# Patient Record
Sex: MALE | Race: WHITE | Employment: OTHER | URBAN - METROPOLITAN AREA
[De-identification: names, ages, dates, MRNs, and addresses within clinical notes are randomized per-mention and may not be internally consistent; named-entity substitution may affect disease eponyms.]

---

## 2017-03-09 ENCOUNTER — APPOINTMENT (OUTPATIENT)
Dept: LAB | Facility: MEDICAL CENTER | Age: 72
End: 2017-03-09
Payer: MEDICARE

## 2017-03-09 ENCOUNTER — TRANSCRIBE ORDERS (OUTPATIENT)
Dept: ADMINISTRATIVE | Facility: HOSPITAL | Age: 72
End: 2017-03-09

## 2017-03-09 DIAGNOSIS — C91.10 CHRONIC LYMPHOCYTIC LEUKEMIA OF B-CELL TYPE NOT HAVING ACHIEVED REMISSION (HCC): ICD-10-CM

## 2017-03-09 DIAGNOSIS — C18.1 MALIGNANT NEOPLASM OF APPENDIX (HCC): ICD-10-CM

## 2017-03-09 PROCEDURE — 80053 COMPREHEN METABOLIC PANEL: CPT

## 2017-03-09 PROCEDURE — 85025 COMPLETE CBC W/AUTO DIFF WBC: CPT

## 2017-03-09 PROCEDURE — 36415 COLL VENOUS BLD VENIPUNCTURE: CPT

## 2017-03-09 PROCEDURE — 82378 CARCINOEMBRYONIC ANTIGEN: CPT

## 2017-03-09 PROCEDURE — 83615 LACTATE (LD) (LDH) ENZYME: CPT

## 2017-03-10 LAB
ALBUMIN SERPL BCP-MCNC: 4.1 G/DL (ref 3.5–5)
ALP SERPL-CCNC: 90 U/L (ref 46–116)
ALT SERPL W P-5'-P-CCNC: 38 U/L (ref 12–78)
ANION GAP SERPL CALCULATED.3IONS-SCNC: 8 MMOL/L (ref 4–13)
AST SERPL W P-5'-P-CCNC: 12 U/L (ref 5–45)
BASOPHILS # BLD AUTO: 0.07 THOUSANDS/ΜL (ref 0–0.1)
BASOPHILS NFR BLD AUTO: 1 % (ref 0–1)
BILIRUB SERPL-MCNC: 0.36 MG/DL (ref 0.2–1)
BUN SERPL-MCNC: 26 MG/DL (ref 5–25)
CALCIUM SERPL-MCNC: 9.8 MG/DL (ref 8.3–10.1)
CEA SERPL-MCNC: 1.3 NG/ML (ref 0–3)
CHLORIDE SERPL-SCNC: 105 MMOL/L (ref 100–108)
CO2 SERPL-SCNC: 26 MMOL/L (ref 21–32)
CREAT SERPL-MCNC: 1.36 MG/DL (ref 0.6–1.3)
EOSINOPHIL # BLD AUTO: 0.19 THOUSAND/ΜL (ref 0–0.61)
EOSINOPHIL NFR BLD AUTO: 3 % (ref 0–6)
ERYTHROCYTE [DISTWIDTH] IN BLOOD BY AUTOMATED COUNT: 13.3 % (ref 11.6–15.1)
GFR SERPL CREATININE-BSD FRML MDRD: 51.8 ML/MIN/1.73SQ M
GLUCOSE SERPL-MCNC: 177 MG/DL (ref 65–140)
HCT VFR BLD AUTO: 42.6 % (ref 36.5–49.3)
HGB BLD-MCNC: 15 G/DL (ref 12–17)
LDH SERPL-CCNC: 187 U/L (ref 81–234)
LYMPHOCYTES # BLD AUTO: 1.77 THOUSANDS/ΜL (ref 0.6–4.47)
LYMPHOCYTES NFR BLD AUTO: 30 % (ref 14–44)
MCH RBC QN AUTO: 31 PG (ref 26.8–34.3)
MCHC RBC AUTO-ENTMCNC: 35.2 G/DL (ref 31.4–37.4)
MCV RBC AUTO: 88 FL (ref 82–98)
MONOCYTES # BLD AUTO: 0.35 THOUSAND/ΜL (ref 0.17–1.22)
MONOCYTES NFR BLD AUTO: 6 % (ref 4–12)
NEUTROPHILS # BLD AUTO: 3.61 THOUSANDS/ΜL (ref 1.85–7.62)
NEUTS SEG NFR BLD AUTO: 60 % (ref 43–75)
NRBC BLD AUTO-RTO: 0 /100 WBCS
PLATELET # BLD AUTO: 196 THOUSANDS/UL (ref 149–390)
PMV BLD AUTO: 10.7 FL (ref 8.9–12.7)
POTASSIUM SERPL-SCNC: 4.8 MMOL/L (ref 3.5–5.3)
PROT SERPL-MCNC: 8.1 G/DL (ref 6.4–8.2)
RBC # BLD AUTO: 4.84 MILLION/UL (ref 3.88–5.62)
SODIUM SERPL-SCNC: 139 MMOL/L (ref 136–145)
WBC # BLD AUTO: 6 THOUSAND/UL (ref 4.31–10.16)

## 2017-03-17 ENCOUNTER — ALLSCRIPTS OFFICE VISIT (OUTPATIENT)
Dept: OTHER | Facility: OTHER | Age: 72
End: 2017-03-17

## 2017-03-17 ENCOUNTER — TRANSCRIBE ORDERS (OUTPATIENT)
Dept: ADMINISTRATIVE | Facility: HOSPITAL | Age: 72
End: 2017-03-17

## 2017-03-17 DIAGNOSIS — C91.10 LEUKEMIA, LYMPHOCYTIC, CHRONIC (HCC): Primary | ICD-10-CM

## 2017-07-05 ENCOUNTER — APPOINTMENT (OUTPATIENT)
Dept: LAB | Facility: CLINIC | Age: 72
End: 2017-07-05
Payer: MEDICARE

## 2017-07-05 ENCOUNTER — TRANSCRIBE ORDERS (OUTPATIENT)
Dept: LAB | Facility: CLINIC | Age: 72
End: 2017-07-05

## 2017-07-05 DIAGNOSIS — C18.1 MALIGNANT NEOPLASM OF APPENDIX (HCC): ICD-10-CM

## 2017-07-05 LAB
ALBUMIN SERPL BCP-MCNC: 4 G/DL (ref 3.5–5)
ALP SERPL-CCNC: 85 U/L (ref 46–116)
ALT SERPL W P-5'-P-CCNC: 28 U/L (ref 12–78)
ANION GAP SERPL CALCULATED.3IONS-SCNC: 9 MMOL/L (ref 4–13)
AST SERPL W P-5'-P-CCNC: 18 U/L (ref 5–45)
BASOPHILS # BLD AUTO: 0.06 THOUSANDS/ΜL (ref 0–0.1)
BASOPHILS NFR BLD AUTO: 1 % (ref 0–1)
BILIRUB SERPL-MCNC: 0.6 MG/DL (ref 0.2–1)
BUN SERPL-MCNC: 25 MG/DL (ref 5–25)
CALCIUM SERPL-MCNC: 9 MG/DL (ref 8.3–10.1)
CHLORIDE SERPL-SCNC: 101 MMOL/L (ref 100–108)
CO2 SERPL-SCNC: 28 MMOL/L (ref 21–32)
CREAT SERPL-MCNC: 1.19 MG/DL (ref 0.6–1.3)
EOSINOPHIL # BLD AUTO: 0.14 THOUSAND/ΜL (ref 0–0.61)
EOSINOPHIL NFR BLD AUTO: 2 % (ref 0–6)
ERYTHROCYTE [DISTWIDTH] IN BLOOD BY AUTOMATED COUNT: 13 % (ref 11.6–15.1)
GFR SERPL CREATININE-BSD FRML MDRD: >60 ML/MIN/1.73SQ M
GLUCOSE SERPL-MCNC: 122 MG/DL (ref 65–140)
HCT VFR BLD AUTO: 42 % (ref 36.5–49.3)
HGB BLD-MCNC: 14.5 G/DL (ref 12–17)
LYMPHOCYTES # BLD AUTO: 1.63 THOUSANDS/ΜL (ref 0.6–4.47)
LYMPHOCYTES NFR BLD AUTO: 25 % (ref 14–44)
MCH RBC QN AUTO: 29.5 PG (ref 26.8–34.3)
MCHC RBC AUTO-ENTMCNC: 34.5 G/DL (ref 31.4–37.4)
MCV RBC AUTO: 85 FL (ref 82–98)
MONOCYTES # BLD AUTO: 0.42 THOUSAND/ΜL (ref 0.17–1.22)
MONOCYTES NFR BLD AUTO: 6 % (ref 4–12)
NEUTROPHILS # BLD AUTO: 4.28 THOUSANDS/ΜL (ref 1.85–7.62)
NEUTS SEG NFR BLD AUTO: 66 % (ref 43–75)
PLATELET # BLD AUTO: 193 THOUSANDS/UL (ref 149–390)
PMV BLD AUTO: 9.8 FL (ref 8.9–12.7)
POTASSIUM SERPL-SCNC: 4.1 MMOL/L (ref 3.5–5.3)
PROT SERPL-MCNC: 7.8 G/DL (ref 6.4–8.2)
RBC # BLD AUTO: 4.92 MILLION/UL (ref 3.88–5.62)
SODIUM SERPL-SCNC: 138 MMOL/L (ref 136–145)
WBC # BLD AUTO: 6.53 THOUSAND/UL (ref 4.31–10.16)

## 2017-07-05 PROCEDURE — 82378 CARCINOEMBRYONIC ANTIGEN: CPT

## 2017-07-05 PROCEDURE — 80053 COMPREHEN METABOLIC PANEL: CPT

## 2017-07-05 PROCEDURE — 36415 COLL VENOUS BLD VENIPUNCTURE: CPT

## 2017-07-05 PROCEDURE — 85025 COMPLETE CBC W/AUTO DIFF WBC: CPT

## 2017-07-06 LAB — CEA SERPL-MCNC: 1.3 NG/ML (ref 0–3)

## 2017-07-10 ENCOUNTER — HOSPITAL ENCOUNTER (OUTPATIENT)
Dept: CT IMAGING | Facility: HOSPITAL | Age: 72
Discharge: HOME | End: 2017-07-10
Attending: INTERNAL MEDICINE
Payer: MEDICARE

## 2017-07-10 DIAGNOSIS — C18.1 MALIGNANT NEOPLASM OF APPENDIX (HCC): ICD-10-CM

## 2017-07-10 PROCEDURE — 71260 CT THORAX DX C+: CPT

## 2017-07-10 PROCEDURE — 74177 CT ABD & PELVIS W/CONTRAST: CPT

## 2017-07-10 RX ADMIN — IOHEXOL 100 ML: 350 INJECTION, SOLUTION INTRAVENOUS at 14:21

## 2017-07-14 DIAGNOSIS — C18.1 MALIGNANT NEOPLASM OF APPENDIX (HCC): ICD-10-CM

## 2017-07-21 ENCOUNTER — ALLSCRIPTS OFFICE VISIT (OUTPATIENT)
Dept: OTHER | Facility: OTHER | Age: 72
End: 2017-07-21

## 2017-09-02 ENCOUNTER — APPOINTMENT (OUTPATIENT)
Dept: LAB | Facility: CLINIC | Age: 72
End: 2017-09-02
Payer: MEDICARE

## 2017-09-02 ENCOUNTER — TRANSCRIBE ORDERS (OUTPATIENT)
Dept: LAB | Facility: CLINIC | Age: 72
End: 2017-09-02

## 2017-09-02 DIAGNOSIS — E11.9 DIABETES MELLITUS WITHOUT COMPLICATION (HCC): Primary | ICD-10-CM

## 2017-09-02 DIAGNOSIS — E11.9 DIABETES MELLITUS WITHOUT COMPLICATION (HCC): ICD-10-CM

## 2017-09-02 LAB
ANION GAP SERPL CALCULATED.3IONS-SCNC: 8 MMOL/L (ref 4–13)
BUN SERPL-MCNC: 21 MG/DL (ref 5–25)
CALCIUM SERPL-MCNC: 9.2 MG/DL (ref 8.3–10.1)
CHLORIDE SERPL-SCNC: 103 MMOL/L (ref 100–108)
CO2 SERPL-SCNC: 28 MMOL/L (ref 21–32)
CREAT SERPL-MCNC: 1.25 MG/DL (ref 0.6–1.3)
EST. AVERAGE GLUCOSE BLD GHB EST-MCNC: 174 MG/DL
GFR SERPL CREATININE-BSD FRML MDRD: 58 ML/MIN/1.73SQ M
GLUCOSE P FAST SERPL-MCNC: 133 MG/DL (ref 65–99)
HBA1C MFR BLD: 7.7 % (ref 4.2–6.3)
POTASSIUM SERPL-SCNC: 4.2 MMOL/L (ref 3.5–5.3)
SODIUM SERPL-SCNC: 139 MMOL/L (ref 136–145)

## 2017-09-02 PROCEDURE — 83036 HEMOGLOBIN GLYCOSYLATED A1C: CPT

## 2017-09-02 PROCEDURE — 80048 BASIC METABOLIC PNL TOTAL CA: CPT

## 2017-09-02 PROCEDURE — 36415 COLL VENOUS BLD VENIPUNCTURE: CPT

## 2017-09-13 ENCOUNTER — HOSPITAL ENCOUNTER (OUTPATIENT)
Dept: RADIOLOGY | Facility: HOSPITAL | Age: 72
Discharge: HOME | End: 2017-09-13
Attending: PODIATRIST
Payer: MEDICARE

## 2017-09-13 ENCOUNTER — TRANSCRIBE ORDERS (OUTPATIENT)
Dept: ADMINISTRATIVE | Facility: HOSPITAL | Age: 72
End: 2017-09-13

## 2017-09-13 ENCOUNTER — HOSPITAL ENCOUNTER (OUTPATIENT)
Dept: RADIOLOGY | Age: 72
Discharge: HOME | End: 2017-09-13
Payer: MEDICARE

## 2017-09-13 DIAGNOSIS — M86.171 ACUTE OSTEOMYELITIS OF RIGHT ANKLE OR FOOT (HCC): ICD-10-CM

## 2017-09-13 DIAGNOSIS — E11.621 DIABETIC FOOT ULCER WITH OSTEOMYELITIS (HCC): ICD-10-CM

## 2017-09-13 DIAGNOSIS — M86.172 OSTEOMYELITIS OF FOOT, LEFT, ACUTE (HCC): Primary | ICD-10-CM

## 2017-09-13 DIAGNOSIS — E11.69 DIABETIC FOOT ULCER WITH OSTEOMYELITIS (HCC): ICD-10-CM

## 2017-09-13 DIAGNOSIS — L97.509 DIABETIC FOOT ULCER WITH OSTEOMYELITIS (HCC): ICD-10-CM

## 2017-09-13 DIAGNOSIS — E08.51: ICD-10-CM

## 2017-09-13 DIAGNOSIS — M86.9 DIABETIC FOOT ULCER WITH OSTEOMYELITIS (HCC): ICD-10-CM

## 2017-09-13 DIAGNOSIS — M86.172 ACUTE OSTEOMYELITIS OF LEFT ANKLE OR FOOT (HCC): Primary | ICD-10-CM

## 2017-09-13 DIAGNOSIS — M86.172 OSTEOMYELITIS OF FOOT, LEFT, ACUTE (HCC): ICD-10-CM

## 2017-09-13 DIAGNOSIS — M86.172 ACUTE OSTEOMYELITIS OF LEFT ANKLE OR FOOT (HCC): ICD-10-CM

## 2017-09-13 PROCEDURE — 73630 X-RAY EXAM OF FOOT: CPT

## 2017-09-13 PROCEDURE — 73718 MRI LOWER EXTREMITY W/O DYE: CPT

## 2017-09-14 ENCOUNTER — TRANSCRIBE ORDERS (OUTPATIENT)
Dept: ADMINISTRATIVE | Facility: HOSPITAL | Age: 72
End: 2017-09-14

## 2017-09-15 ENCOUNTER — HOSPITAL ENCOUNTER (OUTPATIENT)
Dept: RADIOLOGY | Facility: HOSPITAL | Age: 72
Discharge: HOME | End: 2017-09-15
Payer: MEDICARE

## 2017-09-15 DIAGNOSIS — E08.51: ICD-10-CM

## 2017-09-15 PROCEDURE — 93925 LOWER EXTREMITY STUDY: CPT

## 2017-09-15 PROCEDURE — 93923 UPR/LXTR ART STDY 3+ LVLS: CPT

## 2017-11-14 ENCOUNTER — APPOINTMENT (OUTPATIENT)
Dept: LAB | Facility: CLINIC | Age: 72
End: 2017-11-14
Payer: MEDICARE

## 2017-11-14 DIAGNOSIS — C18.1 MALIGNANT NEOPLASM OF APPENDIX (HCC): ICD-10-CM

## 2017-11-14 LAB
ALBUMIN SERPL BCP-MCNC: 4.2 G/DL (ref 3.5–5)
ALP SERPL-CCNC: 97 U/L (ref 46–116)
ALT SERPL W P-5'-P-CCNC: 42 U/L (ref 12–78)
ANION GAP SERPL CALCULATED.3IONS-SCNC: 9 MMOL/L (ref 4–13)
AST SERPL W P-5'-P-CCNC: 20 U/L (ref 5–45)
BASOPHILS # BLD AUTO: 0.06 THOUSANDS/ΜL (ref 0–0.1)
BASOPHILS NFR BLD AUTO: 1 % (ref 0–1)
BILIRUB SERPL-MCNC: 0.5 MG/DL (ref 0.2–1)
BUN SERPL-MCNC: 24 MG/DL (ref 5–25)
CALCIUM SERPL-MCNC: 9.6 MG/DL (ref 8.3–10.1)
CEA SERPL-MCNC: 1.4 NG/ML (ref 0–3)
CHLORIDE SERPL-SCNC: 103 MMOL/L (ref 100–108)
CO2 SERPL-SCNC: 27 MMOL/L (ref 21–32)
CREAT SERPL-MCNC: 1.26 MG/DL (ref 0.6–1.3)
EOSINOPHIL # BLD AUTO: 0.11 THOUSAND/ΜL (ref 0–0.61)
EOSINOPHIL NFR BLD AUTO: 2 % (ref 0–6)
ERYTHROCYTE [DISTWIDTH] IN BLOOD BY AUTOMATED COUNT: 13 % (ref 11.6–15.1)
GFR SERPL CREATININE-BSD FRML MDRD: 57 ML/MIN/1.73SQ M
GLUCOSE SERPL-MCNC: 216 MG/DL (ref 65–140)
HCT VFR BLD AUTO: 43.3 % (ref 36.5–49.3)
HGB BLD-MCNC: 14.9 G/DL (ref 12–17)
LDH SERPL-CCNC: 167 U/L (ref 81–234)
LYMPHOCYTES # BLD AUTO: 1.35 THOUSANDS/ΜL (ref 0.6–4.47)
LYMPHOCYTES NFR BLD AUTO: 22 % (ref 14–44)
MCH RBC QN AUTO: 30.1 PG (ref 26.8–34.3)
MCHC RBC AUTO-ENTMCNC: 34.4 G/DL (ref 31.4–37.4)
MCV RBC AUTO: 88 FL (ref 82–98)
MONOCYTES # BLD AUTO: 0.32 THOUSAND/ΜL (ref 0.17–1.22)
MONOCYTES NFR BLD AUTO: 5 % (ref 4–12)
NEUTROPHILS # BLD AUTO: 4.43 THOUSANDS/ΜL (ref 1.85–7.62)
NEUTS SEG NFR BLD AUTO: 70 % (ref 43–75)
PLATELET # BLD AUTO: 187 THOUSANDS/UL (ref 149–390)
PMV BLD AUTO: 9.8 FL (ref 8.9–12.7)
POTASSIUM SERPL-SCNC: 5.3 MMOL/L (ref 3.5–5.3)
PROT SERPL-MCNC: 8 G/DL (ref 6.4–8.2)
RBC # BLD AUTO: 4.95 MILLION/UL (ref 3.88–5.62)
SODIUM SERPL-SCNC: 139 MMOL/L (ref 136–145)
WBC # BLD AUTO: 6.27 THOUSAND/UL (ref 4.31–10.16)

## 2017-11-14 PROCEDURE — 83615 LACTATE (LD) (LDH) ENZYME: CPT

## 2017-11-14 PROCEDURE — 80053 COMPREHEN METABOLIC PANEL: CPT

## 2017-11-14 PROCEDURE — 36415 COLL VENOUS BLD VENIPUNCTURE: CPT

## 2017-11-14 PROCEDURE — 85025 COMPLETE CBC W/AUTO DIFF WBC: CPT

## 2017-11-14 PROCEDURE — 82378 CARCINOEMBRYONIC ANTIGEN: CPT

## 2017-11-17 DIAGNOSIS — C18.1 MALIGNANT NEOPLASM OF APPENDIX (HCC): ICD-10-CM

## 2017-12-06 ENCOUNTER — ANESTHESIA EVENT (OUTPATIENT)
Dept: PERIOP | Facility: HOSPITAL | Age: 72
End: 2017-12-06
Payer: MEDICARE

## 2017-12-06 ENCOUNTER — ANESTHESIA (OUTPATIENT)
Dept: PERIOP | Facility: HOSPITAL | Age: 72
End: 2017-12-06
Payer: MEDICARE

## 2017-12-06 ENCOUNTER — APPOINTMENT (OUTPATIENT)
Dept: RADIOLOGY | Facility: HOSPITAL | Age: 72
End: 2017-12-06
Payer: MEDICARE

## 2017-12-06 ENCOUNTER — HOSPITAL ENCOUNTER (OUTPATIENT)
Facility: HOSPITAL | Age: 72
Setting detail: OUTPATIENT SURGERY
Discharge: HOME | End: 2017-12-06
Attending: PODIATRIST | Admitting: PODIATRIST
Payer: MEDICARE

## 2017-12-06 VITALS
RESPIRATION RATE: 18 BRPM | OXYGEN SATURATION: 95 % | HEIGHT: 66 IN | SYSTOLIC BLOOD PRESSURE: 156 MMHG | DIASTOLIC BLOOD PRESSURE: 70 MMHG | WEIGHT: 197 LBS | TEMPERATURE: 97.7 F | BODY MASS INDEX: 31.66 KG/M2 | HEART RATE: 77 BPM

## 2017-12-06 DIAGNOSIS — R26.2 AMBULATORY DYSFUNCTION: Primary | ICD-10-CM

## 2017-12-06 LAB
GLUCOSE SERPL-MCNC: 115 MG/DL (ref 65–140)
GLUCOSE SERPL-MCNC: 115 MG/DL (ref 65–140)

## 2017-12-06 PROCEDURE — 73620 X-RAY EXAM OF FOOT: CPT

## 2017-12-06 PROCEDURE — 82948 REAGENT STRIP/BLOOD GLUCOSE: CPT

## 2017-12-06 PROCEDURE — 73630 X-RAY EXAM OF FOOT: CPT

## 2017-12-06 DEVICE — IMPLANTABLE DEVICE: Type: IMPLANTABLE DEVICE | Site: TOE | Status: FUNCTIONAL

## 2017-12-06 RX ORDER — FENTANYL CITRATE 50 UG/ML
INJECTION, SOLUTION INTRAMUSCULAR; INTRAVENOUS AS NEEDED
Status: DISCONTINUED | OUTPATIENT
Start: 2017-12-06 | End: 2017-12-06 | Stop reason: SURG

## 2017-12-06 RX ORDER — ONDANSETRON 2 MG/ML
4 INJECTION INTRAMUSCULAR; INTRAVENOUS EVERY 4 HOURS PRN
Status: CANCELLED | OUTPATIENT
Start: 2017-12-06

## 2017-12-06 RX ORDER — FENTANYL CITRATE/PF 50 MCG/ML
25 SYRINGE (ML) INJECTION
Status: DISCONTINUED | OUTPATIENT
Start: 2017-12-06 | End: 2017-12-06 | Stop reason: HOSPADM

## 2017-12-06 RX ORDER — PROPOFOL 10 MG/ML
INJECTION, EMULSION INTRAVENOUS CONTINUOUS PRN
Status: DISCONTINUED | OUTPATIENT
Start: 2017-12-06 | End: 2017-12-06 | Stop reason: SURG

## 2017-12-06 RX ORDER — ONDANSETRON 2 MG/ML
4 INJECTION INTRAMUSCULAR; INTRAVENOUS EVERY 6 HOURS PRN
Status: DISCONTINUED | OUTPATIENT
Start: 2017-12-06 | End: 2017-12-06 | Stop reason: HOSPADM

## 2017-12-06 RX ORDER — PROPOFOL 10 MG/ML
INJECTION, EMULSION INTRAVENOUS AS NEEDED
Status: DISCONTINUED | OUTPATIENT
Start: 2017-12-06 | End: 2017-12-06 | Stop reason: SURG

## 2017-12-06 RX ORDER — OXYCODONE HYDROCHLORIDE 5 MG/1
5 TABLET ORAL EVERY 4 HOURS PRN
Status: DISCONTINUED | OUTPATIENT
Start: 2017-12-06 | End: 2017-12-06 | Stop reason: HOSPADM

## 2017-12-06 RX ORDER — MAGNESIUM HYDROXIDE 1200 MG/15ML
LIQUID ORAL AS NEEDED
Status: DISCONTINUED | OUTPATIENT
Start: 2017-12-06 | End: 2017-12-06 | Stop reason: HOSPADM

## 2017-12-06 RX ORDER — TRAMADOL HYDROCHLORIDE 50 MG/1
50 TABLET ORAL EVERY 6 HOURS PRN
Qty: 15 TABLET | Refills: 0 | Status: SHIPPED | OUTPATIENT
Start: 2017-12-06 | End: 2017-12-16

## 2017-12-06 RX ORDER — FENTANYL CITRATE/PF 50 MCG/ML
25 SYRINGE (ML) INJECTION
Status: CANCELLED | OUTPATIENT
Start: 2017-12-06

## 2017-12-06 RX ORDER — SODIUM CHLORIDE, SODIUM LACTATE, POTASSIUM CHLORIDE, CALCIUM CHLORIDE 600; 310; 30; 20 MG/100ML; MG/100ML; MG/100ML; MG/100ML
50 INJECTION, SOLUTION INTRAVENOUS CONTINUOUS
Status: DISCONTINUED | OUTPATIENT
Start: 2017-12-06 | End: 2017-12-06 | Stop reason: HOSPADM

## 2017-12-06 RX ORDER — ONDANSETRON 2 MG/ML
4 INJECTION INTRAMUSCULAR; INTRAVENOUS ONCE AS NEEDED
Status: DISCONTINUED | OUTPATIENT
Start: 2017-12-06 | End: 2017-12-06 | Stop reason: HOSPADM

## 2017-12-06 RX ADMIN — FENTANYL CITRATE 50 MCG: 50 INJECTION INTRAMUSCULAR; INTRAVENOUS at 12:07

## 2017-12-06 RX ADMIN — PROPOFOL 100 MCG/KG/MIN: 10 INJECTION, EMULSION INTRAVENOUS at 11:08

## 2017-12-06 RX ADMIN — SODIUM CHLORIDE, SODIUM LACTATE, POTASSIUM CHLORIDE, AND CALCIUM CHLORIDE: .6; .31; .03; .02 INJECTION, SOLUTION INTRAVENOUS at 11:03

## 2017-12-06 RX ADMIN — FENTANYL CITRATE 50 MCG: 50 INJECTION INTRAMUSCULAR; INTRAVENOUS at 11:08

## 2017-12-06 RX ADMIN — CEFAZOLIN SODIUM 2000 MG: 2 SOLUTION INTRAVENOUS at 11:03

## 2017-12-06 RX ADMIN — PROPOFOL 100 MG: 10 INJECTION, EMULSION INTRAVENOUS at 11:08

## 2017-12-06 NOTE — ANESTHESIA POSTPROCEDURE EVALUATION
Post-Op Assessment Note      CV Status:  Stable    Mental Status:  Alert    Hydration Status:  Stable    PONV Controlled:  None    Airway Patency:  Patent    Post Op Vitals Reviewed: Yes          Staff: Anesthesiologist, CRNA           BP      Temp     Pulse     Resp      SpO2

## 2017-12-06 NOTE — OP NOTE
OPERATIVE REPORT  PATIENT NAME: Len Roca    :  1945  MRN: [id number]  Pt Location: AN OR ROOM 04    SURGERY DATE: 2017    Surgeon(s) and Role:     * Gerber Multani DPM - Primary     * Tory Almendarez DPM - Assisting     * Gloria Lemus - Assisting    Preop Diagnosis:  Contracture of joint of left   Type 2 diabetes mellitus with foot ulcer (CODE) ([facility] ) [E11 621]    Post-Op Diagnosis Codes:     * Contracture of joint of left foot     * Type 2 diabetes mellitus with foot ulcer (CODE) ([facility] ) [E11 621]    Procedure(s) (LRB):  HALLUX INTERPHALANGEAL JOINT FUSION (Left)  HALLUX INTERPHALANGEAL JOINT INTERNAL FIXATION; REPAIR OF ULCERATION WITH EXCISIONS AND REVISION OF SKIN HALLUX WITH USE OF ALLOGRAFT (Left)    Specimen(s):  * No specimens in log *    Estimated Blood Loss:   Minimal      Anesthesia Type:   MAC with 10 cc 1:1 mix 1% lidocaine plain 0 5% bupivacaine plain    Hemostasis:  Pneumatic ankle tourniquet 250 mmHg for 50 minutes    Materials:  3-0 Vicryl  3-0 nylon  Julia Allowrap graft 2x4 cm  Julia X Fuse Superelastic Implant Size XL      Operative Indications:  Contracture of joint of right foot [M24 574]  Type 2 diabetes mellitus with foot ulcer (CODE) ([facility] 75 )     Operative Findings:  Excision of wound from the tip of the left hallux and resection of tip of the distal phalanx  Arthrodesis of hallux interphalangeal joint with adequate apposition of bones and proper placement of implant confirmed on intraoperative fluoroscopy  Complications:   None    Procedure and Technique:  Under mild sedation, the patient was brought into the operating room and placed on the operating room table in the supine position  A pneumatic ankle tourniquet was then placed around the patient's left ankle with ample webril padding  A time out was performed to confirm the correct patient, procedure and site with all parties in agreement   Following IV sedation, local anesthetic was obtained about the patient's left 1st ray in a Baltazar block type fashion consisting of 10 ml of 1% Lidocaine and 0 5% Bupivacaine in a 1:1 mixture  The foot was then scrubbed, prepped and draped in the usual aseptic manner  An esmarch bandage was utilized to exsangunate the patients foot and the pneumatic ankle tourniquet was then inflated  The esmarch bandage was removed and the foot was placed on the operating room table  Attention was then directed to the distal tip of the patient's left hallux where a small wound was noted  An elliptical incision line was drawn out in a 3-1 ratio with the apices both medial and laterally  Utilizing a 15 blade that the incision was made through the skin subcutaneous tissues down to the tip of the distal hallux  The full thickness of the ellipse these was excised and passed off the table  At this point a myocutaneous flap was raised off the distal tip of the distal phalanx both dorsally and plantarly  The tip was then resected utilizing a rongeur  Adequate resection of the tip of the phalanx was confirmed with intraoperative fluoroscopy  The surgical site was then flushed out  Then part of the Letcher allowrap graft was trimmed to fit the size of the wound and placed overlying the resection site  The incision was then closed with 3 on nylon in a series of simple interrupted sutures  Then attention was directed to the dorsal aspect of the interphalangeal joint of the hallux where the patient has previously been noted to have significant contracture arthritis of the joint  A linear elliptical incision was made overlying the joint from distal to proximal   The full thickness of the ellipse these was removed and passed off the table  The subcutaneous tissues were then reflected off the joint both medially and laterally  This point the level of the interphalangeal joint was identified and a tenotomy of the extensor was performed overlying the joint    The tendon was then gently reflected back off the phalanges both proximally and distally  The head of the proximal phalanx was then released of all soft tissue attachments  The joint was inspected and there was significant degenerative a shin of the articular cartilage to both the base of the distal phalanx and head of the proximal phalanx  Then a sagittal saw was used to resect the articular cartilage of both sides of the joint  Upon resection the articular cartilage both sides were noted to be flat and ready for apposition utilizing an implant  The areas then flushed with saline  Then according to manufacture instructions both the proximal and distal phalanges were appropriately drilled and broached according to manufacturing guidelines for the Junction X MAG Interactive Implant  Then a and size extra-large implant was inserted into the proximal and distal phalanx according to manufacturing guidelines  Adequate compression of the joint was noted both clinically and on the intraoperative fluoroscopy  Appropriate positioning of the implant was also confirmed on fluoroscopy  The surgical site was then flushed with copious amounts of sterile saline  Part of the Junction allowrap graft was then placed over the fusion site just plantar to the extensor tendon  Extensor tendon was then primarily repaired utilizing 4-0 Vicryl in a series of simple interrupted sutures  The remainder of the Junction Allowrap was then placed dorsal to the tendon  The skin was then reapproximated utilizing 3-0 nylon in a running interlocking stitch  The pneumatic ankle tourniquet was then deflated after a total of 50 minutes and a prompt hyperemic response was noted to the patient's left foot capillary refill time less than 3 seconds to all digits of the left foot  Xeroform was then placed over both of the incision sites  The surgical site was then dressed with Xeroform, 2 inch Lefty, and a 2 inch Ace wrap    The patient was then awoken from surgery and transferred to the post anesthesia care unit where it is expected he will be discharged home later today      Patient Disposition:  PACU     SIGNATURE: John Edwards DPM  DATE: December 6, 2017  TIME: 12:27 PM

## 2017-12-06 NOTE — ANESTHESIA PREPROCEDURE EVALUATION
Review of Systems/Medical History  Patient summary reviewed  Chart reviewed  No history of anesthetic complications     Cardiovascular  EKG reviewed, Exercise tolerance: good,  Hypertension , Past MI (reports type ii nstemi during appendectomy; neg stress test after) ,   Comment: Very active in ADLs; can do you hardwork, carry tools as a ; no angina/CP/SOB  ,  Pulmonary  Not a smoker , No asthma: , No recent URI , No sleep apnea , ,        GI/Hepatic            Endo/Other  Diabetes poorly controlled ,      GYN       Hematology   Musculoskeletal       Neurology   Psychology         Lab Results   Component Value Date    WBC 6 27 11/14/2017    HGB 14 9 11/14/2017     11/14/2017     Lab Results   Component Value Date     11/14/2017    K 5 3 11/14/2017    BUN 24 11/14/2017    CREATININE 1 26 11/14/2017    GLUCOSE 216 (H) 11/14/2017     No results found for: PTT   Lab Results   Component Value Date    INR 1 27 (H) 04/07/2015       Blood type O    Lab Results   Component Value Date    HGBA1C 7 7 (H) 09/02/2017         Physical Exam    Airway    Mallampati score: II  TM Distance: >3 FB       Dental       Cardiovascular      Pulmonary      Other Findings        Anesthesia Plan  ASA Score- 2       Anesthesia Type- IV sedation with anesthesia with ASA Monitors  Additional Monitors:   Airway Plan:     Comment: EDYTA Hatch , have personally seen and evaluated the patient prior to anesthetic care  I have reviewed the pre-anesthetic record, and other medical records if appropriate to the anesthetic care  If a CRNA is involved in the case, I have reviewed the CRNA assessment, if present, and agree  Risks/benefits and alternatives discussed with patient including possible PONV, sore throat, and possibility of rare anesthetic and surgical emergencies          Induction-       Informed Consent- Anesthetic plan and risks discussed with patient    I personally reviewed this patient with the CRNA  Discussed and agreed on the Anesthesia Plan with the JOSEPH Escobedo

## 2017-12-06 NOTE — DISCHARGE INSTRUCTIONS
Post-Operative Instructions    1  Take your prescribed medication as directed  2  Upon arrival at home, lie down and elevate your surgical foot on 2 pillows  3  Remain quiet, off your feet as much as possible, for the first 24-48 hours  This is when your feet first swell and may become painful  After 48 hours you may begin limited walking following these restrictions:   Partial weightbearing to heel of surgical foot in DARCO (toe unloading) shoe  4  Drink large quantities of water and citrus fruit juice  Consume no alcohol  Continue a well-balanced diet  5  Report any unusual discomfort or fever to this office  6  A limited amount of discomfort and swelling is to be expected  In some cases the skin may take on a bruised appearance  The surgical solution that was applied to your foot prior to the operation is dark in color and the operation site may appear to be oozing when it actually is not  7  A slight amount of blood is to be expected, and is no cause for alarm  Do not remove the dressings  If there is active bleeding and if the bleeding persists, add additional gauze to the bandage, apply direct pressure, elevate your feet and call this office  8  Do not get the dressings wet  As regular bathing may be inconvenient, sponge baths are recommended  9  If necessary, take a mild laxative before retiring  10  Wear your special open shoes anytime you put weight on your foot, even if it is just to walk to the bathroom and back  It will probably be a couple weeks before you will be permitted to try regular shoes  11  Having performed the operation, we are interested in a prompt recovery  Please cooperate by following the above instructions  12  Please call to confirm your post-op appointment or call with any other questions

## 2017-12-06 NOTE — DISCHARGE SUMMARY
Discharge Summary Outpatient Procedure Podiatry- Bre Correa 71 y o  male MRN: [id number]    Unit/Bed#: [facility] Encounter: [id number]    Admission Date: 12/6/2017     Admitting Diagnosis: contracture of joint, left hallux  Discharge Diagnosis: same    Procedures Performed: HALLUX INTERPHALANGEAL JOINT FUSION:   HALLUX INTERPHALANGEAL JOINT INTERNAL FIXATION; REPAIR OF ULCERATION WITH EXCISIONS AND REVISION OF SKIN HALLUX WITH USE OF ALLOGRAFT:     Complications: none    Condition at Discharge: stable    Discharge instructions/Information to patient and family:   See after visit summary for information provided to patient and family  Provisions for Follow-Up Care/Important appointments:  See after visit summary for information related to follow-up care and any pertinent home health orders  Discharge Medications:  See after visit summary for reconciled discharge medications provided to patient and family

## 2017-12-07 ENCOUNTER — APPOINTMENT (OUTPATIENT)
Dept: LAB | Facility: CLINIC | Age: 72
End: 2017-12-07
Payer: MEDICARE

## 2017-12-07 ENCOUNTER — TRANSCRIBE ORDERS (OUTPATIENT)
Dept: LAB | Facility: CLINIC | Age: 72
End: 2017-12-07

## 2017-12-07 ENCOUNTER — ALLSCRIPTS OFFICE VISIT (OUTPATIENT)
Dept: OTHER | Facility: OTHER | Age: 72
End: 2017-12-07

## 2017-12-07 ENCOUNTER — TRANSCRIBE ORDERS (OUTPATIENT)
Dept: ADMINISTRATIVE | Facility: HOSPITAL | Age: 72
End: 2017-12-07

## 2017-12-07 DIAGNOSIS — E11.8 TYPE 2 DIABETES MELLITUS WITH COMPLICATION, UNSPECIFIED LONG TERM INSULIN USE STATUS: Primary | ICD-10-CM

## 2017-12-07 DIAGNOSIS — E11.8 TYPE 2 DIABETES MELLITUS WITH COMPLICATION, UNSPECIFIED LONG TERM INSULIN USE STATUS: ICD-10-CM

## 2017-12-07 DIAGNOSIS — C18.9 MALIGNANT NEOPLASM OF COLON, UNSPECIFIED PART OF COLON (HCC): Primary | ICD-10-CM

## 2017-12-07 LAB
ANION GAP SERPL CALCULATED.3IONS-SCNC: 9 MMOL/L (ref 4–13)
BUN SERPL-MCNC: 35 MG/DL (ref 5–25)
CALCIUM SERPL-MCNC: 9.2 MG/DL (ref 8.3–10.1)
CHLORIDE SERPL-SCNC: 103 MMOL/L (ref 100–108)
CO2 SERPL-SCNC: 29 MMOL/L (ref 21–32)
CREAT SERPL-MCNC: 1.62 MG/DL (ref 0.6–1.3)
EST. AVERAGE GLUCOSE BLD GHB EST-MCNC: 169 MG/DL
GFR SERPL CREATININE-BSD FRML MDRD: 42 ML/MIN/1.73SQ M
GLUCOSE SERPL-MCNC: 117 MG/DL (ref 65–140)
HBA1C MFR BLD: 7.5 % (ref 4.2–6.3)
POTASSIUM SERPL-SCNC: 4.9 MMOL/L (ref 3.5–5.3)
SODIUM SERPL-SCNC: 141 MMOL/L (ref 136–145)

## 2017-12-07 PROCEDURE — 36415 COLL VENOUS BLD VENIPUNCTURE: CPT

## 2017-12-07 PROCEDURE — 83036 HEMOGLOBIN GLYCOSYLATED A1C: CPT

## 2017-12-07 PROCEDURE — 80048 BASIC METABOLIC PNL TOTAL CA: CPT

## 2017-12-08 NOTE — PROGRESS NOTES
Assessment    1  Malignant neoplasm of colon, unspecified part of colon (153 9) (C18 9)   2  Chronic lymphocytic leukemia (204 10) (C91 10)    Plan  Malignant neoplasm of colon, unspecified part of colon    · (1) CBC/PLT/DIFF; Status:Active; Requested for:14May2018; Perform:[facility]; ZOD:[id number]; Last Updated By:Wing Bedolla; 12/7/2017 12:07:55 PM;Ordered;neoplasm of colon, unspecified part of colon; Ordered By:Pacheco Dover;   · (1) CBC/PLT/DIFF; Status:Complete; Requested for:Dates Schedule: 5/14/2018 ; Perform:[facility]; WAN:[id number]; Last Updated Janet Gary; 12/7/2017 12:07:55 PM;Ordered;neoplasm of colon, unspecified part of colon; Ordered By:Pacheco Dover;   · (1) CEA; Status:Active; Requested for:14May2018; Perform:[facility]; SFZ:[id number]; Last Updated By:Wing Bedolla; 12/7/2017 12:07:55 PM;Ordered;neoplasm of colon, unspecified part of colon; Ordered By:Pacheco Dover;   · (1) CEA; Status:Complete; Requested for:Dates Schedule: 5/14/2018 ; Perform:[facility]; GCI:[id number]; Last Updated Janet Gary; 12/7/2017 12:07:55 PM;Ordered;neoplasm of colon, unspecified part of colon; Ordered By:Pacheco Dover;   · (1) COMPREHENSIVE METABOLIC PANEL; Status:Active; Requested for:14May2018; Perform:[facility]; AJMA:[id number]; Last Updated By:Wing Bedolla; 12/7/2017 12:07:55 PM;Ordered;neoplasm of colon, unspecified part of colon; Ordered By:Pacheco Dover;   · (1) COMPREHENSIVE METABOLIC PANEL; Status:Complete; Requested for:DatesSchedule: 5/14/2018 ; Perform:[facility]; NNT:[id number]; Last Updated Janet Gary; 12/7/2017 12:07:55 PM;Ordered;neoplasm of colon, unspecified part of colon; Ordered By:Pacheco Dover;   · * CT CHEST ABDOMEN PELVIS W CONTRAST; Status:Active; Requested CBI:[id number];    Perform:[facility]; Order Comments:[facility] at 10am  Arrive 15 minutes early  Follow barium instructions ; Due:75Ggv1052; Last Updated By:Collins, Sheryle Poche; 12/7/2017 12:08:48 PM;Ordered;neoplasm of colon, unspecified part of colon; Ordered By:Pacheco Dover;   · Follow-up visit in 6 months Evaluation and Treatment  Follow-up  Status: Complete Done: [id number]   Ordered;Malignant neoplasm of colon, unspecified part of colon; Ordered By: Yana Nino Performed:  Due: [id number]; Last Updated By: Marcelo Guerrero; 12/7/2017 12:07:55 PM    Discussion/Summary  Discussion Summary: This is a pleasant elderly male with a past history of chronic lymphocytic leukemia with bulky adenopathy  He got 4 cycles of bendamustine and Rituxan starting in 6/2012 with a very nice response and no residual disease on his PET scan  Since he did have a history of bulky adenopathy we decided to give him Rituxan in the maintenance setting  After his first dose he was running a low white count and we had a discussion  He decided to go off of maintenance Rituxan and just be observed  his CBC has remained normal since completion of treatment    He was doing well until he was admitted for appendicitis  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  Only one lymph node was removed  He needed a proper cancer operation  He underwent right hemicolectomy 5/2015 which showed no evidence of residual disease  23 lymph nodes were negative  Based on this we decided he did not need any further chemotherapy  He is now on observation  His CLL is stable  His imaging in 7/2017 shows no evidence of metastatic disease  There is supraclavicular lymphadenopathy which has been stable and is suspected to be related to his CLL  clinically, he is doing well  He is asked to follow up in 6 months with repeat blood work and CT scan of the chest abdomen and pelvis  Should he have any issues or concerns in the interim, he is asked to call the office     Counseling Documentation With Imm: The patient was counseled regarding diagnostic results,-- instructions for management,-- prognosis,-- patient and family education  Medication SE Review and Pt Understands Tx: The treatment plan was reviewed with the patient/guardian  The patient/guardian understands and agrees with the treatment plan      Chief Complaint  Chief Complaint: Chief Complaint:  The patient presents to the office today with history of chronic 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  Chief Complaint Free Text Note Form: History of CLL  was diagnosed with a stage II A  appendiceal carcinoma      History of Present Illness  HPI: 12/7/2017: Guero johnson is a 76-year-old gentleman who was initially seen in our practice in 2012  He had been following with a different hematologist for CLL  He states that practitioner we wanted to put him on trial when his white blood cell count was increasing  Imaging did identify bulky adenopathy as per records that are available  6/8/2012 hemoglobin was 9 2, , white blood cell count 64794 with 92% lymphocytes, 5 percent atypical lymphocytes 1% blasts  ANC 1 99  ALC 61 1  His total protein was elevated to 10 3 with normal albumin of 3 5 creatinine 1 29   FLORENCIO was negative  He received his 1st cycle beginning of June 2012  He underwent 4 cycles of bendamustine and Rituxan with excellent tolerance and no residual disease on follow-up PET-CT  Rituxan maintenance for low-grade discussed and patient agreed  He received 1 dose but had low white blood cell count  Rituxan therefore discontinued  4/29/2013, flow cytometry on peripheral blood showed no evidence of lymphoproliferative disorder (including CLL)  he underwent laparoscopic appendectomy performed by Dr Ramon Opitz for accute appendicitis  Adenocarcinoma ex goblet cell carcinoid, signet ring cell type  - Histologic grade (G3): Â Poorly differentiated (greater than 50% signet ring cells)  Â - Microscopic tumor extension (pT3):  Tumor invades through the muscularisÂ propria into subserosa and mesoappendix without penetration of the visualized visceralÂ peritoneum  Histologic grade (G3),  he had a consultation with Dr Charley Lucero who did not feel that he needed HIPEC  Dr Miki Gómez performed right hemicolectomy and partial omentectomy on 5/28/2015  residual tumor identified  - Twenty-three lymph nodes identified; all negative for malignancy (0/23)  - All margins are negative for tumor  Stage IIA, pT3, N0, G3  Previous Therapy:   patient had 4 cycles of bendamustine and Rituxan followed by one dose of Rituxan the maintenance setting 2012   hemicolectomy and partial omentectomy 5/28/2015: Stage IIA, pT3, N0, G3  Poorly differentiated (greater than 50% signet ring cells)  Adenocarcinoma ex goblet cell carcinoid, signet ring cell type appendix  Current Therapy: observation   Disease Status: JARRED  Interval History: left HALLUX INTERPHALANGEAL JOINT INTERNAL FIXATION; REPAIR OF ULCERATION WITH EXCISIONS 12/6/2017 for non-healing ulcer  Review of Systems  Complete-Male:  As stated in the history of present illness otherwise his 14 point review of systems today was negative  Constitutional: No fever or chills, feels well, no tiredness, no recent weight gain or weight loss  Eyes: No complaints of eye pain, no red eyes, no discharge from eyes, no itchy eyes  ENT: no complaints of earache, no hearing loss, no nosebleeds, no nasal discharge, no sore throat, no hoarseness  Cardiovascular: No complaints of slow heart rate, no fast heart rate, no chest pain, no palpitations, no leg claudication, no lower extremity  Respiratory: No complaints of shortness of breath, no wheezing, no cough, no SOB on exertion, no orthopnea or PND  Gastrointestinal: No complaints of abdominal pain, no constipation, no nausea or vomiting, no diarrhea or bloody stools    Genitourinary: No complaints of dysuria, no incontinence, no hesitancy, no nocturia, no genital lesion, no testicular pain   Musculoskeletal: as noted in HPI  Integumentary: No complaints of skin rash or skin lesions, no itching, no skin wound, no dry skin  Neurological: No compliants of headache, no confusion, no convulsions, no numbness or tingling, no dizziness or fainting, no limb weakness, no difficulty walking  Psychiatric: Is not suicidal, no sleep disturbances, no anxiety or depression, no change in personality, no emotional problems  Endocrine: No complaints of proptosis, no hot flashes, no muscle weakness, no erectile dysfunction, no deepening of the voice, no feelings of weakness  Hematologic/Lymphatic: No complaints of swollen glands, no swollen glands in the neck, does not bleed easily, no easy bruising  Active Problems    1  Arthritis (716 90) (M19 90)   2  C  difficile colitis (008 45) (A04 72)   3  Cardiomyopathy (425 4) (I42 9)   4  Chronic lymphocytic leukemia (204 10) (C91 10)   5  Hypertension (401 9) (I10)   6  Malignant neoplasm of appendix (153 5) (C18 1)   7  Malignant neoplasm of colon, unspecified part of colon (153 9) (C18 9)   8  Type 2 diabetes mellitus (250 00) (E11 9)    Past Medical History  1  History of Chronic Lymphocytic Leukemia (V10 61)   2  History of cataract (V12 49) (Z86 69)   3  History of colonic polyps (V12 72) (Z86 010)   4  History of Lyme disease (088 81) (A69 20)   5  History of Pneumonia (V12 61)    Surgical History  1  History of Appendectomy   2  History of Cataract Surgery   3  History of Cholecystectomy   4  History of Colon Surgery   5  History of Complete Colonoscopy   6  History of Tonsillectomy  Surgical History Reviewed: The surgical history was reviewed and updated today  Family History  Father    1  Family history of pancreatic cancer (V16 0) (Z80 0)   2  Family history of Prostate Cancer (V16 42)  Brother    3  Family history of Chronic Kidney Disease, Stage   4  Family history of Coronary Artery Disease (V17 49)  Family History    5   Family history of Diabetes Mellitus (V18 0)  Family History Reviewed: The family history was reviewed and updated today  Social History     · Denied: History of Alcohol use   · Never A Smoker  Social History Reviewed: The social history was reviewed and updated today  Current Meds   1  AmLODIPine Besylate 10 MG Oral Tablet; take 1/2 tablet daily; Therapy: 15Pgv9865 to (Evaluate:01Jan2016); Last Rx:88Vdm5484 Ordered   2  Fish Oil OIL; 1200mg daily; Therapy: (Ewelina Tellez) to Recorded   3  Fluconazole 200 MG Oral Tablet; TAKE 1 TABLET DAILY AS DIRECTED; Therapy: (Remkimi Dickinson) to Recorded   4  GlipiZIDE ER 10 MG Oral Tablet Extended Release 24 Hour; take 1 tablet daily as needed; Therapy: 09Smf9972 to (Evaluate:01Jan2016); Last Rx:03Sep2015 Ordered   5  Lisinopril 20 MG Oral Tablet; Take 1 tablet daily; Therapy: ([place]) to Recorded   6  MetFORMIN HCl  MG Oral Tablet Extended Release 24 Hour; Take 1 tablet twice daily; Therapy: 81NFV6311 to (Evaluate:27Apr2015) Recorded  Medication List Reviewed: The medication list was reviewed and updated today  Allergies  1  No Known Drug Allergies    Vitals  Vital Signs    Recorded: 89KZN0585 11:36AM   Temperature 97 5 F   Heart Rate 85   Respiration 16   Systolic 888   Diastolic 78   Height 5 ft 6 in   Weight 194 lb 5 oz   BMI Calculated 31 36   BSA Calculated 1 98   O2 Saturation 90   Pain Scale 0       Physical Exam   Constitutional  General appearance: No acute distress, well appearing and well nourished  Eyes  Conjunctiva and lids: No swelling, erythema, or discharge  Pupils and irises: Equal, round and reactive to light  Ears, Nose, Mouth, and Throat  External inspection of ears and nose: Normal    Nasal mucosa, septum, and turbinates: Normal without edema or erythema  Oropharynx: Normal with no erythema, edema, exudate or lesions  Pulmonary  Respiratory effort: No increased work of breathing or signs of respiratory distress  Auscultation of lungs: Clear to auscultation, equal breath sounds bilaterally, no wheezes, no rales, no rhonci  Cardiovascular  Palpation of heart: Normal PMI, no thrills  Auscultation of heart: Normal rate and rhythm, normal S1 and S2, without murmurs  Examination of extremities for edema and/or varicosities: Normal    Abdomen  Abdomen: Non-tender, no masses  Liver and spleen: No hepatomegaly or splenomegaly  Lymphatic  Palpation of lymph nodes in neck: No lymphadenopathy  Musculoskeletal walking boot left foot  Digits and nails: Normal without clubbing or cyanosis  Inspection/palpation of joints, bones, and muscles: Normal    Skin  Skin and subcutaneous tissue: Abnormal  -- he does have actinic keratoses on the left side of his face which again are acting up  I advised her put on heavy sunscreen and use a big hat  Neurologic  Cranial nerves: Cranial nerves 2-12 intact  Sensation: No sensory loss  Psychiatric  Orientation to person, place and time: Normal    Mood and affect: Normal          Results/Data  Results Form:   Results  Pathology Right hemicolectomy specimen was read as a stage II a appendiceal carcinoma  It is a pathologic T3 N0 grade 3 tumor  23 lymph nodes were negative for malignancy  Radiology Reviewed  Health Management  Malignant neoplasm of colon, unspecified part of colon   COLONOSCOPY (GI, SURG); every 3 years; Last 21Apr2016; Next Due: 15Kyr0364; Active    Future Appointments    Date/Time Provider Specialty Site   06/15/2018 10:00 AM EDYTA Camarena   Hematology [facility]cology [facility]       Signatures   Electronically signed by : Jasmin Tristan, [facility]; Dec  7 2017 12:41PM EST                       (Author)    Electronically signed by : EDYTA Smart ; Dec  7 2017  6:12PM EST

## 2017-12-13 ENCOUNTER — APPOINTMENT (OUTPATIENT)
Dept: LAB | Facility: CLINIC | Age: 72
End: 2017-12-13
Payer: MEDICARE

## 2017-12-13 DIAGNOSIS — E11.8 TYPE 2 DIABETES MELLITUS WITH COMPLICATION, UNSPECIFIED LONG TERM INSULIN USE STATUS: ICD-10-CM

## 2017-12-13 LAB
ANION GAP SERPL CALCULATED.3IONS-SCNC: 7 MMOL/L (ref 4–13)
BUN SERPL-MCNC: 26 MG/DL (ref 5–25)
CALCIUM SERPL-MCNC: 9.4 MG/DL (ref 8.3–10.1)
CHLORIDE SERPL-SCNC: 103 MMOL/L (ref 100–108)
CO2 SERPL-SCNC: 28 MMOL/L (ref 21–32)
CREAT SERPL-MCNC: 1.34 MG/DL (ref 0.6–1.3)
GFR SERPL CREATININE-BSD FRML MDRD: 53 ML/MIN/1.73SQ M
GLUCOSE P FAST SERPL-MCNC: 248 MG/DL (ref 65–99)
POTASSIUM SERPL-SCNC: 4.6 MMOL/L (ref 3.5–5.3)
SODIUM SERPL-SCNC: 138 MMOL/L (ref 136–145)

## 2017-12-13 PROCEDURE — 36415 COLL VENOUS BLD VENIPUNCTURE: CPT

## 2017-12-13 PROCEDURE — 80048 BASIC METABOLIC PNL TOTAL CA: CPT

## 2017-12-21 ENCOUNTER — GENERIC CONVERSION - ENCOUNTER (OUTPATIENT)
Dept: OTHER | Facility: OTHER | Age: 72
End: 2017-12-21

## 2017-12-21 ENCOUNTER — APPOINTMENT (OUTPATIENT)
Dept: RADIOLOGY | Facility: CLINIC | Age: 72
End: 2017-12-21
Payer: MEDICARE

## 2017-12-21 ENCOUNTER — TRANSCRIBE ORDERS (OUTPATIENT)
Dept: RADIOLOGY | Facility: CLINIC | Age: 72
End: 2017-12-21

## 2017-12-21 DIAGNOSIS — Z98.1 ARTHRODESIS STATUS: Primary | ICD-10-CM

## 2017-12-21 DIAGNOSIS — E11.69 DIABETIC FOOT ULCER WITH OSTEOMYELITIS (HCC): ICD-10-CM

## 2017-12-21 DIAGNOSIS — M86.9 DIABETIC FOOT ULCER WITH OSTEOMYELITIS (HCC): ICD-10-CM

## 2017-12-21 DIAGNOSIS — E11.621 DIABETIC FOOT ULCER WITH OSTEOMYELITIS (HCC): ICD-10-CM

## 2017-12-21 DIAGNOSIS — L97.509 DIABETIC FOOT ULCER WITH OSTEOMYELITIS (HCC): ICD-10-CM

## 2017-12-21 DIAGNOSIS — Z98.1 ARTHRODESIS STATUS: ICD-10-CM

## 2017-12-21 PROCEDURE — 73630 X-RAY EXAM OF FOOT: CPT

## 2018-01-12 NOTE — RESULT NOTES
Verified Results  (1) TISSUE EXAM 21Apr2016 09:38AM Carmenza Ruelas   Erythematous mucosa at anastamosis site     Test Name Result Flag Reference   LAB AP CASE REPORT (Report)     Surgical Pathology Report             Case: [id number]                   Authorizing Provider: Nancy Balbuena DO    Collected:      04/21/2016 7896        Ordering Location:   [address] [place] [address]   Received:      04/21/2016 [address] [facility]                               Pathologist:      Sonali Wallace MD                              Specimens:  A) - Large Intestine, NOS, surgical anastamosis site                          B) - Small Bowel, NOS, terminal ileum   LAB AP FINAL DIAGNOSIS (Report)     A  Colon, anastomotic site, biopsy:    - Mild acute colitis with glandular injury and regenerative changes and   associated focal foreign body giant cell reaction     - No dysplasia or neoplasia identified  B  Small bowel, terminal ileum, biopsy:    - Ileal mucosa with normal villous architecture and no significant   pathologic abnormalities  - No dysplasia or neoplasia identified  LAB AP SURGICAL ADDITIONAL INFORMATION (Report)     These tests were developed and their performance characteristics   determined by [address] [place] [address] or [facility]  They may not be cleared or approved by the [place]  Food and   Drug Administration  The FDA has determined that such clearance or   approval is not necessary  These tests are used for clinical purposes  They should not be regarded as investigational or for research  This   laboratory has been approved by CLIA 88, designated as a high-complexity   laboratory and is qualified to perform these tests  - Interpretation performed at [facility]   LAB AP GROSS DESCRIPTION (Report)     A   The specimen is received in formalin, labeled with the patient's name   and hospital number, and is designated surgical anastomosis site  The   specimen consists of 5 tan-pink to red soft tissue fragments ranging from   0 1 centimeters to 0 4 centimeters in greatest dimension  Entirely   submitted  One cassette  B  The specimen is received in formalin, labeled with the patient's name   and hospital number, and is designated :terminal ileum  The specimen   consists of 5 tan-pink soft tissue fragments ranging from 0 2 centimeters   to 0 7 centimeters in greatest dimension  Note: The estimated total formalin fixation time based upon information   provided by the submitting clinician and the standard processing schedule   is 18 75 hours      MAS   LAB AP CLINICAL INFORMATION      Erythematous mucosa at anastamosis site

## 2018-01-14 VITALS
WEIGHT: 198 LBS | OXYGEN SATURATION: 94 % | HEART RATE: 71 BPM | BODY MASS INDEX: 31.82 KG/M2 | DIASTOLIC BLOOD PRESSURE: 78 MMHG | RESPIRATION RATE: 16 BRPM | TEMPERATURE: 98.6 F | SYSTOLIC BLOOD PRESSURE: 122 MMHG | HEIGHT: 66 IN

## 2018-01-15 VITALS
DIASTOLIC BLOOD PRESSURE: 80 MMHG | TEMPERATURE: 97.7 F | WEIGHT: 196 LBS | OXYGEN SATURATION: 98 % | RESPIRATION RATE: 16 BRPM | BODY MASS INDEX: 31.5 KG/M2 | HEART RATE: 73 BPM | HEIGHT: 66 IN | SYSTOLIC BLOOD PRESSURE: 138 MMHG

## 2018-01-16 NOTE — MISCELLANEOUS
Dear Mr Shaunna Fajardo,    The biopsies from your colon were fine  Based on this, and your prior cancer, I recommend a repeat colonoscopy in 3 years          Sincerely,        Minal Marrero DO       Electronically signed by:Minal Ruelas DO  Apr 26 2016  8:15AM EST

## 2018-01-22 VITALS
HEART RATE: 85 BPM | RESPIRATION RATE: 16 BRPM | DIASTOLIC BLOOD PRESSURE: 78 MMHG | SYSTOLIC BLOOD PRESSURE: 124 MMHG | OXYGEN SATURATION: 90 % | HEIGHT: 66 IN | BODY MASS INDEX: 31.23 KG/M2 | TEMPERATURE: 97.5 F | WEIGHT: 194.31 LBS

## 2018-03-22 ENCOUNTER — TRANSCRIBE ORDERS (OUTPATIENT)
Dept: RADIOLOGY | Facility: CLINIC | Age: 73
End: 2018-03-22

## 2018-03-22 ENCOUNTER — APPOINTMENT (OUTPATIENT)
Dept: RADIOLOGY | Facility: CLINIC | Age: 73
End: 2018-03-22
Payer: MEDICARE

## 2018-03-22 DIAGNOSIS — M86.9 DIABETIC FOOT ULCER WITH OSTEOMYELITIS (HCC): ICD-10-CM

## 2018-03-22 DIAGNOSIS — L97.509 DIABETIC FOOT ULCER WITH OSTEOMYELITIS (HCC): ICD-10-CM

## 2018-03-22 DIAGNOSIS — M86.172 ACUTE OSTEOMYELITIS OF LEFT ANKLE OR FOOT (HCC): ICD-10-CM

## 2018-03-22 DIAGNOSIS — Z98.1 ARTHRODESIS STATUS: Primary | ICD-10-CM

## 2018-03-22 DIAGNOSIS — E11.621 DIABETIC FOOT ULCER WITH OSTEOMYELITIS (HCC): ICD-10-CM

## 2018-03-22 DIAGNOSIS — E11.69 DIABETIC FOOT ULCER WITH OSTEOMYELITIS (HCC): ICD-10-CM

## 2018-03-22 DIAGNOSIS — Z98.1 ARTHRODESIS STATUS: ICD-10-CM

## 2018-03-22 PROCEDURE — 73630 X-RAY EXAM OF FOOT: CPT

## 2018-03-27 ENCOUNTER — ANESTHESIA EVENT (OUTPATIENT)
Dept: PERIOP | Facility: HOSPITAL | Age: 73
End: 2018-03-27
Payer: MEDICARE

## 2018-03-28 RX ORDER — FLUOCINONIDE TOPICAL SOLUTION USP, 0.05% 0.5 MG/ML
SOLUTION TOPICAL 2 TIMES DAILY
COMMUNITY
End: 2020-09-09

## 2018-03-28 RX ORDER — MULTIVITAMIN
1 TABLET ORAL DAILY
COMMUNITY

## 2018-03-30 ENCOUNTER — APPOINTMENT (OUTPATIENT)
Dept: RADIOLOGY | Facility: HOSPITAL | Age: 73
End: 2018-03-30
Payer: MEDICARE

## 2018-03-30 ENCOUNTER — HOSPITAL ENCOUNTER (OUTPATIENT)
Dept: RADIOLOGY | Facility: HOSPITAL | Age: 73
Setting detail: OUTPATIENT SURGERY
Discharge: HOME | End: 2018-03-30
Payer: MEDICARE

## 2018-03-30 ENCOUNTER — HOSPITAL ENCOUNTER (OUTPATIENT)
Facility: HOSPITAL | Age: 73
Setting detail: OUTPATIENT SURGERY
Discharge: HOME | End: 2018-03-30
Attending: PODIATRIST | Admitting: PODIATRIST
Payer: MEDICARE

## 2018-03-30 ENCOUNTER — ANESTHESIA (OUTPATIENT)
Dept: PERIOP | Facility: HOSPITAL | Age: 73
End: 2018-03-30
Payer: MEDICARE

## 2018-03-30 VITALS
TEMPERATURE: 97.6 F | BODY MASS INDEX: 32.14 KG/M2 | RESPIRATION RATE: 16 BRPM | OXYGEN SATURATION: 96 % | DIASTOLIC BLOOD PRESSURE: 81 MMHG | SYSTOLIC BLOOD PRESSURE: 149 MMHG | WEIGHT: 200 LBS | HEIGHT: 66 IN | HEART RATE: 56 BPM

## 2018-03-30 DIAGNOSIS — T84.418A BREAKDOWN (MECHANICAL) OF OTHER INTERNAL ORTHOPEDIC DEVICES, IMPLANTS AND GRAFTS, INITIAL ENCOUNTER (HCC): ICD-10-CM

## 2018-03-30 LAB
GLUCOSE SERPL-MCNC: 106 MG/DL (ref 65–140)
GLUCOSE SERPL-MCNC: 148 MG/DL (ref 65–140)

## 2018-03-30 PROCEDURE — 88311 DECALCIFY TISSUE: CPT | Performed by: PATHOLOGY

## 2018-03-30 PROCEDURE — C1713 ANCHOR/SCREW BN/BN,TIS/BN: HCPCS | Performed by: PODIATRIST

## 2018-03-30 PROCEDURE — 82948 REAGENT STRIP/BLOOD GLUCOSE: CPT

## 2018-03-30 PROCEDURE — 88304 TISSUE EXAM BY PATHOLOGIST: CPT | Performed by: PATHOLOGY

## 2018-03-30 PROCEDURE — 73630 X-RAY EXAM OF FOOT: CPT

## 2018-03-30 DEVICE — HYDROSET INJECTABLE HA BONE SUBST. 5CC
Type: IMPLANTABLE DEVICE | Site: FOOT | Status: FUNCTIONAL
Brand: HYDROSET

## 2018-03-30 RX ORDER — FENTANYL CITRATE/PF 50 MCG/ML
50 SYRINGE (ML) INJECTION
Status: DISCONTINUED | OUTPATIENT
Start: 2018-03-30 | End: 2018-03-30 | Stop reason: HOSPADM

## 2018-03-30 RX ORDER — MIDAZOLAM HYDROCHLORIDE 1 MG/ML
INJECTION INTRAMUSCULAR; INTRAVENOUS AS NEEDED
Status: DISCONTINUED | OUTPATIENT
Start: 2018-03-30 | End: 2018-03-30 | Stop reason: SURG

## 2018-03-30 RX ORDER — OXYCODONE HYDROCHLORIDE AND ACETAMINOPHEN 5; 325 MG/1; MG/1
1 TABLET ORAL EVERY 6 HOURS PRN
Qty: 20 TABLET | Refills: 0 | Status: SHIPPED | OUTPATIENT
Start: 2018-03-30 | End: 2018-04-09

## 2018-03-30 RX ORDER — FENTANYL CITRATE 50 UG/ML
INJECTION, SOLUTION INTRAMUSCULAR; INTRAVENOUS AS NEEDED
Status: DISCONTINUED | OUTPATIENT
Start: 2018-03-30 | End: 2018-03-30 | Stop reason: SURG

## 2018-03-30 RX ORDER — PROPOFOL 10 MG/ML
INJECTION, EMULSION INTRAVENOUS AS NEEDED
Status: DISCONTINUED | OUTPATIENT
Start: 2018-03-30 | End: 2018-03-30 | Stop reason: SURG

## 2018-03-30 RX ORDER — MEPERIDINE HYDROCHLORIDE 50 MG/ML
12.5 INJECTION INTRAMUSCULAR; INTRAVENOUS; SUBCUTANEOUS ONCE AS NEEDED
Status: DISCONTINUED | OUTPATIENT
Start: 2018-03-30 | End: 2018-03-30 | Stop reason: HOSPADM

## 2018-03-30 RX ORDER — OXYCODONE HYDROCHLORIDE AND ACETAMINOPHEN 5; 325 MG/1; MG/1
1 TABLET ORAL EVERY 4 HOURS PRN
Status: DISCONTINUED | OUTPATIENT
Start: 2018-03-30 | End: 2018-03-30 | Stop reason: HOSPADM

## 2018-03-30 RX ORDER — ONDANSETRON 2 MG/ML
4 INJECTION INTRAMUSCULAR; INTRAVENOUS ONCE AS NEEDED
Status: DISCONTINUED | OUTPATIENT
Start: 2018-03-30 | End: 2018-03-30 | Stop reason: HOSPADM

## 2018-03-30 RX ORDER — SODIUM CHLORIDE 9 MG/ML
125 INJECTION, SOLUTION INTRAVENOUS CONTINUOUS
Status: DISCONTINUED | OUTPATIENT
Start: 2018-03-30 | End: 2018-03-30 | Stop reason: HOSPADM

## 2018-03-30 RX ADMIN — PROPOFOL 40 MG: 10 INJECTION, EMULSION INTRAVENOUS at 12:48

## 2018-03-30 RX ADMIN — MIDAZOLAM HYDROCHLORIDE 2 MG: 1 INJECTION, SOLUTION INTRAMUSCULAR; INTRAVENOUS at 12:38

## 2018-03-30 RX ADMIN — FENTANYL CITRATE 50 MCG: 50 INJECTION, SOLUTION INTRAMUSCULAR; INTRAVENOUS at 13:02

## 2018-03-30 RX ADMIN — PROPOFOL 40 MG: 10 INJECTION, EMULSION INTRAVENOUS at 12:44

## 2018-03-30 RX ADMIN — PROPOFOL 30 MG: 10 INJECTION, EMULSION INTRAVENOUS at 13:02

## 2018-03-30 RX ADMIN — CEFAZOLIN SODIUM 2000 MG: 2 SOLUTION INTRAVENOUS at 12:45

## 2018-03-30 RX ADMIN — FENTANYL CITRATE 50 MCG: 50 INJECTION, SOLUTION INTRAMUSCULAR; INTRAVENOUS at 12:38

## 2018-03-30 RX ADMIN — SODIUM CHLORIDE 125 ML/HR: 0.9 INJECTION, SOLUTION INTRAVENOUS at 10:34

## 2018-03-30 RX ADMIN — PROPOFOL 60 MG: 10 INJECTION, EMULSION INTRAVENOUS at 12:41

## 2018-03-30 RX ADMIN — PROPOFOL 50 MG: 10 INJECTION, EMULSION INTRAVENOUS at 13:38

## 2018-03-30 NOTE — DISCHARGE INSTRUCTIONS
Podiatry:  Keep dressing clean, dry, and intact to the left lower extremity  Remain nonweightbearing to the left lower extremity with the use of a Darco wedge shoe  Take Percocet as needed for pain  Follow up Dr Ricardo Muhammad  Thank you

## 2018-03-30 NOTE — OP NOTE
OPERATIVE REPORT  PATIENT NAME: Nma Rm    :  1945  MRN: [id number]  Pt Location: [facility] OR ROOM 05    SURGERY DATE: 3/30/2018    Surgeon(s) and Role:     * Gerber Multani DPM - Primary     * Ra Olsen DPM - Assisting    Preop Diagnosis:  Breakdown (mechanical) of other internal orthopedic devices, implants and grafts, initial encounter ([place] [address] ) Mirtha Caba left foot     Post-Op Diagnosis Codes:     * Breakdown (mechanical) of other internal orthopedic devices, implants and grafts, initial encounter ([place] [address] ) [T84 418A] left foot     Procedure(s) (LRB):  REMOVAL HARDWARE FOOT (Left)  GREAT TOE BONE BIOPSY (Left)    Specimen(s):  ID Type Source Tests Collected by Time Destination   1 : Bone Left Big Toe Tissue Bone TISSUE EXAM Gerber Multani DPM 3/30/2018 1319      Hemostasis: PNAT about the left calf for 40 minutes at 250 mm hg    Estimated Blood Loss:   5 mL    Materials:  Staples, Xeroform, 4 x 4's, Lefty, Ace    Injectables:  20 cc one-to-one mix 1% lidocaine plain and 0 25% Marcaine plain given preop, 5mL Hydrocet    Complications:  None     Anesthesia Type:   IV Sedation with Anesthesia + local    Operative Indications:  Breakdown (mechanical) of other internal orthopedic devices, implants and grafts, initial encounter ([place] [address] ) [W02 707P]    Operative Findings:  Consistent with diagnosis:  Left hallux with loosening hardware warranting removal    Procedure and Technique:  Under mild sedation, the patient was brought into the operating room and placed on the operating room table in the supine position  A pneumatic calf tourniquet was then placed around the patient's left calf with ample webril padding  A time out was performed to confirm the correct patient, procedure and site with all parties in agreement  Following IV sedation, local anesthetic was injected about the left ankle using 20cc 1:1 mix of 1% lidocain plain and 0 25% marcaine plain   The foot was then scrubbed, prepped and draped in the usual aseptic manner  An esmarch bandage was utilized to exsangunate the patients foot and the pneumatic ankle tourniquet was then inflated  The esmarch bandage was removed and the foot was placed on the operating room table  A skin marker was utilized to plan the incision site about the ulcer on the dorsal aspect of the left hallux  Utilizing a sterile 15 blade an elliptical incision was made so as to excise this region  The incision was carried deeper through subcutaneous tissue and fascia, down to bone  Soft tissue was carefully reflected off of the underlying osseous structures  The interphalangeal joint was identified from which a bone was resected to be sent for tissue exam   At this time the head of the proximal phalanx was resected utilizing a sagittal saw  The hardware was readily visible and utilizing the clamp was removed in entirety without incident  The head region of the proximal phalanx was then drilled subchondrally and the region was irrigated with normal saline  Hydrocet 5 mL which had been prepared on the back table was then injected into this region and packed into the interphalangeal joint  The distal phalanx was compressed onto the proximal phalanx until the Hydrocet hardened  At this time primary skin closure achieved with staples  The incision site was dressed with xeroform, 4x4's, camryn, and an ACE wrap  The patient tolerated the procedure well and was transported to PACU with all vital signs stable       Patient Disposition:  PACU     SIGNATURE: Natasha Zamora DPM  DATE: March 30, 2018  TIME: 2:07 PM

## 2018-03-30 NOTE — DISCHARGE SUMMARY
Discharge Summary Outpatient Procedure Podiatry -   Carlton Marli 71 y o  male MRN: [id number]  Unit/Bed#: OR POOL Encounter: [id number]    Admission Date: 3/30/2018     Admitting Diagnosis: Breakdown (mechanical) of other internal orthopedic devices, implants and grafts, initial encounter [facility]) [T84 418A]    Discharge Diagnosis: same    Procedures Performed: REMOVAL HARDWARE FOOT: 36035 (CPT®)  GREAT TOE BONE BIOPSY:  left hallux     Complications: none    Condition at Discharge: stable    Discharge instructions/Information to patient and family:   See after visit summary for information provided to patient and family  Provisions for Follow-Up Care/Important appointments:  See after visit summary for information related to follow-up care and any pertinent home health orders  Discharge Medications:  See after visit summary for reconciled discharge medications provided to patient and family

## 2018-03-30 NOTE — ANESTHESIA PREPROCEDURE EVALUATION
Review of Systems/Medical History  Patient summary reviewed  Chart reviewed  No history of anesthetic complications     Cardiovascular  EKG reviewed, Exercise tolerance: good,  Hypertension on > 1 medication, Past MI (reports type ii nstemi during appendectomy; neg stress test after) > 6 months, No history of CABG, No cardiac stents  No angina ,   Comment: Very active in ADLs; can do you hardwork, carry tools as a ; no angina/CP/SOB  ,  Pulmonary  Not a smoker , No asthma: , No recent URI , No sleep apnea ,        GI/Hepatic  Negative GI/hepatic ROS          Negative  ROS        Endo/Other  Diabetes poorly controlled type 2 Oral agent,   Comment: BZQ=117 Obesity    GYN  Negative gynecology ROS          Hematology  Negative hematology ROS   Lymphoma (CLL in remission X 6 years)   Musculoskeletal  Negative musculoskeletal ROS        Neurology    Diabetic neuropathy,    Psychology   Negative psychology ROS            Lab Results   Component Value Date    WBC 6 27 11/14/2017    HGB 14 9 11/14/2017     11/14/2017     Lab Results   Component Value Date     12/13/2017    K 4 6 12/13/2017    BUN 26 (H) 12/13/2017    CREATININE 1 34 (H) 12/13/2017    GLUCOSE 117 12/07/2017     No results found for: PTT   Lab Results   Component Value Date    INR 1 27 (H) 04/07/2015       Blood type O    Lab Results   Component Value Date    HGBA1C 7 5 (H) 12/07/2017         Physical Exam    Airway    Mallampati score: II  TM Distance: >3 FB  Neck ROM: full     Dental   No notable dental hx     Cardiovascular  Rhythm: regular, Rate: normal,     Pulmonary  Breath sounds clear to auscultation,     Other Findings        Anesthesia Plan  ASA Score- 3     Anesthesia Type- IV sedation with anesthesia with ASA Monitors  Additional Monitors:   Airway Plan:     Comment:     Plan Factors-Patient not instructed to abstain from smoking on day of procedure  Patient did not smoke on day of surgery      Induction- intravenous  Postoperative Plan-     Informed Consent- Anesthetic plan and risks discussed with patient

## 2018-04-05 ENCOUNTER — TRANSCRIBE ORDERS (OUTPATIENT)
Dept: LAB | Facility: CLINIC | Age: 73
End: 2018-04-05

## 2018-04-05 ENCOUNTER — APPOINTMENT (OUTPATIENT)
Dept: LAB | Facility: CLINIC | Age: 73
End: 2018-04-05
Payer: MEDICARE

## 2018-04-05 DIAGNOSIS — E11.621 DIABETIC FOOT ULCER WITH OSTEOMYELITIS (HCC): ICD-10-CM

## 2018-04-05 DIAGNOSIS — E11.69 DIABETIC FOOT ULCER WITH OSTEOMYELITIS (HCC): ICD-10-CM

## 2018-04-05 DIAGNOSIS — L97.509 DIABETIC FOOT ULCER WITH OSTEOMYELITIS (HCC): ICD-10-CM

## 2018-04-05 DIAGNOSIS — M86.9 DIABETIC FOOT ULCER WITH OSTEOMYELITIS (HCC): ICD-10-CM

## 2018-04-05 DIAGNOSIS — M86.172 ACUTE OSTEOMYELITIS OF LEFT ANKLE OR FOOT (HCC): Primary | ICD-10-CM

## 2018-04-05 DIAGNOSIS — L03.032 ABSCESS OF FIFTH TOENAIL OF LEFT FOOT: ICD-10-CM

## 2018-04-05 LAB
ALBUMIN SERPL BCP-MCNC: 3.8 G/DL (ref 3.5–5)
ALP SERPL-CCNC: 88 U/L (ref 46–116)
ALT SERPL W P-5'-P-CCNC: 36 U/L (ref 12–78)
ANION GAP SERPL CALCULATED.3IONS-SCNC: 12 MMOL/L (ref 4–13)
AST SERPL W P-5'-P-CCNC: 16 U/L (ref 5–45)
BASOPHILS # BLD AUTO: 0.05 THOUSANDS/ΜL (ref 0–0.1)
BASOPHILS NFR BLD AUTO: 1 % (ref 0–1)
BILIRUB DIRECT SERPL-MCNC: 0.16 MG/DL (ref 0–0.2)
BILIRUB SERPL-MCNC: 0.6 MG/DL (ref 0.2–1)
BUN SERPL-MCNC: 21 MG/DL (ref 5–25)
CALCIUM SERPL-MCNC: 9.7 MG/DL (ref 8.3–10.1)
CHLORIDE SERPL-SCNC: 95 MMOL/L (ref 100–108)
CO2 SERPL-SCNC: 29 MMOL/L (ref 21–32)
CREAT SERPL-MCNC: 1.54 MG/DL (ref 0.6–1.3)
CRP SERPL QL: >90 MG/L
EOSINOPHIL # BLD AUTO: 0.24 THOUSAND/ΜL (ref 0–0.61)
EOSINOPHIL NFR BLD AUTO: 3 % (ref 0–6)
ERYTHROCYTE [DISTWIDTH] IN BLOOD BY AUTOMATED COUNT: 12.8 % (ref 11.6–15.1)
ERYTHROCYTE [SEDIMENTATION RATE] IN BLOOD: 55 MM/HOUR (ref 0–10)
EST. AVERAGE GLUCOSE BLD GHB EST-MCNC: 197 MG/DL
GFR SERPL CREATININE-BSD FRML MDRD: 45 ML/MIN/1.73SQ M
GLUCOSE SERPL-MCNC: 329 MG/DL (ref 65–140)
HBA1C MFR BLD: 8.5 % (ref 4.2–6.3)
HCT VFR BLD AUTO: 41 % (ref 36.5–49.3)
HGB BLD-MCNC: 13.9 G/DL (ref 12–17)
LYMPHOCYTES # BLD AUTO: 1.41 THOUSANDS/ΜL (ref 0.6–4.47)
LYMPHOCYTES NFR BLD AUTO: 16 % (ref 14–44)
MCH RBC QN AUTO: 29 PG (ref 26.8–34.3)
MCHC RBC AUTO-ENTMCNC: 33.9 G/DL (ref 31.4–37.4)
MCV RBC AUTO: 85 FL (ref 82–98)
MONOCYTES # BLD AUTO: 0.48 THOUSAND/ΜL (ref 0.17–1.22)
MONOCYTES NFR BLD AUTO: 6 % (ref 4–12)
NEUTROPHILS # BLD AUTO: 6.41 THOUSANDS/ΜL (ref 1.85–7.62)
NEUTS SEG NFR BLD AUTO: 74 % (ref 43–75)
PLATELET # BLD AUTO: 219 THOUSANDS/UL (ref 149–390)
PMV BLD AUTO: 9.6 FL (ref 8.9–12.7)
POTASSIUM SERPL-SCNC: 4.4 MMOL/L (ref 3.5–5.3)
PROT SERPL-MCNC: 8.5 G/DL (ref 6.4–8.2)
RBC # BLD AUTO: 4.8 MILLION/UL (ref 3.88–5.62)
SODIUM SERPL-SCNC: 136 MMOL/L (ref 136–145)
WBC # BLD AUTO: 8.59 THOUSAND/UL (ref 4.31–10.16)

## 2018-04-05 PROCEDURE — 83036 HEMOGLOBIN GLYCOSYLATED A1C: CPT

## 2018-04-05 PROCEDURE — 80053 COMPREHEN METABOLIC PANEL: CPT

## 2018-04-05 PROCEDURE — 82248 BILIRUBIN DIRECT: CPT

## 2018-04-05 PROCEDURE — 86140 C-REACTIVE PROTEIN: CPT

## 2018-04-05 PROCEDURE — 85025 COMPLETE CBC W/AUTO DIFF WBC: CPT

## 2018-04-05 PROCEDURE — 85652 RBC SED RATE AUTOMATED: CPT

## 2018-04-05 PROCEDURE — 36415 COLL VENOUS BLD VENIPUNCTURE: CPT

## 2018-04-12 ENCOUNTER — APPOINTMENT (OUTPATIENT)
Dept: RADIOLOGY | Facility: CLINIC | Age: 73
End: 2018-04-12
Payer: MEDICARE

## 2018-04-12 DIAGNOSIS — E11.69 DIABETIC FOOT ULCER WITH OSTEOMYELITIS (HCC): ICD-10-CM

## 2018-04-12 DIAGNOSIS — L03.032 ABSCESS OF FIFTH TOENAIL OF LEFT FOOT: ICD-10-CM

## 2018-04-12 DIAGNOSIS — L97.509 DIABETIC FOOT ULCER WITH OSTEOMYELITIS (HCC): ICD-10-CM

## 2018-04-12 DIAGNOSIS — M86.172 ACUTE OSTEOMYELITIS OF LEFT ANKLE OR FOOT (HCC): ICD-10-CM

## 2018-04-12 DIAGNOSIS — M86.9 DIABETIC FOOT ULCER WITH OSTEOMYELITIS (HCC): ICD-10-CM

## 2018-04-12 DIAGNOSIS — E11.621 DIABETIC FOOT ULCER WITH OSTEOMYELITIS (HCC): ICD-10-CM

## 2018-04-12 PROCEDURE — 73630 X-RAY EXAM OF FOOT: CPT

## 2018-04-18 ENCOUNTER — HOSPITAL ENCOUNTER (OUTPATIENT)
Dept: RADIOLOGY | Facility: HOSPITAL | Age: 73
Discharge: HOME | End: 2018-04-18
Payer: MEDICARE

## 2018-04-18 ENCOUNTER — HOSPITAL ENCOUNTER (OUTPATIENT)
Facility: HOSPITAL | Age: 73
Setting detail: OUTPATIENT SURGERY
Discharge: HOME | End: 2018-04-18
Attending: PODIATRIST | Admitting: PODIATRIST
Payer: MEDICARE

## 2018-04-18 ENCOUNTER — HOSPITAL ENCOUNTER (OUTPATIENT)
Dept: RADIOLOGY | Age: 73
Discharge: HOME | End: 2018-04-18
Payer: MEDICARE

## 2018-04-18 VITALS
SYSTOLIC BLOOD PRESSURE: 136 MMHG | WEIGHT: 200 LBS | RESPIRATION RATE: 18 BRPM | OXYGEN SATURATION: 95 % | HEIGHT: 67 IN | HEART RATE: 93 BPM | DIASTOLIC BLOOD PRESSURE: 66 MMHG | TEMPERATURE: 97.8 F | BODY MASS INDEX: 31.39 KG/M2

## 2018-04-18 DIAGNOSIS — E11.69 DIABETIC FOOT ULCER WITH OSTEOMYELITIS (HCC): ICD-10-CM

## 2018-04-18 DIAGNOSIS — M86.9 DIABETIC FOOT ULCER WITH OSTEOMYELITIS (HCC): ICD-10-CM

## 2018-04-18 DIAGNOSIS — M86.172 ACUTE OSTEOMYELITIS OF LEFT ANKLE OR FOOT (HCC): ICD-10-CM

## 2018-04-18 DIAGNOSIS — R52 PAIN: ICD-10-CM

## 2018-04-18 DIAGNOSIS — E11.621 DIABETIC FOOT ULCER WITH OSTEOMYELITIS (HCC): ICD-10-CM

## 2018-04-18 DIAGNOSIS — Z98.890 S/P FOOT SURGERY, LEFT: Primary | ICD-10-CM

## 2018-04-18 DIAGNOSIS — L97.509 DIABETIC FOOT ULCER WITH OSTEOMYELITIS (HCC): ICD-10-CM

## 2018-04-18 PROCEDURE — 73620 X-RAY EXAM OF FOOT: CPT

## 2018-04-18 PROCEDURE — 73718 MRI LOWER EXTREMITY W/O DYE: CPT

## 2018-04-18 RX ORDER — OXYCODONE HYDROCHLORIDE AND ACETAMINOPHEN 5; 325 MG/1; MG/1
1 TABLET ORAL EVERY 6 HOURS PRN
Qty: 6 TABLET | Refills: 0 | Status: SHIPPED | OUTPATIENT
Start: 2018-04-18 | End: 2018-04-28

## 2018-04-18 RX ORDER — MAGNESIUM HYDROXIDE 1200 MG/15ML
LIQUID ORAL AS NEEDED
Status: DISCONTINUED | OUTPATIENT
Start: 2018-04-18 | End: 2018-04-18 | Stop reason: HOSPADM

## 2018-04-18 RX ORDER — AMOXICILLIN AND CLAVULANATE POTASSIUM 875; 125 MG/1; MG/1
1 TABLET, FILM COATED ORAL EVERY 12 HOURS SCHEDULED
COMMUNITY
End: 2019-11-07

## 2018-04-18 RX ORDER — CIPROFLOXACIN 500 MG/1
500 TABLET, FILM COATED ORAL DAILY
COMMUNITY
End: 2019-11-07

## 2018-04-18 NOTE — DISCHARGE INSTRUCTIONS
Keep dressings intact to the left foot  You may weight bear as tolerated with your [facility] wedge shoe on the left foot  Please follow up with Dr Marie Spear once you get your MRI done

## 2018-04-18 NOTE — DISCHARGE SUMMARY
Discharge Summary Outpatient Procedure Podiatry -   Erasmo Aguilar 71 y o  male MRN: [id number]  Unit/Bed#: OR POOL Encounter: [id number]    Admission Date: 4/18/2018     Admitting Diagnosis: Breakdown (mechanical) of other internal orthopedic devices, implants and grafts, initial encounter [T84 [id number]]    Discharge Diagnosis: same    Procedures Performed: REMOVAL STAPLES LEFT TOE:     Complications: none    Condition at Discharge: stable    Discharge instructions/Information to patient and family:   See after visit summary for information provided to patient and family  Provisions for Follow-Up Care/Important appointments:  See after visit summary for information related to follow-up care and any pertinent home health orders  Discharge Medications:  See after visit summary for reconciled discharge medications provided to patient and family

## 2018-04-20 ENCOUNTER — DOCUMENTATION (OUTPATIENT)
Dept: OTHER | Facility: HOSPITAL | Age: 73
End: 2018-04-20

## 2018-04-22 LAB — HCV AB SER-ACNC: NEGATIVE

## 2018-05-14 DIAGNOSIS — C18.9 MALIGNANT NEOPLASM OF COLON (HCC): ICD-10-CM

## 2018-06-05 ENCOUNTER — TRANSCRIBE ORDERS (OUTPATIENT)
Dept: LAB | Facility: CLINIC | Age: 73
End: 2018-06-05

## 2018-06-05 ENCOUNTER — APPOINTMENT (OUTPATIENT)
Dept: LAB | Facility: CLINIC | Age: 73
End: 2018-06-05
Payer: MEDICARE

## 2018-06-05 DIAGNOSIS — E11.9 DIABETES MELLITUS WITHOUT COMPLICATION (HCC): ICD-10-CM

## 2018-06-05 DIAGNOSIS — Z12.5 SPECIAL SCREENING FOR MALIGNANT NEOPLASM OF PROSTATE: ICD-10-CM

## 2018-06-05 DIAGNOSIS — C18.9 MALIGNANT NEOPLASM OF COLON (HCC): ICD-10-CM

## 2018-06-05 DIAGNOSIS — Z12.5 SPECIAL SCREENING FOR MALIGNANT NEOPLASM OF PROSTATE: Primary | ICD-10-CM

## 2018-06-05 LAB
ALBUMIN SERPL BCP-MCNC: 4.2 G/DL (ref 3.5–5)
ALP SERPL-CCNC: 87 U/L (ref 46–116)
ALT SERPL W P-5'-P-CCNC: 26 U/L (ref 12–78)
ANION GAP SERPL CALCULATED.3IONS-SCNC: 10 MMOL/L (ref 4–13)
AST SERPL W P-5'-P-CCNC: 14 U/L (ref 5–45)
BASOPHILS # BLD AUTO: 0.06 THOUSANDS/ΜL (ref 0–0.1)
BASOPHILS NFR BLD AUTO: 1 % (ref 0–1)
BILIRUB SERPL-MCNC: 0.5 MG/DL (ref 0.2–1)
BUN SERPL-MCNC: 19 MG/DL (ref 5–25)
CALCIUM SERPL-MCNC: 9.7 MG/DL (ref 8.3–10.1)
CEA SERPL-MCNC: 0.7 NG/ML (ref 0–3)
CHLORIDE SERPL-SCNC: 106 MMOL/L (ref 100–108)
CO2 SERPL-SCNC: 25 MMOL/L (ref 21–32)
CREAT SERPL-MCNC: 1.56 MG/DL (ref 0.6–1.3)
CREAT UR-MCNC: 292 MG/DL
EOSINOPHIL # BLD AUTO: 0.27 THOUSAND/ΜL (ref 0–0.61)
EOSINOPHIL NFR BLD AUTO: 3 % (ref 0–6)
ERYTHROCYTE [DISTWIDTH] IN BLOOD BY AUTOMATED COUNT: 14 % (ref 11.6–15.1)
EST. AVERAGE GLUCOSE BLD GHB EST-MCNC: 163 MG/DL
GFR SERPL CREATININE-BSD FRML MDRD: 44 ML/MIN/1.73SQ M
GLUCOSE P FAST SERPL-MCNC: 120 MG/DL (ref 65–99)
HBA1C MFR BLD: 7.3 % (ref 4.2–6.3)
HCT VFR BLD AUTO: 40.3 % (ref 36.5–49.3)
HGB BLD-MCNC: 13.3 G/DL (ref 12–17)
LYMPHOCYTES # BLD AUTO: 1.77 THOUSANDS/ΜL (ref 0.6–4.47)
LYMPHOCYTES NFR BLD AUTO: 22 % (ref 14–44)
MCH RBC QN AUTO: 28.4 PG (ref 26.8–34.3)
MCHC RBC AUTO-ENTMCNC: 33 G/DL (ref 31.4–37.4)
MCV RBC AUTO: 86 FL (ref 82–98)
MICROALBUMIN UR-MCNC: 41.2 MG/L (ref 0–20)
MICROALBUMIN/CREAT 24H UR: 14 MG/G CREATININE (ref 0–30)
MONOCYTES # BLD AUTO: 0.44 THOUSAND/ΜL (ref 0.17–1.22)
MONOCYTES NFR BLD AUTO: 6 % (ref 4–12)
NEUTROPHILS # BLD AUTO: 5.44 THOUSANDS/ΜL (ref 1.85–7.62)
NEUTS SEG NFR BLD AUTO: 68 % (ref 43–75)
PLATELET # BLD AUTO: 242 THOUSANDS/UL (ref 149–390)
PMV BLD AUTO: 9.6 FL (ref 8.9–12.7)
POTASSIUM SERPL-SCNC: 5.5 MMOL/L (ref 3.5–5.3)
PROT SERPL-MCNC: 8.6 G/DL (ref 6.4–8.2)
PSA SERPL-MCNC: 1.3 NG/ML (ref 0–4)
RBC # BLD AUTO: 4.68 MILLION/UL (ref 3.88–5.62)
SODIUM SERPL-SCNC: 141 MMOL/L (ref 136–145)
WBC # BLD AUTO: 7.98 THOUSAND/UL (ref 4.31–10.16)

## 2018-06-05 PROCEDURE — 82043 UR ALBUMIN QUANTITATIVE: CPT | Performed by: FAMILY MEDICINE

## 2018-06-05 PROCEDURE — 82378 CARCINOEMBRYONIC ANTIGEN: CPT

## 2018-06-05 PROCEDURE — 83036 HEMOGLOBIN GLYCOSYLATED A1C: CPT

## 2018-06-05 PROCEDURE — 80053 COMPREHEN METABOLIC PANEL: CPT

## 2018-06-05 PROCEDURE — 82570 ASSAY OF URINE CREATININE: CPT | Performed by: FAMILY MEDICINE

## 2018-06-05 PROCEDURE — 36415 COLL VENOUS BLD VENIPUNCTURE: CPT

## 2018-06-05 PROCEDURE — G0103 PSA SCREENING: HCPCS

## 2018-06-05 PROCEDURE — 85025 COMPLETE CBC W/AUTO DIFF WBC: CPT

## 2018-06-11 ENCOUNTER — HOSPITAL ENCOUNTER (OUTPATIENT)
Dept: CT IMAGING | Facility: HOSPITAL | Age: 73
Discharge: HOME | End: 2018-06-11
Attending: INTERNAL MEDICINE
Payer: MEDICARE

## 2018-06-11 DIAGNOSIS — C18.9 MALIGNANT NEOPLASM OF COLON (HCC): ICD-10-CM

## 2018-06-11 PROCEDURE — 71260 CT THORAX DX C+: CPT

## 2018-06-11 PROCEDURE — 74177 CT ABD & PELVIS W/CONTRAST: CPT

## 2018-06-11 RX ADMIN — IODIXANOL 100 ML: 320 INJECTION, SOLUTION INTRAVASCULAR at 10:09

## 2018-06-21 ENCOUNTER — OFFICE VISIT (OUTPATIENT)
Dept: HEMATOLOGY ONCOLOGY | Facility: CLINIC | Age: 73
End: 2018-06-21
Payer: MEDICARE

## 2018-06-21 VITALS
BODY MASS INDEX: 30.53 KG/M2 | SYSTOLIC BLOOD PRESSURE: 136 MMHG | WEIGHT: 190 LBS | HEIGHT: 66 IN | RESPIRATION RATE: 16 BRPM | OXYGEN SATURATION: 97 % | DIASTOLIC BLOOD PRESSURE: 82 MMHG | HEART RATE: 88 BPM

## 2018-06-21 DIAGNOSIS — C18.2 MALIGNANT NEOPLASM OF ASCENDING COLON (HCC): Primary | ICD-10-CM

## 2018-06-21 DIAGNOSIS — C91.10 CLL (CHRONIC LYMPHOCYTIC LEUKEMIA) (HCC): ICD-10-CM

## 2018-06-21 PROCEDURE — 99214 OFFICE O/P EST MOD 30 MIN: CPT | Performed by: INTERNAL MEDICINE

## 2018-06-21 NOTE — PROGRESS NOTES
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  Previous Hematologic/ Oncologic History:      Patient had 4 cycles of bendamustine and Rituxan followed by one dose of Rituxan the maintenance setting  He is status post a right hemicolectomy for appendiceal cancer  Current Hematologic/ Oncologic Treatment:    Observation    Interval History:      The patient returns for a followup visit  He states he's doing well  His blood work is within normal limits  As far symptoms are concerned he is doing well  Has had no weight loss or any B  symptoms  Denies any nausea denies any vomiting his diarrhea denies any constipation and the rest of his 14 point review of systems today was negative  His Most recent imaging actually showed  no evidence of metastatic disease  His blood work is within normal limits  His potassium was slightly elevated with a slightly elevated creatinine and he is to hydrate himself aggressively  He will discuss this with his primary care physician  Test Results:    Imaging: Ct Chest Abdomen Pelvis W Contrast    Result Date: 6/13/2018  Narrative: CT CHEST, ABDOMEN AND PELVIS WITH IV CONTRAST INDICATION:   C18 9: Malignant neoplasm of colon, unspecified  Evaluate for metastasis COMPARISON: 7/10/2017 TECHNIQUE: CT examination of the chest, abdomen and pelvis was performed  Axial, sagittal, and coronal 2D reformatted images were created from the source data and submitted for interpretation  Radiation dose length product (DLP) for this visit:  23-14-20-09 mGy-cm     This examination, like all CT scans performed in the [facility], was performed utilizing techniques to minimize radiation dose exposure, including the use of iterative reconstruction and automated exposure control  IV Contrast:  100 mL of iodixanol (VISIPAQUE) Enteric Contrast: Enteric contrast was administered  FINDINGS: CHEST LUNGS:  Lungs are clear  There is no tracheal or endobronchial lesion  PLEURA:  Unremarkable  HEART/GREAT VESSELS:  Unremarkable for patient's age  MEDIASTINUM AND YOBANY:  Unremarkable  CHEST WALL AND LOWER NECK:   Previously seen right supraclavicular lymph node measures 7 mm, previously 11 mm shortest axis and is not pathologically enlarged  ABDOMEN LIVER/BILIARY TREE:  Unremarkable  GALLBLADDER:  Gallbladder is surgically absent  SPLEEN:  Unremarkable  PANCREAS:  Unremarkable  ADRENAL GLANDS:  Unremarkable  KIDNEYS/URETERS:  One or more sharply circumscribed subcentimeter renal hypodensities are noted  These lesions are too small to accurately characterize, but are statistically most likely to represent benign cortical renal cyst(s)  According to the guidelines published in the [facility] Paper of the ACR Incidental Findings Committee (Radiology 2010), no further workup of these lesions is recommended  No hydronephrosis  STOMACH AND BOWEL:  The patient is status post right hemicolectomy  There is diverticulosis coli  Bowel is otherwise unremarkable  APPENDIX:  No findings to suggest appendicitis  ABDOMINOPELVIC CAVITY:  No ascites or free intraperitoneal air  No lymphadenopathy  VESSELS:  Unremarkable for patient's age  PELVIS REPRODUCTIVE ORGANS:  Unremarkable for patient's age  URINARY BLADDER:  Unremarkable  ABDOMINAL WALL/INGUINAL REGIONS:  There is a fat-containing left inguinal/scrotal hernia  OSSEOUS STRUCTURES:  No acute fracture or destructive osseous lesion  There is a stable mild T9 compression deformity  Impression: No evidence of metastatic disease   Workstation performed: [id number]       Labs:   Lab Results   Component Value Date    WBC 7 98 06/05/2018    HGB 13 3 06/05/2018    HCT 40 3 06/05/2018    MCV 86 06/05/2018    PLT 242 06/05/2018     Lab Results   Component Value Date     06/05/2018    K 5 5 (H) 06/05/2018     06/05/2018    CO2 25 06/05/2018    ANIONGAP 10 06/05/2018    BUN 19 06/05/2018    CREATININE 1 56 (H) 06/05/2018    GLUCOSE 329 (H) 04/05/2018    GLUF 120 (H) 06/05/2018    CALCIUM 9 7 06/05/2018    AST 14 06/05/2018    ALT 26 06/05/2018    ALKPHOS 87 06/05/2018    PROT 8 6 (H) 06/05/2018    BILITOT 0 50 06/05/2018    EGFR 44 06/05/2018         Lab Results   Component Value Date    PSA 1 3 06/05/2018    PSA 0 9 01/15/2014       Lab Results   Component Value Date    CEA 0 7 06/05/2018       ROS: As stated in the history of present illness otherwise his 14 point review of systems today was negative  Active Problems: There is no problem list on file for this patient        Past Medical History:   Past Medical History:   Diagnosis Date    Cancer of appendix ([facility] [address] ) 2015    appendectomy-colon resection    Chronic lymphocytic leukemia of B-cell type ([facility] [address] )     "remission 4-5yrs"-chemo    Diabetes mellitus ([facility] [address] )     bs checked by pt at home at 6am =92    Diabetic neuropathy ([facility] [address] )     Heart attack ([facility] [address] ) 04/2015    "labeled as that and than tested later after appendix removed and stress test showed negative"    History of cancer chemotherapy     last treatment approx 5yrs ago    Hypertension     Scalp psoriasis     Toe injury     left great toe, - 2017-internal braec-to be removed today 3/30/2018    Wears glasses        Surgical History:   Past Surgical History:   Procedure Laterality Date    APPENDECTOMY      BONE BIOPSY Left 3/30/2018    Procedure: GREAT TOE BONE BIOPSY;  Surgeon: Josse Odell DPM;  [facility];  Service: Podiatry    CATARACT EXTRACTION Bilateral     COLECTOMY  06/2015    EYE SURGERY Right     "retinal peel"    FOOT FUSION Left 12/6/2017    Procedure: [facility];  Surgeon: Josse Odell DPM;  [facility];  Service: Podiatry    HARDWARE REMOVAL Left 4/18/2018    Procedure: REMOVAL STAPLES LEFT TOE;  Surgeon: Catia Dial DPM;  [facility];  Service: Podiatry    ORIF FOOT FRACTURE Left 12/6/2017    Procedure: HALLUX INTERPHALANGEAL JOINT INTERNAL FIXATION; REPAIR OF ULCERATION WITH EXCISIONS AND REVISION OF SKIN HALLUX WITH USE OF ALLOGRAFT;  Surgeon: Catia Dial DPM;  [facility];  Service: Podiatry    VA COLONOSCOPY FLX DX W/COLLJ SPEC WHEN PFRMD N/A 4/21/2016    Procedure: COLONOSCOPY;  Surgeon: Darron Green DO;  [facility]; Service: Gastroenterology    VA REMOVAL DEEP IMPLANT Left 3/30/2018    Procedure: REMOVAL HARDWARE FOOT;  Surgeon: Catia Dial DPM;  [facility];  Service: Podiatry    TONSILLECTOMY         Family History:  No family history on file  Cancer-related family history is not on file  Social History:   Social History     Social History    Marital status: /Civil Union     Spouse name: N/A    Number of children: N/A    Years of education: N/A     Occupational History    Not on file  Social History Main Topics    Smoking status: Never Smoker    Smokeless tobacco: Never Used    Alcohol use No    Drug use: No    Sexual activity: Not on file     Other Topics Concern    Not on file     Social History Narrative    No narrative on file       Current Medications:   Current Outpatient Prescriptions   Medication Sig Dispense Refill    aspirin 81 MG tablet Take 81 mg by mouth      amLODIPine-benazepril (LOTREL) 10-20 MG per capsule Take 1 capsule by mouth daily   amoxicillin-clavulanate (AUGMENTIN) 875-125 mg per tablet Take 1 tablet by mouth every 12 (twelve) hours      ciprofloxacin (CIPRO) 500 mg tablet Take 500 mg by mouth daily      fluocinonide (LIDEX) 0 05 % external solution Apply topically 2 (two) times a day Scalp psoriasis      glipiZIDE (GLUCOTROL XL) 10 mg 24 hr tablet Take 10 mg by mouth daily        Glucosamine HCl (GLUCOSAMINE PO) Take by mouth daily      lisinopril (ZESTRIL) 20 mg tablet Take 20 mg by mouth daily   metFORMIN (GLUCOPHAGE) 1000 MG tablet Take 1,000 mg by mouth 2 (two) times a day with meals      Multiple Vitamin (MULTIVITAMIN) tablet Take 1 tablet by mouth daily      Multiple Vitamins-Minerals (OCUVITE PO) Take by mouth daily      Omega-3 Fatty Acids (FISH OIL PO) Take by mouth daily       No current facility-administered medications for this visit  Allergies: Allergies   Allergen Reactions    Barium Iodide      Effects the kidneys    Oxycodone-Acetaminophen      hallucination       Physical Exam:    Body surface area is 1 96 meters squared  Wt Readings from Last 3 Encounters:   06/21/18 86 2 kg (190 lb)   04/18/18 90 7 kg (200 lb)   03/30/18 90 7 kg (200 lb)        Temp Readings from Last 3 Encounters:   04/18/18 97 8 °F (36 6 °C) (Tympanic)   03/30/18 97 6 °F (36 4 °C)   12/07/17 97 5 °F (36 4 °C)        BP Readings from Last 3 Encounters:   06/21/18 136/82   04/18/18 136/66   03/30/18 149/81         Pulse Readings from Last 3 Encounters:   06/21/18 88   04/18/18 93   03/30/18 56     @LASTSAO2(3)@      Physical Exam     Constitutional   General appearance: No acute distress, well appearing and well nourished  Eyes   Conjunctiva and lids: No swelling, erythema or discharge  Pupils and irises: Equal, round and reactive to light  Ears, Nose, Mouth, and Throat   External inspection of ears and nose: Normal     Nasal mucosa, septum, and turbinates: Normal without edema or erythema  Oropharynx: Normal with no erythema, edema, exudate or lesions  Pulmonary   Respiratory effort: No increased work of breathing or signs of respiratory distress  Auscultation of lungs: Clear to auscultation  Cardiovascular   Palpation of heart: Normal PMI, no thrills  Auscultation of heart: Normal rate and rhythm, normal S1 and S2, without murmurs      Examination of extremities for edema and/or varicosities: Normal     Carotid pulses: Normal     Abdomen   Abdomen: Non-tender, no masses  Liver and spleen: No hepatomegaly or splenomegaly  Lymphatic   Palpation of lymph nodes in neck: No lymphadenopathy  Musculoskeletal   Gait and station: Normal     Digits and nails: Normal without clubbing or cyanosis  Inspection/palpation of joints, bones, and muscles: Normal     Skin   Skin and subcutaneous tissue: Normal without rashes or lesions  Neurologic   Cranial nerves: Cranial nerves 2-12 intact  Sensation: No sensory loss  Psychiatric   Orientation to person, place, and time: Normal     Mood and affect: Normal         Assessment / Plan: This is a pleasant elderly male with a past history of chronic lymphocytic leukemia with bulky adenopathy  He got 4 cycles of bendamustine and Rituxan with a very nice response and no residual disease on his PET scan  Since he did have a history of bulky adenopathy we decided to give him Rituxan in the maintenance setting  After his first dose he was running a low white count and we had a discussion  He decided to go off of maintenance Rituxan and just be observed  He was doing well until he was admitted for appendicitis  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  Only one lymph node was removed  He needed a proper cancer operation  He had this done  His hemicolectomy showed no evidence of residual disease  23 lymph nodes were negative  Based on this we decided he did not need any further chemotherapy  He is now on observation  His CLL is stable  His imaging shows no evidence of metastatic disease    I'll just see him back in 4 months with repeat blood work         Goals and Barriers:  Current Goal:  Prolong Survival from appendiceal cancer and CLL   Barriers: None  Patient's Capacity to Self Care:  Patient  able to self care      Portions of the record may have been created with voice recognition software   Occasional wrong word or "sound a like" substitutions may have occurred due to the inherent limitations of voice recognition software   Read the chart carefully and recognize, using context, where substitutions have occurred

## 2018-10-15 ENCOUNTER — APPOINTMENT (OUTPATIENT)
Dept: LAB | Facility: CLINIC | Age: 73
End: 2018-10-15
Payer: MEDICARE

## 2018-10-15 ENCOUNTER — TRANSCRIBE ORDERS (OUTPATIENT)
Dept: LAB | Facility: CLINIC | Age: 73
End: 2018-10-15

## 2018-10-15 DIAGNOSIS — C18.2 MALIGNANT NEOPLASM OF ASCENDING COLON (HCC): ICD-10-CM

## 2018-10-15 DIAGNOSIS — C91.10 CLL (CHRONIC LYMPHOCYTIC LEUKEMIA) (HCC): ICD-10-CM

## 2018-10-15 LAB
ALBUMIN SERPL BCP-MCNC: 3.9 G/DL (ref 3.5–5)
ALP SERPL-CCNC: 81 U/L (ref 46–116)
ALT SERPL W P-5'-P-CCNC: 28 U/L (ref 12–78)
ANION GAP SERPL CALCULATED.3IONS-SCNC: 8 MMOL/L (ref 4–13)
AST SERPL W P-5'-P-CCNC: 16 U/L (ref 5–45)
BASOPHILS # BLD AUTO: 0.1 THOUSANDS/ΜL (ref 0–0.1)
BASOPHILS NFR BLD AUTO: 1 % (ref 0–1)
BILIRUB SERPL-MCNC: 0.5 MG/DL (ref 0.2–1)
BUN SERPL-MCNC: 24 MG/DL (ref 5–25)
CALCIUM SERPL-MCNC: 9.2 MG/DL (ref 8.3–10.1)
CEA SERPL-MCNC: 1.1 NG/ML (ref 0–3)
CHLORIDE SERPL-SCNC: 103 MMOL/L (ref 100–108)
CO2 SERPL-SCNC: 26 MMOL/L (ref 21–32)
CREAT SERPL-MCNC: 1.37 MG/DL (ref 0.6–1.3)
EOSINOPHIL # BLD AUTO: 0.28 THOUSAND/ΜL (ref 0–0.61)
EOSINOPHIL NFR BLD AUTO: 4 % (ref 0–6)
ERYTHROCYTE [DISTWIDTH] IN BLOOD BY AUTOMATED COUNT: 12.7 % (ref 11.6–15.1)
GFR SERPL CREATININE-BSD FRML MDRD: 51 ML/MIN/1.73SQ M
GLUCOSE P FAST SERPL-MCNC: 139 MG/DL (ref 65–99)
HCT VFR BLD AUTO: 40.2 % (ref 36.5–49.3)
HGB BLD-MCNC: 13.3 G/DL (ref 12–17)
IMM GRANULOCYTES # BLD AUTO: 0.02 THOUSAND/UL (ref 0–0.2)
IMM GRANULOCYTES NFR BLD AUTO: 0 % (ref 0–2)
LDH SERPL-CCNC: 130 U/L (ref 81–234)
LYMPHOCYTES # BLD AUTO: 1.79 THOUSANDS/ΜL (ref 0.6–4.47)
LYMPHOCYTES NFR BLD AUTO: 24 % (ref 14–44)
MCH RBC QN AUTO: 29.9 PG (ref 26.8–34.3)
MCHC RBC AUTO-ENTMCNC: 33.1 G/DL (ref 31.4–37.4)
MCV RBC AUTO: 90 FL (ref 82–98)
MONOCYTES # BLD AUTO: 0.42 THOUSAND/ΜL (ref 0.17–1.22)
MONOCYTES NFR BLD AUTO: 6 % (ref 4–12)
NEUTROPHILS # BLD AUTO: 4.83 THOUSANDS/ΜL (ref 1.85–7.62)
NEUTS SEG NFR BLD AUTO: 65 % (ref 43–75)
NRBC BLD AUTO-RTO: 0 /100 WBCS
PLATELET # BLD AUTO: 186 THOUSANDS/UL (ref 149–390)
PMV BLD AUTO: 9.6 FL (ref 8.9–12.7)
POTASSIUM SERPL-SCNC: 5.6 MMOL/L (ref 3.5–5.3)
PROT SERPL-MCNC: 8.1 G/DL (ref 6.4–8.2)
RBC # BLD AUTO: 4.45 MILLION/UL (ref 3.88–5.62)
SODIUM SERPL-SCNC: 137 MMOL/L (ref 136–145)
WBC # BLD AUTO: 7.44 THOUSAND/UL (ref 4.31–10.16)

## 2018-10-15 PROCEDURE — 80053 COMPREHEN METABOLIC PANEL: CPT

## 2018-10-15 PROCEDURE — 85025 COMPLETE CBC W/AUTO DIFF WBC: CPT

## 2018-10-15 PROCEDURE — 36415 COLL VENOUS BLD VENIPUNCTURE: CPT

## 2018-10-15 PROCEDURE — 82378 CARCINOEMBRYONIC ANTIGEN: CPT

## 2018-10-15 PROCEDURE — 83615 LACTATE (LD) (LDH) ENZYME: CPT

## 2018-11-01 ENCOUNTER — OFFICE VISIT (OUTPATIENT)
Dept: HEMATOLOGY ONCOLOGY | Facility: CLINIC | Age: 73
End: 2018-11-01
Payer: MEDICARE

## 2018-11-01 VITALS
RESPIRATION RATE: 16 BRPM | TEMPERATURE: 97.6 F | HEART RATE: 83 BPM | OXYGEN SATURATION: 95 % | WEIGHT: 195 LBS | BODY MASS INDEX: 31.34 KG/M2 | DIASTOLIC BLOOD PRESSURE: 66 MMHG | HEIGHT: 66 IN | SYSTOLIC BLOOD PRESSURE: 124 MMHG

## 2018-11-01 DIAGNOSIS — C91.10 CLL (CHRONIC LYMPHOCYTIC LEUKEMIA) (HCC): Primary | ICD-10-CM

## 2018-11-01 DIAGNOSIS — C18.2 MALIGNANT NEOPLASM OF ASCENDING COLON (HCC): ICD-10-CM

## 2018-11-01 PROCEDURE — 99214 OFFICE O/P EST MOD 30 MIN: CPT | Performed by: INTERNAL MEDICINE

## 2018-11-01 RX ORDER — SIMVASTATIN 20 MG
20 TABLET ORAL
COMMUNITY
End: 2021-11-30 | Stop reason: SDUPTHER

## 2018-11-01 NOTE — PROGRESS NOTES
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  Previous Hematologic/ Oncologic History:    Patient had 4 cycles of bendamustine and Rituxan followed by one dose of Rituxan the maintenance setting  He is status post a right hemicolectomy for appendiceal cancer  Current Hematologic/ Oncologic Treatment:    Observation    Interval History:      The patient returns for a followup visit  He states he's doing well  His blood work is within normal limits  As far symptoms are concerned he is doing well  Has had no weight loss or any B  symptoms  Denies any nausea denies any vomiting his diarrhea denies any constipation and the rest of his 14 point review of systems today was negative  His Most recent imaging actually showed  no evidence of metastatic disease  His blood work is within normal limits  His potassium is still Elevated as is his creatinine  He is seeing a new primary care physician who is aware  He states they are doing blood work to follow-up on this  As far as symptoms are concerned he is at baseline  Denies any nausea denies any vomiting denies any diarrhea  The rest of his 14 point review of systems today was negative  Test Results:    Imaging: No results found      Labs:   Lab Results   Component Value Date    WBC 7 44 10/15/2018    HGB 13 3 10/15/2018    HCT 40 2 10/15/2018    MCV 90 10/15/2018     10/15/2018     Lab Results   Component Value Date     10/22/2015    K 5 6 (H) 10/15/2018     10/15/2018    CO2 26 10/15/2018    ANIONGAP 8 10/22/2015    BUN 24 10/15/2018    CREATININE 1 37 (H) 10/15/2018    GLUCOSE 138 10/22/2015    GLUF 139 (H) 10/15/2018    CALCIUM 9 2 10/15/2018    AST 16 10/15/2018    ALT 28 10/15/2018 ALKPHOS 81 10/15/2018    PROT 7 5 10/22/2015    BILITOT 0 59 10/22/2015    EGFR 51 10/15/2018       Lab Results   Component Value Date    PSA 1 3 06/05/2018    PSA 0 9 01/15/2014       Lab Results   Component Value Date    CEA 1 1 10/15/2018         ROS: As stated in the history of present illness otherwise his 14 point review of systems today was negative  Active Problems: There is no problem list on file for this patient        Past Medical History:   Past Medical History:   Diagnosis Date    Cancer of appendix ([facility] [address] ) 2015    appendectomy-colon resection    Chronic lymphocytic leukemia of B-cell type ([facility] [address] )     "remission 4-5yrs"-chemo    Diabetes mellitus ([facility] [address] )     bs checked by pt at home at 6am =92    Diabetic neuropathy ([facility] [address] )     [facility]    "labeled as that and than tested later after appendix removed and stress test showed negative"    History of cancer chemotherapy     last treatment approx 5yrs ago    Hypertension     Scalp psoriasis     Toe injury     left great toe, - 2017-internal braec-to be removed today 3/30/2018    Wears glasses        Surgical History:   Past Surgical History:   Procedure Laterality Date    APPENDECTOMY      BONE BIOPSY Left 3/30/2018    Procedure: GREAT TOE BONE BIOPSY;  Surgeon: Mich Sharp DPM;  [facility];  Service: Podiatry    CATARACT EXTRACTION Bilateral     COLECTOMY  06/2015    EYE SURGERY Right     "retinal peel"    FOOT FUSION Left 12/6/2017    Procedure: [address];  Surgeon: Mich Sharp DPM;  [facility];  Service: Podiatry    HARDWARE REMOVAL Left 4/18/2018    Procedure: REMOVAL STAPLES LEFT TOE;  Surgeon: Mich Sharp DPM;  [facility];  Service: Podiatry    ORIF FOOT FRACTURE Left 12/6/2017    Procedure: HALLUX INTERPHALANGEAL JOINT INTERNAL FIXATION; REPAIR OF ULCERATION WITH EXCISIONS AND REVISION OF SKIN HALLUX WITH USE OF ALLOGRAFT;  Surgeon: Mich Sharp SHADE;  [facility];  Service: Podiatry    MN COLONOSCOPY FLX DX W/COLLJ SPEC WHEN PFRMD N/A 4/21/2016    Procedure: COLONOSCOPY;  Surgeon: Joceline Mcwilliams DO;  [facility]; Service: Gastroenterology    MN REMOVAL DEEP IMPLANT Left 3/30/2018    Procedure: REMOVAL HARDWARE FOOT;  Surgeon: Estelle Miner DPM;  [facility];  Service: Podiatry    TONSILLECTOMY         Family History:  Noncontributory        Social History:   Social History     Social History    Marital status: /Civil Union     Spouse name: N/A    Number of children: N/A    Years of education: N/A     Occupational History    Not on file  Social History Main Topics    Smoking status: Never Smoker    Smokeless tobacco: Never Used    Alcohol use No    Drug use: No    Sexual activity: Not on file     Other Topics Concern    Not on file     Social History Narrative    No narrative on file       Current Medications:   Current Outpatient Prescriptions   Medication Sig Dispense Refill    amLODIPine-benazepril (LOTREL) 10-20 MG per capsule Take 1 capsule by mouth daily   aspirin 81 MG tablet Take 81 mg by mouth      fluocinonide (LIDEX) 0 05 % external solution Apply topically 2 (two) times a day Scalp psoriasis      glipiZIDE (GLUCOTROL XL) 10 mg 24 hr tablet Take 10 mg by mouth daily   Glucosamine HCl (GLUCOSAMINE PO) Take by mouth daily      lisinopril (ZESTRIL) 20 mg tablet Take 20 mg by mouth daily        metFORMIN (GLUCOPHAGE) 1000 MG tablet Take 1,000 mg by mouth 2 (two) times a day with meals      Multiple Vitamin (MULTIVITAMIN) tablet Take 1 tablet by mouth daily      Multiple Vitamins-Minerals (OCUVITE PO) Take by mouth daily      simvastatin (ZOCOR) 20 mg tablet Take 20 mg by mouth daily at bedtime      amoxicillin-clavulanate (AUGMENTIN) 875-125 mg per tablet Take 1 tablet by mouth every 12 (twelve) hours      ciprofloxacin (CIPRO) 500 mg tablet Take 500 mg by mouth daily      Omega-3 Fatty Acids (FISH OIL PO) Take by mouth daily       No current facility-administered medications for this visit  Allergies: Allergies   Allergen Reactions    Barium Iodide      Effects the kidneys    Oxycodone-Acetaminophen      hallucination       Physical Exam:    Body surface area is 1 98 meters squared  Wt Readings from Last 3 Encounters:   11/01/18 88 5 kg (195 lb)   06/21/18 86 2 kg (190 lb)   04/18/18 90 7 kg (200 lb)        Temp Readings from Last 3 Encounters:   11/01/18 97 6 °F (36 4 °C) (Tympanic)   04/18/18 97 8 °F (36 6 °C) (Tympanic)   03/30/18 97 6 °F (36 4 °C)        BP Readings from Last 3 Encounters:   11/01/18 124/66   06/21/18 136/82   04/18/18 136/66         Pulse Readings from Last 3 Encounters:   11/01/18 83   06/21/18 88   04/18/18 93           Physical Exam     Constitutional   General appearance: No acute distress, well appearing and well nourished  Eyes   Conjunctiva and lids: No swelling, erythema or discharge  Pupils and irises: Equal, round and reactive to light  Ears, Nose, Mouth, and Throat   External inspection of ears and nose: Normal     Nasal mucosa, septum, and turbinates: Normal without edema or erythema  Oropharynx: Normal with no erythema, edema, exudate or lesions  Pulmonary   Respiratory effort: No increased work of breathing or signs of respiratory distress  Auscultation of lungs: Clear to auscultation  Cardiovascular   Palpation of heart: Normal PMI, no thrills  Auscultation of heart: Normal rate and rhythm, normal S1 and S2, without murmurs  Examination of extremities for edema and/or varicosities: Normal     Carotid pulses: Normal     Abdomen   Abdomen: Non-tender, no masses  Liver and spleen: No hepatomegaly or splenomegaly  Lymphatic   Palpation of lymph nodes in neck: No lymphadenopathy  Musculoskeletal   Gait and station: Normal     Digits and nails: Normal without clubbing or cyanosis      Inspection/palpation of joints, bones, and muscles: Normal     Skin   Skin and subcutaneous tissue: Normal without rashes or lesions  Neurologic   Cranial nerves: Cranial nerves 2-12 intact  Sensation: No sensory loss  Psychiatric   Orientation to person, place, and time: Normal     Mood and affect: Normal         Assessment / Plan: This is a pleasant elderly male with a past history of chronic lymphocytic leukemia with bulky adenopathy  He got 4 cycles of bendamustine and Rituxan with a very nice response and no residual disease on his PET scan  Since he did have a history of bulky adenopathy we decided to give him Rituxan in the maintenance setting  After his first dose he was running a low white count and we had a discussion  He decided to go off of maintenance Rituxan and just be observed       He was doing well until he was admitted for appendicitis  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  Only one lymph node was removed  He needed a proper cancer operation  He had this done  His hemicolectomy showed no evidence of residual disease  23 lymph nodes were negative  Based on this we decided he did not need any further chemotherapy  He is now on observation  His CLL is stable       His last imaging In July 2017 showed no evidence of metastatic disease    I'll just see him back in 6 months with repeat blood work  Goals and Barriers:  Current Goal:  Prolong Survival from CLL and appendiceal malignancy  Barriers: None  Patient's Capacity to Self Care:  Patient  able to self care  Portions of the record may have been created with voice recognition software   Occasional wrong word or "sound a like" substitutions may have occurred due to the inherent limitations of voice recognition software   Read the chart carefully and recognize, using context, where substitutions have occurred

## 2019-02-15 ENCOUNTER — TRANSCRIBE ORDERS (OUTPATIENT)
Dept: LAB | Facility: CLINIC | Age: 74
End: 2019-02-15

## 2019-02-15 ENCOUNTER — APPOINTMENT (OUTPATIENT)
Dept: LAB | Facility: CLINIC | Age: 74
End: 2019-02-15
Payer: MEDICARE

## 2019-02-15 DIAGNOSIS — N18.30 CHRONIC KIDNEY DISEASE, STAGE III (MODERATE) (HCC): ICD-10-CM

## 2019-02-15 DIAGNOSIS — I11.0 HYPERTENSIVE HEART DISEASE WITH CONGESTIVE HEART FAILURE, UNSPECIFIED HEART FAILURE TYPE (HCC): ICD-10-CM

## 2019-02-15 DIAGNOSIS — E11.8 TYPE 2 DIABETES MELLITUS WITH COMPLICATION, UNSPECIFIED WHETHER LONG TERM INSULIN USE: Primary | ICD-10-CM

## 2019-02-15 DIAGNOSIS — E11.8 TYPE 2 DIABETES MELLITUS WITH COMPLICATION, UNSPECIFIED WHETHER LONG TERM INSULIN USE: ICD-10-CM

## 2019-02-15 LAB
ANION GAP SERPL CALCULATED.3IONS-SCNC: 9 MMOL/L (ref 4–13)
BUN SERPL-MCNC: 30 MG/DL (ref 5–25)
CALCIUM SERPL-MCNC: 9.1 MG/DL (ref 8.3–10.1)
CHLORIDE SERPL-SCNC: 101 MMOL/L (ref 100–108)
CO2 SERPL-SCNC: 24 MMOL/L (ref 21–32)
CREAT SERPL-MCNC: 1.71 MG/DL (ref 0.6–1.3)
EST. AVERAGE GLUCOSE BLD GHB EST-MCNC: 200 MG/DL
GFR SERPL CREATININE-BSD FRML MDRD: 39 ML/MIN/1.73SQ M
GLUCOSE P FAST SERPL-MCNC: 166 MG/DL (ref 65–99)
HBA1C MFR BLD: 8.6 % (ref 4.2–6.3)
POTASSIUM SERPL-SCNC: 5.3 MMOL/L (ref 3.5–5.3)
SODIUM SERPL-SCNC: 134 MMOL/L (ref 136–145)

## 2019-02-15 PROCEDURE — 36415 COLL VENOUS BLD VENIPUNCTURE: CPT

## 2019-02-15 PROCEDURE — 83036 HEMOGLOBIN GLYCOSYLATED A1C: CPT

## 2019-02-15 PROCEDURE — 80048 BASIC METABOLIC PNL TOTAL CA: CPT

## 2019-04-26 ENCOUNTER — TRANSCRIBE ORDERS (OUTPATIENT)
Dept: LAB | Facility: CLINIC | Age: 74
End: 2019-04-26

## 2019-04-26 ENCOUNTER — APPOINTMENT (OUTPATIENT)
Dept: LAB | Facility: CLINIC | Age: 74
End: 2019-04-26
Payer: MEDICARE

## 2019-04-26 DIAGNOSIS — C91.10 CLL (CHRONIC LYMPHOCYTIC LEUKEMIA) (HCC): ICD-10-CM

## 2019-04-26 DIAGNOSIS — C18.2 MALIGNANT NEOPLASM OF ASCENDING COLON (HCC): ICD-10-CM

## 2019-04-26 LAB
ALBUMIN SERPL BCP-MCNC: 4 G/DL (ref 3.5–5)
ALP SERPL-CCNC: 78 U/L (ref 46–116)
ALT SERPL W P-5'-P-CCNC: 29 U/L (ref 12–78)
ANION GAP SERPL CALCULATED.3IONS-SCNC: 8 MMOL/L (ref 4–13)
AST SERPL W P-5'-P-CCNC: 18 U/L (ref 5–45)
BASOPHILS # BLD AUTO: 0.08 THOUSANDS/ΜL (ref 0–0.1)
BASOPHILS NFR BLD AUTO: 1 % (ref 0–1)
BILIRUB SERPL-MCNC: 0.6 MG/DL (ref 0.2–1)
BUN SERPL-MCNC: 28 MG/DL (ref 5–25)
CALCIUM SERPL-MCNC: 9.1 MG/DL (ref 8.3–10.1)
CEA SERPL-MCNC: 1.2 NG/ML (ref 0–3)
CHLORIDE SERPL-SCNC: 103 MMOL/L (ref 100–108)
CO2 SERPL-SCNC: 27 MMOL/L (ref 21–32)
CREAT SERPL-MCNC: 1.44 MG/DL (ref 0.6–1.3)
EOSINOPHIL # BLD AUTO: 0.24 THOUSAND/ΜL (ref 0–0.61)
EOSINOPHIL NFR BLD AUTO: 4 % (ref 0–6)
ERYTHROCYTE [DISTWIDTH] IN BLOOD BY AUTOMATED COUNT: 13.3 % (ref 11.6–15.1)
GFR SERPL CREATININE-BSD FRML MDRD: 48 ML/MIN/1.73SQ M
GLUCOSE P FAST SERPL-MCNC: 134 MG/DL (ref 65–99)
HCT VFR BLD AUTO: 40.7 % (ref 36.5–49.3)
HGB BLD-MCNC: 13.7 G/DL (ref 12–17)
IMM GRANULOCYTES # BLD AUTO: 0.02 THOUSAND/UL (ref 0–0.2)
IMM GRANULOCYTES NFR BLD AUTO: 0 % (ref 0–2)
LDH SERPL-CCNC: 160 U/L (ref 81–234)
LYMPHOCYTES # BLD AUTO: 1.72 THOUSANDS/ΜL (ref 0.6–4.47)
LYMPHOCYTES NFR BLD AUTO: 25 % (ref 14–44)
MCH RBC QN AUTO: 30.6 PG (ref 26.8–34.3)
MCHC RBC AUTO-ENTMCNC: 33.7 G/DL (ref 31.4–37.4)
MCV RBC AUTO: 91 FL (ref 82–98)
MONOCYTES # BLD AUTO: 0.45 THOUSAND/ΜL (ref 0.17–1.22)
MONOCYTES NFR BLD AUTO: 7 % (ref 4–12)
NEUTROPHILS # BLD AUTO: 4.4 THOUSANDS/ΜL (ref 1.85–7.62)
NEUTS SEG NFR BLD AUTO: 63 % (ref 43–75)
NRBC BLD AUTO-RTO: 0 /100 WBCS
PLATELET # BLD AUTO: 180 THOUSANDS/UL (ref 149–390)
PMV BLD AUTO: 9.9 FL (ref 8.9–12.7)
POTASSIUM SERPL-SCNC: 5.4 MMOL/L (ref 3.5–5.3)
PROT SERPL-MCNC: 7.9 G/DL (ref 6.4–8.2)
RBC # BLD AUTO: 4.47 MILLION/UL (ref 3.88–5.62)
SODIUM SERPL-SCNC: 138 MMOL/L (ref 136–145)
WBC # BLD AUTO: 6.91 THOUSAND/UL (ref 4.31–10.16)

## 2019-04-26 PROCEDURE — 82378 CARCINOEMBRYONIC ANTIGEN: CPT

## 2019-04-26 PROCEDURE — 85025 COMPLETE CBC W/AUTO DIFF WBC: CPT

## 2019-04-26 PROCEDURE — 83615 LACTATE (LD) (LDH) ENZYME: CPT

## 2019-04-26 PROCEDURE — 80053 COMPREHEN METABOLIC PANEL: CPT

## 2019-04-26 PROCEDURE — 36415 COLL VENOUS BLD VENIPUNCTURE: CPT

## 2019-05-02 ENCOUNTER — OFFICE VISIT (OUTPATIENT)
Dept: HEMATOLOGY ONCOLOGY | Facility: CLINIC | Age: 74
End: 2019-05-02
Payer: MEDICARE

## 2019-05-02 VITALS
SYSTOLIC BLOOD PRESSURE: 124 MMHG | TEMPERATURE: 99.2 F | OXYGEN SATURATION: 96 % | DIASTOLIC BLOOD PRESSURE: 68 MMHG | HEIGHT: 66 IN | BODY MASS INDEX: 31.5 KG/M2 | HEART RATE: 81 BPM | WEIGHT: 196 LBS | RESPIRATION RATE: 16 BRPM

## 2019-05-02 DIAGNOSIS — C18.2 MALIGNANT NEOPLASM OF ASCENDING COLON (HCC): ICD-10-CM

## 2019-05-02 DIAGNOSIS — D3A.020 CARCINOID TUMOR OF APPENDIX: ICD-10-CM

## 2019-05-02 DIAGNOSIS — C91.10 CLL (CHRONIC LYMPHOCYTIC LEUKEMIA) (HCC): Primary | ICD-10-CM

## 2019-05-02 PROBLEM — B35.1 ONYCHOMYCOSIS: Status: ACTIVE | Noted: 2018-08-12

## 2019-05-02 PROBLEM — E11.40 DIABETIC NEUROPATHY (HCC): Status: ACTIVE | Noted: 2018-08-07

## 2019-05-02 PROBLEM — N18.30 CKD (CHRONIC KIDNEY DISEASE) STAGE 3, GFR 30-59 ML/MIN (HCC): Status: ACTIVE | Noted: 2018-04-20

## 2019-05-02 PROBLEM — I10 BENIGN ESSENTIAL HTN: Status: ACTIVE | Noted: 2018-04-24

## 2019-05-02 PROBLEM — C92.11 CML IN REMISSION (HCC): Status: ACTIVE | Noted: 2018-10-08

## 2019-05-02 PROBLEM — M77.31 HEEL SPUR, RIGHT: Status: ACTIVE | Noted: 2018-11-13

## 2019-05-02 PROCEDURE — 99214 OFFICE O/P EST MOD 30 MIN: CPT | Performed by: PHYSICIAN ASSISTANT

## 2019-05-22 ENCOUNTER — TRANSCRIBE ORDERS (OUTPATIENT)
Dept: LAB | Facility: CLINIC | Age: 74
End: 2019-05-22

## 2019-05-22 ENCOUNTER — APPOINTMENT (OUTPATIENT)
Dept: LAB | Facility: CLINIC | Age: 74
End: 2019-05-22
Payer: MEDICARE

## 2019-05-22 DIAGNOSIS — E11.9 DIABETES MELLITUS WITHOUT COMPLICATION (HCC): ICD-10-CM

## 2019-05-22 DIAGNOSIS — E11.9 DIABETES MELLITUS WITHOUT COMPLICATION (HCC): Primary | ICD-10-CM

## 2019-05-22 DIAGNOSIS — N18.30 CHRONIC KIDNEY DISEASE, STAGE III (MODERATE) (HCC): ICD-10-CM

## 2019-05-22 LAB
ANION GAP SERPL CALCULATED.3IONS-SCNC: 10 MMOL/L (ref 4–13)
BUN SERPL-MCNC: 36 MG/DL (ref 5–25)
CALCIUM SERPL-MCNC: 9.2 MG/DL (ref 8.3–10.1)
CHLORIDE SERPL-SCNC: 103 MMOL/L (ref 100–108)
CO2 SERPL-SCNC: 23 MMOL/L (ref 21–32)
CREAT SERPL-MCNC: 1.55 MG/DL (ref 0.6–1.3)
EST. AVERAGE GLUCOSE BLD GHB EST-MCNC: 174 MG/DL
GFR SERPL CREATININE-BSD FRML MDRD: 44 ML/MIN/1.73SQ M
GLUCOSE P FAST SERPL-MCNC: 123 MG/DL (ref 65–99)
HBA1C MFR BLD: 7.7 % (ref 4.2–6.3)
POTASSIUM SERPL-SCNC: 4.8 MMOL/L (ref 3.5–5.3)
SODIUM SERPL-SCNC: 136 MMOL/L (ref 136–145)

## 2019-05-22 PROCEDURE — 36415 COLL VENOUS BLD VENIPUNCTURE: CPT

## 2019-05-22 PROCEDURE — 80048 BASIC METABOLIC PNL TOTAL CA: CPT

## 2019-05-22 PROCEDURE — 83036 HEMOGLOBIN GLYCOSYLATED A1C: CPT

## 2019-08-23 ENCOUNTER — APPOINTMENT (OUTPATIENT)
Dept: LAB | Facility: CLINIC | Age: 74
End: 2019-08-23
Payer: MEDICARE

## 2019-08-23 ENCOUNTER — TRANSCRIBE ORDERS (OUTPATIENT)
Dept: LAB | Facility: CLINIC | Age: 74
End: 2019-08-23

## 2019-08-23 DIAGNOSIS — C92.11 CHRONIC MYELOID LEUKEMIA IN REMISSION (HCC): ICD-10-CM

## 2019-08-23 DIAGNOSIS — E11.9 DIABETES MELLITUS WITHOUT COMPLICATION (HCC): ICD-10-CM

## 2019-08-23 DIAGNOSIS — E11.9 DIABETES MELLITUS WITHOUT COMPLICATION (HCC): Primary | ICD-10-CM

## 2019-08-23 DIAGNOSIS — E78.1 PURE HYPERGLYCERIDEMIA: ICD-10-CM

## 2019-08-23 LAB
ALBUMIN SERPL BCP-MCNC: 4 G/DL (ref 3.5–5)
ALP SERPL-CCNC: 74 U/L (ref 46–116)
ALT SERPL W P-5'-P-CCNC: 32 U/L (ref 12–78)
AST SERPL W P-5'-P-CCNC: 17 U/L (ref 5–45)
BILIRUB DIRECT SERPL-MCNC: 0.14 MG/DL (ref 0–0.2)
BILIRUB SERPL-MCNC: 0.6 MG/DL (ref 0.2–1)
CHOLEST SERPL-MCNC: 113 MG/DL (ref 50–200)
HDLC SERPL-MCNC: 39 MG/DL (ref 40–60)
LDLC SERPL CALC-MCNC: 53 MG/DL (ref 0–100)
NONHDLC SERPL-MCNC: 74 MG/DL
PROT SERPL-MCNC: 8 G/DL (ref 6.4–8.2)
TRIGL SERPL-MCNC: 103 MG/DL

## 2019-08-23 PROCEDURE — 80061 LIPID PANEL: CPT

## 2019-08-23 PROCEDURE — 80076 HEPATIC FUNCTION PANEL: CPT

## 2019-08-23 PROCEDURE — 36415 COLL VENOUS BLD VENIPUNCTURE: CPT

## 2019-10-31 ENCOUNTER — APPOINTMENT (OUTPATIENT)
Dept: LAB | Facility: CLINIC | Age: 74
End: 2019-10-31
Payer: MEDICARE

## 2019-10-31 DIAGNOSIS — C91.10 CLL (CHRONIC LYMPHOCYTIC LEUKEMIA) (HCC): ICD-10-CM

## 2019-10-31 DIAGNOSIS — C18.2 MALIGNANT NEOPLASM OF ASCENDING COLON (HCC): ICD-10-CM

## 2019-10-31 LAB
ALBUMIN SERPL BCP-MCNC: 4.2 G/DL (ref 3.5–5)
ALP SERPL-CCNC: 77 U/L (ref 46–116)
ALT SERPL W P-5'-P-CCNC: 28 U/L (ref 12–78)
ANION GAP SERPL CALCULATED.3IONS-SCNC: 10 MMOL/L (ref 4–13)
AST SERPL W P-5'-P-CCNC: 22 U/L (ref 5–45)
BASOPHILS # BLD AUTO: 0.06 THOUSANDS/ΜL (ref 0–0.1)
BASOPHILS NFR BLD AUTO: 1 % (ref 0–1)
BILIRUB SERPL-MCNC: 0.7 MG/DL (ref 0.2–1)
BUN SERPL-MCNC: 28 MG/DL (ref 5–25)
CALCIUM SERPL-MCNC: 9.2 MG/DL (ref 8.3–10.1)
CHLORIDE SERPL-SCNC: 103 MMOL/L (ref 100–108)
CO2 SERPL-SCNC: 26 MMOL/L (ref 21–32)
CREAT SERPL-MCNC: 1.35 MG/DL (ref 0.6–1.3)
EOSINOPHIL # BLD AUTO: 0.24 THOUSAND/ΜL (ref 0–0.61)
EOSINOPHIL NFR BLD AUTO: 3 % (ref 0–6)
ERYTHROCYTE [DISTWIDTH] IN BLOOD BY AUTOMATED COUNT: 13.2 % (ref 11.6–15.1)
GFR SERPL CREATININE-BSD FRML MDRD: 52 ML/MIN/1.73SQ M
GLUCOSE SERPL-MCNC: 89 MG/DL (ref 65–140)
HCT VFR BLD AUTO: 43.1 % (ref 36.5–49.3)
HGB BLD-MCNC: 14.1 G/DL (ref 12–17)
IMM GRANULOCYTES # BLD AUTO: 0.02 THOUSAND/UL (ref 0–0.2)
IMM GRANULOCYTES NFR BLD AUTO: 0 % (ref 0–2)
LDH SERPL-CCNC: 180 U/L (ref 81–234)
LYMPHOCYTES # BLD AUTO: 2.55 THOUSANDS/ΜL (ref 0.6–4.47)
LYMPHOCYTES NFR BLD AUTO: 33 % (ref 14–44)
MCH RBC QN AUTO: 29.5 PG (ref 26.8–34.3)
MCHC RBC AUTO-ENTMCNC: 32.7 G/DL (ref 31.4–37.4)
MCV RBC AUTO: 90 FL (ref 82–98)
MONOCYTES # BLD AUTO: 0.51 THOUSAND/ΜL (ref 0.17–1.22)
MONOCYTES NFR BLD AUTO: 7 % (ref 4–12)
NEUTROPHILS # BLD AUTO: 4.47 THOUSANDS/ΜL (ref 1.85–7.62)
NEUTS SEG NFR BLD AUTO: 56 % (ref 43–75)
NRBC BLD AUTO-RTO: 0 /100 WBCS
PLATELET # BLD AUTO: 191 THOUSANDS/UL (ref 149–390)
PMV BLD AUTO: 10.1 FL (ref 8.9–12.7)
POTASSIUM SERPL-SCNC: 4.6 MMOL/L (ref 3.5–5.3)
PROT SERPL-MCNC: 8.4 G/DL (ref 6.4–8.2)
RBC # BLD AUTO: 4.78 MILLION/UL (ref 3.88–5.62)
SODIUM SERPL-SCNC: 139 MMOL/L (ref 136–145)
WBC # BLD AUTO: 7.85 THOUSAND/UL (ref 4.31–10.16)

## 2019-10-31 PROCEDURE — 36415 COLL VENOUS BLD VENIPUNCTURE: CPT

## 2019-10-31 PROCEDURE — 82378 CARCINOEMBRYONIC ANTIGEN: CPT

## 2019-10-31 PROCEDURE — 83615 LACTATE (LD) (LDH) ENZYME: CPT

## 2019-10-31 PROCEDURE — 85025 COMPLETE CBC W/AUTO DIFF WBC: CPT

## 2019-10-31 PROCEDURE — 80053 COMPREHEN METABOLIC PANEL: CPT

## 2019-11-01 LAB — CEA SERPL-MCNC: 0.8 NG/ML (ref 0–3)

## 2019-11-04 ENCOUNTER — HOSPITAL ENCOUNTER (OUTPATIENT)
Dept: CT IMAGING | Facility: HOSPITAL | Age: 74
Discharge: HOME | End: 2019-11-04
Payer: MEDICARE

## 2019-11-04 DIAGNOSIS — C18.2 MALIGNANT NEOPLASM OF ASCENDING COLON (HCC): ICD-10-CM

## 2019-11-04 DIAGNOSIS — C91.10 CLL (CHRONIC LYMPHOCYTIC LEUKEMIA) (HCC): ICD-10-CM

## 2019-11-04 PROCEDURE — 74177 CT ABD & PELVIS W/CONTRAST: CPT

## 2019-11-04 PROCEDURE — 71260 CT THORAX DX C+: CPT

## 2019-11-04 RX ADMIN — IOHEXOL 85 ML: 350 INJECTION, SOLUTION INTRAVENOUS at 09:19

## 2019-11-07 ENCOUNTER — OFFICE VISIT (OUTPATIENT)
Dept: HEMATOLOGY ONCOLOGY | Facility: CLINIC | Age: 74
End: 2019-11-07
Payer: MEDICARE

## 2019-11-07 VITALS
HEART RATE: 84 BPM | RESPIRATION RATE: 16 BRPM | DIASTOLIC BLOOD PRESSURE: 66 MMHG | WEIGHT: 196 LBS | BODY MASS INDEX: 31.5 KG/M2 | TEMPERATURE: 98 F | OXYGEN SATURATION: 95 % | HEIGHT: 66 IN | SYSTOLIC BLOOD PRESSURE: 132 MMHG

## 2019-11-07 DIAGNOSIS — C18.2 MALIGNANT NEOPLASM OF ASCENDING COLON (HCC): ICD-10-CM

## 2019-11-07 DIAGNOSIS — D3A.020 BENIGN CARCINOID TUMOR OF APPENDIX: ICD-10-CM

## 2019-11-07 DIAGNOSIS — C91.10 CLL (CHRONIC LYMPHOCYTIC LEUKEMIA) (HCC): Primary | ICD-10-CM

## 2019-11-07 PROCEDURE — 99214 OFFICE O/P EST MOD 30 MIN: CPT | Performed by: INTERNAL MEDICINE

## 2019-11-07 NOTE — PROGRESS NOTES
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  Previous Hematologic/ Oncologic History:      Patient had 4 cycles of bendamustine and Rituxan followed by one dose of Rituxan the maintenance setting  He is status post a right hemicolectomy for appendiceal cancer  Current Hematologic/ Oncologic Treatment:      Observation    Interval History:      The patient returns for follow-up visit  His blood work is within acceptable limits  Imaging shows no evidence of progression of any of his malignancies  He himself is doing well  Tries any nausea denies any vomiting denies any diarrhea  The rest of his 14 point review of systems today was negative  Test Results:    Imaging: Ct Chest Abdomen Pelvis W Contrast    Result Date: 11/4/2019  Narrative: CT CHEST, ABDOMEN AND PELVIS WITH IV CONTRAST INDICATION:   C91 10: Chronic lymphocytic leukemia of B-cell type not having achieved remission C18 2: Malignant neoplasm of ascending colon  COMPARISON:  6/11/2018, 7/10/2017, 11/14/2016 TECHNIQUE: CT examination of the chest, abdomen and pelvis was performed  Axial, sagittal, and coronal 2D reformatted images were created from the source data and submitted for interpretation  Radiation dose length product (DLP) for this visit:  986 mGy-cm   This examination, like all CT scans performed in the [facility], was performed utilizing techniques to minimize radiation dose exposure, including the use of iterative reconstruction and automated exposure control  IV Contrast:  85 mL of iohexol (OMNIPAQUE) Enteric Contrast: Enteric contrast was administered  FINDINGS: CHEST LUNGS:  Lungs are clear  There is no tracheal or endobronchial lesion   PLEURA: Unremarkable  HEART/GREAT VESSELS:  Unremarkable for patient's age  MEDIASTINUM AND YOBANY:  There is no lymphadenopathy  There are small distal esophageal varices which have been present on multiple prior studies though of uncertain etiology given normal-appearing liver and portal vein  CHEST WALL AND LOWER NECK:   Unremarkable  ABDOMEN LIVER/BILIARY TREE:  Unremarkable  GALLBLADDER:  Gallbladder is surgically absent  SPLEEN:  Unremarkable  PANCREAS:  Unremarkable  ADRENAL GLANDS:  Unremarkable  KIDNEYS/URETERS:  Unremarkable  No hydronephrosis  STOMACH AND BOWEL:  There is colonic diverticulosis without evidence of acute diverticulitis  The patient is status post right hemicolectomy  APPENDIX:  No findings to suggest appendicitis  ABDOMINOPELVIC CAVITY:  No ascites or free intraperitoneal air  No lymphadenopathy  VESSELS:  Unremarkable for patient's age  PELVIS REPRODUCTIVE ORGANS:  Unremarkable for patient's age  URINARY BLADDER:  Unremarkable  ABDOMINAL WALL/INGUINAL REGIONS:  There is a fat-containing left inguinal hernia  OSSEOUS STRUCTURES:  No acute fracture or destructive osseous lesion  Impression: 1  No evidence of metastatic disease in the chest, abdomen, or pelvis  No pathologic lymphadenopathy  2   Small distal esophageal varices of uncertain etiology and clinical significance, stable over multiple prior studies dating back to at least 2015  No evidence of liver disease or portal hypertension   Workstation performed: [id number]       Labs:   Lab Results   Component Value Date    WBC 7 85 10/31/2019    HGB 14 1 10/31/2019    HCT 43 1 10/31/2019    MCV 90 10/31/2019     10/31/2019     Lab Results   Component Value Date     10/22/2015    K 4 6 10/31/2019     10/31/2019    CO2 26 10/31/2019    ANIONGAP 8 10/22/2015    BUN 28 (H) 10/31/2019    CREATININE 1 35 (H) 10/31/2019    GLUCOSE 138 10/22/2015    GLUF 123 (H) 05/22/2019    CALCIUM 9 2 10/31/2019    AST 22 10/31/2019    ALT 28 10/31/2019    ALKPHOS 77 10/31/2019    PROT 7 5 10/22/2015    BILITOT 0 59 10/22/2015    EGFR 52 10/31/2019       Lab Results   Component Value Date    PSA 1 3 06/05/2018    PSA 0 9 01/15/2014       Lab Results   Component Value Date    CEA 0 8 10/31/2019     ROS: As stated in the history of present illness otherwise his 14 point review of systems today was negative        Active Problems:   Patient Active Problem List   Diagnosis    Arthritis    Benign essential HTN    Carcinoid tumor of appendix    [facility]    Chronic lymphocytic leukemia [facility]    CKD (chronic kidney disease) stage 3, GFR 30-59 ml/min ([facility])    CML in remission ([facility]    Diabetic neuropathy ([facility]    H/O Clostridium difficile infection    Heel spur, right    Malignant neoplasm of colon ([facility]    Onychomycosis    Type 2 diabetes mellitus ([facility] [facility] )       Past Medical History:   Past Medical History:   Diagnosis Date    Cancer of appendix ([facility] [facility] ) 2015    appendectomy-colon resection    Chronic lymphocytic leukemia of B-cell type ([facility] [address] )     "remission 4-5yrs"-chemo    Diabetes mellitus ([facility] [address] [facility] )     bs checked by pt at home at 6am =92    Diabetic neuropathy ([facility] [facility]     Heart attack ([facility] [address] [facility] ) 04/2015    "labeled as that and than tested later after appendix removed and stress test showed negative"    History of cancer chemotherapy     last treatment approx 5yrs ago    Hypertension     Scalp psoriasis     Shingles 2/10-15    Toe injury     left great toe, - 2017-internal braec-to be removed today 3/30/2018    Wears glasses        Surgical History:   Past Surgical History:   Procedure Laterality Date    APPENDECTOMY      BONE BIOPSY Left 3/30/2018    Procedure: GREAT TOE BONE BIOPSY;  Surgeon: Delonte Mg DPM;  [facility]: [facility];  Service: Podiatry    CATARACT EXTRACTION Bilateral     COLECTOMY  06/2015    EYE SURGERY Right     "retinal peel"    FOOT FUSION Left 12/6/2017    Procedure: [facility]; Surgeon: Basim Larsen DPM;  [facility];  Service: Podiatry    HARDWARE REMOVAL Left 4/18/2018    Procedure: REMOVAL STAPLES LEFT TOE;  Surgeon: Basim Larsen DPM;  [facility];  Service: Podiatry    ORIF FOOT FRACTURE Left 12/6/2017    Procedure: HALLUX INTERPHALANGEAL JOINT INTERNAL FIXATION; REPAIR OF ULCERATION WITH EXCISIONS AND REVISION OF SKIN HALLUX WITH USE OF ALLOGRAFT;  Surgeon: Basim Larsen DPM;  [facility];  Service: Podiatry    SD COLONOSCOPY FLX DX W/COLLJ SPEC WHEN PFRMD N/A 4/21/2016    Procedure: COLONOSCOPY;  Surgeon: Chuck Esteban DO;  [facility]; Service: Gastroenterology    SD REMOVAL DEEP IMPLANT Left 3/30/2018    Procedure: REMOVAL HARDWARE FOOT;  Surgeon: Basim Larsen DPM;  [facility];  Service: Podiatry    TONSILLECTOMY         Family History:    Family History   Problem Relation Age of Onset    Alcohol abuse Mother     Pancreatic cancer Father     No Known Problems Daughter     No Known Problems Daughter        Cancer-related family history includes Pancreatic cancer in his father      Social History:   Social History     Socioeconomic History    Marital status: /Civil Union     Spouse name: Not on file    Number of children: Not on file    Years of education: Not on file    Highest education level: Not on file   Occupational History    Not on file   Social Needs    Financial resource strain: Not on file    Food insecurity:     Worry: Not on file     Inability: Not on file    Transportation needs:     Medical: Not on file     Non-medical: Not on file   Tobacco Use    Smoking status: Never Smoker    Smokeless tobacco: Never Used   Substance and Sexual Activity    Alcohol use: No    Drug use: No    Sexual activity: Not on file   Lifestyle    Physical activity:     Days per week: Not on file     Minutes per session: Not on file    Stress: Not on file   Relationships    Social connections:     Talks on phone: Not on file     Gets together: Not on file     Attends Zoroastrianism service: Not on file     Active member of club or organization: Not on file     Attends meetings of clubs or organizations: Not on file     Relationship status: Not on file    Intimate partner violence:     Fear of current or ex partner: Not on file     Emotionally abused: Not on file     Physically abused: Not on file     Forced sexual activity: Not on file   Other Topics Concern    Not on file   Social History Narrative    Not on file       Current Medications:   Current Outpatient Medications   Medication Sig Dispense Refill    amLODIPine-benazepril (LOTREL) 10-20 MG per capsule Take 1 capsule by mouth daily   fluocinonide (LIDEX) 0 05 % external solution Apply topically 2 (two) times a day Scalp psoriasis      glipiZIDE (GLUCOTROL XL) 10 mg 24 hr tablet Take 10 mg by mouth daily   Glucosamine HCl (GLUCOSAMINE PO) Take by mouth daily      lisinopril (ZESTRIL) 20 mg tablet Take 20 mg by mouth daily   metFORMIN (GLUCOPHAGE) 1000 MG tablet Take 1,000 mg by mouth 2 (two) times a day with meals      Multiple Vitamin (MULTIVITAMIN) tablet Take 1 tablet by mouth daily      Multiple Vitamins-Minerals (OCUVITE PO) Take by mouth daily      Omega-3 Fatty Acids (FISH OIL PO) Take by mouth daily      simvastatin (ZOCOR) 20 mg tablet Take 20 mg by mouth daily at bedtime      aspirin 81 MG tablet Take 81 mg by mouth       No current facility-administered medications for this visit  Allergies: Allergies   Allergen Reactions    Barium Iodide      Effects the kidneys    Oxycodone-Acetaminophen      hallucination       Physical Exam:    Body surface area is 1 98 meters squared      Wt Readings from Last 3 Encounters:   11/07/19 88 9 kg (196 lb)   05/02/19 88 9 kg (196 lb)   11/01/18 88 5 kg (195 lb)        Temp Readings from Last 3 Encounters:   11/07/19 98 °F (36 7 °C) (Tympanic)   05/02/19 99 2 °F (37 3 °C) (Tympanic) 11/01/18 97 6 °F (36 4 °C) (Tympanic)        BP Readings from Last 3 Encounters:   11/07/19 132/66   05/02/19 124/68   11/01/18 124/66         Pulse Readings from Last 3 Encounters:   11/07/19 84   05/02/19 81   11/01/18 83     @LASTSAO2(3)@      Physical Exam     Constitutional   General appearance: No acute distress, well appearing and well nourished  Eyes   Conjunctiva and lids: No swelling, erythema or discharge  Pupils and irises: Equal, round and reactive to light  Ears, Nose, Mouth, and Throat   External inspection of ears and nose: Normal     Nasal mucosa, septum, and turbinates: Normal without edema or erythema  Oropharynx: Normal with no erythema, edema, exudate or lesions  Pulmonary   Respiratory effort: No increased work of breathing or signs of respiratory distress  Auscultation of lungs: Clear to auscultation  Cardiovascular   Palpation of heart: Normal PMI, no thrills  Auscultation of heart: Normal rate and rhythm, normal S1 and S2, without murmurs  Examination of extremities for edema and/or varicosities: Normal     Carotid pulses: Normal     Abdomen   Abdomen: Non-tender, no masses  Liver and spleen: No hepatomegaly or splenomegaly  Lymphatic   Palpation of lymph nodes in neck: No lymphadenopathy  Musculoskeletal   Gait and station: Normal     Digits and nails: Normal without clubbing or cyanosis  Inspection/palpation of joints, bones, and muscles: Normal     Skin   Skin and subcutaneous tissue: Normal without rashes or lesions  Neurologic   Cranial nerves: Cranial nerves 2-12 intact  Sensation: No sensory loss  Psychiatric   Orientation to person, place, and time: Normal     Mood and affect: Normal         Assessment / Plan: This is a pleasant elderly male with a past history of chronic lymphocytic leukemia with bulky adenopathy  He got 4 cycles of bendamustine and Rituxan with a very nice response and no residual disease on his PET scan   Since he did have a history of bulky adenopathy we decided to give him Rituxan in the maintenance setting  After his first dose he was running a low white count and we had a discussion  He decided to go off of maintenance Rituxan and just be observed       He was doing well until he was admitted for appendicitis  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  Only one lymph node was removed  He needed a proper cancer operation  He had this done  His hemicolectomy showed no evidence of residual disease  23 lymph nodes were negative  Based on this we decided he did not need any further chemotherapy  He is now on observation  His CLL is stable  His most recent blood work is stable  White count is within acceptable limits  CT scan shows no evidence of progression  I will just see him back in 6 months with repeat blood work  The patient is in agreement with the plan  Goals and Barriers:  Current Goal:  Prolong Survival from CLL  Barriers: None  Patient's Capacity to Self Care:  Patient able to self care  Portions of the record may have been created with voice recognition software   Occasional wrong word or "sound a like" substitutions may have occurred due to the inherent limitations of voice recognition software   Read the chart carefully and recognize, using context, where substitutions have occurred

## 2019-11-25 ENCOUNTER — APPOINTMENT (OUTPATIENT)
Dept: LAB | Facility: CLINIC | Age: 74
End: 2019-11-25
Payer: MEDICARE

## 2019-11-25 ENCOUNTER — TRANSCRIBE ORDERS (OUTPATIENT)
Dept: LAB | Facility: CLINIC | Age: 74
End: 2019-11-25

## 2019-11-25 DIAGNOSIS — E11.69 TYPE 2 DIABETES MELLITUS WITH OTHER SPECIFIED COMPLICATION, UNSPECIFIED WHETHER LONG TERM INSULIN USE (HCC): ICD-10-CM

## 2019-11-25 DIAGNOSIS — N18.30 CHRONIC KIDNEY DISEASE, STAGE III (MODERATE) (HCC): ICD-10-CM

## 2019-11-25 DIAGNOSIS — E11.69 TYPE 2 DIABETES MELLITUS WITH OTHER SPECIFIED COMPLICATION, UNSPECIFIED WHETHER LONG TERM INSULIN USE (HCC): Primary | ICD-10-CM

## 2019-11-25 LAB
ANION GAP SERPL CALCULATED.3IONS-SCNC: 10 MMOL/L (ref 4–13)
BUN SERPL-MCNC: 29 MG/DL (ref 5–25)
CALCIUM SERPL-MCNC: 8.9 MG/DL (ref 8.3–10.1)
CHLORIDE SERPL-SCNC: 104 MMOL/L (ref 100–108)
CO2 SERPL-SCNC: 25 MMOL/L (ref 21–32)
CREAT SERPL-MCNC: 1.39 MG/DL (ref 0.6–1.3)
CREAT UR-MCNC: 80.6 MG/DL
EST. AVERAGE GLUCOSE BLD GHB EST-MCNC: 160 MG/DL
GFR SERPL CREATININE-BSD FRML MDRD: 50 ML/MIN/1.73SQ M
GLUCOSE P FAST SERPL-MCNC: 126 MG/DL (ref 65–99)
HBA1C MFR BLD: 7.2 % (ref 4.2–6.3)
MICROALBUMIN UR-MCNC: 50.5 MG/L (ref 0–20)
MICROALBUMIN/CREAT 24H UR: 63 MG/G CREATININE (ref 0–30)
POTASSIUM SERPL-SCNC: 4.5 MMOL/L (ref 3.5–5.3)
SODIUM SERPL-SCNC: 139 MMOL/L (ref 136–145)

## 2019-11-25 PROCEDURE — 83036 HEMOGLOBIN GLYCOSYLATED A1C: CPT

## 2019-11-25 PROCEDURE — 80048 BASIC METABOLIC PNL TOTAL CA: CPT

## 2019-11-25 PROCEDURE — 82043 UR ALBUMIN QUANTITATIVE: CPT | Performed by: INTERNAL MEDICINE

## 2019-11-25 PROCEDURE — 36415 COLL VENOUS BLD VENIPUNCTURE: CPT

## 2019-11-25 PROCEDURE — 82570 ASSAY OF URINE CREATININE: CPT | Performed by: INTERNAL MEDICINE

## 2020-02-10 ENCOUNTER — TELEPHONE (OUTPATIENT)
Dept: HEMATOLOGY ONCOLOGY | Facility: CLINIC | Age: 75
End: 2020-02-10

## 2020-02-10 NOTE — TELEPHONE ENCOUNTER
Called patient on 2/10 left a message stating that his 5/14 appointment time has been changed from 1:00pm to 11:40am  Stated that if this date and or time does not work for patient to please give the office a call back

## 2020-02-27 ENCOUNTER — TRANSCRIBE ORDERS (OUTPATIENT)
Dept: LAB | Facility: CLINIC | Age: 75
End: 2020-02-27

## 2020-02-27 ENCOUNTER — APPOINTMENT (OUTPATIENT)
Dept: LAB | Facility: CLINIC | Age: 75
End: 2020-02-27
Payer: MEDICARE

## 2020-02-27 DIAGNOSIS — E11.9 DIABETES MELLITUS WITHOUT COMPLICATION (HCC): ICD-10-CM

## 2020-02-27 DIAGNOSIS — E11.9 DIABETES MELLITUS WITHOUT COMPLICATION (HCC): Primary | ICD-10-CM

## 2020-02-27 DIAGNOSIS — N18.30 CHRONIC KIDNEY DISEASE, STAGE III (MODERATE) (HCC): ICD-10-CM

## 2020-02-27 LAB
ALBUMIN SERPL BCP-MCNC: 4 G/DL (ref 3.5–5)
ALP SERPL-CCNC: 80 U/L (ref 46–116)
ALT SERPL W P-5'-P-CCNC: 23 U/L (ref 12–78)
ANION GAP SERPL CALCULATED.3IONS-SCNC: 11 MMOL/L (ref 4–13)
AST SERPL W P-5'-P-CCNC: 21 U/L (ref 5–45)
BILIRUB SERPL-MCNC: 0.72 MG/DL (ref 0.2–1)
BUN SERPL-MCNC: 25 MG/DL (ref 5–25)
CALCIUM SERPL-MCNC: 9.4 MG/DL (ref 8.3–10.1)
CHLORIDE SERPL-SCNC: 103 MMOL/L (ref 100–108)
CO2 SERPL-SCNC: 26 MMOL/L (ref 21–32)
CREAT SERPL-MCNC: 1.43 MG/DL (ref 0.6–1.3)
GFR SERPL CREATININE-BSD FRML MDRD: 48 ML/MIN/1.73SQ M
GLUCOSE P FAST SERPL-MCNC: 92 MG/DL (ref 65–99)
POTASSIUM SERPL-SCNC: 4.8 MMOL/L (ref 3.5–5.3)
PROT SERPL-MCNC: 8.3 G/DL (ref 6.4–8.2)
SODIUM SERPL-SCNC: 140 MMOL/L (ref 136–145)

## 2020-02-27 PROCEDURE — 36415 COLL VENOUS BLD VENIPUNCTURE: CPT

## 2020-02-27 PROCEDURE — 83036 HEMOGLOBIN GLYCOSYLATED A1C: CPT

## 2020-02-27 PROCEDURE — 80053 COMPREHEN METABOLIC PANEL: CPT

## 2020-02-28 LAB
EST. AVERAGE GLUCOSE BLD GHB EST-MCNC: 169 MG/DL
HBA1C MFR BLD: 7.5 %

## 2020-05-08 ENCOUNTER — APPOINTMENT (OUTPATIENT)
Dept: LAB | Age: 75
End: 2020-05-08
Payer: MEDICARE

## 2020-05-08 DIAGNOSIS — C18.2 MALIGNANT NEOPLASM OF ASCENDING COLON (HCC): ICD-10-CM

## 2020-05-08 DIAGNOSIS — C91.10 CLL (CHRONIC LYMPHOCYTIC LEUKEMIA) (HCC): ICD-10-CM

## 2020-05-08 DIAGNOSIS — D3A.020 BENIGN CARCINOID TUMOR OF APPENDIX: ICD-10-CM

## 2020-05-08 LAB
ALBUMIN SERPL BCP-MCNC: 4.2 G/DL (ref 3.5–5)
ALP SERPL-CCNC: 74 U/L (ref 46–116)
ALT SERPL W P-5'-P-CCNC: 29 U/L (ref 12–78)
ANION GAP SERPL CALCULATED.3IONS-SCNC: 6 MMOL/L (ref 4–13)
AST SERPL W P-5'-P-CCNC: 18 U/L (ref 5–45)
BILIRUB SERPL-MCNC: 0.61 MG/DL (ref 0.2–1)
BUN SERPL-MCNC: 25 MG/DL (ref 5–25)
CALCIUM SERPL-MCNC: 9.4 MG/DL (ref 8.3–10.1)
CHLORIDE SERPL-SCNC: 105 MMOL/L (ref 100–108)
CO2 SERPL-SCNC: 24 MMOL/L (ref 21–32)
CREAT SERPL-MCNC: 1.41 MG/DL (ref 0.6–1.3)
GFR SERPL CREATININE-BSD FRML MDRD: 49 ML/MIN/1.73SQ M
GLUCOSE SERPL-MCNC: 150 MG/DL (ref 65–140)
LDH SERPL-CCNC: 156 U/L (ref 81–234)
POTASSIUM SERPL-SCNC: 4.5 MMOL/L (ref 3.5–5.3)
PROT SERPL-MCNC: 8 G/DL (ref 6.4–8.2)
SODIUM SERPL-SCNC: 135 MMOL/L (ref 136–145)

## 2020-05-08 PROCEDURE — 36415 COLL VENOUS BLD VENIPUNCTURE: CPT

## 2020-05-08 PROCEDURE — 80053 COMPREHEN METABOLIC PANEL: CPT

## 2020-05-08 PROCEDURE — 83615 LACTATE (LD) (LDH) ENZYME: CPT

## 2020-05-11 ENCOUNTER — APPOINTMENT (OUTPATIENT)
Dept: LAB | Age: 75
End: 2020-05-11
Payer: MEDICARE

## 2020-05-11 ENCOUNTER — TRANSCRIBE ORDERS (OUTPATIENT)
Dept: ADMINISTRATIVE | Age: 75
End: 2020-05-11

## 2020-05-11 DIAGNOSIS — D3A.020 BENIGN CARCINOID TUMOR OF APPENDIX: ICD-10-CM

## 2020-05-11 DIAGNOSIS — C91.10 CLL (CHRONIC LYMPHOCYTIC LEUKEMIA) (HCC): ICD-10-CM

## 2020-05-11 DIAGNOSIS — C18.2 MALIGNANT NEOPLASM OF ASCENDING COLON (HCC): ICD-10-CM

## 2020-05-11 LAB
BASOPHILS # BLD AUTO: 0.09 THOUSANDS/ΜL (ref 0–0.1)
BASOPHILS NFR BLD AUTO: 1 % (ref 0–1)
EOSINOPHIL # BLD AUTO: 0.27 THOUSAND/ΜL (ref 0–0.61)
EOSINOPHIL NFR BLD AUTO: 4 % (ref 0–6)
ERYTHROCYTE [DISTWIDTH] IN BLOOD BY AUTOMATED COUNT: 13.3 % (ref 11.6–15.1)
HCT VFR BLD AUTO: 39.2 % (ref 36.5–49.3)
HGB BLD-MCNC: 13 G/DL (ref 12–17)
IMM GRANULOCYTES # BLD AUTO: 0.01 THOUSAND/UL (ref 0–0.2)
IMM GRANULOCYTES NFR BLD AUTO: 0 % (ref 0–2)
LYMPHOCYTES # BLD AUTO: 2.47 THOUSANDS/ΜL (ref 0.6–4.47)
LYMPHOCYTES NFR BLD AUTO: 32 % (ref 14–44)
MCH RBC QN AUTO: 30 PG (ref 26.8–34.3)
MCHC RBC AUTO-ENTMCNC: 33.2 G/DL (ref 31.4–37.4)
MCV RBC AUTO: 91 FL (ref 82–98)
MONOCYTES # BLD AUTO: 0.62 THOUSAND/ΜL (ref 0.17–1.22)
MONOCYTES NFR BLD AUTO: 8 % (ref 4–12)
NEUTROPHILS # BLD AUTO: 4.32 THOUSANDS/ΜL (ref 1.85–7.62)
NEUTS SEG NFR BLD AUTO: 55 % (ref 43–75)
NRBC BLD AUTO-RTO: 0 /100 WBCS
PLATELET # BLD AUTO: 155 THOUSANDS/UL (ref 149–390)
PMV BLD AUTO: 10.7 FL (ref 8.9–12.7)
RBC # BLD AUTO: 4.33 MILLION/UL (ref 3.88–5.62)
WBC # BLD AUTO: 7.78 THOUSAND/UL (ref 4.31–10.16)

## 2020-05-11 PROCEDURE — 85025 COMPLETE CBC W/AUTO DIFF WBC: CPT

## 2020-05-11 PROCEDURE — 36415 COLL VENOUS BLD VENIPUNCTURE: CPT

## 2020-05-14 ENCOUNTER — OFFICE VISIT (OUTPATIENT)
Dept: HEMATOLOGY ONCOLOGY | Facility: CLINIC | Age: 75
End: 2020-05-14
Payer: MEDICARE

## 2020-05-14 VITALS
WEIGHT: 200 LBS | RESPIRATION RATE: 18 BRPM | SYSTOLIC BLOOD PRESSURE: 148 MMHG | TEMPERATURE: 97.6 F | BODY MASS INDEX: 32.14 KG/M2 | OXYGEN SATURATION: 97 % | HEART RATE: 76 BPM | DIASTOLIC BLOOD PRESSURE: 82 MMHG | HEIGHT: 66 IN

## 2020-05-14 DIAGNOSIS — C91.10 CLL (CHRONIC LYMPHOCYTIC LEUKEMIA) (HCC): Primary | ICD-10-CM

## 2020-05-14 PROCEDURE — 99215 OFFICE O/P EST HI 40 MIN: CPT | Performed by: PHYSICIAN ASSISTANT

## 2020-08-31 ENCOUNTER — TRANSCRIBE ORDERS (OUTPATIENT)
Dept: LAB | Facility: CLINIC | Age: 75
End: 2020-08-31

## 2020-08-31 ENCOUNTER — APPOINTMENT (OUTPATIENT)
Dept: LAB | Facility: CLINIC | Age: 75
End: 2020-08-31
Payer: MEDICARE

## 2020-08-31 DIAGNOSIS — E11.9 DIABETES MELLITUS WITHOUT COMPLICATION (HCC): Primary | ICD-10-CM

## 2020-08-31 DIAGNOSIS — D64.9 RELATIVE ANEMIA: ICD-10-CM

## 2020-08-31 DIAGNOSIS — N18.30 CHRONIC KIDNEY DISEASE, STAGE III (MODERATE) (HCC): ICD-10-CM

## 2020-08-31 DIAGNOSIS — E11.9 DIABETES MELLITUS WITHOUT COMPLICATION (HCC): ICD-10-CM

## 2020-08-31 LAB
ANION GAP SERPL CALCULATED.3IONS-SCNC: 4 MMOL/L (ref 4–13)
BUN SERPL-MCNC: 24 MG/DL (ref 5–25)
CALCIUM SERPL-MCNC: 9.3 MG/DL (ref 8.3–10.1)
CHLORIDE SERPL-SCNC: 109 MMOL/L (ref 100–108)
CO2 SERPL-SCNC: 25 MMOL/L (ref 21–32)
CREAT SERPL-MCNC: 1.35 MG/DL (ref 0.6–1.3)
EST. AVERAGE GLUCOSE BLD GHB EST-MCNC: 180 MG/DL
GFR SERPL CREATININE-BSD FRML MDRD: 51 ML/MIN/1.73SQ M
GLUCOSE SERPL-MCNC: 173 MG/DL (ref 65–140)
HBA1C MFR BLD: 7.9 %
POTASSIUM SERPL-SCNC: 4.2 MMOL/L (ref 3.5–5.3)
SODIUM SERPL-SCNC: 138 MMOL/L (ref 136–145)

## 2020-08-31 PROCEDURE — 83036 HEMOGLOBIN GLYCOSYLATED A1C: CPT

## 2020-08-31 PROCEDURE — 80048 BASIC METABOLIC PNL TOTAL CA: CPT

## 2020-08-31 PROCEDURE — 36415 COLL VENOUS BLD VENIPUNCTURE: CPT

## 2020-09-09 RX ORDER — LISINOPRIL 40 MG/1
TABLET ORAL
COMMUNITY
Start: 2020-07-31 | End: 2020-09-10

## 2020-09-09 RX ORDER — METFORMIN HYDROCHLORIDE 500 MG/1
1000 TABLET, EXTENDED RELEASE ORAL 2 TIMES DAILY WITH MEALS
COMMUNITY
Start: 2020-06-02 | End: 2020-09-10 | Stop reason: SDUPTHER

## 2020-09-10 ENCOUNTER — TELEPHONE (OUTPATIENT)
Dept: ADMINISTRATIVE | Facility: OTHER | Age: 75
End: 2020-09-10

## 2020-09-10 ENCOUNTER — OFFICE VISIT (OUTPATIENT)
Dept: FAMILY MEDICINE CLINIC | Facility: CLINIC | Age: 75
End: 2020-09-10
Payer: MEDICARE

## 2020-09-10 VITALS
RESPIRATION RATE: 16 BRPM | BODY MASS INDEX: 32.3 KG/M2 | HEART RATE: 73 BPM | TEMPERATURE: 97.9 F | SYSTOLIC BLOOD PRESSURE: 136 MMHG | OXYGEN SATURATION: 96 % | HEIGHT: 66 IN | DIASTOLIC BLOOD PRESSURE: 82 MMHG | WEIGHT: 201 LBS

## 2020-09-10 DIAGNOSIS — Z23 NEED FOR VACCINATION: Primary | ICD-10-CM

## 2020-09-10 DIAGNOSIS — I10 BENIGN ESSENTIAL HTN: ICD-10-CM

## 2020-09-10 DIAGNOSIS — E11.42 TYPE 2 DIABETES MELLITUS WITH DIABETIC POLYNEUROPATHY, WITHOUT LONG-TERM CURRENT USE OF INSULIN (HCC): ICD-10-CM

## 2020-09-10 PROBLEM — Z89.432 STATUS POST TRANSMETATARSAL AMPUTATION OF LEFT FOOT (HCC): Status: ACTIVE | Noted: 2019-05-30

## 2020-09-10 PROBLEM — C18.1 PRIMARY MALIGNANT NEOPLASM OF APPENDIX (HCC): Status: ACTIVE | Noted: 2020-09-10

## 2020-09-10 PROCEDURE — 99203 OFFICE O/P NEW LOW 30 MIN: CPT | Performed by: FAMILY MEDICINE

## 2020-09-10 RX ORDER — ZOSTER VACCINE RECOMBINANT, ADJUVANTED 50 MCG/0.5
0.5 KIT INTRAMUSCULAR ONCE
Qty: 1 EACH | Refills: 1 | Status: SHIPPED | OUTPATIENT
Start: 2020-09-10 | End: 2020-09-10

## 2020-09-10 RX ORDER — GLIPIZIDE 10 MG/1
10 TABLET, FILM COATED, EXTENDED RELEASE ORAL 2 TIMES DAILY
Qty: 180 TABLET | Refills: 1 | Status: SHIPPED | OUTPATIENT
Start: 2020-09-10 | End: 2021-01-14 | Stop reason: SDUPTHER

## 2020-09-10 RX ORDER — METFORMIN HYDROCHLORIDE 500 MG/1
1000 TABLET, EXTENDED RELEASE ORAL 2 TIMES DAILY WITH MEALS
Qty: 360 TABLET | Refills: 1 | Status: SHIPPED | OUTPATIENT
Start: 2020-09-10 | End: 2021-01-14 | Stop reason: SDUPTHER

## 2020-09-10 RX ORDER — LOSARTAN POTASSIUM 50 MG/1
50 TABLET ORAL DAILY
Qty: 90 TABLET | Refills: 1 | Status: SHIPPED | OUTPATIENT
Start: 2020-09-10 | End: 2021-01-14 | Stop reason: SDUPTHER

## 2020-09-10 RX ORDER — AMLODIPINE BESYLATE 10 MG/1
10 TABLET ORAL DAILY
Qty: 90 TABLET | Refills: 1 | Status: SHIPPED | OUTPATIENT
Start: 2020-09-10 | End: 2021-01-14 | Stop reason: SDUPTHER

## 2020-09-10 RX ORDER — AMLODIPINE BESYLATE 10 MG/1
10 TABLET ORAL DAILY
COMMUNITY
End: 2020-09-10 | Stop reason: SDUPTHER

## 2020-09-10 NOTE — ASSESSMENT & PLAN NOTE
Had cut on foot- went to a podiatrist and had a lot of procedures on the foot  Switched to Dr Kanika Peters- on first visit was sent to the ER, ultimately had transmetatarsal amputation    Sees Dr Donna Stuart for routine podiatry

## 2020-09-10 NOTE — PROGRESS NOTES
Assessment/Plan:       Problem List Items Addressed This Visit        Endocrine    Type 2 diabetes mellitus ([facility] [address] [address] )       Lab Results   Component Value Date    HGBA1C 7 9 (H) 08/31/2020   A1C increased from prev 7 5  Need to obtain DM eye and podiatry notes  Pt admits to worsening dietary habits  He will work on cleaning up diet, less carbs, etc- and will recheck prior to next OV  He prefers to avoid adding more medication at this point         Relevant Medications    glipiZIDE (GLUCOTROL XL) 10 mg 24 hr tablet    metFORMIN (GLUCOPHAGE-XR) 500 mg 24 hr tablet    Other Relevant Orders    Comprehensive metabolic panel    Hemoglobin A1C    Lipid Panel with Direct LDL reflex    Microalbumin / creatinine urine ratio       Cardiovascular and Mediastinum    Benign essential HTN     Cough with lisinopril  D/c lisinopril and replace w losartan  Reviewed w patient- agrees  He will monitor BP  Relevant Medications    amLODIPine (NORVASC) 10 mg tablet    losartan (COZAAR) 50 mg tablet    Other Relevant Orders    CBC and differential    TSH, 3rd generation with Free T4 reflex      Other Visit Diagnoses     Need for vaccination    -  Primary      Printed rx for shingrix for pt           Most recent labs reviewed       Subjective:     Shefali Fitzgerald is a 77 y o  male here today and has the below chronic conditions:    Patient Active Problem List   Diagnosis    Arthritis    Benign essential HTN    Carcinoid tumor of appendix    Cardiomyopathy ([facility]    Chronic lymphocytic leukemia ([facility]    CKD (chronic kidney disease) stage 3, GFR 30-59 ml/min ([facility]    CML in remission ([facility]    Diabetic neuropathy ([facility]    H/O Clostridium difficile infection    Heel spur, right    Malignant neoplasm of colon ([facility]    Onychomycosis    Type 2 diabetes mellitus ([facility]    Primary malignant neoplasm of appendix ([facility]    Status post transmetatarsal amputation of left foot ([facility] )     Current Outpatient Medications   Medication Sig Dispense Refill    amLODIPine (NORVASC) 10 mg tablet Take 10 mg by mouth daily      aspirin 81 MG tablet Take 81 mg by mouth      glipiZIDE (GLUCOTROL XL) 10 mg 24 hr tablet Take 10 mg by mouth 2 (two) times a day       Glucosamine HCl (GLUCOSAMINE PO) Take 1 tablet by mouth 2 (two) times a day       lisinopril (ZESTRIL) 20 mg tablet Take 20 mg by mouth daily   metFORMIN (GLUCOPHAGE-XR) 500 mg 24 hr tablet Take 1,000 mg by mouth 2 (two) times a day with meals       Multiple Vitamin (MULTIVITAMIN) tablet Take 1 tablet by mouth daily      Multiple Vitamins-Minerals (OCUVITE PO) Take 1 tablet by mouth daily       Omega-3 Fatty Acids (FISH OIL PO) Take by mouth daily      simvastatin (ZOCOR) 20 mg tablet Take 20 mg by mouth daily at bedtime       No current facility-administered medications for this visit  HPI:  Chief Complaint   Patient presents with    [facility]     New patient,     Cough     Would like to get off of lisinopril  Has a cough from it  - CC above per clinical staff and reviewed  Last colonoscopy in 2016- due in 5 years  Follows with Dr Jacqueline Echols  CLL- follows w Dr Jacqueline Echols    Cough with lisinopril    Dr Smith Score- ophtho  Dr Jacqueline Echols- heme/onc  Dr Britta Dudley- podiatry              The following portions of the patient's history were reviewed and updated as appropriate: allergies, current medications, past family history, past medical history, past social history, past surgical history and problem list     ROS:  Review of Systems   No fever, chills, congestion, chest pain, shortness of breath, nausea, vomiting, diarrhea, constipation, blood in stool, urinary concerns, mood changes  Rest of ROS neg except as above      Objective:      /82   Pulse 73   Temp 97 9 °F (36 6 °C) (Tympanic)   Resp 16   Ht 5' 6" (1 676 m)   Wt 91 2 kg (201 lb)   SpO2 96%   BMI 32 44 kg/m²   BP Readings from Last 3 Encounters:   09/10/20 136/82   05/14/20 148/82   11/07/19 132/66     Wt Readings from Last 3 Encounters:   09/10/20 91 2 kg (201 lb)   05/14/20 90 7 kg (200 lb)   11/07/19 88 9 kg (196 lb)               Physical Exam:   Physical Exam      BMI Counseling: Body mass index is 32 44 kg/m²  The BMI is above normal  Nutrition recommendations include moderation in carbohydrate intake  Exercise recommendations include exercising 3-5 times per week

## 2020-09-10 NOTE — TELEPHONE ENCOUNTER
----- Message from Freda Josue, [address] sent at 2020  4:13 PM EDT -----  Regardin Naomi Dominic Dixon  20 4:13 PM    Hello, our patient Hailey Fraser has had Diabetic Eye Exam and Hepatitis C completed/performed  Please assist in updating the patient chart by pulling the Care Everywhere (CE) document  The date of service is 18, 10/3/19       Thank you,  Freda Josue, MA  [facility]

## 2020-09-10 NOTE — TELEPHONE ENCOUNTER
Upon review of the In Basket request we were able to locate, review, and update the patient chart as requested for Hepatitis C   Any additional questions or concerns should be emailed to the Practice Liaisons via [email or web address] email, please do not reply via In Basket      Thank you  Arpit Ross

## 2020-09-13 NOTE — ASSESSMENT & PLAN NOTE
Lab Results   Component Value Date    HGBA1C 7 9 (H) 08/31/2020   A1C increased from prev 7 5  Pt admits to worsening dietary habits  He will work on cleaning up diet, less carbs, etc- and will recheck prior to next OV    He prefers to avoid adding more medication at this point

## 2020-09-13 NOTE — ASSESSMENT & PLAN NOTE
Cough with lisinopril  D/c lisinopril and replace w losartan  Reviewed w patient- agrees  He will monitor BP

## 2020-12-04 ENCOUNTER — TRANSCRIBE ORDERS (OUTPATIENT)
Dept: LAB | Facility: CLINIC | Age: 75
End: 2020-12-04

## 2020-12-04 ENCOUNTER — LAB (OUTPATIENT)
Dept: LAB | Facility: CLINIC | Age: 75
End: 2020-12-04
Payer: MEDICARE

## 2020-12-04 ENCOUNTER — TELEPHONE (OUTPATIENT)
Dept: HEMATOLOGY ONCOLOGY | Facility: MEDICAL CENTER | Age: 75
End: 2020-12-04

## 2020-12-04 DIAGNOSIS — C91.10 CLL (CHRONIC LYMPHOCYTIC LEUKEMIA) (HCC): ICD-10-CM

## 2020-12-04 LAB
ALBUMIN SERPL BCP-MCNC: 4.1 G/DL (ref 3.5–5)
ALP SERPL-CCNC: 89 U/L (ref 46–116)
ALT SERPL W P-5'-P-CCNC: 30 U/L (ref 12–78)
ANION GAP SERPL CALCULATED.3IONS-SCNC: 7 MMOL/L (ref 4–13)
AST SERPL W P-5'-P-CCNC: 16 U/L (ref 5–45)
BASOPHILS # BLD AUTO: 0.09 THOUSANDS/ΜL (ref 0–0.1)
BASOPHILS NFR BLD AUTO: 1 % (ref 0–1)
BILIRUB SERPL-MCNC: 0.55 MG/DL (ref 0.2–1)
BUN SERPL-MCNC: 25 MG/DL (ref 5–25)
CALCIUM SERPL-MCNC: 9.1 MG/DL (ref 8.3–10.1)
CHLORIDE SERPL-SCNC: 102 MMOL/L (ref 100–108)
CO2 SERPL-SCNC: 29 MMOL/L (ref 21–32)
CREAT SERPL-MCNC: 1.3 MG/DL (ref 0.6–1.3)
EOSINOPHIL # BLD AUTO: 0.22 THOUSAND/ΜL (ref 0–0.61)
EOSINOPHIL NFR BLD AUTO: 3 % (ref 0–6)
ERYTHROCYTE [DISTWIDTH] IN BLOOD BY AUTOMATED COUNT: 13 % (ref 11.6–15.1)
GFR SERPL CREATININE-BSD FRML MDRD: 54 ML/MIN/1.73SQ M
GLUCOSE P FAST SERPL-MCNC: 186 MG/DL (ref 65–99)
HCT VFR BLD AUTO: 41.8 % (ref 36.5–49.3)
HGB BLD-MCNC: 13.5 G/DL (ref 12–17)
IMM GRANULOCYTES # BLD AUTO: 0.03 THOUSAND/UL (ref 0–0.2)
IMM GRANULOCYTES NFR BLD AUTO: 0 % (ref 0–2)
LDH SERPL-CCNC: 169 U/L (ref 81–234)
LYMPHOCYTES # BLD AUTO: 3.1 THOUSANDS/ΜL (ref 0.6–4.47)
LYMPHOCYTES NFR BLD AUTO: 37 % (ref 14–44)
MCH RBC QN AUTO: 29.8 PG (ref 26.8–34.3)
MCHC RBC AUTO-ENTMCNC: 32.3 G/DL (ref 31.4–37.4)
MCV RBC AUTO: 92 FL (ref 82–98)
MONOCYTES # BLD AUTO: 0.4 THOUSAND/ΜL (ref 0.17–1.22)
MONOCYTES NFR BLD AUTO: 5 % (ref 4–12)
NEUTROPHILS # BLD AUTO: 4.49 THOUSANDS/ΜL (ref 1.85–7.62)
NEUTS SEG NFR BLD AUTO: 54 % (ref 43–75)
NRBC BLD AUTO-RTO: 0 /100 WBCS
PLATELET # BLD AUTO: 170 THOUSANDS/UL (ref 149–390)
PMV BLD AUTO: 10.1 FL (ref 8.9–12.7)
POTASSIUM SERPL-SCNC: 4.2 MMOL/L (ref 3.5–5.3)
PROT SERPL-MCNC: 8.4 G/DL (ref 6.4–8.2)
RBC # BLD AUTO: 4.53 MILLION/UL (ref 3.88–5.62)
SODIUM SERPL-SCNC: 138 MMOL/L (ref 136–145)
WBC # BLD AUTO: 8.33 THOUSAND/UL (ref 4.31–10.16)

## 2020-12-04 PROCEDURE — 83615 LACTATE (LD) (LDH) ENZYME: CPT

## 2020-12-04 PROCEDURE — 85025 COMPLETE CBC W/AUTO DIFF WBC: CPT

## 2020-12-04 PROCEDURE — 36415 COLL VENOUS BLD VENIPUNCTURE: CPT

## 2020-12-04 PROCEDURE — 80053 COMPREHEN METABOLIC PANEL: CPT

## 2020-12-10 ENCOUNTER — OFFICE VISIT (OUTPATIENT)
Dept: HEMATOLOGY ONCOLOGY | Facility: CLINIC | Age: 75
End: 2020-12-10
Payer: MEDICARE

## 2020-12-10 VITALS
BODY MASS INDEX: 33.11 KG/M2 | HEART RATE: 85 BPM | HEIGHT: 66 IN | WEIGHT: 206 LBS | DIASTOLIC BLOOD PRESSURE: 72 MMHG | OXYGEN SATURATION: 96 % | SYSTOLIC BLOOD PRESSURE: 142 MMHG | RESPIRATION RATE: 16 BRPM | TEMPERATURE: 98.2 F

## 2020-12-10 DIAGNOSIS — C91.10 CHRONIC LYMPHOCYTIC LEUKEMIA (HCC): Primary | ICD-10-CM

## 2020-12-10 DIAGNOSIS — C18.1 PRIMARY MALIGNANT NEOPLASM OF APPENDIX (HCC): ICD-10-CM

## 2020-12-10 PROCEDURE — 99214 OFFICE O/P EST MOD 30 MIN: CPT | Performed by: NURSE PRACTITIONER

## 2021-01-04 ENCOUNTER — LAB (OUTPATIENT)
Dept: LAB | Facility: CLINIC | Age: 76
End: 2021-01-04
Payer: MEDICARE

## 2021-01-04 DIAGNOSIS — I10 BENIGN ESSENTIAL HTN: ICD-10-CM

## 2021-01-04 DIAGNOSIS — E11.42 TYPE 2 DIABETES MELLITUS WITH DIABETIC POLYNEUROPATHY, WITHOUT LONG-TERM CURRENT USE OF INSULIN (HCC): ICD-10-CM

## 2021-01-04 LAB
ALBUMIN SERPL BCP-MCNC: 4.2 G/DL (ref 3.5–5)
ALP SERPL-CCNC: 86 U/L (ref 46–116)
ALT SERPL W P-5'-P-CCNC: 28 U/L (ref 12–78)
ANION GAP SERPL CALCULATED.3IONS-SCNC: 8 MMOL/L (ref 4–13)
AST SERPL W P-5'-P-CCNC: 18 U/L (ref 5–45)
BASOPHILS # BLD AUTO: 0.06 THOUSANDS/ΜL (ref 0–0.1)
BASOPHILS NFR BLD AUTO: 1 % (ref 0–1)
BILIRUB SERPL-MCNC: 0.71 MG/DL (ref 0.2–1)
BUN SERPL-MCNC: 24 MG/DL (ref 5–25)
CALCIUM SERPL-MCNC: 9.6 MG/DL (ref 8.3–10.1)
CHLORIDE SERPL-SCNC: 101 MMOL/L (ref 100–108)
CHOLEST SERPL-MCNC: 135 MG/DL (ref 50–200)
CO2 SERPL-SCNC: 27 MMOL/L (ref 21–32)
CREAT SERPL-MCNC: 1.37 MG/DL (ref 0.6–1.3)
EOSINOPHIL # BLD AUTO: 0.28 THOUSAND/ΜL (ref 0–0.61)
EOSINOPHIL NFR BLD AUTO: 3 % (ref 0–6)
ERYTHROCYTE [DISTWIDTH] IN BLOOD BY AUTOMATED COUNT: 13 % (ref 11.6–15.1)
EST. AVERAGE GLUCOSE BLD GHB EST-MCNC: 209 MG/DL
GFR SERPL CREATININE-BSD FRML MDRD: 50 ML/MIN/1.73SQ M
GLUCOSE P FAST SERPL-MCNC: 149 MG/DL (ref 65–99)
HBA1C MFR BLD: 8.9 %
HCT VFR BLD AUTO: 42.9 % (ref 36.5–49.3)
HDLC SERPL-MCNC: 50 MG/DL
HGB BLD-MCNC: 14.5 G/DL (ref 12–17)
IMM GRANULOCYTES # BLD AUTO: 0.03 THOUSAND/UL (ref 0–0.2)
IMM GRANULOCYTES NFR BLD AUTO: 0 % (ref 0–2)
LDLC SERPL CALC-MCNC: 58 MG/DL (ref 0–100)
LEFT EYE DIABETIC RETINOPATHY: NORMAL
LYMPHOCYTES # BLD AUTO: 4.28 THOUSANDS/ΜL (ref 0.6–4.47)
LYMPHOCYTES NFR BLD AUTO: 43 % (ref 14–44)
MCH RBC QN AUTO: 30.5 PG (ref 26.8–34.3)
MCHC RBC AUTO-ENTMCNC: 33.8 G/DL (ref 31.4–37.4)
MCV RBC AUTO: 90 FL (ref 82–98)
MONOCYTES # BLD AUTO: 0.51 THOUSAND/ΜL (ref 0.17–1.22)
MONOCYTES NFR BLD AUTO: 5 % (ref 4–12)
NEUTROPHILS # BLD AUTO: 4.8 THOUSANDS/ΜL (ref 1.85–7.62)
NEUTS SEG NFR BLD AUTO: 48 % (ref 43–75)
NRBC BLD AUTO-RTO: 0 /100 WBCS
PLATELET # BLD AUTO: 175 THOUSANDS/UL (ref 149–390)
PMV BLD AUTO: 9.6 FL (ref 8.9–12.7)
POTASSIUM SERPL-SCNC: 5 MMOL/L (ref 3.5–5.3)
PROT SERPL-MCNC: 8.6 G/DL (ref 6.4–8.2)
RBC # BLD AUTO: 4.75 MILLION/UL (ref 3.88–5.62)
RIGHT EYE DIABETIC RETINOPATHY: NORMAL
SODIUM SERPL-SCNC: 136 MMOL/L (ref 136–145)
TRIGL SERPL-MCNC: 135 MG/DL
TSH SERPL DL<=0.05 MIU/L-ACNC: 4.64 UIU/ML (ref 0.36–3.74)
WBC # BLD AUTO: 9.96 THOUSAND/UL (ref 4.31–10.16)

## 2021-01-04 PROCEDURE — 80053 COMPREHEN METABOLIC PANEL: CPT

## 2021-01-04 PROCEDURE — 83036 HEMOGLOBIN GLYCOSYLATED A1C: CPT

## 2021-01-04 PROCEDURE — 80061 LIPID PANEL: CPT

## 2021-01-04 PROCEDURE — 84443 ASSAY THYROID STIM HORMONE: CPT

## 2021-01-04 PROCEDURE — 82043 UR ALBUMIN QUANTITATIVE: CPT | Performed by: FAMILY MEDICINE

## 2021-01-04 PROCEDURE — 82570 ASSAY OF URINE CREATININE: CPT | Performed by: FAMILY MEDICINE

## 2021-01-04 PROCEDURE — 84439 ASSAY OF FREE THYROXINE: CPT

## 2021-01-04 PROCEDURE — 36415 COLL VENOUS BLD VENIPUNCTURE: CPT

## 2021-01-04 PROCEDURE — 85025 COMPLETE CBC W/AUTO DIFF WBC: CPT

## 2021-01-05 LAB
CREAT UR-MCNC: 68.4 MG/DL
MICROALBUMIN UR-MCNC: 273 MG/L (ref 0–20)
MICROALBUMIN/CREAT 24H UR: 399 MG/G CREATININE (ref 0–30)
T4 FREE SERPL-MCNC: 0.85 NG/DL (ref 0.76–1.46)

## 2021-01-11 DIAGNOSIS — C91.10 CLL (CHRONIC LYMPHOCYTIC LEUKEMIA) (HCC): ICD-10-CM

## 2021-01-11 DIAGNOSIS — R77.9 ELEVATED SERUM PROTEIN LEVEL: Primary | ICD-10-CM

## 2021-01-14 ENCOUNTER — OFFICE VISIT (OUTPATIENT)
Dept: FAMILY MEDICINE CLINIC | Facility: CLINIC | Age: 76
End: 2021-01-14
Payer: MEDICARE

## 2021-01-14 VITALS
HEART RATE: 78 BPM | WEIGHT: 200.2 LBS | RESPIRATION RATE: 20 BRPM | SYSTOLIC BLOOD PRESSURE: 150 MMHG | HEIGHT: 66 IN | TEMPERATURE: 97.1 F | DIASTOLIC BLOOD PRESSURE: 72 MMHG | OXYGEN SATURATION: 98 % | BODY MASS INDEX: 32.17 KG/M2

## 2021-01-14 DIAGNOSIS — E11.42 TYPE 2 DIABETES MELLITUS WITH DIABETIC POLYNEUROPATHY, WITHOUT LONG-TERM CURRENT USE OF INSULIN (HCC): ICD-10-CM

## 2021-01-14 DIAGNOSIS — I10 BENIGN ESSENTIAL HTN: ICD-10-CM

## 2021-01-14 DIAGNOSIS — Z89.432 STATUS POST TRANSMETATARSAL AMPUTATION OF LEFT FOOT (HCC): ICD-10-CM

## 2021-01-14 DIAGNOSIS — Z00.00 MEDICARE ANNUAL WELLNESS VISIT, SUBSEQUENT: Primary | ICD-10-CM

## 2021-01-14 DIAGNOSIS — Z12.5 SCREENING PSA (PROSTATE SPECIFIC ANTIGEN): ICD-10-CM

## 2021-01-14 DIAGNOSIS — E03.9 BORDERLINE HYPOTHYROIDISM: ICD-10-CM

## 2021-01-14 PROBLEM — M86.9 DIABETIC FOOT ULCER WITH OSTEOMYELITIS (HCC): Status: ACTIVE | Noted: 2021-01-14

## 2021-01-14 PROBLEM — B35.1 ONYCHOMYCOSIS: Status: RESOLVED | Noted: 2018-08-12 | Resolved: 2021-01-14

## 2021-01-14 PROBLEM — E11.69 DIABETIC FOOT ULCER WITH OSTEOMYELITIS (HCC): Status: ACTIVE | Noted: 2021-01-14

## 2021-01-14 PROBLEM — M77.31 HEEL SPUR, RIGHT: Status: RESOLVED | Noted: 2018-11-13 | Resolved: 2021-01-14

## 2021-01-14 PROBLEM — M86.9 TOE OSTEOMYELITIS, LEFT (HCC): Status: ACTIVE | Noted: 2021-01-14

## 2021-01-14 PROBLEM — L97.509 DIABETIC FOOT ULCER WITH OSTEOMYELITIS (HCC): Status: ACTIVE | Noted: 2021-01-14

## 2021-01-14 PROBLEM — E11.621 DIABETIC FOOT ULCER WITH OSTEOMYELITIS (HCC): Status: ACTIVE | Noted: 2021-01-14

## 2021-01-14 PROCEDURE — G0438 PPPS, INITIAL VISIT: HCPCS | Performed by: FAMILY MEDICINE

## 2021-01-14 PROCEDURE — 1123F ACP DISCUSS/DSCN MKR DOCD: CPT | Performed by: FAMILY MEDICINE

## 2021-01-14 PROCEDURE — 99214 OFFICE O/P EST MOD 30 MIN: CPT | Performed by: FAMILY MEDICINE

## 2021-01-14 RX ORDER — LOSARTAN POTASSIUM 50 MG/1
50 TABLET ORAL DAILY
Qty: 90 TABLET | Refills: 1 | Status: SHIPPED | OUTPATIENT
Start: 2021-01-14 | End: 2021-01-20 | Stop reason: SDUPTHER

## 2021-01-14 RX ORDER — AMLODIPINE BESYLATE 10 MG/1
10 TABLET ORAL DAILY
Qty: 90 TABLET | Refills: 1 | Status: SHIPPED | OUTPATIENT
Start: 2021-01-14 | End: 2021-07-15 | Stop reason: SDUPTHER

## 2021-01-14 RX ORDER — METFORMIN HYDROCHLORIDE 500 MG/1
1000 TABLET, EXTENDED RELEASE ORAL 2 TIMES DAILY WITH MEALS
Qty: 360 TABLET | Refills: 1 | Status: SHIPPED | OUTPATIENT
Start: 2021-01-14 | End: 2021-11-30 | Stop reason: SDUPTHER

## 2021-01-14 RX ORDER — GLIPIZIDE 10 MG/1
10 TABLET, FILM COATED, EXTENDED RELEASE ORAL 2 TIMES DAILY
Qty: 180 TABLET | Refills: 1 | Status: SHIPPED | OUTPATIENT
Start: 2021-01-14 | End: 2021-11-30 | Stop reason: SDUPTHER

## 2021-01-14 NOTE — PATIENT INSTRUCTIONS
We are committed to getting you vaccinated as soon as possible and will be closely following CDC and [facility]  guidelines as they are released and revised  Please refer to our COVID-19 vaccine webpage for the most up to date information on the vaccine and our distribution efforts  Medicare Preventive Visit Patient Instructions  Thank you for completing your Welcome to Medicare Visit or Medicare Annual Wellness Visit today  Your next wellness visit will be due in one year (1/14/2022)  The screening/preventive services that you may require over the next 5-10 years are detailed below  Some tests may not apply to you based off risk factors and/or age  Screening tests ordered at today's visit but not completed yet may show as past due  Also, please note that scanned in results may not display below  Preventive Screenings:  Service Recommendations Previous Testing/Comments   Colorectal Cancer Screening  · Colonoscopy    · Fecal Occult Blood Test (FOBT)/Fecal Immunochemical Test (FIT)  · Fecal DNA/Cologuard Test  · Flexible Sigmoidoscopy Age: 54-65 years old   Colonoscopy: every 10 years (May be performed more frequently if at higher risk)  OR  FOBT/FIT: every 1 year  OR  Cologuard: every 3 years  OR  Sigmoidoscopy: every 5 years  Screening may be recommended earlier than age 48 if at higher risk for colorectal cancer  Also, an individualized decision between you and your healthcare provider will decide whether screening between the ages of 74-80 would be appropriate   Colonoscopy: 04/21/2016  FOBT/FIT: Not on file  Cologuard: Not on file  Sigmoidoscopy: Not on file         Prostate Cancer Screening Individualized decision between patient and health care provider in men between ages of 53-78   Medicare will cover every 12 months beginning on the day after your 50th birthday PSA: 1 3 ng/mL          Hepatitis C Screening Once for adults born between [facility]  More frequently in patients at high risk for Hepatitis C Hep C Antibody: 04/22/2018       Diabetes Screening 1-2 times per year if you're at risk for diabetes or have pre-diabetes Fasting glucose: 149 mg/dL   A1C: 8 9 %       Cholesterol Screening Once every 5 years if you don't have a lipid disorder  May order more often based on risk factors  Lipid panel: 01/04/2021          Other Preventive Screenings Covered by Medicare:  1  Abdominal Aortic Aneurysm (AAA) Screening: covered once if your at risk  You're considered to be at risk if you have a family history of AAA or a male between the age of 73-68 who smoking at least 100 cigarettes in your lifetime  2  Lung Cancer Screening: covers low dose CT scan once per year if you meet all of the following conditions: (1) Age 50-69; (2) No signs or symptoms of lung cancer; (3) Current smoker or have quit smoking within the last 15 years; (4) You have a tobacco smoking history of at least 30 pack years (packs per day x number of years you smoked); (5) You get a written order from a healthcare provider  3  Glaucoma Screening: covered annually if you're considered high risk: (1) You have diabetes OR (2) Family history of glaucoma OR (3)  aged 48 and older OR (3)  American aged 72 and older  3  Osteoporosis Screening: covered every 2 years if you meet one of the following conditions: (1) Have a vertebral abnormality; (2) On glucocorticoid therapy for more than 3 months; (3) Have primary hyperparathyroidism; (4) On osteoporosis medications and need to assess response to drug therapy  5  HIV Screening: covered annually if you're between the age of 12-76  Also covered annually if you are younger than 13 and older than 72 with risk factors for HIV infection  For pregnant patients, it is covered up to 3 times per pregnancy      Immunizations:  Immunization Recommendations   Influenza Vaccine Annual influenza vaccination during flu season is recommended for all persons aged >= 6 months who do not have contraindications   Pneumococcal Vaccine (Prevnar and Pneumovax)  * Prevnar = PCV13  * Pneumovax = PPSV23 Adults 25-60 years old: 1-3 doses may be recommended based on certain risk factors  Adults 72 years old: Prevnar (PCV13) vaccine recommended followed by Pneumovax (PPSV23) vaccine  If already received PPSV23 since turning 65, then PCV13 recommended at least one year after PPSV23 dose  Hepatitis B Vaccine 3 dose series if at intermediate or high risk (ex: diabetes, end stage renal disease, liver disease)   Tetanus (Td) Vaccine - COST NOT COVERED BY MEDICARE PART B Following completion of primary series, a booster dose should be given every 10 years to maintain immunity against tetanus  Td may also be given as tetanus wound prophylaxis  Tdap Vaccine - COST NOT COVERED BY MEDICARE PART B Recommended at least once for all adults  For pregnant patients, recommended with each pregnancy  Shingles Vaccine (Shingrix) - COST NOT COVERED BY MEDICARE PART B  2 shot series recommended in those aged 48 and above     Health Maintenance Due:      Topic Date Due    Colonoscopy Surveillance  04/21/2019    Hepatitis C Screening  Completed     Immunizations Due:      Topic Date Due    Influenza Vaccine (1) 09/01/2020     Advance Directives   What are advance directives? Advance directives are legal documents that state your wishes and plans for medical care  These plans are made ahead of time in case you lose your ability to make decisions for yourself  Advance directives can apply to any medical decision, such as the treatments you want, and if you want to donate organs  What are the types of advance directives? There are many types of advance directives, and each state has rules about how to use them  You may choose a combination of any of the following:  · Living will: This is a written record of the treatment you want   You can also choose which treatments you do not want, which to limit, and which to stop at a certain time  This includes surgery, medicine, IV fluid, and tube feedings  · Durable power of  for healthcare [facility]): This is a written record that states who you want to make healthcare choices for you when you are unable to make them for yourself  This person, called a proxy, is usually a family member or a friend  You may choose more than 1 proxy  · Do not resuscitate (DNR) order:  A DNR order is used in case your heart stops beating or you stop breathing  It is a request not to have certain forms of treatment, such as CPR  A DNR order may be included in other types of advance directives  · Medical directive: This covers the care that you want if you are in a coma, near death, or unable to make decisions for yourself  You can list the treatments you want for each condition  Treatment may include pain medicine, surgery, blood transfusions, dialysis, IV or tube feedings, and a ventilator (breathing machine)  · Values history: This document has questions about your views, beliefs, and how you feel and think about life  This information can help others choose the care that you would choose  Why are advance directives important? An advance directive helps you control your care  Although spoken wishes may be used, it is better to have your wishes written down  Spoken wishes can be misunderstood, or not followed  Treatments may be given even if you do not want them  An advance directive may make it easier for your family to make difficult choices about your care  Weight Management   Why it is important to manage your weight:  Being overweight increases your risk of health conditions such as heart disease, high blood pressure, type 2 diabetes, and certain types of cancer  It can also increase your risk for osteoarthritis, sleep apnea, and other respiratory problems  Aim for a slow, steady weight loss  Even a small amount of weight loss can lower your risk of health problems    How to lose weight safely:  A safe and healthy way to lose weight is to eat fewer calories and get regular exercise  You can lose up about 1 pound a week by decreasing the number of calories you eat by 500 calories each day  Healthy meal plan for weight management:  A healthy meal plan includes a variety of foods, contains fewer calories, and helps you stay healthy  A healthy meal plan includes the following:  · Eat whole-grain foods more often  A healthy meal plan should contain fiber  Fiber is the part of grains, fruits, and vegetables that is not broken down by your body  Whole-grain foods are healthy and provide extra fiber in your diet  Some examples of whole-grain foods are whole-wheat breads and pastas, oatmeal, brown rice, and bulgur  · Eat a variety of vegetables every day  Include dark, leafy greens such as spinach, kale, anabel greens, and mustard greens  Eat yellow and orange vegetables such as carrots, sweet potatoes, and winter squash  · Eat a variety of fruits every day  Choose fresh or canned fruit (canned in its own juice or light syrup) instead of juice  Fruit juice has very little or no fiber  · Eat low-fat dairy foods  Drink fat-free (skim) milk or 1% milk  Eat fat-free yogurt and low-fat cottage cheese  Try low-fat cheeses such as mozzarella and other reduced-fat cheeses  · Choose meat and other protein foods that are low in fat  Choose beans or other legumes such as split peas or lentils  Choose fish, skinless poultry (chicken or turkey), or lean cuts of red meat (beef or pork)  Before you cook meat or poultry, cut off any visible fat  · Use less fat and oil  Try baking foods instead of frying them  Add less fat, such as margarine, sour cream, regular salad dressing and mayonnaise to foods  Eat fewer high-fat foods  Some examples of high-fat foods include french fries, doughnuts, ice cream, and cakes  · Eat fewer sweets  Limit foods and drinks that are high in sugar   This includes candy, cookies, regular soda, and sweetened drinks  Exercise:  Exercise at least 30 minutes per day on most days of the week  Some examples of exercise include walking, biking, dancing, and swimming  You can also fit in more physical activity by taking the stairs instead of the elevator or parking farther away from stores  Ask your healthcare provider about the best exercise plan for you  © Copyright [employer] 2018 Information is for End User's use only and may not be sold, redistributed or otherwise used for commercial purposes  All illustrations and images included in CareNotes® are the copyrighted property of [employer]  or [facility] Preventive Visit Patient Instructions  Thank you for completing your Welcome to Medicare Visit or Medicare Annual Wellness Visit today  Your next wellness visit will be due in one year (1/14/2022)  The screening/preventive services that you may require over the next 5-10 years are detailed below  Some tests may not apply to you based off risk factors and/or age  Screening tests ordered at today's visit but not completed yet may show as past due  Also, please note that scanned in results may not display below  Preventive Screenings:  Service Recommendations Previous Testing/Comments   Colorectal Cancer Screening  · Colonoscopy    · Fecal Occult Blood Test (FOBT)/Fecal Immunochemical Test (FIT)  · Fecal DNA/Cologuard Test  · Flexible Sigmoidoscopy Age: 54-65 years old   Colonoscopy: every 10 years (May be performed more frequently if at higher risk)  OR  FOBT/FIT: every 1 year  OR  Cologuard: every 3 years  OR  Sigmoidoscopy: every 5 years  Screening may be recommended earlier than age 48 if at higher risk for colorectal cancer  Also, an individualized decision between you and your healthcare provider will decide whether screening between the ages of 74-80 would be appropriate   Colonoscopy: 04/21/2016  FOBT/FIT: Not on file  Cologuard: Not on file  Sigmoidoscopy: Not on file         Prostate Cancer Screening Individualized decision between patient and health care provider in men between ages of 53-78   Medicare will cover every 12 months beginning on the day after your 50th birthday PSA: 1 3 ng/mL          Hepatitis C Screening Once for adults born between 1945 and 1965  More frequently in patients at high risk for Hepatitis C Hep C Antibody: 04/22/2018       Diabetes Screening 1-2 times per year if you're at risk for diabetes or have pre-diabetes Fasting glucose: 149 mg/dL   A1C: 8 9 %       Cholesterol Screening Once every 5 years if you don't have a lipid disorder  May order more often based on risk factors  Lipid panel: 01/04/2021          Other Preventive Screenings Covered by Medicare:  6  Abdominal Aortic Aneurysm (AAA) Screening: covered once if your at risk  You're considered to be at risk if you have a family history of AAA or a male between the age of 73-68 who smoking at least 100 cigarettes in your lifetime  7  Lung Cancer Screening: covers low dose CT scan once per year if you meet all of the following conditions: (1) Age 50-69; (2) No signs or symptoms of lung cancer; (3) Current smoker or have quit smoking within the last 15 years; (4) You have a tobacco smoking history of at least 30 pack years (packs per day x number of years you smoked); (5) You get a written order from a healthcare provider  8  Glaucoma Screening: covered annually if you're considered high risk: (1) You have diabetes OR (2) Family history of glaucoma OR (3)  aged 48 and older OR (3)  American aged 72 and older  5  Osteoporosis Screening: covered every 2 years if you meet one of the following conditions: (1) Have a vertebral abnormality; (2) On glucocorticoid therapy for more than 3 months; (3) Have primary hyperparathyroidism; (4) On osteoporosis medications and need to assess response to drug therapy    10  HIV Screening: covered annually if you're between the age of 12-76  Also covered annually if you are younger than 13 and older than 72 with risk factors for HIV infection  For pregnant patients, it is covered up to 3 times per pregnancy  Immunizations:  Immunization Recommendations   Influenza Vaccine Annual influenza vaccination during flu season is recommended for all persons aged >= 6 months who do not have contraindications   Pneumococcal Vaccine (Prevnar and Pneumovax)  * Prevnar = PCV13  * Pneumovax = PPSV23 Adults 25-60 years old: 1-3 doses may be recommended based on certain risk factors  Adults 72 years old: Prevnar (PCV13) vaccine recommended followed by Pneumovax (PPSV23) vaccine  If already received PPSV23 since turning 65, then PCV13 recommended at least one year after PPSV23 dose  Hepatitis B Vaccine 3 dose series if at intermediate or high risk (ex: diabetes, end stage renal disease, liver disease)   Tetanus (Td) Vaccine - COST NOT COVERED BY MEDICARE PART B Following completion of primary series, a booster dose should be given every 10 years to maintain immunity against tetanus  Td may also be given as tetanus wound prophylaxis  Tdap Vaccine - COST NOT COVERED BY MEDICARE PART B Recommended at least once for all adults  For pregnant patients, recommended with each pregnancy  Shingles Vaccine (Shingrix) - COST NOT COVERED BY MEDICARE PART B  2 shot series recommended in those aged 48 and above     Health Maintenance Due:      Topic Date Due    Colonoscopy Surveillance  04/21/2019    Hepatitis C Screening  Completed     Immunizations Due:      Topic Date Due    Influenza Vaccine (1) 09/01/2020     Advance Directives   What are advance directives? Advance directives are legal documents that state your wishes and plans for medical care  These plans are made ahead of time in case you lose your ability to make decisions for yourself   Advance directives can apply to any medical decision, such as the treatments you want, and if you want to donate organs  What are the types of advance directives? There are many types of advance directives, and each state has rules about how to use them  You may choose a combination of any of the following:  · Living will: This is a written record of the treatment you want  You can also choose which treatments you do not want, which to limit, and which to stop at a certain time  This includes surgery, medicine, IV fluid, and tube feedings  · Durable power of  for healthcare [facility] [place]): This is a written record that states who you want to make healthcare choices for you when you are unable to make them for yourself  This person, called a proxy, is usually a family member or a friend  You may choose more than 1 proxy  · Do not resuscitate (DNR) order:  A DNR order is used in case your heart stops beating or you stop breathing  It is a request not to have certain forms of treatment, such as CPR  A DNR order may be included in other types of advance directives  · Medical directive: This covers the care that you want if you are in a coma, near death, or unable to make decisions for yourself  You can list the treatments you want for each condition  Treatment may include pain medicine, surgery, blood transfusions, dialysis, IV or tube feedings, and a ventilator (breathing machine)  · Values history: This document has questions about your views, beliefs, and how you feel and think about life  This information can help others choose the care that you would choose  Why are advance directives important? An advance directive helps you control your care  Although spoken wishes may be used, it is better to have your wishes written down  Spoken wishes can be misunderstood, or not followed  Treatments may be given even if you do not want them  An advance directive may make it easier for your family to make difficult choices about your care     Weight Management   Why it is important to manage your weight: Being overweight increases your risk of health conditions such as heart disease, high blood pressure, type 2 diabetes, and certain types of cancer  It can also increase your risk for osteoarthritis, sleep apnea, and other respiratory problems  Aim for a slow, steady weight loss  Even a small amount of weight loss can lower your risk of health problems  How to lose weight safely:  A safe and healthy way to lose weight is to eat fewer calories and get regular exercise  You can lose up about 1 pound a week by decreasing the number of calories you eat by 500 calories each day  Healthy meal plan for weight management:  A healthy meal plan includes a variety of foods, contains fewer calories, and helps you stay healthy  A healthy meal plan includes the following:  · Eat whole-grain foods more often  A healthy meal plan should contain fiber  Fiber is the part of grains, fruits, and vegetables that is not broken down by your body  Whole-grain foods are healthy and provide extra fiber in your diet  Some examples of whole-grain foods are whole-wheat breads and pastas, oatmeal, brown rice, and bulgur  · Eat a variety of vegetables every day  Include dark, leafy greens such as spinach, kale, anabel greens, and mustard greens  Eat yellow and orange vegetables such as carrots, sweet potatoes, and winter squash  · Eat a variety of fruits every day  Choose fresh or canned fruit (canned in its own juice or light syrup) instead of juice  Fruit juice has very little or no fiber  · Eat low-fat dairy foods  Drink fat-free (skim) milk or 1% milk  Eat fat-free yogurt and low-fat cottage cheese  Try low-fat cheeses such as mozzarella and other reduced-fat cheeses  · Choose meat and other protein foods that are low in fat  Choose beans or other legumes such as split peas or lentils  Choose fish, skinless poultry (chicken or turkey), or lean cuts of red meat (beef or pork)   Before you cook meat or poultry, cut off any visible fat    · Use less fat and oil  Try baking foods instead of frying them  Add less fat, such as margarine, sour cream, regular salad dressing and mayonnaise to foods  Eat fewer high-fat foods  Some examples of high-fat foods include french fries, doughnuts, ice cream, and cakes  · Eat fewer sweets  Limit foods and drinks that are high in sugar  This includes candy, cookies, regular soda, and sweetened drinks  Exercise:  Exercise at least 30 minutes per day on most days of the week  Some examples of exercise include walking, biking, dancing, and swimming  You can also fit in more physical activity by taking the stairs instead of the elevator or parking farther away from stores  Ask your healthcare provider about the best exercise plan for you  © Copyright [employer] 2018 Information is for End User's use only and may not be sold, redistributed or otherwise used for commercial purposes  All illustrations and images included in CareNotes® are the copyrighted property of [employer]  or [facility] Preventive Visit Patient Instructions  Thank you for completing your Welcome to Medicare Visit or Medicare Annual Wellness Visit today  Your next wellness visit will be due in one year (1/14/2022)  The screening/preventive services that you may require over the next 5-10 years are detailed below  Some tests may not apply to you based off risk factors and/or age  Screening tests ordered at today's visit but not completed yet may show as past due  Also, please note that scanned in results may not display below    Preventive Screenings:  Service Recommendations Previous Testing/Comments   Colorectal Cancer Screening  · Colonoscopy    · Fecal Occult Blood Test (FOBT)/Fecal Immunochemical Test (FIT)  · Fecal DNA/Cologuard Test  · Flexible Sigmoidoscopy Age: 54-65 years old   Colonoscopy: every 10 years (May be performed more frequently if at higher risk)  OR  FOBT/FIT: every 1 year  OR  Cologuard: every 3 years  OR  Sigmoidoscopy: every 5 years  Screening may be recommended earlier than age 48 if at higher risk for colorectal cancer  Also, an individualized decision between you and your healthcare provider will decide whether screening between the ages of 74-80 would be appropriate  Colonoscopy: 04/21/2016  FOBT/FIT: Not on file  Cologuard: Not on file  Sigmoidoscopy: Not on file         Prostate Cancer Screening Individualized decision between patient and health care provider in men between ages of 53-78   Medicare will cover every 12 months beginning on the day after your 50th birthday PSA: 1 3 ng/mL          Hepatitis C Screening Once for adults born between 1945 and 1965  More frequently in patients at high risk for Hepatitis C Hep C Antibody: 04/22/2018       Diabetes Screening 1-2 times per year if you're at risk for diabetes or have pre-diabetes Fasting glucose: 149 mg/dL   A1C: 8 9 %       Cholesterol Screening Once every 5 years if you don't have a lipid disorder  May order more often based on risk factors  Lipid panel: 01/04/2021          Other Preventive Screenings Covered by Medicare:  11  Abdominal Aortic Aneurysm (AAA) Screening: covered once if your at risk  You're considered to be at risk if you have a family history of AAA or a male between the age of 73-68 who smoking at least 100 cigarettes in your lifetime  12  Lung Cancer Screening: covers low dose CT scan once per year if you meet all of the following conditions: (1) Age 50-69; (2) No signs or symptoms of lung cancer; (3) Current smoker or have quit smoking within the last 15 years; (4) You have a tobacco smoking history of at least 30 pack years (packs per day x number of years you smoked); (5) You get a written order from a healthcare provider    15  Glaucoma Screening: covered annually if you're considered high risk: (1) You have diabetes OR (2) Family history of glaucoma OR (3)  aged 48 and older OR (4)  American aged 72 and older  15  Osteoporosis Screening: covered every 2 years if you meet one of the following conditions: (1) Have a vertebral abnormality; (2) On glucocorticoid therapy for more than 3 months; (3) Have primary hyperparathyroidism; (4) On osteoporosis medications and need to assess response to drug therapy  15  HIV Screening: covered annually if you're between the age of 12-76  Also covered annually if you are younger than 13 and older than 72 with risk factors for HIV infection  For pregnant patients, it is covered up to 3 times per pregnancy  Immunizations:  Immunization Recommendations   Influenza Vaccine Annual influenza vaccination during flu season is recommended for all persons aged >= 6 months who do not have contraindications   Pneumococcal Vaccine (Prevnar and Pneumovax)  * Prevnar = PCV13  * Pneumovax = PPSV23 Adults 25-60 years old: 1-3 doses may be recommended based on certain risk factors  Adults 72 years old: Prevnar (PCV13) vaccine recommended followed by Pneumovax (PPSV23) vaccine  If already received PPSV23 since turning 65, then PCV13 recommended at least one year after PPSV23 dose  Hepatitis B Vaccine 3 dose series if at intermediate or high risk (ex: diabetes, end stage renal disease, liver disease)   Tetanus (Td) Vaccine - COST NOT COVERED BY MEDICARE PART B Following completion of primary series, a booster dose should be given every 10 years to maintain immunity against tetanus  Td may also be given as tetanus wound prophylaxis  Tdap Vaccine - COST NOT COVERED BY MEDICARE PART B Recommended at least once for all adults  For pregnant patients, recommended with each pregnancy     Shingles Vaccine (Shingrix) - COST NOT COVERED BY MEDICARE PART B  2 shot series recommended in those aged 48 and above     Health Maintenance Due:      Topic Date Due    Colonoscopy Surveillance  04/21/2019    Hepatitis C Screening  Completed     Immunizations Due:      Topic Date Due    Influenza Vaccine (1) 09/01/2020     Advance Directives   What are advance directives? Advance directives are legal documents that state your wishes and plans for medical care  These plans are made ahead of time in case you lose your ability to make decisions for yourself  Advance directives can apply to any medical decision, such as the treatments you want, and if you want to donate organs  What are the types of advance directives? There are many types of advance directives, and each state has rules about how to use them  You may choose a combination of any of the following:  · Living will: This is a written record of the treatment you want  You can also choose which treatments you do not want, which to limit, and which to stop at a certain time  This includes surgery, medicine, IV fluid, and tube feedings  · Durable power of  for healthcare [facility]): This is a written record that states who you want to make healthcare choices for you when you are unable to make them for yourself  This person, called a proxy, is usually a family member or a friend  You may choose more than 1 proxy  · Do not resuscitate (DNR) order:  A DNR order is used in case your heart stops beating or you stop breathing  It is a request not to have certain forms of treatment, such as CPR  A DNR order may be included in other types of advance directives  · Medical directive: This covers the care that you want if you are in a coma, near death, or unable to make decisions for yourself  You can list the treatments you want for each condition  Treatment may include pain medicine, surgery, blood transfusions, dialysis, IV or tube feedings, and a ventilator (breathing machine)  · Values history: This document has questions about your views, beliefs, and how you feel and think about life  This information can help others choose the care that you would choose  Why are advance directives important?   An advance directive helps you control your care  Although spoken wishes may be used, it is better to have your wishes written down  Spoken wishes can be misunderstood, or not followed  Treatments may be given even if you do not want them  An advance directive may make it easier for your family to make difficult choices about your care  Weight Management   Why it is important to manage your weight:  Being overweight increases your risk of health conditions such as heart disease, high blood pressure, type 2 diabetes, and certain types of cancer  It can also increase your risk for osteoarthritis, sleep apnea, and other respiratory problems  Aim for a slow, steady weight loss  Even a small amount of weight loss can lower your risk of health problems  How to lose weight safely:  A safe and healthy way to lose weight is to eat fewer calories and get regular exercise  You can lose up about 1 pound a week by decreasing the number of calories you eat by 500 calories each day  Healthy meal plan for weight management:  A healthy meal plan includes a variety of foods, contains fewer calories, and helps you stay healthy  A healthy meal plan includes the following:  · Eat whole-grain foods more often  A healthy meal plan should contain fiber  Fiber is the part of grains, fruits, and vegetables that is not broken down by your body  Whole-grain foods are healthy and provide extra fiber in your diet  Some examples of whole-grain foods are whole-wheat breads and pastas, oatmeal, brown rice, and bulgur  · Eat a variety of vegetables every day  Include dark, leafy greens such as spinach, kale, anabel greens, and mustard greens  Eat yellow and orange vegetables such as carrots, sweet potatoes, and winter squash  · Eat a variety of fruits every day  Choose fresh or canned fruit (canned in its own juice or light syrup) instead of juice  Fruit juice has very little or no fiber  · Eat low-fat dairy foods  Drink fat-free (skim) milk or 1% milk   Eat fat-free yogurt and low-fat cottage cheese  Try low-fat cheeses such as mozzarella and other reduced-fat cheeses  · Choose meat and other protein foods that are low in fat  Choose beans or other legumes such as split peas or lentils  Choose fish, skinless poultry (chicken or turkey), or lean cuts of red meat (beef or pork)  Before you cook meat or poultry, cut off any visible fat  · Use less fat and oil  Try baking foods instead of frying them  Add less fat, such as margarine, sour cream, regular salad dressing and mayonnaise to foods  Eat fewer high-fat foods  Some examples of high-fat foods include french fries, doughnuts, ice cream, and cakes  · Eat fewer sweets  Limit foods and drinks that are high in sugar  This includes candy, cookies, regular soda, and sweetened drinks  Exercise:  Exercise at least 30 minutes per day on most days of the week  Some examples of exercise include walking, biking, dancing, and swimming  You can also fit in more physical activity by taking the stairs instead of the elevator or parking farther away from stores  Ask your healthcare provider about the best exercise plan for you  © Copyright [employer] 2018 Information is for End User's use only and may not be sold, redistributed or otherwise used for commercial purposes   All illustrations and images included in CareNotes® are the copyrighted property of A D A M , Inc  or [address] [place] [address] [employer]

## 2021-01-14 NOTE — PROGRESS NOTES
Assessment/Plan:       Problem List Items Addressed This Visit        Endocrine    Type 2 diabetes mellitus with diabetic neuropathy, without long-term current use of insulin ([facility] [address] )     Pt declines med changes for now and notes that his carb intake has been significantly higher with soda, cupcakes, sweets  Since he saw his labs he is adjusting diet and his BS readings are going down a bit  He will let me know if the BS readings don't continue to decrease  Lab Results   Component Value Date    HGBA1C 8 9 (H) 01/04/2021            Relevant Medications    glipiZIDE (GLUCOTROL XL) 10 mg 24 hr tablet    metFORMIN (GLUCOPHAGE-XR) 500 mg 24 hr tablet       Cardiovascular and Mediastinum    Benign essential HTN     Elevated today more than typical    Monitor at home and let me know readings  If elevated, will increase losartan to 100 mg daily         Relevant Medications    amLODIPine (NORVASC) 10 mg tablet    losartan (COZAAR) 50 mg tablet    Other Relevant Orders    CBC and differential       Other    Status post transmetatarsal amputation of left foot ([facility] [address] )     Follows with Dr Chapincito Henriquez for routine podiatric care  Is aware of importance of improved BS control            Screening PSA (prostate specific antigen)        check w/ next labs  Relevant Orders    PSA, Total Screen      Borderline hypothyroidism- Not on medication   Check w/ next labs  intiate levothyroxine if rising or if symptoms  Lab Results   Component Value Date    [id number] 4 640 (H) 01/04/2021              Most recent labs reviewed     Subjective:     Mariaelena Perez is a 77 y o  male here today with chief complaint below:  Chief Complaint   Patient presents with    Annual Exam     No concerns      - CC above per clinical staff and reviewed  HPI:    Pt here for awv and to review labs  He saw his labs and started cutting back:  Was having a soda, cupcake, some more bread than usual   Increased holiday eating    Is eliminating these and monitoring BS  Doesn't want to increase or change medication  Drinking water well  His BS is improving a bit with cutting out sweets in his diet  No side effects from medications  Needs refills today  Follows up with podiatry      The following portions of the patient's history were reviewed and updated as appropriate: allergies, current medications, past family history, past medical history, past social history, past surgical history and problem list     ROS:  Review of Systems   No fever, chills, congestion, chest pain, shortness of breath, nausea, vomiting, diarrhea, constipation, blood in stool, urinary concerns, mood changes  Rest of ROS neg except as above  Objective:      /72   Pulse 78   Temp (!) 97 1 °F (36 2 °C)   Resp 20   Ht 5' 6" (1 676 m)   Wt 90 8 kg (200 lb 3 2 oz)   SpO2 98%   BMI 32 31 kg/m²   BP Readings from Last 3 Encounters:   01/14/21 150/72   12/10/20 142/72   09/10/20 136/82     Wt Readings from Last 3 Encounters:   01/14/21 90 8 kg (200 lb 3 2 oz)   12/10/20 93 4 kg (206 lb)   09/10/20 91 2 kg (201 lb)               Physical Exam:   Physical Exam  Vitals signs and nursing note reviewed  Constitutional:       General: He is not in acute distress  Appearance: He is well-developed  HENT:      Head: Normocephalic and atraumatic  Eyes:      Conjunctiva/sclera: Conjunctivae normal    Neck:      Musculoskeletal: Neck supple  Cardiovascular:      Rate and Rhythm: Normal rate and regular rhythm  Heart sounds: Normal heart sounds  No murmur  Pulmonary:      Effort: Pulmonary effort is normal  No respiratory distress  Breath sounds: Normal breath sounds  No wheezing  Abdominal:      Palpations: Abdomen is soft  Tenderness: There is no abdominal tenderness  There is no guarding or rebound  Musculoskeletal:      Right lower leg: Edema (trace b/l) present  Left lower leg: Edema (trace b/l) present     Lymphadenopathy:      Cervical: No cervical adenopathy  Skin:     General: Skin is warm and dry  Neurological:      Mental Status: He is alert and oriented to person, place, and time     Psychiatric:         Mood and Affect: Mood normal          Behavior: Behavior normal

## 2021-01-14 NOTE — PROGRESS NOTES
Assessment and Plan:     Problem List Items Addressed This Visit     None      Visit Diagnoses     Medicare annual wellness visit, subsequent    -  Primary           Preventive health issues were discussed with patient, and age appropriate screening tests were ordered as noted in patient's After Visit Summary  Personalized health advice and appropriate referrals for health education or preventive services given if needed, as noted in patient's After Visit Summary       History of Present Illness:     Patient presents for Medicare Annual Wellness visit    Patient Care Team:  Dinorah Duong MD as PCP - General (Family Medicine)  Sheria Ludwig, DO Quitman Justin, MD Denver Romance, MD Soila Rover, MD Von Pierini, DO as Endoscopist     Problem List:     Patient Active Problem List   Diagnosis    Arthritis    Benign essential HTN    Carcinoid tumor of appendix    Cardiomyopathy ([facility] [address] )    Chronic lymphocytic leukemia ([facility] [address] )    CKD (chronic kidney disease) stage 3, GFR 30-59 ml/min    CML in remission ([facility] [address] )    Diabetic neuropathy ([facility] [address] )    H/O Clostridium difficile infection    Heel spur, right    Malignant neoplasm of colon ([facility] [address] )    Onychomycosis    Type 2 diabetes mellitus ([facility] [address] )    Primary malignant neoplasm of appendix ([facility] [address] )    Status post transmetatarsal amputation of left foot ([address] )    Elevated serum protein level      Past Medical and Surgical History:     Past Medical History:   Diagnosis Date    Arthritis     Spine    Cancer of appendix ([facility] [address] ) 2015    appendectomy-colon resection    Chronic lymphocytic leukemia of B-cell type ([address] )     "remission 4-5yrs"-chemo    DDD (degenerative disc disease), cervical     Diabetes mellitus ([address] )     bs checked by pt at home at 6am =92    Diabetic neuropathy ([facility] [address] )     Diabetic retinopathy ([facility] [address] )     Elevated WBC count     Heart attack ([facility] [address] ) 04/2015    "labeled as that and than tested later after appendix removed and stress test showed negative"    History of cancer chemotherapy     last treatment approx 5yrs ago    Hypertension     Myocardial infarction ([facility] [address] ) 2015    Scalp psoriasis     Shingles 2/10-15    Toe injury     left great toe, - -internal braec-to be removed today 3/30/2018    Wears glasses      Past Surgical History:   Procedure Laterality Date    APPENDECTOMY      BONE BIOPSY Left 3/30/2018    Procedure: GREAT TOE BONE BIOPSY;  Surgeon: Enrike Fermin DPM;  [facility];  Service: Podiatry    BOWEL RESECTION      CATARACT EXTRACTION Bilateral     CHOLECYSTECTOMY      COLECTOMY  2015    EYE SURGERY Right     "retinal peel"    FOOT FUSION Left 2017    Procedure: Minal Earling;  Surgeon: Enrike Fermin DPM;  [facility];  Service: Podiatry    HARDWARE REMOVAL Left 2018    Procedure: REMOVAL STAPLES LEFT TOE;  Surgeon: Enrike Fermin DPM;  [facility];  Service: Podiatry    ORIF FOOT FRACTURE Left 2017    Procedure: HALLUX INTERPHALANGEAL JOINT INTERNAL FIXATION; REPAIR OF ULCERATION WITH EXCISIONS AND REVISION OF SKIN HALLUX WITH USE OF ALLOGRAFT;  Surgeon: Enrike Fermin DPM;  [facility];  Service: Podiatry    PA COLONOSCOPY FLX DX W/COLLJ Sky 1978 PFRMD N/A 2016    Procedure: COLONOSCOPY;  Surgeon: Sarah Norris DO;  [facility];   Service: Gastroenterology    PA REMOVAL DEEP IMPLANT Left 3/30/2018    Procedure: REMOVAL HARDWARE FOOT;  Surgeon: Enrike Fermin DPM;  [facility];  Service: Podiatry    RETINAL DETACHMENT SURGERY      TONSILLECTOMY        Family History:     Family History   Problem Relation Age of Onset    Alcohol abuse Mother            [facility] Pancreatic cancer Father         age 78    Hashimoto's thyroiditis Daughter     Rheum arthritis Daughter         Juvenile Rheum Arthitis     No Known Problems Sister          age 66    No Known Problems Brother          80    Kidney failure Brother         dialysis, 76    Diabetes Brother       Social History:     E-Cigarette/Vaping    E-Cigarette Use Never User      E-Cigarette/Vaping Substances    Nicotine No     THC No     CBD No     Flavoring No      Social History     Socioeconomic History    Marital status: /Civil Union     Spouse name: None    Number of children: None    Years of education: 3 yr college    Highest education level: None   Occupational History    None   Social Needs    Financial resource strain: None    Food insecurity     Worry: None     Inability: None    Transportation needs     Medical: None     Non-medical: None   Tobacco Use    Smoking status: Never Smoker    Smokeless tobacco: Never Used   Substance and Sexual Activity    Alcohol use: No    Drug use: No    Sexual activity: Not Currently     Partners: Female     Birth control/protection: None   Lifestyle    Physical activity     Days per week: None     Minutes per session: None    Stress: None   Relationships    Social connections     Talks on phone: None     Gets together: None     Attends Gnosticist service: None     Active member of club or organization: None     Attends meetings of clubs or organizations: None     Relationship status: None    Intimate partner violence     Fear of current or ex partner: None     Emotionally abused: None     Physically abused: None     Forced sexual activity: None   Other Topics Concern    None   Social History Narrative    Most recent tobacco use screenin2018    Education: 4 Year College    Marital status:     Exercise level: Occasional    Diet: Diabetic    General stress level: Low    Alcohol intake: None    Caffeine intake:  Moderate    Guns present in home: No    Smoke alarm in home: Yes      Medications and Allergies:     Current Outpatient Medications   Medication Sig Dispense Refill    amLODIPine (NORVASC) 10 mg tablet Take 1 tablet (10 mg total) by mouth daily 90 tablet 1    glipiZIDE (GLUCOTROL XL) 10 mg 24 hr tablet Take 1 tablet (10 mg total) by mouth 2 (two) times a day 180 tablet 1    Glucosamine HCl (GLUCOSAMINE PO) Take 1 tablet by mouth 2 (two) times a day       losartan (COZAAR) 50 mg tablet Take 1 tablet (50 mg total) by mouth daily 90 tablet 1    metFORMIN (GLUCOPHAGE-XR) 500 mg 24 hr tablet Take 2 tablets (1,000 mg total) by mouth 2 (two) times a day with meals 360 tablet 1    Multiple Vitamin (MULTIVITAMIN) tablet Take 1 tablet by mouth daily      Multiple Vitamins-Minerals (OCUVITE PO) Take 1 tablet by mouth daily       Omega-3 Fatty Acids (FISH OIL PO) Take by mouth daily      simvastatin (ZOCOR) 20 mg tablet Take 20 mg by mouth daily at bedtime      aspirin 81 MG tablet Take 81 mg by mouth as needed        No current facility-administered medications for this visit  Allergies   Allergen Reactions    Oxycodone-Acetaminophen      hallucination      Immunizations:     Immunization History   Administered Date(s) Administered    INFLUENZA 12/15/2009, 09/15/2010, 01/10/2012, 12/05/2013, 11/14/2014, 11/19/2014, 12/30/2014, 10/28/2015, 09/12/2016, 12/14/2017, 10/08/2018    Influenza Split High Dose Preservative Free IM 11/25/2019    Pneumococcal Conjugate 13-Valent 05/10/2016    Pneumococcal Polysaccharide PPV23 03/27/2015    Tdap 02/25/2019      Health Maintenance:         Topic Date Due    Colonoscopy Surveillance  04/21/2019    Hepatitis C Screening  Completed         Topic Date Due    Influenza Vaccine (1) 09/01/2020      Medicare Health Risk Assessment:     There were no vitals taken for this visit  Sandra  is here for his Subsequent Wellness visit  Health Risk Assessment:   Patient rates overall health as good  Patient feels that their physical health rating is same  Eyesight was rated as same  Hearing was rated as same  Patient feels that their emotional and mental health rating is same  Pain experienced in the last 7 days has been none   Patient states that he has experienced no weight loss or gain in last 6 months  Depression Screening:   PHQ-2 Score: 0      Fall Risk Screening: In the past year, patient has experienced: no history of falling in past year      Home Safety:  Patient does not have trouble with stairs inside or outside of their home  Patient has working smoke alarms and has no working carbon monoxide detector  Home safety hazards include: none  Nutrition:   Current diet is Diabetic  Medications:   Patient is currently taking over-the-counter supplements  OTC medications include: see medication list  Patient is able to manage medications  Activities of Daily Living (ADLs)/Instrumental Activities of Daily Living (IADLs):   Walk and transfer into and out of bed and chair?: Yes  Dress and groom yourself?: Yes    Bathe or shower yourself?: Yes    Feed yourself?  Yes  Do your laundry/housekeeping?: Yes  Manage your money, pay your bills and track your expenses?: Yes  Make your own meals?: Yes    Do your own shopping?: Yes    Previous Hospitalizations:   Any hospitalizations or ED visits within the last 12 months?: No      Advance Care Planning:   Living will: No    Durable POA for healthcare: No    Advanced directive: No    Advanced directive counseling given: Yes    Five wishes given: Yes      Cognitive Screening:   Provider or family/friend/caregiver concerned regarding cognition?: No    PREVENTIVE SCREENINGS      Cardiovascular Screening:    General: Screening Current      Diabetes Screening:     General: Screening Not Indicated and History Diabetes      Colorectal Cancer Screening:     General: History Colorectal Cancer      Prostate Cancer Screening:      Due for: PSA      Osteoporosis Screening:    General: Screening Not Indicated      Abdominal Aortic Aneurysm (AAA) Screening:    Risk factors include: age between 73-67 yo        General: Screening Not Indicated      Lung Cancer Screening:     General: Screening Not Indicated Hepatitis C Screening:    General: Screening Current      Susy Little MD

## 2021-01-15 ENCOUNTER — PATIENT MESSAGE (OUTPATIENT)
Dept: FAMILY MEDICINE CLINIC | Facility: CLINIC | Age: 76
End: 2021-01-15

## 2021-01-15 DIAGNOSIS — I10 BENIGN ESSENTIAL HTN: ICD-10-CM

## 2021-01-15 PROBLEM — E11.69 DIABETIC FOOT ULCER WITH OSTEOMYELITIS (HCC): Status: RESOLVED | Noted: 2021-01-14 | Resolved: 2021-01-15

## 2021-01-15 PROBLEM — L97.509 DIABETIC FOOT ULCER WITH OSTEOMYELITIS (HCC): Status: RESOLVED | Noted: 2021-01-14 | Resolved: 2021-01-15

## 2021-01-15 PROBLEM — E03.9 BORDERLINE HYPOTHYROIDISM: Status: ACTIVE | Noted: 2021-01-15

## 2021-01-15 PROBLEM — E11.621 DIABETIC FOOT ULCER WITH OSTEOMYELITIS (HCC): Status: RESOLVED | Noted: 2021-01-14 | Resolved: 2021-01-15

## 2021-01-15 PROBLEM — M86.9 TOE OSTEOMYELITIS, LEFT (HCC): Status: RESOLVED | Noted: 2021-01-14 | Resolved: 2021-01-15

## 2021-01-15 PROBLEM — M86.9 DIABETIC FOOT ULCER WITH OSTEOMYELITIS (HCC): Status: RESOLVED | Noted: 2021-01-14 | Resolved: 2021-01-15

## 2021-01-15 NOTE — ASSESSMENT & PLAN NOTE
Not on medication   Check w/ next labs  intiate levothyroxine if rising or if symptoms    Lab Results   Component Value Date    [id number] 4 640 (H) 01/04/2021

## 2021-01-15 NOTE — ASSESSMENT & PLAN NOTE
Follows with Dr Dulce Whitmore for routine podiatric care    Is aware of importance of improved BS control

## 2021-01-15 NOTE — ASSESSMENT & PLAN NOTE
Pt declines med changes for now and notes that his carb intake has been significantly higher with soda, cupcakes, sweets  Since he saw his labs he is adjusting diet and his BS readings are going down a bit  He will let me know if the BS readings don't continue to decrease      Lab Results   Component Value Date    HGBA1C 8 9 (H) 01/04/2021

## 2021-01-15 NOTE — ASSESSMENT & PLAN NOTE
Elevated today more than typical    Monitor at home and let me know readings    If elevated, will increase losartan to 100 mg daily

## 2021-01-20 DIAGNOSIS — Z23 ENCOUNTER FOR IMMUNIZATION: ICD-10-CM

## 2021-01-20 RX ORDER — LOSARTAN POTASSIUM 100 MG/1
100 TABLET ORAL DAILY
Qty: 90 TABLET | Refills: 1 | Status: SHIPPED | OUTPATIENT
Start: 2021-01-20 | End: 2021-07-15 | Stop reason: SDUPTHER

## 2021-01-20 NOTE — TELEPHONE ENCOUNTER
From: Marya Berman MD  To: Vu Joseph  Sent: 1/15/2021 10:34 AM EST  Subject: Blood pressure    Hi Mr  Terri Livers,   I noticed that your blood pressure was a little more elevated than usual for you at your visit yesterday  Could you check a few blood pressure readings at home and let me know how they look? If your blood pressure remains over 140/90, I would like to increase your losartan to 100 mg daily  Keep me posted,  Melisa Garner MD

## 2021-01-26 ENCOUNTER — TELEPHONE (OUTPATIENT)
Dept: FAMILY MEDICINE CLINIC | Facility: CLINIC | Age: 76
End: 2021-01-26

## 2021-01-26 NOTE — TELEPHONE ENCOUNTER
Patient called, he needs the office to call the [address] and explain that his Losartan increased from 50mg to 100 mg because they will not send out the order without speaking to someone from the office regarding the increase   The number to call is [phone]

## 2021-01-27 ENCOUNTER — PATIENT MESSAGE (OUTPATIENT)
Dept: FAMILY MEDICINE CLINIC | Facility: CLINIC | Age: 76
End: 2021-01-27

## 2021-01-28 NOTE — TELEPHONE ENCOUNTER
From: Elton Paige LPN  To: Mario Pena  Sent: 1/27/2021 11:56 AM EST  Subject: Influenza Vaccine     Hello,     We hope you are doing well  While it is important to receive your flu vaccine yearly, our records show that you have not yet received yours for this year  Please call the office at [phone] if you are interested in scheduling a visit to receive the vaccine  If you have had your flu vaccine outside of our office, please reply to this message with the date and location so we can update your medical record  If you have already received your flu vaccine in our office, please disregard this message            We look forward to hearing from you,    Your Medical Team at [address]

## 2021-02-24 ENCOUNTER — IMMUNIZATIONS (OUTPATIENT)
Dept: FAMILY MEDICINE CLINIC | Facility: HOSPITAL | Age: 76
End: 2021-02-24

## 2021-02-24 DIAGNOSIS — Z23 ENCOUNTER FOR IMMUNIZATION: Primary | ICD-10-CM

## 2021-02-24 PROCEDURE — 0001A SARS-COV-2 / COVID-19 MRNA VACCINE (PFIZER-BIONTECH) 30 MCG: CPT

## 2021-02-24 PROCEDURE — 91300 SARS-COV-2 / COVID-19 MRNA VACCINE (PFIZER-BIONTECH) 30 MCG: CPT

## 2021-03-17 ENCOUNTER — IMMUNIZATIONS (OUTPATIENT)
Dept: FAMILY MEDICINE CLINIC | Facility: HOSPITAL | Age: 76
End: 2021-03-17

## 2021-03-17 DIAGNOSIS — Z23 ENCOUNTER FOR IMMUNIZATION: Primary | ICD-10-CM

## 2021-03-17 PROCEDURE — 91300 SARS-COV-2 / COVID-19 MRNA VACCINE (PFIZER-BIONTECH) 30 MCG: CPT

## 2021-03-17 PROCEDURE — 0002A SARS-COV-2 / COVID-19 MRNA VACCINE (PFIZER-BIONTECH) 30 MCG: CPT

## 2021-06-04 ENCOUNTER — APPOINTMENT (OUTPATIENT)
Dept: LAB | Facility: CLINIC | Age: 76
End: 2021-06-04
Payer: MEDICARE

## 2021-06-04 DIAGNOSIS — C91.10 CHRONIC LYMPHOCYTIC LEUKEMIA (HCC): ICD-10-CM

## 2021-06-04 DIAGNOSIS — C18.1 PRIMARY MALIGNANT NEOPLASM OF APPENDIX (HCC): ICD-10-CM

## 2021-06-04 LAB
ALBUMIN SERPL BCP-MCNC: 4 G/DL (ref 3.5–5)
ALP SERPL-CCNC: 72 U/L (ref 46–116)
ALT SERPL W P-5'-P-CCNC: 29 U/L (ref 12–78)
ANION GAP SERPL CALCULATED.3IONS-SCNC: 11 MMOL/L (ref 4–13)
AST SERPL W P-5'-P-CCNC: 19 U/L (ref 5–45)
BASOPHILS # BLD MANUAL: 0 THOUSAND/UL (ref 0–0.1)
BASOPHILS NFR MAR MANUAL: 0 % (ref 0–1)
BILIRUB SERPL-MCNC: 0.5 MG/DL (ref 0.2–1)
BUN SERPL-MCNC: 27 MG/DL (ref 5–25)
CALCIUM SERPL-MCNC: 9 MG/DL (ref 8.3–10.1)
CHLORIDE SERPL-SCNC: 106 MMOL/L (ref 100–108)
CO2 SERPL-SCNC: 24 MMOL/L (ref 21–32)
CREAT SERPL-MCNC: 1.41 MG/DL (ref 0.6–1.3)
EOSINOPHIL # BLD MANUAL: 0.53 THOUSAND/UL (ref 0–0.4)
EOSINOPHIL NFR BLD MANUAL: 5 % (ref 0–6)
ERYTHROCYTE [DISTWIDTH] IN BLOOD BY AUTOMATED COUNT: 13.2 % (ref 11.6–15.1)
GFR SERPL CREATININE-BSD FRML MDRD: 49 ML/MIN/1.73SQ M
GLUCOSE P FAST SERPL-MCNC: 97 MG/DL (ref 65–99)
HCT VFR BLD AUTO: 38.8 % (ref 36.5–49.3)
HGB BLD-MCNC: 13.3 G/DL (ref 12–17)
LDH SERPL-CCNC: 162 U/L (ref 81–234)
LG PLATELETS BLD QL SMEAR: PRESENT
LYMPHOCYTES # BLD AUTO: 3.68 THOUSAND/UL (ref 0.6–4.47)
LYMPHOCYTES # BLD AUTO: 35 % (ref 14–44)
MCH RBC QN AUTO: 30.7 PG (ref 26.8–34.3)
MCHC RBC AUTO-ENTMCNC: 34.3 G/DL (ref 31.4–37.4)
MCV RBC AUTO: 90 FL (ref 82–98)
MONOCYTES # BLD AUTO: 0.32 THOUSAND/UL (ref 0–1.22)
MONOCYTES NFR BLD: 3 % (ref 4–12)
NEUTROPHILS # BLD MANUAL: 5.99 THOUSAND/UL (ref 1.85–7.62)
NEUTS BAND NFR BLD MANUAL: 2 % (ref 0–8)
NEUTS SEG NFR BLD AUTO: 55 % (ref 43–75)
NRBC BLD AUTO-RTO: 0 /100 WBCS
PLATELET # BLD AUTO: 184 THOUSANDS/UL (ref 149–390)
PLATELET BLD QL SMEAR: ADEQUATE
PMV BLD AUTO: 9.9 FL (ref 8.9–12.7)
POLYCHROMASIA BLD QL SMEAR: PRESENT
POTASSIUM SERPL-SCNC: 4.2 MMOL/L (ref 3.5–5.3)
PROT SERPL-MCNC: 8.2 G/DL (ref 6.4–8.2)
RBC # BLD AUTO: 4.33 MILLION/UL (ref 3.88–5.62)
SODIUM SERPL-SCNC: 141 MMOL/L (ref 136–145)
TOTAL CELLS COUNTED SPEC: 100
WBC # BLD AUTO: 10.5 THOUSAND/UL (ref 4.31–10.16)

## 2021-06-04 PROCEDURE — 80053 COMPREHEN METABOLIC PANEL: CPT

## 2021-06-04 PROCEDURE — 83615 LACTATE (LD) (LDH) ENZYME: CPT

## 2021-06-04 PROCEDURE — 85027 COMPLETE CBC AUTOMATED: CPT

## 2021-06-04 PROCEDURE — 36415 COLL VENOUS BLD VENIPUNCTURE: CPT

## 2021-06-04 PROCEDURE — 85007 BL SMEAR W/DIFF WBC COUNT: CPT

## 2021-06-10 ENCOUNTER — OFFICE VISIT (OUTPATIENT)
Dept: HEMATOLOGY ONCOLOGY | Facility: CLINIC | Age: 76
End: 2021-06-10
Payer: MEDICARE

## 2021-06-10 VITALS
WEIGHT: 198 LBS | BODY MASS INDEX: 31.82 KG/M2 | SYSTOLIC BLOOD PRESSURE: 160 MMHG | RESPIRATION RATE: 16 BRPM | OXYGEN SATURATION: 95 % | TEMPERATURE: 97.8 F | HEART RATE: 80 BPM | DIASTOLIC BLOOD PRESSURE: 78 MMHG | HEIGHT: 66 IN

## 2021-06-10 DIAGNOSIS — C18.6 MALIGNANT NEOPLASM OF DESCENDING COLON (HCC): ICD-10-CM

## 2021-06-10 DIAGNOSIS — C91.10 CLL (CHRONIC LYMPHOCYTIC LEUKEMIA) (HCC): Primary | ICD-10-CM

## 2021-06-10 PROCEDURE — 99214 OFFICE O/P EST MOD 30 MIN: CPT | Performed by: INTERNAL MEDICINE

## 2021-06-10 NOTE — PROGRESS NOTES
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      Previous Hematologic/ Oncologic History:       The patient had 4 cycles of bendamustine and Rituxan followed by 1 dose of Rituxan in the maintenance setting  He had a right hemicolectomy for appendiceal cancer    Current Hematologic/ Oncologic Treatment:     observation    Interval History:       The patient returns for follow-up visit  His white count slightly elevated 10 5  Lymphocytes were actually not elevated  The patient himself is at baseline  Was slightly concerned about his creatinine but this seems to be running around baseline  Denies any nausea denies any vomiting denies any diarrhea  The rest of his 14 point review of systems today was negative  Test Results:    Imaging: No results found  Labs:   Lab Results   Component Value Date    WBC 10 50 (H) 06/04/2021    HGB 13 3 06/04/2021    HCT 38 8 06/04/2021    MCV 90 06/04/2021     06/04/2021     Lab Results   Component Value Date     10/22/2015    K 4 2 06/04/2021     06/04/2021    CO2 24 06/04/2021    ANIONGAP 8 10/22/2015    BUN 27 (H) 06/04/2021    CREATININE 1 41 (H) 06/04/2021    GLUCOSE 138 10/22/2015    GLUF 97 06/04/2021    CALCIUM 9 0 06/04/2021    AST 19 06/04/2021    ALT 29 06/04/2021    ALKPHOS 72 06/04/2021    PROT 7 5 10/22/2015    BILITOT 0 59 10/22/2015    EGFR 49 06/04/2021       Lab Results   Component Value Date    PSA 1 3 06/05/2018    PSA 0 9 01/15/2014       Lab Results   Component Value Date    CEA 0 8 10/31/2019       ROS: As stated in the history of present illness otherwise his 14 point review of systems today was negative      Active Problems:   Patient Active Problem List Diagnosis    Arthritis    Benign essential HTN    Carcinoid tumor of appendix    Cardiomyopathy [facility] )    Chronic lymphocytic leukemia ([facility] )    CKD (chronic kidney disease) stage 3, GFR 30-59 ml/min ([facility])    CML in remission ([facility] )    Diabetic neuropathy ([facility] )    H/O Clostridium difficile infection    Malignant neoplasm of colon ([facility] )    Type 2 diabetes mellitus with diabetic neuropathy, without long-term current use of insulin ([facility] )    Primary malignant neoplasm of appendix ([place] [address] )    Status post transmetatarsal amputation of left foot ([facility])    Elevated serum protein level    Borderline hypothyroidism       Past Medical History:   Past Medical History:   Diagnosis Date    Arthritis     Spine    Cancer of appendix ([place] [address] ) 2015    appendectomy-colon resection    Chronic lymphocytic leukemia of B-cell type ([place] [address] )     "remission 4-5yrs"-chemo    DDD (degenerative disc disease), cervical     Diabetes mellitus ([place] [address] )     bs checked by pt at home at 6am =92    Diabetic neuropathy ([place] [address] )     Diabetic retinopathy ([place] [address] )     Elevated WBC count     Heart attack ([facility] ) 04/2015    "labeled as that and than tested later after appendix removed and stress test showed negative"    History of cancer chemotherapy     last treatment approx 5yrs ago    Hypertension     Myocardial infarction ([facility] ) 04/2015    Scalp psoriasis     Shingles 2/10-15    Toe injury     left great toe, - 2017-internal braec-to be removed today 3/30/2018    Wears glasses        Surgical History:   Past Surgical History:   Procedure Laterality Date    APPENDECTOMY      BONE BIOPSY Left 3/30/2018    Procedure: GREAT TOE BONE BIOPSY;  Surgeon: Wilber Fraser DPM;  [facility];  Service: Podiatry    BOWEL RESECTION      CATARACT EXTRACTION Bilateral     CHOLECYSTECTOMY      COLECTOMY  06/2015    EYE SURGERY Right     "retinal peel"    FOOT FUSION Left 12/6/2017    Procedure: [facility];  Surgeon: Gerber Multani DPM;  [facility];  Service: Podiatry    HARDWARE REMOVAL Left 2018    Procedure: REMOVAL STAPLES LEFT TOE;  Surgeon: Kera Cuenca DPM;  [facility];  Service: Podiatry    ORIF FOOT FRACTURE Left 2017    Procedure: HALLUX INTERPHALANGEAL JOINT INTERNAL FIXATION; REPAIR OF ULCERATION WITH EXCISIONS AND REVISION OF SKIN HALLUX WITH USE OF ALLOGRAFT;  Surgeon: Kera Cuenca DPM;  [facility];  Service: Podiatry    SD COLONOSCOPY FLX DX W/COLLJ SPEC WHEN PFRMD N/A 2016    Procedure: COLONOSCOPY;  Surgeon: Bia Centeno DO;  [facility]: [facility]; Service: Gastroenterology    SD REMOVAL DEEP IMPLANT Left 3/30/2018    Procedure: REMOVAL HARDWARE FOOT;  Surgeon: Kera Cuenca DPM;  [facility];  Service: Podiatry    RETINAL DETACHMENT SURGERY      TONSILLECTOMY         Family History:    Family History   Problem Relation Age of Onset    Alcohol abuse Mother            [place] Arnett Pancreatic cancer Father         age 78    Hashimoto's thyroiditis Daughter     Rheum arthritis Daughter         Juvenile Rheum Arthitis     No Known Problems Sister          age 66    No Known Problems Brother          80    Kidney failure Brother         dialysis, 76    Diabetes Brother        Cancer-related family history includes Pancreatic cancer in his father      Social History:   Social History     Socioeconomic History    Marital status: /Civil Union     Spouse name: Not on file    Number of children: Not on file    Years of education: 3 yr college    Highest education level: Not on file   Occupational History    Not on file   Social Needs    Financial resource strain: Not on file    Food insecurity     Worry: Not on file     Inability: Not on file   [employer] needs     Medical: Not on file     Non-medical: Not on file   Tobacco Use    Smoking status: Never Smoker    Smokeless tobacco: Never Used   Substance and Sexual Activity    Alcohol use: No    Drug use: No    Sexual activity: Not Currently     Partners: Female     Birth control/protection: None   Lifestyle    Physical activity     Days per week: Not on file     Minutes per session: Not on file    Stress: Not on file   Relationships    Social connections     Talks on phone: Not on file     Gets together: Not on file     Attends Latter day service: Not on file     Active member of club or organization: Not on file     Attends meetings of clubs or organizations: Not on file     Relationship status: Not on file    Intimate partner violence     Fear of current or ex partner: Not on file     Emotionally abused: Not on file     Physically abused: Not on file     Forced sexual activity: Not on file   Other Topics Concern    Not on file   Social History Narrative    Most recent tobacco use screenin2018    Education: [address]    Marital status:     Exercise level: Occasional    Diet: Diabetic    General stress level: Low    Alcohol intake: None    Caffeine intake:  Moderate    Guns present in home: No    Smoke alarm in home: Yes       Current Medications:   Current Outpatient Medications   Medication Sig Dispense Refill    amLODIPine (NORVASC) 10 mg tablet Take 1 tablet (10 mg total) by mouth daily 90 tablet 1    glipiZIDE (GLUCOTROL XL) 10 mg 24 hr tablet Take 1 tablet (10 mg total) by mouth 2 (two) times a day 180 tablet 1    Glucosamine HCl (GLUCOSAMINE PO) Take 1 tablet by mouth 2 (two) times a day       losartan (COZAAR) 100 MG tablet Take 1 tablet (100 mg total) by mouth daily 90 tablet 1    metFORMIN (GLUCOPHAGE-XR) 500 mg 24 hr tablet Take 2 tablets (1,000 mg total) by mouth 2 (two) times a day with meals 360 tablet 1    Multiple Vitamin (MULTIVITAMIN) tablet Take 1 tablet by mouth daily      Multiple Vitamins-Minerals (OCUVITE PO) Take 1 tablet by mouth daily       Omega-3 Fatty Acids (FISH OIL PO) Take by mouth daily      simvastatin (ZOCOR) 20 mg tablet Take 20 mg by mouth daily at bedtime      aspirin 81 MG tablet Take 81 mg by mouth as needed        No current facility-administered medications for this visit  Allergies: Allergies   Allergen Reactions    Lisinopril Cough     cough    Oxycodone-Acetaminophen      hallucination       Physical Exam:    Body surface area is 1 99 meters squared  Wt Readings from Last 3 Encounters:   06/10/21 89 8 kg (198 lb)   01/14/21 90 8 kg (200 lb 3 2 oz)   12/10/20 93 4 kg (206 lb)        Temp Readings from Last 3 Encounters:   06/10/21 97 8 °F (36 6 °C) (Temporal)   01/14/21 (!) 97 1 °F (36 2 °C)   12/10/20 98 2 °F (36 8 °C) (Temporal)        BP Readings from Last 3 Encounters:   06/10/21 160/78   01/14/21 150/72   12/10/20 142/72         Pulse Readings from Last 3 Encounters:   06/10/21 80   01/14/21 78   12/10/20 85         Physical Exam     Constitutional   General appearance: No acute distress, well appearing and well nourished  Eyes   Conjunctiva and lids: No swelling, erythema or discharge  Pupils and irises: Equal, round and reactive to light  Ears, Nose, Mouth, and Throat   External inspection of ears and nose: Normal     Nasal mucosa, septum, and turbinates: Normal without edema or erythema  Oropharynx: Normal with no erythema, edema, exudate or lesions  Pulmonary   Respiratory effort: No increased work of breathing or signs of respiratory distress  Auscultation of lungs: Clear to auscultation  Cardiovascular   Palpation of heart: Normal PMI, no thrills  Auscultation of heart: Normal rate and rhythm, normal S1 and S2, without murmurs  Examination of extremities for edema and/or varicosities: Normal     Carotid pulses: Normal     Abdomen   Abdomen: Non-tender, no masses  Liver and spleen: No hepatomegaly or splenomegaly  Lymphatic   Palpation of lymph nodes in neck: No lymphadenopathy      Musculoskeletal   Gait and station: Normal     Digits and nails: Normal without clubbing or cyanosis  Inspection/palpation of joints, bones, and muscles: Normal     Skin   Skin and subcutaneous tissue: Normal without rashes or lesions  Neurologic   Cranial nerves: Cranial nerves 2-12 intact  Sensation: No sensory loss  Psychiatric   Orientation to person, place, and time: Normal     Mood and affect: Normal         Assessment / Plan: This is a pleasant elderly male with a past history of chronic lymphocytic leukemia with bulky adenopathy  He got 4 cycles of bendamustine and Rituxan with a very nice response and no residual disease on his PET scan  Since he did have a history of bulky adenopathy we decided to give him Rituxan in the maintenance setting  After his first dose he was running a low white count and we had a discussion  He decided to go off of maintenance Rituxan and just be observed       He was doing well until he was admitted for appendicitis  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  Only one lymph node was removed  He needed a proper cancer operation  He had this done  His hemicolectomy showed no evidence of residual disease  23 lymph nodes were negative  Based on this we decided he did not need any further chemotherapy  He is now on observation  His last imaging in 2019  Did not show any evidence of metastatic disease  His white count has gone up slightly so I will do a flow cytometry and see him back in 4 months  We will see if his CLL is slowly coming back or not  Otherwise he is asymptomatic  He is more than 6 years out from his appendiceal cancer so he does not need any more imaging since he has no symptoms and his CMP does not show any major abnormalities apart from the creatinine being elevated  The patient is in agreement with the plan  I will see him back in 4 months with repeat blood work  Until then if he has any questions he will call our office        Goals and Barriers:  Current Goal:  Prolong Survival from   CLL  Barriers: None  Patient's Capacity to Self Care:  Patient  able to self care  Portions of the record may have been created with voice recognition software   Occasional wrong word or "sound a like" substitutions may have occurred due to the inherent limitations of voice recognition software   Read the chart carefully and recognize, using context, where substitutions have occurred

## 2021-06-17 ENCOUNTER — TELEPHONE (OUTPATIENT)
Dept: FAMILY MEDICINE CLINIC | Facility: CLINIC | Age: 76
End: 2021-06-17

## 2021-06-17 NOTE — TELEPHONE ENCOUNTER
Please let pt know that we received notification from his insurer that he previously was on zocor (simvastatin) but is not on it at present  I do recommend that he take a statin for cardiovascular protection and cholesterol  If he is comfortable with this and is not taking the simvastatin (not on his med list), then I can order atorvastatin (lipitor) 40 mg for him and then he is due for labs with me prior to his appointment next month  - lab order was placed on 1/15 for 7/15/21    If he is agreeable,   Atorvastatin 40 mg nightly #90/1

## 2021-06-18 NOTE — TELEPHONE ENCOUNTER
Phone call placed to patient and reviewed this information with patient  Patient states that he is all ready taking Simvastain 20 mg every day  Medication list updated

## 2021-07-15 ENCOUNTER — OFFICE VISIT (OUTPATIENT)
Dept: FAMILY MEDICINE CLINIC | Facility: CLINIC | Age: 76
End: 2021-07-15
Payer: MEDICARE

## 2021-07-15 VITALS
DIASTOLIC BLOOD PRESSURE: 64 MMHG | HEART RATE: 75 BPM | SYSTOLIC BLOOD PRESSURE: 132 MMHG | BODY MASS INDEX: 31.5 KG/M2 | OXYGEN SATURATION: 98 % | RESPIRATION RATE: 16 BRPM | TEMPERATURE: 97.8 F | WEIGHT: 196 LBS | HEIGHT: 66 IN

## 2021-07-15 DIAGNOSIS — E11.42 TYPE 2 DIABETES MELLITUS WITH DIABETIC POLYNEUROPATHY, WITHOUT LONG-TERM CURRENT USE OF INSULIN (HCC): ICD-10-CM

## 2021-07-15 DIAGNOSIS — I10 BENIGN ESSENTIAL HTN: ICD-10-CM

## 2021-07-15 DIAGNOSIS — Z89.432 STATUS POST TRANSMETATARSAL AMPUTATION OF LEFT FOOT (HCC): ICD-10-CM

## 2021-07-15 DIAGNOSIS — E11.40 TYPE 2 DIABETES MELLITUS WITH DIABETIC NEUROPATHY, WITHOUT LONG-TERM CURRENT USE OF INSULIN (HCC): ICD-10-CM

## 2021-07-15 DIAGNOSIS — E03.9 BORDERLINE HYPOTHYROIDISM: Primary | ICD-10-CM

## 2021-07-15 PROBLEM — Z85.09 HISTORY OF MALIGNANT NEOPLASM OF APPENDIX: Status: ACTIVE | Noted: 2020-09-10

## 2021-07-15 PROCEDURE — 99214 OFFICE O/P EST MOD 30 MIN: CPT | Performed by: FAMILY MEDICINE

## 2021-07-15 RX ORDER — LOSARTAN POTASSIUM 100 MG/1
100 TABLET ORAL DAILY
Qty: 90 TABLET | Refills: 1 | Status: SHIPPED | OUTPATIENT
Start: 2021-07-15 | End: 2021-11-30 | Stop reason: SDUPTHER

## 2021-07-15 RX ORDER — LOSARTAN POTASSIUM 100 MG/1
100 TABLET ORAL DAILY
Qty: 90 TABLET | Refills: 1 | Status: SHIPPED | OUTPATIENT
Start: 2021-07-15 | End: 2021-07-15 | Stop reason: SDUPTHER

## 2021-07-15 RX ORDER — AMLODIPINE BESYLATE 10 MG/1
10 TABLET ORAL DAILY
Qty: 90 TABLET | Refills: 1 | Status: SHIPPED | OUTPATIENT
Start: 2021-07-15 | End: 2021-07-15 | Stop reason: SDUPTHER

## 2021-07-15 RX ORDER — AMLODIPINE BESYLATE 10 MG/1
10 TABLET ORAL DAILY
Qty: 90 TABLET | Refills: 1 | Status: SHIPPED | OUTPATIENT
Start: 2021-07-15 | End: 2021-11-30 | Stop reason: SDUPTHER

## 2021-07-15 NOTE — ASSESSMENT & PLAN NOTE
Patient notes that he has been drinking non-diet soda and eating a lot more bread than typical    Since his wife saw his lab results, she has encouraged him to cut these out and his fasting blood sugars have been as low at 60 (this AM, improved with juice)  He will keep an eye on his blood sugar and is aware to have glipizide twice a day WITH meals  He will let me know if his BS is not consistently trending down from where he has been with the diet changes he is making   He declines medication change today since his diet changes seem to be improving his BS  Lab Results   Component Value Date    HGBA1C 8 7 (H) 07/07/2021

## 2021-07-15 NOTE — ASSESSMENT & PLAN NOTE
Subclinical hypothyroidism  Continue to monitor  Not on medication  Reviewed with patient  Recheck w/ next labs in four months      TSH 4 19 on 7/7/21  TSH 4 64 on 1/4/21

## 2021-07-15 NOTE — ASSESSMENT & PLAN NOTE
Hillary Sutherland    Order for DM shoes  Continue routine podiatric care and work on improved blood sugar control

## 2021-07-15 NOTE — PROGRESS NOTES
Assessment/Plan:       Problem List Items Addressed This Visit        Endocrine    Type 2 diabetes mellitus with diabetic neuropathy, without long-term current use of insulin ([facility] )     Patient notes that he has been drinking non-diet soda and eating a lot more bread than typical    Since his wife saw his lab results, she has encouraged him to cut these out and his fasting blood sugars have been as low at 60 (this AM, improved with juice)  He will keep an eye on his blood sugar and is aware to have glipizide twice a day WITH meals  He will let me know if his BS is not consistently trending down from where he has been with the diet changes he is making  He declines medication change today since his diet changes seem to be improving his BS  Lab Results   Component Value Date    HGBA1C 8 7 (H) 07/07/2021            Borderline hypothyroidism - Primary     Subclinical hypothyroidism  Continue to monitor  Not on medication  Reviewed with patient  Recheck w/ next labs in four months  TSH 4 19 on 7/7/21  TSH 4 64 on 1/4/21             Relevant Orders    TSH, 3rd generation with Free T4 reflex       Cardiovascular and Mediastinum    Benign essential HTN     BP controlled on losartan 100 mg daily and amlodipine 10 mg daily  Refills sent to mail away pharmacy           Relevant Medications    losartan (COZAAR) 100 MG tablet    amLODIPine (NORVASC) 10 mg tablet       Other    Status post transmetatarsal amputation of left foot ([facility] )     Follows w Dr Susana Hicks    Order for DM shoes  Continue routine podiatric care and work on improved blood sugar control           Other Visit Diagnoses     Type 2 diabetes mellitus with diabetic polyneuropathy, without long-term current use of insulin ([facility] )        Relevant Orders    Diabetic Shoe    Lipid Panel with Direct LDL reflex    Comprehensive metabolic panel    Hemoglobin A1C    Microalbumin / creatinine urine ratio               Most recent labs reviewed    Subjective:     Cayla Haynes is a 77 y o  male here today and has the below chronic conditions:    Patient Active Problem List   Diagnosis    Arthritis    Benign essential HTN    Carcinoid tumor of appendix    Cardiomyopathy ([place] [address] )    Chronic lymphocytic leukemia ([place] [address] )    CKD (chronic kidney disease) stage 3, GFR 30-59 ml/min ([place] [address] )    CML in remission ([place] [address] )    Diabetic neuropathy ([place] [address] )    H/O Clostridium difficile infection    Malignant neoplasm of colon ([place] [address] )    Type 2 diabetes mellitus with diabetic neuropathy, without long-term current use of insulin ([place] [address] )    Primary malignant neoplasm of appendix ([place] [address] )    Status post transmetatarsal amputation of left foot ([facility])    Elevated serum protein level    Borderline hypothyroidism     Current Outpatient Medications   Medication Sig Dispense Refill    amLODIPine (NORVASC) 10 mg tablet Take 1 tablet (10 mg total) by mouth daily 90 tablet 1    aspirin 81 MG tablet Take 81 mg by mouth as needed       glipiZIDE (GLUCOTROL XL) 10 mg 24 hr tablet Take 1 tablet (10 mg total) by mouth 2 (two) times a day 180 tablet 1    Glucosamine HCl (GLUCOSAMINE PO) Take 1 tablet by mouth 2 (two) times a day       losartan (COZAAR) 100 MG tablet Take 1 tablet (100 mg total) by mouth daily 90 tablet 1    metFORMIN (GLUCOPHAGE-XR) 500 mg 24 hr tablet Take 2 tablets (1,000 mg total) by mouth 2 (two) times a day with meals 360 tablet 1    Multiple Vitamin (MULTIVITAMIN) tablet Take 1 tablet by mouth daily      Multiple Vitamins-Minerals (OCUVITE PO) Take 1 tablet by mouth daily       Omega-3 Fatty Acids (FISH OIL PO) Take by mouth daily      simvastatin (ZOCOR) 20 mg tablet Take 20 mg by mouth daily at bedtime       No current facility-administered medications for this visit  HPI:  Chief Complaint   Patient presents with    Follow-up     6 month follow up      - CC above per clinical staff and reviewed  Pt here for follow up     He notes he has been eating more bread in general   120-150 in the AM   Now that his wife has seen his test results, he is no longer eating so much bread and his BS are down in the AM   Was drinking coca-cola (not diet) and he is not longer drinking this either  BP is about 757-800 systolic at home fasting  Today his BS was down to 60, for which he drank juice and felt better  Is being a lot more cautious with his diet now  Is careful to bring something to drink when he is doing yardwork  Taking simvastatin 20 mg (half of a 40 mg tab- still has a lot at home)    He is due for diabetic shoes soon  The podiatrist told him we should order  Getting these from [address] [place] [address]- due in Sept    He does have a black R great toe after dropping something on his toe  Not painful at present  No CP or SOB  Following up with heme/onc regularly  utd with colonoscopy          The following portions of the patient's history were reviewed and updated as appropriate: allergies, current medications, past family history, past medical history, past social history, past surgical history and problem list     ROS:  Review of Systems   No fever, chills, congestion, chest pain, shortness of breath, nausea, vomiting, diarrhea, constipation, blood in stool, urinary concerns, mood changes  Rest of ROS neg except as above  Objective:      /64   Pulse 75   Temp 97 8 °F (36 6 °C)   Resp 16   Ht 5' 6" (1 676 m)   Wt 88 9 kg (196 lb)   SpO2 98%   BMI 31 64 kg/m²   BP Readings from Last 3 Encounters:   07/15/21 132/64   06/10/21 160/78   01/14/21 150/72     Wt Readings from Last 3 Encounters:   07/15/21 88 9 kg (196 lb)   06/10/21 89 8 kg (198 lb)   01/14/21 90 8 kg (200 lb 3 2 oz)               Physical Exam:   Physical Exam  Vitals and nursing note reviewed  Constitutional:       Appearance: He is well-developed  HENT:      Head: Normocephalic and atraumatic  Eyes:      Conjunctiva/sclera: Conjunctivae normal    Neck:      Vascular: No carotid bruit     Cardiovascular: Rate and Rhythm: Normal rate and regular rhythm  Heart sounds: Normal heart sounds  No murmur heard  Pulmonary:      Effort: Pulmonary effort is normal  No respiratory distress  Breath sounds: Normal breath sounds  No wheezing  Abdominal:      Palpations: Abdomen is soft  Tenderness: There is no abdominal tenderness  There is no guarding or rebound  Musculoskeletal:      Cervical back: Neck supple  Right lower leg: No edema  Left lower leg: No edema  Comments: Transmetatarsal amputation L  Black toenail on R with thickening   Lymphadenopathy:      Cervical: No cervical adenopathy  Skin:     General: Skin is warm and dry  Neurological:      Mental Status: He is alert and oriented to person, place, and time  Psychiatric:         Mood and Affect: Mood normal          Behavior: Behavior normal            BMI Counseling: Body mass index is 31 64 kg/m²  The BMI is above normal  Nutrition recommendations include moderation in carbohydrate intake

## 2021-07-15 NOTE — PROGRESS NOTES
Patient's shoes and socks were not removed  Right Foot/Ankle   Right Foot Inspection  Skin Exam: skin normal and skin intact no dry skin, no warmth, no callus, no erythema, no maceration, no abnormal color, no pre-ulcer, no ulcer and no callus                          Toe Exam: ROM and strength within normal limits  Sensory     Proprioception: absent     Vascular  Capillary refills: elevated  The right DP pulse is 2+  Right Toe  - Comprehensive Exam  Ecchymosis: toe 1, toe 2, toe 3 and toe 4  Arch: normal  Swelling: none         Left Foot/Ankle  Left Foot Inspection  Skin Exam: dry skin                         Toe Exam: left toe deformity                   Sensory     Proprioception: absent    Vascular  Capillary refills: elevated  The left DP pulse is 0     Left Toe  - Comprehensive Exam  Ecchymosis: toe 1, toe 2, toe 3, toe 4 and toe 5  Arch: normal  Swelling: none       Assign Risk Category:  ; ; Weak pulses

## 2021-07-15 NOTE — ASSESSMENT & PLAN NOTE
BP controlled on losartan 100 mg daily and amlodipine 10 mg daily  Refills sent to mail away pharmacy

## 2021-10-06 ENCOUNTER — APPOINTMENT (OUTPATIENT)
Dept: LAB | Facility: HOSPITAL | Age: 76
End: 2021-10-06
Payer: MEDICARE

## 2021-10-06 DIAGNOSIS — C18.6 MALIGNANT NEOPLASM OF DESCENDING COLON (HCC): ICD-10-CM

## 2021-10-06 DIAGNOSIS — C91.10 CLL (CHRONIC LYMPHOCYTIC LEUKEMIA) (HCC): ICD-10-CM

## 2021-10-06 LAB
ALBUMIN SERPL BCP-MCNC: 4.2 G/DL (ref 3.4–4.8)
ALP SERPL-CCNC: 66.4 U/L (ref 10–129)
ALT SERPL W P-5'-P-CCNC: 15 U/L (ref 5–63)
ANION GAP SERPL CALCULATED.3IONS-SCNC: 8 MMOL/L (ref 4–13)
AST SERPL W P-5'-P-CCNC: 15 U/L (ref 15–41)
BASOPHILS # BLD AUTO: 0.04 THOUSANDS/ΜL (ref 0–0.1)
BASOPHILS NFR BLD AUTO: 0 % (ref 0–1)
BILIRUB SERPL-MCNC: 0.37 MG/DL (ref 0.3–1.2)
BUN SERPL-MCNC: 27 MG/DL (ref 6–20)
CALCIUM SERPL-MCNC: 10.1 MG/DL (ref 8.4–10.2)
CEA SERPL-MCNC: 1.6 NG/ML (ref 0–3)
CHLORIDE SERPL-SCNC: 104 MMOL/L (ref 96–108)
CO2 SERPL-SCNC: 27 MMOL/L (ref 22–33)
CREAT SERPL-MCNC: 1.3 MG/DL (ref 0.5–1.2)
EOSINOPHIL # BLD AUTO: 0.24 THOUSAND/ΜL (ref 0–0.61)
EOSINOPHIL NFR BLD AUTO: 2 % (ref 0–6)
ERYTHROCYTE [DISTWIDTH] IN BLOOD BY AUTOMATED COUNT: 13 % (ref 11.6–15.1)
GFR SERPL CREATININE-BSD FRML MDRD: 53 ML/MIN/1.73SQ M
GLUCOSE P FAST SERPL-MCNC: 115 MG/DL (ref 70–105)
HCT VFR BLD AUTO: 38.9 % (ref 36.5–49.3)
HGB BLD-MCNC: 13.1 G/DL (ref 12–17)
IMM GRANULOCYTES # BLD AUTO: 0.01 THOUSAND/UL (ref 0–0.2)
IMM GRANULOCYTES NFR BLD AUTO: 0 % (ref 0–2)
LYMPHOCYTES # BLD AUTO: 6.2 THOUSANDS/ΜL (ref 0.6–4.47)
LYMPHOCYTES NFR BLD AUTO: 52 % (ref 14–44)
MCH RBC QN AUTO: 30.3 PG (ref 26.8–34.3)
MCHC RBC AUTO-ENTMCNC: 33.7 G/DL (ref 31.4–37.4)
MCV RBC AUTO: 90 FL (ref 82–98)
MONOCYTES # BLD AUTO: 0.54 THOUSAND/ΜL (ref 0.17–1.22)
MONOCYTES NFR BLD AUTO: 5 % (ref 4–12)
NEUTROPHILS # BLD AUTO: 4.82 THOUSANDS/ΜL (ref 1.85–7.62)
NEUTS SEG NFR BLD AUTO: 41 % (ref 43–75)
PLATELET # BLD AUTO: 169 THOUSANDS/UL (ref 149–390)
PMV BLD AUTO: 10.2 FL (ref 8.9–12.7)
POTASSIUM SERPL-SCNC: 4.6 MMOL/L (ref 3.5–5)
PROT SERPL-MCNC: 7.8 G/DL (ref 6.4–8.3)
RBC # BLD AUTO: 4.32 MILLION/UL (ref 3.88–5.62)
SODIUM SERPL-SCNC: 139 MMOL/L (ref 133–145)
WBC # BLD AUTO: 11.85 THOUSAND/UL (ref 4.31–10.16)

## 2021-10-06 PROCEDURE — 36415 COLL VENOUS BLD VENIPUNCTURE: CPT

## 2021-10-06 PROCEDURE — 82378 CARCINOEMBRYONIC ANTIGEN: CPT

## 2021-10-06 PROCEDURE — 80053 COMPREHEN METABOLIC PANEL: CPT

## 2021-10-06 PROCEDURE — 85025 COMPLETE CBC W/AUTO DIFF WBC: CPT

## 2021-10-11 ENCOUNTER — APPOINTMENT (OUTPATIENT)
Dept: LAB | Facility: HOSPITAL | Age: 76
End: 2021-10-11
Payer: MEDICARE

## 2021-10-11 PROCEDURE — 88184 FLOWCYTOMETRY/ TC 1 MARKER: CPT

## 2021-10-14 ENCOUNTER — OFFICE VISIT (OUTPATIENT)
Dept: HEMATOLOGY ONCOLOGY | Facility: CLINIC | Age: 76
End: 2021-10-14
Payer: MEDICARE

## 2021-10-14 VITALS
DIASTOLIC BLOOD PRESSURE: 84 MMHG | RESPIRATION RATE: 14 BRPM | OXYGEN SATURATION: 95 % | WEIGHT: 196.5 LBS | HEART RATE: 83 BPM | TEMPERATURE: 97.8 F | BODY MASS INDEX: 31.58 KG/M2 | SYSTOLIC BLOOD PRESSURE: 160 MMHG | HEIGHT: 66 IN

## 2021-10-14 DIAGNOSIS — C91.10 CLL (CHRONIC LYMPHOCYTIC LEUKEMIA) (HCC): Primary | ICD-10-CM

## 2021-10-14 DIAGNOSIS — C91.10 CHRONIC LYMPHOCYTIC LEUKEMIA (HCC): ICD-10-CM

## 2021-10-14 LAB — SCAN RESULT: NORMAL

## 2021-10-14 PROCEDURE — 99214 OFFICE O/P EST MOD 30 MIN: CPT | Performed by: INTERNAL MEDICINE

## 2021-10-15 DIAGNOSIS — C18.6 MALIGNANT NEOPLASM OF DESCENDING COLON (HCC): ICD-10-CM

## 2021-10-15 DIAGNOSIS — C91.10 CLL (CHRONIC LYMPHOCYTIC LEUKEMIA) (HCC): Primary | ICD-10-CM

## 2021-10-16 ENCOUNTER — IMMUNIZATIONS (OUTPATIENT)
Dept: FAMILY MEDICINE CLINIC | Facility: HOSPITAL | Age: 76
End: 2021-10-16

## 2021-10-16 DIAGNOSIS — Z23 ENCOUNTER FOR IMMUNIZATION: Primary | ICD-10-CM

## 2021-10-16 PROCEDURE — 91300 SARS-COV-2 / COVID-19 MRNA VACCINE (PFIZER-BIONTECH) 30 MCG: CPT

## 2021-10-16 PROCEDURE — 0001A SARS-COV-2 / COVID-19 MRNA VACCINE (PFIZER-BIONTECH) 30 MCG: CPT

## 2021-10-19 ENCOUNTER — TELEPHONE (OUTPATIENT)
Dept: FAMILY MEDICINE CLINIC | Facility: CLINIC | Age: 76
End: 2021-10-19

## 2021-11-11 ENCOUNTER — APPOINTMENT (OUTPATIENT)
Dept: LAB | Facility: HOSPITAL | Age: 76
End: 2021-11-11
Payer: MEDICARE

## 2021-11-11 DIAGNOSIS — E11.42 TYPE 2 DIABETES MELLITUS WITH DIABETIC POLYNEUROPATHY, WITHOUT LONG-TERM CURRENT USE OF INSULIN (HCC): ICD-10-CM

## 2021-11-11 DIAGNOSIS — E03.9 BORDERLINE HYPOTHYROIDISM: ICD-10-CM

## 2021-11-11 LAB
ALBUMIN SERPL BCP-MCNC: 4.5 G/DL (ref 3.4–4.8)
ALP SERPL-CCNC: 71 U/L (ref 10–129)
ALT SERPL W P-5'-P-CCNC: 18 U/L (ref 5–63)
ANION GAP SERPL CALCULATED.3IONS-SCNC: 9 MMOL/L (ref 4–13)
AST SERPL W P-5'-P-CCNC: 18 U/L (ref 15–41)
BILIRUB SERPL-MCNC: 0.79 MG/DL (ref 0.3–1.2)
BUN SERPL-MCNC: 28 MG/DL (ref 6–20)
CALCIUM SERPL-MCNC: 9.5 MG/DL (ref 8.4–10.2)
CHLORIDE SERPL-SCNC: 103 MMOL/L (ref 96–108)
CHOLEST SERPL-MCNC: 141 MG/DL
CO2 SERPL-SCNC: 25 MMOL/L (ref 22–33)
CREAT SERPL-MCNC: 1.24 MG/DL (ref 0.5–1.2)
GFR SERPL CREATININE-BSD FRML MDRD: 57 ML/MIN/1.73SQ M
GLUCOSE P FAST SERPL-MCNC: 208 MG/DL (ref 70–105)
HDLC SERPL-MCNC: 39 MG/DL
LDLC SERPL CALC-MCNC: 69 MG/DL (ref 0–100)
POTASSIUM SERPL-SCNC: 4.6 MMOL/L (ref 3.5–5)
PROT SERPL-MCNC: 8.4 G/DL (ref 6.4–8.3)
SODIUM SERPL-SCNC: 137 MMOL/L (ref 133–145)
TRIGL SERPL-MCNC: 164.1 MG/DL
TSH SERPL DL<=0.05 MIU/L-ACNC: 5.6 UIU/ML (ref 0.34–5.6)

## 2021-11-11 PROCEDURE — 36415 COLL VENOUS BLD VENIPUNCTURE: CPT

## 2021-11-11 PROCEDURE — 83036 HEMOGLOBIN GLYCOSYLATED A1C: CPT

## 2021-11-11 PROCEDURE — 82043 UR ALBUMIN QUANTITATIVE: CPT

## 2021-11-11 PROCEDURE — 80053 COMPREHEN METABOLIC PANEL: CPT

## 2021-11-11 PROCEDURE — 82570 ASSAY OF URINE CREATININE: CPT

## 2021-11-11 PROCEDURE — 84439 ASSAY OF FREE THYROXINE: CPT

## 2021-11-11 PROCEDURE — 80061 LIPID PANEL: CPT

## 2021-11-11 PROCEDURE — 84443 ASSAY THYROID STIM HORMONE: CPT

## 2021-11-12 LAB
CREAT UR-MCNC: 116 MG/DL
EST. AVERAGE GLUCOSE BLD GHB EST-MCNC: 174 MG/DL
HBA1C MFR BLD: 7.7 %
MICROALBUMIN UR-MCNC: 387 MG/L (ref 0–20)
MICROALBUMIN/CREAT 24H UR: 334 MG/G CREATININE (ref 0–30)
T4 FREE SERPL-MCNC: 0.89 NG/DL (ref 0.76–1.46)

## 2021-11-16 PROBLEM — E11.21 DIABETIC NEPHROPATHY ASSOCIATED WITH TYPE 2 DIABETES MELLITUS (HCC): Status: ACTIVE | Noted: 2021-11-16

## 2021-11-24 ENCOUNTER — RA CDI HCC (OUTPATIENT)
Dept: OTHER | Facility: HOSPITAL | Age: 76
End: 2021-11-24

## 2021-11-30 ENCOUNTER — OFFICE VISIT (OUTPATIENT)
Dept: FAMILY MEDICINE CLINIC | Facility: CLINIC | Age: 76
End: 2021-11-30
Payer: MEDICARE

## 2021-11-30 VITALS
HEIGHT: 66 IN | TEMPERATURE: 97.7 F | WEIGHT: 198 LBS | OXYGEN SATURATION: 95 % | HEART RATE: 95 BPM | SYSTOLIC BLOOD PRESSURE: 138 MMHG | DIASTOLIC BLOOD PRESSURE: 62 MMHG | RESPIRATION RATE: 18 BRPM | BODY MASS INDEX: 31.82 KG/M2

## 2021-11-30 DIAGNOSIS — E03.9 BORDERLINE HYPOTHYROIDISM: ICD-10-CM

## 2021-11-30 DIAGNOSIS — E11.42 TYPE 2 DIABETES MELLITUS WITH DIABETIC POLYNEUROPATHY, WITHOUT LONG-TERM CURRENT USE OF INSULIN (HCC): ICD-10-CM

## 2021-11-30 DIAGNOSIS — E11.40 TYPE 2 DIABETES MELLITUS WITH DIABETIC NEUROPATHY, WITHOUT LONG-TERM CURRENT USE OF INSULIN (HCC): Primary | ICD-10-CM

## 2021-11-30 DIAGNOSIS — I10 BENIGN ESSENTIAL HTN: ICD-10-CM

## 2021-11-30 PROCEDURE — 99214 OFFICE O/P EST MOD 30 MIN: CPT | Performed by: FAMILY MEDICINE

## 2021-11-30 RX ORDER — SIMVASTATIN 20 MG
20 TABLET ORAL
Qty: 90 TABLET | Refills: 1 | Status: SHIPPED | OUTPATIENT
Start: 2021-11-30 | End: 2022-04-05 | Stop reason: SDUPTHER

## 2021-11-30 RX ORDER — GLIPIZIDE 10 MG/1
10 TABLET, FILM COATED, EXTENDED RELEASE ORAL 2 TIMES DAILY
Qty: 180 TABLET | Refills: 1 | Status: SHIPPED | OUTPATIENT
Start: 2021-11-30 | End: 2022-04-05 | Stop reason: SDUPTHER

## 2021-11-30 RX ORDER — LOSARTAN POTASSIUM 100 MG/1
100 TABLET ORAL DAILY
Qty: 90 TABLET | Refills: 1 | Status: SHIPPED | OUTPATIENT
Start: 2021-11-30 | End: 2022-04-05 | Stop reason: SDUPTHER

## 2021-11-30 RX ORDER — METFORMIN HYDROCHLORIDE 500 MG/1
1000 TABLET, EXTENDED RELEASE ORAL 2 TIMES DAILY WITH MEALS
Qty: 360 TABLET | Refills: 1 | Status: SHIPPED | OUTPATIENT
Start: 2021-11-30 | End: 2022-04-05 | Stop reason: SDUPTHER

## 2021-11-30 RX ORDER — AMLODIPINE BESYLATE 10 MG/1
10 TABLET ORAL DAILY
Qty: 90 TABLET | Refills: 1 | Status: SHIPPED | OUTPATIENT
Start: 2021-11-30 | End: 2022-04-05 | Stop reason: SDUPTHER

## 2021-12-06 ENCOUNTER — TELEPHONE (OUTPATIENT)
Dept: FAMILY MEDICINE CLINIC | Facility: CLINIC | Age: 76
End: 2021-12-06

## 2021-12-07 ENCOUNTER — PATIENT MESSAGE (OUTPATIENT)
Dept: FAMILY MEDICINE CLINIC | Facility: CLINIC | Age: 76
End: 2021-12-07

## 2022-02-02 ENCOUNTER — APPOINTMENT (OUTPATIENT)
Dept: LAB | Facility: HOSPITAL | Age: 77
End: 2022-02-02
Payer: MEDICARE

## 2022-02-02 DIAGNOSIS — C18.6 MALIGNANT NEOPLASM OF DESCENDING COLON (HCC): ICD-10-CM

## 2022-02-02 DIAGNOSIS — C91.10 CLL (CHRONIC LYMPHOCYTIC LEUKEMIA) (HCC): ICD-10-CM

## 2022-02-02 LAB
ALBUMIN SERPL BCP-MCNC: 4.1 G/DL (ref 3.4–4.8)
ALP SERPL-CCNC: 73.8 U/L (ref 10–129)
ALT SERPL W P-5'-P-CCNC: 15 U/L (ref 5–63)
ANION GAP SERPL CALCULATED.3IONS-SCNC: 9 MMOL/L (ref 4–13)
AST SERPL W P-5'-P-CCNC: 15 U/L (ref 15–41)
BASOPHILS # BLD AUTO: 0.07 THOUSANDS/ΜL (ref 0–0.1)
BASOPHILS NFR BLD AUTO: 1 % (ref 0–1)
BILIRUB SERPL-MCNC: 0.59 MG/DL (ref 0.3–1.2)
BUN SERPL-MCNC: 28 MG/DL (ref 6–20)
CALCIUM SERPL-MCNC: 10.1 MG/DL (ref 8.4–10.2)
CHLORIDE SERPL-SCNC: 101 MMOL/L (ref 96–108)
CO2 SERPL-SCNC: 25 MMOL/L (ref 22–33)
CREAT SERPL-MCNC: 1.37 MG/DL (ref 0.5–1.2)
EOSINOPHIL # BLD AUTO: 0.44 THOUSAND/ΜL (ref 0–0.61)
EOSINOPHIL NFR BLD AUTO: 3 % (ref 0–6)
ERYTHROCYTE [DISTWIDTH] IN BLOOD BY AUTOMATED COUNT: 13.2 % (ref 11.6–15.1)
GFR SERPL CREATININE-BSD FRML MDRD: 50 ML/MIN/1.73SQ M
GLUCOSE P FAST SERPL-MCNC: 196 MG/DL (ref 70–105)
HCT VFR BLD AUTO: 36.7 % (ref 36.5–49.3)
HGB BLD-MCNC: 12.4 G/DL (ref 12–17)
IMM GRANULOCYTES # BLD AUTO: 0.04 THOUSAND/UL (ref 0–0.2)
IMM GRANULOCYTES NFR BLD AUTO: 0 % (ref 0–2)
LYMPHOCYTES # BLD AUTO: 8.8 THOUSANDS/ΜL (ref 0.6–4.47)
LYMPHOCYTES NFR BLD AUTO: 57 % (ref 14–44)
MCH RBC QN AUTO: 30.1 PG (ref 26.8–34.3)
MCHC RBC AUTO-ENTMCNC: 33.8 G/DL (ref 31.4–37.4)
MCV RBC AUTO: 89 FL (ref 82–98)
MONOCYTES # BLD AUTO: 0.59 THOUSAND/ΜL (ref 0.17–1.22)
MONOCYTES NFR BLD AUTO: 4 % (ref 4–12)
NEUTROPHILS # BLD AUTO: 5.31 THOUSANDS/ΜL (ref 1.85–7.62)
NEUTS SEG NFR BLD AUTO: 35 % (ref 43–75)
NRBC BLD AUTO-RTO: 0 /100 WBCS
PLATELET # BLD AUTO: 189 THOUSANDS/UL (ref 149–390)
PMV BLD AUTO: 10 FL (ref 8.9–12.7)
POTASSIUM SERPL-SCNC: 4.8 MMOL/L (ref 3.5–5)
PROT SERPL-MCNC: 8 G/DL (ref 6.4–8.3)
RBC # BLD AUTO: 4.12 MILLION/UL (ref 3.88–5.62)
SODIUM SERPL-SCNC: 135 MMOL/L (ref 133–145)
WBC # BLD AUTO: 15.25 THOUSAND/UL (ref 4.31–10.16)

## 2022-02-02 PROCEDURE — 80053 COMPREHEN METABOLIC PANEL: CPT

## 2022-02-02 PROCEDURE — 82378 CARCINOEMBRYONIC ANTIGEN: CPT

## 2022-02-02 PROCEDURE — 88185 FLOWCYTOMETRY/TC ADD-ON: CPT

## 2022-02-02 PROCEDURE — 85025 COMPLETE CBC W/AUTO DIFF WBC: CPT

## 2022-02-02 PROCEDURE — 36415 COLL VENOUS BLD VENIPUNCTURE: CPT

## 2022-02-02 PROCEDURE — 88184 FLOWCYTOMETRY/ TC 1 MARKER: CPT

## 2022-02-03 LAB — CEA SERPL-MCNC: 1.9 NG/ML (ref 0–3)

## 2022-02-04 LAB — SCAN RESULT: NORMAL

## 2022-02-14 ENCOUNTER — TELEPHONE (OUTPATIENT)
Dept: HEMATOLOGY ONCOLOGY | Facility: HOSPITAL | Age: 77
End: 2022-02-14

## 2022-02-14 NOTE — TELEPHONE ENCOUNTER
02/14/22    Pt is currently on observation  Spoke with pt to see if he wants to be seen by Ada Crawley or Emmanuel Gresham  Pt stated he can only come next week and I R/S him to see Emmanuel Gresham on 2/23 11:30AM as requested  Pt verbalized understanding and agreement

## 2022-02-23 ENCOUNTER — OFFICE VISIT (OUTPATIENT)
Dept: HEMATOLOGY ONCOLOGY | Facility: CLINIC | Age: 77
End: 2022-02-23
Payer: MEDICARE

## 2022-02-23 VITALS
HEIGHT: 66 IN | DIASTOLIC BLOOD PRESSURE: 66 MMHG | RESPIRATION RATE: 17 BRPM | OXYGEN SATURATION: 95 % | TEMPERATURE: 97.3 F | SYSTOLIC BLOOD PRESSURE: 128 MMHG | WEIGHT: 190 LBS | HEART RATE: 81 BPM | BODY MASS INDEX: 30.53 KG/M2

## 2022-02-23 DIAGNOSIS — C18.6 MALIGNANT NEOPLASM OF DESCENDING COLON (HCC): ICD-10-CM

## 2022-02-23 DIAGNOSIS — C91.10 CLL (CHRONIC LYMPHOCYTIC LEUKEMIA) (HCC): Primary | ICD-10-CM

## 2022-02-23 DIAGNOSIS — C92.11 CML IN REMISSION (HCC): ICD-10-CM

## 2022-02-23 PROCEDURE — 99214 OFFICE O/P EST MOD 30 MIN: CPT | Performed by: PHYSICIAN ASSISTANT

## 2022-02-23 NOTE — PROGRESS NOTES
Hematology/Oncology Outpatient Follow- up Note  Jose M Jiang [birth year], [id number]  2022  Date:  10/14/2021     Presenting Complaint/Diagnosis : The patient presents to the office today with history of CLL and llater diagnosed with a stage IIa appendiceal carcinoma       Previous Hematologic/ Oncologic History:    The patient had 4 cycles of bendamustine and Rituxan followed by 1 dose of Rituxan in the maintenance setting      s/p right hemicolectomy for appendiceal cancer          Current Hematologic/ Oncologic Treatment:  Observation    ECO    Interval History:  White count continues to increase as below  No anemia or thrombocytopenia  Flow cytometry shows increase in clonal B cells to 31% from 23% 10/14/21  No palpable splenomegaly  No profound fatigue, fevers, night sweats, frequent infections, or palpable lymphadenopathy  No early satiety  WBC   Date Value Ref Range Status   2022 15 25 (H) 4 31 - 10 16 Thousand/uL Final   10/06/2021 11 85 (H) 4 31 - 10 16 Thousand/uL Final   2021 10 50 (H) 4 31 - 10 16 Thousand/uL Final   2021 9 96 4 31 - 10 16 Thousand/uL Final     Hemoglobin   Date Value Ref Range Status   2022 12 4 12 0 - 17 0 g/dL Final   10/22/2015 13 6 12 0 - 17 0 g/dL Final     Platelets   Date Value Ref Range Status   2022 189 149 - 390 Thousands/uL Final   10/22/2015 169 149 - 390 Thousand/uL Final       Cancer Staging:  Cancer Staging  No matching staging information was found for the patient  Test Results:    Imaging: No results found      Labs:   Lab Results   Component Value Date    WBC 15 25 (H) 2022    HGB 12 4 2022    HCT 36 7 2022    MCV 89 2022     2022     Lab Results   Component Value Date     10/22/2015    K 4 8 2022     2022    CO2 25 2022    ANIONGAP 8 10/22/2015    BUN 28 (H) 2022    CREATININE 1 37 (H) 2022    GLUCOSE 138 10/22/2015    GLUF 196 (H) 02/02/2022    CALCIUM 10 1 02/02/2022    AST 15 02/02/2022    ALT 15 02/02/2022    ALKPHOS 73 8 02/02/2022    PROT 7 5 10/22/2015    BILITOT 0 59 10/22/2015    EGFR 50 02/02/2022           Review of Systems   Constitutional: Negative for chills and fever  HENT: Negative for ear pain and sore throat  Eyes: Negative for pain and visual disturbance  Respiratory: Negative for cough and shortness of breath  Cardiovascular: Negative for chest pain and palpitations  Gastrointestinal: Negative for abdominal pain, blood in stool, constipation, diarrhea, nausea and vomiting  Genitourinary: Negative for dysuria and hematuria  Musculoskeletal: Negative for arthralgias and back pain  Skin: Negative for color change and rash  Neurological: Negative for seizures and syncope  Hematological: Negative for adenopathy  Does not bruise/bleed easily  All other systems reviewed and are negative          Active Problems:   Patient Active Problem List   Diagnosis    Arthritis    Benign essential HTN    [facility]    Chronic lymphocytic leukemia ([facility] )    CKD (chronic kidney disease) stage 3, GFR 30-59 ml/min (HCC)    [facility]    Diabetic neuropathy ([facility]    H/O Clostridium difficile infection    Hx of malignant carcinoid tumor of large intestine    Type 2 diabetes mellitus with diabetic neuropathy, without long-term current use of insulin ([facility] )    History of malignant neoplasm of appendix    Status post transmetatarsal [facility] foot [facility]    Elevated serum protein level    Borderline hypothyroidism    Diabetic nephropathy associated with type 2 diabetes mellitus ([facility] )       Past Medical History:   Past Medical History:   Diagnosis Date    Arthritis     Spine    Cancer 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 )     bs checked by pt at home at 6am =92    Diabetic neuropathy [facility]     Diabetic retinopathy ([facility]     Elevated WBC count     Heart attack ([facility] ) 04/2015    "labeled as that and than tested later after appendix removed and stress test showed negative"    History of cancer chemotherapy     last treatment approx 5yrs ago    Hypertension     Myocardial infarction ([facility] ) 04/2015    Scalp psoriasis     Shingles 2/10-15    Toe injury     left great toe, - 2017-internal braec-to be removed today 3/30/2018    Wears glasses        Surgical History:   Past Surgical History:   Procedure Laterality Date    APPENDECTOMY      BONE BIOPSY Left 3/30/2018    Procedure: GREAT TOE BONE BIOPSY;  Surgeon: Hemanth Cheek DPM;  [facility];  Service: Podiatry    BOWEL RESECTION      CATARACT EXTRACTION Bilateral     CHOLECYSTECTOMY      COLECTOMY  06/2015    EYE SURGERY Right     "retinal peel"    FOOT FUSION Left 12/6/2017    Procedure: Dorothy Dash;  Surgeon: Hemanth Cheek DPM;  [facility];  Service: Podiatry    HARDWARE REMOVAL Left 4/18/2018    Procedure: REMOVAL STAPLES LEFT TOE;  Surgeon: Hemanth Cheek DPM;  [facility];  Service: Podiatry    ORIF FOOT FRACTURE Left 12/6/2017    Procedure: HALLUX INTERPHALANGEAL JOINT INTERNAL FIXATION; REPAIR OF ULCERATION WITH EXCISIONS AND REVISION OF SKIN HALLUX WITH USE OF ALLOGRAFT;  Surgeon: Hemanth Cheek DPM;  [facility];  Service: Podiatry    DC COLONOSCOPY FLX DX W/KOURTNEY Swanson 1978 PFRMD N/A 4/21/2016    Procedure: COLONOSCOPY;  Surgeon: Ryann Shi DO;  [facility];   Service: Gastroenterology    DC REMOVAL DEEP IMPLANT Left 3/30/2018    Procedure: REMOVAL HARDWARE FOOT;  Surgeon: Hemanth Cheek DPM;  [facility];  Service: Podiatry    RETINAL DETACHMENT SURGERY      TONSILLECTOMY         Family History:    Family History   Problem Relation Age of Onset    Alcohol abuse Mother             Pancreatic cancer Father         age 78    Hashimoto's thyroiditis Daughter     Rheum arthritis Daughter         Juvenile Rheum Arthitis     No Known Problems Sister          age 66    No Known Problems Brother          80    Kidney failure Brother         dialysis, 76    Diabetes Brother        Cancer-related family history includes Pancreatic cancer in his father  Social History:   Social History     Socioeconomic History    Marital status: /Civil Union     Spouse name: Not on file    Number of children: Not on file    Years of education: 3 yr college    Highest education level: Not on file   Occupational History    Not on file   Tobacco Use    Smoking status: Never Smoker    Smokeless tobacco: Never Used   Vaping Use    Vaping Use: Never used   Substance and Sexual Activity    Alcohol use: No    Drug use: No    Sexual activity: Not Currently     Partners: Female     Birth control/protection: None   Other Topics Concern    Not on file   Social History Narrative    Most recent tobacco use screenin2018    Education: [address]    Marital status:     Exercise level: Occasional    Diet: Diabetic    General stress level: Low    Alcohol intake: None    Caffeine intake:  Moderate    Guns present in home: No    Smoke alarm in home: Yes     Social Determinants of Health     Financial Resource Strain: Not on file   Food Insecurity: Not on file   Transportation Needs: Not on file   Physical Activity: Not on file   Stress: Not on file   Social Connections: Not on file   Intimate Partner Violence: Not on file   Housing Stability: Not on file       Current Medications:   Current Outpatient Medications   Medication Sig Dispense Refill    amLODIPine (NORVASC) 10 mg tablet Take 1 tablet (10 mg total) by mouth daily 90 tablet 1    glipiZIDE (GLUCOTROL XL) 10 mg 24 hr tablet Take 1 tablet (10 mg total) by mouth 2 (two) times a day 180 tablet 1    Glucosamine HCl (GLUCOSAMINE PO) Take 1 tablet by mouth 2 (two) times a day       losartan (COZAAR) 100 MG tablet Take 1 tablet (100 mg total) by mouth daily 90 tablet 1    metFORMIN (GLUCOPHAGE-XR) 500 mg 24 hr tablet Take 2 tablets (1,000 mg total) by mouth 2 (two) times a day with meals 360 tablet 1    Multiple Vitamin (MULTIVITAMIN) tablet Take 1 tablet by mouth daily      Multiple Vitamins-Minerals (OCUVITE PO) Take 1 tablet by mouth daily       Omega-3 Fatty Acids (FISH OIL PO) Take by mouth daily      simvastatin (ZOCOR) 20 mg tablet Take 1 tablet (20 mg total) by mouth daily at bedtime 90 tablet 1    aspirin 81 MG tablet Take 81 mg by mouth as needed        No current facility-administered medications for this visit  Allergies: Allergies   Allergen Reactions    Lisinopril Cough     cough    Oxycodone-Acetaminophen      hallucination       Physical Exam:  /66   Pulse 81   Temp (!) 97 3 °F (36 3 °C)   Resp 17   Ht 5' 6" (1 676 m)   Wt 86 2 kg (190 lb)   SpO2 95%   BMI 30 67 kg/m²   Body surface area is 1 96 meters squared  Wt Readings from Last 3 Encounters:   02/23/22 86 2 kg (190 lb)   11/30/21 89 8 kg (198 lb)   10/14/21 89 1 kg (196 lb 8 oz)           Physical Exam  Vitals and nursing note reviewed  Constitutional:       General: He is not in acute distress  Appearance: Normal appearance  He is not ill-appearing or toxic-appearing  HENT:      Head: Normocephalic and atraumatic  Eyes:      General: No scleral icterus  Cardiovascular:      Rate and Rhythm: Normal rate and regular rhythm  Pulses: Normal pulses  Heart sounds: Normal heart sounds  No murmur heard  Pulmonary:      Effort: Pulmonary effort is normal       Breath sounds: Normal breath sounds  No wheezing or rhonchi  Chest:      Chest wall: No tenderness  Abdominal:      General: Bowel sounds are normal  There is no distension  Palpations: Abdomen is soft  There is no hepatomegaly, splenomegaly or mass  Tenderness: There is no abdominal tenderness  Musculoskeletal:      Right lower leg: No edema  Left lower leg: No edema  Lymphadenopathy:      Cervical: No cervical adenopathy  Comments: No palpable lymphadenopathy  Skin:     General: Skin is warm and dry  Neurological:      Mental Status: He is alert and oriented to person, place, and time  Psychiatric:         Thought Content: Thought content normal          Assessment / Plan:    1  CLL  History of CLL with bulky lymphadenoapthy  S/p bendamustine and rituxan with response and no evidence of residual disease  With his bulky adenopathy, decision was made for maintenance rituxan but was stopped due to leukopenia and favored observation  Plan to repeat CBCdiff, CMP, and flow cytometry with follow up in four months  2  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  S/p resection with single lymphadenectomy  Hemicolectomy then performed, no evidence of residual disease and 23 lymph nodes negative not requiring further chemotherapy  Continued observation  Last imaging 2018 JARRED  Goals and Barriers:  Current Goal:  Prolong Survival from CLL  Barriers: None  Patient's Capacity to Self Care:  Patient is able to self care  Portions of the record may have been created with voice recognition software  Occasional wrong word or "sound a like" substitutions may have occurred due to the inherent limitations of voice recognition software  Read the chart carefully and recognize, using context, where substitutions have occurred

## 2022-03-30 ENCOUNTER — RA CDI HCC (OUTPATIENT)
Dept: OTHER | Facility: HOSPITAL | Age: 77
End: 2022-03-30

## 2022-03-30 NOTE — PROGRESS NOTES
[address]  coding opportunities     E11 22     Chart Reviewed number of suggestions sent to Provider: 1     Patients Insurance     Medicare Insurance: [employer]

## 2022-04-05 ENCOUNTER — OFFICE VISIT (OUTPATIENT)
Dept: FAMILY MEDICINE CLINIC | Facility: CLINIC | Age: 77
End: 2022-04-05
Payer: MEDICARE

## 2022-04-05 VITALS
HEIGHT: 66 IN | HEART RATE: 71 BPM | TEMPERATURE: 97.8 F | WEIGHT: 193 LBS | OXYGEN SATURATION: 99 % | RESPIRATION RATE: 16 BRPM | DIASTOLIC BLOOD PRESSURE: 68 MMHG | SYSTOLIC BLOOD PRESSURE: 142 MMHG | BODY MASS INDEX: 31.02 KG/M2

## 2022-04-05 DIAGNOSIS — Z00.00 MEDICARE ANNUAL WELLNESS VISIT, SUBSEQUENT: Primary | ICD-10-CM

## 2022-04-05 DIAGNOSIS — I10 BENIGN ESSENTIAL HTN: ICD-10-CM

## 2022-04-05 DIAGNOSIS — E11.42 TYPE 2 DIABETES MELLITUS WITH DIABETIC POLYNEUROPATHY, WITHOUT LONG-TERM CURRENT USE OF INSULIN (HCC): ICD-10-CM

## 2022-04-05 PROCEDURE — G0439 PPPS, SUBSEQ VISIT: HCPCS | Performed by: FAMILY MEDICINE

## 2022-04-05 RX ORDER — LOSARTAN POTASSIUM 100 MG/1
100 TABLET ORAL DAILY
Qty: 90 TABLET | Refills: 1 | Status: SHIPPED | OUTPATIENT
Start: 2022-04-05

## 2022-04-05 RX ORDER — AMLODIPINE BESYLATE 10 MG/1
10 TABLET ORAL DAILY
Qty: 90 TABLET | Refills: 1 | Status: SHIPPED | OUTPATIENT
Start: 2022-04-05

## 2022-04-05 RX ORDER — SIMVASTATIN 20 MG
20 TABLET ORAL
Qty: 90 TABLET | Refills: 1 | Status: SHIPPED | OUTPATIENT
Start: 2022-04-05

## 2022-04-05 RX ORDER — METFORMIN HYDROCHLORIDE 500 MG/1
1000 TABLET, EXTENDED RELEASE ORAL 2 TIMES DAILY WITH MEALS
Qty: 360 TABLET | Refills: 1 | Status: SHIPPED | OUTPATIENT
Start: 2022-04-05

## 2022-04-05 RX ORDER — GLIPIZIDE 10 MG/1
10 TABLET, FILM COATED, EXTENDED RELEASE ORAL 2 TIMES DAILY
Qty: 180 TABLET | Refills: 1 | Status: SHIPPED | OUTPATIENT
Start: 2022-04-05

## 2022-04-05 NOTE — PATIENT INSTRUCTIONS

## 2022-04-05 NOTE — PROGRESS NOTES
Assessment and Plan:     Problem List Items Addressed This Visit        Cardiovascular and Mediastinum    Benign essential HTN    Relevant Medications    amLODIPine (NORVASC) 10 mg tablet    losartan (COZAAR) 100 MG tablet      Other Visit Diagnoses     Medicare annual wellness visit, subsequent    -  Primary    Type 2 diabetes mellitus with diabetic polyneuropathy, without long-term current use of insulin (HCC)        Relevant Medications    metFORMIN (GLUCOPHAGE-XR) 500 mg 24 hr tablet    simvastatin (ZOCOR) 20 mg tablet    glipiZIDE (GLUCOTROL XL) 10 mg 24 hr tablet      Medicare wellness visit today  Has an upcoming appointment to review chronic health conditions  Med refills sent as requested  Preventive health issues were discussed with patient, and age appropriate screening tests were ordered as noted in patient's After Visit Summary  Personalized health advice and appropriate referrals for health education or preventive services given if needed, as noted in patient's After Visit Summary       History of Present Illness:     Patient presents for Medicare Annual Wellness visit    Patient Care Team:  Cristy Chan MD as PCP - General (Family Medicine)  DO Ahsan Alcantar MD Elenora Rubenstein, MD Suszanne Combes, MD Dauna Hammer Chaput, DO as Endoscopist     Problem List:     Patient Active Problem List   Diagnosis    Arthritis    Benign essential HTN    Cardi[facility]    Chronic lymphocytic leukemia [facility]    CKD (chronic kidney disease) stage 3, GFR 30-59 ml/min ([facility] )    CML in remission ([facility]    Diabetic neuropathy ([facility]    H/O Clostridium difficile infection    Hx of malignant carcinoid tumor of large intestine    Type 2 diabetes mellitus with diabetic neuropathy, without long-term current use of insulin ([facility] )    History of malignant neoplasm of appendix    Status post transmetatarsal amputation of left foot ([address] )    Elevated serum protein level    Borderline hypothyroidism    Diabetic nephropathy associated with type 2 diabetes mellitus ([place] [address] )      Past Medical and Surgical History:     Past Medical History:   Diagnosis Date    Arthritis     Spine    Cancer ([place] [address] )     Cancer of appendix ([place] [address] ) 2015    appendectomy-colon resection    Chronic lymphocytic leukemia of B-cell type ([place] [address] )     "remission 4-5yrs"-chemo    DDD (degenerative disc disease), cervical     Diabetes mellitus ([place] [address] )     bs checked by pt at home at 6am =92    Diabetic neuropathy ([place] [address] )     Diabetic retinopathy ([place] [address] )     Elevated WBC count     Heart attack ([place] [address] ) 04/2015    "labeled as that and than tested later after appendix removed and stress test showed negative"    History of cancer chemotherapy     last treatment approx 5yrs ago    Hypertension     Myocardial infarction ([place] [address] ) 04/2015    Scalp psoriasis     Shingles 2/10-15    Toe injury     left great toe, - 2017-internal braec-to be removed today 3/30/2018    Wears glasses      Past Surgical History:   Procedure Laterality Date    APPENDECTOMY      BONE BIOPSY Left 3/30/2018    Procedure: GREAT TOE BONE BIOPSY;  Surgeon: Jeanmarie Gee DPM;  [facility];  Service: Podiatry    BOWEL RESECTION      CATARACT EXTRACTION Bilateral     CHOLECYSTECTOMY      COLECTOMY  06/2015    EYE SURGERY Right     "retinal peel"    FOOT FUSION Left 12/6/2017    Procedure: [facility];  Surgeon: Jeanmarie Gee DPM;  [facility];  Service: Podiatry    HARDWARE REMOVAL Left 4/18/2018    Procedure: REMOVAL STAPLES LEFT TOE;  Surgeon: Jeanmarie Gee DPM;  [facility];  Service: Podiatry    ORIF FOOT FRACTURE Left 12/6/2017    Procedure: HALLUX INTERPHALANGEAL JOINT INTERNAL FIXATION; REPAIR OF ULCERATION WITH EXCISIONS AND REVISION OF SKIN HALLUX WITH USE OF ALLOGRAFT;  Surgeon: Jeanmarie Gee DPM;  [facility];  Service: Podiatry    AK COLONOSCOPY FLX DX W/COLLJ SPEC WHEN PFRMD N/A 2016    Procedure: COLONOSCOPY;  Surgeon: Rashard Jon DO;  [facility]; Service: Gastroenterology    [place] REMOVAL DEEP IMPLANT Left 3/30/2018    Procedure: REMOVAL HARDWARE FOOT;  Surgeon: Isidra Reina DPM;  [facility];  Service: Podiatry    RETINAL DETACHMENT SURGERY      TONSILLECTOMY        Family History:     Family History   Problem Relation Age of Onset    Alcohol abuse Mother            Julieann Frankel Pancreatic cancer Father         age 78    Hashimoto's thyroiditis Daughter     Rheum arthritis Daughter         Juvenile Rheum Arthitis     No Known Problems Sister          age 66    No Known Problems Brother          80    Kidney failure Brother         dialysis, 76    Diabetes Brother       Social History:     Social History     Socioeconomic History    Marital status: /Civil Union     Spouse name: None    Number of children: None    Years of education: 3 yr college    Highest education level: None   Occupational History    None   Tobacco Use    Smoking status: Never Smoker    Smokeless tobacco: Never Used   Vaping Use    Vaping Use: Never used   Substance and Sexual Activity    Alcohol use: No    Drug use: No    Sexual activity: Not Currently     Partners: Female     Birth control/protection: None   Other Topics Concern    None   Social History Narrative    Most recent tobacco use screenin2018    Education: 4 Year College    Marital status:     Exercise level: Occasional    Diet: Diabetic    General stress level: Low    Alcohol intake: None    Caffeine intake:  Moderate    Guns present in home: No    Smoke alarm in home: Yes     Social Determinants of Health     Financial Resource Strain: Not on file   Food Insecurity: Not on file   Transportation Needs: Not on file   Physical Activity: Not on file   Stress: Not on file   Social Connections: Not on file   Intimate Partner Violence: Not on file   Housing Stability: Not on file Medications and Allergies:     Current Outpatient Medications   Medication Sig Dispense Refill    amLODIPine (NORVASC) 10 mg tablet Take 1 tablet (10 mg total) by mouth daily 90 tablet 1    aspirin 81 MG tablet Take 81 mg by mouth as needed       glipiZIDE (GLUCOTROL XL) 10 mg 24 hr tablet Take 1 tablet (10 mg total) by mouth 2 (two) times a day 180 tablet 1    Glucosamine HCl (GLUCOSAMINE PO) Take 1 tablet by mouth 2 (two) times a day       losartan (COZAAR) 100 MG tablet Take 1 tablet (100 mg total) by mouth daily 90 tablet 1    metFORMIN (GLUCOPHAGE-XR) 500 mg 24 hr tablet Take 2 tablets (1,000 mg total) by mouth 2 (two) times a day with meals 360 tablet 1    Multiple Vitamin (MULTIVITAMIN) tablet Take 1 tablet by mouth daily      Multiple Vitamins-Minerals (OCUVITE PO) Take 1 tablet by mouth daily       Omega-3 Fatty Acids (FISH OIL PO) Take by mouth daily      simvastatin (ZOCOR) 20 mg tablet Take 1 tablet (20 mg total) by mouth daily at bedtime 90 tablet 1     No current facility-administered medications for this visit       Allergies   Allergen Reactions    Lisinopril Cough     cough    Oxycodone-Acetaminophen      hallucination      Immunizations:     Immunization History   Administered Date(s) Administered    COVID-19 PFIZER VACCINE 0 3 ML IM 02/24/2021, 03/17/2021, 10/16/2021    INFLUENZA 12/15/2009, 09/15/2010, 01/10/2012, 12/05/2013, 11/14/2014, 11/19/2014, 12/30/2014, 10/28/2015, 09/12/2016, 12/14/2017, 10/08/2018, 09/10/2020, 09/10/2020    Influenza Split High Dose Preservative Free IM 11/25/2019    Influenza, high dose seasonal 0 7 mL 09/10/2020    Pneumococcal Conjugate 13-Valent 05/10/2016    Pneumococcal Polysaccharide PPV23 03/27/2015    Tdap 02/25/2019      Health Maintenance:         Topic Date Due    Hepatitis C Screening  Completed         Topic Date Due    COVID-19 Vaccine (4 - Booster for Pfizer series) 03/16/2022      Medicare Health Risk Assessment:     There were no vitals taken for this visit  Boris Lomax is here for his Subsequent Wellness visit  Health Risk Assessment:   Patient rates overall health as fair  Patient feels that their physical health rating is same  Patient is satisfied with their life  Eyesight was rated as same  Hearing was rated as same  Patient feels that their emotional and mental health rating is same  Patients states they are never, rarely angry  Patient states they are never, rarely unusually tired/fatigued  Pain experienced in the last 7 days has been none  Patient states that he has experienced no weight loss or gain in last 6 months  Depression Screening:   PHQ-2 Score: 0      Fall Risk Screening: In the past year, patient has experienced: no history of falling in past year      Home Safety:  Patient does not have trouble with stairs inside or outside of their home  Patient has working smoke alarms and has no working carbon monoxide detector  Home safety hazards include: none  Nutrition:   Current diet is Regular and Low Carb  Medications:   Patient is currently taking over-the-counter supplements  OTC medications include: Fish oil, Osteo-biflex  Patient is able to manage medications  Activities of Daily Living (ADLs)/Instrumental Activities of Daily Living (IADLs):   Walk and transfer into and out of bed and chair?: Yes  Dress and groom yourself?: Yes    Bathe or shower yourself?: Yes    Feed yourself?  Yes  Do your laundry/housekeeping?: Yes  Manage your money, pay your bills and track your expenses?: Yes  Make your own meals?: Yes    Do your own shopping?: Yes    Previous Hospitalizations:   Any hospitalizations or ED visits within the last 12 months?: No      Advance Care Planning:   Living will: No    Durable POA for healthcare: No    Advanced directive: No    Advanced directive counseling given: Yes    Five wishes given: Yes      Comments: Reviewed five wishes forms/ discussed planning    Cognitive Screening:   Provider or family/friend/caregiver concerned regarding cognition?: No    PREVENTIVE SCREENINGS      Cardiovascular Screening:    General: Screening Current      Diabetes Screening:     General: Screening Not Indicated and History Diabetes      Colorectal Cancer Screening:     General: History Colorectal Cancer      Prostate Cancer Screening:    General: Screening Not Indicated      Osteoporosis Screening:    General: Patient Declines      Abdominal Aortic Aneurysm (AAA) Screening:    Risk factors include: age between 73-69 yo        General: Patient Declines      Lung Cancer Screening:     General: Screening Not Indicated      Hepatitis C Screening:    General: Screening Current    Screening, Brief Intervention, and Referral to Treatment (SBIRT)    Screening  Typical number of drinks in a day: 0  Typical number of drinks in a week: 0  Interpretation: Low risk drinking behavior      AUDIT-C Screenin) How often did you have a drink containing alcohol in the past year? never  2) How many drinks did you have on a typical day when you were drinking in the past year? 0  3) How often did you have 6 or more drinks on one occasion in the past year? never    AUDIT-C Score: 0  Interpretation: Score 0-3 (male): Negative screen for alcohol misuse    Single Item Drug Screening:  How often have you used an illegal drug (including marijuana) or a prescription medication for non-medical reasons in the past year? never    Single Item Drug Screen Score: 0  Interpretation: Negative screen for possible drug use disorder      Michael Roche MD

## 2022-04-25 ENCOUNTER — LAB (OUTPATIENT)
Dept: LAB | Facility: HOSPITAL | Age: 77
End: 2022-04-25
Payer: MEDICARE

## 2022-04-25 DIAGNOSIS — E03.9 BORDERLINE HYPOTHYROIDISM: ICD-10-CM

## 2022-04-25 DIAGNOSIS — E11.42 TYPE 2 DIABETES MELLITUS WITH DIABETIC POLYNEUROPATHY, WITHOUT LONG-TERM CURRENT USE OF INSULIN (HCC): ICD-10-CM

## 2022-04-25 LAB
ALBUMIN SERPL BCP-MCNC: 4.2 G/DL (ref 3.5–5)
ALP SERPL-CCNC: 63 U/L (ref 34–104)
ALT SERPL W P-5'-P-CCNC: 15 U/L (ref 7–52)
ANION GAP SERPL CALCULATED.3IONS-SCNC: 8 MMOL/L (ref 4–13)
AST SERPL W P-5'-P-CCNC: 15 U/L (ref 13–39)
BILIRUB SERPL-MCNC: 0.35 MG/DL (ref 0.2–1)
BUN SERPL-MCNC: 31 MG/DL (ref 5–25)
CALCIUM SERPL-MCNC: 9.5 MG/DL (ref 8.4–10.2)
CHLORIDE SERPL-SCNC: 106 MMOL/L (ref 96–108)
CHOLEST SERPL-MCNC: 133 MG/DL
CO2 SERPL-SCNC: 23 MMOL/L (ref 21–32)
CREAT SERPL-MCNC: 1.27 MG/DL (ref 0.6–1.3)
CREAT UR-MCNC: 43.8 MG/DL
EST. AVERAGE GLUCOSE BLD GHB EST-MCNC: 189 MG/DL
GFR SERPL CREATININE-BSD FRML MDRD: 54 ML/MIN/1.73SQ M
GLUCOSE P FAST SERPL-MCNC: 99 MG/DL (ref 65–99)
HBA1C MFR BLD: 8.2 %
HDLC SERPL-MCNC: 37 MG/DL
LDLC SERPL CALC-MCNC: 55 MG/DL (ref 0–100)
MICROALBUMIN UR-MCNC: 46.1 MG/L (ref 0–20)
MICROALBUMIN/CREAT 24H UR: 105 MG/G CREATININE (ref 0–30)
POTASSIUM SERPL-SCNC: 4.4 MMOL/L (ref 3.5–5.3)
PROT SERPL-MCNC: 7.9 G/DL (ref 6.4–8.4)
SODIUM SERPL-SCNC: 137 MMOL/L (ref 135–147)
T4 FREE SERPL-MCNC: 0.8 NG/DL (ref 0.76–1.46)
TRIGL SERPL-MCNC: 205 MG/DL
TSH SERPL DL<=0.05 MIU/L-ACNC: 6.54 UIU/ML (ref 0.45–4.5)

## 2022-04-25 PROCEDURE — 84443 ASSAY THYROID STIM HORMONE: CPT

## 2022-04-25 PROCEDURE — 84439 ASSAY OF FREE THYROXINE: CPT

## 2022-04-25 PROCEDURE — 82043 UR ALBUMIN QUANTITATIVE: CPT

## 2022-04-25 PROCEDURE — 36415 COLL VENOUS BLD VENIPUNCTURE: CPT

## 2022-04-25 PROCEDURE — 80061 LIPID PANEL: CPT

## 2022-04-25 PROCEDURE — 83036 HEMOGLOBIN GLYCOSYLATED A1C: CPT

## 2022-04-25 PROCEDURE — 82570 ASSAY OF URINE CREATININE: CPT

## 2022-04-25 PROCEDURE — 80053 COMPREHEN METABOLIC PANEL: CPT

## 2022-04-28 ENCOUNTER — OFFICE VISIT (OUTPATIENT)
Dept: FAMILY MEDICINE CLINIC | Facility: CLINIC | Age: 77
End: 2022-04-28
Payer: MEDICARE

## 2022-04-28 VITALS
SYSTOLIC BLOOD PRESSURE: 132 MMHG | RESPIRATION RATE: 16 BRPM | HEART RATE: 66 BPM | DIASTOLIC BLOOD PRESSURE: 58 MMHG | HEIGHT: 66 IN | TEMPERATURE: 97.5 F | BODY MASS INDEX: 31.02 KG/M2 | WEIGHT: 193 LBS | OXYGEN SATURATION: 97 %

## 2022-04-28 DIAGNOSIS — E11.40 TYPE 2 DIABETES MELLITUS WITH DIABETIC NEUROPATHY, WITHOUT LONG-TERM CURRENT USE OF INSULIN (HCC): Primary | ICD-10-CM

## 2022-04-28 DIAGNOSIS — E03.9 HYPOTHYROIDISM, UNSPECIFIED TYPE: ICD-10-CM

## 2022-04-28 DIAGNOSIS — I10 BENIGN ESSENTIAL HTN: ICD-10-CM

## 2022-04-28 PROCEDURE — 99214 OFFICE O/P EST MOD 30 MIN: CPT | Performed by: FAMILY MEDICINE

## 2022-04-28 RX ORDER — LEVOTHYROXINE SODIUM 0.05 MG/1
50 TABLET ORAL DAILY
Qty: 90 TABLET | Refills: 1 | Status: SHIPPED | OUTPATIENT
Start: 2022-04-28

## 2022-04-28 NOTE — RESULT ENCOUNTER NOTE
Appointment today to review  A1C increased to 8 2 from prior 7 8  Microalbuminuria improving compared to previous  TSH drifting higher- may benefit from treatment- will discuss at [address]  Fasting BS and kidney function improved from previous   TG increased at 205 (from 164)

## 2022-04-28 NOTE — PROGRESS NOTES
Assessment/Plan:       Problem List Items Addressed This Visit        Endocrine    Type 2 diabetes mellitus with diabetic neuropathy, without long-term current use of insulin ([facility] ) - Primary     A1C increased from prior 7 8 to 8 2  He is currently on metformin and glipizide  Will add [place] and [place]- reviewed use/side effects  If this is cost prohibitive, he will let me know  He continues to work on lower carb diet, exercise  Lab Results   Component Value Date    HGBA1C 8 2 (H) 04/25/2022            Relevant Medications    sitaGLIPtin (JANUVIA) 100 mg tablet    Other Relevant Orders    CBC and differential    Comprehensive metabolic panel    Hemoglobin A1C    Lipid Panel with Direct LDL reflex    Microalbumin / creatinine urine ratio    Hypothyroidism     TSH continues to drift up  Discussed treatment with patient  Will start levothyroxine 50 mcg daily and repeat labs in 2 months- will then adjust dose accordingly         Relevant Medications    levothyroxine 50 mcg tablet    Other Relevant Orders    TSH, 3rd generation with Free T4 reflex       Cardiovascular and Mediastinum    Benign essential HTN     Stable at present  No changes needed  Reviewed prior labs  Relevant Orders    CBC and differential    Comprehensive metabolic panel               Most recent labs reviewed       Subjective:     Lawanda Tinsley is a 77 y o  male here today with chief complaint below:  Chief Complaint   Patient presents with    Follow up     6 month CC    Labs Only     04/25/22    Medication Refill     none    HM     BMI     - CC above per clinical staff and reviewed  HPI:  Pt here for six month follow up and lab review  Normal BM   No CP or SOB    BS has been a bit erratic  jose    Diet- watching carbs  Keeping active- walks outdoors  He wants to hold on prevnar 20 for the present time- will wait until the fall to consider this      He continues to follow up with oncology for his hx 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)    No other health concerns today  Was just recently here for his medicare awv    The following portions of the patient's history were reviewed and updated as appropriate: allergies, current medications, past family history, past medical history, past social history, past surgical history and problem list     ROS:  Review of Systems     Objective:      /58   Pulse 66   Temp 97 5 °F (36 4 °C)   Resp 16   Ht 5' 6" (1 676 m)   Wt 87 5 kg (193 lb)   SpO2 97%   BMI 31 15 kg/m²   BP Readings from Last 3 Encounters:   04/28/22 132/58   04/05/22 142/68   02/23/22 128/66     Wt Readings from Last 3 Encounters:   04/28/22 87 5 kg (193 lb)   04/05/22 87 5 kg (193 lb)   02/23/22 86 2 kg (190 lb)               Physical Exam:   Physical Exam  Vitals and nursing note reviewed  Constitutional:       Appearance: Normal appearance  He is well-developed  He is not ill-appearing  HENT:      Head: Normocephalic and atraumatic  Eyes:      Conjunctiva/sclera: Conjunctivae normal    Neck:      Vascular: No carotid bruit  Cardiovascular:      Rate and Rhythm: Normal rate and regular rhythm  Heart sounds: Normal heart sounds  No murmur heard  Pulmonary:      Effort: Pulmonary effort is normal  No respiratory distress  Breath sounds: Normal breath sounds  No wheezing  Abdominal:      Palpations: Abdomen is soft  Tenderness: There is no abdominal tenderness  There is no guarding or rebound  Musculoskeletal:      Cervical back: Neck supple  Right lower leg: No edema  Left lower leg: No edema  Lymphadenopathy:      Cervical: No cervical adenopathy  Skin:     General: Skin is warm and dry  Neurological:      Mental Status: He is alert and oriented to person, place, and time  Psychiatric:         Mood and Affect: Mood normal          Behavior: Behavior normal          BMI Counseling: Body mass index is 31 15 kg/m²   The BMI is above normal  Nutrition recommendations include moderation in carbohydrate intake  Rationale for BMI follow-up plan is due to patient being overweight or obese

## 2022-04-28 NOTE — ASSESSMENT & PLAN NOTE
Lab Results   Component Value Date    EGFR 54 04/25/2022    EGFR 50 02/02/2022    EGFR 57 11/11/2021    CREATININE 1 27 04/25/2022    CREATININE 1 37 (H) 02/02/2022    CREATININE 1 24 (H) 11/11/2021

## 2022-05-02 NOTE — ASSESSMENT & PLAN NOTE
A1C increased from prior 7 8 to 8 2  He is currently on metformin and glipizide  Will add [place] and [place]- reviewed use/side effects  If this is cost prohibitive, he will let me know  He continues to work on lower carb diet, exercise      Lab Results   Component Value Date    HGBA1C 8 2 (H) 04/25/2022

## 2022-05-02 NOTE — ASSESSMENT & PLAN NOTE
TSH continues to drift up    Discussed treatment with patient  Will start levothyroxine 50 mcg daily and repeat labs in 2 months- will then adjust dose accordingly

## 2022-06-14 ENCOUNTER — APPOINTMENT (OUTPATIENT)
Dept: LAB | Facility: HOSPITAL | Age: 77
End: 2022-06-14
Payer: MEDICARE

## 2022-06-14 DIAGNOSIS — C91.10 CLL (CHRONIC LYMPHOCYTIC LEUKEMIA) (HCC): ICD-10-CM

## 2022-06-14 DIAGNOSIS — C18.6 MALIGNANT NEOPLASM OF DESCENDING COLON (HCC): ICD-10-CM

## 2022-06-14 LAB
ALBUMIN SERPL BCP-MCNC: 4.3 G/DL (ref 3.5–5)
ALP SERPL-CCNC: 64 U/L (ref 34–104)
ALT SERPL W P-5'-P-CCNC: 17 U/L (ref 7–52)
ANION GAP SERPL CALCULATED.3IONS-SCNC: 8 MMOL/L (ref 4–13)
AST SERPL W P-5'-P-CCNC: 16 U/L (ref 13–39)
BASOPHILS # BLD AUTO: 0.07 THOUSANDS/ΜL (ref 0–0.1)
BASOPHILS NFR BLD AUTO: 0 % (ref 0–1)
BILIRUB SERPL-MCNC: 0.44 MG/DL (ref 0.2–1)
BUN SERPL-MCNC: 26 MG/DL (ref 5–25)
CALCIUM SERPL-MCNC: 8.8 MG/DL (ref 8.4–10.2)
CEA SERPL-MCNC: 1.6 NG/ML (ref 0–3)
CHLORIDE SERPL-SCNC: 105 MMOL/L (ref 96–108)
CO2 SERPL-SCNC: 24 MMOL/L (ref 21–32)
CREAT SERPL-MCNC: 1.39 MG/DL (ref 0.6–1.3)
EOSINOPHIL # BLD AUTO: 0.24 THOUSAND/ΜL (ref 0–0.61)
EOSINOPHIL NFR BLD AUTO: 1 % (ref 0–6)
ERYTHROCYTE [DISTWIDTH] IN BLOOD BY AUTOMATED COUNT: 13.3 % (ref 11.6–15.1)
GFR SERPL CREATININE-BSD FRML MDRD: 49 ML/MIN/1.73SQ M
GLUCOSE P FAST SERPL-MCNC: 103 MG/DL (ref 65–99)
HCT VFR BLD AUTO: 38.2 % (ref 36.5–49.3)
HGB BLD-MCNC: 12.4 G/DL (ref 12–17)
IMM GRANULOCYTES # BLD AUTO: 0.04 THOUSAND/UL (ref 0–0.2)
IMM GRANULOCYTES NFR BLD AUTO: 0 % (ref 0–2)
LYMPHOCYTES # BLD AUTO: 12.14 THOUSANDS/ΜL (ref 0.6–4.47)
LYMPHOCYTES NFR BLD AUTO: 70 % (ref 14–44)
MCH RBC QN AUTO: 30.3 PG (ref 26.8–34.3)
MCHC RBC AUTO-ENTMCNC: 32.5 G/DL (ref 31.4–37.4)
MCV RBC AUTO: 93 FL (ref 82–98)
MONOCYTES # BLD AUTO: 0.51 THOUSAND/ΜL (ref 0.17–1.22)
MONOCYTES NFR BLD AUTO: 3 % (ref 4–12)
NEUTROPHILS # BLD AUTO: 4.55 THOUSANDS/ΜL (ref 1.85–7.62)
NEUTS SEG NFR BLD AUTO: 26 % (ref 43–75)
NRBC BLD AUTO-RTO: 0 /100 WBCS
PLATELET # BLD AUTO: 153 THOUSANDS/UL (ref 149–390)
PMV BLD AUTO: 9.8 FL (ref 8.9–12.7)
POTASSIUM SERPL-SCNC: 4.1 MMOL/L (ref 3.5–5.3)
PROT SERPL-MCNC: 7.9 G/DL (ref 6.4–8.4)
RBC # BLD AUTO: 4.09 MILLION/UL (ref 3.88–5.62)
SODIUM SERPL-SCNC: 137 MMOL/L (ref 135–147)
WBC # BLD AUTO: 17.55 THOUSAND/UL (ref 4.31–10.16)

## 2022-06-14 PROCEDURE — 88185 FLOWCYTOMETRY/TC ADD-ON: CPT

## 2022-06-14 PROCEDURE — 36415 COLL VENOUS BLD VENIPUNCTURE: CPT

## 2022-06-14 PROCEDURE — 88184 FLOWCYTOMETRY/ TC 1 MARKER: CPT

## 2022-06-14 PROCEDURE — 82378 CARCINOEMBRYONIC ANTIGEN: CPT

## 2022-06-14 PROCEDURE — 85025 COMPLETE CBC W/AUTO DIFF WBC: CPT

## 2022-06-14 PROCEDURE — 80053 COMPREHEN METABOLIC PANEL: CPT

## 2022-06-22 ENCOUNTER — OFFICE VISIT (OUTPATIENT)
Dept: HEMATOLOGY ONCOLOGY | Facility: CLINIC | Age: 77
End: 2022-06-22
Payer: MEDICARE

## 2022-06-22 VITALS
BODY MASS INDEX: 31.18 KG/M2 | HEIGHT: 66 IN | HEART RATE: 67 BPM | WEIGHT: 194 LBS | OXYGEN SATURATION: 98 % | DIASTOLIC BLOOD PRESSURE: 74 MMHG | SYSTOLIC BLOOD PRESSURE: 120 MMHG | TEMPERATURE: 98 F | RESPIRATION RATE: 14 BRPM

## 2022-06-22 DIAGNOSIS — C18.6 MALIGNANT NEOPLASM OF DESCENDING COLON (HCC): ICD-10-CM

## 2022-06-22 DIAGNOSIS — C91.10 CLL (CHRONIC LYMPHOCYTIC LEUKEMIA) (HCC): Primary | ICD-10-CM

## 2022-06-22 LAB — SCAN RESULT: NORMAL

## 2022-06-22 PROCEDURE — 99214 OFFICE O/P EST MOD 30 MIN: CPT | Performed by: INTERNAL MEDICINE

## 2022-06-22 NOTE — PROGRESS NOTES
Hematology/Oncology Outpatient Follow- up Note  Harriett Cushing 77 y o  male MRN: @ Encounter: [id number]        Date:  6/22/2022    Presenting Complaint/Diagnosis :     The patient presents to the office today with history of chronic 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      Previous Hematologic/ Oncologic History:      The patient had 4 cycles of bendamustine and Rituxan followed by 1 dose of Rituxan in the maintenance setting      He had a right hemicolectomy for appendiceal cancer    Current Hematologic/ Oncologic Treatment:      Observation    Interval History:      Patient returns for follow-up visit  His white count is slowly going up  It was 17 5 with a hemoglobin of 12 4 and platelet count of 817  Creatinine was slightly elevated  CEA is normal at 1 6  Flow cytometry had revealed immunophenotypic findings consistent with CLL at 48% of cells  Cells were CD5 positive CD 23 positive CD 20 positive  There were no significant number of circulating blasts or abnormal T lymphoid or myeloid populations  Patient himself is at baseline  Denies any nausea denies any vomiting denies any diarrhea  The rest of his 14 point review of systems today was negative  Denies any B symptoms  Test Results:    Imaging: No results found      Labs:   Lab Results   Component Value Date    WBC 17 55 (H) 06/14/2022    HGB 12 4 06/14/2022    HCT 38 2 06/14/2022    MCV 93 06/14/2022     06/14/2022     Lab Results   Component Value Date     10/22/2015    K 4 1 06/14/2022     06/14/2022    CO2 24 06/14/2022    ANIONGAP 8 10/22/2015    BUN 26 (H) 06/14/2022    CREATININE 1 39 (H) 06/14/2022    GLUCOSE 138 10/22/2015    GLUF 103 (H) 06/14/2022    CALCIUM 8 8 06/14/2022    AST 16 06/14/2022    ALT 17 06/14/2022    ALKPHOS 64 06/14/2022    PROT 7 5 10/22/2015    BILITOT 0 59 10/22/2015    EGFR 49 06/14/2022       Lab Results   Component Value Date    PSA 1 3 06/05/2018 PSA 0 9 01/15/2014       Lab Results   Component Value Date    CEA 1 6 06/14/2022         ROS: As stated in the history of present illness otherwise his 14 point review of systems today was negative        Active Problems:   Patient Active Problem List   Diagnosis    Arthritis    Benign essential HTN    [facility]opathy [facility]    Chronic lymphocytic leukemia ([facility] )    CKD (chronic kidney disease) stage 3, GFR 30-59 ml/min (HCC)    CML in remission ([facility] )    Diabetic neuropathy ([facility] )    H/O Clostridium difficile infection    Hx of malignant carcinoid tumor of large intestine    Type 2 diabetes mellitus with diabetic neuropathy, without long-term current use of insulin ([facility] )    History of malignant neoplasm of appendix    Status post transmetatarsal amputation of left foot (HCC)    Elevated serum protein level    Hypothyroidism    Diabetic nephropathy associated with type 2 diabetes mellitus ([facility] )       Past Medical History:   Past Medical History:   Diagnosis Date    Arthritis     Spine    Cancer ([facility] )     Cancer of appendix ([facility] ) 2015    appendectomy-colon resection    Chronic lymphocytic leukemia of B-cell type ([facility] )     "remission 4-5yrs"-chemo    DDD (degenerative disc disease), cervical     Diabetes mellitus ([facility] )     bs checked by pt at home at 6am =92    Diabetic neuropathy ([facility] )     Diabetic retinopathy ([facility] )     Elevated WBC count     Heart attack ([facility] ) 04/2015    "labeled as that and than tested later after appendix removed and stress test showed negative"    History of cancer chemotherapy     last treatment approx 5yrs ago    Hypertension     Myocardial infarction ([facility] ) 04/2015    Scalp psoriasis     Shingles 2/10-15    Toe injury     left great toe, - 2017-internal braec-to be removed today 3/30/2018    Wears glasses        Surgical History:   Past Surgical History:   Procedure Laterality Date    APPENDECTOMY      BONE BIOPSY Left 3/30/2018    Procedure: GREAT TOE BONE BIOPSY;  Surgeon: Nickolas Hernandez DPM;  [facility];  Service: Podiatry    BOWEL RESECTION      CATARACT EXTRACTION Bilateral     CHOLECYSTECTOMY      COLECTOMY  2015    EYE SURGERY Right     "retinal peel"    FOOT FUSION Left 2017    Procedure: Delories Matsu;  Surgeon: Nickolas Hernandez DPM;  [facility];  Service: Podiatry    HARDWARE REMOVAL Left 2018    Procedure: REMOVAL STAPLES LEFT TOE;  Surgeon: Nickolas Hernandez DPM;  [facility];  Service: Podiatry    ORIF FOOT FRACTURE Left 2017    Procedure: HALLUX INTERPHALANGEAL JOINT INTERNAL FIXATION; REPAIR OF ULCERATION WITH EXCISIONS AND REVISION OF SKIN HALLUX WITH USE OF ALLOGRAFT;  Surgeon: Nickolas Hernandez DPM;  [facility];  Service: Podiatry    RI COLONOSCOPY FLX DX W/KOURTNEY Swanson  PFRMD N/A 2016    Procedure: COLONOSCOPY;  Surgeon: Domonique Larkin DO;  [facility]: [facility]; Service: Gastroenterology    RI REMOVAL DEEP IMPLANT Left 3/30/2018    Procedure: REMOVAL HARDWARE FOOT;  Surgeon: Nickolas Hernandez DPM;  [facility];  Service: Podiatry    RETINAL DETACHMENT SURGERY      TONSILLECTOMY         Family History:    Family History   Problem Relation Age of Onset    Alcohol abuse Mother            Willehannah Rand Pancreatic cancer Father         age 78    Hashimoto's thyroiditis Daughter     Rheum arthritis Daughter         Juvenile Rheum Arthitis     No Known Problems Sister          age 66    No Known Problems Brother          80    Kidney failure Brother         dialysis, 76    Diabetes Brother        Cancer-related family history includes Pancreatic cancer in his father      Social History:   Social History     Socioeconomic History    Marital status: /Civil Union     Spouse name: Not on file    Number of children: Not on file    Years of education: 4 yr college    Highest education level: Not on file   Occupational History    Not on file Tobacco Use    Smoking status: Never Smoker    Smokeless tobacco: Never Used   Vaping Use    Vaping Use: Never used   Substance and Sexual Activity    Alcohol use: No    Drug use: No    Sexual activity: Not Currently     Partners: Female     Birth control/protection: None   Other Topics Concern    Not on file   Social History Narrative    Most recent tobacco use screenin2018    Education: 4 Year College    Marital status:     Exercise level: Occasional    Diet: Diabetic    General stress level: Low    Alcohol intake: None    Caffeine intake:  Moderate    Guns present in home: No    Smoke alarm in home: Yes     Social Determinants of Health     Financial Resource Strain: Not on file   Food Insecurity: Not on file   Transportation Needs: Not on file   Physical Activity: Not on file   Stress: Not on file   Social Connections: Not on file   Intimate Partner Violence: Not on file   Housing Stability: Not on file       Current Medications:   Current Outpatient Medications   Medication Sig Dispense Refill    amLODIPine (NORVASC) 10 mg tablet Take 1 tablet (10 mg total) by mouth daily 90 tablet 1    glipiZIDE (GLUCOTROL XL) 10 mg 24 hr tablet Take 1 tablet (10 mg total) by mouth 2 (two) times a day 180 tablet 1    Glucosamine HCl (GLUCOSAMINE PO) Take 1 tablet by mouth 2 (two) times a day       levothyroxine 50 mcg tablet Take 1 tablet (50 mcg total) by mouth daily 90 tablet 1    losartan (COZAAR) 100 MG tablet Take 1 tablet (100 mg total) by mouth daily 90 tablet 1    metFORMIN (GLUCOPHAGE-XR) 500 mg 24 hr tablet Take 2 tablets (1,000 mg total) by mouth 2 (two) times a day with meals 360 tablet 1    Multiple Vitamins-Minerals (OCUVITE PO) Take 1 tablet by mouth daily       Omega-3 Fatty Acids (FISH OIL PO) Take by mouth daily      simvastatin (ZOCOR) 20 mg tablet Take 1 tablet (20 mg total) by mouth daily at bedtime 90 tablet 1    aspirin 81 MG tablet Take 81 mg by mouth as needed (Patient not taking: Reported on 6/22/2022)      Multiple Vitamin (MULTIVITAMIN) tablet Take 1 tablet by mouth daily (Patient not taking: Reported on 6/22/2022)      sitaGLIPtin (JANUVIA) 100 mg tablet Take 1 tablet (100 mg total) by mouth daily 90 tablet 1     No current facility-administered medications for this visit  Allergies: Allergies   Allergen Reactions    Lisinopril Cough     cough    Oxycodone-Acetaminophen      hallucination       Physical Exam:    Body surface area is 1 97 meters squared  Wt Readings from Last 3 Encounters:   06/22/22 88 kg (194 lb)   04/28/22 87 5 kg (193 lb)   04/05/22 87 5 kg (193 lb)        Temp Readings from Last 3 Encounters:   06/22/22 98 °F (36 7 °C) (Temporal)   04/28/22 97 5 °F (36 4 °C)   04/05/22 97 8 °F (36 6 °C)        BP Readings from Last 3 Encounters:   06/22/22 120/74   04/28/22 132/58   04/05/22 142/68         Pulse Readings from Last 3 Encounters:   06/22/22 67   04/28/22 66   04/05/22 71         Physical Exam     Constitutional   General appearance: No acute distress, well appearing and well nourished  Eyes   Conjunctiva and lids: No swelling, erythema or discharge  Pupils and irises: Equal, round and reactive to light  Ears, Nose, Mouth, and Throat   External inspection of ears and nose: Normal     Nasal mucosa, septum, and turbinates: Normal without edema or erythema  Oropharynx: Normal with no erythema, edema, exudate or lesions  Pulmonary   Respiratory effort: No increased work of breathing or signs of respiratory distress  Auscultation of lungs: Clear to auscultation  Cardiovascular   Palpation of heart: Normal PMI, no thrills  Auscultation of heart: Normal rate and rhythm, normal S1 and S2, without murmurs  Examination of extremities for edema and/or varicosities: Normal     Carotid pulses: Normal     Abdomen   Abdomen: Non-tender, no masses  Liver and spleen: No hepatomegaly or splenomegaly      Lymphatic Palpation of lymph nodes in neck: No lymphadenopathy  Musculoskeletal   Gait and station: Normal     Digits and nails: Normal without clubbing or cyanosis  Inspection/palpation of joints, bones, and muscles: Normal     Skin   Skin and subcutaneous tissue: Normal without rashes or lesions  Neurologic   Cranial nerves: Cranial nerves 2-12 intact  Sensation: No sensory loss  Psychiatric   Orientation to person, place, and time: Normal     Mood and affect: Normal         Assessment / Plan: This is a pleasant elderly male with a past history of chronic lymphocytic leukemia with bulky adenopathy  He got 4 cycles of bendamustine and Rituxan with a very nice response and no residual disease on his PET scan  Since he did have a history of bulky adenopathy we decided to give him Rituxan in the maintenance setting  After his first dose he was running a low white count and we had a discussion  He decided to go off of maintenance Rituxan and just be observed       He was doing well until he was admitted for appendicitis  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  Only one lymph node was removed  He needed a proper cancer operation  He had this done  His hemicolectomy showed no evidence of residual disease  23 lymph nodes were negative  Based on this we decided he did not need any further chemotherapy  He is now on observation    His last imaging in 2019  Did not show any evidence of metastatic disease  White count is slowly going up by around 2 point every 4 months  The patient is asymptomatic  We will continue observation  I will see him back in 4 months with repeat blood work  If he has any symptoms before then he will call our office  Goals and Barriers:  Current Goal:  Prolong Survival from CLL and colon cancer  Barriers: None  Patient's Capacity to Self Care:  Patient able to self care      Portions of the record may have been created with voice recognition software  Occasional wrong word or "sound a like" substitutions may have occurred due to the inherent limitations of voice recognition software  Read the chart carefully and recognize, using context, where substitutions have occurred

## 2022-10-19 ENCOUNTER — APPOINTMENT (OUTPATIENT)
Dept: LAB | Facility: HOSPITAL | Age: 77
End: 2022-10-19
Payer: MEDICARE

## 2022-10-19 DIAGNOSIS — E11.40 TYPE 2 DIABETES MELLITUS WITH DIABETIC NEUROPATHY, WITHOUT LONG-TERM CURRENT USE OF INSULIN (HCC): ICD-10-CM

## 2022-10-19 DIAGNOSIS — I10 BENIGN ESSENTIAL HTN: ICD-10-CM

## 2022-10-19 DIAGNOSIS — E03.9 HYPOTHYROIDISM, UNSPECIFIED TYPE: ICD-10-CM

## 2022-10-19 DIAGNOSIS — C18.6 MALIGNANT NEOPLASM OF DESCENDING COLON (HCC): ICD-10-CM

## 2022-10-19 DIAGNOSIS — C91.10 CLL (CHRONIC LYMPHOCYTIC LEUKEMIA) (HCC): ICD-10-CM

## 2022-10-19 LAB
ALBUMIN SERPL BCP-MCNC: 4.2 G/DL (ref 3.5–5)
ALP SERPL-CCNC: 67 U/L (ref 34–104)
ALT SERPL W P-5'-P-CCNC: 16 U/L (ref 7–52)
ANION GAP SERPL CALCULATED.3IONS-SCNC: 9 MMOL/L (ref 4–13)
AST SERPL W P-5'-P-CCNC: 18 U/L (ref 13–39)
BASOPHILS # BLD AUTO: 0.07 THOUSANDS/ÂΜL (ref 0–0.1)
BASOPHILS NFR BLD AUTO: 0 % (ref 0–1)
BILIRUB SERPL-MCNC: 0.5 MG/DL (ref 0.2–1)
BUN SERPL-MCNC: 27 MG/DL (ref 5–25)
CALCIUM SERPL-MCNC: 9.2 MG/DL (ref 8.4–10.2)
CEA SERPL-MCNC: 1.3 NG/ML (ref 0–3)
CHLORIDE SERPL-SCNC: 106 MMOL/L (ref 96–108)
CHOLEST SERPL-MCNC: 122 MG/DL
CO2 SERPL-SCNC: 22 MMOL/L (ref 21–32)
CREAT SERPL-MCNC: 1.25 MG/DL (ref 0.6–1.3)
CREAT UR-MCNC: 32.7 MG/DL
EOSINOPHIL # BLD AUTO: 0.24 THOUSAND/ÂΜL (ref 0–0.61)
EOSINOPHIL NFR BLD AUTO: 1 % (ref 0–6)
ERYTHROCYTE [DISTWIDTH] IN BLOOD BY AUTOMATED COUNT: 14.1 % (ref 11.6–15.1)
EST. AVERAGE GLUCOSE BLD GHB EST-MCNC: 163 MG/DL
GFR SERPL CREATININE-BSD FRML MDRD: 55 ML/MIN/1.73SQ M
GLUCOSE P FAST SERPL-MCNC: 137 MG/DL (ref 65–99)
HBA1C MFR BLD: 7.3 %
HCT VFR BLD AUTO: 38.7 % (ref 36.5–49.3)
HDLC SERPL-MCNC: 37 MG/DL
HGB BLD-MCNC: 13 G/DL (ref 12–17)
IMM GRANULOCYTES # BLD AUTO: 0.04 THOUSAND/UL (ref 0–0.2)
IMM GRANULOCYTES NFR BLD AUTO: 0 % (ref 0–2)
LDH SERPL-CCNC: 154 U/L (ref 140–271)
LDLC SERPL CALC-MCNC: 59 MG/DL (ref 0–100)
LYMPHOCYTES # BLD AUTO: 16.59 THOUSANDS/ÂΜL (ref 0.6–4.47)
LYMPHOCYTES NFR BLD AUTO: 76 % (ref 14–44)
MCH RBC QN AUTO: 30.7 PG (ref 26.8–34.3)
MCHC RBC AUTO-ENTMCNC: 33.6 G/DL (ref 31.4–37.4)
MCV RBC AUTO: 92 FL (ref 82–98)
MICROALBUMIN UR-MCNC: 146 MG/L (ref 0–20)
MICROALBUMIN/CREAT 24H UR: 446 MG/G CREATININE (ref 0–30)
MONOCYTES # BLD AUTO: 0.39 THOUSAND/ÂΜL (ref 0.17–1.22)
MONOCYTES NFR BLD AUTO: 2 % (ref 4–12)
NEUTROPHILS # BLD AUTO: 4.48 THOUSANDS/ÂΜL (ref 1.85–7.62)
NEUTS SEG NFR BLD AUTO: 21 % (ref 43–75)
NRBC BLD AUTO-RTO: 0 /100 WBCS
PLATELET # BLD AUTO: 163 THOUSANDS/UL (ref 149–390)
PMV BLD AUTO: 9.9 FL (ref 8.9–12.7)
POTASSIUM SERPL-SCNC: 4.4 MMOL/L (ref 3.5–5.3)
PROT SERPL-MCNC: 7.8 G/DL (ref 6.4–8.4)
RBC # BLD AUTO: 4.23 MILLION/UL (ref 3.88–5.62)
SODIUM SERPL-SCNC: 137 MMOL/L (ref 135–147)
TRIGL SERPL-MCNC: 128 MG/DL
TSH SERPL DL<=0.05 MIU/L-ACNC: 4.43 UIU/ML (ref 0.45–4.5)
WBC # BLD AUTO: 21.81 THOUSAND/UL (ref 4.31–10.16)

## 2022-10-19 PROCEDURE — 80053 COMPREHEN METABOLIC PANEL: CPT

## 2022-10-19 PROCEDURE — 82570 ASSAY OF URINE CREATININE: CPT

## 2022-10-19 PROCEDURE — 83615 LACTATE (LD) (LDH) ENZYME: CPT

## 2022-10-19 PROCEDURE — 84443 ASSAY THYROID STIM HORMONE: CPT

## 2022-10-19 PROCEDURE — 80061 LIPID PANEL: CPT

## 2022-10-19 PROCEDURE — 82043 UR ALBUMIN QUANTITATIVE: CPT

## 2022-10-19 PROCEDURE — 83036 HEMOGLOBIN GLYCOSYLATED A1C: CPT

## 2022-10-19 PROCEDURE — 85025 COMPLETE CBC W/AUTO DIFF WBC: CPT

## 2022-10-19 PROCEDURE — 36415 COLL VENOUS BLD VENIPUNCTURE: CPT

## 2022-10-19 PROCEDURE — 82378 CARCINOEMBRYONIC ANTIGEN: CPT

## 2022-10-26 ENCOUNTER — OFFICE VISIT (OUTPATIENT)
Dept: HEMATOLOGY ONCOLOGY | Facility: CLINIC | Age: 77
End: 2022-10-26
Payer: MEDICARE

## 2022-10-26 VITALS
DIASTOLIC BLOOD PRESSURE: 76 MMHG | SYSTOLIC BLOOD PRESSURE: 132 MMHG | WEIGHT: 196 LBS | TEMPERATURE: 98 F | OXYGEN SATURATION: 98 % | RESPIRATION RATE: 14 BRPM | HEART RATE: 74 BPM | HEIGHT: 66 IN | BODY MASS INDEX: 31.5 KG/M2

## 2022-10-26 DIAGNOSIS — C91.10 CLL (CHRONIC LYMPHOCYTIC LEUKEMIA) (HCC): Primary | ICD-10-CM

## 2022-10-26 PROCEDURE — 99214 OFFICE O/P EST MOD 30 MIN: CPT | Performed by: INTERNAL MEDICINE

## 2022-10-26 RX ORDER — BRIMONIDINE TARTRATE 2 MG/ML
SOLUTION/ DROPS OPHTHALMIC
COMMUNITY
Start: 2022-10-06

## 2022-10-26 NOTE — PROGRESS NOTES
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  Previous Hematologic/ Oncologic History:      The patient had 4 cycles of bendamustine and Rituxan followed by 1 dose of Rituxan in the maintenance setting      He had a right hemicolectomy for appendiceal cancer    Current Hematologic/ Oncologic Treatment:      Observation    Interval History:      The patient returns for follow-up visit  Patient's white count was 21 8 with a hemoglobin of 13 0 and a platelet count was 771  In June white count was 17 5 so it is slowly rising  I would probably consider we ibrutinib once the patient does need treatment  For now the counts are within acceptable limits  In terms of symptoms he is at baseline  Denies any nausea denies any vomiting denies any diarrhea  The rest of his 14 point review of systems today was negative  Test Results:    Imaging: No results found      Labs:   Lab Results   Component Value Date    WBC 21 81 (H) 10/19/2022    HGB 13 0 10/19/2022    HCT 38 7 10/19/2022    MCV 92 10/19/2022     10/19/2022     Lab Results   Component Value Date     10/22/2015    K 4 4 10/19/2022     10/19/2022    CO2 22 10/19/2022    ANIONGAP 8 10/22/2015    BUN 27 (H) 10/19/2022    CREATININE 1 25 10/19/2022    GLUCOSE 138 10/22/2015    GLUF 137 (H) 10/19/2022    CALCIUM 9 2 10/19/2022    AST 18 10/19/2022    ALT 16 10/19/2022    ALKPHOS 67 10/19/2022    PROT 7 5 10/22/2015    BILITOT 0 59 10/22/2015    EGFR 55 10/19/2022       Lab Results   Component Value Date    PSA 1 3 06/05/2018    PSA 0 9 01/15/2014       Lab Results   Component Value Date    CEA 1 3 10/19/2022       ROS: As stated in the history of present illness otherwise his 15 point review of systems today was negative        Active Problems:   Patient Active Problem List   Diagnosis   • Arthritis   • Benign essential HTN   • Cardiomyopathy ([place] [address] )   • Chronic lymphocytic leukemia ([place] [address] )   • CKD (chronic kidney disease) stage 3, GFR 30-59 ml/min (HCC)   • CML in remission ([place] [address] )   • Diabetic neuropathy ([place] [address] )   • H/O Clostridium difficile infection   • Hx of malignant carcinoid tumor of large intestine   • Type 2 diabetes mellitus with diabetic neuropathy, without long-term current use of insulin ([place] [address] )   • History of malignant neoplasm of appendix   • Status post transmetatarsal amputation of left foot (HCC)   • Elevated serum protein level   • Hypothyroidism   • Diabetic nephropathy associated with type 2 diabetes mellitus ([place] [address] )       Past Medical History:   Past Medical History:   Diagnosis Date   • Arthritis     Spine   • Cancer ([place] [address] )    • Cancer of appendix ([place] [address] ) 2015    appendectomy-colon resection   • Chronic lymphocytic leukemia of B-cell type ([place] [address] )     "remission 4-5yrs"-chemo   • DDD (degenerative disc disease), cervical    • Diabetes mellitus ([place] [address] )     bs checked by pt at home at 6am =92   • Diabetic neuropathy ([place] [address] )    • Diabetic retinopathy ([place] [address] )    • Elevated WBC count    • Heart attack ([place] [address] ) 04/2015    "labeled as that and than tested later after appendix removed and stress test showed negative"   • History of cancer chemotherapy     last treatment approx 5yrs ago   • Hypertension    • Myocardial infarction ([place] [address] ) 04/2015   • Scalp psoriasis    • Shingles 2/10-15   • Toe injury     left great toe, - 2017-internal braec-to be removed today 3/30/2018   • Wears glasses        Surgical History:   Past Surgical History:   Procedure Laterality Date   • APPENDECTOMY     • BONE BIOPSY Left 3/30/2018    Procedure: GREAT TOE BONE BIOPSY;  Surgeon: Helen Chapman DPM;  [facility];  Service: Podiatry   • BOWEL RESECTION     • CATARACT EXTRACTION Bilateral    • CHOLECYSTECTOMY     • COLECTOMY  2015   • EYE SURGERY Right     "retinal peel"   • FOOT FUSION Left 2017    Procedure: Monia Cancel JOINT FUSION;  Surgeon: Abhi Mays DPM;  [facility];  Service: Podiatry   • HARDWARE REMOVAL Left 2018    Procedure: REMOVAL STAPLES LEFT TOE;  Surgeon: Abhi Mays DPM;  [facility];  Service: Podiatry   • ORIF FOOT FRACTURE Left 2017    Procedure: HALLUX INTERPHALANGEAL JOINT INTERNAL FIXATION; REPAIR OF ULCERATION WITH EXCISIONS AND REVISION OF SKIN HALLUX WITH USE OF ALLOGRAFT;  Surgeon: Abhi Mays DPM;  [facility];  Service: Podiatry   • AR COLONOSCOPY FLX DX W/COLLJ SPEC WHEN PFRMD N/A 2016    Procedure: COLONOSCOPY;  Surgeon: Sinai Garcia DO;  [facility]: [facility]; Service: Gastroenterology   • AR REMOVAL DEEP IMPLANT Left 3/30/2018    Procedure: REMOVAL HARDWARE FOOT;  Surgeon: Abhi Mays DPM;  [facility];  Service: Podiatry   • RETINAL DETACHMENT SURGERY     • TONSILLECTOMY         Family History:    Family History   Problem Relation Age of Onset   • Alcohol abuse Mother            • Pancreatic cancer Father         age 78   • Hashimoto's thyroiditis Daughter    • Rheum arthritis Daughter         Juvenile Rheum Arthitis    • No Known Problems Sister          age 66   • No Known Problems Brother          80   • Kidney failure Brother         dialysis, 76   • Diabetes Brother        Cancer-related family history includes Pancreatic cancer in his father      Social History:   Social History     Socioeconomic History   • Marital status: /Civil Union     Spouse name: Not on file   • Number of children: Not on file   • Years of education: 4 yr college   • Highest education level: Not on file   Occupational History   • Not on file   Tobacco Use   • Smoking status: Never Smoker   • Smokeless tobacco: Never Used   Vaping Use   • Vaping Use: Never used   Substance and Sexual Activity   • Alcohol use: No   • Drug use: No   • Sexual activity: Not Currently     Partners: Female     Birth control/protection: None   Other Topics Concern   • Not on file   Social History Narrative    Most recent tobacco use screenin2018    Education: [address]    Marital status:     Exercise level: Occasional    Diet: Diabetic    General stress level: Low    Alcohol intake: None    Caffeine intake:  Moderate    Guns present in home: No    Smoke alarm in home: Yes     Social Determinants of Health     Financial Resource Strain: Not on file   Food Insecurity: Not on file   Transportation Needs: Not on file   Physical Activity: Not on file   Stress: Not on file   Social Connections: Not on file   Intimate Partner Violence: Not on file   Housing Stability: Not on file       Current Medications:   Current Outpatient Medications   Medication Sig Dispense Refill   • amLODIPine (NORVASC) 10 mg tablet Take 1 tablet (10 mg total) by mouth daily 90 tablet 1   • brimonidine tartrate 0 2 % ophthalmic solution INSTILL 1 DROP INTO EACH EYE TWICE DAILY     • glipiZIDE (GLUCOTROL XL) 10 mg 24 hr tablet Take 1 tablet (10 mg total) by mouth 2 (two) times a day 180 tablet 1   • Glucosamine HCl (GLUCOSAMINE PO) Take 1 tablet by mouth 2 (two) times a day      • levothyroxine 50 mcg tablet Take 1 tablet (50 mcg total) by mouth daily 90 tablet 1   • losartan (COZAAR) 100 MG tablet Take 1 tablet (100 mg total) by mouth daily 90 tablet 1   • metFORMIN (GLUCOPHAGE-XR) 500 mg 24 hr tablet Take 2 tablets (1,000 mg total) by mouth 2 (two) times a day with meals 360 tablet 1   • Multiple Vitamins-Minerals (OCUVITE PO) Take 1 tablet by mouth daily      • Omega-3 Fatty Acids (FISH OIL PO) Take by mouth daily     • simvastatin (ZOCOR) 20 mg tablet Take 1 tablet (20 mg total) by mouth daily at bedtime 90 tablet 1   • aspirin 81 MG tablet Take 81 mg by mouth as needed  (Patient not taking: No sig reported)     • Multiple Vitamin (MULTIVITAMIN) tablet Take 1 tablet by mouth daily (Patient not taking: No sig reported)     • sitaGLIPtin (JANUVIA) 100 mg tablet Take 1 tablet (100 mg total) by mouth daily 90 tablet 1     No current facility-administered medications for this visit  Allergies: Allergies   Allergen Reactions   • Lisinopril Cough     cough   • Oxycodone-Acetaminophen      hallucination       Physical Exam:    Body surface area is 1 98 meters squared  Wt Readings from Last 3 Encounters:   10/26/22 88 9 kg (196 lb)   06/22/22 88 kg (194 lb)   04/28/22 87 5 kg (193 lb)        Temp Readings from Last 3 Encounters:   10/26/22 98 °F (36 7 °C) (Temporal)   06/22/22 98 °F (36 7 °C) (Temporal)   04/28/22 97 5 °F (36 4 °C)        BP Readings from Last 3 Encounters:   10/26/22 132/76   06/22/22 120/74   04/28/22 132/58         Pulse Readings from Last 3 Encounters:   10/26/22 74   06/22/22 67   04/28/22 66         Physical Exam     Constitutional   General appearance: No acute distress, well appearing and well nourished  Eyes   Conjunctiva and lids: No swelling, erythema or discharge  Pupils and irises: Equal, round and reactive to light  Ears, Nose, Mouth, and Throat   External inspection of ears and nose: Normal     Nasal mucosa, septum, and turbinates: Normal without edema or erythema  Oropharynx: Normal with no erythema, edema, exudate or lesions  Pulmonary   Respiratory effort: No increased work of breathing or signs of respiratory distress  Auscultation of lungs: Clear to auscultation  Cardiovascular   Palpation of heart: Normal PMI, no thrills  Auscultation of heart: Normal rate and rhythm, normal S1 and S2, without murmurs  Examination of extremities for edema and/or varicosities: Normal     Carotid pulses: Normal     Abdomen   Abdomen: Non-tender, no masses  Liver and spleen: No hepatomegaly or splenomegaly  Lymphatic   Palpation of lymph nodes in neck: No lymphadenopathy      Musculoskeletal Gait and station: Normal     Digits and nails: Normal without clubbing or cyanosis  Inspection/palpation of joints, bones, and muscles: Normal     Skin   Skin and subcutaneous tissue: Normal without rashes or lesions  Neurologic   Cranial nerves: Cranial nerves 2-12 intact  Sensation: No sensory loss  Psychiatric   Orientation to person, place, and time: Normal     Mood and affect: Normal         Assessment / Plan: This is a pleasant elderly male with a past history of chronic lymphocytic leukemia with bulky adenopathy  He got 4 cycles of bendamustine and Rituxan with a very nice response and no residual disease on his PET scan  Since he did have a history of bulky adenopathy we decided to give him Rituxan in the maintenance setting  After his first dose he was running a low white count and we had a discussion  He decided to go off of maintenance Rituxan and just be observed       He was doing well until he was admitted for appendicitis  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  Only one lymph node was removed  He needed a proper cancer operation  He had this done  His hemicolectomy showed no evidence of residual disease  23 lymph nodes were negative  Based on this we decided he did not need any further chemotherapy  He is now on observation    His last imaging in 2019  Did not show any evidence of metastatic disease  White count is slowly going up by around 2 point every 4 months  It is now 21 8 with a normal hemoglobin of 13 and a normal platelet count of 717  The patient is asymptomatic  We will continue observation  I will see him back in 4 months with repeat blood work  If he has any symptoms before then he will call our office  Goals and Barriers:  Current Goal:  Prolong Survival from CLL  Barriers: None  Patient's Capacity to Self Care:  Patient  able to self care      Portions of the record may have been created with voice recognition software  Occasional wrong word or "sound a like" substitutions may have occurred due to the inherent limitations of voice recognition software  Read the chart carefully and recognize, using context, where substitutions have occurred

## 2022-11-02 ENCOUNTER — RA CDI HCC (OUTPATIENT)
Dept: OTHER | Facility: HOSPITAL | Age: 77
End: 2022-11-02

## 2022-11-08 ENCOUNTER — OFFICE VISIT (OUTPATIENT)
Dept: FAMILY MEDICINE CLINIC | Facility: CLINIC | Age: 77
End: 2022-11-08

## 2022-11-08 VITALS
SYSTOLIC BLOOD PRESSURE: 146 MMHG | RESPIRATION RATE: 16 BRPM | HEART RATE: 94 BPM | BODY MASS INDEX: 31.37 KG/M2 | TEMPERATURE: 97.5 F | OXYGEN SATURATION: 99 % | WEIGHT: 195.2 LBS | DIASTOLIC BLOOD PRESSURE: 68 MMHG | HEIGHT: 66 IN

## 2022-11-08 DIAGNOSIS — E11.42 DIABETIC POLYNEUROPATHY ASSOCIATED WITH TYPE 2 DIABETES MELLITUS (HCC): Primary | ICD-10-CM

## 2022-11-08 DIAGNOSIS — Z89.432 STATUS POST TRANSMETATARSAL AMPUTATION OF LEFT FOOT (HCC): ICD-10-CM

## 2022-11-08 DIAGNOSIS — I10 BENIGN ESSENTIAL HTN: ICD-10-CM

## 2022-11-08 DIAGNOSIS — E11.42 TYPE 2 DIABETES MELLITUS WITH DIABETIC POLYNEUROPATHY, WITHOUT LONG-TERM CURRENT USE OF INSULIN (HCC): ICD-10-CM

## 2022-11-08 DIAGNOSIS — N18.31 STAGE 3A CHRONIC KIDNEY DISEASE (HCC): ICD-10-CM

## 2022-11-08 DIAGNOSIS — E03.9 HYPOTHYROIDISM, UNSPECIFIED TYPE: ICD-10-CM

## 2022-11-08 RX ORDER — SIMVASTATIN 20 MG
20 TABLET ORAL
Qty: 90 TABLET | Refills: 1 | Status: SHIPPED | OUTPATIENT
Start: 2022-11-08

## 2022-11-08 RX ORDER — GLIPIZIDE 10 MG/1
10 TABLET, FILM COATED, EXTENDED RELEASE ORAL 2 TIMES DAILY
Qty: 180 TABLET | Refills: 1 | Status: SHIPPED | OUTPATIENT
Start: 2022-11-08

## 2022-11-08 RX ORDER — LOSARTAN POTASSIUM 100 MG/1
100 TABLET ORAL DAILY
Qty: 90 TABLET | Refills: 1 | Status: SHIPPED | OUTPATIENT
Start: 2022-11-08

## 2022-11-08 RX ORDER — METFORMIN HYDROCHLORIDE 500 MG/1
1000 TABLET, EXTENDED RELEASE ORAL 2 TIMES DAILY WITH MEALS
Qty: 360 TABLET | Refills: 1 | Status: SHIPPED | OUTPATIENT
Start: 2022-11-08

## 2022-11-08 RX ORDER — AMLODIPINE BESYLATE 10 MG/1
10 TABLET ORAL DAILY
Qty: 90 TABLET | Refills: 1 | Status: SHIPPED | OUTPATIENT
Start: 2022-11-08

## 2022-11-08 RX ORDER — LEVOTHYROXINE SODIUM 0.05 MG/1
50 TABLET ORAL DAILY
Qty: 90 TABLET | Refills: 1 | Status: SHIPPED | OUTPATIENT
Start: 2022-11-08

## 2022-11-08 NOTE — PROGRESS NOTES
Assessment/Plan:       Problem List Items Addressed This Visit        Endocrine    Diabetic [facility] ) - Primary     Controlled  Did not start [facility] [place] [facility] [place] due to cost    Continue current meds- no changes at present  Repeat labs in six months  Continue lower carb diet  Lab Results   Component Value Date    HGBA1C 7 3 (H) 10/19/2022            Relevant Medications    metFORMIN (GLUCOPHAGE-XR) 500 mg 24 hr tablet    glipiZIDE (GLUCOTROL XL) 10 mg 24 hr tablet    Hypothyroidism     Lab Results   Component Value Date    [id number] 4 431 10/19/2022     Repeat in six months  Continue levothyroxine         Relevant Medications    levothyroxine 50 mcg tablet    Other Relevant Orders    TSH, 3rd generation with Free T4 reflex       Cardiovascular and Mediastinum    Benign essential HTN    Relevant Medications    amLODIPine (NORVASC) 10 mg tablet    losartan (COZAAR) 100 MG tablet       Genitourinary    CKD (chronic kidney disease) stage 3, GFR 30-59 ml/min ([facility])     Lab Results   Component Value Date    EGFR 55 10/19/2022    EGFR 49 06/14/2022    EGFR 54 04/25/2022    CREATININE 1 25 10/19/2022    CREATININE 1 39 (H) 06/14/2022    CREATININE 1 27 04/25/2022   stable to slightly improved  Other    Status post transmetatarsal amputation of left foot ([facility] )     Recommend follow up with podiatry in regard to DM shoes             Other Visit Diagnoses     Type 2 diabetes mellitus with diabetic polyneuropathy, without long-term current use of insulin ([facility])        Relevant Medications    metFORMIN (GLUCOPHAGE-XR) 500 mg 24 hr tablet    simvastatin (ZOCOR) 20 mg tablet    glipiZIDE (GLUCOTROL XL) 10 mg 24 hr tablet    Other Relevant Orders    Ambulatory Referral to Podiatry    Comprehensive metabolic panel    Hemoglobin A1C    Lipid Panel with Direct LDL reflex    Microalbumin / creatinine urine ratio               Most recent labs reviewed       Subjective:     Matteo Nassar is a 69 y o  male here today with chief complaint below:  Chief Complaint   Patient presents with   • Follow-up     6 month f/u  No new problems or concerns at this time  • HM     Pt [facility]  Pt declines PCV today  - CC above per clinical staff and reviewed  HPI:  Pt is here for f/u visit  He notes he is feeling well  Topical voltaren has helped with arthritis pain when needed on knees/hands    DM- has been trying to be more careful with diet  He switched to taking metformin xr in the AM and this is working better  He used to sometimes pass the tablets out in his stool if he took it at night  The following portions of the patient's history were reviewed and updated as appropriate: allergies, current medications, past family history, past medical history, past social history, past surgical history and problem list     ROS:  Review of Systems   No fever, chills, congestion, chest pain, shortness of breath, nausea, vomiting, diarrhea, constipation, blood in stool, urinary concerns, mood changes  Rest of ROS neg except as above  Objective:      /68   Pulse 94   Temp 97 5 °F (36 4 °C)   Resp 16   Ht 5' 6" (1 676 m)   Wt 88 5 kg (195 lb 3 2 oz)   SpO2 99%   BMI 31 51 kg/m²   BP Readings from Last 3 Encounters:   11/08/22 146/68   10/26/22 132/76   06/22/22 120/74     Wt Readings from Last 3 Encounters:   11/08/22 88 5 kg (195 lb 3 2 oz)   10/26/22 88 9 kg (196 lb)   06/22/22 88 kg (194 lb)               Physical Exam:   Physical Exam  Vitals and nursing note reviewed  Constitutional:       Appearance: Normal appearance  He is well-developed  He is not ill-appearing  HENT:      Head: Normocephalic and atraumatic  Eyes:      Conjunctiva/sclera: Conjunctivae normal    Cardiovascular:      Rate and Rhythm: Normal rate and regular rhythm  Heart sounds: Normal heart sounds  No murmur heard  Pulmonary:      Effort: Pulmonary effort is normal  No respiratory distress  Breath sounds: Normal breath sounds  No wheezing  Abdominal:      Palpations: Abdomen is soft  Tenderness: There is no abdominal tenderness  There is no guarding or rebound  Musculoskeletal:      Cervical back: Neck supple  Right lower leg: No edema  Left lower leg: No edema  Lymphadenopathy:      Cervical: No cervical adenopathy  Skin:     General: Skin is warm and dry  Neurological:      Mental Status: He is alert and oriented to person, place, and time     Psychiatric:         Mood and Affect: Mood normal          Behavior: Behavior normal

## 2022-11-09 NOTE — PROGRESS NOTES
Diabetic Foot Exam    Patient's shoes and socks removed  Right Foot/Ankle   Right Foot Inspection  Skin Exam: skin normal  Skin not intact, no dry skin, no warmth, no callus, no erythema, no maceration, no abnormal color, no pre-ulcer, no ulcer and no callus  Toe Exam: ROM and strength within normal limits  Sensory   Monofilament testing: absent    Vascular  Capillary refills: elevated  The right DP pulse is 2+  Left Foot/Ankle  Left Foot Inspection  Skin Exam: Skin not intact, no dry skin, no warmth, no erythema, no maceration, normal color, no pre-ulcer, no ulcer and no callus  Amputation: amputation left foot (Comments: s/p amputation of toes)    Toe Exam: ROM and strength within normal limits  Sensory   Monofilament testing: absent    Vascular  The left DP pulse is 2+       Assign Risk Category  Deformity present  Loss of protective sensation  No weak pulses  Risk: 2

## 2022-11-10 NOTE — ASSESSMENT & PLAN NOTE
Controlled  Did not start [facility] [place] [facility] [place] due to cost    Continue current meds- no changes at present  Repeat labs in six months  Continue lower carb diet    Lab Results   Component Value Date    HGBA1C 7 3 (H) 10/19/2022

## 2022-11-10 NOTE — ASSESSMENT & PLAN NOTE
Lab Results   Component Value Date    EGFR 55 10/19/2022    EGFR 49 06/14/2022    EGFR 54 04/25/2022    CREATININE 1 25 10/19/2022    CREATININE 1 39 (H) 06/14/2022    CREATININE 1 27 04/25/2022   stable to slightly improved

## 2022-11-10 NOTE — ASSESSMENT & PLAN NOTE
Lab Results   Component Value Date    IEX8MPEVUMIO 4 431 10/19/2022     Repeat in six months  Continue levothyroxine

## 2022-11-29 ENCOUNTER — TELEPHONE (OUTPATIENT)
Dept: FAMILY MEDICINE CLINIC | Facility: CLINIC | Age: 77
End: 2022-11-29

## 2022-11-29 NOTE — TELEPHONE ENCOUNTER
Fax received from [facility] for diabetic shoes/inserts, put in clinical folder

## 2023-03-02 ENCOUNTER — TELEPHONE (OUTPATIENT)
Dept: HEMATOLOGY ONCOLOGY | Facility: CLINIC | Age: 78
End: 2023-03-02

## 2023-03-02 NOTE — TELEPHONE ENCOUNTER
Spoke with patient rescheduling appointment with Dr Helga Preciado since provider is leaving  Confirmed work-up prior to the new appt  Also Confirmed the new appointment date and time

## 2023-03-03 ENCOUNTER — APPOINTMENT (OUTPATIENT)
Dept: LAB | Facility: HOSPITAL | Age: 78
End: 2023-03-03

## 2023-03-03 DIAGNOSIS — C91.10 CLL (CHRONIC LYMPHOCYTIC LEUKEMIA) (HCC): ICD-10-CM

## 2023-03-03 LAB
ALBUMIN SERPL BCP-MCNC: 4.4 G/DL (ref 3.5–5)
ALP SERPL-CCNC: 77 U/L (ref 34–104)
ALT SERPL W P-5'-P-CCNC: 12 U/L (ref 7–52)
ANION GAP SERPL CALCULATED.3IONS-SCNC: 6 MMOL/L (ref 4–13)
AST SERPL W P-5'-P-CCNC: 15 U/L (ref 13–39)
BASOPHILS # BLD AUTO: 0.09 THOUSANDS/ÂΜL (ref 0–0.1)
BASOPHILS NFR BLD AUTO: 0 % (ref 0–1)
BILIRUB SERPL-MCNC: 0.7 MG/DL (ref 0.2–1)
BUN SERPL-MCNC: 24 MG/DL (ref 5–25)
CALCIUM SERPL-MCNC: 9.9 MG/DL (ref 8.4–10.2)
CHLORIDE SERPL-SCNC: 104 MMOL/L (ref 96–108)
CO2 SERPL-SCNC: 27 MMOL/L (ref 21–32)
CREAT SERPL-MCNC: 1.28 MG/DL (ref 0.6–1.3)
EOSINOPHIL # BLD AUTO: 0.27 THOUSAND/ÂΜL (ref 0–0.61)
EOSINOPHIL NFR BLD AUTO: 1 % (ref 0–6)
ERYTHROCYTE [DISTWIDTH] IN BLOOD BY AUTOMATED COUNT: 13.5 % (ref 11.6–15.1)
GFR SERPL CREATININE-BSD FRML MDRD: 54 ML/MIN/1.73SQ M
GLUCOSE P FAST SERPL-MCNC: 151 MG/DL (ref 65–99)
HCT VFR BLD AUTO: 39.9 % (ref 36.5–49.3)
HGB BLD-MCNC: 12.9 G/DL (ref 12–17)
IMM GRANULOCYTES # BLD AUTO: 0.03 THOUSAND/UL (ref 0–0.2)
IMM GRANULOCYTES NFR BLD AUTO: 0 % (ref 0–2)
LDH SERPL-CCNC: 125 U/L (ref 140–271)
LYMPHOCYTES # BLD AUTO: 22.05 THOUSANDS/ÂΜL (ref 0.6–4.47)
LYMPHOCYTES NFR BLD AUTO: 85 % (ref 14–44)
MCH RBC QN AUTO: 30.2 PG (ref 26.8–34.3)
MCHC RBC AUTO-ENTMCNC: 32.3 G/DL (ref 31.4–37.4)
MCV RBC AUTO: 93 FL (ref 82–98)
MONOCYTES # BLD AUTO: 0.35 THOUSAND/ÂΜL (ref 0.17–1.22)
MONOCYTES NFR BLD AUTO: 1 % (ref 4–12)
NEUTROPHILS # BLD AUTO: 3.42 THOUSANDS/ÂΜL (ref 1.85–7.62)
NEUTS SEG NFR BLD AUTO: 13 % (ref 43–75)
NRBC BLD AUTO-RTO: 0 /100 WBCS
PLATELET # BLD AUTO: 185 THOUSANDS/UL (ref 149–390)
PMV BLD AUTO: 9.6 FL (ref 8.9–12.7)
POTASSIUM SERPL-SCNC: 4.7 MMOL/L (ref 3.5–5.3)
PROT SERPL-MCNC: 8.3 G/DL (ref 6.4–8.4)
RBC # BLD AUTO: 4.27 MILLION/UL (ref 3.88–5.62)
SODIUM SERPL-SCNC: 137 MMOL/L (ref 135–147)
WBC # BLD AUTO: 26.21 THOUSAND/UL (ref 4.31–10.16)

## 2023-03-06 NOTE — TELEPHONE ENCOUNTER
03/06/23    LVM r/s pt to see Reyna Dandy on 3/9 since was told by Savage Alexis that she only sees benign hem pt at [facility]

## 2023-03-07 NOTE — PROGRESS NOTES
Hematology/Oncology Outpatient Follow- up Note  [facility]053, [id number]  3/9/2023        Chief Complaint   Patient presents with   • Follow-up       HPI:  Silke Castillo is a 70-year-old gentleman with a history of CLL with bulky adenopathy  He was treated with 4 cycles of Bendamustine and Rituxanin 2012 with good response and no evidence of residual disease on his post-treatment PET scan  He was treated with maintenance RItuxan due to his history of bulky adenopathy  After his 1st dose, he was running a low white count so decision was made to discontinue maintenance Rituxan and place him on observation  Patient's CLL has been stable     Patient was doing well until 2015 when he was hospitalized with appendicitis  He underwent resection and 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  Only 1 lymph node was removed  Patient underwent a right hemicolectomy in May of 2015 which showed no evidence of residual disease  Twenty-three lymph nodes were negative  Based on this he did not need any adjuvant chemotherapy  Previous Hematologic/ Oncologic History:    Bendamustine and Rituxan x4 cycles in   Rituxan in the maintenance setting for 1 dose     Right hemicolectomy for appendiceal cancer 2015    Current Hematologic/ Oncologic Treatment:    Observation    ECO - Asymptomatic    Interval History: Patient returns for follow-up  He was last seen by Dr Shelby Alcaraz on 10/26/2022  He denies any change to his health since last visit  Most recent blood work completed on 3/3/2023 reviewed  White count 26 2, globin 12 9, platelets 206, absolute lymphocytes 22 05    CMP unremarkable    Patient reports he is well-appearing  Denies constitutional symptoms  No B symptoms  No abdominal pain  No change in bowel or bladder habits  Cancer Staging:   Cancer Staging   No matching staging information was found for the patient      Molecular Testing: Test Results:    Imaging: No results found  Labs:   Lab Results   Component Value Date    WBC 26 21 (H) 03/03/2023    HGB 12 9 03/03/2023    HCT 39 9 03/03/2023    MCV 93 03/03/2023     03/03/2023     Lab Results   Component Value Date     10/22/2015    K 4 7 03/03/2023     03/03/2023    CO2 27 03/03/2023    ANIONGAP 8 10/22/2015    BUN 24 03/03/2023    CREATININE 1 28 03/03/2023    GLUCOSE 138 10/22/2015    GLUF 151 (H) 03/03/2023    CALCIUM 9 9 03/03/2023    AST 15 03/03/2023    ALT 12 03/03/2023    ALKPHOS 77 03/03/2023    PROT 7 5 10/22/2015    BILITOT 0 59 10/22/2015    EGFR 54 03/03/2023           Review of Systems   All other systems reviewed and are negative          Active Problems:   Patient Active Problem List   Diagnosis   • Arthritis   • Benign essential HTN   • Cardiomyopathy ([facility] )   • Chronic lymphocytic leukemia ([facility] )   • CKD (chronic kidney disease) stage 3, GFR 30-59 ml/min ([facility])   • Diabetic neuropathy ([facility])   • H/O Clostridium difficile infection   • Hx of malignant carcinoid tumor of large intestine   • Type 2 diabetes mellitus with diabetic neuropathy, without long-term current use of insulin ([facility] )   • History of malignant neoplasm of appendix   • Status post transmetatarsal amputation of left foot (HCC)   • Elevated serum protein level   • Hypothyroidism   • Diabetic nephropathy associated with type 2 diabetes mellitus ([facility] )       Past Medical History:   Past Medical History:   Diagnosis Date   • Arthritis     Spine   • Cancer ([facility] )    • Cancer of appendix ([facility] ) 2015    appendectomy-colon resection   • Chronic lymphocytic leukemia of B-cell type ([facility] )     "remission 4-5yrs"-chemo   • DDD (degenerative disc disease), cervical    • Diabetes mellitus ([facility] )     bs checked by pt at home at 6am =92   • Diabetic neuropathy ([facility] )    • Diabetic retinopathy ([facility] )    • Elevated WBC count    • Heart attack ([facility] ) 04/2015    "labeled as that and than tested later after appendix removed and stress test showed negative"   • History of cancer chemotherapy     last treatment approx 5yrs ago   • Hypertension    • Myocardial infarction ([facility] ) 2015   • Scalp psoriasis    • Shingles 2/10-15   • Toe injury     left great toe, - 2017-internal braec-to be removed today 3/30/2018   • Wears glasses        Surgical History:   Past Surgical History:   Procedure Laterality Date   • APPENDECTOMY     • BONE BIOPSY Left 3/30/2018    Procedure: GREAT TOE BONE BIOPSY;  Surgeon: Lety Calderon DPM;  Location: [facility];  Service: Podiatry   • BOWEL RESECTION     • CATARACT EXTRACTION Bilateral    • CHOLECYSTECTOMY     • COLECTOMY  2015   • EYE SURGERY Right     "retinal peel"   • FOOT FUSION Left 2017    Procedure: Tana Hale;  Surgeon: Lety Calderon DPM;  [facility];  Service: Podiatry   • HARDWARE REMOVAL Left 2018    Procedure: REMOVAL STAPLES LEFT TOE;  Surgeon: Lety Calderon DPM;  [facility];  Service: Podiatry   • ORIF FOOT FRACTURE Left 2017    Procedure: HALLUX INTERPHALANGEAL JOINT INTERNAL FIXATION; REPAIR OF ULCERATION WITH EXCISIONS AND REVISION OF SKIN HALLUX WITH USE OF ALLOGRAFT;  Surgeon: Lety Calderon DPM;  [facility];  Service: Podiatry   • RI COLONOSCOPY FLX DX W/COLLJ SPEC WHEN PFRMD N/A 2016    Procedure: COLONOSCOPY;  Surgeon: Jacobo Knott DO;  Location: [facility];   Service: Gastroenterology   • RI REMOVAL IMPLANT DEEP Left 3/30/2018    Procedure: REMOVAL HARDWARE FOOT;  Surgeon: Lety Calderon DPM;  [facility]: [facility];  Service: Podiatry   • RETINAL DETACHMENT SURGERY     • TONSILLECTOMY         Family History:    Family History   Problem Relation Age of Onset   • Alcohol abuse Mother            • Pancreatic cancer Father         age 78   • Hashimoto's thyroiditis Daughter    • Rheum arthritis Daughter         Juvenile Rheum Arthitis    • No Known Problems Sister          age 66   • No Known Problems Brother          80   • Kidney failure Brother         dialysis, 76   • Diabetes Brother        Cancer-related family history includes Pancreatic cancer in his father  Social History:   Social History     Socioeconomic History   • Marital status: /Civil Union     Spouse name: Not on file   • Number of children: Not on file   • Years of education: 4 yr college   • Highest education level: Not on file   Occupational History   • Not on file   Tobacco Use   • Smoking status: Never   • Smokeless tobacco: Never   Vaping Use   • Vaping Use: Never used   Substance and Sexual Activity   • Alcohol use: No   • Drug use: No   • Sexual activity: Not Currently     Partners: Female     Birth control/protection: None   Other Topics Concern   • Not on file   Social History Narrative    Most recent tobacco use screenin2018    Education: [address]    Marital status:     Exercise level: Occasional    Diet: Diabetic    General stress level: Low    Alcohol intake: None    Caffeine intake:  Moderate    Guns present in home: No    Smoke alarm in home: Yes     Social Determinants of Health     Financial Resource Strain: Not on file   Food Insecurity: Not on file   Transportation Needs: Not on file   Physical Activity: Not on file   Stress: Not on file   Social Connections: Not on file   Intimate Partner Violence: Not on file   Housing Stability: Not on file       Current Medications:   Current Outpatient Medications   Medication Sig Dispense Refill   • amLODIPine (NORVASC) 10 mg tablet Take 1 tablet (10 mg total) by mouth daily 90 tablet 1   • brimonidine tartrate 0 2 % ophthalmic solution INSTILL 1 DROP INTO EACH EYE TWICE DAILY     • glipiZIDE (GLUCOTROL XL) 10 mg 24 hr tablet Take 1 tablet (10 mg total) by mouth 2 (two) times a day 180 tablet 1   • Glucosamine HCl (GLUCOSAMINE PO) Take 1 tablet by mouth 2 (two) times a day      • levothyroxine 50 mcg tablet Take 1 tablet (50 mcg total) by mouth daily 90 tablet 1   • losartan (COZAAR) 100 MG tablet Take 1 tablet (100 mg total) by mouth daily 90 tablet 1   • metFORMIN (GLUCOPHAGE-XR) 500 mg 24 hr tablet Take 2 tablets (1,000 mg total) by mouth 2 (two) times a day with meals 360 tablet 1   • Multiple Vitamin (MULTIVITAMIN) tablet Take 1 tablet by mouth daily     • Omega-3 Fatty Acids (FISH OIL PO) Take by mouth daily     • simvastatin (ZOCOR) 20 mg tablet Take 1 tablet (20 mg total) by mouth daily at bedtime 90 tablet 1   • timolol (TIMOPTIC) 0 5 % ophthalmic solution timolol maleate 0 5 % eye drops     • Multiple Vitamins-Minerals (OCUVITE PO) Take 1 tablet by mouth daily        No current facility-administered medications for this visit  Allergies: Allergies   Allergen Reactions   • Lisinopril Cough     cough   • Oxycodone-Acetaminophen      hallucination       Physical Exam:  /76 (BP Location: Left arm, Patient Position: Sitting, Cuff Size: Adult)   Pulse 92   Temp 98 °F (36 7 °C) (Temporal)   Resp 14   Ht 5' 6" (1 676 m)   Wt 87 5 kg (193 lb)   SpO2 92%   BMI 31 15 kg/m²   Body surface area is 1 97 meters squared  Wt Readings from Last 3 Encounters:   03/09/23 87 5 kg (193 lb)   11/08/22 88 5 kg (195 lb 3 2 oz)   10/26/22 88 9 kg (196 lb)           Physical Exam  Constitutional:       General: He is not in acute distress  Appearance: Normal appearance  HENT:      Head: Normocephalic and atraumatic  Eyes:      General: No scleral icterus  Right eye: No discharge  Left eye: No discharge  Conjunctiva/sclera: Conjunctivae normal    Cardiovascular:      Rate and Rhythm: Normal rate and regular rhythm  Pulmonary:      Effort: Pulmonary effort is normal  No respiratory distress  Breath sounds: Normal breath sounds  Abdominal:      General: Bowel sounds are normal  There is no distension  Palpations: Abdomen is soft  There is no mass  Tenderness:  There is no abdominal tenderness  Musculoskeletal:         General: Normal range of motion  Lymphadenopathy:      Cervical: No cervical adenopathy  Upper Body:      Right upper body: No supraclavicular, axillary or pectoral adenopathy  Left upper body: No supraclavicular, axillary or pectoral adenopathy  Skin:     General: Skin is warm and dry  Neurological:      General: No focal deficit present  Mental Status: He is alert and oriented to person, place, and time  Psychiatric:         Mood and Affect: Mood normal          Behavior: Behavior normal          Assessment / Plan:    1  Chronic lymphocytic leukemia ([facility] )   The patient is a 61-year-old gentleman with a history of CLL with bulky adenopathy  He was treated with 4 cycles of bendamustine and Rituxan in 6/2012 with good response and no evidence of residual disease on his post-treatment PET scan  He was treated with maintenance RItuxan due to his history of bulky adenopathy  After his 1st dose, he was running a low white count so decision was made to discontinue maintenance Rituxan and place him on observation  Patient's white count has been slowly rising  It is now 26 2 with a normal hemoglobin, normal platelet count  He is asymptomatic  Clinically, there are no concerns on exam   No palpable lymphadenopathy  He will continue on observation  He will transfer care to a different physician in our practice since Dr Domonique Wall has left network  He will return for a follow-up visit in 4 months with repeat blood work to include CBC, CMP, LDH, flow cytometry  Patient was in agreement with this plan of care  He is instructed to call anytime with questions or concerning symptoms     2  History of malignant neoplasm of appendix   He was diagnosed with 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 and is s/p right hemicolectomy in May of 2015  Twenty-three lymph nodes were negative    Based on this he did not need any adjuvant chemotherapy  His last imaging in November 2019 was negative for recurrent or metastatic disease  Patient is now over 8 years out from initial diagnosis  He no longer requires any routine imaging                 Goals and Barriers:  Current Goal:  Prolong Survival from CLL  Barriers: None  Patient's Capacity to Self Care:  Patient  able to self care  Portions of the record may have been created with voice recognition software  Occasional wrong word or "sound a like" substitutions may have occurred due to the inherent limitations of voice recognition software  Read the chart carefully and recognize, using context, where substitutions have occurred

## 2023-03-09 ENCOUNTER — OFFICE VISIT (OUTPATIENT)
Dept: HEMATOLOGY ONCOLOGY | Facility: CLINIC | Age: 78
End: 2023-03-09

## 2023-03-09 VITALS
SYSTOLIC BLOOD PRESSURE: 144 MMHG | OXYGEN SATURATION: 92 % | DIASTOLIC BLOOD PRESSURE: 76 MMHG | HEIGHT: 66 IN | WEIGHT: 193 LBS | HEART RATE: 92 BPM | TEMPERATURE: 98 F | BODY MASS INDEX: 31.02 KG/M2 | RESPIRATION RATE: 14 BRPM

## 2023-03-09 DIAGNOSIS — C91.10 CHRONIC LYMPHOCYTIC LEUKEMIA (HCC): Primary | ICD-10-CM

## 2023-03-09 DIAGNOSIS — Z85.09 HISTORY OF MALIGNANT NEOPLASM OF APPENDIX: ICD-10-CM

## 2023-03-09 PROBLEM — C92.11 CML IN REMISSION (HCC): Status: RESOLVED | Noted: 2018-10-08 | Resolved: 2023-03-09

## 2023-03-09 RX ORDER — TIMOLOL MALEATE 5 MG/ML
SOLUTION/ DROPS OPHTHALMIC
COMMUNITY

## 2023-06-13 DIAGNOSIS — E03.9 HYPOTHYROIDISM, UNSPECIFIED TYPE: ICD-10-CM

## 2023-06-13 DIAGNOSIS — I10 BENIGN ESSENTIAL HTN: ICD-10-CM

## 2023-06-13 DIAGNOSIS — E11.42 TYPE 2 DIABETES MELLITUS WITH DIABETIC POLYNEUROPATHY, WITHOUT LONG-TERM CURRENT USE OF INSULIN (HCC): ICD-10-CM

## 2023-06-13 RX ORDER — SIMVASTATIN 20 MG
20 TABLET ORAL
Qty: 90 TABLET | Refills: 1 | Status: SHIPPED | OUTPATIENT
Start: 2023-06-13

## 2023-06-13 RX ORDER — LEVOTHYROXINE SODIUM 0.05 MG/1
50 TABLET ORAL DAILY
Qty: 90 TABLET | Refills: 1 | Status: SHIPPED | OUTPATIENT
Start: 2023-06-13 | End: 2023-06-23

## 2023-06-14 RX ORDER — LOSARTAN POTASSIUM 100 MG/1
100 TABLET ORAL DAILY
Qty: 90 TABLET | Refills: 0 | Status: SHIPPED | OUTPATIENT
Start: 2023-06-14

## 2023-06-14 RX ORDER — GLIPIZIDE 10 MG/1
10 TABLET, FILM COATED, EXTENDED RELEASE ORAL 2 TIMES DAILY
Qty: 180 TABLET | Refills: 0 | Status: SHIPPED | OUTPATIENT
Start: 2023-06-14

## 2023-06-14 RX ORDER — METFORMIN HYDROCHLORIDE 500 MG/1
1000 TABLET, EXTENDED RELEASE ORAL 2 TIMES DAILY WITH MEALS
Qty: 360 TABLET | Refills: 0 | Status: SHIPPED | OUTPATIENT
Start: 2023-06-14

## 2023-06-14 RX ORDER — AMLODIPINE BESYLATE 10 MG/1
10 TABLET ORAL DAILY
Qty: 90 TABLET | Refills: 0 | Status: SHIPPED | OUTPATIENT
Start: 2023-06-14

## 2023-06-14 NOTE — TELEPHONE ENCOUNTER
Please let pt know he is due for labs- lab order is in from November (was to be done in May)  Let him know I'd like him to do this now, even though his appointment isn't until August     Will send in refills x 90 days

## 2023-06-20 ENCOUNTER — APPOINTMENT (OUTPATIENT)
Dept: LAB | Facility: HOSPITAL | Age: 78
End: 2023-06-20
Payer: MEDICARE

## 2023-06-20 DIAGNOSIS — C91.10 CHRONIC LYMPHOCYTIC LEUKEMIA (HCC): ICD-10-CM

## 2023-06-20 DIAGNOSIS — E11.42 TYPE 2 DIABETES MELLITUS WITH DIABETIC POLYNEUROPATHY, WITHOUT LONG-TERM CURRENT USE OF INSULIN (HCC): ICD-10-CM

## 2023-06-20 DIAGNOSIS — E03.9 HYPOTHYROIDISM, UNSPECIFIED TYPE: ICD-10-CM

## 2023-06-20 LAB
ALBUMIN SERPL BCP-MCNC: 4.2 G/DL (ref 3.5–5)
ALP SERPL-CCNC: 77 U/L (ref 34–104)
ALT SERPL W P-5'-P-CCNC: 12 U/L (ref 7–52)
ANION GAP SERPL CALCULATED.3IONS-SCNC: 8 MMOL/L
AST SERPL W P-5'-P-CCNC: 17 U/L (ref 13–39)
BASOPHILS # BLD MANUAL: 0 THOUSAND/UL (ref 0–0.1)
BASOPHILS NFR MAR MANUAL: 0 % (ref 0–1)
BILIRUB SERPL-MCNC: 0.49 MG/DL (ref 0.2–1)
BUN SERPL-MCNC: 22 MG/DL (ref 5–25)
CALCIUM SERPL-MCNC: 9.2 MG/DL (ref 8.4–10.2)
CHLORIDE SERPL-SCNC: 104 MMOL/L (ref 96–108)
CHOLEST SERPL-MCNC: 118 MG/DL
CO2 SERPL-SCNC: 25 MMOL/L (ref 21–32)
CREAT SERPL-MCNC: 1.34 MG/DL (ref 0.6–1.3)
CREAT UR-MCNC: 90.3 MG/DL
EOSINOPHIL # BLD MANUAL: 0.89 THOUSAND/UL (ref 0–0.4)
EOSINOPHIL NFR BLD MANUAL: 3 % (ref 0–6)
ERYTHROCYTE [DISTWIDTH] IN BLOOD BY AUTOMATED COUNT: 13.8 % (ref 11.6–15.1)
EST. AVERAGE GLUCOSE BLD GHB EST-MCNC: 177 MG/DL
GFR SERPL CREATININE-BSD FRML MDRD: 51 ML/MIN/1.73SQ M
GLUCOSE P FAST SERPL-MCNC: 105 MG/DL (ref 65–99)
HBA1C MFR BLD: 7.8 %
HCT VFR BLD AUTO: 37.3 % (ref 36.5–49.3)
HDLC SERPL-MCNC: 37 MG/DL
HGB BLD-MCNC: 12.2 G/DL (ref 12–17)
LDH SERPL-CCNC: 121 U/L (ref 140–271)
LDLC SERPL CALC-MCNC: 51 MG/DL (ref 0–100)
LYMPHOCYTES # BLD AUTO: 25.13 THOUSAND/UL (ref 0.6–4.47)
LYMPHOCYTES # BLD AUTO: 85 % (ref 14–44)
MCH RBC QN AUTO: 30.3 PG (ref 26.8–34.3)
MCHC RBC AUTO-ENTMCNC: 32.7 G/DL (ref 31.4–37.4)
MCV RBC AUTO: 93 FL (ref 82–98)
MICROALBUMIN UR-MCNC: 293 MG/L (ref 0–20)
MICROALBUMIN/CREAT 24H UR: 324 MG/G CREATININE (ref 0–30)
MONOCYTES # BLD AUTO: 0 THOUSAND/UL (ref 0–1.22)
MONOCYTES NFR BLD: 0 % (ref 4–12)
NEUTROPHILS # BLD MANUAL: 3.55 THOUSAND/UL (ref 1.85–7.62)
NEUTS BAND NFR BLD MANUAL: 1 % (ref 0–8)
NEUTS SEG NFR BLD AUTO: 11 % (ref 43–75)
PLATELET # BLD AUTO: 159 THOUSANDS/UL (ref 149–390)
PLATELET BLD QL SMEAR: ADEQUATE
PMV BLD AUTO: 10.1 FL (ref 8.9–12.7)
POTASSIUM SERPL-SCNC: 4.2 MMOL/L (ref 3.5–5.3)
PROT SERPL-MCNC: 8.1 G/DL (ref 6.4–8.4)
RBC # BLD AUTO: 4.02 MILLION/UL (ref 3.88–5.62)
RBC MORPH BLD: NORMAL
SODIUM SERPL-SCNC: 137 MMOL/L (ref 135–147)
T4 FREE SERPL-MCNC: 0.83 NG/DL (ref 0.61–1.12)
TRIGL SERPL-MCNC: 151 MG/DL
TSH SERPL DL<=0.05 MIU/L-ACNC: 4.6 UIU/ML (ref 0.45–4.5)
WBC # BLD AUTO: 29.56 THOUSAND/UL (ref 4.31–10.16)

## 2023-06-20 PROCEDURE — 82570 ASSAY OF URINE CREATININE: CPT

## 2023-06-20 PROCEDURE — 82043 UR ALBUMIN QUANTITATIVE: CPT

## 2023-06-20 PROCEDURE — 84439 ASSAY OF FREE THYROXINE: CPT

## 2023-06-20 PROCEDURE — 83615 LACTATE (LD) (LDH) ENZYME: CPT

## 2023-06-20 PROCEDURE — 85027 COMPLETE CBC AUTOMATED: CPT

## 2023-06-20 PROCEDURE — 83036 HEMOGLOBIN GLYCOSYLATED A1C: CPT

## 2023-06-20 PROCEDURE — 88184 FLOWCYTOMETRY/ TC 1 MARKER: CPT

## 2023-06-20 PROCEDURE — 85007 BL SMEAR W/DIFF WBC COUNT: CPT

## 2023-06-20 PROCEDURE — 80061 LIPID PANEL: CPT

## 2023-06-20 PROCEDURE — 84443 ASSAY THYROID STIM HORMONE: CPT

## 2023-06-20 PROCEDURE — 88185 FLOWCYTOMETRY/TC ADD-ON: CPT

## 2023-06-20 PROCEDURE — 80053 COMPREHEN METABOLIC PANEL: CPT

## 2023-06-20 PROCEDURE — 36415 COLL VENOUS BLD VENIPUNCTURE: CPT

## 2023-06-22 LAB — SCAN RESULT: NORMAL

## 2023-06-23 DIAGNOSIS — E03.9 ACQUIRED HYPOTHYROIDISM: Primary | ICD-10-CM

## 2023-06-23 RX ORDER — LEVOTHYROXINE SODIUM 0.07 MG/1
75 TABLET ORAL DAILY
Qty: 90 TABLET | Refills: 1 | Status: SHIPPED | OUTPATIENT
Start: 2023-06-23

## 2023-08-09 ENCOUNTER — RA CDI HCC (OUTPATIENT)
Dept: OTHER | Facility: HOSPITAL | Age: 78
End: 2023-08-09

## 2023-08-09 NOTE — PROGRESS NOTES
[address] [facility] coding opportunities     E11.22   Chart Reviewed number of suggestions sent to Provider: 1     Patients Insurance     Medicare Insurance: [employer]

## 2023-09-07 ENCOUNTER — OFFICE VISIT (OUTPATIENT)
Dept: HEMATOLOGY ONCOLOGY | Facility: CLINIC | Age: 78
End: 2023-09-07
Payer: MEDICARE

## 2023-09-07 VITALS
RESPIRATION RATE: 18 BRPM | SYSTOLIC BLOOD PRESSURE: 150 MMHG | HEART RATE: 72 BPM | WEIGHT: 190 LBS | OXYGEN SATURATION: 97 % | BODY MASS INDEX: 30.53 KG/M2 | HEIGHT: 66 IN | DIASTOLIC BLOOD PRESSURE: 70 MMHG

## 2023-09-07 DIAGNOSIS — I42.9 CARDIOMYOPATHY, UNSPECIFIED TYPE (HCC): ICD-10-CM

## 2023-09-07 DIAGNOSIS — E11.21 DIABETIC NEPHROPATHY ASSOCIATED WITH TYPE 2 DIABETES MELLITUS (HCC): ICD-10-CM

## 2023-09-07 DIAGNOSIS — C91.10 CHRONIC LYMPHOCYTIC LEUKEMIA (HCC): ICD-10-CM

## 2023-09-07 DIAGNOSIS — Z85.09 HISTORY OF MALIGNANT NEOPLASM OF APPENDIX: Primary | ICD-10-CM

## 2023-09-07 DIAGNOSIS — N18.31 STAGE 3A CHRONIC KIDNEY DISEASE (HCC): ICD-10-CM

## 2023-09-07 PROCEDURE — 99215 OFFICE O/P EST HI 40 MIN: CPT | Performed by: INTERNAL MEDICINE

## 2023-09-07 NOTE — PROGRESS NOTES
Buck Martinez  1945  [address] [facility]  [address] [place] [id number]    DISCUSSION/SUMMARY:    67-year-old male with history of CLL previously treated with BR. Patient then started maintenance Rituxan but had CBC side effects; Rituxan was held. Since that time, the plan has been surveillance. Presently Mr. Jacqueline Smith feels well and clinically there are no concerning findings, no adenopathy. Recent blood work was okay; the white count is slowly rising along with the lymphocyte count but hemoglobin level and platelet count are good/acceptable. Renal function, liver function are within normal limits. As above, patient has no symptoms. The plan is to continue with surveillance. Patient demonstrates a very good understanding of the situation and understands that he may eventually require additional treatment for the CLL. Patient was previously diagnosed with appendiceal carcinoid. Patient underwent right hemicolectomy with negative lymph nodes. No GI issues recently. Surveillance continues for this also. Routine health maintenance and medical care is up-to-date. Patient will speak to his PCP about flu shot, Pneumovax and shingles vaccine. Patient is to return in 6 months with repeat blood work before. Patient knows to call the hematology/oncology office if there are any other questions or concerns. Carefully review your medication list and verify that the list is accurate and up-to-date. Please call the hematology/oncology office if there are medications missing from the list, medications on the list that you are not currently taking or if there is a dosage or instruction that is different from how you're taking that medication.     Patient goals and areas of care: CLL/SLL surveillance, monitor for any GI symptoms  Barriers to care: none  Patient is able to self-care  _____________________________________________________________________________________    Chief Complaint   Patient presents with   • Follow-up     History of Present Illness: 81-year-old male with CLL/SLL previously treated with 4 cycles of BR (June 2012) with good response and no evidence of residual disease. Patient was then started on maintenance Rituxan but because of CBC abnormalities, the maintenance was discontinued. Patient has been on surveillance. Mr. Sabina Cedillo was admitted to the hospital in 2015 for abdominal pain; underwent appendectomy. 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 Patient subsequently underwent right hemicolectomy in May 2015 which showed no evidence of additional disease. 23 lymph nodes were negative. The plan was to continue with surveillance. Presently Mr. Wing Rocha states feeling well, baseline. Appetite is good, weight is stable. No nausea, vomiting, diarrhea, constipation or GI bleeding. No  issues. No enlarged lymph nodes. Patient continues to be very active, works approximately 5 or 6 hours a day outside. Generalized arthritic complaints are the same as before. Routine health maintenance and medical care is up-to-date. No recent infections, no recent hospitalizations. Review of Systems   Constitutional: Negative. HENT: Negative. Eyes: Negative. Respiratory: Negative. Cardiovascular: Negative. Gastrointestinal: Negative. Endocrine: Negative. Genitourinary: Negative. Musculoskeletal: Negative. Skin: Negative. Allergic/Immunologic: Negative. Neurological: Negative. Hematological: Negative. Psychiatric/Behavioral: Negative. All other systems reviewed and are negative.     Patient Active Problem List   Diagnosis   • Arthritis   • Benign essential HTN   • Cardiomyopathy ([address])   • Chronic lymphocytic leukemia ([address])   • CKD (chronic kidney disease) stage 3, GFR 30-59 ml/min ([facility])   • Diabetic neuropathy ([address])   • H/O Clostridium difficile infection   • Hx of malignant carcinoid tumor of large intestine   • Type 2 diabetes mellitus with diabetic neuropathy, without long-term current use of insulin (HCC)   • History of malignant neoplasm of appendix   • Status post transmetatarsal amputation of left foot (HCC)   • Elevated serum protein level   • Hypothyroidism   • Diabetic nephropathy associated with type 2 diabetes mellitus ([address])     Past Medical History:   Diagnosis Date   • Arthritis     Spine   • Cancer [facility])    • 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   • Chronic lymphocytic leukemia of B-cell type ([address])     "remission 4-5yrs"-chemo   • DDD (degenerative disc disease), cervical    • Diabetes mellitus ([address])     bs checked by pt at home at 6am =92   • Diabetic neuropathy ([address])    • Diabetic retinopathy ([address])    • Elevated WBC count    • Heart attack ([address]) 04/2015    "labeled as that and than tested later after appendix removed and stress test showed negative"   • History of cancer chemotherapy     last treatment approx 5yrs ago   • Hypertension    • Myocardial infarction ([address]) 04/2015   • Scalp psoriasis    • Shingles 2/10-15   • Toe injury     left great toe, - 2017-internal braec-to be removed today 3/30/2018   • Wears glasses      Past Surgical History:   Procedure Laterality Date   • APPENDECTOMY     • BONE BIOPSY Left 3/30/2018    Procedure: GREAT TOE BONE BIOPSY;  Surgeon: Jamilah Hargrove DPM;  [facility]: [facility];  Service: Podiatry   • BOWEL RESECTION     • CATARACT EXTRACTION Bilateral    • CHOLECYSTECTOMY     • COLECTOMY  06/2015   • EYE SURGERY Right     "retinal peel"   • FOOT FUSION Left 12/6/2017    Procedure: [place];  Surgeon: Jamilah Hargrove DPM;  [facility];  Service: Podiatry   • HARDWARE REMOVAL Left 4/18/2018    Procedure: REMOVAL STAPLES LEFT TOE;  Surgeon: Jamilah Hargrove DPM;  [facility];  Service: Podiatry • ORIF FOOT FRACTURE Left 2017    Procedure: HALLUX INTERPHALANGEAL JOINT INTERNAL FIXATION; REPAIR OF ULCERATION WITH EXCISIONS AND REVISION OF SKIN HALLUX WITH USE OF ALLOGRAFT;  Surgeon: Kal Avina DPM;  [facility];  Service: Podiatry   • CA COLONOSCOPY FLX DX W/COLLJ SPEC WHEN PFRMD N/A 2016    Procedure: COLONOSCOPY;  Surgeon: Lisa Cid DO;  [facility]: [facility]; Service: Gastroenterology   • CA REMOVAL IMPLANT DEEP Left 3/30/2018    Procedure: REMOVAL HARDWARE FOOT;  Surgeon: Kal Avina DPM;  [facility];  Service: Podiatry   • RETINAL DETACHMENT SURGERY     • TONSILLECTOMY       Family History   Problem Relation Age of Onset   • Alcohol abuse Mother            • Pancreatic cancer Father         age 78   • Hashimoto's thyroiditis Daughter    • Rheum arthritis Daughter         Juvenile Rheum Arthitis    • No Known Problems Sister          age 66   • No Known Problems Brother          80   • Kidney failure Brother         dialysis, 76   • Diabetes Brother      Social History     Socioeconomic History   • Marital status: /Civil Union     Spouse name: Not on file   • Number of children: Not on file   • Years of education: 4 yr college   • Highest education level: Not on file   Occupational History   • Not on file   Tobacco Use   • Smoking status: Never   • Smokeless tobacco: Never   Vaping Use   • Vaping Use: Never used   Substance and Sexual Activity   • Alcohol use: No   • Drug use: No   • Sexual activity: Not Currently     Partners: Female     Birth control/protection: None   Other Topics Concern   • Not on file   Social History Narrative    Most recent tobacco use screenin2018    Education: 4 Year College    Marital status:     Exercise level: Occasional    Diet: Diabetic    General stress level: Low    Alcohol intake: None    Caffeine intake:  Moderate    Guns present in home: No    Smoke alarm in home: Yes     Social Determinants of Health     Financial Resource Strain: Unknown (9/5/2023)    Overall Financial Resource Strain (CARDIA)    • Difficulty of Paying Living Expenses: Patient refused   Food Insecurity: Not on file   Transportation Needs: No Transportation Needs (9/5/2023)    PRAPARE - Transportation    • Lack of Transportation (Medical): No    • Lack of Transportation (Non-Medical):  No   Physical Activity: Not on file   Stress: Not on file   Social Connections: Not on file   Intimate Partner Violence: Not on file   Housing Stability: Not on file       Current Outpatient Medications:   •  amLODIPine (NORVASC) 10 mg tablet, Take 1 tablet (10 mg total) by mouth daily, Disp: 90 tablet, Rfl: 0  •  brimonidine tartrate 0.2 % ophthalmic solution, INSTILL 1 DROP INTO EACH EYE TWICE DAILY, Disp: , Rfl:   •  glipiZIDE (GLUCOTROL XL) 10 mg 24 hr tablet, Take 1 tablet (10 mg total) by mouth 2 (two) times a day, Disp: 180 tablet, Rfl: 0  •  Glucosamine HCl (GLUCOSAMINE PO), Take 1 tablet by mouth 2 (two) times a day , Disp: , Rfl:   •  levothyroxine (Synthroid) 75 mcg tablet, Take 1 tablet (75 mcg total) by mouth daily, Disp: 90 tablet, Rfl: 1  •  losartan (COZAAR) 100 MG tablet, Take 1 tablet (100 mg total) by mouth daily, Disp: 90 tablet, Rfl: 0  •  metFORMIN (GLUCOPHAGE-XR) 500 mg 24 hr tablet, Take 2 tablets (1,000 mg total) by mouth 2 (two) times a day with meals, Disp: 360 tablet, Rfl: 0  •  Multiple Vitamin (MULTIVITAMIN) tablet, Take 1 tablet by mouth daily, Disp: , Rfl:   •  Omega-3 Fatty Acids (FISH OIL PO), Take by mouth daily, Disp: , Rfl:   •  simvastatin (ZOCOR) 20 mg tablet, Take 1 tablet (20 mg total) by mouth daily at bedtime, Disp: 90 tablet, Rfl: 1  •  timolol (TIMOPTIC) 0.5 % ophthalmic solution, timolol maleate 0.5 % eye drops, Disp: , Rfl:   •  Multiple Vitamins-Minerals (OCUVITE PO), Take 1 tablet by mouth daily , Disp: , Rfl:     Allergies   Allergen Reactions   • Lisinopril Cough     cough   • Oxycodone-Acetaminophen      hallucination     Vitals:    09/07/23 0817   BP: 150/70   Pulse: 72   Resp: 18   SpO2: 97%     Physical Exam  Constitutional:       Appearance: He is well-developed. HENT:      Head: Normocephalic and atraumatic. Right Ear: External ear normal.      Left Ear: External ear normal.   Eyes:      Conjunctiva/sclera: Conjunctivae normal.      Pupils: Pupils are equal, round, and reactive to light. Cardiovascular:      Rate and Rhythm: Normal rate and regular rhythm. Heart sounds: Normal heart sounds. Pulmonary:      Effort: Pulmonary effort is normal.      Breath sounds: Normal breath sounds. Abdominal:      General: Bowel sounds are normal.      Palpations: Abdomen is soft. Musculoskeletal:         General: Normal range of motion. Cervical back: Normal range of motion and neck supple. Skin:     General: Skin is warm. Neurological:      Mental Status: He is alert and oriented to person, place, and time. Deep Tendon Reflexes: Reflexes are normal and symmetric. Psychiatric:         Behavior: Behavior normal.         Thought Content: Thought content normal.         Judgment: Judgment normal.     Extremities: No lower extreme edema bilaterally, no cords, pulses are 1+  Lymphatics: No adenopathy in the neck, supraclavicular region, axilla, pre-/postauricular area, occipital area and inguinal area bilaterally    Labs    6/20/2023 WBC = 29.5 hemoglobin = 12.2 hematocrit = 37.3 platelet = 647 neutrophil = 11% bands = 1% lymphocyte = 85% monocyte = 0% eosinophil = 3% BUN = 22 creatinine = 1.34 LFTs WNL total protein = 8.1 LDH = 121        [facility]    11/4/2019 CAT scan chest abdomen pelvis with contrast    IMPRESSION:     1. No evidence of metastatic disease in the chest, abdomen, or pelvis.   No pathologic lymphadenopathy.     2.  Small distal esophageal varices of uncertain etiology and clinical significance, stable over multiple prior studies dating back to at least 2015. No evidence of liver disease or portal hypertension.     Pathology

## 2023-09-08 NOTE — PROGRESS NOTES
[address]  coding opportunities        E11 22  Chart Reviewed number of suggestions sent to Provider: 1     Patients Insurance     Medicare Insurance: [employer] [Chaperone Present] : A chaperone was present in the examining room during all aspects of the physical examination [Appropriately responsive] : appropriately responsive [Alert] : alert [No Acute Distress] : no acute distress [No Lymphadenopathy] : no lymphadenopathy [Soft] : soft [Non-tender] : non-tender [Non-distended] : non-distended [No HSM] : No HSM [No Lesions] : no lesions [No Mass] : no mass [Oriented x3] : oriented x3 [Examination Of The Breasts] : a normal appearance [No Masses] : no breast masses were palpable [Labia Majora] : normal [Labia Minora] : normal [Normal] : normal [Uterine Adnexae] : normal [Declined] : Patient declined rectal exam

## 2023-09-12 ENCOUNTER — OFFICE VISIT (OUTPATIENT)
Dept: FAMILY MEDICINE CLINIC | Facility: CLINIC | Age: 78
End: 2023-09-12
Payer: MEDICARE

## 2023-09-12 VITALS
HEART RATE: 75 BPM | SYSTOLIC BLOOD PRESSURE: 160 MMHG | WEIGHT: 189 LBS | DIASTOLIC BLOOD PRESSURE: 84 MMHG | HEIGHT: 66 IN | TEMPERATURE: 97.6 F | OXYGEN SATURATION: 96 % | RESPIRATION RATE: 14 BRPM | BODY MASS INDEX: 30.37 KG/M2

## 2023-09-12 DIAGNOSIS — Z12.11 COLON CANCER SCREENING: ICD-10-CM

## 2023-09-12 DIAGNOSIS — E11.42 DIABETIC POLYNEUROPATHY ASSOCIATED WITH TYPE 2 DIABETES MELLITUS (HCC): ICD-10-CM

## 2023-09-12 DIAGNOSIS — N18.31 STAGE 3A CHRONIC KIDNEY DISEASE (HCC): ICD-10-CM

## 2023-09-12 DIAGNOSIS — Z23 ENCOUNTER FOR IMMUNIZATION: ICD-10-CM

## 2023-09-12 DIAGNOSIS — E03.9 ACQUIRED HYPOTHYROIDISM: ICD-10-CM

## 2023-09-12 DIAGNOSIS — Z89.432 STATUS POST TRANSMETATARSAL AMPUTATION OF LEFT FOOT (HCC): ICD-10-CM

## 2023-09-12 DIAGNOSIS — E11.42 TYPE 2 DIABETES MELLITUS WITH DIABETIC POLYNEUROPATHY, WITHOUT LONG-TERM CURRENT USE OF INSULIN (HCC): ICD-10-CM

## 2023-09-12 DIAGNOSIS — E11.40 TYPE 2 DIABETES MELLITUS WITH DIABETIC NEUROPATHY, WITHOUT LONG-TERM CURRENT USE OF INSULIN (HCC): ICD-10-CM

## 2023-09-12 DIAGNOSIS — Z00.00 MEDICARE ANNUAL WELLNESS VISIT, SUBSEQUENT: Primary | ICD-10-CM

## 2023-09-12 DIAGNOSIS — I10 BENIGN ESSENTIAL HTN: ICD-10-CM

## 2023-09-12 PROCEDURE — G0009 ADMIN PNEUMOCOCCAL VACCINE: HCPCS

## 2023-09-12 PROCEDURE — 90677 PCV20 VACCINE IM: CPT

## 2023-09-12 PROCEDURE — G0439 PPPS, SUBSEQ VISIT: HCPCS | Performed by: FAMILY MEDICINE

## 2023-09-12 PROCEDURE — 99214 OFFICE O/P EST MOD 30 MIN: CPT | Performed by: FAMILY MEDICINE

## 2023-09-12 RX ORDER — METFORMIN HYDROCHLORIDE 500 MG/1
1000 TABLET, EXTENDED RELEASE ORAL 2 TIMES DAILY WITH MEALS
Qty: 360 TABLET | Refills: 1 | Status: SHIPPED | OUTPATIENT
Start: 2023-09-12

## 2023-09-12 RX ORDER — GLIPIZIDE 10 MG/1
10 TABLET, FILM COATED, EXTENDED RELEASE ORAL 2 TIMES DAILY
Qty: 180 TABLET | Refills: 1 | Status: SHIPPED | OUTPATIENT
Start: 2023-09-12

## 2023-09-12 RX ORDER — LOSARTAN POTASSIUM 100 MG/1
100 TABLET ORAL DAILY
Qty: 90 TABLET | Refills: 1 | Status: SHIPPED | OUTPATIENT
Start: 2023-09-12

## 2023-09-12 RX ORDER — AMLODIPINE BESYLATE 10 MG/1
10 TABLET ORAL DAILY
Qty: 90 TABLET | Refills: 1 | Status: SHIPPED | OUTPATIENT
Start: 2023-09-12

## 2023-09-12 RX ORDER — LEVOTHYROXINE SODIUM 0.07 MG/1
75 TABLET ORAL DAILY
Qty: 90 TABLET | Refills: 1 | Status: SHIPPED | OUTPATIENT
Start: 2023-09-12

## 2023-09-12 RX ORDER — SIMVASTATIN 20 MG
20 TABLET ORAL
Qty: 90 TABLET | Refills: 1 | Status: SHIPPED | OUTPATIENT
Start: 2023-09-12

## 2023-09-12 RX ORDER — CARVEDILOL 3.12 MG/1
3.12 TABLET ORAL 2 TIMES DAILY WITH MEALS
Qty: 180 TABLET | Refills: 1 | Status: SHIPPED | OUTPATIENT
Start: 2023-09-12 | End: 2024-09-06

## 2023-09-12 NOTE — PROGRESS NOTES
Diabetic Foot Exam    Patient's shoes and socks removed. Right Foot/Ankle   Right Foot Inspection  Skin Exam: skin normal and skin intact. No dry skin, no warmth, no callus, no erythema, no maceration, no abnormal color, no pre-ulcer, no ulcer and no callus. Toe Exam: ROM and strength within normal limits. Vascular  Capillary refills: < 3 seconds  The right DP pulse is 1+. Left Foot/Ankle  Left Foot Inspection  Skin Exam: skin intact. No erythema and no maceration. Amputation: amputation left foot (Comments: transmetatarsal amputation)    Assign Risk Category  No deformity present  Loss of protective sensation  No weak pulses  Risk: 1       Assessment and Plan:     Problem List Items Addressed This Visit        Endocrine    Diabetic neuropathy ([address])     Continue to optimize blood sugar control. Continue podiatry follow-up. Lab Results   Component Value Date    HGBA1C 7.8 (H) 06/20/2023            Relevant Medications    metFORMIN (GLUCOPHAGE-XR) 500 mg 24 hr tablet    glipiZIDE (GLUCOTROL XL) 10 mg 24 hr tablet    Type 2 diabetes mellitus with diabetic neuropathy, without long-term current use of insulin ([facility])     Most recent A1c was 7.8. He will get this rechecked next month to get a more up-to-date reading. At that point we will decide if any medications need to be adjusted. Will continue to monitor creatinine. (He is on metformin XR 1000 mg twice daily)  Obtain copy of diabetic eye exam report. Lab Results   Component Value Date    HGBA1C 7.8 (H) 06/20/2023            Relevant Medications    simvastatin (ZOCOR) 20 mg tablet    metFORMIN (GLUCOPHAGE-XR) 500 mg 24 hr tablet    glipiZIDE (GLUCOTROL XL) 10 mg 24 hr tablet    Hypothyroidism     Repeat TSH. Adjust levothyroxine accordingly. Currently on 75 mcg daily.   Lab Results   Component Value Date    TBV5OKQWLHCJ 4.596 (H) 06/20/2023              Relevant Medications    levothyroxine (Synthroid) 75 mcg tablet    carvedilol (Coreg) 3.125 mg tablet    Other Relevant Orders    TSH, 3rd generation with Free T4 reflex    TSH, 3rd generation with Free T4 reflex       Cardiovascular and Mediastinum    Benign essential HTN     Uncontrolled today. Continue losartan 100 mg daily, amlodipine 10 mg daily, and add carvedilol 3.125 mg twice daily. Reviewed use and side effects of medication. He will monitor home blood pressure readings and let me know how these look. Labs ordered for now and for prior to his next follow-up visit. Relevant Medications    losartan (COZAAR) 100 MG tablet    amLODIPine (NORVASC) 10 mg tablet    carvedilol (Coreg) 3.125 mg tablet    Other Relevant Orders    Comprehensive metabolic panel    CBC and differential    CBC and differential    Comprehensive metabolic panel       Genitourinary    CKD (chronic kidney disease) stage 3, GFR 30-59 ml/min [facility])     Lab Results   Component Value Date    EGFR 51 06/20/2023    EGFR 54 03/03/2023    EGFR 55 10/19/2022    CREATININE 1.34 (H) 06/20/2023    CREATININE 1.28 03/03/2023    CREATININE 1.25 10/19/2022   Continue to monitor. Other    Status post transmetatarsal amputation of left foot ([address])     Stable, follows up with podiatry.         Other Visit Diagnoses     Medicare annual wellness visit, subsequent    -  Primary    Colon cancer screening        Relevant Orders    Ambulatory referral to Gastroenterology    Type 2 diabetes mellitus with diabetic polyneuropathy, without long-term current use of insulin (HCC)        Relevant Medications    simvastatin (ZOCOR) 20 mg tablet    metFORMIN (GLUCOPHAGE-XR) 500 mg 24 hr tablet    glipiZIDE (GLUCOTROL XL) 10 mg 24 hr tablet    Other Relevant Orders    Comprehensive metabolic panel    Lipid Panel with Direct LDL reflex    Albumin / creatinine urine ratio    Hemoglobin A1C    CBC and differential    CBC and differential    Comprehensive metabolic panel    Hemoglobin A1C    Lipid Panel with Direct LDL reflex    Albumin / creatinine urine ratio    Encounter for immunization        Relevant Orders    Pneumococcal Conjugate Vaccine 20-valent (Pcv20) (Completed)        BMI Counseling: Body mass index is 30.51 kg/m². The BMI is above normal. Nutrition recommendations include encouraging healthy choices of fruits and vegetables and moderation in carbohydrate intake. Rationale for BMI follow-up plan is due to patient being overweight or obese. Depression Screening and Follow-up Plan: Patient was screened for depression during today's encounter. They screened negative with a PHQ-2 score of 0. Preventive health issues were discussed with patient, and age appropriate screening tests were ordered as noted in patient's After Visit Summary. Personalized health advice and appropriate referrals for health education or preventive services given if needed, as noted in patient's After Visit Summary. History of Present Illness:     Patient presents for a Medicare Wellness Visit    Pt here for f/u and medicare awv. DM- needs refills on everything. He notes that his last A1c was little higher than before. Labs were done in June. Diet- feels he can make some improvements. Not much exercise. Htn- hasn't been checking blood pressure at home. No headaches. Blood pressure was high last week also. He is taking medication as prescribed. No chest pain or shortness of breath. No dizziness or lightheadedness. No palpitations. He is moving his bowels regularly. No blood in the stool.   He was not aware that he is due for repeat colonoscopy, he will call to schedule this with GI.        HPI   Patient Care Team:  Ingrid Rosenberg MD as PCP - General (Family Medicine)  MD Mariel Vieira MD Wandalee Bennetts, MD Arland Shoemaker Chaput, DO as Endoscopist  Peter Taveras MD (Ophthalmology)     Review of Systems:     Review of Systems     Problem List:     Patient Active Problem List   Diagnosis   • Arthritis • Benign essential HTN   • Cardiomyopathy ([address])   • Chronic lymphocytic leukemia (HCC)   • CKD (chronic kidney disease) stage 3, GFR 30-59 ml/min (HCC)   • Diabetic neuropathy (HCC)   • H/O Clostridium difficile infection   • Hx of malignant carcinoid tumor of large intestine   • Type 2 diabetes mellitus with diabetic neuropathy, without long-term current use of insulin (HCC)   • History of malignant neoplasm of appendix   • Status post transmetatarsal amputation of left foot (HCC)   • Elevated serum protein level   • Hypothyroidism   • Diabetic nephropathy associated with type 2 diabetes mellitus ([address])      Past Medical and Surgical History:     Past Medical History:   Diagnosis Date   • Arthritis     Spine   • Cancer [facility])    • 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   • Chronic lymphocytic leukemia of B-cell type ([address])     "remission 4-5yrs"-chemo   • DDD (degenerative disc disease), cervical    • Diabetes mellitus ([address])     bs checked by pt at home at 6am =92   • Diabetic neuropathy ([address])    • Diabetic retinopathy ([address])    • Elevated WBC count    • Heart attack ([address]) 04/2015    "labeled as that and than tested later after appendix removed and stress test showed negative"   • History of cancer chemotherapy     last treatment approx 5yrs ago   • Hypertension    • Myocardial infarction ([address]) 04/2015   • Scalp psoriasis    • Shingles 2/10-15   • Toe injury     left great toe, - 2017-internal braec-to be removed today 3/30/2018   • Wears glasses      Past Surgical History:   Procedure Laterality Date   • APPENDECTOMY     • BONE BIOPSY Left 3/30/2018    Procedure: GREAT TOE BONE BIOPSY;  Surgeon: Joy Kovacs DPM;  [facility];  Service: Podiatry   • BOWEL RESECTION     • CATARACT EXTRACTION Bilateral    • CHOLECYSTECTOMY     • COLECTOMY  06/2015   • EYE SURGERY Right     "retinal peel"   • FOOT FUSION Left 12/6/2017    Procedure: Jaison;  Surgeon: Gerber Multani DPM;  [facility];  Service: Podiatry   • HARDWARE REMOVAL Left 2018    Procedure: REMOVAL STAPLES LEFT TOE;  Surgeon: Deborah Shay DPM;  [facility];  Service: Podiatry   • ORIF FOOT FRACTURE Left 2017    Procedure: HALLUX INTERPHALANGEAL JOINT INTERNAL FIXATION; REPAIR OF ULCERATION WITH EXCISIONS AND REVISION OF SKIN HALLUX WITH USE OF ALLOGRAFT;  Surgeon: Deborah Shay DPM;  [facility];  Service: Podiatry   • WY COLONOSCOPY FLX DX W/COLLJ SPEC WHEN PFRMD N/A 2016    Procedure: COLONOSCOPY;  Surgeon: Karolina Corona DO;  [facility]: [facility];   Service: Gastroenterology   • W[place] REMOVAL IMPLANT DEEP Left 3/30/2018    Procedure: REMOVAL HARDWARE FOOT;  Surgeon: Deborah Shay DPM;  [facility];  Service: Podiatry   • RETINAL DETACHMENT SURGERY     • TONSILLECTOMY        Family History:     Family History   Problem Relation Age of Onset   • Alcohol abuse Mother            • Pancreatic cancer Father         age 78   • Hashimoto's thyroiditis Daughter    • Rheum arthritis Daughter         Juvenile Rheum Arthitis    • No Known Problems Sister          age 66   • No Known Problems Brother          80   • Kidney failure Brother         dialysis, 76   • Diabetes Brother       Social History:     Social History     Socioeconomic History   • Marital status: /Civil Union     Spouse name: None   • Number of children: None   • Years of education: 4 yr college   • Highest education level: None   Occupational History   • None   Tobacco Use   • Smoking status: Never   • Smokeless tobacco: Never   Vaping Use   • Vaping Use: Never used   Substance and Sexual Activity   • Alcohol use: No   • Drug use: No   • Sexual activity: Not Currently     Partners: Female     Birth control/protection: None   Other Topics Concern   • None   Social History Narrative    Most recent tobacco use screenin2018    Education: [address] [place] [address] status:     Exercise level: Occasional    Diet: Diabetic    General stress level: Low    Alcohol intake: None    Caffeine intake: Moderate    Guns present in home: No    Smoke alarm in home: Yes     Social Determinants of Health     Financial Resource Strain: Unknown (9/5/2023)    Overall Financial Resource Strain (CARDIA)    • Difficulty of Paying Living Expenses: Patient refused   Food Insecurity: Not on file   Transportation Needs: No Transportation Needs (9/5/2023)    PRAPARE - Transportation    • Lack of Transportation (Medical): No    • Lack of Transportation (Non-Medical):  No   Physical Activity: Not on file   Stress: Not on file   Social Connections: Not on file   Intimate Partner Violence: Not on file   Housing Stability: Not on file      Medications and Allergies:     Current Outpatient Medications   Medication Sig Dispense Refill   • amLODIPine (NORVASC) 10 mg tablet Take 1 tablet (10 mg total) by mouth daily 90 tablet 1   • brimonidine tartrate 0.2 % ophthalmic solution INSTILL 1 DROP INTO EACH EYE TWICE DAILY     • carvedilol (Coreg) 3.125 mg tablet Take 1 tablet (3.125 mg total) by mouth 2 (two) times a day with meals 180 tablet 1   • glipiZIDE (GLUCOTROL XL) 10 mg 24 hr tablet Take 1 tablet (10 mg total) by mouth 2 (two) times a day 180 tablet 1   • Glucosamine HCl (GLUCOSAMINE PO) Take 1 tablet by mouth 2 (two) times a day      • levothyroxine (Synthroid) 75 mcg tablet Take 1 tablet (75 mcg total) by mouth daily 90 tablet 1   • losartan (COZAAR) 100 MG tablet Take 1 tablet (100 mg total) by mouth daily 90 tablet 1   • metFORMIN (GLUCOPHAGE-XR) 500 mg 24 hr tablet Take 2 tablets (1,000 mg total) by mouth 2 (two) times a day with meals 360 tablet 1   • Multiple Vitamin (MULTIVITAMIN) tablet Take 1 tablet by mouth daily     • Omega-3 Fatty Acids (FISH OIL PO) Take by mouth daily     • simvastatin (ZOCOR) 20 mg tablet Take 1 tablet (20 mg total) by mouth daily at bedtime 90 tablet 1   • timolol (TIMOPTIC) 0.5 % ophthalmic solution timolol maleate 0.5 % eye drops       No current facility-administered medications for this visit. Allergies   Allergen Reactions   • Lisinopril Cough     cough   • Oxycodone-Acetaminophen      hallucination      Immunizations:     Immunization History   Administered Date(s) Administered   • COVID-19 PFIZER VACCINE 0.3 ML IM 02/24/2021, 03/17/2021, 10/16/2021   • INFLUENZA 12/15/2009, 09/15/2010, 01/10/2012, 12/05/2013, 11/14/2014, 11/19/2014, 12/30/2014, 10/28/2015, 09/12/2016, 12/14/2017, 10/08/2018, 09/10/2020, 09/10/2020   • Influenza Split High Dose Preservative Free IM 11/25/2019   • Influenza, high dose seasonal 0.7 mL 09/10/2020   • Pneumococcal Conjugate 13-Valent 05/10/2016   • Pneumococcal Conjugate Vaccine 20-valent (Pcv20), Polysace 09/12/2023   • Pneumococcal Polysaccharide PPV23 03/27/2015   • Tdap 02/25/2019      Health Maintenance:         Topic Date Due   • Hepatitis C Screening  Completed         Topic Date Due   • COVID-19 Vaccine (4 - Booster for [place] series) 12/11/2021   • Influenza Vaccine (1) 09/01/2023      Medicare Screening Tests and Risk Assessments:     Micheline Urrutia is here for his Subsequent Wellness visit. Health Risk Assessment:   Patient rates overall health as good. Patient feels that their physical health rating is same. Patient is satisfied with their life. Eyesight was rated as same. Hearing was rated as same. Patient feels that their emotional and mental health rating is same. Patients states they are never, rarely angry. Patient states they are never, rarely unusually tired/fatigued. Pain experienced in the last 7 days has been none. Patient states that he has experienced no weight loss or gain in last 6 months. Depression Screening:   PHQ-2 Score: 0      Fall Risk Screening:    In the past year, patient has experienced: no history of falling in past year      Home Safety:  Patient does not have trouble with stairs inside or outside of their home. Patient has working smoke alarms and has no working carbon monoxide detector. Home safety hazards include: none. Nutrition:   Current diet is Diabetic. Medications:   Patient is currently taking over-the-counter supplements. OTC medications include: Osteo bi flex, eye vitamins. Patient is able to manage medications. Activities of Daily Living (ADLs)/Instrumental Activities of Daily Living (IADLs):   Walk and transfer into and out of bed and chair?: Yes  Dress and groom yourself?: Yes    Bathe or shower yourself?: Yes    Feed yourself? Yes  Do your laundry/housekeeping?: Yes  Manage your money, pay your bills and track your expenses?: Yes  Make your own meals?: Yes    Do your own shopping?: Yes    Previous Hospitalizations:   Any hospitalizations or ED visits within the last 12 months?: No      Advance Care Planning:   Living will: No    Durable POA for healthcare: No    Advanced directive: No    Advanced directive counseling given: Yes      Comments: Declines advanced directive/ POA    Cognitive Screening:   Provider or family/friend/caregiver concerned regarding cognition?: No    PREVENTIVE SCREENINGS      Cardiovascular Screening:    General: Screening Current      Diabetes Screening:     General: Screening Not Indicated and History Diabetes      Colorectal Cancer Screening:     General: History Colorectal Cancer      Prostate Cancer Screening:    General: Screening Not Indicated      Lung Cancer Screening:     General: Screening Not Indicated      Hepatitis C Screening:    General: Screening Current    Screening, Brief Intervention, and Referral to Treatment (SBIRT)    Screening  Typical number of drinks in a day: 0  Typical number of drinks in a week: 0  Interpretation: Low risk drinking behavior. AUDIT-C Screenin) How often did you have a drink containing alcohol in the past year? never  2) How many drinks did you have on a typical day when you were drinking in the past year? 0  3) How often did you have 6 or more drinks on one occasion in the past year? never    AUDIT-C Score: 0  Interpretation: Score 0-3 (male): Negative screen for alcohol misuse    Single Item Drug Screening:  How often have you used an illegal drug (including marijuana) or a prescription medication for non-medical reasons in the past year? never    Single Item Drug Screen Score: 0  Interpretation: Negative screen for possible drug use disorder    No results found. Physical Exam:     /84   Pulse 75   Temp 97.6 °F (36.4 °C)   Resp 14   Ht 5' 6" (1.676 m)   Wt 85.7 kg (189 lb)   SpO2 96%   BMI 30.51 kg/m²     Physical Exam  Vitals and nursing note reviewed. Constitutional:       General: He is not in acute distress. Appearance: Normal appearance. He is well-developed. He is not ill-appearing. HENT:      Head: Normocephalic and atraumatic. Eyes:      Conjunctiva/sclera: Conjunctivae normal.   Neck:      Vascular: No carotid bruit. Cardiovascular:      Rate and Rhythm: Normal rate and regular rhythm. Pulses: no weak pulses          Dorsalis pedis pulses are 1+ on the right side. Heart sounds: No murmur heard. Pulmonary:      Effort: Pulmonary effort is normal. No respiratory distress. Breath sounds: Normal breath sounds. Abdominal:      Palpations: Abdomen is soft. Tenderness: There is no abdominal tenderness. Musculoskeletal:      Cervical back: Neck supple. Right lower leg: No edema. Left lower leg: No edema. Feet:       Comments: L transmetatarsal amputation   Feet:      Right foot:      Skin integrity: No ulcer, skin breakdown, erythema, warmth, callus or dry skin. Left foot: amputated     Skin integrity: No skin breakdown or erythema. Skin:     General: Skin is warm and dry. Capillary Refill: Capillary refill takes less than 2 seconds. Neurological:      Mental Status: He is alert and oriented to person, place, and time. Psychiatric:         Mood and Affect: Mood normal.         Behavior: Behavior normal.        Luis Carlos Christianson MD

## 2023-09-12 NOTE — PATIENT INSTRUCTIONS
Continue the same medications, but add on coreg 3.125 mg twice a day for better BP control. Monitor your BP 2-3 times a week at home and let me know after 2-3 weeks how your readings look. Get labs done in October and again prior to your next office visit. Medicare Preventive Visit Patient Instructions  Thank you for completing your Welcome to Medicare Visit or Medicare Annual Wellness Visit today. Your next wellness visit will be due in one year (9/12/2024). The screening/preventive services that you may require over the next 5-10 years are detailed below. Some tests may not apply to you based off risk factors and/or age. Screening tests ordered at today's visit but not completed yet may show as past due. Also, please note that scanned in results may not display below. Preventive Screenings:  Service Recommendations Previous Testing/Comments   Colorectal Cancer Screening  Colonoscopy    Fecal Occult Blood Test (FOBT)/Fecal Immunochemical Test (FIT)  Fecal DNA/Cologuard Test  Flexible Sigmoidoscopy Age: 43-73 years old   Colonoscopy: every 10 years (May be performed more frequently if at higher risk)  OR  FOBT/FIT: every 1 year  OR  Cologuard: every 3 years  OR  Sigmoidoscopy: every 5 years  Screening may be recommended earlier than age 39 if at higher risk for colorectal cancer. Also, an individualized decision between you and your healthcare provider will decide whether screening between the ages of 77-80 would be appropriate.  Colonoscopy: 04/21/2016  FOBT/FIT: Not on file  Cologuard: Not on file  Sigmoidoscopy: Not on file    History Colorectal Cancer     Prostate Cancer Screening Individualized decision between patient and health care provider in men between ages of 53-66   Medicare will cover every 12 months beginning on the day after your 50th birthday PSA: No results in last 5 years     Screening Not Indicated     Hepatitis C Screening Once for adults born between [birth year] and 1964  More frequently in patients at high risk for Hepatitis C Hep C Antibody: 04/22/2018    Screening Current   Diabetes Screening 1-2 times per year if you're at risk for diabetes or have pre-diabetes Fasting glucose: 105 mg/dL (6/20/2023)  A1C: 7.8 % (6/20/2023)  Screening Not Indicated  History Diabetes   Cholesterol Screening Once every 5 years if you don't have a lipid disorder. May order more often based on risk factors. Lipid panel: 06/20/2023  Screening Current      Other Preventive Screenings Covered by Medicare:  Abdominal Aortic Aneurysm (AAA) Screening: covered once if your at risk. You're considered to be at risk if you have a family history of AAA or a male between the age of 70-76 who smoking at least 100 cigarettes in your lifetime. Lung Cancer Screening: covers low dose CT scan once per year if you meet all of the following conditions: (1) Age 48-67; (2) No signs or symptoms of lung cancer; (3) Current smoker or have quit smoking within the last 15 years; (4) You have a tobacco smoking history of at least 20 pack years (packs per day x number of years you smoked); (5) You get a written order from a healthcare provider. Glaucoma Screening: covered annually if you're considered high risk: (1) You have diabetes OR (2) Family history of glaucoma OR (3)  aged 48 and older OR (3)  American aged 72 and older  Osteoporosis Screening: covered every 2 years if you meet one of the following conditions: (1) Have a vertebral abnormality; (2) On glucocorticoid therapy for more than 3 months; (3) Have primary hyperparathyroidism; (4) On osteoporosis medications and need to assess response to drug therapy. HIV Screening: covered annually if you're between the age of 14-79. Also covered annually if you are younger than 13 and older than 72 with risk factors for HIV infection. For pregnant patients, it is covered up to 3 times per pregnancy.     Immunizations:  Immunization Recommendations   Influenza Vaccine Annual influenza vaccination during flu season is recommended for all persons aged >= 6 months who do not have contraindications   Pneumococcal Vaccine   * Pneumococcal conjugate vaccine = PCV13 (Prevnar 13), PCV15 (Vaxneuvance), PCV20 (Prevnar 20)  * Pneumococcal polysaccharide vaccine = PPSV23 (Pneumovax) Adults 20-63 years old: 1-3 doses may be recommended based on certain risk factors  Adults 72 years old: 1-2 doses may be recommended based off what pneumonia vaccine you previously received   Hepatitis B Vaccine 3 dose series if at intermediate or high risk (ex: diabetes, end stage renal disease, liver disease)   Tetanus (Td) Vaccine - COST NOT COVERED BY MEDICARE PART B Following completion of primary series, a booster dose should be given every 10 years to maintain immunity against tetanus. Td may also be given as tetanus wound prophylaxis. Tdap Vaccine - COST NOT COVERED BY MEDICARE PART B Recommended at least once for all adults. For pregnant patients, recommended with each pregnancy. Shingles Vaccine (Shingrix) - COST NOT COVERED BY MEDICARE PART B  2 shot series recommended in those aged 48 and above     Health Maintenance Due:      Topic Date Due    Hepatitis C Screening  Completed     Immunizations Due:      Topic Date Due    COVID-19 Vaccine (4 - Booster for Pfizer series) 12/11/2021    Influenza Vaccine (1) 09/01/2023     Advance Directives   What are advance directives? Advance directives are legal documents that state your wishes and plans for medical care. These plans are made ahead of time in case you lose your ability to make decisions for yourself. Advance directives can apply to any medical decision, such as the treatments you want, and if you want to donate organs. What are the types of advance directives? There are many types of advance directives, and each state has rules about how to use them. You may choose a combination of any of the following:  Living will:   This is a written record of the treatment you want. You can also choose which treatments you do not want, which to limit, and which to stop at a certain time. This includes surgery, medicine, IV fluid, and tube feedings. Durable power of  for [facility]): This is a written record that states who you want to make healthcare choices for you when you are unable to make them for yourself. This person, called a proxy, is usually a family member or a friend. You may choose more than 1 proxy. Do not resuscitate (DNR) order:  A DNR order is used in case your heart stops beating or you stop breathing. It is a request not to have certain forms of treatment, such as CPR. A DNR order may be included in other types of advance directives. Medical directive: This covers the care that you want if you are in a coma, near death, or unable to make decisions for yourself. You can list the treatments you want for each condition. Treatment may include pain medicine, surgery, blood transfusions, dialysis, IV or tube feedings, and a ventilator (breathing machine). Values history: This document has questions about your views, beliefs, and how you feel and think about life. This information can help others choose the care that you would choose. Why are advance directives important? An advance directive helps you control your care. Although spoken wishes may be used, it is better to have your wishes written down. Spoken wishes can be misunderstood, or not followed. Treatments may be given even if you do not want them. An advance directive may make it easier for your family to make difficult choices about your care. Weight Management   Why it is important to manage your weight:  Being overweight increases your risk of health conditions such as heart disease, high blood pressure, type 2 diabetes, and certain types of cancer. It can also increase your risk for osteoarthritis, sleep apnea, and other respiratory problems.  Aim for a slow, steady weight loss. Even a small amount of weight loss can lower your risk of health problems. How to lose weight safely:  A safe and healthy way to lose weight is to eat fewer calories and get regular exercise. You can lose up about 1 pound a week by decreasing the number of calories you eat by 500 calories each day. Healthy meal plan for weight management:  A healthy meal plan includes a variety of foods, contains fewer calories, and helps you stay healthy. A healthy meal plan includes the following:  Eat whole-grain foods more often. A healthy meal plan should contain fiber. Fiber is the part of grains, fruits, and vegetables that is not broken down by your body. Whole-grain foods are healthy and provide extra fiber in your diet. Some examples of whole-grain foods are whole-wheat breads and pastas, oatmeal, brown rice, and bulgur. Eat a variety of vegetables every day. Include dark, leafy greens such as spinach, kale, anabel greens, and mustard greens. Eat yellow and orange vegetables such as carrots, sweet potatoes, and winter squash. Eat a variety of fruits every day. Choose fresh or canned fruit (canned in its own juice or light syrup) instead of juice. Fruit juice has very little or no fiber. Eat low-fat dairy foods. Drink fat-free (skim) milk or 1% milk. Eat fat-free yogurt and low-fat cottage cheese. Try low-fat cheeses such as mozzarella and other reduced-fat cheeses. Choose meat and other protein foods that are low in fat. Choose beans or other legumes such as split peas or lentils. Choose fish, skinless poultry (chicken or turkey), or lean cuts of red meat (beef or pork). Before you cook meat or poultry, cut off any visible fat. Use less fat and oil. Try baking foods instead of frying them. Add less fat, such as margarine, sour cream, regular salad dressing and mayonnaise to foods. Eat fewer high-fat foods.  Some examples of high-fat foods include french fries, doughnuts, ice cream, and cakes. Eat fewer sweets. Limit foods and drinks that are high in sugar. This includes candy, cookies, regular soda, and sweetened drinks. Exercise:  Exercise at least 30 minutes per day on most days of the week. Some examples of exercise include walking, biking, dancing, and swimming. You can also fit in more physical activity by taking the stairs instead of the elevator or parking farther away from stores. Ask your healthcare provider about the best exercise plan for you. © Copyright [employer] 2018 Information is for End User's use only and may not be sold, redistributed or otherwise used for commercial purposes.  All illustrations and images included in CareNotes® are the copyrighted property of [employer] Inc[employer] or  [employer]

## 2023-09-14 NOTE — ASSESSMENT & PLAN NOTE
Repeat TSH. Adjust levothyroxine accordingly. Currently on 75 mcg daily.   Lab Results   Component Value Date    [id number] 4.596 (H) 06/20/2023

## 2023-09-14 NOTE — ASSESSMENT & PLAN NOTE
Most recent A1c was 7.8. He will get this rechecked next month to get a more up-to-date reading. At that point we will decide if any medications need to be adjusted. Will continue to monitor creatinine. (He is on metformin XR 1000 mg twice daily)  Obtain copy of diabetic eye exam report.   Lab Results   Component Value Date    HGBA1C 7.8 (H) 06/20/2023

## 2023-09-14 NOTE — ASSESSMENT & PLAN NOTE
Uncontrolled today. Continue losartan 100 mg daily, amlodipine 10 mg daily, and add carvedilol 3.125 mg twice daily. Reviewed use and side effects of medication. He will monitor home blood pressure readings and let me know how these look. Labs ordered for now and for prior to his next follow-up visit.

## 2023-09-14 NOTE — ASSESSMENT & PLAN NOTE
On ARB. Monitor urine albumin/creatinine  Optimize blood sugar control.   Lab Results   Component Value Date    HGBA1C 7.8 (H) 06/20/2023

## 2023-09-14 NOTE — ASSESSMENT & PLAN NOTE
Lab Results   Component Value Date    EGFR 51 06/20/2023    EGFR 54 03/03/2023    EGFR 55 10/19/2022    CREATININE 1.34 (H) 06/20/2023    CREATININE 1.28 03/03/2023    CREATININE 1.25 10/19/2022   Continue to monitor.

## 2023-09-14 NOTE — ASSESSMENT & PLAN NOTE
Continue to optimize blood sugar control. Continue podiatry follow-up.   Lab Results   Component Value Date    HGBA1C 7.8 (H) 06/20/2023

## 2023-10-23 ENCOUNTER — TELEPHONE (OUTPATIENT)
Age: 78
End: 2023-10-23

## 2023-10-23 NOTE — TELEPHONE ENCOUNTER
Mildred Bergman called to find out the status to forms she mailed to office a week ago. The form are for Diabetic shoes so Medicare can cover.     Forms were to be sent to [facility]    Please call Mildred Bergman with update on forms @ [phone]

## 2023-10-25 LAB
LEFT EYE DIABETIC RETINOPATHY: NORMAL
RIGHT EYE DIABETIC RETINOPATHY: NORMAL

## 2023-10-25 NOTE — TELEPHONE ENCOUNTER
Form has been faxed, scanned into patient's chart, and mailed out to [address] [place] [address] per patient's request

## 2023-10-25 NOTE — TELEPHONE ENCOUNTER
Form has been completed by Dr. Harsh Sanders and placed in mail folder in clinical area to be scanned into patient's chart and mailed to patient's podiatrist. Address included on form.

## 2024-02-27 ENCOUNTER — PATIENT MESSAGE (OUTPATIENT)
Dept: FAMILY MEDICINE CLINIC | Facility: CLINIC | Age: 79
End: 2024-02-27

## 2024-02-27 ENCOUNTER — APPOINTMENT (OUTPATIENT)
Dept: LAB | Facility: HOSPITAL | Age: 79
End: 2024-02-27
Payer: MEDICARE

## 2024-02-27 DIAGNOSIS — I10 BENIGN ESSENTIAL HTN: ICD-10-CM

## 2024-02-27 DIAGNOSIS — Z85.09 HISTORY OF MALIGNANT NEOPLASM OF APPENDIX: ICD-10-CM

## 2024-02-27 DIAGNOSIS — E03.9 ACQUIRED HYPOTHYROIDISM: ICD-10-CM

## 2024-02-27 DIAGNOSIS — E11.42 TYPE 2 DIABETES MELLITUS WITH DIABETIC POLYNEUROPATHY, WITHOUT LONG-TERM CURRENT USE OF INSULIN (HCC): ICD-10-CM

## 2024-02-27 LAB
ALBUMIN SERPL BCP-MCNC: 4.3 G/DL (ref 3.5–5)
ALP SERPL-CCNC: 84 U/L (ref 34–104)
ALT SERPL W P-5'-P-CCNC: 16 U/L (ref 7–52)
ANION GAP SERPL CALCULATED.3IONS-SCNC: 8 MMOL/L
AST SERPL W P-5'-P-CCNC: 17 U/L (ref 13–39)
BASOPHILS # BLD MANUAL: 0 THOUSAND/UL (ref 0–0.1)
BASOPHILS NFR MAR MANUAL: 0 % (ref 0–1)
BILIRUB SERPL-MCNC: 0.53 MG/DL (ref 0.2–1)
BUN SERPL-MCNC: 29 MG/DL (ref 5–25)
CALCIUM SERPL-MCNC: 9.6 MG/DL (ref 8.4–10.2)
CHLORIDE SERPL-SCNC: 102 MMOL/L (ref 96–108)
CHOLEST SERPL-MCNC: 129 MG/DL
CO2 SERPL-SCNC: 26 MMOL/L (ref 21–32)
CREAT SERPL-MCNC: 1.32 MG/DL (ref 0.6–1.3)
CREAT UR-MCNC: 61 MG/DL
EOSINOPHIL # BLD MANUAL: 0.3 THOUSAND/UL (ref 0–0.4)
EOSINOPHIL NFR BLD MANUAL: 1 % (ref 0–6)
ERYTHROCYTE [DISTWIDTH] IN BLOOD BY AUTOMATED COUNT: 14.5 % (ref 11.6–15.1)
EST. AVERAGE GLUCOSE BLD GHB EST-MCNC: 163 MG/DL
GFR SERPL CREATININE-BSD FRML MDRD: 51 ML/MIN/1.73SQ M
GLUCOSE P FAST SERPL-MCNC: 168 MG/DL (ref 65–99)
HBA1C MFR BLD: 7.3 %
HCT VFR BLD AUTO: 36.3 % (ref 36.5–49.3)
HDLC SERPL-MCNC: 39 MG/DL
HGB BLD-MCNC: 11.8 G/DL (ref 12–17)
LDH SERPL-CCNC: 133 U/L (ref 140–271)
LDLC SERPL CALC-MCNC: 71 MG/DL (ref 0–100)
LYMPHOCYTES # BLD AUTO: 24.22 THOUSAND/UL (ref 0.6–4.47)
LYMPHOCYTES # BLD AUTO: 81 % (ref 14–44)
MCH RBC QN AUTO: 31.2 PG (ref 26.8–34.3)
MCHC RBC AUTO-ENTMCNC: 32.5 G/DL (ref 31.4–37.4)
MCV RBC AUTO: 96 FL (ref 82–98)
MICROALBUMIN UR-MCNC: 529.6 MG/L
MICROALBUMIN/CREAT 24H UR: 868 MG/G CREATININE (ref 0–30)
MONOCYTES # BLD AUTO: 0.3 THOUSAND/UL (ref 0–1.22)
MONOCYTES NFR BLD: 1 % (ref 4–12)
NEUTROPHILS # BLD MANUAL: 5.08 THOUSAND/UL (ref 1.85–7.62)
NEUTS SEG NFR BLD AUTO: 17 % (ref 43–75)
PLATELET # BLD AUTO: 164 THOUSANDS/UL (ref 149–390)
PLATELET BLD QL SMEAR: ADEQUATE
PMV BLD AUTO: 10.1 FL (ref 8.9–12.7)
POTASSIUM SERPL-SCNC: 5.3 MMOL/L (ref 3.5–5.3)
PROT SERPL-MCNC: 7.9 G/DL (ref 6.4–8.4)
RBC # BLD AUTO: 3.78 MILLION/UL (ref 3.88–5.62)
RBC MORPH BLD: NORMAL
SMUDGE CELLS BLD QL SMEAR: PRESENT
SODIUM SERPL-SCNC: 136 MMOL/L (ref 135–147)
TRIGL SERPL-MCNC: 93 MG/DL
TSH SERPL DL<=0.05 MIU/L-ACNC: 4.42 UIU/ML (ref 0.45–4.5)
WBC # BLD AUTO: 29.9 THOUSAND/UL (ref 4.31–10.16)

## 2024-02-27 PROCEDURE — 36415 COLL VENOUS BLD VENIPUNCTURE: CPT

## 2024-02-27 PROCEDURE — 85027 COMPLETE CBC AUTOMATED: CPT

## 2024-02-27 PROCEDURE — 83036 HEMOGLOBIN GLYCOSYLATED A1C: CPT

## 2024-02-27 PROCEDURE — 80053 COMPREHEN METABOLIC PANEL: CPT

## 2024-02-27 PROCEDURE — 83615 LACTATE (LD) (LDH) ENZYME: CPT

## 2024-02-27 PROCEDURE — 80061 LIPID PANEL: CPT

## 2024-02-27 PROCEDURE — 82043 UR ALBUMIN QUANTITATIVE: CPT

## 2024-02-27 PROCEDURE — 82570 ASSAY OF URINE CREATININE: CPT

## 2024-02-27 PROCEDURE — 84443 ASSAY THYROID STIM HORMONE: CPT

## 2024-02-27 PROCEDURE — 85007 BL SMEAR W/DIFF WBC COUNT: CPT

## 2024-03-07 ENCOUNTER — OFFICE VISIT (OUTPATIENT)
Dept: HEMATOLOGY ONCOLOGY | Facility: CLINIC | Age: 79
End: 2024-03-07
Payer: MEDICARE

## 2024-03-07 VITALS
OXYGEN SATURATION: 96 % | WEIGHT: 193 LBS | SYSTOLIC BLOOD PRESSURE: 144 MMHG | RESPIRATION RATE: 15 BRPM | BODY MASS INDEX: 31.02 KG/M2 | TEMPERATURE: 98 F | DIASTOLIC BLOOD PRESSURE: 72 MMHG | HEART RATE: 68 BPM | HEIGHT: 66 IN

## 2024-03-07 DIAGNOSIS — I50.9 ACUTE CONGESTIVE HEART FAILURE, UNSPECIFIED HEART FAILURE TYPE (HCC): ICD-10-CM

## 2024-03-07 DIAGNOSIS — C91.10 CHRONIC LYMPHOCYTIC LEUKEMIA (HCC): Primary | ICD-10-CM

## 2024-03-07 DIAGNOSIS — I42.9 CARDIOMYOPATHY, UNSPECIFIED TYPE (HCC): ICD-10-CM

## 2024-03-07 DIAGNOSIS — E11.42 DIABETIC POLYNEUROPATHY ASSOCIATED WITH TYPE 2 DIABETES MELLITUS (HCC): ICD-10-CM

## 2024-03-07 DIAGNOSIS — N18.31 STAGE 3A CHRONIC KIDNEY DISEASE (HCC): ICD-10-CM

## 2024-03-07 PROCEDURE — 99214 OFFICE O/P EST MOD 30 MIN: CPT | Performed by: INTERNAL MEDICINE

## 2024-03-07 NOTE — PROGRESS NOTES
Abelino Renee  1945  [address] [facility] [place]  [address]  [place] [id number]    DISCUSSION/SUMMARY:    78-year-old male with history of CLL/SLL previously treated with BR.  Patient then started maintenance Rituxan but had CBC side effects; Rituxan was held.  Since that time, the plan has been surveillance.    Presently Mr. Renee feels well and clinically there are no concerning findings, no adenopathy.  Recent blood work was okay/acceptable.  White count is elevated but about the same as before.  H/H levels and platelet count are good/acceptable.  LFTs are also within normal limits.  Renal function is elevated but about the same as before (renal evaluation is pending).  Patient has no symptoms.  The plan is to continue with surveillance.      Patient was previously diagnosed with appendiceal carcinoid.  Patient underwent right hemicolectomy with negative lymph nodes.  No GI issues recently.  Surveillance continues for this also.    Routine health maintenance and medical care is up-to-date.  Patient is to return to the office in 6 months with repeat blood work before.  Mr. Renee knows to call the hematology/oncology office if there are any other questions or concerns.    Carefully review your medication list and verify that the list is accurate and up-to-date. Please call the hematology/oncology office if there are medications missing from the list, medications on the list that you are not currently taking or if there is a dosage or instruction that is different from how you're taking that medication.    Patient goals and areas of care: CLL/SLL surveillance, monitor for any GI symptoms  Barriers to care: none  Patient is able to self-care  _____________________________________________________________________________________    Chief Complaint   Patient presents with    Follow-up    CLL/SLL on surveillance     History of Present Illness:  78-year-old male with CLL/SLL previously treated with 4 cycles of BR (June 2012) with good response and no evidence of residual disease.  Patient was then started on maintenance Rituxan but because of CBC abnormalities, the maintenance was discontinued.  Patient has been on surveillance.    Mr. Lazar was admitted to the hospital in 2015 for abdominal pain; underwent appendectomy.  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  Patient subsequently underwent right hemicolectomy in May 2015 which showed no evidence of additional disease.  23 lymph nodes were negative.  The plan was to continue with surveillance.    Presently Mr. Renee states feeling well, baseline.  Patient continues to be active.  Appetite is good, weight is stable.  No enlarging lymph nodes.  No fevers or signs of infection, no recent hospitalization.  Generalized body aches are the same as before not worse.  Routine health maintenance and medical care is up-to-date.    Review of Systems   Constitutional: Negative.    HENT: Negative.     Eyes: Negative.    Respiratory: Negative.     Cardiovascular: Negative.    Gastrointestinal: Negative.    Endocrine: Negative.    Genitourinary: Negative.    Musculoskeletal: Negative.    Skin: Negative.    Allergic/Immunologic: Negative.    Neurological: Negative.    Hematological: Negative.    Psychiatric/Behavioral: Negative.     All other systems reviewed and are negative.    Patient Active Problem List   Diagnosis    Arthritis    Benign essential HTN    Cardiomyopathy (HCC)    Chronic lymphocytic leukemia (HCC)    CKD (chronic kidney disease) stage 3, GFR 30-59 ml/min (HCC)    Diabetic neuropathy (HCC)    H/O Clostridium difficile infection    Hx of malignant carcinoid tumor of large intestine    Type 2 diabetes mellitus with diabetic neuropathy, without long-term current use of insulin (HCC)    History of malignant neoplasm of appendix    Status post transmetatarsal amputation of left foot  "(HCC)    Elevated serum protein level    Hypothyroidism    Diabetic nephropathy associated with type 2 diabetes mellitus (HCC)     Past Medical History:   Diagnosis Date    Arthritis     Spine    Cancer (HCC)     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    Chronic lymphocytic leukemia of B-cell type (HCC)     \"remission 4-5yrs\"-chemo    DDD (degenerative disc disease), cervical     Diabetes mellitus (HCC)     bs checked by pt at home at 6am =92    Diabetic neuropathy (HCC)     Diabetic retinopathy (HCC)     Elevated WBC count     Heart attack (HCC) 04/2015    \"labeled as that and than tested later after appendix removed and stress test showed negative\"    History of cancer chemotherapy     last treatment approx 5yrs ago    Hypertension     Myocardial infarction (HCC) 04/2015    Scalp psoriasis     Shingles 2/10-15    Toe injury     left great toe, - 2017-internal braec-to be removed today 3/30/2018    Wears glasses      Past Surgical History:   Procedure Laterality Date    APPENDECTOMY      BONE BIOPSY Left 3/30/2018    Procedure: GREAT TOE BONE BIOPSY;  Surgeon: Gerber Multani DPM;  [facility]: [facility];  Service: Podiatry    BOWEL RESECTION      CATARACT EXTRACTION Bilateral     CHOLECYSTECTOMY      COLECTOMY  06/2015    EYE SURGERY Right     \"retinal peel\"    FOOT FUSION Left 12/6/2017    Procedure: HALLUX INTERPHALANGEAL JOINT FUSION;  Surgeon: Gerber Multani DPM;  [facility];  Service: Podiatry    HARDWARE REMOVAL Left 4/18/2018    Procedure: REMOVAL STAPLES LEFT TOE;  Surgeon: Gerber Multani DPM;  [facility];  Service: Podiatry    ORIF FOOT FRACTURE Left 12/6/2017    Procedure: HALLUX INTERPHALANGEAL JOINT INTERNAL FIXATION; REPAIR OF ULCERATION WITH EXCISIONS AND REVISION OF SKIN HALLUX WITH USE OF ALLOGRAFT;  Surgeon: Gerber Multani DPM;  [facility];  Service: Podiatry    WI COLONOSCOPY FLX DX W/COLLJ SPEC WHEN PFRMD N/A 4/21/2016    Procedure: " COLONOSCOPY;  Surgeon: Minal Ruelas DO;  [facility]: [facility];  Service: Gastroenterology    MD REMOVAL IMPLANT DEEP Left 3/30/2018    Procedure: REMOVAL HARDWARE FOOT;  Surgeon: Gerber Multani DPM;  [facility];  Service: Podiatry    RETINAL DETACHMENT SURGERY      TONSILLECTOMY       Family History   Problem Relation Age of Onset    Alcohol abuse Mother             Pancreatic cancer Father         age 79    Hashimoto's thyroiditis Daughter     Rheum arthritis Daughter         Juvenile Rheum Arthitis     No Known Problems Sister          age 78    No Known Problems Brother          86    Kidney failure Brother         dialysis, 74    Diabetes Brother      Social History     Socioeconomic History    Marital status: /Civil Union     Spouse name: Not on file    Number of children: Not on file    Years of education: 4 yr college    Highest education level: Not on file   Occupational History    Not on file   Tobacco Use    Smoking status: Never    Smokeless tobacco: Never   Vaping Use    Vaping status: Never Used   Substance and Sexual Activity    Alcohol use: No    Drug use: No    Sexual activity: Not Currently     Partners: Female     Birth control/protection: None   Other Topics Concern    Not on file   Social History Narrative    Most recent tobacco use screenin2018    Education: 4 Year College    Marital status:     Exercise level: Occasional    Diet: Diabetic    General stress level: Low    Alcohol intake: None    Caffeine intake: Moderate    Guns present in home: No    Smoke alarm in home: Yes     Social Determinants of Health     Financial Resource Strain: Patient Declined (2023)    Overall Financial Resource Strain (CARDIA)     Difficulty of Paying Living Expenses: Patient declined   Food Insecurity: Not on file   Transportation Needs: No Transportation Needs (2023)    PRAPARE - Transportation     Lack of Transportation (Medical): No     Lack of  Transportation (Non-Medical): No   Physical Activity: Not on file   Stress: Not on file   Social Connections: Not on file   Intimate Partner Violence: Not on file   Housing Stability: Not on file       Current Outpatient Medications:     amLODIPine (NORVASC) 10 mg tablet, Take 1 tablet (10 mg total) by mouth daily, Disp: 90 tablet, Rfl: 1    brimonidine tartrate 0.2 % ophthalmic solution, INSTILL 1 DROP INTO EACH EYE TWICE DAILY, Disp: , Rfl:     carvedilol (Coreg) 3.125 mg tablet, Take 1 tablet (3.125 mg total) by mouth 2 (two) times a day with meals, Disp: 180 tablet, Rfl: 1    glipiZIDE (GLUCOTROL XL) 10 mg 24 hr tablet, Take 1 tablet (10 mg total) by mouth 2 (two) times a day, Disp: 180 tablet, Rfl: 1    Glucosamine HCl (GLUCOSAMINE PO), Take 1 tablet by mouth 2 (two) times a day , Disp: , Rfl:     levothyroxine (Synthroid) 75 mcg tablet, Take 1 tablet (75 mcg total) by mouth daily, Disp: 90 tablet, Rfl: 1    losartan (COZAAR) 100 MG tablet, Take 1 tablet (100 mg total) by mouth daily, Disp: 90 tablet, Rfl: 1    metFORMIN (GLUCOPHAGE-XR) 500 mg 24 hr tablet, Take 2 tablets (1,000 mg total) by mouth 2 (two) times a day with meals, Disp: 360 tablet, Rfl: 1    Multiple Vitamin (MULTIVITAMIN) tablet, Take 1 tablet by mouth daily, Disp: , Rfl:     Omega-3 Fatty Acids (FISH OIL PO), Take by mouth daily, Disp: , Rfl:     simvastatin (ZOCOR) 20 mg tablet, Take 1 tablet (20 mg total) by mouth daily at bedtime, Disp: 90 tablet, Rfl: 1    timolol (TIMOPTIC) 0.5 % ophthalmic solution, timolol maleate 0.5 % eye drops, Disp: , Rfl:     Allergies   Allergen Reactions    Lisinopril Cough     cough    Oxycodone-Acetaminophen      hallucination     Vitals:    03/07/24 1357   Resp: 15   Temp: 98 °F (36.7 °C)     Physical Exam  Constitutional:       Appearance: He is well-developed.   HENT:      Head: Normocephalic and atraumatic.      Right Ear: External ear normal.      Left Ear: External ear normal.   Eyes:       Conjunctiva/sclera: Conjunctivae normal.      Pupils: Pupils are equal, round, and reactive to light.   Cardiovascular:      Rate and Rhythm: Normal rate and regular rhythm.      Heart sounds: Normal heart sounds.   Pulmonary:      Effort: Pulmonary effort is normal.      Breath sounds: Normal breath sounds.   Abdominal:      General: Bowel sounds are normal.      Palpations: Abdomen is soft.   Musculoskeletal:         General: Normal range of motion.      Cervical back: Normal range of motion and neck supple.   Skin:     General: Skin is warm.   Neurological:      Mental Status: He is alert and oriented to person, place, and time.      Deep Tendon Reflexes: Reflexes are normal and symmetric.   Psychiatric:         Behavior: Behavior normal.         Thought Content: Thought content normal.         Judgment: Judgment normal.     Extremities: No lower extreme edema bilaterally, no cords, pulses are 1+  Lymphatics: No adenopathy in the neck, supraclavicular region, axilla, pre-/postauricular area, occipital area and inguinal area bilaterally    Labs      2/27/2024 differential: Neutrophil = 17% lymphocyte = 81% monocyte = 1% eosinophil = 1%    2/27/2024 BUN = 29 creatinine = 1.32 glucose = 168 LFTs WNL LDH = 133    6/20/2023 WBC = 29.5 hemoglobin = 12.2 hematocrit = 37.3 platelet = 159 neutrophil = 11% bands = 1% lymphocyte = 85% monocyte = 0% eosinophil = 3% BUN = 22 creatinine = 1.34 LFTs WNL total protein = 8.1 LDH = 121    [facility]    11/4/2019 CAT scan chest abdomen pelvis with contrast    IMPRESSION:     1.  No evidence of metastatic disease in the chest, abdomen, or pelvis.  No pathologic lymphadenopathy.     2.  Small distal esophageal varices of uncertain etiology and clinical significance, stable over multiple prior studies dating back to at least 2015.  No evidence of liver disease or portal hypertension.    Pathology

## 2024-03-08 ENCOUNTER — RA CDI HCC (OUTPATIENT)
Dept: OTHER | Facility: HOSPITAL | Age: 79
End: 2024-03-08

## 2024-03-08 NOTE — PROGRESS NOTES
HCC coding opportunities          Chart Reviewed number of suggestions sent to Provider: 2     Patients Insurance   E11.22 and E11.40  Medicare Insurance: Medicare

## 2024-03-11 ENCOUNTER — TELEPHONE (OUTPATIENT)
Dept: FAMILY MEDICINE CLINIC | Facility: CLINIC | Age: 79
End: 2024-03-11

## 2024-03-11 NOTE — TELEPHONE ENCOUNTER
Reviewed- pt does not need the lab work repeated. We have an appointment to review the labs.  No additional bloodwork needed from me.

## 2024-03-11 NOTE — TELEPHONE ENCOUNTER
Patient called RX refill line asking about labs that were to be entered. He stated the office called him and told him he needed to fast for these labs and also have a repeat Albumin/creatinine ratio done. Patient went to lab today fasting and was told there werent any labs entered. Please contact patient as he has an appointment on 3/14/24 to discuss these labs. [phone] and he stated to leave a message when they are placed in the system

## 2024-03-11 NOTE — TELEPHONE ENCOUNTER
Pt called that Dr wanted his creatinine labs done before his appt on Thursday and he been fasting for over 12 hours and labs was unable to get a hold of the office when he was there so he called us when he got home to see if we can put that order in so he can complete the blood work he needed for office. Office was unavailable but finally was able to get ahold of the staff and they asked Dr and she stated there was no more labs needed for his upcomign appt, she can use the labs in the system already obtained. Pt sorry he ,misunderstood and will be here on Thursday for his appt

## 2024-03-12 ENCOUNTER — TELEPHONE (OUTPATIENT)
Age: 79
End: 2024-03-12

## 2024-03-12 NOTE — TELEPHONE ENCOUNTER
Refaxed information to [employer]. Responded to patient via [employer] notifying them of refax.

## 2024-03-12 NOTE — TELEPHONE ENCOUNTER
Pt called (and sent MyChart message) [facility] never received fax.  Pt would like office to re-send to [phone]

## 2024-03-14 ENCOUNTER — OFFICE VISIT (OUTPATIENT)
Dept: FAMILY MEDICINE CLINIC | Facility: CLINIC | Age: 79
End: 2024-03-14
Payer: MEDICARE

## 2024-03-14 VITALS
SYSTOLIC BLOOD PRESSURE: 128 MMHG | HEART RATE: 60 BPM | OXYGEN SATURATION: 96 % | DIASTOLIC BLOOD PRESSURE: 72 MMHG | RESPIRATION RATE: 16 BRPM | TEMPERATURE: 97.6 F | HEIGHT: 66 IN | WEIGHT: 193 LBS | BODY MASS INDEX: 31.02 KG/M2

## 2024-03-14 DIAGNOSIS — Z89.432 STATUS POST TRANSMETATARSAL AMPUTATION OF LEFT FOOT (HCC): ICD-10-CM

## 2024-03-14 DIAGNOSIS — E11.42 TYPE 2 DIABETES MELLITUS WITH DIABETIC POLYNEUROPATHY, WITHOUT LONG-TERM CURRENT USE OF INSULIN (HCC): ICD-10-CM

## 2024-03-14 DIAGNOSIS — C91.10 CHRONIC LYMPHOCYTIC LEUKEMIA (HCC): ICD-10-CM

## 2024-03-14 DIAGNOSIS — E11.21 DIABETIC NEPHROPATHY ASSOCIATED WITH TYPE 2 DIABETES MELLITUS (HCC): Primary | ICD-10-CM

## 2024-03-14 DIAGNOSIS — I10 BENIGN ESSENTIAL HTN: ICD-10-CM

## 2024-03-14 DIAGNOSIS — E03.9 HYPOTHYROIDISM, UNSPECIFIED TYPE: ICD-10-CM

## 2024-03-14 PROCEDURE — 99214 OFFICE O/P EST MOD 30 MIN: CPT | Performed by: FAMILY MEDICINE

## 2024-03-14 PROCEDURE — G2211 COMPLEX E/M VISIT ADD ON: HCPCS | Performed by: FAMILY MEDICINE

## 2024-03-14 RX ORDER — GLIPIZIDE 10 MG/1
10 TABLET, FILM COATED, EXTENDED RELEASE ORAL 2 TIMES DAILY
Qty: 180 TABLET | Refills: 1 | Status: SHIPPED | OUTPATIENT
Start: 2024-03-14

## 2024-03-14 RX ORDER — CARVEDILOL 6.25 MG/1
6.25 TABLET ORAL 2 TIMES DAILY WITH MEALS
Qty: 180 TABLET | Refills: 1 | Status: SHIPPED | OUTPATIENT
Start: 2024-03-14 | End: 2025-03-09

## 2024-03-14 RX ORDER — SIMVASTATIN 20 MG
20 TABLET ORAL
Qty: 90 TABLET | Refills: 1 | Status: SHIPPED | OUTPATIENT
Start: 2024-03-14

## 2024-03-14 RX ORDER — METFORMIN HYDROCHLORIDE 500 MG/1
1000 TABLET, EXTENDED RELEASE ORAL 2 TIMES DAILY WITH MEALS
Qty: 360 TABLET | Refills: 1 | Status: SHIPPED | OUTPATIENT
Start: 2024-03-14

## 2024-03-14 RX ORDER — LOSARTAN POTASSIUM 100 MG/1
100 TABLET ORAL DAILY
Qty: 90 TABLET | Refills: 1 | Status: SHIPPED | OUTPATIENT
Start: 2024-03-14

## 2024-03-14 RX ORDER — AMLODIPINE BESYLATE 10 MG/1
10 TABLET ORAL DAILY
Qty: 90 TABLET | Refills: 1 | Status: SHIPPED | OUTPATIENT
Start: 2024-03-14

## 2024-03-14 NOTE — ASSESSMENT & PLAN NOTE
TSH high end of normal, feeling well.   Continue levothyroxine 75 mcg daily  Repeat prior to next OV

## 2024-03-14 NOTE — PATIENT INSTRUCTIONS
Bps at home are elevated, a little higher than reading here today  Continue amlodipine 10 mg daily, losartan 100 mg daily, and increase coreg to 6.25 mg twice a day.    If pulse is less than 50, or if you feel lightheaded/dizzy- increase fluid intake and decrease back to 3.125 mg twice a day.

## 2024-03-14 NOTE — ASSESSMENT & PLAN NOTE
Continue podiatry f/u  Continue working on good glycemic control  A1C improving.  Lab Results   Component Value Date    HGBA1C 7.3 (H) 02/27/2024

## 2024-03-14 NOTE — ASSESSMENT & PLAN NOTE
Pt is on losartan   A1C has been improving  DM for >20 years  He has made appointment with [facility] for next month given his increased albumin/creat   Discussed importance of good BS control, BP control    Lab Results   Component Value Date    HGBA1C 7.3 (H) 02/27/2024

## 2024-03-14 NOTE — PROGRESS NOTES
Assessment/Plan:       Problem List Items Addressed This Visit        Cardiovascular and Mediastinum    Benign essential HTN     Bps at home are elevated, a little higher than reading here today  Continue amlodipine 10 mg daily, losartan 100 mg daily, and increase coreg to 6.25 mg twice a day.    If pulse is less than 50, or if you feel lightheaded/dizzy- increase fluid intake and decrease back to 3.125 mg twice a day.            Relevant Medications    amLODIPine (NORVASC) 10 mg tablet    carvedilol (Coreg) 6.25 mg tablet    losartan (COZAAR) 100 MG tablet    Other Relevant Orders    Comprehensive metabolic panel    CBC and differential    Lipid Panel with Direct LDL reflex       Endocrine    Type 2 diabetes mellitus with diabetic polyneuropathy, without long-term current use of insulin (HCC)     Continue podiatry f/u  Continue working on good glycemic control  A1C improving.  Lab Results   Component Value Date    HGBA1C 7.3 (H) 02/27/2024          Relevant Medications    glipiZIDE (GLUCOTROL XL) 10 mg 24 hr tablet    metFORMIN (GLUCOPHAGE-XR) 500 mg 24 hr tablet    simvastatin (ZOCOR) 20 mg tablet    Other Relevant Orders    Hemoglobin A1C    Lipid Panel with Direct LDL reflex    Hypothyroidism     TSH high end of normal, feeling well.   Continue levothyroxine 75 mcg daily  Repeat prior to next OV         Relevant Medications    carvedilol (Coreg) 6.25 mg tablet    Other Relevant Orders    TSH, 3rd generation with Free T4 reflex    Diabetic nephropathy associated with type 2 diabetes mellitus (HCC) - Primary     Pt is on losartan   A1C has been improving  DM for >20 years  He has made appointment with [facility] for next month given his increased albumin/creat   Discussed importance of good BS control, BP control    Lab Results   Component Value Date    HGBA1C 7.3 (H) 02/27/2024          Relevant Medications    glipiZIDE (GLUCOTROL XL) 10 mg 24 hr tablet    metFORMIN (GLUCOPHAGE-XR) 500 mg 24 hr tablet        Care Coordination    Status post transmetatarsal amputation of left foot (HCC)     Continue good glycemic control  Continue podiatry follow up            Oncology    Chronic lymphocytic leukemia (HCC)     Continue heme/onc follow up  Recent labs stable                 Most recent labs reviewed     Subjective:     Abelino is a 78 y.o. male here today with chief complaint below:  Chief Complaint   Patient presents with   • Follow-up     Return in about 6 months (around 3/12/2024) for Follow up chronic conditions. Only new concern is his kidneys, but he is already scheduled with [facility].    • HM     Recent CRC at a private facility on [facility] .      - CC above per clinical staff and reviewed.    HPI:    Here for f/u on labs and chronic conditions.  He notes he is overall feeling well.  He does not note any current concerns.  He is not having any chest pain or shortness of breath.  He is moving his bowels regularly.    He notes he was dehydrated when he had his labs done bc he hadn't had anything to drink that morning and had been in the haircut prior to going to the lab.    DM- has cut out most breads.  He is watching carbs.  Taking meds regularly.  No hypoglycemia.  He is taking metformin and glipizide.  Branded meds were cost prohibitive.    Htn- home BP typically 150s/60s  Pulse usually high 50s  He is taking his medications regularly.  Used to see cardiology but doesn't see them now.  History of CHF    Thyroid- doesn't feel tired, keeps active, taking medicine regularly.    Was just at podiatry a few weeks ago.    Normal BM  No blood in stool.     Urinates x 1-2 times at night    The following portions of the patient's history were reviewed and updated as appropriate: allergies, current medications, past family history, past medical history, past social history, past surgical history and problem list.    ROS:  Review of Systems       Objective:      /72 (Cuff Size: Standard)   Pulse 60   Temp  "97.6 °F (36.4 °C)   Resp 16   Ht 5' 6\" (1.676 m)   Wt 87.5 kg (193 lb)   SpO2 96%   BMI 31.15 kg/m²   BP Readings from Last 3 Encounters:   03/14/24 128/72   03/07/24 144/72   09/12/23 160/84     Wt Readings from Last 3 Encounters:   03/14/24 87.5 kg (193 lb)   03/07/24 87.5 kg (193 lb)   09/12/23 85.7 kg (189 lb)               Physical Exam:   Physical Exam  Vitals and nursing note reviewed.   Constitutional:       Appearance: Normal appearance. He is well-developed. He is not ill-appearing.   HENT:      Head: Normocephalic and atraumatic.   Neck:      Vascular: No carotid bruit.   Cardiovascular:      Rate and Rhythm: Normal rate and regular rhythm.      Heart sounds: Normal heart sounds. No murmur heard.  Pulmonary:      Effort: Pulmonary effort is normal. No respiratory distress.      Breath sounds: Normal breath sounds. No wheezing.   Abdominal:      Palpations: Abdomen is soft.      Tenderness: There is no abdominal tenderness. There is no guarding or rebound.   Musculoskeletal:      Cervical back: Neck supple.      Right lower leg: No edema.      Left lower leg: No edema.   Lymphadenopathy:      Cervical: No cervical adenopathy.   Skin:     General: Skin is warm and dry.   Neurological:      Mental Status: He is alert and oriented to person, place, and time.   Psychiatric:         Mood and Affect: Mood normal.         Behavior: Behavior normal.                "

## 2024-03-21 NOTE — OP NOTE
OPERATIVE REPORT  PATIENT NAME: Sharon Zamora    :  1945  MRN: [id number]  Pt Location: [facility] ROOM 07    SURGERY DATE: 2018    Surgeon(s) and Role:     * Susan De DPM - Primary     * Durward Aase, DPM - Assisting    Preop Diagnosis:  1  Left great toe foreign body (staples)    Procedure(s) (LRB):  REMOVAL STAPLES LEFT TOE (Left)    Specimen(s):  * No specimens in log *    Estimated Blood Loss:   Minimal    Drains: none       Anesthesia Type:   Local 10cc of 1:1 mix of 1% lidocaine and 0 5% marcaine     Operative Indications:  * No pre-op diagnosis entered *    Operative Findings:  As expected with diagnosis  Complications:   None    Procedure and Technique:  The patient was brought into the operating room and left on the stretcher  A time out was performed to confirm the correct patient, procedure and site with all parties in agreement  Local anesthetic was obtained about the patient's left foot and injection was performed consisting of 10ml of 1% Lidocaine and 0 5% Bupivacaine in a 1:1 mixture  The foot was then scrubbed, prepped and draped in the usual aseptic manner  Attention was then directed to the left foot great toe where wound was noted  C-arm was used to identify the location of the two staples then this was removed with hemostat  Final c-arm imaging showed no more staples in the area  The wound appeared to be granular with some fibrotic tissue  The skin edges were friable therefore unable to close with nylon suture  Subsequently, the wound was irrigated with sterile saline  Dressings applied consisting of wet to dry betadine soaked gauze, camryn, and ACE  Patient tolerated the procedure and local anesthesia well and was transported to the PACU with VSS       Patient Disposition:  PACU  and hemodynamically stable    SIGNATURE: Durward Aase, DPM  DATE: 2018  TIME: 11:22 AM
good balance

## 2024-04-09 ENCOUNTER — APPOINTMENT (OUTPATIENT)
Dept: LAB | Facility: HOSPITAL | Age: 79
End: 2024-04-09
Payer: MEDICARE

## 2024-04-09 DIAGNOSIS — I10 ESSENTIAL HYPERTENSION, MALIGNANT: ICD-10-CM

## 2024-04-09 DIAGNOSIS — R80.9 PROTEINURIA, UNSPECIFIED TYPE: ICD-10-CM

## 2024-04-09 DIAGNOSIS — R79.89 HYPOURICEMIA: ICD-10-CM

## 2024-04-09 DIAGNOSIS — N28.9 URETERAL SLUDGE: ICD-10-CM

## 2024-04-09 LAB
ALBUMIN SERPL BCP-MCNC: 4.5 G/DL (ref 3.5–5)
ALP SERPL-CCNC: 70 U/L (ref 34–104)
ALT SERPL W P-5'-P-CCNC: 16 U/L (ref 7–52)
ANION GAP SERPL CALCULATED.3IONS-SCNC: 8 MMOL/L (ref 4–13)
AST SERPL W P-5'-P-CCNC: 19 U/L (ref 13–39)
BACTERIA UR QL AUTO: ABNORMAL /HPF
BILIRUB SERPL-MCNC: 0.64 MG/DL (ref 0.2–1)
BILIRUB UR QL STRIP: NEGATIVE
BUN SERPL-MCNC: 34 MG/DL (ref 5–25)
CALCIUM SERPL-MCNC: 9.5 MG/DL (ref 8.4–10.2)
CHLORIDE SERPL-SCNC: 106 MMOL/L (ref 96–108)
CLARITY UR: CLEAR
CO2 SERPL-SCNC: 21 MMOL/L (ref 21–32)
COLOR UR: YELLOW
CREAT SERPL-MCNC: 1.4 MG/DL (ref 0.6–1.3)
CREAT UR-MCNC: 34.6 MG/DL
GFR SERPL CREATININE-BSD FRML MDRD: 48 ML/MIN/1.73SQ M
GLUCOSE P FAST SERPL-MCNC: 163 MG/DL (ref 65–99)
GLUCOSE UR STRIP-MCNC: NEGATIVE MG/DL
HCT VFR BLD AUTO: 36.3 % (ref 36.5–49.3)
HGB BLD-MCNC: 11.7 G/DL (ref 12–17)
HGB UR QL STRIP.AUTO: ABNORMAL
KETONES UR STRIP-MCNC: NEGATIVE MG/DL
LEUKOCYTE ESTERASE UR QL STRIP: NEGATIVE
NITRITE UR QL STRIP: NEGATIVE
NON-SQ EPI CELLS URNS QL MICRO: ABNORMAL /HPF
PH UR STRIP.AUTO: 5.5 [PH]
PHOSPHATE SERPL-MCNC: 3.5 MG/DL (ref 2.3–4.1)
POTASSIUM SERPL-SCNC: 5.6 MMOL/L (ref 3.5–5.3)
PROT SERPL-MCNC: 8.3 G/DL (ref 6.4–8.4)
PROT UR STRIP-MCNC: ABNORMAL MG/DL
PROT UR-MCNC: 50 MG/DL
PROT/CREAT UR: 1.45 MG/G{CREAT} (ref 0–0.1)
RBC #/AREA URNS AUTO: ABNORMAL /HPF
SODIUM SERPL-SCNC: 135 MMOL/L (ref 135–147)
SP GR UR STRIP.AUTO: 1.01 (ref 1–1.03)
UROBILINOGEN UR QL STRIP.AUTO: 0.2 E.U./DL
WBC #/AREA URNS AUTO: ABNORMAL /HPF

## 2024-04-09 PROCEDURE — 36415 COLL VENOUS BLD VENIPUNCTURE: CPT

## 2024-04-09 PROCEDURE — 82570 ASSAY OF URINE CREATININE: CPT

## 2024-04-09 PROCEDURE — 85014 HEMATOCRIT: CPT

## 2024-04-09 PROCEDURE — 85018 HEMOGLOBIN: CPT

## 2024-04-09 PROCEDURE — 80053 COMPREHEN METABOLIC PANEL: CPT

## 2024-04-09 PROCEDURE — 81001 URINALYSIS AUTO W/SCOPE: CPT

## 2024-04-09 PROCEDURE — 84100 ASSAY OF PHOSPHORUS: CPT

## 2024-04-09 PROCEDURE — 84156 ASSAY OF PROTEIN URINE: CPT

## 2024-04-29 ENCOUNTER — APPOINTMENT (OUTPATIENT)
Dept: LAB | Facility: HOSPITAL | Age: 79
End: 2024-04-29
Payer: MEDICARE

## 2024-04-29 DIAGNOSIS — N18.31 CHRONIC KIDNEY DISEASE (CKD) STAGE G3A/A1, MODERATELY DECREASED GLOMERULAR FILTRATION RATE (GFR) BETWEEN 45-59 ML/MIN/1.73 SQUARE METER AND ALBUMINURIA CREATININE RATIO LESS THAN 30 MG/G (HCC): ICD-10-CM

## 2024-04-29 DIAGNOSIS — I10 HYPERTENSION, ESSENTIAL: ICD-10-CM

## 2024-04-29 LAB
ANION GAP SERPL CALCULATED.3IONS-SCNC: 8 MMOL/L (ref 4–13)
BUN SERPL-MCNC: 37 MG/DL (ref 5–25)
CALCIUM SERPL-MCNC: 9.7 MG/DL (ref 8.4–10.2)
CHLORIDE SERPL-SCNC: 107 MMOL/L (ref 96–108)
CO2 SERPL-SCNC: 23 MMOL/L (ref 21–32)
CREAT SERPL-MCNC: 1.5 MG/DL (ref 0.6–1.3)
GFR SERPL CREATININE-BSD FRML MDRD: 44 ML/MIN/1.73SQ M
GLUCOSE SERPL-MCNC: 66 MG/DL (ref 65–140)
POTASSIUM SERPL-SCNC: 4.4 MMOL/L (ref 3.5–5.3)
SODIUM SERPL-SCNC: 138 MMOL/L (ref 135–147)

## 2024-04-29 PROCEDURE — 80048 BASIC METABOLIC PNL TOTAL CA: CPT

## 2024-04-29 PROCEDURE — 36415 COLL VENOUS BLD VENIPUNCTURE: CPT

## 2024-06-07 ENCOUNTER — TELEPHONE (OUTPATIENT)
Age: 79
End: 2024-06-07

## 2024-06-07 ENCOUNTER — HOSPITAL ENCOUNTER (INPATIENT)
Facility: HOSPITAL | Age: 79
LOS: 6 days | Discharge: HOME | DRG: 193 | End: 2024-06-13
Attending: EMERGENCY MEDICINE | Admitting: INTERNAL MEDICINE
Payer: MEDICARE

## 2024-06-07 ENCOUNTER — APPOINTMENT (EMERGENCY)
Dept: CT IMAGING | Facility: HOSPITAL | Age: 79
DRG: 193 | End: 2024-06-07
Payer: MEDICARE

## 2024-06-07 ENCOUNTER — APPOINTMENT (EMERGENCY)
Dept: RADIOLOGY | Facility: HOSPITAL | Age: 79
DRG: 193 | End: 2024-06-07
Payer: MEDICARE

## 2024-06-07 DIAGNOSIS — J18.9 MULTIFOCAL PNEUMONIA: Primary | ICD-10-CM

## 2024-06-07 DIAGNOSIS — R73.9 HYPERGLYCEMIA: ICD-10-CM

## 2024-06-07 DIAGNOSIS — I50.9 ACUTE CONGESTIVE HEART FAILURE, UNSPECIFIED HEART FAILURE TYPE (HCC): ICD-10-CM

## 2024-06-07 DIAGNOSIS — J96.01 ACUTE HYPOXEMIC RESPIRATORY FAILURE (HCC): ICD-10-CM

## 2024-06-07 DIAGNOSIS — J18.9 COMMUNITY ACQUIRED PNEUMONIA OF LEFT LUNG, UNSPECIFIED PART OF LUNG: ICD-10-CM

## 2024-06-07 DIAGNOSIS — N17.9 AKI (ACUTE KIDNEY INJURY) (HCC): ICD-10-CM

## 2024-06-07 DIAGNOSIS — E11.42 TYPE 2 DIABETES MELLITUS WITH DIABETIC POLYNEUROPATHY, WITHOUT LONG-TERM CURRENT USE OF INSULIN (HCC): ICD-10-CM

## 2024-06-07 DIAGNOSIS — R65.20 SEVERE SEPSIS (HCC): ICD-10-CM

## 2024-06-07 DIAGNOSIS — A41.9 SEVERE SEPSIS (HCC): ICD-10-CM

## 2024-06-07 LAB
2HR DELTA HS TROPONIN: 2 NG/L
4HR DELTA HS TROPONIN: 4 NG/L
ALBUMIN SERPL BCP-MCNC: 3.4 G/DL (ref 3.5–5)
ALP SERPL-CCNC: 101 U/L (ref 34–104)
ALT SERPL W P-5'-P-CCNC: 21 U/L (ref 7–52)
ANION GAP SERPL CALCULATED.3IONS-SCNC: 13 MMOL/L (ref 4–13)
AST SERPL W P-5'-P-CCNC: 14 U/L (ref 13–39)
ATRIAL RATE: 77 BPM
BACTERIA UR QL AUTO: ABNORMAL /HPF
BASOPHILS # BLD MANUAL: 0 THOUSAND/UL (ref 0–0.1)
BASOPHILS NFR MAR MANUAL: 0 % (ref 0–1)
BILIRUB SERPL-MCNC: 0.72 MG/DL (ref 0.2–1)
BILIRUB UR QL STRIP: NEGATIVE
BNP SERPL-MCNC: 608 PG/ML (ref 0–100)
BUN SERPL-MCNC: 47 MG/DL (ref 5–25)
CALCIUM ALBUM COR SERPL-MCNC: 9.3 MG/DL (ref 8.3–10.1)
CALCIUM SERPL-MCNC: 8.8 MG/DL (ref 8.4–10.2)
CARDIAC TROPONIN I PNL SERPL HS: 27 NG/L
CARDIAC TROPONIN I PNL SERPL HS: 29 NG/L
CARDIAC TROPONIN I PNL SERPL HS: 31 NG/L
CHLORIDE SERPL-SCNC: 97 MMOL/L (ref 96–108)
CLARITY UR: ABNORMAL
CO2 SERPL-SCNC: 20 MMOL/L (ref 21–32)
COARSE GRAN CASTS URNS QL MICRO: ABNORMAL /LPF
COLOR UR: YELLOW
CREAT SERPL-MCNC: 2.13 MG/DL (ref 0.6–1.3)
EOSINOPHIL # BLD MANUAL: 0 THOUSAND/UL (ref 0–0.4)
EOSINOPHIL NFR BLD MANUAL: 0 % (ref 0–6)
ERYTHROCYTE [DISTWIDTH] IN BLOOD BY AUTOMATED COUNT: 13.8 % (ref 11.6–15.1)
FLUAV RNA RESP QL NAA+PROBE: NEGATIVE
FLUBV RNA RESP QL NAA+PROBE: NEGATIVE
GFR SERPL CREATININE-BSD FRML MDRD: 28 ML/MIN/1.73SQ M
GLUCOSE SERPL-MCNC: 281 MG/DL (ref 65–140)
GLUCOSE SERPL-MCNC: 314 MG/DL (ref 65–140)
GLUCOSE SERPL-MCNC: 351 MG/DL (ref 65–140)
GLUCOSE SERPL-MCNC: 363 MG/DL (ref 65–140)
GLUCOSE SERPL-MCNC: 490 MG/DL (ref 65–140)
GLUCOSE UR STRIP-MCNC: ABNORMAL MG/DL
HCT VFR BLD AUTO: 27.6 % (ref 36.5–49.3)
HGB BLD-MCNC: 9.2 G/DL (ref 12–17)
HGB UR QL STRIP.AUTO: ABNORMAL
KETONES UR STRIP-MCNC: NEGATIVE MG/DL
L PNEUMO1 AG UR QL IA.RAPID: NEGATIVE
LACTATE SERPL-SCNC: 1.5 MMOL/L (ref 0.5–2)
LEUKOCYTE ESTERASE UR QL STRIP: NEGATIVE
LYMPHOCYTES # BLD AUTO: 15.27 THOUSAND/UL (ref 0.6–4.47)
LYMPHOCYTES # BLD AUTO: 57 % (ref 14–44)
MCH RBC QN AUTO: 31.1 PG (ref 26.8–34.3)
MCHC RBC AUTO-ENTMCNC: 33.3 G/DL (ref 31.4–37.4)
MCV RBC AUTO: 93 FL (ref 82–98)
MONOCYTES # BLD AUTO: 0 THOUSAND/UL (ref 0–1.22)
MONOCYTES NFR BLD: 0 % (ref 4–12)
NEUTROPHILS # BLD MANUAL: 11.52 THOUSAND/UL (ref 1.85–7.62)
NEUTS BAND NFR BLD MANUAL: 5 % (ref 0–8)
NEUTS SEG NFR BLD AUTO: 38 % (ref 43–75)
NITRITE UR QL STRIP: NEGATIVE
NON-SQ EPI CELLS URNS QL MICRO: ABNORMAL /HPF
P AXIS: 67 DEGREES
PH UR STRIP.AUTO: 6 [PH]
PLATELET # BLD AUTO: 204 THOUSANDS/UL (ref 149–390)
PLATELET BLD QL SMEAR: ADEQUATE
PMV BLD AUTO: 9.9 FL (ref 8.9–12.7)
POTASSIUM SERPL-SCNC: 3.8 MMOL/L (ref 3.5–5.3)
PR INTERVAL: 174 MS
PROCALCITONIN SERPL-MCNC: 2.49 NG/ML
PROT SERPL-MCNC: 7.3 G/DL (ref 6.4–8.4)
PROT UR STRIP-MCNC: ABNORMAL MG/DL
QRS AXIS: -12 DEGREES
QRSD INTERVAL: 80 MS
QT INTERVAL: 382 MS
QTC INTERVAL: 415 MS
RBC # BLD AUTO: 2.96 MILLION/UL (ref 3.88–5.62)
RBC #/AREA URNS AUTO: ABNORMAL /HPF
RBC MORPH BLD: NORMAL
RSV RNA RESP QL NAA+PROBE: NEGATIVE
S PNEUM AG UR QL: NEGATIVE
SARS-COV-2 RNA RESP QL NAA+PROBE: NEGATIVE
SODIUM SERPL-SCNC: 130 MMOL/L (ref 135–147)
SP GR UR STRIP.AUTO: 1.02 (ref 1–1.03)
T WAVE AXIS: 44 DEGREES
UROBILINOGEN UR QL STRIP.AUTO: 0.2 E.U./DL
VENTRICULAR RATE: 71 BPM
WBC # BLD AUTO: 26.79 THOUSAND/UL (ref 4.31–10.16)
WBC #/AREA URNS AUTO: ABNORMAL /HPF

## 2024-06-07 PROCEDURE — 85007 BL SMEAR W/DIFF WBC COUNT: CPT | Performed by: PHYSICIAN ASSISTANT

## 2024-06-07 PROCEDURE — 71045 X-RAY EXAM CHEST 1 VIEW: CPT

## 2024-06-07 PROCEDURE — 84484 ASSAY OF TROPONIN QUANT: CPT | Performed by: PHYSICIAN ASSISTANT

## 2024-06-07 PROCEDURE — 0241U HB NFCT DS VIR RESP RNA 4 TRGT: CPT | Performed by: PHYSICIAN ASSISTANT

## 2024-06-07 PROCEDURE — 93010 ELECTROCARDIOGRAM REPORT: CPT | Performed by: INTERNAL MEDICINE

## 2024-06-07 PROCEDURE — 87040 BLOOD CULTURE FOR BACTERIA: CPT | Performed by: PHYSICIAN ASSISTANT

## 2024-06-07 PROCEDURE — 84145 PROCALCITONIN (PCT): CPT | Performed by: PHYSICIAN ASSISTANT

## 2024-06-07 PROCEDURE — 83880 ASSAY OF NATRIURETIC PEPTIDE: CPT | Performed by: PHYSICIAN ASSISTANT

## 2024-06-07 PROCEDURE — 87449 NOS EACH ORGANISM AG IA: CPT | Performed by: INTERNAL MEDICINE

## 2024-06-07 PROCEDURE — 94664 DEMO&/EVAL PT USE INHALER: CPT

## 2024-06-07 PROCEDURE — 93005 ELECTROCARDIOGRAM TRACING: CPT

## 2024-06-07 PROCEDURE — 96361 HYDRATE IV INFUSION ADD-ON: CPT

## 2024-06-07 PROCEDURE — 36415 COLL VENOUS BLD VENIPUNCTURE: CPT | Performed by: PHYSICIAN ASSISTANT

## 2024-06-07 PROCEDURE — 81001 URINALYSIS AUTO W/SCOPE: CPT | Performed by: PHYSICIAN ASSISTANT

## 2024-06-07 PROCEDURE — 96375 TX/PRO/DX INJ NEW DRUG ADDON: CPT

## 2024-06-07 PROCEDURE — 99291 CRITICAL CARE FIRST HOUR: CPT | Performed by: PHYSICIAN ASSISTANT

## 2024-06-07 PROCEDURE — 85027 COMPLETE CBC AUTOMATED: CPT | Performed by: PHYSICIAN ASSISTANT

## 2024-06-07 PROCEDURE — 96365 THER/PROPH/DIAG IV INF INIT: CPT

## 2024-06-07 PROCEDURE — 82948 REAGENT STRIP/BLOOD GLUCOSE: CPT

## 2024-06-07 PROCEDURE — 71250 CT THORAX DX C-: CPT

## 2024-06-07 PROCEDURE — 96367 TX/PROPH/DG ADDL SEQ IV INF: CPT

## 2024-06-07 PROCEDURE — 80053 COMPREHEN METABOLIC PANEL: CPT | Performed by: PHYSICIAN ASSISTANT

## 2024-06-07 PROCEDURE — 81003 URINALYSIS AUTO W/O SCOPE: CPT | Performed by: PHYSICIAN ASSISTANT

## 2024-06-07 PROCEDURE — 99285 EMERGENCY DEPT VISIT HI MDM: CPT

## 2024-06-07 PROCEDURE — 94640 AIRWAY INHALATION TREATMENT: CPT

## 2024-06-07 PROCEDURE — 83605 ASSAY OF LACTIC ACID: CPT | Performed by: PHYSICIAN ASSISTANT

## 2024-06-07 PROCEDURE — 99223 1ST HOSP IP/OBS HIGH 75: CPT | Performed by: INTERNAL MEDICINE

## 2024-06-07 RX ORDER — INSULIN LISPRO 100 [IU]/ML
1-5 INJECTION, SOLUTION INTRAVENOUS; SUBCUTANEOUS
Status: DISCONTINUED | OUTPATIENT
Start: 2024-06-07 | End: 2024-06-09

## 2024-06-07 RX ORDER — CEFTRIAXONE 1 G/50ML
1000 INJECTION, SOLUTION INTRAVENOUS EVERY 24 HOURS
Status: COMPLETED | OUTPATIENT
Start: 2024-06-07 | End: 2024-06-11

## 2024-06-07 RX ORDER — FUROSEMIDE 10 MG/ML
25 INJECTION INTRAMUSCULAR; INTRAVENOUS ONCE
Status: DISCONTINUED | OUTPATIENT
Start: 2024-06-07 | End: 2024-06-07

## 2024-06-07 RX ORDER — AMLODIPINE BESYLATE 10 MG/1
10 TABLET ORAL DAILY
Status: DISCONTINUED | OUTPATIENT
Start: 2024-06-08 | End: 2024-06-13 | Stop reason: HOSPADM

## 2024-06-07 RX ORDER — CARVEDILOL 6.25 MG/1
6.25 TABLET ORAL 2 TIMES DAILY WITH MEALS
Status: DISCONTINUED | OUTPATIENT
Start: 2024-06-07 | End: 2024-06-13 | Stop reason: HOSPADM

## 2024-06-07 RX ORDER — INSULIN LISPRO 100 [IU]/ML
5 INJECTION, SOLUTION INTRAVENOUS; SUBCUTANEOUS ONCE
Status: COMPLETED | OUTPATIENT
Start: 2024-06-07 | End: 2024-06-07

## 2024-06-07 RX ORDER — LEVOTHYROXINE SODIUM 0.07 MG/1
75 TABLET ORAL DAILY
Status: DISCONTINUED | OUTPATIENT
Start: 2024-06-08 | End: 2024-06-13 | Stop reason: HOSPADM

## 2024-06-07 RX ORDER — LOSARTAN POTASSIUM 50 MG/1
100 TABLET ORAL DAILY
Status: DISCONTINUED | OUTPATIENT
Start: 2024-06-08 | End: 2024-06-12

## 2024-06-07 RX ORDER — ALBUTEROL SULFATE 2.5 MG/3ML
2.5 SOLUTION RESPIRATORY (INHALATION) EVERY 6 HOURS PRN
Status: DISCONTINUED | OUTPATIENT
Start: 2024-06-07 | End: 2024-06-13 | Stop reason: HOSPADM

## 2024-06-07 RX ORDER — ACETAMINOPHEN 325 MG/1
650 TABLET ORAL EVERY 6 HOURS PRN
Status: DISCONTINUED | OUTPATIENT
Start: 2024-06-07 | End: 2024-06-11

## 2024-06-07 RX ORDER — LEVALBUTEROL INHALATION SOLUTION 1.25 MG/3ML
1.25 SOLUTION RESPIRATORY (INHALATION)
Status: DISCONTINUED | OUTPATIENT
Start: 2024-06-07 | End: 2024-06-13

## 2024-06-07 RX ORDER — CEFTRIAXONE 1 G/50ML
1000 INJECTION, SOLUTION INTRAVENOUS ONCE
Status: DISCONTINUED | OUTPATIENT
Start: 2024-06-07 | End: 2024-06-07

## 2024-06-07 RX ORDER — INSULIN LISPRO 100 [IU]/ML
1-6 INJECTION, SOLUTION INTRAVENOUS; SUBCUTANEOUS
Status: DISCONTINUED | OUTPATIENT
Start: 2024-06-07 | End: 2024-06-10

## 2024-06-07 RX ORDER — BRIMONIDINE TARTRATE 2 MG/ML
1 SOLUTION/ DROPS OPHTHALMIC 3 TIMES DAILY
Status: DISCONTINUED | OUTPATIENT
Start: 2024-06-07 | End: 2024-06-13 | Stop reason: HOSPADM

## 2024-06-07 RX ORDER — SODIUM CHLORIDE 9 MG/ML
75 INJECTION, SOLUTION INTRAVENOUS CONTINUOUS
Status: DISCONTINUED | OUTPATIENT
Start: 2024-06-07 | End: 2024-06-07

## 2024-06-07 RX ORDER — SODIUM CHLORIDE 9 MG/ML
75 INJECTION, SOLUTION INTRAVENOUS CONTINUOUS
Status: DISPENSED | OUTPATIENT
Start: 2024-06-07 | End: 2024-06-08

## 2024-06-07 RX ORDER — GUAIFENESIN/DEXTROMETHORPHAN 100-10MG/5
10 SYRUP ORAL EVERY 4 HOURS PRN
Status: DISCONTINUED | OUTPATIENT
Start: 2024-06-07 | End: 2024-06-13 | Stop reason: HOSPADM

## 2024-06-07 RX ORDER — ENOXAPARIN SODIUM 100 MG/ML
40 INJECTION SUBCUTANEOUS DAILY
Status: DISCONTINUED | OUTPATIENT
Start: 2024-06-07 | End: 2024-06-12

## 2024-06-07 RX ORDER — TIMOLOL MALEATE 5 MG/ML
1 SOLUTION/ DROPS OPHTHALMIC DAILY
Status: DISCONTINUED | OUTPATIENT
Start: 2024-06-08 | End: 2024-06-13 | Stop reason: HOSPADM

## 2024-06-07 RX ORDER — PRAVASTATIN SODIUM 40 MG
40 TABLET ORAL
Status: DISCONTINUED | OUTPATIENT
Start: 2024-06-07 | End: 2024-06-13 | Stop reason: HOSPADM

## 2024-06-07 RX ADMIN — INSULIN HUMAN 8.75 UNITS: 100 INJECTION, SOLUTION PARENTERAL at 09:54

## 2024-06-07 RX ADMIN — INSULIN LISPRO 5 UNITS: 100 INJECTION, SOLUTION INTRAVENOUS; SUBCUTANEOUS at 13:50

## 2024-06-07 RX ADMIN — SODIUM CHLORIDE 75 ML/HR: 0.9 INJECTION, SOLUTION INTRAVENOUS at 13:44

## 2024-06-07 RX ADMIN — SODIUM CHLORIDE 75 ML/HR: 0.9 INJECTION, SOLUTION INTRAVENOUS at 16:53

## 2024-06-07 RX ADMIN — INSULIN LISPRO 3 UNITS: 100 INJECTION, SOLUTION INTRAVENOUS; SUBCUTANEOUS at 21:49

## 2024-06-07 RX ADMIN — PRAVASTATIN SODIUM 40 MG: 40 TABLET ORAL at 17:30

## 2024-06-07 RX ADMIN — LEVALBUTEROL HYDROCHLORIDE 1.25 MG: 1.25 SOLUTION RESPIRATORY (INHALATION) at 19:57

## 2024-06-07 RX ADMIN — SODIUM CHLORIDE 500 ML: 0.9 INJECTION, SOLUTION INTRAVENOUS at 09:32

## 2024-06-07 RX ADMIN — INSULIN LISPRO 5 UNITS: 100 INJECTION, SOLUTION INTRAVENOUS; SUBCUTANEOUS at 18:20

## 2024-06-07 RX ADMIN — CEFTRIAXONE 1000 MG: 1 INJECTION, SOLUTION INTRAVENOUS at 13:51

## 2024-06-07 RX ADMIN — CARVEDILOL 6.25 MG: 6.25 TABLET, FILM COATED ORAL at 17:30

## 2024-06-07 RX ADMIN — BRIMONIDINE TARTRATE 1 DROP: 2 SOLUTION/ DROPS OPHTHALMIC at 18:20

## 2024-06-07 RX ADMIN — PIPERACILLIN AND TAZOBACTAM 4.5 G: 4; .5 INJECTION, POWDER, FOR SOLUTION INTRAVENOUS at 09:02

## 2024-06-07 RX ADMIN — VANCOMYCIN HYDROCHLORIDE 1750 MG: 1 INJECTION, POWDER, LYOPHILIZED, FOR SOLUTION INTRAVENOUS at 10:53

## 2024-06-07 RX ADMIN — BRIMONIDINE TARTRATE 1 DROP: 2 SOLUTION/ DROPS OPHTHALMIC at 21:48

## 2024-06-07 RX ADMIN — ENOXAPARIN SODIUM 40 MG: 40 INJECTION SUBCUTANEOUS at 13:51

## 2024-06-07 RX ADMIN — AZITHROMYCIN MONOHYDRATE 500 MG: 500 INJECTION, POWDER, LYOPHILIZED, FOR SOLUTION INTRAVENOUS at 14:43

## 2024-06-07 NOTE — ASSESSMENT & PLAN NOTE
Multi focal pneumonia predominantly left upper lobe  Patient states he had flulike symptoms 1 week ago  Now comes with shortness of breath, minimal cough.  CT chest suggestive of multifocal pneumonia  Noted to have tachypnea and hypoxia.  Does not meet SIRS criteria, elevated WBC count secondary to CLL and at baseline.  Will give IV ceftriaxone and azithromycin.  Urine strep and Legionella ordered.  Follow-up cultures.  Sputum culture if able to obtain ordered.

## 2024-06-07 NOTE — ASSESSMENT & PLAN NOTE
Does not take oxygen at home  Requiring 3 L of oxygen in the emergency department  Secondary to pneumonia  Wean O2 supplement oxygen and wean off as tolerated.

## 2024-06-07 NOTE — ASSESSMENT & PLAN NOTE
Lab Results   Component Value Date    HGBA1C 7.3 (H) 02/27/2024       Recent Labs     06/07/24  1103   POCGLU 351*       Presents with hyperglycemia on oral medications at home, patient states he has not taken his medications today.  Give sliding scale insulin.    Blood Sugar Average: Last 72 hrs:  (P) 351

## 2024-06-07 NOTE — ASSESSMENT & PLAN NOTE
Lab Results   Component Value Date    EGFR 28 06/07/2024    EGFR 44 04/29/2024    EGFR 48 04/09/2024    CREATININE 2.13 (H) 06/07/2024    CREATININE 1.50 (H) 04/29/2024    CREATININE 1.40 (H) 04/09/2024     Elevated from baseline, give gentle hydration  Note that patient has chronic lower extremity.

## 2024-06-07 NOTE — H&P
[facility]  H&P  Name: Abelino Renee 78 y.o. male I MRN: [id number]  Unit/Bed#: ED 10 I Date of Admission: 6/7/2024   Date of Service: 6/7/2024 I Hospital Day: 0      Assessment & Plan   Community acquired pneumonia of left lung  Assessment & Plan  Multi focal pneumonia predominantly left upper lobe  Patient states he had flulike symptoms 1 week ago  Now comes with shortness of breath, minimal cough.  CT chest suggestive of multifocal pneumonia  Noted to have tachypnea and hypoxia.  Does not meet SIRS criteria, elevated WBC count secondary to CLL and at baseline.  Will give IV ceftriaxone and azithromycin.  Urine strep and Legionella ordered.  Follow-up cultures.  Sputum culture if able to obtain ordered.      Acute respiratory failure with hypoxia (HCC)  Assessment & Plan  Does not take oxygen at home  Requiring 3 L of oxygen in the emergency department  Secondary to pneumonia  Wean O2 supplement oxygen and wean off as tolerated.    Type 2 diabetes mellitus with diabetic polyneuropathy, without long-term current use of insulin ([facility])  Assessment & Plan  Lab Results   Component Value Date    HGBA1C 7.3 (H) 02/27/2024       Recent Labs     06/07/24  1103   POCGLU 351*       Presents with hyperglycemia on oral medications at home, patient states he has not taken his medications today.  Give sliding scale insulin.    Blood Sugar Average: Last 72 hrs:  (P) 351      CKD (chronic kidney disease) stage 3, GFR 30-59 ml/min ([facility])  Assessment & Plan  Lab Results   Component Value Date    EGFR 28 06/07/2024    EGFR 44 04/29/2024    EGFR 48 04/09/2024    CREATININE 2.13 (H) 06/07/2024    CREATININE 1.50 (H) 04/29/2024    CREATININE 1.40 (H) 04/09/2024     Elevated from baseline, give gentle hydration  Note that patient has chronic lower extremity.    Chronic lymphocytic leukemia (HCC)  Assessment & Plan  Chronic elevation of WBC count, currently at baseline    Benign essential HTN  Assessment &  "Plan  Continue home medications.       VTE Prophylaxis: Enoxaparin (Lovenox)  / sequential compression device   Code Status: Full code    Discussion with family: Offered to call patient's wife however patient states she was in the ED with him and she just left and he declined call.    Anticipated Length of Stay:  Patient will be admitted on an Inpatient basis with an anticipated length of stay of more than 2 midnights.   Justification for Hospital Stay: Hypoxia, pneumonia    Total Time for Visit, including Counseling / Coordination of Care:  75 minutes .  Greater than 50% of this total time spent on direct patient counseling and coordination of care.    Chief Complaint:   Shortness of breath    History of Present Illness:    Abelino Renee is a 78 y.o. male who presents with past medical history of diabetes, hypertension presents with complaints of cough, shortness of breath.  Symptoms started with flulike illness more than a week ago, gradually worsening, patient states today he was not able to walk due to weakness in his lower extremities, has mild cough, minimally productive.  Complains of some discomfort in the left side of the chest.  Denies any fever, chills, nausea, vomiting.  Denies any abdominal pain.  Denies any urinary or bowel complaints    Review of Systems:    More than 10 system review of systems was performed, pertinent positive and negative findings mentioned as per history of present illness.    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    Chronic lymphocytic leukemia of B-cell type (HCC)     \"remission 4-5yrs\"-chemo    DDD (degenerative disc disease), cervical     Diabetes mellitus (HCC)     bs checked by pt at home at 6am =92    Diabetic neuropathy (HCC)     Diabetic retinopathy (HCC)     Elevated WBC count     Heart attack (HCC) 04/2015    \"labeled as " "that and than tested later after appendix removed and stress test showed negative\"    History of cancer chemotherapy     last treatment approx 5yrs ago    Hypertension     Myocardial infarction (HCC) 04/2015    Scalp psoriasis     Shingles 2/10-15    Toe injury     left great toe, - 2017-internal braec-to be removed today 3/30/2018    Wears glasses        Past Surgical History:   Procedure Laterality Date    APPENDECTOMY      BONE BIOPSY Left 3/30/2018    Procedure: GREAT TOE BONE BIOPSY;  Surgeon: Gerber Multani DPM;  Location: [facility];  Service: Podiatry    BOWEL RESECTION      CATARACT EXTRACTION Bilateral     CHOLECYSTECTOMY      COLECTOMY  06/2015    EYE SURGERY Right     \"retinal peel\"    FOOT FUSION Left 12/6/2017    Procedure: HALLUX INTERPHALANGEAL JOINT FUSION;  Surgeon: Gerber Multani DPM;  [facility]: [facility];  Service: Podiatry    HARDWARE REMOVAL Left 4/18/2018    Procedure: REMOVAL STAPLES LEFT TOE;  Surgeon: Gerber Multani DPM;  Location: [facility];  Service: Podiatry    ORIF FOOT FRACTURE Left 12/6/2017    Procedure: HALLUX INTERPHALANGEAL JOINT INTERNAL FIXATION; REPAIR OF ULCERATION WITH EXCISIONS AND REVISION OF SKIN HALLUX WITH USE OF ALLOGRAFT;  Surgeon: Gerber Mlutani DPM;  [facility]: [facility];  Service: Podiatry    KY COLONOSCOPY FLX DX W/COLLJ SPEC WHEN PFRMD N/A 4/21/2016    Procedure: COLONOSCOPY;  Surgeon: Minal Ruelas DO;  Location: [facility];  Service: Gastroenterology    KY REMOVAL IMPLANT DEEP Left 3/30/2018    Procedure: REMOVAL HARDWARE FOOT;  Surgeon: Gerber Multani DPM;  Location: [facility];  Service: Podiatry    RETINAL DETACHMENT SURGERY      TONSILLECTOMY         Meds/Allergies:    Prior to Admission medications    Medication Sig Start Date End Date Taking? Authorizing Provider   amLODIPine (NORVASC) 10 mg tablet Take 1 tablet (10 mg total) by mouth daily 3/14/24   Edel Wright MD   brimonidine tartrate 0.2 % ophthalmic solution INSTILL 1 DROP INTO EACH EYE " TWICE DAILY 10/6/22   Historical Provider, MD   carvedilol (Coreg) 6.25 mg tablet Take 1 tablet (6.25 mg total) by mouth 2 (two) times a day with meals 3/14/24 3/9/25  Edel Wright MD   glipiZIDE (GLUCOTROL XL) 10 mg 24 hr tablet Take 1 tablet (10 mg total) by mouth 2 (two) times a day 3/14/24   Edel Wright MD   Glucosamine HCl (GLUCOSAMINE PO) Take 1 tablet by mouth 2 (two) times a day     Historical Provider, MD   levothyroxine (Synthroid) 75 mcg tablet Take 1 tablet (75 mcg total) by mouth daily 9/12/23   Edel Wright MD   losartan (COZAAR) 100 MG tablet Take 1 tablet (100 mg total) by mouth daily 3/14/24   Edel Wright MD   metFORMIN (GLUCOPHAGE-XR) 500 mg 24 hr tablet Take 2 tablets (1,000 mg total) by mouth 2 (two) times a day with meals 3/14/24   Edel Wright MD   Multiple Vitamin (MULTIVITAMIN) tablet Take 1 tablet by mouth daily    Historical Provider, MD   Omega-3 Fatty Acids (FISH OIL PO) Take by mouth daily    Historical Provider, MD   simvastatin (ZOCOR) 20 mg tablet Take 1 tablet (20 mg total) by mouth daily at bedtime 3/14/24   Edel Wright MD   timolol (TIMOPTIC) 0.5 % ophthalmic solution timolol maleate 0.5 % eye drops    Historical Provider, MD     I have reviewed home medications with patient personally.    Allergies:   Allergies   Allergen Reactions    Oxycodone-Acetaminophen      hallucination       Social History:     Marital Status: /Civil Union     Substance Use History:   Social History     Substance and Sexual Activity   Alcohol Use No     Social History     Tobacco Use   Smoking Status Never   Smokeless Tobacco Never     Social History     Substance and Sexual Activity   Drug Use No       Family History:    non-contributory    Physical Exam:     Vitals:   Blood Pressure: 136/67 (06/07/24 1119)  Pulse: 77 (06/07/24 1119)  Temperature: 98.2 °F (36.8 °C) (06/07/24 1313)  Temp Source: Oral (06/07/24 1313)  Respirations: (!) 35 (06/07/24 1119)  Weight - Scale:  87.5 kg (192 lb 14.4 oz) (06/07/24 0934)  SpO2: 96 % (06/07/24 1119)    Physical Exam    General appearance:  Patient not in acute distress  Eyes:  Pupils equal reacting to light  ENT:  Moist oral mucous membranes  CVS:  S1-S2 heard, regular rate and rhythm, 1+ edema bilateral lower extremities  Chest:  Bilateral air entry present, decreased air entry left lung  Abdomen:  Soft, nontender, bowel sounds present  CNS:  No focal neurological deficits  Genitourinary: deferred  Skin:  No acute rash   psychiatric:  No psychosis  Musculoskeletal:  No joint deformities      Additional Data:     Lab Results: I have personally reviewed pertinent reports.      Results from last 7 days   Lab Units 06/07/24  0832   WBC Thousand/uL 26.79*   HEMOGLOBIN g/dL 9.2*   HEMATOCRIT % 27.6*   PLATELETS Thousands/uL 204   BANDS PCT % 5   LYMPHO PCT % 57*   MONO PCT % 0*   EOS PCT % 0     Results from last 7 days   Lab Units 06/07/24  0832   SODIUM mmol/L 130*   POTASSIUM mmol/L 3.8   CHLORIDE mmol/L 97   CO2 mmol/L 20*   BUN mg/dL 47*   CREATININE mg/dL 2.13*   ANION GAP mmol/L 13   CALCIUM mg/dL 8.8   ALBUMIN g/dL 3.4*   TOTAL BILIRUBIN mg/dL 0.72   ALK PHOS U/L 101   ALT U/L 21   AST U/L 14   GLUCOSE RANDOM mg/dL 490*         Results from last 7 days   Lab Units 06/07/24  1103   POC GLUCOSE mg/dl 351*         Results from last 7 days   Lab Units 06/07/24  0849 06/07/24  0832   LACTIC ACID mmol/L 1.5  --    PROCALCITONIN ng/ml  --  2.49*       Imaging: I have personally reviewed pertinent reports.      CT chest without contrast   Final Result by Jeff Gavin DO (06/07 1051)      1. Multifocal pneumonia (greatest in the left upper lobe) as detailed above. Follow-up CT chest in 8 weeks recommended to assure resolution.      2. Mild mediastinal adenopathy, presumably reactive.      3. Mild septal thickening and groundglass opacity most notably in the lower lobes may represent an element of superimposed interstitial edema.      4.  Prominent liver, particularly caudate lobe. Cirrhosis cannot be excluded.      The study was marked in EPIC for follow-up.            Workstation performed: [id number]         XR chest 1 view portable   Final Result by Jeff Gavin DO (06/07 1054)      Left upper lobe pneumonia. Additional findings as above better depicted on subsequent CT imaging.            Workstation performed: [id number]             EKG, Pathology, and Other Studies Reviewed on Admission:   EKG: Reviewed personally by me shows sinus rhythm, no acute ST-T wave changes.    [employer] / [facility] Records Reviewed: Yes     ** Please Note: This note has been constructed using a voice recognition system. **

## 2024-06-07 NOTE — SEPSIS NOTE
"  Sepsis Note   Abelino Renee 78 y.o. male MRN: [id number]  Unit/Bed#: ED 10 Encounter: [id number]       Initial Sepsis Screening       Row Name 06/07/24 0920                Is the patient's history suggestive of a new or worsening infection? Yes (Proceed)  -SD        Suspected source of infection pneumonia  -SD        Indicate SIRS criteria Tachypnea > 20 resp per min;Leukocytosis (WBC > 05420 IJL) OR Leukopenia (WBC <4000 IJL) OR Bandemia (WBC >10% bands)  -SD        Are two or more of the above signs & symptoms of infection both present and new to the patient? Yes (Proceed)  -SD        Assess for evidence of organ dysfunction: Are any of the below criteria present within 6 hours of suspected infection and SIRS criteria that are NOT considered to be chronic conditions? Creatinine > 2.0 AND > 0.5 above baseline  -SD        Date of presentation of severe sepsis 06/07/24  -SD        Time of presentation of severe sepsis 0920  -SD        Sepsis Note: Click \"NEXT\" below (NOT \"close\") to generate sepsis note based on above information. --                  User Key  (r) = Recorded By, (t) = Taken By, (c) = Cosigned By      Initials Name Provider Type    MISHA Gimenez PA-C Physician Assistant                        There is no height or weight on file to calculate BMI.  Wt Readings from Last 1 Encounters:   03/14/24 87.5 kg (193 lb)        Ideal body weight: 63.8 kg (140 lb 10.5 oz)  Adjusted ideal body weight: 73.3 kg (161 lb 9.5 oz)    "

## 2024-06-07 NOTE — TELEPHONE ENCOUNTER
USHAI:    Pt called requesting an appt, when I asked him his symptoms he stated: Shortness of breath, weakness, severe congesting, wheezing, he feels he's not getting enough oxygen to his legs and feet. Pt did sound he was out of breath while speaking to me.     I tried to warm transfer to triage nurse staff but they were not available. I suggested to the pt to proceed to the ED at [facility] since that is the nearest hospital to him. He agreed due to his symptoms. I also advised him after his ED visit to please call us for a follow up appt with his PCP.

## 2024-06-07 NOTE — PLAN OF CARE
Problem: Potential for Falls  Goal: Patient will remain free of falls  Description: INTERVENTIONS:  - Educate patient/family on patient safety including physical limitations  - Instruct patient to call for assistance with activity   - Consult OT/PT to assist with strengthening/mobility   - Keep Call bell within reach  - Keep bed low and locked with side rails adjusted as appropriate  - Keep care items and personal belongings within reach  - Initiate and maintain comfort rounds  - Offer Toileting every 2 Hours, in advance of need  - Initiate/Maintain bed alarm  - Obtain necessary fall risk management equipment:   - Apply yellow socks and bracelet for high fall risk patients  - Consider moving patient to room near nurses station  Outcome: Progressing

## 2024-06-07 NOTE — ED PROVIDER NOTES
"History  Chief Complaint   Patient presents with    Shortness of Breath     Pt reports x2 days of SOB after having a \"flu\" x6 days. Feels SOB at rest and worsens with ambulation. Also reports bilateral leg swelling for several months that feels worse. Pt takes a diuretic but unable to recall name and dose.      This is a 78-year-old male with past medical history significant for chronic lymphocytic leukemia, type 2 diabetes mellitus, myocardial infarction, hypertension, and cardiomyopathy presenting to the emergency department today for shortness of breath.  Shortness of breath is associated with a nonproductive cough and has been ongoing for approximately 2 days.  The patient tested positive for influenza 6 days ago and subsequently had numerous episodes of nausea and vomiting which has since resolved.  He notes shortness of breath both at rest and with ambulation.  He has lower extremity swelling.  He denies any nasal congestion or rhinorrhea.  He does not have a sore throat.  No abdominal pain, diarrhea, or constipation.  The patient does not wear oxygen at home.  No neck pain.  No other complaints at this time.      History provided by:  Patient   used: No    Shortness of Breath  Severity:  Moderate  Onset quality:  Gradual  Duration:  2 days  Timing:  Constant  Progression:  Worsening  Chronicity:  New  Relieved by:  Nothing  Worsened by:  Movement  Ineffective treatments:  None tried  Associated symptoms: cough    Associated symptoms: no abdominal pain, no chest pain, no claudication, no diaphoresis, no ear pain, no fever, no headaches, no neck pain, no rash, no sore throat, no sputum production, no syncope, no swollen glands, no vomiting and no wheezing        Prior to Admission Medications   Prescriptions Last Dose Informant Patient Reported? Taking?   Glucosamine HCl (GLUCOSAMINE PO) 6/6/2024 Self Yes Yes   Sig: Take 1 tablet by mouth 2 (two) times a day    Multiple Vitamin " "(MULTIVITAMIN) tablet 6/6/2024 Self Yes Yes   Sig: Take 1 tablet by mouth daily   Omega-3 Fatty Acids (FISH OIL PO) 6/6/2024 Self Yes Yes   Sig: Take by mouth daily   amLODIPine (NORVASC) 10 mg tablet 6/6/2024  No Yes   Sig: Take 1 tablet (10 mg total) by mouth daily   brimonidine tartrate 0.2 % ophthalmic solution 6/6/2024 Self Yes Yes   Sig: INSTILL 1 DROP INTO EACH EYE TWICE DAILY   carvedilol (Coreg) 6.25 mg tablet 6/6/2024  No Yes   Sig: Take 1 tablet (6.25 mg total) by mouth 2 (two) times a day with meals   glipiZIDE (GLUCOTROL XL) 10 mg 24 hr tablet 6/6/2024  No Yes   Sig: Take 1 tablet (10 mg total) by mouth 2 (two) times a day   levothyroxine (Synthroid) 75 mcg tablet 6/7/2024  No Yes   Sig: Take 1 tablet (75 mcg total) by mouth daily   losartan (COZAAR) 100 MG tablet 6/6/2024  No Yes   Sig: Take 1 tablet (100 mg total) by mouth daily   metFORMIN (GLUCOPHAGE-XR) 500 mg 24 hr tablet 6/6/2024  No Yes   Sig: Take 2 tablets (1,000 mg total) by mouth 2 (two) times a day with meals   simvastatin (ZOCOR) 20 mg tablet 6/6/2024  No Yes   Sig: Take 1 tablet (20 mg total) by mouth daily at bedtime   timolol (TIMOPTIC) 0.5 % ophthalmic solution 6/6/2024 Self Yes Yes   Sig: timolol maleate 0.5 % eye drops      Facility-Administered Medications: None       Past Medical History:   Diagnosis Date    Arthritis     Spine    Cancer (HCC)     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    Chronic lymphocytic leukemia of B-cell type (HCC)     \"remission 4-5yrs\"-chemo    DDD (degenerative disc disease), cervical     Diabetes mellitus (HCC)     bs checked by pt at home at 6am =92    Diabetic neuropathy (HCC)     Diabetic retinopathy (HCC)     Elevated WBC count     Heart attack (HCC) 04/2015    \"labeled as that and than tested later after appendix removed and stress test showed negative\"    History of cancer chemotherapy     last treatment approx 5yrs ago    Hypertension     Myocardial infarction (HCC) 04/2015 " "   Scalp psoriasis     Shingles 2/10-15    Toe injury     left great toe, - -internal braec-to be removed today 3/30/2018    Wears glasses        Past Surgical History:   Procedure Laterality Date    APPENDECTOMY      BONE BIOPSY Left 3/30/2018    Procedure: GREAT TOE BONE BIOPSY;  Surgeon: Gerber Multani DPM;  Location: [facility];  Service: Podiatry    BOWEL RESECTION      CATARACT EXTRACTION Bilateral     CHOLECYSTECTOMY      COLECTOMY  2015    EYE SURGERY Right     \"retinal peel\"    FOOT FUSION Left 2017    Procedure: HALLUX INTERPHALANGEAL JOINT FUSION;  Surgeon: Gerber Multani DPM;  [facility];  Service: Podiatry    HARDWARE REMOVAL Left 2018    Procedure: REMOVAL STAPLES LEFT TOE;  Surgeon: Gerber Multani DPM;  [facility];  Service: Podiatry    ORIF FOOT FRACTURE Left 2017    Procedure: HALLUX INTERPHALANGEAL JOINT INTERNAL FIXATION; REPAIR OF ULCERATION WITH EXCISIONS AND REVISION OF SKIN HALLUX WITH USE OF ALLOGRAFT;  Surgeon: Gerber Multani DPM;  [facility];  Service: Podiatry    NJ COLONOSCOPY FLX DX W/COLLJ SPEC WHEN PFRMD N/A 2016    Procedure: COLONOSCOPY;  Surgeon: Minal Ruelas DO;  Location: [facility];  Service: Gastroenterology    NJ REMOVAL IMPLANT DEEP Left 3/30/2018    Procedure: REMOVAL HARDWARE FOOT;  Surgeon: Gerber Multani DPM;  Location: [facility];  Service: Podiatry    RETINAL DETACHMENT SURGERY      TONSILLECTOMY         Family History   Problem Relation Age of Onset    Alcohol abuse Mother             Pancreatic cancer Father         age 79    Hashimoto's thyroiditis Daughter     Rheum arthritis Daughter         Juvenile Rheum Arthitis     No Known Problems Sister          age 78    No Known Problems Brother          86    Kidney failure Brother         dialysis, 74    Diabetes Brother      I have reviewed and agree with the history as documented.    E-Cigarette/Vaping    E-Cigarette Use Never User  "     E-Cigarette/Vaping Substances    Nicotine No     THC No     CBD No     Flavoring No     Other No     Unknown No      Social History     Tobacco Use    Smoking status: Never     Passive exposure: Never    Smokeless tobacco: Never   Vaping Use    Vaping status: Never Used   Substance Use Topics    Alcohol use: Never    Drug use: Never       Review of Systems   Constitutional:  Negative for appetite change, chills, diaphoresis, fatigue and fever.   HENT:  Negative for ear pain and sore throat.    Eyes:  Negative for visual disturbance.   Respiratory:  Positive for cough and shortness of breath. Negative for sputum production, chest tightness and wheezing.    Cardiovascular:  Positive for leg swelling. Negative for chest pain, palpitations, claudication and syncope.   Gastrointestinal:  Negative for abdominal pain, constipation, diarrhea, nausea and vomiting.   Genitourinary:  Negative for dysuria.   Musculoskeletal:  Negative for neck pain and neck stiffness.   Skin:  Negative for rash and wound.   Neurological:  Negative for dizziness, seizures, syncope, weakness, light-headedness, numbness and headaches.   Psychiatric/Behavioral:  Negative for confusion.    All other systems reviewed and are negative.      Physical Exam  Physical Exam  Vitals and nursing note reviewed.   Constitutional:       General: He is not in acute distress.     Appearance: Normal appearance. He is normal weight. He is ill-appearing. He is not toxic-appearing or diaphoretic.   HENT:      Head: Normocephalic and atraumatic.      Nose: Nose normal. No congestion or rhinorrhea.      Mouth/Throat:      Mouth: Mucous membranes are moist.      Pharynx: No oropharyngeal exudate or posterior oropharyngeal erythema.   Eyes:      General: No scleral icterus.        Right eye: No discharge.         Left eye: No discharge.      Conjunctiva/sclera: Conjunctivae normal.   Neck:      Comments: Nonmeningeal  Cardiovascular:      Rate and Rhythm: Normal rate  and regular rhythm.      Pulses: Normal pulses.      Heart sounds: Normal heart sounds. No murmur heard.     No friction rub. No gallop.   Pulmonary:      Effort: Pulmonary effort is normal. No respiratory distress.      Breath sounds: Normal breath sounds. No stridor. No wheezing, rhonchi or rales.      Comments: Decreased breath sounds to the bilateral lung fields  Chest:      Chest wall: No tenderness.   Musculoskeletal:         General: Normal range of motion.      Cervical back: Normal range of motion. No rigidity.      Right lower leg: Edema (1+) present.      Left lower leg: Edema (1+) present.   Skin:     General: Skin is warm and dry.      Capillary Refill: Capillary refill takes less than 2 seconds.      Coloration: Skin is not jaundiced or pale.   Neurological:      General: No focal deficit present.      Mental Status: He is alert and oriented to person, place, and time. Mental status is at baseline.   Psychiatric:         Mood and Affect: Mood normal.         Behavior: Behavior normal.         Vital Signs  ED Triage Vitals   Temperature Pulse Respirations Blood Pressure SpO2   06/07/24 0824 06/07/24 0817 06/07/24 0817 06/07/24 0817 06/07/24 0817   98 °F (36.7 °C) 80 (!) 24 (!) 176/74 (!) 87 %      Temp Source Heart Rate Source Patient Position - Orthostatic VS BP Location FiO2 (%)   06/07/24 0824 06/07/24 0817 06/07/24 0817 06/07/24 0817 --   Oral Monitor Sitting Left arm       Pain Score       06/07/24 0817       5           Vitals:    06/07/24 0930 06/07/24 1119 06/07/24 1400 06/07/24 1628   BP:  136/67 134/63 128/65   Pulse: 62 77 63 64   Patient Position - Orthostatic VS:  Lying Lying Lying         Visual Acuity  Visual Acuity      Flowsheet Row Most Recent Value   L Pupil Size (mm) 2   R Pupil Size (mm) 2   L Pupil Shape Round   R Pupil Shape Round            ED Medications  Medications   acetaminophen (TYLENOL) tablet 650 mg (has no administration in time range)   dextromethorphan-guaiFENesin  (ROBITUSSIN DM) oral syrup 10 mL (has no administration in time range)   enoxaparin (LOVENOX) subcutaneous injection 40 mg (40 mg Subcutaneous Given 6/7/24 1351)   cefTRIAXone (ROCEPHIN) IVPB (premix in dextrose) 1,000 mg 50 mL (0 mg Intravenous Stopped 6/7/24 1440)     And   azithromycin (ZITHROMAX) 500 mg in sodium chloride 0.9% 250mL IVPB 500 mg (500 mg Intravenous New Bag 6/7/24 1443)   amLODIPine (NORVASC) tablet 10 mg (has no administration in time range)   brimonidine tartrate 0.2 % ophthalmic solution 1 drop (has no administration in time range)   carvedilol (COREG) tablet 6.25 mg (6.25 mg Oral Given 6/7/24 1730)   levothyroxine tablet 75 mcg (has no administration in time range)   losartan (COZAAR) tablet 100 mg (has no administration in time range)   pravastatin (PRAVACHOL) tablet 40 mg (40 mg Oral Given 6/7/24 1730)   timolol (TIMOPTIC) 0.5 % ophthalmic solution 1 drop (has no administration in time range)   sodium chloride 0.9 % infusion (75 mL/hr Intravenous New Bag 6/7/24 1653)   albuterol inhalation solution 2.5 mg (has no administration in time range)   piperacillin-tazobactam (ZOSYN) IVPB 4.5 g (0 g Intravenous Stopped 6/7/24 0932)   sodium chloride 0.9 % bolus 500 mL (0 mL Intravenous Stopped 6/7/24 1058)   insulin regular (HumuLIN R,NovoLIN R) injection 8.75 Units (8.75 Units Intravenous Given 6/7/24 0954)   vancomycin (VANCOCIN) 1,750 mg in sodium chloride 0.9 % 500 mL IVPB (0 mg Intravenous Stopped 6/7/24 1359)   insulin lispro (HumALOG/ADMELOG) 100 units/mL subcutaneous injection 5 Units (5 Units Subcutaneous Given 6/7/24 1350)       Diagnostic Studies  Results Reviewed       Procedure Component Value Units Date/Time    HS Troponin I 4hr [[id number]]  (Normal) Collected: 06/07/24 1312    Lab Status: Final result Specimen: Blood from Arm, Left Updated: 06/07/24 1405     hs TnI 4hr 31 ng/L      Delta 4hr hsTnI 4 ng/L     Fingerstick Glucose (POCT) [[id number]]  (Abnormal) Collected: 06/07/24 1341     Lab Status: Final result Specimen: Blood Updated: 06/07/24 1342     POC Glucose 363 mg/dl     Strep Pneumoniae, Urine [[id number]] Collected: 06/07/24 1124    Lab Status: In process Specimen: Urine, Other Updated: 06/07/24 1335    Legionella antigen, Urine [[id number]] Collected: 06/07/24 1124    Lab Status: In process Specimen: Urine, Other Updated: 06/07/24 1335    Sputum culture and Gram stain [[id number]]     Lab Status: No result Specimen: Sputum     Urine Microscopic [[id number]]  (Abnormal) Collected: 06/07/24 1124    Lab Status: Final result Specimen: Urine, Other Updated: 06/07/24 1224     RBC, UA 1-2 /hpf      WBC, UA 4-10 /hpf      Epithelial Cells Occasional /hpf      Bacteria, UA Moderate /hpf      COARSE GRANULAR CASTS 5-10 /lpf     UA w Reflex to Microscopic w Reflex to Culture [[id number]]  (Abnormal) Collected: 06/07/24 1124    Lab Status: Final result Specimen: Urine, Other Updated: 06/07/24 1138     Color, UA Yellow     Clarity, UA Slightly Cloudy     Specific Gravity, UA 1.025     pH, UA 6.0     Leukocytes, UA Negative     Nitrite, UA Negative     Protein, UA 2+ mg/dl      Glucose, UA 3+ mg/dl      Ketones, UA Negative mg/dl      Urobilinogen, UA 0.2 E.U./dl      Bilirubin, UA Negative     Occult Blood, UA 1+    Fingerstick Glucose (POCT) [[id number]]  (Abnormal) Collected: 06/07/24 1103    Lab Status: Final result Specimen: Blood Updated: 06/07/24 1104     POC Glucose 351 mg/dl     Blood culture [[id number]] Collected: 06/07/24 0830    Lab Status: Preliminary result Specimen: Blood from Arm, Right Updated: 06/07/24 1101     Blood Culture Received in Microbiology Lab. Culture in Progress.    Blood culture [[id number]] Collected: 06/07/24 0833    Lab Status: Preliminary result Specimen: Blood from Arm, Left Updated: 06/07/24 1101     Blood Culture Received in Microbiology Lab. Culture in Progress.    HS Troponin I 2hr [[id number]]  (Normal) Collected: 06/07/24 1024    Lab Status: Final result Specimen:  Blood from Arm, Right Updated: 06/07/24 1052     hs TnI 2hr 29 ng/L      Delta 2hr hsTnI 2 ng/L     RBC Morphology Reflex Test [[id number]] Collected: 06/07/24 0832    Lab Status: Final result Specimen: Blood from Arm, Left Updated: 06/07/24 1001    FLU/RSV/COVID - if FLU/RSV clinically relevant [[id number]]  (Normal) Collected: 06/07/24 0832    Lab Status: Final result Specimen: Nares from Nose Updated: 06/07/24 1000     SARS-CoV-2 Negative     INFLUENZA A PCR Negative     INFLUENZA B PCR Negative     RSV PCR Negative    Narrative:      FOR PEDIATRIC PATIENTS - copy/paste COVID Guidelines URL to browser: [email or web address]    SARS-CoV-2 assay is a Nucleic Acid Amplification assay intended for the  qualitative detection of nucleic acid from SARS-CoV-2 in nasopharyngeal  swabs. Results are for the presumptive identification of SARS-CoV-2 RNA.    Positive results are indicative of infection with SARS-CoV-2, the virus  causing COVID-19, but do not rule out bacterial infection or co-infection  with other viruses. Laboratories within the [place] and its  territories are required to report all positive results to the appropriate  public health authorities. Negative results do not preclude SARS-CoV-2  infection and should not be used as the sole basis for treatment or other  patient management decisions. Negative results must be combined with  clinical observations, patient history, and epidemiological information.  This test has not been FDA cleared or approved.    This test has been authorized by FDA under an Emergency Use Authorization  (EUA). This test is only authorized for the duration of time the  declaration that circumstances exist justifying the authorization of the  emergency use of an in vitro diagnostic tests for detection of SARS-CoV-2  virus and/or diagnosis of COVID-19 infection under section 564(b)(1) of  the Act, 21 U.S.C. 360bbb-3(b)(1), unless  the authorization is terminated  or revoked sooner. The test has been validated but independent review by FDA  and CLIA is pending.    Test performed using [employer] GeneXpert: This RT-PCR assay targets N2,  a region unique to SARS-CoV-2. A conserved region in the E-gene was chosen  for pan-Sarbecovirus detection which includes SARS-CoV-2.    According to CMS-2020-01-R, this platform meets the definition of high-throughput technology.    CBC and differential [[id number]]  (Abnormal) Collected: 06/07/24 0832    Lab Status: Final result Specimen: Blood from Arm, Left Updated: 06/07/24 0939     WBC 26.79 Thousand/uL      RBC 2.96 Million/uL      Hemoglobin 9.2 g/dL      Hematocrit 27.6 %      MCV 93 fL      MCH 31.1 pg      MCHC 33.3 g/dL      RDW 13.8 %      MPV 9.9 fL      Platelets 204 Thousands/uL     Narrative:      This is an appended report.  These results have been appended to a previously verified report.    Manual Differential(PHLEBS Do Not Order) [[id number]]  (Abnormal) Collected: 06/07/24 0832    Lab Status: Final result Specimen: Blood from Arm, Left Updated: 06/07/24 0939     Segmented % 38 %      Bands % 5 %      Lymphocytes % 57 %      Monocytes % 0 %      Eosinophils % 0 %      Basophils % 0 %      Absolute Neutrophils 11.52 Thousand/uL      Absolute Lymphocytes 15.27 Thousand/uL      Absolute Monocytes 0.00 Thousand/uL      Absolute Eosinophils 0.00 Thousand/uL      Absolute Basophils 0.00 Thousand/uL      Total Counted --     RBC Morphology Normal     Platelet Estimate Adequate    B-Type Natriuretic Peptide(BNP) [[id number]]  (Abnormal) Collected: 06/07/24 0832    Lab Status: Final result Specimen: Blood from Arm, Left Updated: 06/07/24 0927      pg/mL     Comprehensive metabolic panel [[id number]]  (Abnormal) Collected: 06/07/24 0832    Lab Status: Final result Specimen: Blood from Arm, Left Updated: 06/07/24 0917     Sodium 130 mmol/L      Potassium 3.8 mmol/L      Chloride 97 mmol/L      CO2 20  mmol/L      ANION GAP 13 mmol/L      BUN 47 mg/dL      Creatinine 2.13 mg/dL      Glucose 490 mg/dL      Calcium 8.8 mg/dL      Corrected Calcium 9.3 mg/dL      AST 14 U/L      ALT 21 U/L      Alkaline Phosphatase 101 U/L      Total Protein 7.3 g/dL      Albumin 3.4 g/dL      Total Bilirubin 0.72 mg/dL      eGFR 28 ml/min/1.73sq m     Narrative:      National Kidney Disease Foundation guidelines for Chronic Kidney Disease (CKD):     Stage 1 with normal or high GFR (GFR > 90 mL/min/1.73 square meters)    Stage 2 Mild CKD (GFR = 60-89 mL/min/1.73 square meters)    Stage 3A Moderate CKD (GFR = 45-59 mL/min/1.73 square meters)    Stage 3B Moderate CKD (GFR = 30-44 mL/min/1.73 square meters)    Stage 4 Severe CKD (GFR = 15-29 mL/min/1.73 square meters)    Stage 5 End Stage CKD (GFR <15 mL/min/1.73 square meters)  Note: GFR calculation is accurate only with a steady state creatinine    Lactic acid, plasma (w/reflex if result > 2.0) [[id number]]  (Normal) Collected: 06/07/24 0849    Lab Status: Final result Specimen: Blood from Arm, Left Updated: 06/07/24 0912     LACTIC ACID 1.5 mmol/L     Narrative:      Result may be elevated if tourniquet was used during collection.    Procalcitonin [[id number]]  (Abnormal) Collected: 06/07/24 0832    Lab Status: Final result Specimen: Blood from Arm, Left Updated: 06/07/24 0911     Procalcitonin 2.49 ng/ml     HS Troponin 0hr (reflex protocol) [[id number]]  (Normal) Collected: 06/07/24 0832    Lab Status: Final result Specimen: Blood from Arm, Left Updated: 06/07/24 0908     hs TnI 0hr 27 ng/L                    CT chest without contrast   Final Result by Jeff Gavin DO (06/07 1051)      1. Multifocal pneumonia (greatest in the left upper lobe) as detailed above. Follow-up CT chest in 8 weeks recommended to assure resolution.      2. Mild mediastinal adenopathy, presumably reactive.      3. Mild septal thickening and groundglass opacity most notably in the lower lobes may  represent an element of superimposed interstitial edema.      4. Prominent liver, particularly caudate lobe. Cirrhosis cannot be excluded.      The study was marked in EPIC for follow-up.            Workstation performed: [id number]         XR chest 1 view portable   Final Result by Jeff Gavin DO (06/07 1054)      Left upper lobe pneumonia. Additional findings as above better depicted on subsequent CT imaging.            Workstation performed: [id number]                    Procedures  ECG 12 Lead Documentation Only    Date/Time: 6/7/2024 8:24 AM    Performed by: William Gimenez PA-C  Authorized by: William Gimenez PA-C    Indications / Diagnosis:  Short of Breath  Patient location:  ED  Previous ECG:     Previous ECG:  Compared to current    Comparison ECG info:  4/9/2015    Similarity:  Changes noted  Interpretation:     Interpretation: non-specific    Rate:     ECG rate:  71    ECG rate assessment: normal    Rhythm:     Rhythm: sinus rhythm    Ectopy:     Ectopy: none    QRS:     QRS axis:  Normal  Conduction:     Conduction: normal    ST segments:     ST segments:  Normal  T waves:     T waves: normal    Comments:      Normal sinus rhythm with a rate of 71.  Normal axis.  No ectopy.  No STEMI.  CriticalCare Time    Date/Time: 6/7/2024 9:00 AM    Performed by: William Gimenez PA-C  Authorized by: William Gimenez PA-C    Critical care provider statement:     Critical care time (minutes):  32    Critical care time was exclusive of:  Separately billable procedures and treating other patients    Critical care was necessary to treat or prevent imminent or life-threatening deterioration of the following conditions:  Sepsis    Critical care was time spent personally by me on the following activities:  Blood draw for specimens, obtaining history from patient or surrogate, evaluation of patient's response to treatment, examination of patient, review of  "old charts, re-evaluation of patient's condition, ordering and review of radiographic studies, ordering and review of laboratory studies and ordering and performing treatments and interventions  Comments:      Severe Sepsis + IV ABX + IVF           ED Course  ED Course as of 06/07/24 1732 Fri Jun 07, 2024   0828 SpO2: 91 %  Patient is on 2L NC.   0919 GLUCOSE(!!): 490  Despite hyperglycemia, the patient has no anion gap.  Not diabetic ketoacidosis.   0921 The 30ml/kg fluid bolus was not given to the patient despite hypotension and/or significantly elevated lactate of = 4 and/or presence of septic shock due to: Concern for fluid/volume overload. The patient will be administered 500 ml of crystalloid fluid instead. Orders for this have been placed in [facility]. The patient may receive additional colloid or crystalloid fluids thereafter based on clinical condition.     William Gimenez PA-C                              Initial Sepsis Screening       Row Name 06/07/24 0920                Is the patient's history suggestive of a new or worsening infection? Yes (Proceed)  -SD        Suspected source of infection pneumonia  -SD        Indicate SIRS criteria Tachypnea > 20 resp per min;Leukocytosis (WBC > 18694 IJL) OR Leukopenia (WBC <4000 IJL) OR Bandemia (WBC >10% bands)  -SD        Are two or more of the above signs & symptoms of infection both present and new to the patient? Yes (Proceed)  -SD        Assess for evidence of organ dysfunction: Are any of the below criteria present within 6 hours of suspected infection and SIRS criteria that are NOT considered to be chronic conditions? Creatinine > 2.0 AND > 0.5 above baseline  -SD        Date of presentation of severe sepsis 06/07/24  -SD        Time of presentation of severe sepsis 0920  -SD        Sepsis Note: Click \"NEXT\" below (NOT \"close\") to generate sepsis note based on above information. --                  User Key  (r) = Recorded By, (t) = Taken By, " (c) = Cosigned By      Initials Name Provider Type    SD William Pola JOSE RAMON Gimenez Physician Assistant                    SBIRT 22yo+      Flowsheet Row Most Recent Value   Initial Alcohol Screen: US AUDIT-C     1. How often do you have a drink containing alcohol? 0 Filed at: 06/07/2024 1400   2. How many drinks containing alcohol do you have on a typical day you are drinking?  0 Filed at: 06/07/2024 1400   3a. Male UNDER 65: How often do you have five or more drinks on one occasion? 0 Filed at: 06/07/2024 1400   3b. FEMALE Any Age, or MALE 65+: How often do you have 4 or more drinks on one occassion? 0 Filed at: 06/07/2024 1400   Audit-C Score 0 Filed at: 06/07/2024 1400   [place]: How many times in the past year have you...    Used an illegal drug or used a prescription medication for non-medical reasons? Never Filed at: 06/07/2024 1400                      Medical Decision Making  78-year-old male presenting to the emergency department today for shortness of breath associated with a cough.  Ongoing for 2 days.  Recently tested positive for influenza.  On arrival, the patient is hypoxic.  He was placed on 2 L nasal cannula and oxygen saturation improved to 90 to 93%.  The patient has 1+ pitting edema to the bilateral lower extremities.  The patient has leukocytosis at 26.79.  Glucose is 490.  He has pseudohyponatremia.  He has an RAINE with creatinine of 2.13.  Procalcitonin is 2.49.  Negative troponin x 2.  No lactic acidosis.  Negative viral panel.  The patient was tachypneic and also had leukocytosis which in the setting of RAINE resulted in the patient meeting severe sepsis criteria.  EKG is nonischemic on my independent interpretation.  Chest x-ray shows significant left sided pneumonia on my independent interpretation.  CT chest shows multifocal pneumonia to the left lung.  The patient was dosed with Zosyn and vancomycin while here in the emergency department.  He was also given a dose of insulin while  "here in the emergency department.  Based upon multifocal pneumonia in the setting of severe sepsis, will plan for admission.  Case was discussed with Dr. Rai who accepts the patient for admission.  The patient and/or patient's proxy verify their understanding and agree to the plan at this time.  All questions answered to the patient and/or their proxy's satisfaction.  All labs reviewed and utilized in the medical decision making process (if labs were ordered).  Portions of the record may have been created with voice recognition software.  Occasional wrong word or \"sound a like\" substitutions may have occurred due to the inherent limitations of voice recognition software.  Read the chart carefully and recognize, using context, where substitutions have occurred.    I reviewed prior notes.  I reviewed prior EKG.    Problems Addressed:  Acute hypoxemic respiratory failure (HCC): undiagnosed new problem with uncertain prognosis  RAINE (acute kidney injury) (HCC): undiagnosed new problem with uncertain prognosis  Hyperglycemia: undiagnosed new problem with uncertain prognosis  Multifocal pneumonia: undiagnosed new problem with uncertain prognosis  Severe sepsis (HCC): undiagnosed new problem with uncertain prognosis    Amount and/or Complexity of Data Reviewed  External Data Reviewed: ECG and notes.  Labs: ordered. Decision-making details documented in ED Course.  Radiology: ordered and independent interpretation performed. Decision-making details documented in ED Course.     Details: CXR  ECG/medicine tests: ordered and independent interpretation performed. Decision-making details documented in ED Course.  Discussion of management or test interpretation with external provider(s): Dr. Rai - SLIM    Risk  OTC drugs.  Decision regarding hospitalization.             Disposition  Final diagnoses:   Multifocal pneumonia   Severe sepsis (HCC)   RAINE (acute kidney injury) (HCC)   Hyperglycemia   Acute hypoxemic " respiratory failure (HCC)     Time reflects when diagnosis was documented in both MDM as applicable and the Disposition within this note       Time User Action Codes Description Comment    6/7/2024 11:12 AM William Gimenez [J18.9] Multifocal pneumonia     6/7/2024 11:12 AM William Gimenez [A41.9,  R65.20] Severe sepsis ([facility])     6/7/2024 11:12 AM William Gimenez [N17.9] RAINE (acute kidney injury) ([facility])     6/7/2024 11:12 AM William Gimenez [R73.9] Hyperglycemia     6/7/2024  5:00 PM William Gimenez [J96.01] Acute hypoxemic respiratory failure (HCC)           ED Disposition       ED Disposition   Admit    Condition   Stable    Date/Time   Fri Jun 7, 2024 1112    Comment   Case was discussed with Dr. Rai and the patient's admission status was agreed to be Admission Status: inpatient status to the service of Dr. Rai.               Follow-up Information    None         Current Discharge Medication List        CONTINUE these medications which have NOT CHANGED    Details   amLODIPine (NORVASC) 10 mg tablet Take 1 tablet (10 mg total) by mouth daily  Qty: 90 tablet, Refills: 1    Associated Diagnoses: Benign essential HTN      brimonidine tartrate 0.2 % ophthalmic solution INSTILL 1 DROP INTO EACH EYE TWICE DAILY      carvedilol (Coreg) 6.25 mg tablet Take 1 tablet (6.25 mg total) by mouth 2 (two) times a day with meals  Qty: 180 tablet, Refills: 1    Associated Diagnoses: Benign essential HTN      glipiZIDE (GLUCOTROL XL) 10 mg 24 hr tablet Take 1 tablet (10 mg total) by mouth 2 (two) times a day  Qty: 180 tablet, Refills: 1    Associated Diagnoses: Type 2 diabetes mellitus with diabetic polyneuropathy, without long-term current use of insulin ([facility])      Glucosamine HCl (GLUCOSAMINE PO) Take 1 tablet by mouth 2 (two) times a day       levothyroxine (Synthroid) 75 mcg tablet Take 1 tablet (75 mcg total) by mouth daily  Qty: 90 tablet, Refills: 1     Associated Diagnoses: Acquired hypothyroidism      losartan (COZAAR) 100 MG tablet Take 1 tablet (100 mg total) by mouth daily  Qty: 90 tablet, Refills: 1    Associated Diagnoses: Benign essential HTN      metFORMIN (GLUCOPHAGE-XR) 500 mg 24 hr tablet Take 2 tablets (1,000 mg total) by mouth 2 (two) times a day with meals  Qty: 360 tablet, Refills: 1    Associated Diagnoses: Type 2 diabetes mellitus with diabetic polyneuropathy, without long-term current use of insulin ([facility])      Multiple Vitamin (MULTIVITAMIN) tablet Take 1 tablet by mouth daily      Omega-3 Fatty Acids (FISH OIL PO) Take by mouth daily      simvastatin (ZOCOR) 20 mg tablet Take 1 tablet (20 mg total) by mouth daily at bedtime  Qty: 90 tablet, Refills: 1    Associated Diagnoses: Type 2 diabetes mellitus with diabetic polyneuropathy, without long-term current use of insulin ([facility])      timolol (TIMOPTIC) 0.5 % ophthalmic solution timolol maleate 0.5 % eye drops             No discharge procedures on file.    PDMP Review       None            ED Provider  Electronically Signed by             William Gimenez PA-C  06/07/24 [id number]       William Gimenez PA-C  06/07/24 8118

## 2024-06-07 NOTE — RESPIRATORY THERAPY NOTE
"RT Protocol Note  Abelino Renee 78 y.o. 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    Chronic lymphocytic leukemia of B-cell type (HCC)     \"remission 4-5yrs\"-chemo    DDD (degenerative disc disease), cervical     Diabetes mellitus (HCC)     bs checked by pt at home at 6am =92    Diabetic neuropathy (HCC)     Diabetic retinopathy (HCC)     Elevated WBC count     Heart attack (HCC) 04/2015    \"labeled as that and than tested later after appendix removed and stress test showed negative\"    History of cancer chemotherapy     last treatment approx 5yrs ago    Hypertension     Myocardial infarction (HCC) 04/2015    Scalp psoriasis     Shingles 2/10-15    Toe injury     left great toe, - 2017-internal braec-to be removed today 3/30/2018    Wears glasses      Social History     Socioeconomic History    Marital status: /Civil Union     Spouse name: None    Number of children: None    Years of education: 4 yr college    Highest education level: None   Occupational History    None   Tobacco Use    Smoking status: Never     Passive exposure: Never    Smokeless tobacco: Never   Vaping Use    Vaping status: Never Used   Substance and Sexual Activity    Alcohol use: Never    Drug use: Never    Sexual activity: Never     Partners: Female     Birth control/protection: None   Other Topics Concern    None   Social History Narrative    Most recent tobacco use " "[email or web address]    Education: 4 Year College    Marital status:     Exercise level: Occasional    Diet: Diabetic    General stress level: Low    Alcohol intake: None    Caffeine intake: Moderate    Guns present in home: No    Smoke alarm in home: Yes     Social Determinants of Health     Financial Resource Strain: Patient Declined (2023)    Overall Financial Resource Strain (CARDIA)     Difficulty of Paying Living Expenses: Patient declined   Food Insecurity: Not on file   Transportation Needs: No Transportation Needs (2023)    PRAPARE - Transportation     Lack of Transportation (Medical): No     Lack of Transportation (Non-Medical): No   Physical Activity: Not on file   Stress: Not on file   Social Connections: Not on file   Intimate Partner Violence: Not on file   Housing Stability: Not on file       Subjective         Objective    Physical Exam:   Assessment Type: (P) Assess only  General Appearance: (P) Alert, Awake  Respiratory Pattern: (P) Dyspnea with exertion, Dyspnea at rest  Chest Assessment: (P) Chest expansion symmetrical  R Breath Sounds: (P) Diminished, Expiratory wheezes    Vitals:  Blood pressure 128/65, pulse (P) 72, temperature 97.7 °F (36.5 °C), temperature source Oral, resp. rate 16, height 5' 6\" (1.676 m), weight 87.5 kg (192 lb 14.4 oz), SpO2 (P) 91%.          Imaging and other studies: I have personally reviewed pertinent reports.            Plan    Respiratory Plan: (P) No distress/Pulmonary history        Resp Comments: (P) Assessed pt per protocol. Pt has no pulmonary history. Breath sounds are diminished with upper airway wheezing. Currently on 4.5L NC. Pt is not on oxygen at baseline. Will order TID Xopenex. Respiratory to follow.   "

## 2024-06-08 LAB
ANION GAP SERPL CALCULATED.3IONS-SCNC: 13 MMOL/L (ref 4–13)
BUN SERPL-MCNC: 44 MG/DL (ref 5–25)
CALCIUM SERPL-MCNC: 8.6 MG/DL (ref 8.4–10.2)
CHLORIDE SERPL-SCNC: 102 MMOL/L (ref 96–108)
CO2 SERPL-SCNC: 20 MMOL/L (ref 21–32)
CREAT SERPL-MCNC: 2.02 MG/DL (ref 0.6–1.3)
ERYTHROCYTE [DISTWIDTH] IN BLOOD BY AUTOMATED COUNT: 14 % (ref 11.6–15.1)
GFR SERPL CREATININE-BSD FRML MDRD: 30 ML/MIN/1.73SQ M
GLUCOSE SERPL-MCNC: 303 MG/DL (ref 65–140)
GLUCOSE SERPL-MCNC: 351 MG/DL (ref 65–140)
GLUCOSE SERPL-MCNC: 364 MG/DL (ref 65–140)
GLUCOSE SERPL-MCNC: 378 MG/DL (ref 65–140)
GLUCOSE SERPL-MCNC: 395 MG/DL (ref 65–140)
HCT VFR BLD AUTO: 26.5 % (ref 36.5–49.3)
HGB BLD-MCNC: 8.7 G/DL (ref 12–17)
MCH RBC QN AUTO: 30.7 PG (ref 26.8–34.3)
MCHC RBC AUTO-ENTMCNC: 32.8 G/DL (ref 31.4–37.4)
MCV RBC AUTO: 94 FL (ref 82–98)
PLATELET # BLD AUTO: 220 THOUSANDS/UL (ref 149–390)
PMV BLD AUTO: 10.1 FL (ref 8.9–12.7)
POTASSIUM SERPL-SCNC: 3.5 MMOL/L (ref 3.5–5.3)
PROCALCITONIN SERPL-MCNC: 2.32 NG/ML
RBC # BLD AUTO: 2.83 MILLION/UL (ref 3.88–5.62)
SODIUM SERPL-SCNC: 135 MMOL/L (ref 135–147)
WBC # BLD AUTO: 25.46 THOUSAND/UL (ref 4.31–10.16)

## 2024-06-08 PROCEDURE — 80048 BASIC METABOLIC PNL TOTAL CA: CPT | Performed by: INTERNAL MEDICINE

## 2024-06-08 PROCEDURE — 94760 N-INVAS EAR/PLS OXIMETRY 1: CPT

## 2024-06-08 PROCEDURE — 85027 COMPLETE CBC AUTOMATED: CPT | Performed by: INTERNAL MEDICINE

## 2024-06-08 PROCEDURE — 94640 AIRWAY INHALATION TREATMENT: CPT

## 2024-06-08 PROCEDURE — 99232 SBSQ HOSP IP/OBS MODERATE 35: CPT | Performed by: NURSE PRACTITIONER

## 2024-06-08 PROCEDURE — 82948 REAGENT STRIP/BLOOD GLUCOSE: CPT

## 2024-06-08 PROCEDURE — 84145 PROCALCITONIN (PCT): CPT | Performed by: INTERNAL MEDICINE

## 2024-06-08 RX ORDER — INSULIN GLARGINE 100 [IU]/ML
5 INJECTION, SOLUTION SUBCUTANEOUS
Status: DISCONTINUED | OUTPATIENT
Start: 2024-06-08 | End: 2024-06-09

## 2024-06-08 RX ORDER — FUROSEMIDE 10 MG/ML
20 INJECTION INTRAMUSCULAR; INTRAVENOUS ONCE
Status: COMPLETED | OUTPATIENT
Start: 2024-06-08 | End: 2024-06-08

## 2024-06-08 RX ORDER — AZITHROMYCIN 500 MG/1
500 TABLET, FILM COATED ORAL EVERY 24 HOURS
Status: DISCONTINUED | OUTPATIENT
Start: 2024-06-08 | End: 2024-06-08

## 2024-06-08 RX ORDER — TRIAMCINOLONE ACETONIDE 1 MG/G
CREAM TOPICAL 2 TIMES DAILY
Status: DISCONTINUED | OUTPATIENT
Start: 2024-06-08 | End: 2024-06-13 | Stop reason: HOSPADM

## 2024-06-08 RX ORDER — TORSEMIDE 10 MG/1
5 TABLET ORAL DAILY
Status: DISCONTINUED | OUTPATIENT
Start: 2024-06-08 | End: 2024-06-12

## 2024-06-08 RX ADMIN — FUROSEMIDE 20 MG: 10 INJECTION, SOLUTION INTRAMUSCULAR; INTRAVENOUS at 17:14

## 2024-06-08 RX ADMIN — DICLOFENAC SODIUM 2 G: 10 GEL TOPICAL at 21:05

## 2024-06-08 RX ADMIN — CARVEDILOL 6.25 MG: 6.25 TABLET, FILM COATED ORAL at 08:50

## 2024-06-08 RX ADMIN — ACETAMINOPHEN 650 MG: 325 TABLET, FILM COATED ORAL at 02:34

## 2024-06-08 RX ADMIN — INSULIN LISPRO 5 UNITS: 100 INJECTION, SOLUTION INTRAVENOUS; SUBCUTANEOUS at 21:03

## 2024-06-08 RX ADMIN — TORSEMIDE 5 MG: 10 TABLET ORAL at 13:54

## 2024-06-08 RX ADMIN — DICLOFENAC SODIUM 2 G: 10 GEL TOPICAL at 15:21

## 2024-06-08 RX ADMIN — INSULIN LISPRO 5 UNITS: 100 INJECTION, SOLUTION INTRAVENOUS; SUBCUTANEOUS at 17:17

## 2024-06-08 RX ADMIN — BRIMONIDINE TARTRATE 1 DROP: 2 SOLUTION/ DROPS OPHTHALMIC at 08:50

## 2024-06-08 RX ADMIN — CEFTRIAXONE 1000 MG: 1 INJECTION, SOLUTION INTRAVENOUS at 13:54

## 2024-06-08 RX ADMIN — CARVEDILOL 6.25 MG: 6.25 TABLET, FILM COATED ORAL at 17:15

## 2024-06-08 RX ADMIN — LEVOTHYROXINE SODIUM 75 MCG: 75 TABLET ORAL at 08:50

## 2024-06-08 RX ADMIN — LEVALBUTEROL HYDROCHLORIDE 1.25 MG: 1.25 SOLUTION RESPIRATORY (INHALATION) at 20:02

## 2024-06-08 RX ADMIN — PRAVASTATIN SODIUM 40 MG: 40 TABLET ORAL at 17:15

## 2024-06-08 RX ADMIN — LOSARTAN POTASSIUM 100 MG: 50 TABLET, FILM COATED ORAL at 08:50

## 2024-06-08 RX ADMIN — ALBUTEROL SULFATE 2.5 MG: 2.5 SOLUTION RESPIRATORY (INHALATION) at 02:34

## 2024-06-08 RX ADMIN — AMLODIPINE BESYLATE 10 MG: 10 TABLET ORAL at 08:50

## 2024-06-08 RX ADMIN — GUAIFENESIN AND DEXTROMETHORPHAN 10 ML: 100; 10 SYRUP ORAL at 02:34

## 2024-06-08 RX ADMIN — BRIMONIDINE TARTRATE 1 DROP: 2 SOLUTION/ DROPS OPHTHALMIC at 21:02

## 2024-06-08 RX ADMIN — INSULIN LISPRO 5 UNITS: 100 INJECTION, SOLUTION INTRAVENOUS; SUBCUTANEOUS at 12:12

## 2024-06-08 RX ADMIN — INSULIN GLARGINE 5 UNITS: 100 INJECTION, SOLUTION SUBCUTANEOUS at 21:04

## 2024-06-08 RX ADMIN — INSULIN LISPRO 5 UNITS: 100 INJECTION, SOLUTION INTRAVENOUS; SUBCUTANEOUS at 08:52

## 2024-06-08 RX ADMIN — LEVALBUTEROL HYDROCHLORIDE 1.25 MG: 1.25 SOLUTION RESPIRATORY (INHALATION) at 14:18

## 2024-06-08 RX ADMIN — LEVALBUTEROL HYDROCHLORIDE 1.25 MG: 1.25 SOLUTION RESPIRATORY (INHALATION) at 07:27

## 2024-06-08 RX ADMIN — TIMOLOL MALEATE 1 DROP: 5 SOLUTION/ DROPS OPHTHALMIC at 08:50

## 2024-06-08 RX ADMIN — BRIMONIDINE TARTRATE 1 DROP: 2 SOLUTION/ DROPS OPHTHALMIC at 17:17

## 2024-06-08 NOTE — PROGRESS NOTES
[facility]  Progress Note  Name: Abelino Renee I  MRN: [id number]  Unit/Bed#: -01 I Date of Admission: 6/7/2024   Date of Service: 6/8/2024 I Hospital Day: 1    Assessment & Plan   * Community acquired pneumonia of left lung  Assessment & Plan  Multi focal pneumonia predominantly left upper lobe, Patient states he had flulike symptoms 1 week ago.Presents with comes with shortness of breath, minimal cough.  CT chest suggestive of multifocal pneumonia  Noted to have tachypnea and hypoxia.  Does not meet SIRS criteria, elevated WBC count secondary to CLL and at baseline.  Continue ceftriaxone  Covid/ Flu/RSV negative  Urine strep and Legionella negative  Azithromycin dcd   Blood cultures negative for 24 hours.  Sputum culture if able to obtain ordered.      Acute respiratory failure with hypoxia (HCC)  Assessment & Plan  Room air at baseline  Requiring 3 L of oxygen in the emergency department  Now requiring 4 L  Secondary to pneumonia  Wean O2 supplement oxygen and wean off as tolerated.    Type 2 diabetes mellitus with diabetic polyneuropathy, without long-term current use of insulin ([facility])  Assessment & Plan  Lab Results   Component Value Date    HGBA1C 7.3 (H) 02/27/2024       Recent Labs     06/07/24  1647 06/07/24  2119 06/08/24  0852 06/08/24  1211   POCGLU 314* 281* 378* 364*     Presents with hyperglycemia on oral medications at home, patient states he has not taken his medications today.    Give sliding scale insulin.  CC diet  Will start low dose Lantus  Blood Sugar Average: Last 72 hrs:  (P) 341.4784209366414036      CKD (chronic kidney disease) stage 3, GFR 30-59 ml/min ([facility])  Assessment & Plan  Lab Results   Component Value Date    EGFR 30 06/08/2024    EGFR 28 06/07/2024    EGFR 44 04/29/2024    CREATININE 2.02 (H) 06/08/2024    CREATININE 2.13 (H) 06/07/2024    CREATININE 1.50 (H) 04/29/2024     Elevated from baseline, give gentle hydration  Note that patient has chronic  lower extremity.    Chronic lymphocytic leukemia (HCC)  Assessment & Plan  Chronic elevation of WBC count, currently at baseline           VTE Pharmacologic Prophylaxis:   Moderate Risk (Score 3-4) - Pharmacological DVT Prophylaxis Ordered: enoxaparin (Lovenox).    Mobility:   Basic Mobility Inpatient Raw Score: 24  JH-HLM Goal: 8: Walk 250 feet or more  JH-HLM Achieved: 5: Stand (1 or more minutes)  JH-HLM Goal achieved. Continue to encourage appropriate mobility.    Patient Centered Rounds: I performed bedside rounds with nursing staff today.   Discussions with Specialists or Other Care Team Provider: notes    Education and Discussions with Family / Patient: Updated  (wife) at bedside.    Total Time Spent on Date of Encounter in care of patient: 35 mins. This time was spent on one or more of the following: performing physical exam; counseling and coordination of care; obtaining or reviewing history; documenting in the medical record; reviewing/ordering tests, medications or procedures; communicating with other healthcare professionals and discussing with patient's family/caregivers.    Current Length of Stay: 1 day(s)  Current Patient Status: Inpatient   Certification Statement: The patient will continue to require additional inpatient hospital stay due to antibiotics and O2  Discharge Plan: Anticipate discharge in 48 hrs to home.    Code Status: Level 1 - Full Code    Subjective:   Feeling slightly better today stil with RAIN and needing O2, mild conversational dyspnea    Objective:     Vitals:   Temp (24hrs), Av.9 °F (36.6 °C), Min:97.7 °F (36.5 °C), Max:98.2 °F (36.8 °C)    Temp:  [97.7 °F (36.5 °C)-98.2 °F (36.8 °C)] 98.1 °F (36.7 °C)  HR:  [61-86] 61  Resp:  [16-24] 18  BP: (128-167)/(63-77) 150/64  SpO2:  [90 %-93 %] 90 %  Body mass index is 31.14 kg/m².     Input and Output Summary (last 24 hours):     Intake/Output Summary (Last 24 hours) at 2024 4724  Last data filed at 2024  0159  Gross per 24 hour   Intake 1722.5 ml   Output 200 ml   Net 1522.5 ml       Physical Exam:   Physical Exam  Vitals and nursing note reviewed.   Constitutional:       General: He is not in acute distress.     Appearance: He is ill-appearing.   Cardiovascular:      Rate and Rhythm: Normal rate.   Pulmonary:      Effort: No respiratory distress.   Abdominal:      Palpations: Abdomen is soft.   Skin:     General: Skin is warm.      Coloration: Skin is pale.   Neurological:      Mental Status: He is alert. Mental status is at baseline.   Psychiatric:         Mood and Affect: Mood normal.          Additional Data:     Labs:  Results from last 7 days   Lab Units 06/08/24  0450 06/07/24  0832   WBC Thousand/uL 25.46* 26.79*   HEMOGLOBIN g/dL 8.7* 9.2*   HEMATOCRIT % 26.5* 27.6*   PLATELETS Thousands/uL 220 204   BANDS PCT %  --  5   LYMPHO PCT %  --  57*   MONO PCT %  --  0*   EOS PCT %  --  0     Results from last 7 days   Lab Units 06/08/24  0450 06/07/24  0832   SODIUM mmol/L 135 130*   POTASSIUM mmol/L 3.5 3.8   CHLORIDE mmol/L 102 97   CO2 mmol/L 20* 20*   BUN mg/dL 44* 47*   CREATININE mg/dL 2.02* 2.13*   ANION GAP mmol/L 13 13   CALCIUM mg/dL 8.6 8.8   ALBUMIN g/dL  --  3.4*   TOTAL BILIRUBIN mg/dL  --  0.72   ALK PHOS U/L  --  101   ALT U/L  --  21   AST U/L  --  14   GLUCOSE RANDOM mg/dL 303* 490*         Results from last 7 days   Lab Units 06/08/24  1211 06/08/24  0852 06/07/24  2119 06/07/24  1647 06/07/24  1341 06/07/24  1103   POC GLUCOSE mg/dl 364* 378* 281* 314* 363* 351*         Results from last 7 days   Lab Units 06/08/24  0450 06/07/24  0849 06/07/24  0832   LACTIC ACID mmol/L  --  1.5  --    PROCALCITONIN ng/ml 2.32*  --  2.49*       Lines/Drains:  Invasive Devices       Peripheral Intravenous Line  Duration             Peripheral IV 06/07/24 Left Antecubital 1 day    Peripheral IV 06/07/24 Right;Ventral (anterior) Forearm <1 day                      Recent Cultures (last 7 days):   Results from  last 7 days   Lab Units 06/07/24  1124 06/07/24  0833 06/07/24  0830   BLOOD CULTURE   --  No Growth at 24 hrs. No Growth at 24 hrs.   LEGIONELLA URINARY ANTIGEN  Negative  --   --        Last 24 Hours Medication List:   Current Facility-Administered Medications   Medication Dose Route Frequency Provider Last Rate    acetaminophen  650 mg Oral Q6H PRN Areli Rai MD      albuterol  2.5 mg Nebulization Q6H PRN Areli Rai MD      amLODIPine  10 mg Oral Daily Areli Rai MD      brimonidine tartrate  1 drop Both Eyes TID Areli Rai MD      carvedilol  6.25 mg Oral BID With Meals Areli Rai MD      cefTRIAXone  1,000 mg Intravenous Q24H Areli Rai MD Stopped (06/07/24 1440)    dextromethorphan-guaiFENesin  10 mL Oral Q4H PRN Areli Rai MD      Diclofenac Sodium  2 g Topical 4x Daily RADHA Hercules      enoxaparin  40 mg Subcutaneous Daily Areli Rai MD      insulin glargine  5 Units Subcutaneous HS RADHA Hercules      insulin lispro  1-5 Units Subcutaneous HS Areli Rai MD      insulin lispro  1-6 Units Subcutaneous TID AC Areli Rai MD      levalbuterol  1.25 mg Nebulization TID Areli Rai MD      levothyroxine  75 mcg Oral Daily Areli Rai MD      losartan  100 mg Oral Daily Areli Rai MD      pravastatin  40 mg Oral Daily With Dinner Areli Rai MD      timolol  1 drop Both Eyes Daily Areli Rai MD      torsemide  5 mg Oral Daily RADHA Hercules      triamcinolone   Topical BID RADHA Hercules          Today, Patient Was Seen By: RADHA Hercules    **Please Note: This note may have been constructed using a voice recognition system.**

## 2024-06-08 NOTE — ASSESSMENT & PLAN NOTE
Lab Results   Component Value Date    HGBA1C 7.3 (H) 02/27/2024       Recent Labs     06/07/24  1647 06/07/24  2119 06/08/24  0852 06/08/24  1211   POCGLU 314* 281* 378* 364*     Presents with hyperglycemia on oral medications at home, patient states he has not taken his medications today.    Give sliding scale insulin.  CC diet  Will start low dose Lantus  Blood Sugar Average: Last 72 hrs:  (P) 341.9629685214374427

## 2024-06-08 NOTE — PROGRESS NOTES
The azithromycin has been converted to Oral per [facility] IV-to-PO Auto-Conversion Protocol for Adults as approved by the Pharmacy and Therapeutics Committee. The patient met all eligible criteria:  1) Age = 18 years old   2) Received at least one dose of the IV form   3) Receiving at least one other scheduled oral/enteral medication   4) Tolerating an oral/enteral diet   and did not have any exclusions:   1) Critical care patient   2) Active GI bleed (IF assessing H2RAs or PPIs)   3) Continuous tube feeding (IF assessing cipro, doxycycline, levofloxacin, minocycline, rifampin, or voriconazole)   4) Receiving PO vancomycin (IF assessing metronidazole)   5) Persistent nausea and/or vomiting   6) Ileus or gastrointestinal obstruction   7) Isabel/nasogastric tube set for continuous suction   8) Specific order not to automatically convert to PO (in the order's comments or if discussed in the most recent Infectious Disease or primary team's progress notes).

## 2024-06-08 NOTE — ASSESSMENT & PLAN NOTE
Likely 2:2 CAP  Room air at baseline  Now requiring 2-3 L  Wean O2 supplement oxygen and wean off as tolerated.

## 2024-06-08 NOTE — ASSESSMENT & PLAN NOTE
Room air at baseline  Requiring 3 L of oxygen in the emergency department  Now requiring 4 L  Secondary to pneumonia  Wean O2 supplement oxygen and wean off as tolerated.

## 2024-06-08 NOTE — ASSESSMENT & PLAN NOTE
Lab Results   Component Value Date    HGBA1C 7.3 (H) 02/27/2024       Recent Labs     06/07/24  2119 06/08/24  0852 06/08/24  1211 06/08/24  1632   POCGLU 281* 378* 364* 351*       Blood Sugar Average: Last 72 hrs:  (P) 343.5208903783054115    Home Regimen: Glipizide and Metformin. Hold while admitted  Notable Hyperglycemia  Will increase Lantus to 8 units qHS  Increase SSI Algorithm   Will add Humalog 5 units TID with meals  C/w Accu-Cheks and SSI AC/HS  Hypoglycemic protocol  Diabetic Diet

## 2024-06-08 NOTE — PLAN OF CARE
Problem: Potential for Falls  Goal: Patient will remain free of falls  Description: INTERVENTIONS:  - Educate patient/family on patient safety including physical limitations  - Instruct patient to call for assistance with activity   - Consult OT/PT to assist with strengthening/mobility   - Keep Call bell within reach  - Keep bed low and locked with side rails adjusted as appropriate  - Keep care items and personal belongings within reach  - Initiate and maintain comfort rounds  - Make Fall Risk Sign visible to staff  - Offer Toileting every  Hours, in advance of need  - Initiate/Maintain alarm  - Obtain necessary fall risk management equipment:   - Apply yellow socks and bracelet for high fall risk patients  - Consider moving patient to room near nurses station  Outcome: Progressing     Problem: PAIN - ADULT  Goal: Verbalizes/displays adequate comfort level or baseline comfort level  Description: Interventions:  - Encourage patient to monitor pain and request assistance  - Assess pain using appropriate pain scale  - Administer analgesics based on type and severity of pain and evaluate response  - Implement non-pharmacological measures as appropriate and evaluate response  - Consider cultural and social influences on pain and pain management  - Notify physician/advanced practitioner if interventions unsuccessful or patient reports new pain  Outcome: Progressing     Problem: INFECTION - ADULT  Goal: Absence or prevention of progression during hospitalization  Description: INTERVENTIONS:  - Assess and monitor for signs and symptoms of infection  - Monitor lab/diagnostic results  - Monitor all insertion sites, i.e. indwelling lines, tubes, and drains  - Monitor endotracheal if appropriate and nasal secretions for changes in amount and color  - [place] appropriate cooling/warming therapies per order  - Administer medications as ordered  - Instruct and encourage patient and family to use good hand hygiene technique  -  Identify and instruct in appropriate isolation precautions for identified infection/condition  Outcome: Progressing     Problem: SAFETY ADULT  Goal: Patient will remain free of falls  Description: INTERVENTIONS:  - Educate patient/family on patient safety including physical limitations  - Instruct patient to call for assistance with activity   - Consult OT/PT to assist with strengthening/mobility   - Keep Call bell within reach  - Keep bed low and locked with side rails adjusted as appropriate  - Keep care items and personal belongings within reach  - Initiate and maintain comfort rounds  - Make Fall Risk Sign visible to staff  - Offer Toileting every  Hours, in advance of need  - Initiate/Maintain alarm  - Obtain necessary fall risk management equipment:   - Apply yellow socks and bracelet for high fall risk patients  - Consider moving patient to room near nurses station  Outcome: Progressing

## 2024-06-08 NOTE — ASSESSMENT & PLAN NOTE
Lab Results   Component Value Date    EGFR 30 06/08/2024    EGFR 28 06/07/2024    EGFR 44 04/29/2024    CREATININE 2.02 (H) 06/08/2024    CREATININE 2.13 (H) 06/07/2024    CREATININE 1.50 (H) 04/29/2024     Elevated from baseline, give gentle hydration  Note that patient has chronic lower extremity.

## 2024-06-08 NOTE — ASSESSMENT & PLAN NOTE
Multi focal pneumonia predominantly left upper lobe, Patient states he had flulike symptoms 1 week ago.Presents with comes with shortness of breath, minimal cough.  CT chest suggestive of multifocal pneumonia  Noted to have tachypnea and hypoxia.  Does not meet SIRS criteria, elevated WBC count secondary to CLL and at baseline.  Continue ceftriaxone  Covid/ Flu/RSV negative  Urine strep and Legionella negative  Azithromycin dcd   Blood cultures negative for 24 hours.  Sputum culture if able to obtain ordered.

## 2024-06-08 NOTE — ASSESSMENT & PLAN NOTE
Patient states he had flulike symptoms 1 week ago.Presents with comes with shortness of breath, minimal cough.  Noted to have tachypnea and hypoxia on arrival.  Does not meet SIRS criteria, elevated WBC count secondary to CLL at baseline.  CT chest suggestive of multifocal pneumonia  Covid/ Flu/RSV negative  Urine strep and Legionella negative   Blood cultures negative for 48 hours.    Sputum culture if able to obtain   Completed 3 days of Azithromycin  Continue ceftriaxone (D3)  Follow up final culture results

## 2024-06-08 NOTE — ASSESSMENT & PLAN NOTE
Lab Results   Component Value Date    EGFR 33 06/09/2024    EGFR 30 06/08/2024    EGFR 28 06/07/2024    CREATININE 1.91 (H) 06/09/2024    CREATININE 2.02 (H) 06/08/2024    CREATININE 2.13 (H) 06/07/2024     Mildly Elevated from baseline, Continuing to improve  Baseline closer to Crt 1.2-1.4  S/p gentle IV hydration on admission  S/p 1x dose of IV Furosemide (6/8)  Monitor while admitted

## 2024-06-09 LAB
ANION GAP SERPL CALCULATED.3IONS-SCNC: 12 MMOL/L (ref 4–13)
BUN SERPL-MCNC: 40 MG/DL (ref 5–25)
CALCIUM SERPL-MCNC: 8.9 MG/DL (ref 8.4–10.2)
CHLORIDE SERPL-SCNC: 101 MMOL/L (ref 96–108)
CO2 SERPL-SCNC: 22 MMOL/L (ref 21–32)
CREAT SERPL-MCNC: 1.91 MG/DL (ref 0.6–1.3)
ERYTHROCYTE [DISTWIDTH] IN BLOOD BY AUTOMATED COUNT: 14.1 % (ref 11.6–15.1)
GFR SERPL CREATININE-BSD FRML MDRD: 33 ML/MIN/1.73SQ M
GLUCOSE SERPL-MCNC: 274 MG/DL (ref 65–140)
GLUCOSE SERPL-MCNC: 276 MG/DL (ref 65–140)
GLUCOSE SERPL-MCNC: 388 MG/DL (ref 65–140)
GLUCOSE SERPL-MCNC: 393 MG/DL (ref 65–140)
GLUCOSE SERPL-MCNC: 396 MG/DL (ref 65–140)
HCT VFR BLD AUTO: 28.2 % (ref 36.5–49.3)
HGB BLD-MCNC: 9 G/DL (ref 12–17)
MAGNESIUM SERPL-MCNC: 1.8 MG/DL (ref 1.9–2.7)
MCH RBC QN AUTO: 30.1 PG (ref 26.8–34.3)
MCHC RBC AUTO-ENTMCNC: 31.9 G/DL (ref 31.4–37.4)
MCV RBC AUTO: 94 FL (ref 82–98)
PLATELET # BLD AUTO: 238 THOUSANDS/UL (ref 149–390)
PMV BLD AUTO: 9.8 FL (ref 8.9–12.7)
POTASSIUM SERPL-SCNC: 3.4 MMOL/L (ref 3.5–5.3)
RBC # BLD AUTO: 2.99 MILLION/UL (ref 3.88–5.62)
SODIUM SERPL-SCNC: 135 MMOL/L (ref 135–147)
WBC # BLD AUTO: 26.48 THOUSAND/UL (ref 4.31–10.16)

## 2024-06-09 PROCEDURE — 94640 AIRWAY INHALATION TREATMENT: CPT

## 2024-06-09 PROCEDURE — 94760 N-INVAS EAR/PLS OXIMETRY 1: CPT

## 2024-06-09 PROCEDURE — 80048 BASIC METABOLIC PNL TOTAL CA: CPT | Performed by: NURSE PRACTITIONER

## 2024-06-09 PROCEDURE — 99232 SBSQ HOSP IP/OBS MODERATE 35: CPT

## 2024-06-09 PROCEDURE — 85027 COMPLETE CBC AUTOMATED: CPT | Performed by: NURSE PRACTITIONER

## 2024-06-09 PROCEDURE — 82948 REAGENT STRIP/BLOOD GLUCOSE: CPT

## 2024-06-09 PROCEDURE — 83735 ASSAY OF MAGNESIUM: CPT | Performed by: NURSE PRACTITIONER

## 2024-06-09 RX ORDER — FUROSEMIDE 10 MG/ML
20 INJECTION INTRAMUSCULAR; INTRAVENOUS ONCE
Status: COMPLETED | OUTPATIENT
Start: 2024-06-09 | End: 2024-06-09

## 2024-06-09 RX ORDER — POLYETHYLENE GLYCOL 3350 17 G/17G
17 POWDER, FOR SOLUTION ORAL DAILY PRN
Status: DISCONTINUED | OUTPATIENT
Start: 2024-06-09 | End: 2024-06-13 | Stop reason: HOSPADM

## 2024-06-09 RX ORDER — INSULIN GLARGINE 100 [IU]/ML
8 INJECTION, SOLUTION SUBCUTANEOUS
Status: DISCONTINUED | OUTPATIENT
Start: 2024-06-09 | End: 2024-06-10

## 2024-06-09 RX ORDER — POTASSIUM CHLORIDE 20 MEQ/1
20 TABLET, EXTENDED RELEASE ORAL ONCE
Status: COMPLETED | OUTPATIENT
Start: 2024-06-09 | End: 2024-06-09

## 2024-06-09 RX ORDER — SENNOSIDES 8.6 MG
1 TABLET ORAL
Status: DISCONTINUED | OUTPATIENT
Start: 2024-06-09 | End: 2024-06-13 | Stop reason: HOSPADM

## 2024-06-09 RX ORDER — INSULIN LISPRO 100 [IU]/ML
1-6 INJECTION, SOLUTION INTRAVENOUS; SUBCUTANEOUS
Status: DISCONTINUED | OUTPATIENT
Start: 2024-06-09 | End: 2024-06-10

## 2024-06-09 RX ORDER — INSULIN LISPRO 100 [IU]/ML
5 INJECTION, SOLUTION INTRAVENOUS; SUBCUTANEOUS
Status: DISCONTINUED | OUTPATIENT
Start: 2024-06-09 | End: 2024-06-12

## 2024-06-09 RX ORDER — MAGNESIUM SULFATE 1 G/100ML
1 INJECTION INTRAVENOUS ONCE
Status: COMPLETED | OUTPATIENT
Start: 2024-06-09 | End: 2024-06-09

## 2024-06-09 RX ORDER — DOCUSATE SODIUM 100 MG/1
100 CAPSULE, LIQUID FILLED ORAL 2 TIMES DAILY
Status: DISCONTINUED | OUTPATIENT
Start: 2024-06-09 | End: 2024-06-13 | Stop reason: HOSPADM

## 2024-06-09 RX ADMIN — ENOXAPARIN SODIUM 40 MG: 40 INJECTION SUBCUTANEOUS at 10:44

## 2024-06-09 RX ADMIN — INSULIN LISPRO 5 UNITS: 100 INJECTION, SOLUTION INTRAVENOUS; SUBCUTANEOUS at 12:25

## 2024-06-09 RX ADMIN — INSULIN GLARGINE 8 UNITS: 100 INJECTION, SOLUTION SUBCUTANEOUS at 21:21

## 2024-06-09 RX ADMIN — PRAVASTATIN SODIUM 40 MG: 40 TABLET ORAL at 17:26

## 2024-06-09 RX ADMIN — DOCUSATE SODIUM 100 MG: 100 CAPSULE, LIQUID FILLED ORAL at 17:26

## 2024-06-09 RX ADMIN — LEVALBUTEROL HYDROCHLORIDE 1.25 MG: 1.25 SOLUTION RESPIRATORY (INHALATION) at 07:26

## 2024-06-09 RX ADMIN — DICLOFENAC SODIUM 2 G: 10 GEL TOPICAL at 21:22

## 2024-06-09 RX ADMIN — CARVEDILOL 6.25 MG: 6.25 TABLET, FILM COATED ORAL at 17:26

## 2024-06-09 RX ADMIN — INSULIN LISPRO 4 UNITS: 100 INJECTION, SOLUTION INTRAVENOUS; SUBCUTANEOUS at 11:06

## 2024-06-09 RX ADMIN — TIMOLOL MALEATE 1 DROP: 5 SOLUTION/ DROPS OPHTHALMIC at 10:44

## 2024-06-09 RX ADMIN — MAGNESIUM SULFATE HEPTAHYDRATE 1 G: 1 INJECTION, SOLUTION INTRAVENOUS at 10:44

## 2024-06-09 RX ADMIN — LEVOTHYROXINE SODIUM 75 MCG: 75 TABLET ORAL at 10:44

## 2024-06-09 RX ADMIN — BRIMONIDINE TARTRATE 1 DROP: 2 SOLUTION/ DROPS OPHTHALMIC at 21:21

## 2024-06-09 RX ADMIN — LEVALBUTEROL HYDROCHLORIDE 1.25 MG: 1.25 SOLUTION RESPIRATORY (INHALATION) at 19:42

## 2024-06-09 RX ADMIN — DICLOFENAC SODIUM 2 G: 10 GEL TOPICAL at 11:06

## 2024-06-09 RX ADMIN — FUROSEMIDE 20 MG: 10 INJECTION, SOLUTION INTRAMUSCULAR; INTRAVENOUS at 12:25

## 2024-06-09 RX ADMIN — AMLODIPINE BESYLATE 10 MG: 10 TABLET ORAL at 10:44

## 2024-06-09 RX ADMIN — DOCUSATE SODIUM 100 MG: 100 CAPSULE, LIQUID FILLED ORAL at 11:06

## 2024-06-09 RX ADMIN — CEFTRIAXONE 1000 MG: 1 INJECTION, SOLUTION INTRAVENOUS at 14:29

## 2024-06-09 RX ADMIN — BRIMONIDINE TARTRATE 1 DROP: 2 SOLUTION/ DROPS OPHTHALMIC at 10:43

## 2024-06-09 RX ADMIN — TRIAMCINOLONE ACETONIDE: 1 CREAM TOPICAL at 10:44

## 2024-06-09 RX ADMIN — TORSEMIDE 5 MG: 10 TABLET ORAL at 10:44

## 2024-06-09 RX ADMIN — INSULIN LISPRO 6 UNITS: 100 INJECTION, SOLUTION INTRAVENOUS; SUBCUTANEOUS at 17:26

## 2024-06-09 RX ADMIN — INSULIN LISPRO 6 UNITS: 100 INJECTION, SOLUTION INTRAVENOUS; SUBCUTANEOUS at 21:23

## 2024-06-09 RX ADMIN — LEVALBUTEROL HYDROCHLORIDE 1.25 MG: 1.25 SOLUTION RESPIRATORY (INHALATION) at 14:26

## 2024-06-09 RX ADMIN — INSULIN LISPRO 6 UNITS: 100 INJECTION, SOLUTION INTRAVENOUS; SUBCUTANEOUS at 12:25

## 2024-06-09 RX ADMIN — TRIAMCINOLONE ACETONIDE: 1 CREAM TOPICAL at 17:29

## 2024-06-09 RX ADMIN — CARVEDILOL 6.25 MG: 6.25 TABLET, FILM COATED ORAL at 10:43

## 2024-06-09 RX ADMIN — DICLOFENAC SODIUM 2 G: 10 GEL TOPICAL at 12:29

## 2024-06-09 RX ADMIN — LOSARTAN POTASSIUM 100 MG: 50 TABLET, FILM COATED ORAL at 10:44

## 2024-06-09 RX ADMIN — POTASSIUM CHLORIDE 20 MEQ: 1500 TABLET, EXTENDED RELEASE ORAL at 10:44

## 2024-06-09 RX ADMIN — BRIMONIDINE TARTRATE 1 DROP: 2 SOLUTION/ DROPS OPHTHALMIC at 17:29

## 2024-06-09 RX ADMIN — SENNOSIDES 8.6 MG: 8.6 TABLET, FILM COATED ORAL at 21:21

## 2024-06-09 RX ADMIN — INSULIN LISPRO 5 UNITS: 100 INJECTION, SOLUTION INTRAVENOUS; SUBCUTANEOUS at 17:26

## 2024-06-09 RX ADMIN — DICLOFENAC SODIUM 2 G: 10 GEL TOPICAL at 17:29

## 2024-06-09 NOTE — PROGRESS NOTES
[facility]  Progress Note  Name: Abelino Renee I  MRN: [id number]  Unit/Bed#: -01 I Date of Admission: 6/7/2024   Date of Service: 6/9/2024 I Hospital Day: 2    Assessment & Plan   * Community acquired pneumonia of left lung  Assessment & Plan  Patient states he had flulike symptoms 1 week ago.Presents with comes with shortness of breath, minimal cough.  Noted to have tachypnea and hypoxia on arrival.  Does not meet SIRS criteria, elevated WBC count secondary to CLL at baseline.  CT chest suggestive of multifocal pneumonia  Covid/ Flu/RSV negative  Urine strep and Legionella negative   Blood cultures negative for 48 hours.    Sputum culture if able to obtain   Completed 3 days of Azithromycin  Continue ceftriaxone (D3)  Follow up final culture results       Acute respiratory failure with hypoxia (HCC)  Assessment & Plan  Likely 2:2 CAP  Room air at baseline  Now requiring 2-3 L  Wean O2 supplement oxygen and wean off as tolerated.    Type 2 diabetes mellitus with diabetic polyneuropathy, without long-term current use of insulin ([facility])  Assessment & Plan  Lab Results   Component Value Date    HGBA1C 7.3 (H) 02/27/2024       Recent Labs     06/07/24  2119 06/08/24  0852 06/08/24  1211 06/08/24  1632   POCGLU 281* 378* 364* 351*       Blood Sugar Average: Last 72 hrs:  (P) [id number]    Home Regimen: Glipizide and Metformin. Hold while admitted  Notable Hyperglycemia  Will increase Lantus to 8 units qHS  Increase SSI Algorithm   Will add Humalog 5 units TID with meals  C/w Accu-Cheks and SSI AC/HS  Hypoglycemic protocol  Diabetic Diet    CKD (chronic kidney disease) stage 3, GFR 30-59 ml/min ([facility])  Assessment & Plan  Lab Results   Component Value Date    EGFR 33 06/09/2024    EGFR 30 06/08/2024    EGFR 28 06/07/2024    CREATININE 1.91 (H) 06/09/2024    CREATININE 2.02 (H) 06/08/2024    CREATININE 2.13 (H) 06/07/2024     Mildly Elevated from baseline, Continuing to  improve  Baseline closer to Crt 1.2-1.4  S/p gentle IV hydration on admission  S/p 1x dose of IV Furosemide (6/8)  Monitor while admitted    Chronic lymphocytic leukemia (HCC)  Assessment & Plan  Chronic elevation of WBC count  Currently stable at baseline               VTE Pharmacologic Prophylaxis:   High Risk (Score >/= 5) - Pharmacological DVT Prophylaxis Ordered: enoxaparin (Lovenox). Sequential Compression Devices Ordered.    Mobility:   Basic Mobility Inpatient Raw Score: 24  JH-HLM Goal: 8: Walk 250 feet or more  JH-HLM Achieved: 5: Stand (1 or more minutes)  JH-HLM Goal NOT achieved. Continue with multidisciplinary rounding and encourage appropriate mobility to improve upon JH-HLM goals.    Patient Centered Rounds: I performed bedside rounds with nursing staff today.   Discussions with Specialists or Other Care Team Provider: RN    Education and Discussions with Family / Patient: Patient declined call to .     Total Time Spent on Date of Encounter in care of patient: 30 mins. This time was spent on one or more of the following: performing physical exam; counseling and coordination of care; obtaining or reviewing history; documenting in the medical record; reviewing/ordering tests, medications or procedures; communicating with other healthcare professionals and discussing with patient's family/caregivers.    Current Length of Stay: 2 day(s)  Current Patient Status: Inpatient   Certification Statement: The patient will continue to require additional inpatient hospital stay due to CAP and Hypoxia requiring IV antibiotics and Supplemental Oxygen  Discharge Plan: Anticipate discharge in 24-48 hrs to discharge location to be determined pending rehab evaluations.    Code Status: Level 1 - Full Code    Subjective:   Patient stating he still does not feel great but it has improved from admission. Patient stating he still does not have a great appetite. Patient denies any chest pain or abdominal pain.      Objective:     Vitals:   Temp (24hrs), Av.7 °F (36.5 °C), Min:97.4 °F (36.3 °C), Max:98.1 °F (36.7 °C)    Temp:  [97.4 °F (36.3 °C)-98.1 °F (36.7 °C)] 98.1 °F (36.7 °C)  HR:  [58-77] 77  Resp:  [18] 18  BP: (132-154)/(60-81) 154/81  SpO2:  [88 %-93 %] 90 %  Body mass index is 31.14 kg/m².     Input and Output Summary (last 24 hours):     Intake/Output Summary (Last 24 hours) at 2024 1139  Last data filed at 2024 0530  Gross per 24 hour   Intake 480 ml   Output 850 ml   Net -370 ml       Physical Exam:   Physical Exam  Vitals and nursing note reviewed.   Constitutional:       General: He is not in acute distress.     Appearance: Normal appearance.   HENT:      Head: Normocephalic.      Nose: Nose normal. No congestion.      Mouth/Throat:      Mouth: Mucous membranes are moist.      Pharynx: Oropharynx is clear.   Cardiovascular:      Rate and Rhythm: Normal rate and regular rhythm.      Pulses: Normal pulses.      Heart sounds: No murmur heard.  Pulmonary:      Effort: Pulmonary effort is normal. No respiratory distress.      Breath sounds: Rhonchi present.   Abdominal:      General: Bowel sounds are normal. There is no distension.      Palpations: Abdomen is soft.      Tenderness: There is no abdominal tenderness.   Musculoskeletal:         General: Normal range of motion.      Cervical back: Normal range of motion.      Right lower leg: Edema present.      Left lower leg: Edema present.   Skin:     General: Skin is warm and dry.      Capillary Refill: Capillary refill takes less than 2 seconds.   Neurological:      Mental Status: He is alert and oriented to person, place, and time. Mental status is at baseline.      Motor: Weakness (Generalized) present.   Psychiatric:         Mood and Affect: Mood normal.         Speech: Speech normal.         Behavior: Behavior is cooperative.          Additional Data:     Labs:  Results from last 7 days   Lab Units 24  0529 24  0450 24  0818    WBC Thousand/uL 26.48*   < > 26.79*   HEMOGLOBIN g/dL 9.0*   < > 9.2*   HEMATOCRIT % 28.2*   < > 27.6*   PLATELETS Thousands/uL 238   < > 204   BANDS PCT %  --   --  5   LYMPHO PCT %  --   --  57*   MONO PCT %  --   --  0*   EOS PCT %  --   --  0    < > = values in this interval not displayed.     Results from last 7 days   Lab Units 06/09/24  0529 06/08/24  0450 06/07/24  0832   SODIUM mmol/L 135   < > 130*   POTASSIUM mmol/L 3.4*   < > 3.8   CHLORIDE mmol/L 101   < > 97   CO2 mmol/L 22   < > 20*   BUN mg/dL 40*   < > 47*   CREATININE mg/dL 1.91*   < > 2.13*   ANION GAP mmol/L 12   < > 13   CALCIUM mg/dL 8.9   < > 8.8   ALBUMIN g/dL  --   --  3.4*   TOTAL BILIRUBIN mg/dL  --   --  0.72   ALK PHOS U/L  --   --  101   ALT U/L  --   --  21   AST U/L  --   --  14   GLUCOSE RANDOM mg/dL 276*   < > 490*    < > = values in this interval not displayed.         Results from last 7 days   Lab Units 06/09/24  1137 06/09/24  0744 06/08/24  1927 06/08/24  1632 06/08/24  1211 06/08/24  0852 06/07/24  2119 06/07/24  1647 06/07/24  1341 06/07/24  1103   POC GLUCOSE mg/dl 396* 274* 395* 351* 364* 378* 281* 314* 363* 351*         Results from last 7 days   Lab Units 06/08/24  0450 06/07/24  0849 06/07/24  0832   LACTIC ACID mmol/L  --  1.5  --    PROCALCITONIN ng/ml 2.32*  --  2.49*       Lines/Drains:  Invasive Devices       Peripheral Intravenous Line  Duration             Peripheral IV 06/07/24 Left Antecubital 2 days    Peripheral IV 06/07/24 Right;Ventral (anterior) Forearm 1 day                          Imaging: Reviewed radiology reports from this admission including: chest xray and chest CT scan    Recent Cultures (last 7 days):   Results from last 7 days   Lab Units 06/07/24  1124 06/07/24  0833 06/07/24  0830   BLOOD CULTURE   --  No Growth at 48 hrs. No Growth at 48 hrs.   LEGIONELLA URINARY ANTIGEN  Negative  --   --        Last 24 Hours Medication List:   Current Facility-Administered Medications   Medication Dose  Route Frequency Provider Last Rate    acetaminophen  650 mg Oral Q6H PRN Areli Rai MD      albuterol  2.5 mg Nebulization Q6H PRN Areli Rai MD      amLODIPine  10 mg Oral Daily Areli Rai MD      brimonidine tartrate  1 drop Both Eyes TID Areli Rai MD      carvedilol  6.25 mg Oral BID With Meals Areli Rai MD      cefTRIAXone  1,000 mg Intravenous Q24H Areli Rai MD 1,000 mg (06/08/24 7034)    dextromethorphan-guaiFENesin  10 mL Oral Q4H PRN Areli Rai MD      Diclofenac Sodium  2 g Topical 4x Daily RADHA Hercules      docusate sodium  100 mg Oral BID RADHA Stearns      enoxaparin  40 mg Subcutaneous Daily Areli Rai MD      insulin glargine  8 Units Subcutaneous HS RADHA Stearns      insulin lispro  1-6 Units Subcutaneous TID AC Areli Rai MD      insulin lispro  1-6 Units Subcutaneous HS RADHA Stearns      insulin lispro  5 Units Subcutaneous TID With Meals RADHA Stearns      levalbuterol  1.25 mg Nebulization TID Areli Rai MD      levothyroxine  75 mcg Oral Daily Areli Rai MD      losartan  100 mg Oral Daily Areli Rai MD      magnesium sulfate  1 g Intravenous Once RADHA Stearns      polyethylene glycol  17 g Oral Daily PRN RADHA Stearns      pravastatin  40 mg Oral Daily With Dinner Areli Rai MD      senna  1 tablet Oral HS RADHA Stearns      timolol  1 drop Both Eyes Daily Areli Rai MD      torsemide  5 mg Oral Daily RADHA Hercules      triamcinolone   Topical BID RADHA Hercuels          Today, Patient Was Seen By: RADHA Stearns    **Please Note: This note may have been constructed using a voice recognition system.**

## 2024-06-09 NOTE — PLAN OF CARE
Problem: SAFETY ADULT  Goal: Patient will remain free of falls  Description: INTERVENTIONS:  - Educate patient/family on patient safety including physical limitations  - Instruct patient to call for assistance with activity   - Consult OT/PT to assist with strengthening/mobility   - Keep Call bell within reach  - Keep bed low and locked with side rails adjusted as appropriate  - Keep care items and personal belongings within reach  - Initiate and maintain comfort rounds  - Make Fall Risk Sign visible to staff  - Apply yellow socks and bracelet for high fall risk patients  - Consider moving patient to room near nurses station  Outcome: Progressing     Problem: Knowledge Deficit  Goal: Patient/family/caregiver demonstrates understanding of disease process, treatment plan, medications, and discharge instructions  Description: Complete learning assessment and assess knowledge base.  Interventions:  - Provide teaching at level of understanding  - Provide teaching via preferred learning methods  Outcome: Progressing

## 2024-06-09 NOTE — PLAN OF CARE

## 2024-06-10 ENCOUNTER — TELEPHONE (OUTPATIENT)
Dept: HEMATOLOGY ONCOLOGY | Facility: CLINIC | Age: 79
End: 2024-06-10

## 2024-06-10 LAB
ALBUMIN SERPL BCP-MCNC: 3.4 G/DL (ref 3.5–5)
ALP SERPL-CCNC: 100 U/L (ref 34–104)
ALT SERPL W P-5'-P-CCNC: 27 U/L (ref 7–52)
ANION GAP SERPL CALCULATED.3IONS-SCNC: 12 MMOL/L (ref 4–13)
AST SERPL W P-5'-P-CCNC: 22 U/L (ref 13–39)
BILIRUB SERPL-MCNC: 0.55 MG/DL (ref 0.2–1)
BUN SERPL-MCNC: 38 MG/DL (ref 5–25)
CALCIUM ALBUM COR SERPL-MCNC: 9.7 MG/DL (ref 8.3–10.1)
CALCIUM SERPL-MCNC: 9.2 MG/DL (ref 8.4–10.2)
CHLORIDE SERPL-SCNC: 98 MMOL/L (ref 96–108)
CO2 SERPL-SCNC: 26 MMOL/L (ref 21–32)
CREAT SERPL-MCNC: 1.83 MG/DL (ref 0.6–1.3)
ERYTHROCYTE [DISTWIDTH] IN BLOOD BY AUTOMATED COUNT: 14 % (ref 11.6–15.1)
GFR SERPL CREATININE-BSD FRML MDRD: 34 ML/MIN/1.73SQ M
GLUCOSE SERPL-MCNC: 212 MG/DL (ref 65–140)
GLUCOSE SERPL-MCNC: 228 MG/DL (ref 65–140)
GLUCOSE SERPL-MCNC: 266 MG/DL (ref 65–140)
GLUCOSE SERPL-MCNC: 294 MG/DL (ref 65–140)
GLUCOSE SERPL-MCNC: 372 MG/DL (ref 65–140)
HCT VFR BLD AUTO: 28.6 % (ref 36.5–49.3)
HGB BLD-MCNC: 9.2 G/DL (ref 12–17)
MCH RBC QN AUTO: 30.2 PG (ref 26.8–34.3)
MCHC RBC AUTO-ENTMCNC: 32.2 G/DL (ref 31.4–37.4)
MCV RBC AUTO: 94 FL (ref 82–98)
PLATELET # BLD AUTO: 242 THOUSANDS/UL (ref 149–390)
PMV BLD AUTO: 9.4 FL (ref 8.9–12.7)
POTASSIUM SERPL-SCNC: 3.4 MMOL/L (ref 3.5–5.3)
PROCALCITONIN SERPL-MCNC: 1.05 NG/ML
PROT SERPL-MCNC: 7.6 G/DL (ref 6.4–8.4)
RBC # BLD AUTO: 3.05 MILLION/UL (ref 3.88–5.62)
SODIUM SERPL-SCNC: 136 MMOL/L (ref 135–147)
WBC # BLD AUTO: 25.72 THOUSAND/UL (ref 4.31–10.16)

## 2024-06-10 PROCEDURE — 94760 N-INVAS EAR/PLS OXIMETRY 1: CPT

## 2024-06-10 PROCEDURE — 94640 AIRWAY INHALATION TREATMENT: CPT

## 2024-06-10 PROCEDURE — 82948 REAGENT STRIP/BLOOD GLUCOSE: CPT

## 2024-06-10 PROCEDURE — 85027 COMPLETE CBC AUTOMATED: CPT

## 2024-06-10 PROCEDURE — 99232 SBSQ HOSP IP/OBS MODERATE 35: CPT

## 2024-06-10 PROCEDURE — 80053 COMPREHEN METABOLIC PANEL: CPT

## 2024-06-10 PROCEDURE — 84145 PROCALCITONIN (PCT): CPT

## 2024-06-10 RX ORDER — INSULIN LISPRO 100 [IU]/ML
1-6 INJECTION, SOLUTION INTRAVENOUS; SUBCUTANEOUS
Status: DISCONTINUED | OUTPATIENT
Start: 2024-06-11 | End: 2024-06-11

## 2024-06-10 RX ORDER — INSULIN LISPRO 100 [IU]/ML
2-12 INJECTION, SOLUTION INTRAVENOUS; SUBCUTANEOUS
Status: DISCONTINUED | OUTPATIENT
Start: 2024-06-10 | End: 2024-06-13 | Stop reason: HOSPADM

## 2024-06-10 RX ORDER — INSULIN GLARGINE 100 [IU]/ML
10 INJECTION, SOLUTION SUBCUTANEOUS
Status: DISCONTINUED | OUTPATIENT
Start: 2024-06-10 | End: 2024-06-12

## 2024-06-10 RX ORDER — POTASSIUM CHLORIDE 20 MEQ/1
40 TABLET, EXTENDED RELEASE ORAL ONCE
Status: COMPLETED | OUTPATIENT
Start: 2024-06-10 | End: 2024-06-10

## 2024-06-10 RX ADMIN — TRIAMCINOLONE ACETONIDE: 1 CREAM TOPICAL at 09:58

## 2024-06-10 RX ADMIN — DICLOFENAC SODIUM 2 G: 10 GEL TOPICAL at 17:08

## 2024-06-10 RX ADMIN — INSULIN LISPRO 4 UNITS: 100 INJECTION, SOLUTION INTRAVENOUS; SUBCUTANEOUS at 17:07

## 2024-06-10 RX ADMIN — INSULIN LISPRO 5 UNITS: 100 INJECTION, SOLUTION INTRAVENOUS; SUBCUTANEOUS at 09:48

## 2024-06-10 RX ADMIN — INSULIN LISPRO 10 UNITS: 100 INJECTION, SOLUTION INTRAVENOUS; SUBCUTANEOUS at 13:00

## 2024-06-10 RX ADMIN — DOCUSATE SODIUM 100 MG: 100 CAPSULE, LIQUID FILLED ORAL at 09:48

## 2024-06-10 RX ADMIN — POTASSIUM CHLORIDE 40 MEQ: 1500 TABLET, EXTENDED RELEASE ORAL at 09:55

## 2024-06-10 RX ADMIN — TRIAMCINOLONE ACETONIDE: 1 CREAM TOPICAL at 17:09

## 2024-06-10 RX ADMIN — SENNOSIDES 8.6 MG: 8.6 TABLET, FILM COATED ORAL at 21:08

## 2024-06-10 RX ADMIN — DICLOFENAC SODIUM 2 G: 10 GEL TOPICAL at 16:29

## 2024-06-10 RX ADMIN — BRIMONIDINE TARTRATE 1 DROP: 2 SOLUTION/ DROPS OPHTHALMIC at 16:29

## 2024-06-10 RX ADMIN — BRIMONIDINE TARTRATE 1 DROP: 2 SOLUTION/ DROPS OPHTHALMIC at 09:56

## 2024-06-10 RX ADMIN — INSULIN GLARGINE 10 UNITS: 100 INJECTION, SOLUTION SUBCUTANEOUS at 21:08

## 2024-06-10 RX ADMIN — BRIMONIDINE TARTRATE 1 DROP: 2 SOLUTION/ DROPS OPHTHALMIC at 21:08

## 2024-06-10 RX ADMIN — CEFTRIAXONE 1000 MG: 1 INJECTION, SOLUTION INTRAVENOUS at 16:22

## 2024-06-10 RX ADMIN — LEVALBUTEROL HYDROCHLORIDE 1.25 MG: 1.25 SOLUTION RESPIRATORY (INHALATION) at 14:19

## 2024-06-10 RX ADMIN — INSULIN LISPRO 5 UNITS: 100 INJECTION, SOLUTION INTRAVENOUS; SUBCUTANEOUS at 17:07

## 2024-06-10 RX ADMIN — CARVEDILOL 6.25 MG: 6.25 TABLET, FILM COATED ORAL at 09:51

## 2024-06-10 RX ADMIN — PRAVASTATIN SODIUM 40 MG: 40 TABLET ORAL at 16:30

## 2024-06-10 RX ADMIN — ENOXAPARIN SODIUM 40 MG: 40 INJECTION SUBCUTANEOUS at 09:53

## 2024-06-10 RX ADMIN — AMLODIPINE BESYLATE 10 MG: 10 TABLET ORAL at 09:48

## 2024-06-10 RX ADMIN — DICLOFENAC SODIUM 2 G: 10 GEL TOPICAL at 21:10

## 2024-06-10 RX ADMIN — LOSARTAN POTASSIUM 100 MG: 50 TABLET, FILM COATED ORAL at 09:47

## 2024-06-10 RX ADMIN — INSULIN LISPRO 3 UNITS: 100 INJECTION, SOLUTION INTRAVENOUS; SUBCUTANEOUS at 09:49

## 2024-06-10 RX ADMIN — CARVEDILOL 6.25 MG: 6.25 TABLET, FILM COATED ORAL at 16:30

## 2024-06-10 RX ADMIN — INSULIN LISPRO 5 UNITS: 100 INJECTION, SOLUTION INTRAVENOUS; SUBCUTANEOUS at 13:00

## 2024-06-10 RX ADMIN — DOCUSATE SODIUM 100 MG: 100 CAPSULE, LIQUID FILLED ORAL at 18:23

## 2024-06-10 RX ADMIN — LEVOTHYROXINE SODIUM 75 MCG: 75 TABLET ORAL at 09:47

## 2024-06-10 RX ADMIN — LEVALBUTEROL HYDROCHLORIDE 1.25 MG: 1.25 SOLUTION RESPIRATORY (INHALATION) at 19:26

## 2024-06-10 RX ADMIN — LEVALBUTEROL HYDROCHLORIDE 1.25 MG: 1.25 SOLUTION RESPIRATORY (INHALATION) at 07:52

## 2024-06-10 RX ADMIN — DICLOFENAC SODIUM 2 G: 10 GEL TOPICAL at 09:53

## 2024-06-10 RX ADMIN — TORSEMIDE 5 MG: 10 TABLET ORAL at 09:47

## 2024-06-10 RX ADMIN — INSULIN LISPRO 6 UNITS: 100 INJECTION, SOLUTION INTRAVENOUS; SUBCUTANEOUS at 21:08

## 2024-06-10 RX ADMIN — TIMOLOL MALEATE 1 DROP: 5 SOLUTION/ DROPS OPHTHALMIC at 11:00

## 2024-06-10 NOTE — ASSESSMENT & PLAN NOTE
Patient states he had flulike symptoms 1 week ago.Presents with comes with shortness of breath, minimal cough.  Noted to have tachypnea and hypoxia on arrival.  Does not meet SIRS criteria, elevated WBC count secondary to CLL at baseline.  CT chest suggestive of multifocal pneumonia  Covid/ Flu/RSV negative  Urine strep and Legionella negative  Blood cultures negative for 48 hours. Procal: 2.49->1.05    Sputum culture if able to obtain   Completed 3 days of Azithromycin  Continue IV ceftriaxone (D4)  Follow up final culture results

## 2024-06-10 NOTE — CASE MANAGEMENT
Case Management Assessment & Discharge Planning Note    Patient name Abelino Renee  Location /-01 MRN [id number]  : 1945 Date 6/10/2024       Current Admission Date: 2024  Current Admission Diagnosis:Community acquired pneumonia of left lung   Patient Active Problem List    Diagnosis Date Noted Date Diagnosed    Acute respiratory failure with hypoxia ([facility]) 2024     Community acquired pneumonia of left lung 2024     Acute congestive heart failure, unspecified heart failure type ([facility]) 2024     Diabetic nephropathy associated with type 2 diabetes mellitus ([facility]) 2021     Hypothyroidism 01/15/2021     Elevated serum protein level 2021     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     Status post transmetatarsal amputation of left foot ([facility]) 2019     Benign essential HTN 2018     CKD (chronic kidney disease) stage 3, GFR 30-59 ml/min ([facility]) 2018     Arthritis 2016     Hx of malignant carcinoid tumor of large intestine 2016     H/O Clostridium difficile infection 2015     Cardiomyopathy ([facility]) 2015     Chronic lymphocytic leukemia ([facility]) 2013     Type 2 diabetes mellitus with diabetic polyneuropathy, without long-term current use of insulin ([facility]) 2012       LOS (days): 3  Geometric Mean LOS (GMLOS) (days): 5.1  Days to GMLOS:2     OBJECTIVE:    Risk of Unplanned Readmission Score: 18.96         Current admission status: Inpatient       Preferred Pharmacy:   [facility] [address] [place] [address]  Phone: [phone] Fax: [phone]    [facility] - [place], [place] - [address] [facility]  [address] [facility] [address]  Phone: [phone] Fax: [phone]    Primary Care Provider: Edel Wright MD    Primary Insurance: MEDICARE  Secondary Insurance: COMMERCIAL MISCELLANEOUS    ASSESSMENT:  Active  Health Care Proxies    There are no active Health Care Proxies on file.                      Patient Information  Admitted from:: Home  Mental Status: Alert  During Assessment patient was accompanied by: Not accompanied during assessment  Assessment information provided by:: Patient  Primary Caregiver: Self  Support Systems: Self, Spouse/significant other  County of Residence: [place]  What city do you live in?: [place]  Home entry access options. Select all that apply.: Stairs  Number of steps to enter home.: 1  Do the steps have railings?: Yes  Type of Current Residence: 2 story home  Upon entering residence, is there a bedroom on the main floor (no further steps)?: No  A bedroom is located on the following floor levels of residence (select all that apply):: 2nd Floor  Upon entering residence, is there a bathroom on the main floor (no further steps)?: No  Indicate which floors of current residence have a bathroom (select all the apply):: 2nd Floor  Number of steps to 2nd floor from main floor: One Flight  Living Arrangements: Lives w/ Spouse/significant other    Activities of Daily Living Prior to Admission  Functional Status: Independent  Completes ADLs independently?: Yes  Ambulates independently?: Yes  Does patient use assisted devices?: No  Does patient currently own DME?: No  Does patient have a history of Outpatient Therapy (PT/OT)?: No  Does the patient have a history of Short-Term Rehab?: No  Does patient have a history of HHC?: No  Does patient currently have HHC?: No         Patient Information Continued  Income Source: Pension/care home  Does patient have prescription coverage?: Yes  Does patient receive dialysis treatments?: No  Does patient have a history of substance abuse?: No  Does patient have a history of Mental Health Diagnosis?: No         Means of Transportation  Means of Transport to Appts:: Drives Self      Social Determinants of Health (SDOH)      Flowsheet Row Most Recent Value    Housing Stability    In the last 12 months, was there a time when you were not able to pay the mortgage or rent on time? N   At any time in the past 12 months, were you homeless or living in a shelter (including now)? N   Transportation Needs    In the past 12 months, has lack of transportation kept you from medical appointments or from getting medications? no   In the past 12 months, has lack of transportation kept you from meetings, work, or from getting things needed for daily living? No   Food Insecurity    Within the past 12 months, you worried that your food would run out before you got the money to buy more. Never true   Within the past 12 months, the food you bought just didn't last and you didn't have money to get more. Never true   [employer]    In the past 12 months has the electric, gas, oil, or water company threatened to shut off services in your home? No            DISCHARGE DETAILS:    Discharge planning discussed with:: Patient  Freedom of Choice: Yes  Comments - Freedom of Choice: Cm met with patient to review Cm role. Patient does not anticipate any needs upon discharge. Patient not interested in STR or HHC at this time.  CM contacted family/caregiver?: No- see comments (Patient Ax4)  Were Treatment Team discharge recommendations reviewed with patient/caregiver?: Yes  Did patient/caregiver verbalize understanding of patient care needs?: Yes  Were patient/caregiver advised of the risks associated with not following Treatment Team discharge recommendations?: Yes         Requested Home Health Care         Is the patient interested in HHC at discharge?: No    DME Referral Provided  Referral made for DME?: No

## 2024-06-10 NOTE — ASSESSMENT & PLAN NOTE
"Patient: George Aden    Procedure Summary       Date: 12/13/23 Room / Location: [facility] ENDOSCOPY 8 /  [facility]    Anesthesia Start: 0741 Anesthesia Stop:     Procedure: COLONOSCOPY to cecum Diagnosis:       Screening for malignant neoplasm of colon      (Screening for malignant neoplasm of colon [Z12.11])    Surgeons: Nathaly Franz MD Provider: Melani Ramírez MD    Anesthesia Type: MAC ASA Status: 3            Anesthesia Type: MAC    Vitals  Vitals Value Taken Time   /92 12/13/23 0827   Temp     Pulse 53 12/13/23 0829   Resp 16 12/13/23 0827   SpO2 98 % 12/13/23 0829   Vitals shown include unfiled device data.        Post Anesthesia Care and Evaluation    Patient location during evaluation: PHASE II  Patient participation: complete - patient participated  Level of consciousness: awake and alert  Pain management: adequate    Airway patency: patent  Anesthetic complications: No anesthetic complications    Cardiovascular status: acceptable  Respiratory status: acceptable  Hydration status: acceptable    Comments: /92 (BP Location: Left arm, Patient Position: Lying)   Pulse 57   Resp 16   Ht 182.9 cm (72\")   Wt 87.5 kg (193 lb)   SpO2 99%   BMI 26.18 kg/m²       " Lab Results   Component Value Date    EGFR 34 06/10/2024    EGFR 33 06/09/2024    EGFR 30 06/08/2024    CREATININE 1.83 (H) 06/10/2024    CREATININE 1.91 (H) 06/09/2024    CREATININE 2.02 (H) 06/08/2024     Mildly Elevated from baseline, Continuing to improve  Baseline closer to Crt 1.2-1.4  S/p gentle IV hydration on admission  S/p 1x dose of IV Furosemide (6/8 and 6/9)  Monitor while admitted

## 2024-06-10 NOTE — ASSESSMENT & PLAN NOTE
Likely 2:2 CAP  Room air at baseline  Now requiring 1-2 L  Wean O2 supplement oxygen and wean off as tolerated.

## 2024-06-10 NOTE — TELEPHONE ENCOUNTER
Pt's appt needs to be rescheduled d/t provider not being in the office. Attempted to reach patient about need of appointment change with no success. Appointment canceled and detailed VM left with [facility] number [phone] for patient to return call to reschedule.     Additional message sent [employer].

## 2024-06-10 NOTE — PLAN OF CARE
Problem: INFECTION - ADULT  Goal: Absence or prevention of progression during hospitalization  Description: INTERVENTIONS:  - Assess and monitor for signs and symptoms of infection  - Monitor lab/diagnostic results  - Monitor all insertion sites, i.e. indwelling lines, tubes, and drains  - Monitor endotracheal if appropriate and nasal secretions for changes in amount and color  - [place] appropriate cooling/warming therapies per order  - Administer medications as ordered  - Instruct and encourage patient and family to use good hand hygiene technique  - Identify and instruct in appropriate isolation precautions for identified infection/condition  Outcome: Progressing     Problem: SAFETY ADULT  Goal: Patient will remain free of falls  Description: INTERVENTIONS:  - Educate patient/family on patient safety including physical limitations  - Instruct patient to call for assistance with activity   - Consult OT/PT to assist with strengthening/mobility   - Keep Call bell within reach  - Keep bed low and locked with side rails adjusted as appropriate  - Keep care items and personal belongings within reach  - Initiate and maintain comfort rounds  - Make Fall Risk Sign visible to staff  - Apply yellow socks and bracelet for high fall risk patients  - Consider moving patient to room near nurses station  Outcome: Progressing

## 2024-06-10 NOTE — CASE MANAGEMENT
Case Management Progress Note    Patient name Abelino Renee  Location /-01 MRN [id number]  : 1945 Date 6/10/2024       LOS (days): 3  Geometric Mean LOS (GMLOS) (days): 5.1  Days to GMLOS:2        OBJECTIVE:        Current admission status: Inpatient  Preferred Pharmacy:   [facility] [address] [place] [address]  Phone: [phone] Fax: [phone]    [facility] [address] [facility]  [address] [facility] [address]  Phone: [phone] Fax: [phone]    Primary Care Provider: Edel Wright MD    Primary Insurance: MEDICARE  Secondary Insurance: COMMERCIAL MISCELLANEOUS    PROGRESS NOTE:    Cm met with patient to review Cm role. Patient lives at home with his wife. Patient lives in a 2 story home with 2nd floor set up. There are roughly 15 steps to go to get to second floor with railings on both sides. Patient does not use or own any DME at this time. He reports no history of HHC or STR and is not interested at this time. Patient lives across the street and would like to walk home once medically stable. He denies any need for resources or any concerns at this time. CM to follow if anything further is needed.

## 2024-06-10 NOTE — PLAN OF CARE
Problem: PAIN - ADULT  Goal: Verbalizes/displays adequate comfort level or baseline comfort level  Description: Interventions:  - Encourage patient to monitor pain and request assistance  - Assess pain using appropriate pain scale  - Administer analgesics based on type and severity of pain and evaluate response  - Implement non-pharmacological measures as appropriate and evaluate response  - Consider cultural and social influences on pain and pain management  - Notify physician/advanced practitioner if interventions unsuccessful or patient reports new pain  6/10/2024 1858 by Gely Aguiar RN  Outcome: Progressing  6/10/2024 1858 by Gely Aguiar RN  Outcome: Progressing     Problem: RESPIRATORY - ADULT  Goal: Achieves optimal ventilation and oxygenation  Description: INTERVENTIONS:  - Assess for changes in respiratory status  - Assess for changes in mentation and behavior  - Position to facilitate oxygenation and minimize respiratory effort  - Oxygen administered by appropriate delivery if ordered  - Initiate smoking cessation education as indicated  - Encourage broncho-pulmonary hygiene including cough, deep breathe, Incentive Spirometry  - Assess the need for suctioning and aspirate as needed  - Assess and instruct to report SOB or any respiratory difficulty  - Respiratory Therapy support as indicated  Outcome: Progressing

## 2024-06-10 NOTE — ASSESSMENT & PLAN NOTE
Lab Results   Component Value Date    HGBA1C 7.3 (H) 02/27/2024       Recent Labs     06/09/24  1612 06/09/24  2122 06/10/24  0717 06/10/24  1104   POCGLU 388* 393* 266* 372*       Blood Sugar Average: Last 72 hrs:  (P) 349    Home Regimen: Glipizide and Metformin. Hold while admitted  Persistent Hyperglycemia  Will increase Lantus to 10 units qHS  Increase SSI to Algorithm 4  Will add 0200 accu-check and SSI coverage  C/w Humalog 5 units TID with meals  C/w Accu-Cheks and SSI AC/HS  Hypoglycemic protocol  Diabetic Diet

## 2024-06-10 NOTE — PROGRESS NOTES
[facility]  Progress Note  Name: Abelino Renee I  MRN: [id number]  Unit/Bed#: -01 I Date of Admission: 6/7/2024   Date of Service: 6/10/2024 I Hospital Day: 3    Assessment & Plan   * Community acquired pneumonia of left lung  Assessment & Plan  Patient states he had flulike symptoms 1 week ago.Presents with comes with shortness of breath, minimal cough.  Noted to have tachypnea and hypoxia on arrival.  Does not meet SIRS criteria, elevated WBC count secondary to CLL at baseline.  CT chest suggestive of multifocal pneumonia  Covid/ Flu/RSV negative  Urine strep and Legionella negative  Blood cultures negative for 48 hours. Procal: 2.49->1.05    Sputum culture if able to obtain   Completed 3 days of Azithromycin  Continue IV ceftriaxone (D4)  Follow up final culture results       Acute respiratory failure with hypoxia (HCC)  Assessment & Plan  Likely 2:2 CAP  Room air at baseline  Now requiring 1-2 L  Wean O2 supplement oxygen and wean off as tolerated.    Type 2 diabetes mellitus with diabetic polyneuropathy, without long-term current use of insulin ([facility])  Assessment & Plan  Lab Results   Component Value Date    HGBA1C 7.3 (H) 02/27/2024       Recent Labs     06/09/24  1612 06/09/24  2122 06/10/24  0717 06/10/24  1104   POCGLU 388* 393* 266* 372*       Blood Sugar Average: Last 72 hrs:  (P) 349    Home Regimen: Glipizide and Metformin. Hold while admitted  Persistent Hyperglycemia  Will increase Lantus to 10 units qHS  Increase SSI to Algorithm 4  Will add 0200 accu-check and SSI coverage  C/w Humalog 5 units TID with meals  C/w Accu-Cheks and SSI AC/HS  Hypoglycemic protocol  Diabetic Diet    CKD (chronic kidney disease) stage 3, GFR 30-59 ml/min ([facility])  Assessment & Plan  Lab Results   Component Value Date    EGFR 34 06/10/2024    EGFR 33 06/09/2024    EGFR 30 06/08/2024    CREATININE 1.83 (H) 06/10/2024    CREATININE 1.91 (H) 06/09/2024    CREATININE 2.02 (H) 06/08/2024     Mildly  Elevated from baseline, Continuing to improve  Baseline closer to Crt 1.2-1.4  S/p gentle IV hydration on admission  S/p 1x dose of IV Furosemide (6/8 and 6/9)  C/w Home diuretic dosing and monitor closely  Monitor while admitted    Chronic lymphocytic leukemia (HCC)  Assessment & Plan  Chronic elevation of WBC count  Currently stable at baseline  Continue outpatient follow up with primary Heme/Onc specialist               VTE Pharmacologic Prophylaxis: VTE Score: 9 High Risk (Score >/= 5) - Pharmacological DVT Prophylaxis Ordered: enoxaparin (Lovenox). Sequential Compression Devices Ordered.    Mobility:   Basic Mobility Inpatient Raw Score: 24  JH-HLM Goal: 8: Walk 250 feet or more  JH-HLM Achieved: 5: Stand (1 or more minutes)  JH-HLM Goal NOT achieved. Continue with multidisciplinary rounding and encourage appropriate mobility to improve upon JH-HLM goals.    Patient Centered Rounds: I performed bedside rounds with nursing staff today.   Discussions with Specialists or Other Care Team Provider: Suzanne WILLINGHAM    Education and Discussions with Family / Patient: Updated  (wife) via phone.    Total Time Spent on Date of Encounter in care of patient: 30 mins. This time was spent on one or more of the following: performing physical exam; counseling and coordination of care; obtaining or reviewing history; documenting in the medical record; reviewing/ordering tests, medications or procedures; communicating with other healthcare professionals and discussing with patient's family/caregivers.    Current Length of Stay: 3 day(s)  Current Patient Status: Inpatient   Certification Statement: The patient will continue to require additional inpatient hospital stay due to CAP requiring IV antibiotics and supplemental O2 and persistent Hyperglycemia  Discharge Plan: Anticipate discharge in 48-72 hrs to home.    Code Status: Level 1 - Full Code    Subjective:   Patient said he started to feel much better.  Patient  stated he started to get an appetite back.  Patient said his lower extremity swelling is at baseline.  Patient stated his breathing is gradually improving, although he still having some shortness of breath with exertion.  Patient denies any active chest pain, or abdominal pain.    Objective:     Vitals:   Temp (24hrs), Av.8 °F (36.6 °C), Min:97.1 °F (36.2 °C), Max:98.3 °F (36.8 °C)    Temp:  [97.1 °F (36.2 °C)-98.3 °F (36.8 °C)] 98.3 °F (36.8 °C)  HR:  [56-61] 60  Resp:  [16-18] 16  BP: (138-140)/(55-58) 140/58  SpO2:  [91 %-97 %] 93 %  Body mass index is 30.51 kg/m².     Input and Output Summary (last 24 hours):     Intake/Output Summary (Last 24 hours) at 6/10/2024 1220  Last data filed at 6/10/2024 0718  Gross per 24 hour   Intake 730 ml   Output 1500 ml   Net -770 ml       Physical Exam:   Physical Exam  Vitals and nursing note reviewed.   Constitutional:       General: He is not in acute distress.  HENT:      Head: Normocephalic.      Nose: Nose normal. No congestion.      Mouth/Throat:      Mouth: Mucous membranes are moist.      Pharynx: Oropharynx is clear.   Cardiovascular:      Rate and Rhythm: Normal rate and regular rhythm.      Pulses: Normal pulses.      Heart sounds: No murmur heard.  Pulmonary:      Effort: Pulmonary effort is normal. No respiratory distress.      Breath sounds: Rhonchi (Improving) present.   Abdominal:      General: Bowel sounds are normal. There is no distension.      Palpations: Abdomen is soft.      Tenderness: There is no abdominal tenderness.   Musculoskeletal:         General: Normal range of motion.      Right lower leg: Edema (At baseline) present.      Left lower leg: Edema (At baseline) present.   Skin:     General: Skin is warm and dry.      Capillary Refill: Capillary refill takes less than 2 seconds.   Neurological:      Mental Status: He is alert and oriented to person, place, and time. Mental status is at baseline.   Psychiatric:         Mood and Affect: Mood  normal.         Speech: Speech normal.         Behavior: Behavior is cooperative.          Additional Data:     Labs:  Results from last 7 days   Lab Units 06/10/24  0533 06/08/24  0450 06/07/24  0832   WBC Thousand/uL 25.72*   < > 26.79*   HEMOGLOBIN g/dL 9.2*   < > 9.2*   HEMATOCRIT % 28.6*   < > 27.6*   PLATELETS Thousands/uL 242   < > 204   BANDS PCT %  --   --  5   LYMPHO PCT %  --   --  57*   MONO PCT %  --   --  0*   EOS PCT %  --   --  0    < > = values in this interval not displayed.     Results from last 7 days   Lab Units 06/10/24  0533   SODIUM mmol/L 136   POTASSIUM mmol/L 3.4*   CHLORIDE mmol/L 98   CO2 mmol/L 26   BUN mg/dL 38*   CREATININE mg/dL 1.83*   ANION GAP mmol/L 12   CALCIUM mg/dL 9.2   ALBUMIN g/dL 3.4*   TOTAL BILIRUBIN mg/dL 0.55   ALK PHOS U/L 100   ALT U/L 27   AST U/L 22   GLUCOSE RANDOM mg/dL 228*         Results from last 7 days   Lab Units 06/10/24  1104 06/10/24  0717 06/09/24  2122 06/09/24  1612 06/09/24  1137 06/09/24  0744 06/08/24  1927 06/08/24  1632 06/08/24  1211 06/08/24  0852 06/07/24  2119 06/07/24  1647   POC GLUCOSE mg/dl 372* 266* 393* 388* 396* 274* 395* 351* 364* 378* 281* 314*         Results from last 7 days   Lab Units 06/10/24  0533 06/08/24  0450 06/07/24  0849 06/07/24  0832   LACTIC ACID mmol/L  --   --  1.5  --    PROCALCITONIN ng/ml 1.05* 2.32*  --  2.49*       Lines/Drains:  Invasive Devices       Peripheral Intravenous Line  Duration             Peripheral IV 06/07/24 Right;Ventral (anterior) Forearm 2 days                          Imaging: No pertinent imaging reviewed.    Recent Cultures (last 7 days):   Results from last 7 days   Lab Units 06/07/24  1124 06/07/24  0833 06/07/24  0830   BLOOD CULTURE   --  No Growth at 72 hrs. No Growth at 72 hrs.   LEGIONELLA URINARY ANTIGEN  Negative  --   --        Last 24 Hours Medication List:   Current Facility-Administered Medications   Medication Dose Route Frequency Provider Last Rate    acetaminophen  650 mg  Oral Q6H PRN Areli Rai MD      albuterol  2.5 mg Nebulization Q6H PRN Areli Rai MD      amLODIPine  10 mg Oral Daily Areli Rai MD      brimonidine tartrate  1 drop Both Eyes TID Areli Rai MD      carvedilol  6.25 mg Oral BID With Meals Areli Rai MD      cefTRIAXone  1,000 mg Intravenous Q24H Areli Rai MD 1,000 mg (06/09/24 1429)    dextromethorphan-guaiFENesin  10 mL Oral Q4H PRN Areli Rai MD      Diclofenac Sodium  2 g Topical 4x Daily RADHA Hercules      docusate sodium  100 mg Oral BID RADHA Stearns      enoxaparin  40 mg Subcutaneous Daily Areli Rai MD      insulin glargine  10 Units Subcutaneous HS RADHA Stearns      [START ON 6/11/2024] insulin lispro  1-6 Units Subcutaneous 0200 RADHA Stearns      insulin lispro  2-12 Units Subcutaneous TID AC RADHA Stearns      insulin lispro  2-12 Units Subcutaneous HS RADHA Stearns      insulin lispro  5 Units Subcutaneous TID With Meals RADHA Stearns      levalbuterol  1.25 mg Nebulization TID Areli Rai MD      levothyroxine  75 mcg Oral Daily Areli Rai MD      losartan  100 mg Oral Daily Areli Rai MD      polyethylene glycol  17 g Oral Daily PRN RADHA Stearns      pravastatin  40 mg Oral Daily With Dinner Areli Rai MD      senna  1 tablet Oral HS RADHA Stearns      timolol  1 drop Both Eyes Daily Areli Rai MD      torsemide  5 mg Oral Daily RADHA Hercules      triamcinolone   Topical BID RADHA Hercules          Today, Patient Was Seen By: RADHA Stearns    **Please Note: This note may have been constructed using a voice recognition system.**

## 2024-06-10 NOTE — ASSESSMENT & PLAN NOTE
Chronic elevation of WBC count  Currently stable at baseline  Continue outpatient follow up with primary Heme/Onc specialist

## 2024-06-10 NOTE — ASSESSMENT & PLAN NOTE
Lab Results   Component Value Date    EGFR 34 06/10/2024    EGFR 33 06/09/2024    EGFR 30 06/08/2024    CREATININE 1.83 (H) 06/10/2024    CREATININE 1.91 (H) 06/09/2024    CREATININE 2.02 (H) 06/08/2024     Creatinine 2.13 on admission. Baseline Cr closer to 1.2-1.4.  Likely in setting of mild dehydration with pneumonia  S/p gentle IVF hydration. S/p IV Lasix (6/8, 6/9).   Improving, Cr 1.76 today & clinically euvolemic  Continue home torsemide 5 mg daily, monitor BMP

## 2024-06-11 ENCOUNTER — TELEPHONE (OUTPATIENT)
Dept: HEMATOLOGY ONCOLOGY | Facility: CLINIC | Age: 79
End: 2024-06-11

## 2024-06-11 LAB
ANION GAP SERPL CALCULATED.3IONS-SCNC: 10 MMOL/L (ref 4–13)
BUN SERPL-MCNC: 36 MG/DL (ref 5–25)
CALCIUM SERPL-MCNC: 9.1 MG/DL (ref 8.4–10.2)
CHLORIDE SERPL-SCNC: 99 MMOL/L (ref 96–108)
CO2 SERPL-SCNC: 29 MMOL/L (ref 21–32)
CREAT SERPL-MCNC: 1.76 MG/DL (ref 0.6–1.3)
ERYTHROCYTE [DISTWIDTH] IN BLOOD BY AUTOMATED COUNT: 14 % (ref 11.6–15.1)
EST. AVERAGE GLUCOSE BLD GHB EST-MCNC: 197 MG/DL
GFR SERPL CREATININE-BSD FRML MDRD: 36 ML/MIN/1.73SQ M
GLUCOSE SERPL-MCNC: 194 MG/DL (ref 65–140)
GLUCOSE SERPL-MCNC: 201 MG/DL (ref 65–140)
GLUCOSE SERPL-MCNC: 263 MG/DL (ref 65–140)
GLUCOSE SERPL-MCNC: 277 MG/DL (ref 65–140)
GLUCOSE SERPL-MCNC: 340 MG/DL (ref 65–140)
HBA1C MFR BLD: 8.5 %
HCT VFR BLD AUTO: 28.5 % (ref 36.5–49.3)
HGB BLD-MCNC: 9.1 G/DL (ref 12–17)
MCH RBC QN AUTO: 29.8 PG (ref 26.8–34.3)
MCHC RBC AUTO-ENTMCNC: 31.9 G/DL (ref 31.4–37.4)
MCV RBC AUTO: 93 FL (ref 82–98)
PLATELET # BLD AUTO: 264 THOUSANDS/UL (ref 149–390)
PMV BLD AUTO: 9.8 FL (ref 8.9–12.7)
POTASSIUM SERPL-SCNC: 4.2 MMOL/L (ref 3.5–5.3)
RBC # BLD AUTO: 3.05 MILLION/UL (ref 3.88–5.62)
SODIUM SERPL-SCNC: 138 MMOL/L (ref 135–147)
WBC # BLD AUTO: 23.5 THOUSAND/UL (ref 4.31–10.16)

## 2024-06-11 PROCEDURE — 94760 N-INVAS EAR/PLS OXIMETRY 1: CPT

## 2024-06-11 PROCEDURE — 94640 AIRWAY INHALATION TREATMENT: CPT

## 2024-06-11 PROCEDURE — 82948 REAGENT STRIP/BLOOD GLUCOSE: CPT

## 2024-06-11 PROCEDURE — 83036 HEMOGLOBIN GLYCOSYLATED A1C: CPT | Performed by: PHYSICIAN ASSISTANT

## 2024-06-11 PROCEDURE — 80048 BASIC METABOLIC PNL TOTAL CA: CPT

## 2024-06-11 PROCEDURE — 85027 COMPLETE CBC AUTOMATED: CPT

## 2024-06-11 PROCEDURE — 99232 SBSQ HOSP IP/OBS MODERATE 35: CPT | Performed by: PHYSICIAN ASSISTANT

## 2024-06-11 RX ORDER — LIDOCAINE 50 MG/G
1 PATCH TOPICAL DAILY
Status: DISCONTINUED | OUTPATIENT
Start: 2024-06-11 | End: 2024-06-13 | Stop reason: HOSPADM

## 2024-06-11 RX ORDER — ACETAMINOPHEN 325 MG/1
650 TABLET ORAL EVERY 6 HOURS PRN
Status: DISCONTINUED | OUTPATIENT
Start: 2024-06-11 | End: 2024-06-13 | Stop reason: HOSPADM

## 2024-06-11 RX ORDER — AMOXICILLIN 250 MG/1
1000 CAPSULE ORAL EVERY 8 HOURS SCHEDULED
Status: DISCONTINUED | OUTPATIENT
Start: 2024-06-12 | End: 2024-06-12

## 2024-06-11 RX ADMIN — PRAVASTATIN SODIUM 40 MG: 40 TABLET ORAL at 17:02

## 2024-06-11 RX ADMIN — CARVEDILOL 6.25 MG: 6.25 TABLET, FILM COATED ORAL at 17:02

## 2024-06-11 RX ADMIN — ENOXAPARIN SODIUM 40 MG: 40 INJECTION SUBCUTANEOUS at 08:08

## 2024-06-11 RX ADMIN — CEFTRIAXONE 1000 MG: 1 INJECTION, SOLUTION INTRAVENOUS at 13:41

## 2024-06-11 RX ADMIN — LEVALBUTEROL HYDROCHLORIDE 1.25 MG: 1.25 SOLUTION RESPIRATORY (INHALATION) at 14:07

## 2024-06-11 RX ADMIN — LIDOCAINE 1 PATCH: 700 PATCH TOPICAL at 11:39

## 2024-06-11 RX ADMIN — INSULIN LISPRO 4 UNITS: 100 INJECTION, SOLUTION INTRAVENOUS; SUBCUTANEOUS at 08:10

## 2024-06-11 RX ADMIN — TIMOLOL MALEATE 1 DROP: 5 SOLUTION/ DROPS OPHTHALMIC at 08:09

## 2024-06-11 RX ADMIN — DICLOFENAC SODIUM 2 G: 10 GEL TOPICAL at 08:10

## 2024-06-11 RX ADMIN — BRIMONIDINE TARTRATE 1 DROP: 2 SOLUTION/ DROPS OPHTHALMIC at 21:55

## 2024-06-11 RX ADMIN — LEVALBUTEROL HYDROCHLORIDE 1.25 MG: 1.25 SOLUTION RESPIRATORY (INHALATION) at 20:26

## 2024-06-11 RX ADMIN — INSULIN LISPRO 5 UNITS: 100 INJECTION, SOLUTION INTRAVENOUS; SUBCUTANEOUS at 11:40

## 2024-06-11 RX ADMIN — AMLODIPINE BESYLATE 10 MG: 10 TABLET ORAL at 08:08

## 2024-06-11 RX ADMIN — DICLOFENAC SODIUM 2 G: 10 GEL TOPICAL at 21:56

## 2024-06-11 RX ADMIN — CARVEDILOL 6.25 MG: 6.25 TABLET, FILM COATED ORAL at 08:08

## 2024-06-11 RX ADMIN — DOCUSATE SODIUM 100 MG: 100 CAPSULE, LIQUID FILLED ORAL at 08:08

## 2024-06-11 RX ADMIN — TRIAMCINOLONE ACETONIDE: 1 CREAM TOPICAL at 17:02

## 2024-06-11 RX ADMIN — INSULIN LISPRO 5 UNITS: 100 INJECTION, SOLUTION INTRAVENOUS; SUBCUTANEOUS at 17:03

## 2024-06-11 RX ADMIN — INSULIN LISPRO 6 UNITS: 100 INJECTION, SOLUTION INTRAVENOUS; SUBCUTANEOUS at 17:03

## 2024-06-11 RX ADMIN — LEVOTHYROXINE SODIUM 75 MCG: 75 TABLET ORAL at 08:09

## 2024-06-11 RX ADMIN — BRIMONIDINE TARTRATE 1 DROP: 2 SOLUTION/ DROPS OPHTHALMIC at 08:09

## 2024-06-11 RX ADMIN — INSULIN GLARGINE 10 UNITS: 100 INJECTION, SOLUTION SUBCUTANEOUS at 21:56

## 2024-06-11 RX ADMIN — TRIAMCINOLONE ACETONIDE: 1 CREAM TOPICAL at 08:09

## 2024-06-11 RX ADMIN — DICLOFENAC SODIUM 2 G: 10 GEL TOPICAL at 17:02

## 2024-06-11 RX ADMIN — TORSEMIDE 5 MG: 10 TABLET ORAL at 08:08

## 2024-06-11 RX ADMIN — INSULIN LISPRO 6 UNITS: 100 INJECTION, SOLUTION INTRAVENOUS; SUBCUTANEOUS at 21:58

## 2024-06-11 RX ADMIN — BRIMONIDINE TARTRATE 1 DROP: 2 SOLUTION/ DROPS OPHTHALMIC at 17:02

## 2024-06-11 RX ADMIN — INSULIN LISPRO 5 UNITS: 100 INJECTION, SOLUTION INTRAVENOUS; SUBCUTANEOUS at 08:10

## 2024-06-11 RX ADMIN — LOSARTAN POTASSIUM 100 MG: 50 TABLET, FILM COATED ORAL at 08:09

## 2024-06-11 RX ADMIN — LEVALBUTEROL HYDROCHLORIDE 1.25 MG: 1.25 SOLUTION RESPIRATORY (INHALATION) at 07:41

## 2024-06-11 RX ADMIN — DICLOFENAC SODIUM 2 G: 10 GEL TOPICAL at 13:41

## 2024-06-11 RX ADMIN — INSULIN LISPRO 8 UNITS: 100 INJECTION, SOLUTION INTRAVENOUS; SUBCUTANEOUS at 11:39

## 2024-06-11 NOTE — ASSESSMENT & PLAN NOTE
Lab Results   Component Value Date    HGBA1C 7.3 (H) 02/27/2024       Recent Labs     06/10/24  1104 06/10/24  1651 06/10/24  1953 06/11/24  0712   POCGLU 372* 212* 294* 201*       Blood Sugar Average: Last 72 hrs:  (P) 329.2119250894042286    Home Regimen: Glipizide and Metformin. Hold while admitted  Persistent Hyperglycemia  Will increase Lantus to 10 units qHS  Increase SSI to Algorithm 4  Will add 0200 accu-check and SSI coverage  C/w Humalog 5 units TID with meals  C/w Accu-Cheks and SSI AC/HS  Hypoglycemic protocol  Diabetic Diet

## 2024-06-11 NOTE — ASSESSMENT & PLAN NOTE
History of CLL, chronic elevation of WBCs ~ 20-29 per chart review  Improving leukocytosis, appears to be at baseline  Continue outpatient F/U with primary Heme/Onc specialist

## 2024-06-11 NOTE — ASSESSMENT & PLAN NOTE
Patient states he had flulike symptoms 1 week ago.Presents with comes with shortness of breath, minimal cough.  Noted to have tachypnea and hypoxia on arrival.  Does not meet SIRS criteria, elevated WBC count secondary to CLL at baseline.  CT chest suggestive of multifocal pneumonia  Covid/ Flu/RSV negative  Urine strep and Legionella negative  Blood cultures negative for 48 hours. Procal: 2.49->1.05    Sputum culture if able to obtain   Completed 3 days of Azithromycin  Continue IV ceftriaxone (D5)  Follow up final culture results

## 2024-06-11 NOTE — PROGRESS NOTES
Pending sale to [facility]  Progress Note  Name: Abelino Renee I  MRN: [id number]  Unit/Bed#: -01 I Date of Admission: 6/7/2024   Date of Service: 6/11/2024 I Hospital Day: 4    Assessment & Plan   * Community acquired pneumonia of left lung  Assessment & Plan  Patient states he had flulike symptoms 1 week ago.Presents with comes with shortness of breath, minimal cough.  Noted to have tachypnea and hypoxia on arrival.  Does not meet SIRS criteria, elevated WBC count secondary to CLL at baseline.  CT chest suggestive of multifocal pneumonia  Covid/ Flu/RSV negative  Urine strep and Legionella negative  Blood cultures negative for 48 hours. Procal: 2.49->1.05    Sputum culture if able to obtain   Completed 3 days of Azithromycin  Continue IV ceftriaxone (D5)  Follow up final culture results     Acute respiratory failure with hypoxia (HCC)  Assessment & Plan  Likely 2:2 CAP  Room air at baseline  Now requiring 1-2 L  Wean O2 supplement oxygen and wean off as tolerated.    Type 2 diabetes mellitus with diabetic polyneuropathy, without long-term current use of insulin ([facility])  Assessment & Plan  Lab Results   Component Value Date    HGBA1C 7.3 (H) 02/27/2024       Recent Labs     06/10/24  1104 06/10/24  1651 06/10/24  1953 06/11/24  0712   POCGLU 372* 212* 294* 201*       Blood Sugar Average: Last 72 hrs:  (P) 329.8328204563839924    Home Regimen: Glipizide and Metformin. Hold while admitted  Persistent Hyperglycemia  Will increase Lantus to 10 units qHS  Increase SSI to Algorithm 4  Will add 0200 accu-check and SSI coverage  C/w Humalog 5 units TID with meals  C/w Accu-Cheks and SSI AC/HS  Hypoglycemic protocol  Diabetic Diet      CKD (chronic kidney disease) stage 3, GFR 30-59 ml/min ([facility])  Assessment & Plan  Lab Results   Component Value Date    EGFR 36 06/11/2024    EGFR 34 06/10/2024    EGFR 33 06/09/2024    CREATININE 1.76 (H) 06/11/2024    CREATININE 1.83 (H) 06/10/2024    CREATININE 1.91 (H)  "06/09/2024     Mildly Elevated from baseline, Continuing to improve  Baseline closer to Crt 1.2-1.4  S/p gentle IV hydration on admission  S/p 1x dose of IV Furosemide (6/8 and 6/9)  C/w Home diuretic dosing and monitor closely  Monitor while admitted    Chronic lymphocytic leukemia (HCC)  Assessment & Plan  Chronic elevation of WBC count  Currently stable at baseline  Continue outpatient follow up with primary Heme/Onc specialist         Subjective:   Pt is a 78 yr old male with a past medical history of DM type II, HTN, CLL, and CKD. Pt was admitted 6/7/2024 with complaints of cough, shortness of breath and lung Multifocal pneumonia (greatest in the left upper lobe) found on CT.      Pt was seen in the a.a at bedside. Patient said he started to feel much better although he did not get much sleep due to back and neck pian which he contributed to the mattress. Patient said his lower extremity swelling is at baseline. Patient stated his breathing is gradually improving, although he still having some shortness of breath with exertion. He currently O2 saturation is 92 percent on RA, he stated it goes down to 88 when he walk to the bathroom.  Patient denies any active chest pain, or abdominal pain.      Review of Systems   Cardiovascular:  Positive for leg swelling.   All other systems reviewed and are negative.           Objective:  Vitals: Blood pressure 157/75, pulse 63, temperature 97.6 °F (36.4 °C), temperature source Tympanic, resp. rate 16, height 5' 6\" (1.676 m), weight 84.6 kg (186 lb 6.4 oz), SpO2 93%.,Body mass index is 30.09 kg/m².        Intake/Output Summary (Last 24 hours) at 6/11/2024 0911  Last data filed at 6/11/2024 0847      Gross per 24 hour   Intake --   Output 1775 ml   Net -1775 ml         Invasive Devices         Peripheral Intravenous Line  Duration                Peripheral IV 06/07/24 Right;Ventral (anterior) Forearm 3 days                          Physical Exam: Physical Exam  Vitals and " nursing note reviewed.   Constitutional:       General: He is not in acute distress.  HENT:      Head: Normocephalic.      Nose: Nose normal. No congestion.      Mouth/Throat:      Mouth: Mucous membranes are moist.      Pharynx: Oropharynx is clear.   Cardiovascular:      Rate and Rhythm: Normal rate and regular rhythm.      Pulses: Normal pulses.      Heart sounds: No murmur heard.  Pulmonary:      Effort: Pulmonary effort is normal. No respiratory distress.      Breath sounds: Clear   Abdominal:      General: Bowel sounds are normal. There is no distension.      Palpations: Abdomen is soft.      Tenderness: There is no abdominal tenderness.   Musculoskeletal:         General: Normal range of motion.      Right lower leg: Edema (At baseline) present.      Left lower leg: Edema (At baseline) present.   Skin:     General: Skin is warm and dry.      Capillary Refill: Capillary refill takes less than 2 seconds.   Neurological:      Mental Status: He is alert and oriented to person, place, and time. Mental status is at baseline.   Psychiatric:         Mood and Affect: Mood normal.         Speech: Speech normal.         Behavior: Behavior is cooperative.     {Progress note PORCH templates:28256}

## 2024-06-11 NOTE — ASSESSMENT & PLAN NOTE
Patient presented with flulike symptoms, SOB and cough for the past week. Hx CLL with chronic leukocytosis.  CT chest showed evidence of multifocal pneumonia, mild mediastinal adenopathy likely reactive  Did not meet SIRS criteria, elevated WBC count secondary to CLL at baseline.  COVID/RSV/flu negative.  Legionella/strep pneumoniae urinary antigens negative.   BC x2: NG after 4 days. Procal: 2.49->1.05.   Received IV Zosyn/vancomycin in ED. S/p 2 doses azithromycin.  D5 IV Rocephin, switch to amoxicillin tomorrow to complete total 7-day antibiotic course

## 2024-06-11 NOTE — ASSESSMENT & PLAN NOTE
Lab Results   Component Value Date    EGFR 36 06/11/2024    EGFR 34 06/10/2024    EGFR 33 06/09/2024    CREATININE 1.76 (H) 06/11/2024    CREATININE 1.83 (H) 06/10/2024    CREATININE 1.91 (H) 06/09/2024     Mildly Elevated from baseline, Continuing to improve  Baseline closer to Crt 1.2-1.4  S/p gentle IV hydration on admission  S/p 1x dose of IV Furosemide (6/8 and 6/9)  C/w Home diuretic dosing and monitor closely  Monitor while admitted

## 2024-06-11 NOTE — ASSESSMENT & PLAN NOTE
Patient hypoxic requiring 3L NC O2 on arrival to ED.  Not on oxygen at home.  Likely in setting of community-acquired pneumonia   Improving, currently on room air while at rest with SpO2 >91%  Plan for home O2 eval prior to discharge tomorrow

## 2024-06-11 NOTE — TELEPHONE ENCOUNTER
Appointment Change  Cancel, Reschedule, Change to Virtual      Who are you speaking with? Patient   If it is not the patient, is the caller listed on the communication consent form? Yes   Which provider is the appointment scheduled with? Dr. Phelps   When was the original appointment scheduled?    Please list date and time 9/19/24   At which location is the appointment scheduled to take place? [facility]   Was the appointment rescheduled?     Was the appointment changed from an in person visit to a virtual visit?    If so, please list the details of the change. 9/3/24   What is the reason for the appointment change? Wanted sooner appt

## 2024-06-11 NOTE — PLAN OF CARE
Problem: Potential for Falls  Goal: Patient will remain free of falls  Description: INTERVENTIONS:  - Educate patient/family on patient safety including physical limitations  - Instruct patient to call for assistance with activity   - Consult OT/PT to assist with strengthening/mobility   - Keep Call bell within reach  - Keep bed low and locked with side rails adjusted as appropriate  - Keep care items and personal belongings within reach  - Initiate and maintain comfort rounds  - Make Fall Risk Sign visible to staff  - Offer Toileting every  Hours, in advance of need  - Initiate/Maintain alarm  - Obtain necessary fall risk management equipment:   - Apply yellow socks and bracelet for high fall risk patients  - Consider moving patient to room near nurses station  Outcome: Progressing     Problem: PAIN - ADULT  Goal: Verbalizes/displays adequate comfort level or baseline comfort level  Description: Interventions:  - Encourage patient to monitor pain and request assistance  - Assess pain using appropriate pain scale  - Administer analgesics based on type and severity of pain and evaluate response  - Implement non-pharmacological measures as appropriate and evaluate response  - Consider cultural and social influences on pain and pain management  - Notify physician/advanced practitioner if interventions unsuccessful or patient reports new pain  Outcome: Progressing     Problem: INFECTION - ADULT  Goal: Absence or prevention of progression during hospitalization  Description: INTERVENTIONS:  - Assess and monitor for signs and symptoms of infection  - Monitor lab/diagnostic results  - Monitor all insertion sites, i.e. indwelling lines, tubes, and drains  - Monitor endotracheal if appropriate and nasal secretions for changes in amount and color  - [place] appropriate cooling/warming therapies per order  - Administer medications as ordered  - Instruct and encourage patient and family to use good hand hygiene technique  -  Identify and instruct in appropriate isolation precautions for identified infection/condition  Outcome: Progressing     Problem: SAFETY ADULT  Goal: Patient will remain free of falls  Description: INTERVENTIONS:  - Educate patient/family on patient safety including physical limitations  - Instruct patient to call for assistance with activity   - Consult OT/PT to assist with strengthening/mobility   - Keep Call bell within reach  - Keep bed low and locked with side rails adjusted as appropriate  - Keep care items and personal belongings within reach  - Initiate and maintain comfort rounds  - Make Fall Risk Sign visible to staff  - Offer Toileting every  Hours, in advance of need  - Initiate/Maintain alarm  - Obtain necessary fall risk management equipment:   - Apply yellow socks and bracelet for high fall risk patients  - Consider moving patient to room near nurses station  Outcome: Progressing

## 2024-06-11 NOTE — ASSESSMENT & PLAN NOTE
Lab Results   Component Value Date    HGBA1C 7.3 (H) 02/27/2024       Recent Labs     06/10/24  1104 06/10/24  1651 06/10/24  1953 06/11/24  0712   POCGLU 372* 212* 294* 201*       Blood Sugar Average: Last 72 hrs:  (P) 329.6187548599847154    Hold home glipizide and metformin while inpatient  Persistent hyperglycemia while inpatient, likely in setting of pneumonia and hospitalization  Continue Lantus 10U qHS, Humalog 5U TID AC  SSI coverage with Accu-Cheks ACHS, SSI algorithm recently increased  Check updated HgbA1c, given advanced age higher threshold for need for home insulin  Hypoglycemia protocol, diabetic diet

## 2024-06-11 NOTE — PROGRESS NOTES
[facility]  Progress Note  Name: Abelino Renee I  MRN: [id number]  Unit/Bed#: -01 I Date of Admission: 6/7/2024   Date of Service: 6/11/2024 I Hospital Day: 4    Assessment & Plan   * Community acquired pneumonia of left lung  Assessment & Plan  Patient presented with flulike symptoms, SOB and cough for the past week. Hx CLL with chronic leukocytosis.  CT chest showed evidence of multifocal pneumonia, mild mediastinal adenopathy likely reactive  Did not meet SIRS criteria, elevated WBC count secondary to CLL at baseline.  COVID/RSV/flu negative.  Legionella/strep pneumoniae urinary antigens negative.   BC x2: NG after 4 days. Procal: 2.49->1.05.   Received IV Zosyn/vancomycin in ED. S/p 2 doses azithromycin.  D5 IV Rocephin, switch to amoxicillin tomorrow to complete total 7-day antibiotic course    Acute respiratory failure with hypoxia (HCC)  Assessment & Plan  Patient hypoxic requiring 3L NC O2 on arrival to ED.  Not on oxygen at home.  Likely in setting of community-acquired pneumonia   Improving, currently on room air while at rest with SpO2 >91%  Plan for home O2 eval prior to discharge tomorrow    Type 2 diabetes mellitus with diabetic polyneuropathy, without long-term current use of insulin (HCC)  Assessment & Plan  Lab Results   Component Value Date    HGBA1C 7.3 (H) 02/27/2024       Recent Labs     06/10/24  1104 06/10/24  1651 06/10/24  1953 06/11/24  0712   POCGLU 372* 212* 294* 201*       Blood Sugar Average: Last 72 hrs:  (P) [id number]    Hold home glipizide and metformin while inpatient  Persistent hyperglycemia while inpatient, likely in setting of pneumonia and hospitalization  Continue Lantus 10U qHS, Humalog 5U TID AC  SSI coverage with Accu-Cheks ACHS, SSI algorithm recently increased  Check updated HgbA1c, given advanced age higher threshold for need for home insulin  Hypoglycemia protocol, diabetic diet    CKD (chronic kidney disease) stage 3, GFR  30-59 ml/min (formerly [facility])  Assessment & Plan  Lab Results   Component Value Date    EGFR 34 06/10/2024    EGFR 33 06/09/2024    EGFR 30 06/08/2024    CREATININE 1.83 (H) 06/10/2024    CREATININE 1.91 (H) 06/09/2024    CREATININE 2.02 (H) 06/08/2024     Creatinine 2.13 on admission. Baseline Cr closer to 1.2-1.4.  Likely in setting of mild dehydration with pneumonia  S/p gentle IVF hydration. S/p IV Lasix (6/8, 6/9).   Improving, Cr 1.76 today & clinically euvolemic  Continue home torsemide 5 mg daily, monitor BMP    Chronic lymphocytic leukemia (HCC)  Assessment & Plan  History of CLL, chronic elevation of WBCs ~ 20-29 per chart review  Improving leukocytosis, appears to be at baseline  Continue outpatient F/U with primary Heme/Onc specialist               VTE Pharmacologic Prophylaxis: VTE Score: 9 High Risk (Score >/= 5) - Pharmacological DVT Prophylaxis Ordered: enoxaparin (Lovenox). Sequential Compression Devices Ordered.    Mobility:   Basic Mobility Inpatient Raw Score: 24  JH-HLM Goal: 8: Walk 250 feet or more  JH-HLM Achieved: 7: Walk 25 feet or more  JH-HLM Goal NOT achieved. Continue with multidisciplinary rounding and encourage appropriate mobility to improve upon JH-HLM goals.    Patient Centered Rounds: I performed bedside rounds with nursing staff today.   Discussions with Specialists or Other Care Team Provider: Case management    Education and Discussions with Family / Patient: Patient declined call to .     Total Time Spent on Date of Encounter in care of patient: 45 mins. This time was spent on one or more of the following: performing physical exam; counseling and coordination of care; obtaining or reviewing history; documenting in the medical record; reviewing/ordering tests, medications or procedures; communicating with other healthcare professionals and discussing with patient's family/caregivers.    Current Length of Stay: 4 day(s)  Current Patient Status: Inpatient   Certification  Statement: The patient will continue to require additional inpatient hospital stay due to IV antibiotics, clinical improvement  Discharge Plan: Anticipate discharge tomorrow to home.    Code Status: Level 1 - Full Code    Subjective:   Patient is seen at bedside this a.m., reports improvement in SOB and symptoms overall.    Objective:     Vitals:   Temp (24hrs), Av.7 °F (36.5 °C), Min:97.6 °F (36.4 °C), Max:97.9 °F (36.6 °C)    Temp:  [97.6 °F (36.4 °C)-97.9 °F (36.6 °C)] 97.6 °F (36.4 °C)  HR:  [56-63] 63  Resp:  [16] 16  BP: (134-157)/(64-75) 157/75  SpO2:  [92 %-97 %] 93 %  Body mass index is 30.09 kg/m².     Input and Output Summary (last 24 hours):     Intake/Output Summary (Last 24 hours) at 2024 1225  Last data filed at 2024 0847  Gross per 24 hour   Intake --   Output 1775 ml   Net -1775 ml       Physical Exam:   Physical Exam  Constitutional:       General: He is not in acute distress.     Appearance: He is not ill-appearing, toxic-appearing or diaphoretic.   Cardiovascular:      Rate and Rhythm: Normal rate and regular rhythm.      Pulses: Normal pulses.      Heart sounds: Normal heart sounds.   Pulmonary:      Effort: Pulmonary effort is normal. No respiratory distress.      Breath sounds: Normal breath sounds.   Abdominal:      General: Bowel sounds are normal. There is no distension.      Palpations: Abdomen is soft.      Tenderness: There is no abdominal tenderness.   Musculoskeletal:         General: No swelling or tenderness.   Skin:     General: Skin is warm.   Neurological:      General: No focal deficit present.      Mental Status: He is alert.   Psychiatric:         Mood and Affect: Mood normal.         Behavior: Behavior normal.          Additional Data:     Labs:  Results from last 7 days   Lab Units 24  0501 24  0450 24  0832   WBC Thousand/uL 23.50*   < > 26.79*   HEMOGLOBIN g/dL 9.1*   < > 9.2*   HEMATOCRIT % 28.5*   < > 27.6*   PLATELETS Thousands/uL 264   <  > 204   BANDS PCT %  --   --  5   LYMPHO PCT %  --   --  57*   MONO PCT %  --   --  0*   EOS PCT %  --   --  0    < > = values in this interval not displayed.     Results from last 7 days   Lab Units 06/11/24  0501 06/10/24  0533   SODIUM mmol/L 138 136   POTASSIUM mmol/L 4.2 3.4*   CHLORIDE mmol/L 99 98   CO2 mmol/L 29 26   BUN mg/dL 36* 38*   CREATININE mg/dL 1.76* 1.83*   ANION GAP mmol/L 10 12   CALCIUM mg/dL 9.1 9.2   ALBUMIN g/dL  --  3.4*   TOTAL BILIRUBIN mg/dL  --  0.55   ALK PHOS U/L  --  100   ALT U/L  --  27   AST U/L  --  22   GLUCOSE RANDOM mg/dL 194* 228*         Results from last 7 days   Lab Units 06/11/24  0712 06/10/24  1953 06/10/24  1651 06/10/24  1104 06/10/24  0717 06/09/24  2122 06/09/24  1612 06/09/24  1137 06/09/24  0744 06/08/24  1927 06/08/24  1632 06/08/24  1211   POC GLUCOSE mg/dl 201* 294* 212* 372* 266* 393* 388* 396* 274* 395* 351* 364*         Results from last 7 days   Lab Units 06/10/24  0533 06/08/24  0450 06/07/24  0849 06/07/24  0832   LACTIC ACID mmol/L  --   --  1.5  --    PROCALCITONIN ng/ml 1.05* 2.32*  --  2.49*       Lines/Drains:  Invasive Devices       Peripheral Intravenous Line  Duration             Peripheral IV 06/07/24 Right;Ventral (anterior) Forearm 3 days                          Imaging: Reviewed radiology reports from this admission including: chest xray and chest CT scan    Recent Cultures (last 7 days):   Results from last 7 days   Lab Units 06/07/24  1124 06/07/24  0833 06/07/24  0830   BLOOD CULTURE   --  No Growth After 4 Days. No Growth After 4 Days.   LEGIONELLA URINARY ANTIGEN  Negative  --   --        Last 24 Hours Medication List:   Current Facility-Administered Medications   Medication Dose Route Frequency Provider Last Rate    acetaminophen  650 mg Oral Q6H PRN Claire N Hogan, PA-C      albuterol  2.5 mg Nebulization Q6H PRN Areli Rai MD      amLODIPine  10 mg Oral Daily Areli Rai MD      [START ON 6/12/2024] amoxicillin  1,000  mg Oral Q8H MICHAEL Claire Hogan PA-C      brimonidine tartrate  1 drop Both Eyes TID Areli Rai MD      carvedilol  6.25 mg Oral BID With Meals Areli Rai MD      cefTRIAXone  1,000 mg Intravenous Q24H Claire Hogan PA-C 1,000 mg (06/10/24 1622)    dextromethorphan-guaiFENesin  10 mL Oral Q4H PRN Areli Rai MD      Diclofenac Sodium  2 g Topical 4x Daily RADHA Hercules      docusate sodium  100 mg Oral BID RADHA Stearns      enoxaparin  40 mg Subcutaneous Daily Areli Rai MD      insulin glargine  10 Units Subcutaneous HS RADHA Stearns      insulin lispro  2-12 Units Subcutaneous TID AC RADHA Stearns      insulin lispro  2-12 Units Subcutaneous HS RADHA Stearns      insulin lispro  5 Units Subcutaneous TID With Meals RADHA Stearns      levalbuterol  1.25 mg Nebulization TID Areli Rai MD      levothyroxine  75 mcg Oral Daily Areli Rai MD      lidocaine  1 patch Topical Daily Claire Hogan PA-C      losartan  100 mg Oral Daily Areli Rai MD      polyethylene glycol  17 g Oral Daily PRN RADHA Stearns      pravastatin  40 mg Oral Daily With Dinner Areli Rai MD      senna  1 tablet Oral HS RADHA Stearns      timolol  1 drop Both Eyes Daily Areli Rai MD      torsemide  5 mg Oral Daily RADHA Hercules      triamcinolone   Topical BID RADHA Hercules          Today, Patient Was Seen By: Claire Hogan PA-C    **Please Note: This note may have been constructed using a voice recognition system.**

## 2024-06-12 ENCOUNTER — APPOINTMENT (INPATIENT)
Dept: ULTRASOUND IMAGING | Facility: HOSPITAL | Age: 79
DRG: 193 | End: 2024-06-12
Payer: MEDICARE

## 2024-06-12 LAB
ANION GAP SERPL CALCULATED.3IONS-SCNC: 10 MMOL/L (ref 4–13)
ANION GAP SERPL CALCULATED.3IONS-SCNC: 9 MMOL/L (ref 4–13)
BACTERIA BLD CULT: NORMAL
BACTERIA BLD CULT: NORMAL
BUN SERPL-MCNC: 34 MG/DL (ref 5–25)
BUN SERPL-MCNC: 34 MG/DL (ref 5–25)
CALCIUM SERPL-MCNC: 8.6 MG/DL (ref 8.4–10.2)
CALCIUM SERPL-MCNC: 8.7 MG/DL (ref 8.4–10.2)
CHLORIDE SERPL-SCNC: 100 MMOL/L (ref 96–108)
CHLORIDE SERPL-SCNC: 99 MMOL/L (ref 96–108)
CO2 SERPL-SCNC: 28 MMOL/L (ref 21–32)
CO2 SERPL-SCNC: 28 MMOL/L (ref 21–32)
CREAT SERPL-MCNC: 1.97 MG/DL (ref 0.6–1.3)
CREAT SERPL-MCNC: 2.12 MG/DL (ref 0.6–1.3)
ERYTHROCYTE [DISTWIDTH] IN BLOOD BY AUTOMATED COUNT: 13.9 % (ref 11.6–15.1)
GFR SERPL CREATININE-BSD FRML MDRD: 29 ML/MIN/1.73SQ M
GFR SERPL CREATININE-BSD FRML MDRD: 31 ML/MIN/1.73SQ M
GLUCOSE SERPL-MCNC: 169 MG/DL (ref 65–140)
GLUCOSE SERPL-MCNC: 182 MG/DL (ref 65–140)
GLUCOSE SERPL-MCNC: 233 MG/DL (ref 65–140)
GLUCOSE SERPL-MCNC: 247 MG/DL (ref 65–140)
GLUCOSE SERPL-MCNC: 247 MG/DL (ref 65–140)
GLUCOSE SERPL-MCNC: 299 MG/DL (ref 65–140)
HCT VFR BLD AUTO: 26.5 % (ref 36.5–49.3)
HGB BLD-MCNC: 8.7 G/DL (ref 12–17)
MCH RBC QN AUTO: 30.5 PG (ref 26.8–34.3)
MCHC RBC AUTO-ENTMCNC: 32.8 G/DL (ref 31.4–37.4)
MCV RBC AUTO: 93 FL (ref 82–98)
PLATELET # BLD AUTO: 218 THOUSANDS/UL (ref 149–390)
PMV BLD AUTO: 9.7 FL (ref 8.9–12.7)
POTASSIUM SERPL-SCNC: 3.5 MMOL/L (ref 3.5–5.3)
POTASSIUM SERPL-SCNC: 4.1 MMOL/L (ref 3.5–5.3)
PROCALCITONIN SERPL-MCNC: 0.5 NG/ML
RBC # BLD AUTO: 2.85 MILLION/UL (ref 3.88–5.62)
SODIUM SERPL-SCNC: 136 MMOL/L (ref 135–147)
SODIUM SERPL-SCNC: 138 MMOL/L (ref 135–147)
WBC # BLD AUTO: 19.78 THOUSAND/UL (ref 4.31–10.16)

## 2024-06-12 PROCEDURE — 80048 BASIC METABOLIC PNL TOTAL CA: CPT | Performed by: PHYSICIAN ASSISTANT

## 2024-06-12 PROCEDURE — 94640 AIRWAY INHALATION TREATMENT: CPT

## 2024-06-12 PROCEDURE — 99232 SBSQ HOSP IP/OBS MODERATE 35: CPT | Performed by: PHYSICIAN ASSISTANT

## 2024-06-12 PROCEDURE — 76705 ECHO EXAM OF ABDOMEN: CPT

## 2024-06-12 PROCEDURE — 76770 US EXAM ABDO BACK WALL COMP: CPT

## 2024-06-12 PROCEDURE — 94760 N-INVAS EAR/PLS OXIMETRY 1: CPT

## 2024-06-12 PROCEDURE — 84145 PROCALCITONIN (PCT): CPT | Performed by: PHYSICIAN ASSISTANT

## 2024-06-12 PROCEDURE — 94761 N-INVAS EAR/PLS OXIMETRY MLT: CPT

## 2024-06-12 PROCEDURE — 85027 COMPLETE CBC AUTOMATED: CPT | Performed by: PHYSICIAN ASSISTANT

## 2024-06-12 PROCEDURE — 82948 REAGENT STRIP/BLOOD GLUCOSE: CPT

## 2024-06-12 RX ORDER — INSULIN GLARGINE 100 [IU]/ML
12 INJECTION, SOLUTION SUBCUTANEOUS
Status: DISCONTINUED | OUTPATIENT
Start: 2024-06-12 | End: 2024-06-13 | Stop reason: HOSPADM

## 2024-06-12 RX ORDER — HEPARIN SODIUM 5000 [USP'U]/ML
5000 INJECTION, SOLUTION INTRAVENOUS; SUBCUTANEOUS EVERY 8 HOURS SCHEDULED
Status: DISCONTINUED | OUTPATIENT
Start: 2024-06-13 | End: 2024-06-13 | Stop reason: HOSPADM

## 2024-06-12 RX ORDER — POTASSIUM CHLORIDE 20 MEQ/1
40 TABLET, EXTENDED RELEASE ORAL ONCE
Status: COMPLETED | OUTPATIENT
Start: 2024-06-12 | End: 2024-06-12

## 2024-06-12 RX ORDER — INSULIN LISPRO 100 [IU]/ML
7 INJECTION, SOLUTION INTRAVENOUS; SUBCUTANEOUS
Status: DISCONTINUED | OUTPATIENT
Start: 2024-06-12 | End: 2024-06-13 | Stop reason: HOSPADM

## 2024-06-12 RX ORDER — AMOXICILLIN 250 MG/1
500 CAPSULE ORAL EVERY 12 HOURS SCHEDULED
Status: DISCONTINUED | OUTPATIENT
Start: 2024-06-13 | End: 2024-06-13

## 2024-06-12 RX ADMIN — ENOXAPARIN SODIUM 40 MG: 40 INJECTION SUBCUTANEOUS at 09:46

## 2024-06-12 RX ADMIN — DICLOFENAC SODIUM 2 G: 10 GEL TOPICAL at 12:13

## 2024-06-12 RX ADMIN — INSULIN LISPRO 4 UNITS: 100 INJECTION, SOLUTION INTRAVENOUS; SUBCUTANEOUS at 21:36

## 2024-06-12 RX ADMIN — TRIAMCINOLONE ACETONIDE: 1 CREAM TOPICAL at 09:55

## 2024-06-12 RX ADMIN — BRIMONIDINE TARTRATE 1 DROP: 2 SOLUTION/ DROPS OPHTHALMIC at 20:32

## 2024-06-12 RX ADMIN — LEVOTHYROXINE SODIUM 75 MCG: 75 TABLET ORAL at 09:45

## 2024-06-12 RX ADMIN — DICLOFENAC SODIUM 2 G: 10 GEL TOPICAL at 17:56

## 2024-06-12 RX ADMIN — BRIMONIDINE TARTRATE 1 DROP: 2 SOLUTION/ DROPS OPHTHALMIC at 09:42

## 2024-06-12 RX ADMIN — DICLOFENAC SODIUM 2 G: 10 GEL TOPICAL at 09:56

## 2024-06-12 RX ADMIN — AMOXICILLIN 1000 MG: 250 CAPSULE ORAL at 05:19

## 2024-06-12 RX ADMIN — LEVALBUTEROL HYDROCHLORIDE 1.25 MG: 1.25 SOLUTION RESPIRATORY (INHALATION) at 20:16

## 2024-06-12 RX ADMIN — INSULIN LISPRO 7 UNITS: 100 INJECTION, SOLUTION INTRAVENOUS; SUBCUTANEOUS at 17:54

## 2024-06-12 RX ADMIN — BRIMONIDINE TARTRATE 1 DROP: 2 SOLUTION/ DROPS OPHTHALMIC at 17:49

## 2024-06-12 RX ADMIN — DICLOFENAC SODIUM 2 G: 10 GEL TOPICAL at 21:40

## 2024-06-12 RX ADMIN — POTASSIUM CHLORIDE 40 MEQ: 1500 TABLET, EXTENDED RELEASE ORAL at 09:45

## 2024-06-12 RX ADMIN — TRIAMCINOLONE ACETONIDE: 1 CREAM TOPICAL at 17:56

## 2024-06-12 RX ADMIN — PRAVASTATIN SODIUM 40 MG: 40 TABLET ORAL at 17:48

## 2024-06-12 RX ADMIN — LEVALBUTEROL HYDROCHLORIDE 1.25 MG: 1.25 SOLUTION RESPIRATORY (INHALATION) at 08:45

## 2024-06-12 RX ADMIN — LEVALBUTEROL HYDROCHLORIDE 1.25 MG: 1.25 SOLUTION RESPIRATORY (INHALATION) at 14:22

## 2024-06-12 RX ADMIN — INSULIN LISPRO 4 UNITS: 100 INJECTION, SOLUTION INTRAVENOUS; SUBCUTANEOUS at 17:53

## 2024-06-12 RX ADMIN — INSULIN GLARGINE 12 UNITS: 100 INJECTION, SOLUTION SUBCUTANEOUS at 21:37

## 2024-06-12 RX ADMIN — CARVEDILOL 6.25 MG: 6.25 TABLET, FILM COATED ORAL at 17:48

## 2024-06-12 RX ADMIN — INSULIN LISPRO 5 UNITS: 100 INJECTION, SOLUTION INTRAVENOUS; SUBCUTANEOUS at 09:49

## 2024-06-12 RX ADMIN — CARVEDILOL 6.25 MG: 6.25 TABLET, FILM COATED ORAL at 09:45

## 2024-06-12 RX ADMIN — INSULIN LISPRO 4 UNITS: 100 INJECTION, SOLUTION INTRAVENOUS; SUBCUTANEOUS at 12:24

## 2024-06-12 RX ADMIN — TIMOLOL MALEATE 1 DROP: 5 SOLUTION/ DROPS OPHTHALMIC at 09:42

## 2024-06-12 RX ADMIN — SODIUM CHLORIDE 500 ML: 0.9 INJECTION, SOLUTION INTRAVENOUS at 09:56

## 2024-06-12 RX ADMIN — INSULIN LISPRO 2 UNITS: 100 INJECTION, SOLUTION INTRAVENOUS; SUBCUTANEOUS at 09:49

## 2024-06-12 RX ADMIN — AMLODIPINE BESYLATE 10 MG: 10 TABLET ORAL at 09:45

## 2024-06-12 RX ADMIN — INSULIN LISPRO 5 UNITS: 100 INJECTION, SOLUTION INTRAVENOUS; SUBCUTANEOUS at 12:24

## 2024-06-12 NOTE — PROGRESS NOTES
[facility]  Progress Note  Name: Abelino Renee I  MRN: [id number]  Unit/Bed#: -01 I Date of Admission: 6/7/2024   Date of Service: 6/12/2024 I Hospital Day: 5    Assessment & Plan   * Community acquired pneumonia of left lung  Assessment & Plan  Patient presented with flulike symptoms, SOB and cough for the past week. Hx CLL with chronic leukocytosis.  CT chest showed evidence of multifocal pneumonia, mild mediastinal adenopathy likely reactive  Did not meet SIRS criteria, elevated WBC count secondary to CLL at baseline.  COVID/RSV/flu negative.  Legionella/strep pneumoniae urinary antigens negative.   BC x2: NG after 5 days. Procal: 2.49->1.05->0.5.  Received IV Zosyn/vancomycin in ED. S/p 2 doses azithromycin.  Completed 5 days IV Rocephin, continue amoxicillin to complete total 7-day antibiotic course  Amoxicillin dose renal adjusted with worsening creatinine    Acute respiratory failure with hypoxia (HCC)  Assessment & Plan  Patient hypoxic requiring 3L NC O2 on arrival to ED. Not on oxygen at home.  Likely in setting of community-acquired pneumonia   Improving, currently on room air while at rest with SpO2 >91%  Home O2 eval: Does not qualify for oxygen, SpO2 >91% on room air.    CKD (chronic kidney disease) stage 3, GFR 30-59 ml/min ([facility])  Assessment & Plan  Lab Results   Component Value Date    EGFR 31 06/12/2024    EGFR 36 06/11/2024    EGFR 34 06/10/2024    CREATININE 1.97 (H) 06/12/2024    CREATININE 1.76 (H) 06/11/2024    CREATININE 1.83 (H) 06/10/2024     Creatinine 2.13 on admission. Baseline Cr closer to 1.2-1.5.  Initially thought to be due to dehydration with pneumonia   Consider possible diabetic nephropathy, ? mild volume overload  S/p gentle IVF hydration. S/p IV Lasix (6/8, 6/9).   -6lbs, -2.6 net output thus far. Clinically euvolemic.  Trialed 500 cc IVF bolus, Cr worse at 2.12  Check updated Echo, RUQ US to r/o underlying cirrhosis  Hold losartan, diuretics &  check BMP in a.m.    Type 2 diabetes mellitus with diabetic polyneuropathy, without long-term current use of insulin (HCC)  Assessment & Plan  Lab Results   Component Value Date    HGBA1C 8.5 (H) 06/11/2024       Recent Labs     06/11/24  1106 06/11/24  1622 06/11/24  1945 06/12/24  0802   POCGLU 340* 277* 263* 182*       Blood Sugar Average: Last 72 hrs:  (P) 296.0246173110208464    Hold home glipizide and metformin while inpatient  Persistent hyperglycemia while inpatient, likely in setting of pneumonia and hospitalization  Updated HgbA1c 8.5, given advanced age hold off on insulin upon discharge  Continue Lantus 10U qHS, Humalog 5U TID AC while inpatient  SSI coverage with Accu-Cheks ACHS  Hypoglycemia protocol, diabetic diet  May need to consider low-dose Lantus upon discharge    Chronic lymphocytic leukemia (HCC)  Assessment & Plan  History of CLL, chronic elevation of WBCs ~ 20-29 per chart review  Improving leukocytosis, appears to be at baseline  Continue outpatient F/U with primary Heme/Onc specialist               VTE Pharmacologic Prophylaxis: VTE Score: 9 High Risk (Score >/= 5) - Pharmacological DVT Prophylaxis Ordered: heparin. Sequential Compression Devices Ordered.    Mobility:   Basic Mobility Inpatient Raw Score: 24  JH-HLM Goal: 8: Walk 250 feet or more  JH-HLM Achieved: 8: Walk 250 feet ot more  JH-HLM Goal achieved. Continue to encourage appropriate mobility.    Patient Centered Rounds: I performed bedside rounds with nursing staff today.   Discussions with Specialists or Other Care Team Provider: Case management    Education and Discussions with Family / Patient: Updated  (wife) at bedside.    Total Time Spent on Date of Encounter in care of patient: 45 mins. This time was spent on one or more of the following: performing physical exam; counseling and coordination of care; obtaining or reviewing history; documenting in the medical record; reviewing/ordering tests, medications or  procedures; communicating with other healthcare professionals and discussing with patient's family/caregivers.    Current Length of Stay: 5 day(s)  Current Patient Status: Inpatient   Certification Statement: The patient will continue to require additional inpatient hospital stay due to RAINE, hyperglycemia  Discharge Plan: Anticipate discharge in 24-48 hrs to home.    Code Status: Level 1 - Full Code    Subjective:   Patient is seen at bedside this a.m. and again in afternoon, denies any acute complaints at this time. Patient agreeable to remain admitted for further evaluation of elevated creatinine.    Objective:     Vitals:   Temp (24hrs), Av.4 °F (36.9 °C), Min:98.2 °F (36.8 °C), Max:98.6 °F (37 °C)    Temp:  [98.2 °F (36.8 °C)-98.6 °F (37 °C)] 98.2 °F (36.8 °C)  HR:  [57-69] (P) 57  Resp:  [18-20] (P) 20  BP: (136-144)/(62-67) (P) 131/67  SpO2:  [93 %-98 %] (P) 95 %  Body mass index is 29.67 kg/m².     Input and Output Summary (last 24 hours):     Intake/Output Summary (Last 24 hours) at 2024 1458  Last data filed at 2024 0956  Gross per 24 hour   Intake 240 ml   Output 1075 ml   Net -835 ml       Physical Exam:   Physical Exam  Constitutional:       General: He is not in acute distress.     Appearance: He is not ill-appearing, toxic-appearing or diaphoretic.   Cardiovascular:      Rate and Rhythm: Normal rate and regular rhythm.      Pulses: Normal pulses.      Heart sounds: Normal heart sounds.   Pulmonary:      Effort: Pulmonary effort is normal. No respiratory distress.      Breath sounds: Normal breath sounds.   Abdominal:      General: Bowel sounds are normal. There is no distension.      Palpations: Abdomen is soft.      Tenderness: There is no abdominal tenderness.   Musculoskeletal:         General: No swelling or tenderness.   Skin:     General: Skin is warm.   Neurological:      General: No focal deficit present.      Mental Status: He is alert.   Psychiatric:         Mood and Affect:  Mood normal.         Behavior: Behavior normal.          Additional Data:     Labs:  Results from last 7 days   Lab Units 06/12/24  0442 06/08/24  0450 06/07/24  0832   WBC Thousand/uL 19.78*   < > 26.79*   HEMOGLOBIN g/dL 8.7*   < > 9.2*   HEMATOCRIT % 26.5*   < > 27.6*   PLATELETS Thousands/uL 218   < > 204   BANDS PCT %  --   --  5   LYMPHO PCT %  --   --  57*   MONO PCT %  --   --  0*   EOS PCT %  --   --  0    < > = values in this interval not displayed.     Results from last 7 days   Lab Units 06/12/24  1321 06/11/24  0501 06/10/24  0533   SODIUM mmol/L 136   < > 136   POTASSIUM mmol/L 4.1   < > 3.4*   CHLORIDE mmol/L 99   < > 98   CO2 mmol/L 28   < > 26   BUN mg/dL 34*   < > 38*   CREATININE mg/dL 2.12*   < > 1.83*   ANION GAP mmol/L 9   < > 12   CALCIUM mg/dL 8.7   < > 9.2   ALBUMIN g/dL  --   --  3.4*   TOTAL BILIRUBIN mg/dL  --   --  0.55   ALK PHOS U/L  --   --  100   ALT U/L  --   --  27   AST U/L  --   --  22   GLUCOSE RANDOM mg/dL 299*   < > 228*    < > = values in this interval not displayed.         Results from last 7 days   Lab Units 06/12/24  1116 06/12/24  0802 06/11/24  1945 06/11/24  1622 06/11/24  1106 06/11/24  0712 06/10/24  1953 06/10/24  1651 06/10/24  1104 06/10/24  0717 06/09/24  2122 06/09/24  1612   POC GLUCOSE mg/dl 233* 182* 263* 277* 340* 201* 294* 212* 372* 266* 393* 388*     Results from last 7 days   Lab Units 06/11/24  0501   HEMOGLOBIN A1C % 8.5*     Results from last 7 days   Lab Units 06/12/24  0442 06/10/24  0533 06/08/24  0450 06/07/24  0849 06/07/24  0832   LACTIC ACID mmol/L  --   --   --  1.5  --    PROCALCITONIN ng/ml 0.50* 1.05* 2.32*  --  2.49*       Lines/Drains:  Invasive Devices       Peripheral Intravenous Line  Duration             Peripheral IV 06/07/24 Right;Ventral (anterior) Forearm 5 days                          Imaging: No pertinent imaging reviewed.    Recent Cultures (last 7 days):   Results from last 7 days   Lab Units 06/07/24  1124 06/07/24  0833  06/07/24  0830   BLOOD CULTURE   --  No Growth After 5 Days. No Growth After 5 Days.   LEGIONELLA URINARY ANTIGEN  Negative  --   --        Last 24 Hours Medication List:   Current Facility-Administered Medications   Medication Dose Route Frequency Provider Last Rate    acetaminophen  650 mg Oral Q6H PRN Claire Hogan PA-C      albuterol  2.5 mg Nebulization Q6H PRN Areli Rai MD      amLODIPine  10 mg Oral Daily Areli Rai MD      [START ON 6/13/2024] amoxicillin  500 mg Oral Q12H MICHAEL Claire Hogan PA-C      brimonidine tartrate  1 drop Both Eyes TID Areli Rai MD      carvedilol  6.25 mg Oral BID With Meals Areli Rai MD      dextromethorphan-guaiFENesin  10 mL Oral Q4H PRN Areli Rai MD      Diclofenac Sodium  2 g Topical 4x Daily RADHA Hercules      docusate sodium  100 mg Oral BID RADHA Stearns      [START ON 6/13/2024] heparin (porcine)  5,000 Units Subcutaneous Q8H MICHAEL Claire Hogan PA-C      insulin glargine  12 Units Subcutaneous HS Claire Hogan PA-C      insulin lispro  2-12 Units Subcutaneous TID AC RADHA Stearns      insulin lispro  2-12 Units Subcutaneous HS RADHA Stearns      insulin lispro  7 Units Subcutaneous TID With Meals Claire Hogan PA-C      levalbuterol  1.25 mg Nebulization TID Areli Rai MD      levothyroxine  75 mcg Oral Daily Areli Rai MD      lidocaine  1 patch Topical Daily Claire Hogan PA-C      polyethylene glycol  17 g Oral Daily PRN RADHA Stearns      pravastatin  40 mg Oral Daily With Dinner Areli Rai MD      senna  1 tablet Oral HS RADHA Stearns      timolol  1 drop Both Eyes Daily Areli Rai MD      triamcinolone   Topical BID RADHA Hercules          Today, Patient Was Seen By: Claire Hogan PA-C    **Please Note: This note may have been constructed using a voice recognition system.**

## 2024-06-12 NOTE — RESPIRATORY THERAPY NOTE
Home Oxygen Qualifying Test     Patient name: Abelino Renee        : 1945   Date of Test:  2024  Diagnosis:    Home Oxygen Test:    **Medicare Guidelines require item(s) 1-5 on all ambulatory patients or 1 and 2 on non-ambulatory patients.    1. Baseline SPO2 on Room Air at rest 96 %   If <= 88% on Room Air add O2 via NC to obtain SpO2 >=88%. If LPM needed, document LPM  needed to reach =>88%    SPO2 during exertion on Room Air 91  %  During exertion monitor SPO2. If SPO2 increases >=89%, do not add supplemental oxygen    SPO2 on Oxygen at Rest N/A % at N/A LPM    SPO2 during exertion on Oxygen N/A % at N/A LPM    Test performed during exertion activity. Walking     []  Supplemental Home Oxygen is indicated.    [x]  Client does not qualify for home oxygen.    Respiratory Additional Notes- Found pt on RA on the recliner with a SPO2 of 96%. Pt was walked on RA. SPO2 fluctuated between 91-99%. Pt was able to walk about 150 feet without stopping. Walked pt back to the recliner. Pt does not qualify for home oxygen.     Brant Chambers, RT

## 2024-06-12 NOTE — ASSESSMENT & PLAN NOTE
Lab Results   Component Value Date    HGBA1C 8.5 (H) 06/11/2024       Recent Labs     06/11/24  1106 06/11/24  1622 06/11/24  1945 06/12/24  0802   POCGLU 340* 277* 263* 182*       Blood Sugar Average: Last 72 hrs:  (P) 296.0355120981766608    Hold home glipizide and metformin while inpatient  Persistent hyperglycemia while inpatient, likely in setting of pneumonia and hospitalization  Updated HgbA1c 8.5, given advanced age hold off on insulin upon discharge  Continue Lantus 10U qHS, Humalog 5U TID AC while inpatient  SSI coverage with Accu-Cheks ACHS  Hypoglycemia protocol, diabetic diet  May need to consider low-dose Lantus upon discharge

## 2024-06-12 NOTE — PLAN OF CARE
Problem: Potential for Falls  Goal: Patient will remain free of falls  Description: INTERVENTIONS:  - Educate patient/family on patient safety including physical limitations  - Instruct patient to call for assistance with activity   - Consult OT/PT to assist with strengthening/mobility   - Keep Call bell within reach  - Keep bed low and locked with side rails adjusted as appropriate  - Keep care items and personal belongings within reach  - Initiate and maintain comfort rounds  - Make Fall Risk Sign visible to staff  - Offer Toileting every 2 Hours, in advance of need  - Initiate/Maintain alarm  - Obtain necessary fall risk management equipment:   - Apply yellow socks and bracelet for high fall risk patients  - Consider moving patient to room near nurses station  Outcome: Progressing     Problem: PAIN - ADULT  Goal: Verbalizes/displays adequate comfort level or baseline comfort level  Description: Interventions:  - Encourage patient to monitor pain and request assistance  - Assess pain using appropriate pain scale  - Administer analgesics based on type and severity of pain and evaluate response  - Implement non-pharmacological measures as appropriate and evaluate response  - Consider cultural and social influences on pain and pain management  - Notify physician/advanced practitioner if interventions unsuccessful or patient reports new pain  Outcome: Progressing     Problem: INFECTION - ADULT  Goal: Absence or prevention of progression during hospitalization  Description: INTERVENTIONS:  - Assess and monitor for signs and symptoms of infection  - Monitor lab/diagnostic results  - Monitor all insertion sites, i.e. indwelling lines, tubes, and drains  - Monitor endotracheal if appropriate and nasal secretions for changes in amount and color  - [place] appropriate cooling/warming therapies per order  - Administer medications as ordered  - Instruct and encourage patient and family to use good hand hygiene  technique  - Identify and instruct in appropriate isolation precautions for identified infection/condition  Outcome: Progressing     Problem: SAFETY ADULT  Goal: Patient will remain free of falls  Description: INTERVENTIONS:  - Educate patient/family on patient safety including physical limitations  - Instruct patient to call for assistance with activity   - Consult OT/PT to assist with strengthening/mobility   - Keep Call bell within reach  - Keep bed low and locked with side rails adjusted as appropriate  - Keep care items and personal belongings within reach  - Initiate and maintain comfort rounds  - Make Fall Risk Sign visible to staff  - Offer Toileting every  Hours, in advance of need  - Initiate/Maintain alarm  - Obtain necessary fall risk management equipment:   - Apply yellow socks and bracelet for high fall risk patients  - Consider moving patient to room near nurses station  Outcome: Progressing  Goal: Maintain or return to baseline ADL function  Description: INTERVENTIONS:  -  Assess patient's ability to carry out ADLs; assess patient's baseline for ADL function and identify physical deficits which impact ability to perform ADLs (bathing, care of mouth/teeth, toileting, grooming, dressing, etc.)  - Assess/evaluate cause of self-care deficits   - Assess range of motion  - Assess patient's mobility; develop plan if impaired  - Assess patient's need for assistive devices and provide as appropriate  - Encourage maximum independence but intervene and supervise when necessary  - Involve family in performance of ADLs  - Assess for home care needs following discharge   - Consider OT consult to assist with ADL evaluation and planning for discharge  - Provide patient education as appropriate  Outcome: Progressing  Goal: Maintains/Returns to pre admission functional level  Description: INTERVENTIONS:  - Perform AM-PAC 6 Click Basic Mobility/ Daily Activity assessment daily.  - Set and communicate daily mobility  goal to care team and patient/family/caregiver.   - Collaborate with rehabilitation services on mobility goals if consulted  - Perform Range of Motion 4 times a day.  - Reposition patient every 2 hours.  - Dangle patient 3 times a day  - Stand patient 3 times a day  - Ambulate patient 3 times a day  - Out of bed to chair 3 times a day   - Out of bed for meals 3 times a day  - Out of bed for toileting  - Record patient progress and toleration of activity level   Outcome: Progressing     Problem: DISCHARGE PLANNING  Goal: Discharge to home or other facility with appropriate resources  Description: INTERVENTIONS:  - Identify barriers to discharge w/patient and caregiver  - Arrange for needed discharge resources and transportation as appropriate  - Identify discharge learning needs (meds, wound care, etc.)  - Arrange for interpretive services to assist at discharge as needed  - Refer to Case Management Department for coordinating discharge planning if the patient needs post-hospital services based on physician/advanced practitioner order or complex needs related to functional status, cognitive ability, or social support system  Outcome: Progressing     Problem: Knowledge Deficit  Goal: Patient/family/caregiver demonstrates understanding of disease process, treatment plan, medications, and discharge instructions  Description: Complete learning assessment and assess knowledge base.  Interventions:  - Provide teaching at level of understanding  - Provide teaching via preferred learning methods  Outcome: Progressing     Problem: RESPIRATORY - ADULT  Goal: Achieves optimal ventilation and oxygenation  Description: INTERVENTIONS:  - Assess for changes in respiratory status  - Assess for changes in mentation and behavior  - Position to facilitate oxygenation and minimize respiratory effort  - Oxygen administered by appropriate delivery if ordered  - Initiate smoking cessation education as indicated  - Encourage  broncho-pulmonary hygiene including cough, deep breathe, Incentive Spirometry  - Assess the need for suctioning and aspirate as needed  - Assess and instruct to report SOB or any respiratory difficulty  - Respiratory Therapy support as indicated  Outcome: Progressing

## 2024-06-12 NOTE — ASSESSMENT & PLAN NOTE
Patient hypoxic requiring 3L NC O2 on arrival to ED. Not on oxygen at home.  Likely in setting of community-acquired pneumonia   Improving, currently on room air while at rest with SpO2 >91%  Home O2 eval: Does not qualify for oxygen, SpO2 >91% on room air.

## 2024-06-12 NOTE — DISCHARGE SUMMARY
[facility]  Discharge- Abelino Renee 1945, 78 y.o. male MRN: [id number]  Unit/Bed#: -01 Encounter: [id number]  Primary Care Provider: Edel Wright MD   Date and time admitted to hospital: 6/7/2024  8:12 AM    * Community acquired pneumonia of left lung  Assessment & Plan  Patient presented with flulike symptoms, SOB and cough for the past week. Hx CLL with chronic leukocytosis.  CT chest showed evidence of multifocal pneumonia, mild mediastinal adenopathy likely reactive  Did not meet SIRS criteria, elevated WBC count secondary to CLL at baseline.  COVID/RSV/flu negative.  Legionella/strep pneumoniae urinary antigens negative.   BC x2: NG after 5 days. Procal: 2.49->1.05->0.5.  Received IV Zosyn/vancomycin in ED. S/p 2 doses azithromycin.  Completed 5 days IV Rocephin, continue amoxicillin to complete total 7-day antibiotic course    Acute respiratory failure with hypoxia (HCC)  Assessment & Plan  Patient hypoxic requiring 3L NC O2 on arrival to ED. Not on oxygen at home.  Likely in setting of community-acquired pneumonia   Improving, currently on room air while at rest with SpO2 >91%  Home O2 eval: Does not qualify for oxygen, SpO2 >91% on room air.    CKD (chronic kidney disease) stage 3, GFR 30-59 ml/min ([facility])  Assessment & Plan  Lab Results   Component Value Date    EGFR 31 06/12/2024    EGFR 36 06/11/2024    EGFR 34 06/10/2024    CREATININE 1.97 (H) 06/12/2024    CREATININE 1.76 (H) 06/11/2024    CREATININE 1.83 (H) 06/10/2024     Creatinine 2.13 on admission. Baseline Cr closer to 1.2-1.5.  Likely in setting of mild dehydration with pneumonia  S/p gentle IVF hydration. S/p IV Lasix (6/8, 6/9).   Cr 1.97 today, s/p 500 cc IVF bolus    Type 2 diabetes mellitus with diabetic polyneuropathy, without long-term current use of insulin ([facility])  Assessment & Plan  Lab Results   Component Value Date    HGBA1C 8.5 (H) 06/11/2024       Recent Labs     06/11/24  1106 06/11/24  1622  06/11/24 1945 06/12/24  0802   POCGLU 340* 277* 263* 182*       Blood Sugar Average: Last 72 hrs:  (P) [id number]    Hold home glipizide and metformin while inpatient  Persistent hyperglycemia while inpatient, likely in setting of pneumonia and hospitalization  Updated HgbA1c 8.5, given advanced age hold off on insulin upon discharge  Continue Lantus 10U qHS, Humalog 5U TID AC while inpatient  SSI coverage with Accu-Cheks ACHS  Hypoglycemia protocol, diabetic diet  Resume home oral diabetic medications, F/U with PCP to discuss need for further optimization    Chronic lymphocytic leukemia (HCC)  Assessment & Plan  History of CLL, chronic elevation of WBCs ~ 20-29 per chart review  Improving leukocytosis, appears to be at baseline  Continue outpatient F/U with primary Heme/Onc specialist      Medical Problems       Resolved Problems  Date Reviewed: 6/12/2024   None       Discharging Physician / Practitioner: Claire Hogan PA-C  PCP: Edel Wright MD  Admission Date:   Admission Orders (From admission, onward)       Ordered        06/07/24 1113  INPATIENT ADMISSION  Once                          Discharge Date: 06/12/24    Consultations During Hospital Stay:  ***    Procedures Performed:   ***    Significant Findings / Test Results:   ***    Incidental Findings:   ***   {SLIM Discharge Incidential Findings:04925}    Test Results Pending at Discharge (will require follow up):   ***     Outpatient Tests Requested:  ***    Complications:  ***    Reason for Admission: ***    Hospital Course:   Abelino Renee is a 78 y.o. male patient who originally presented to the hospital on 6/7/2024 due to ***    {Hospital Course:17864}    {Complete this smartphrase if the patient is an inpatient and being discharged earlier than 2 midnights. If this does not apply, please delete this line:50637}    Please see above list of diagnoses and related plan for additional information.     Condition at Discharge:  "{Condition:30585}    Discharge Day Visit / Exam:   Subjective:  ***  Vitals: Blood Pressure: 136/67 (06/12/24 0739)  Pulse: 69 (06/12/24 0739)  Temperature: 98.2 °F (36.8 °C) (06/12/24 0739)  Temp Source: Oral (06/12/24 0739)  Respirations: 18 (06/12/24 0739)  Height: 5' 6\" (167.6 cm) (06/07/24 1628)  Weight - Scale: 83.4 kg (183 lb 12.8 oz) (06/12/24 0534)  SpO2: 96 % (06/12/24 0845)  Exam:   Physical Exam ***    Discussion with Family: {Family Communication:07094}    Discharge instructions/Information to patient and family:   See after visit summary for information provided to patient and family.      Provisions for Follow-Up Care:  See after visit summary for information related to follow-up care and any pertinent home health orders.      Mobility at time of Discharge:   Basic Mobility Inpatient Raw Score: 24  JH-HLM Goal: 8: Walk 250 feet or more  JH-HLM Achieved: 8: Walk 250 feet ot more  {Mobility:38551}     Disposition:   {Disposition on Discharge:11568}    Planned Readmission: ***     Discharge Statement:  I spent *** minutes discharging the patient. This time was spent on the day of discharge. I had direct contact with the patient on the day of discharge. Greater than 50% of the total time was spent examining patient, answering all patient questions, arranging and discussing plan of care with patient as well as directly providing post-discharge instructions.  Additional time then spent on discharge activities.    Discharge Medications:  See after visit summary for reconciled discharge medications provided to patient and/or family.      **Please Note: This note may have been constructed using a voice recognition system**      "

## 2024-06-12 NOTE — ASSESSMENT & PLAN NOTE
Lab Results   Component Value Date    EGFR 31 06/12/2024    EGFR 36 06/11/2024    EGFR 34 06/10/2024    CREATININE 1.97 (H) 06/12/2024    CREATININE 1.76 (H) 06/11/2024    CREATININE 1.83 (H) 06/10/2024     Creatinine 2.13 on admission. Baseline Cr closer to 1.2-1.5.  Initially thought to be due to dehydration with pneumonia   Consider possible diabetic nephropathy, ? mild volume overload  S/p gentle IVF hydration. S/p IV Lasix (6/8, 6/9).   -6lbs, -2.6 net output thus far. Clinically euvolemic.  Trialed 500 cc IVF bolus, Cr worse at 2.12  Check updated Echo, RUQ US to r/o underlying cirrhosis  Hold losartan, diuretics & check BMP in a.m.

## 2024-06-12 NOTE — PLAN OF CARE
Problem: Potential for Falls  Goal: Patient will remain free of falls  Description: INTERVENTIONS:  - Educate patient/family on patient safety including physical limitations  - Instruct patient to call for assistance with activity   - Consult OT/PT to assist with strengthening/mobility   - Keep Call bell within reach  - Keep bed low and locked with side rails adjusted as appropriate  - Keep care items and personal belongings within reach  - Initiate and maintain comfort rounds  - Make Fall Risk Sign visible to staff  - Offer Toileting every 2 Hours, in advance of need  - Initiate/Maintain bed alarm  - Obtain necessary fall risk management equipment:   - Apply yellow socks and bracelet for high fall risk patients  - Consider moving patient to room near nurses station  Outcome: Progressing         Problem: PAIN - ADULT  Goal: Verbalizes/displays adequate comfort level or baseline comfort level  Description: Interventions:  - Encourage patient to monitor pain and request assistance  - Assess pain using appropriate pain scale  - Administer analgesics based on type and severity of pain and evaluate response  - Implement non-pharmacological measures as appropriate and evaluate response  - Consider cultural and social influences on pain and pain management  - Notify physician/advanced practitioner if interventions unsuccessful or patient reports new pain  Outcome: Progressing        Problem: INFECTION - ADULT  Goal: Absence or prevention of progression during hospitalization  Description: INTERVENTIONS:  - Assess and monitor for signs and symptoms of infection  - Monitor lab/diagnostic results  - Monitor all insertion sites, i.e. indwelling lines, tubes, and drains  - Monitor endotracheal if appropriate and nasal secretions for changes in amount and color  - [place] appropriate cooling/warming therapies per order  - Administer medications as ordered  - Instruct and encourage patient and family to use good hand hygiene  technique  - Identify and instruct in appropriate isolation precautions for identified infection/condition  Outcome: Progressing  Goal: Absence of fever/infection during neutropenic period  Description: INTERVENTIONS:  - Monitor WBC    Outcome: Progressing        Problem: SAFETY ADULT  Goal: Maintain or return to baseline ADL function  Description: INTERVENTIONS:  -  Assess patient's ability to carry out ADLs; assess patient's baseline for ADL function and identify physical deficits which impact ability to perform ADLs (bathing, care of mouth/teeth, toileting, grooming, dressing, etc.)  - Assess/evaluate cause of self-care deficits   - Assess range of motion  - Assess patient's mobility; develop plan if impaired  - Assess patient's need for assistive devices and provide as appropriate  - Encourage maximum independence but intervene and supervise when necessary  - Involve family in performance of ADLs  - Assess for home care needs following discharge   - Consider OT consult to assist with ADL evaluation and planning for discharge  - Provide patient education as appropriate  Outcome: Progressing           Problem: RESPIRATORY - ADULT  Goal: Achieves optimal ventilation and oxygenation  Description: INTERVENTIONS:  - Assess for changes in respiratory status  - Assess for changes in mentation and behavior  - Position to facilitate oxygenation and minimize respiratory effort  - Oxygen administered by appropriate delivery if ordered  - Initiate smoking cessation education as indicated  - Encourage broncho-pulmonary hygiene including cough, deep breathe, Incentive Spirometry  - Assess the need for suctioning and aspirate as needed  - Assess and instruct to report SOB or any respiratory difficulty  - Respiratory Therapy support as indicated  Outcome: Progressing         Problem: DISCHARGE PLANNING  Goal: Discharge to home or other facility with appropriate resources  Description: INTERVENTIONS:  - Identify barriers to  discharge w/patient and caregiver  - Arrange for needed discharge resources and transportation as appropriate  - Identify discharge learning needs (meds, wound care, etc.)  - Arrange for interpretive services to assist at discharge as needed  - Refer to Case Management Department for coordinating discharge planning if the patient needs post-hospital services based on physician/advanced practitioner order or complex needs related to functional status, cognitive ability, or social support system  Outcome: Progressing

## 2024-06-12 NOTE — ASSESSMENT & PLAN NOTE
Patient presented with flulike symptoms, SOB and cough for the past week. Hx CLL with chronic leukocytosis.  CT chest showed evidence of multifocal pneumonia, mild mediastinal adenopathy likely reactive  Did not meet SIRS criteria, elevated WBC count secondary to CLL at baseline.  COVID/RSV/flu negative.  Legionella/strep pneumoniae urinary antigens negative.   BC x2: NG after 5 days. Procal: 2.49->1.05->0.5.  Received IV Zosyn/vancomycin in ED. S/p 2 doses azithromycin.  Completed 5 days IV Rocephin, continue amoxicillin to complete total 7-day antibiotic course  Amoxicillin dose renal adjusted with worsening creatinine

## 2024-06-13 ENCOUNTER — APPOINTMENT (INPATIENT)
Dept: NON INVASIVE DIAGNOSTICS | Facility: HOSPITAL | Age: 79
DRG: 193 | End: 2024-06-13
Payer: MEDICARE

## 2024-06-13 VITALS
WEIGHT: 183 LBS | TEMPERATURE: 98.2 F | OXYGEN SATURATION: 93 % | HEART RATE: 71 BPM | DIASTOLIC BLOOD PRESSURE: 67 MMHG | HEIGHT: 66 IN | BODY MASS INDEX: 29.41 KG/M2 | SYSTOLIC BLOOD PRESSURE: 151 MMHG | RESPIRATION RATE: 16 BRPM

## 2024-06-13 LAB
ALBUMIN SERPL BCP-MCNC: 3.5 G/DL (ref 3.5–5)
ALP SERPL-CCNC: 101 U/L (ref 34–104)
ALT SERPL W P-5'-P-CCNC: 35 U/L (ref 7–52)
ANION GAP SERPL CALCULATED.3IONS-SCNC: 9 MMOL/L (ref 4–13)
AORTIC ROOT: 3.3 CM
APICAL FOUR CHAMBER EJECTION FRACTION: 67 %
ASCENDING AORTA: 3.2 CM
AST SERPL W P-5'-P-CCNC: 20 U/L (ref 13–39)
BILIRUB SERPL-MCNC: 0.53 MG/DL (ref 0.2–1)
BSA FOR ECHO PROCEDURE: 1.93 M2
BUN SERPL-MCNC: 30 MG/DL (ref 5–25)
CALCIUM SERPL-MCNC: 9.2 MG/DL (ref 8.4–10.2)
CHLORIDE SERPL-SCNC: 98 MMOL/L (ref 96–108)
CO2 SERPL-SCNC: 28 MMOL/L (ref 21–32)
CREAT SERPL-MCNC: 1.66 MG/DL (ref 0.6–1.3)
E WAVE DECELERATION TIME: 187 MS
E/A RATIO: 1.83
ERYTHROCYTE [DISTWIDTH] IN BLOOD BY AUTOMATED COUNT: 13.9 % (ref 11.6–15.1)
FRACTIONAL SHORTENING: 35 (ref 28–44)
GFR SERPL CREATININE-BSD FRML MDRD: 39 ML/MIN/1.73SQ M
GLUCOSE SERPL-MCNC: 150 MG/DL (ref 65–140)
GLUCOSE SERPL-MCNC: 173 MG/DL (ref 65–140)
GLUCOSE SERPL-MCNC: 250 MG/DL (ref 65–140)
GLUCOSE SERPL-MCNC: 282 MG/DL (ref 65–140)
HCT VFR BLD AUTO: 31.2 % (ref 36.5–49.3)
HGB BLD-MCNC: 10.1 G/DL (ref 12–17)
INTERVENTRICULAR SEPTUM IN DIASTOLE (PARASTERNAL SHORT AXIS VIEW): 1.3 CM
INTERVENTRICULAR SEPTUM: 1.3 CM (ref 0.6–1.1)
LAAS-AP2: 29.1 CM2
LAAS-AP4: 21.3 CM2
LEFT ATRIUM SIZE: 3.9 CM
LEFT ATRIUM VOLUME (MOD BIPLANE): 84 ML
LEFT ATRIUM VOLUME INDEX (MOD BIPLANE): 43.5 ML/M2
LEFT INTERNAL DIMENSION IN SYSTOLE: 2.8 CM (ref 2.1–4)
LEFT VENTRICULAR INTERNAL DIMENSION IN DIASTOLE: 4.3 CM (ref 3.5–6)
LEFT VENTRICULAR POSTERIOR WALL IN END DIASTOLE: 1.2 CM
LEFT VENTRICULAR STROKE VOLUME: 53 ML
LVSV (TEICH): 53 ML
MCH RBC QN AUTO: 30.5 PG (ref 26.8–34.3)
MCHC RBC AUTO-ENTMCNC: 32.4 G/DL (ref 31.4–37.4)
MCV RBC AUTO: 94 FL (ref 82–98)
MV PEAK A VEL: 0.6 M/S
MV PEAK E VEL: 110 CM/S
PLATELET # BLD AUTO: 237 THOUSANDS/UL (ref 149–390)
PMV BLD AUTO: 9.3 FL (ref 8.9–12.7)
POTASSIUM SERPL-SCNC: 4.1 MMOL/L (ref 3.5–5.3)
PROT SERPL-MCNC: 7.8 G/DL (ref 6.4–8.4)
RBC # BLD AUTO: 3.31 MILLION/UL (ref 3.88–5.62)
RIGHT ATRIAL 2D VOLUME: 37 ML
RIGHT ATRIUM AREA SYSTOLE A4C: 15.4 CM2
RIGHT VENTRICLE ID DIMENSION: 4.8 CM
SL CV LEFT ATRIUM LENGTH A2C: 5.9 CM
SL CV LV EF: 60
SL CV PED ECHO LEFT VENTRICLE DIASTOLIC VOLUME (MOD BIPLANE) 2D: 83 ML
SL CV PED ECHO LEFT VENTRICLE SYSTOLIC VOLUME (MOD BIPLANE) 2D: 29 ML
SODIUM SERPL-SCNC: 135 MMOL/L (ref 135–147)
TR MAX PG: 51 MMHG
TR PEAK VELOCITY: 3.6 M/S
TRICUSPID ANNULAR PLANE SYSTOLIC EXCURSION: 3.1 CM
TRICUSPID VALVE PEAK REGURGITATION VELOCITY: 3.57 M/S
WBC # BLD AUTO: 22.94 THOUSAND/UL (ref 4.31–10.16)

## 2024-06-13 PROCEDURE — 82948 REAGENT STRIP/BLOOD GLUCOSE: CPT

## 2024-06-13 PROCEDURE — 93306 TTE W/DOPPLER COMPLETE: CPT

## 2024-06-13 PROCEDURE — 99239 HOSP IP/OBS DSCHRG MGMT >30: CPT | Performed by: STUDENT IN AN ORGANIZED HEALTH CARE EDUCATION/TRAINING PROGRAM

## 2024-06-13 PROCEDURE — 93306 TTE W/DOPPLER COMPLETE: CPT | Performed by: INTERNAL MEDICINE

## 2024-06-13 PROCEDURE — 85027 COMPLETE CBC AUTOMATED: CPT | Performed by: PHYSICIAN ASSISTANT

## 2024-06-13 PROCEDURE — 80053 COMPREHEN METABOLIC PANEL: CPT | Performed by: PHYSICIAN ASSISTANT

## 2024-06-13 RX ORDER — AMOXICILLIN 500 MG/1
500 CAPSULE ORAL ONCE
Start: 2024-06-13 | End: 2024-06-13

## 2024-06-13 RX ORDER — TORSEMIDE 10 MG/1
5 TABLET ORAL
Status: DISCONTINUED | OUTPATIENT
Start: 2024-06-13 | End: 2024-06-13 | Stop reason: HOSPADM

## 2024-06-13 RX ORDER — TORSEMIDE 5 MG/1
2.5 TABLET ORAL DAILY
Start: 2024-06-13

## 2024-06-13 RX ORDER — AMOXICILLIN 250 MG/1
500 CAPSULE ORAL ONCE
Status: COMPLETED | OUTPATIENT
Start: 2024-06-13 | End: 2024-06-13

## 2024-06-13 RX ORDER — ACETAMINOPHEN 325 MG/1
650 TABLET ORAL EVERY 6 HOURS PRN
Start: 2024-06-13

## 2024-06-13 RX ADMIN — TIMOLOL MALEATE 1 DROP: 5 SOLUTION/ DROPS OPHTHALMIC at 09:06

## 2024-06-13 RX ADMIN — BRIMONIDINE TARTRATE 1 DROP: 2 SOLUTION/ DROPS OPHTHALMIC at 09:06

## 2024-06-13 RX ADMIN — HEPARIN SODIUM 5000 UNITS: 5000 INJECTION, SOLUTION INTRAVENOUS; SUBCUTANEOUS at 06:21

## 2024-06-13 RX ADMIN — AMLODIPINE BESYLATE 10 MG: 10 TABLET ORAL at 09:02

## 2024-06-13 RX ADMIN — DICLOFENAC SODIUM 2 G: 10 GEL TOPICAL at 12:53

## 2024-06-13 RX ADMIN — DICLOFENAC SODIUM 2 G: 10 GEL TOPICAL at 09:07

## 2024-06-13 RX ADMIN — AMOXICILLIN 500 MG: 250 CAPSULE ORAL at 09:02

## 2024-06-13 RX ADMIN — INSULIN LISPRO 7 UNITS: 100 INJECTION, SOLUTION INTRAVENOUS; SUBCUTANEOUS at 09:08

## 2024-06-13 RX ADMIN — AMOXICILLIN 500 MG: 250 CAPSULE ORAL at 17:27

## 2024-06-13 RX ADMIN — CARVEDILOL 6.25 MG: 6.25 TABLET, FILM COATED ORAL at 09:03

## 2024-06-13 RX ADMIN — INSULIN LISPRO 7 UNITS: 100 INJECTION, SOLUTION INTRAVENOUS; SUBCUTANEOUS at 12:48

## 2024-06-13 RX ADMIN — LEVOTHYROXINE SODIUM 75 MCG: 75 TABLET ORAL at 09:02

## 2024-06-13 RX ADMIN — INSULIN LISPRO 2 UNITS: 100 INJECTION, SOLUTION INTRAVENOUS; SUBCUTANEOUS at 12:48

## 2024-06-13 RX ADMIN — INSULIN LISPRO 6 UNITS: 100 INJECTION, SOLUTION INTRAVENOUS; SUBCUTANEOUS at 09:08

## 2024-06-13 RX ADMIN — TORSEMIDE 5 MG: 10 TABLET ORAL at 12:48

## 2024-06-13 RX ADMIN — TRIAMCINOLONE ACETONIDE: 1 CREAM TOPICAL at 09:06

## 2024-06-13 NOTE — PLAN OF CARE
Problem: Potential for Falls  Goal: Patient will remain free of falls  Description: INTERVENTIONS:  - Educate patient/family on patient safety including physical limitations  - Instruct patient to call for assistance with activity   - Consult OT/PT to assist with strengthening/mobility   - Keep Call bell within reach  - Keep bed low and locked with side rails adjusted as appropriate  - Keep care items and personal belongings within reach  - Initiate and maintain comfort rounds  - Make Fall Risk Sign visible to staff  - Offer Toileting every 2 Hours, in advance of need  - Initiate/Maintain alarm  - Obtain necessary fall risk management equipment:   - Apply yellow socks and bracelet for high fall risk patients  - Consider moving patient to room near nurses station  Outcome: Progressing     Problem: PAIN - ADULT  Goal: Verbalizes/displays adequate comfort level or baseline comfort level  Description: Interventions:  - Encourage patient to monitor pain and request assistance  - Assess pain using appropriate pain scale  - Administer analgesics based on type and severity of pain and evaluate response  - Implement non-pharmacological measures as appropriate and evaluate response  - Consider cultural and social influences on pain and pain management  - Notify physician/advanced practitioner if interventions unsuccessful or patient reports new pain  Outcome: Progressing     Problem: INFECTION - ADULT  Goal: Absence or prevention of progression during hospitalization  Description: INTERVENTIONS:  - Assess and monitor for signs and symptoms of infection  - Monitor lab/diagnostic results  - Monitor all insertion sites, i.e. indwelling lines, tubes, and drains  - Monitor endotracheal if appropriate and nasal secretions for changes in amount and color  - [place] appropriate cooling/warming therapies per order  - Administer medications as ordered  - Instruct and encourage patient and family to use good hand hygiene  technique  - Identify and instruct in appropriate isolation precautions for identified infection/condition  Outcome: Progressing     Problem: SAFETY ADULT  Goal: Patient will remain free of falls  Description: INTERVENTIONS:  - Educate patient/family on patient safety including physical limitations  - Instruct patient to call for assistance with activity   - Consult OT/PT to assist with strengthening/mobility   - Keep Call bell within reach  - Keep bed low and locked with side rails adjusted as appropriate  - Keep care items and personal belongings within reach  - Initiate and maintain comfort rounds  - Make Fall Risk Sign visible to staff  - Offer Toileting every 2 Hours, in advance of need  - Initiate/Maintain alarm  - Obtain necessary fall risk management equipment:   - Apply yellow socks and bracelet for high fall risk patients  - Consider moving patient to room near nurses station  Outcome: Progressing  Goal: Maintain or return to baseline ADL function  Description: INTERVENTIONS:  -  Assess patient's ability to carry out ADLs; assess patient's baseline for ADL function and identify physical deficits which impact ability to perform ADLs (bathing, care of mouth/teeth, toileting, grooming, dressing, etc.)  - Assess/evaluate cause of self-care deficits   - Assess range of motion  - Assess patient's mobility; develop plan if impaired  - Assess patient's need for assistive devices and provide as appropriate  - Encourage maximum independence but intervene and supervise when necessary  - Involve family in performance of ADLs  - Assess for home care needs following discharge   - Consider OT consult to assist with ADL evaluation and planning for discharge  - Provide patient education as appropriate  Outcome: Progressing  Goal: Maintains/Returns to pre admission functional level  Description: INTERVENTIONS:  - Perform AM-PAC 6 Click Basic Mobility/ Daily Activity assessment daily.  - Set and communicate daily mobility  goal to care team and patient/family/caregiver.   - Collaborate with rehabilitation services on mobility goals if consulted  - Perform Range of Motion 4 times a day.  - Reposition patient every 2 hours.  - Dangle patient 3 times a day  - Stand patient 3 times a day  - Ambulate patient 3 times a day  - Out of bed to chair 3 times a day   - Out of bed for meals 3 times a day  - Out of bed for toileting  - Record patient progress and toleration of activity level   Outcome: Progressing     Problem: DISCHARGE PLANNING  Goal: Discharge to home or other facility with appropriate resources  Description: INTERVENTIONS:  - Identify barriers to discharge w/patient and caregiver  - Arrange for needed discharge resources and transportation as appropriate  - Identify discharge learning needs (meds, wound care, etc.)  - Arrange for interpretive services to assist at discharge as needed  - Refer to Case Management Department for coordinating discharge planning if the patient needs post-hospital services based on physician/advanced practitioner order or complex needs related to functional status, cognitive ability, or social support system  Outcome: Progressing     Problem: Knowledge Deficit  Goal: Patient/family/caregiver demonstrates understanding of disease process, treatment plan, medications, and discharge instructions  Description: Complete learning assessment and assess knowledge base.  Interventions:  - Provide teaching at level of understanding  - Provide teaching via preferred learning methods  Outcome: Progressing     Problem: RESPIRATORY - ADULT  Goal: Achieves optimal ventilation and oxygenation  Description: INTERVENTIONS:  - Assess for changes in respiratory status  - Assess for changes in mentation and behavior  - Position to facilitate oxygenation and minimize respiratory effort  - Oxygen administered by appropriate delivery if ordered  - Initiate smoking cessation education as indicated  - Encourage  broncho-pulmonary hygiene including cough, deep breathe, Incentive Spirometry  - Assess the need for suctioning and aspirate as needed  - Assess and instruct to report SOB or any respiratory difficulty  - Respiratory Therapy support as indicated  Outcome: Progressing

## 2024-06-13 NOTE — DISCHARGE SUMMARY
[facility]  Discharge- Abelino Renee 1945, 78 y.o. male MRN: [id number]  Unit/Bed#: [facility]-01 Encounter: [id number]  Primary Care Provider: Edel Wright MD   Date and time admitted to hospital: 6/7/2024  8:12 AM    Acute respiratory failure with hypoxia (HCC)  Assessment & Plan  Patient hypoxic requiring 3L NC O2 on arrival to ED. Not on oxygen at home.  Likely in setting of community-acquired pneumonia   Improving, currently on room air while at rest with SpO2 >91%  Home O2 eval: Does not qualify for oxygen, SpO2 >91% on room air.    Type 2 diabetes mellitus with diabetic polyneuropathy, without long-term current use of insulin ([facility])  Assessment & Plan  Lab Results   Component Value Date    HGBA1C 8.5 (H) 06/11/2024       Recent Labs     06/12/24  2103 06/13/24  0707 06/13/24  1120 06/13/24  1551   POCGLU 247* 282* 173* 150*       Blood Sugar Average: Last 72 hrs:  (P) 249.1024227926486963    Hold home glipizide and metformin while inpatient  Persistent hyperglycemia while inpatient, likely in setting of pneumonia and hospitalization  Updated HgbA1c 8.5, given advanced age hold off on insulin upon discharge  Continue Lantus 10U qHS, Humalog 5U TID AC while inpatient  SSI coverage with Accu-Cheks ACHS  Hypoglycemia protocol, diabetic diet  May need to consider low-dose Lantus upon discharge    CKD (chronic kidney disease) stage 3, GFR 30-59 ml/min ([facility])  Assessment & Plan  Lab Results   Component Value Date    EGFR 39 06/13/2024    EGFR 29 06/12/2024    EGFR 31 06/12/2024    CREATININE 1.66 (H) 06/13/2024    CREATININE 2.12 (H) 06/12/2024    CREATININE 1.97 (H) 06/12/2024     Creatinine 2.13 on admission. Baseline Cr closer to 1.2-1.5.  Initially thought to be due to dehydration with pneumonia   Consider possible diabetic nephropathy, ? mild volume overload  S/p gentle IVF hydration. S/p IV Lasix (6/8, 6/9).   -6lbs, -2.6 net output thus far. Clinically euvolemic.  Trialed 500 cc  IVF bolus, Cr worse at 2.12  Check updated Echo, RUQ US to r/o underlying cirrhosis  Hold losartan, diuretics & check BMP in a.m.    Chronic lymphocytic leukemia (HCC)  Assessment & Plan  History of CLL, chronic elevation of WBCs ~ 20-29 per chart review  Improving leukocytosis, appears to be at baseline  Continue outpatient F/U with primary Heme/Onc specialist    * Community acquired pneumonia of left lung  Assessment & Plan  Patient presented with flulike symptoms, SOB and cough for the past week. Hx CLL with chronic leukocytosis.  CT chest showed evidence of multifocal pneumonia, mild mediastinal adenopathy likely reactive  Did not meet SIRS criteria, elevated WBC count secondary to CLL at baseline.  COVID/RSV/flu negative.  Legionella/strep pneumoniae urinary antigens negative.   BC x2: NG after 5 days. Procal: 2.49->1.05->0.5.  Received IV Zosyn/vancomycin in ED. S/p 2 doses azithromycin.  Completed 5 days IV Rocephin, continue amoxicillin to complete total 7-day antibiotic course  Amoxicillin dose renal adjusted with worsening creatinine    Medical Problems       Resolved Problems  Date Reviewed: 6/13/2024   None       Discharging Physician / Practitioner: Melchor Ware  PCP: Edel Wright MD  Admission Date:   Admission Orders (From admission, onward)       Ordered        06/07/24 1113  INPATIENT ADMISSION  Once                          Discharge Date: 06/21/24    Consultations During Hospital Stay:  None    Procedures Performed:   None    Significant Findings / Test Results:   Cre 1.66  Echo LVEF 60% (Result paraphrased by myself, see chart for full details)  US Renal- No hydronephrosis or perinephric collections  US normal sonographic appearance of the liver  CT Chest-1. Multifocal pneumonia (greatest in the left upper lobe) as detailed above. Follow-up CT chest in 8 weeks recommended to assure resolution. Mild mediastinal adenopathy, presumably reactive. Mild septal thickening and groundglass  "opacity most notably in the lower lobes may represent an element of superimposed interstitial edema.    Incidental Findings:   None     Test Results Pending at Discharge (will require follow up):   N/A     Outpatient Tests Requested:  Outpatient BMP in 72 hours    Complications:  None    Reason for Admission: Shortness of breath    Hospital Course:   Abelino Renee is a 78 y.o. male patient who originally presented to the hospital on 6/7/2024 due to shortness of breath and was found to have pneumonia.  He was admitted and started on IV antibiotics.  CT of the chest showed question interstitial edema, so patient received additional IV antibiotics which may have caused a little bit of bump in creatinine.  Does not appear to be consistent with RAINE, but patient was monitored.  He was given a diuretic break and had improvement in his renal function.  Ultimately he was discharged with plans to get a follow-up BMP in the outpatient setting.    Please see above list of diagnoses and related plan for additional information.     Condition at Discharge: good    Discharge Day Visit / Exam:   Subjective:  No acute distress or new complaints  Vitals: Blood Pressure: 151/67 (06/13/24 1215)  Pulse: 71 (06/13/24 1215)  Temperature: 98.2 °F (36.8 °C) (06/13/24 0717)  Temp Source: Oral (06/13/24 0717)  Respirations: 16 (06/13/24 0717)  Height: 5' 6\" (167.6 cm) (06/13/24 1215)  Weight - Scale: 83 kg (183 lb) (06/13/24 1215)  SpO2: 93 % (06/13/24 0717)  Exam:   Physical Exam  Vitals and nursing note reviewed.   Constitutional:       General: He is not in acute distress.     Appearance: He is well-developed. He is not toxic-appearing or diaphoretic.   HENT:      Head: Normocephalic and atraumatic.      Mouth/Throat:      Mouth: Mucous membranes are moist.   Eyes:      General: No scleral icterus.     Extraocular Movements: Extraocular movements intact.      Conjunctiva/sclera: Conjunctivae normal.   Cardiovascular:      Rate and " Rhythm: Normal rate and regular rhythm.      Pulses: Normal pulses.      Heart sounds: No murmur heard.     No friction rub. No gallop.   Pulmonary:      Effort: Pulmonary effort is normal. No respiratory distress.      Breath sounds: Normal breath sounds. No wheezing, rhonchi or rales.   Abdominal:      General: Abdomen is flat. Bowel sounds are normal. There is no distension.      Palpations: Abdomen is soft. There is no mass.      Tenderness: There is no abdominal tenderness. There is no guarding.   Musculoskeletal:         General: No swelling.      Cervical back: Neck supple.      Right lower leg: No edema.      Left lower leg: No edema.   Lymphadenopathy:      Cervical: No cervical adenopathy.   Skin:     General: Skin is warm and dry.      Capillary Refill: Capillary refill takes less than 2 seconds.      Coloration: Skin is not jaundiced.      Findings: No rash.   Neurological:      General: No focal deficit present.      Mental Status: He is alert and oriented to person, place, and time.      Sensory: No sensory deficit.      Motor: No weakness.   Psychiatric:         Mood and Affect: Mood normal.          Discussion with Family: Updated  (wife) at bedside.    Discharge instructions/Information to patient and family:   See after visit summary for information provided to patient and family.      Provisions for Follow-Up Care:  See after visit summary for information related to follow-up care and any pertinent home health orders.      Mobility at time of Discharge:   Basic Mobility Inpatient Raw Score: 24  JH-HLM Goal: 8: Walk 250 feet or more  JH-HLM Achieved: 7: Walk 25 feet or more  HLM Goal NOT achieved. Continue to encourage mobility in post discharge setting.     Disposition:   Home    Planned Readmission: No     Discharge Statement:  I spent 45 minutes discharging the patient. This time was spent on the day of discharge. I had direct contact with the patient on the day of discharge.  Greater than 50% of the total time was spent examining patient, answering all patient questions, arranging and discussing plan of care with patient as well as directly providing post-discharge instructions.  Additional time then spent on discharge activities.    Discharge Medications:  See after visit summary for reconciled discharge medications provided to patient and/or family.      **Please Note: This note may have been constructed using a voice recognition system**

## 2024-06-13 NOTE — CASE MANAGEMENT
Case Management Discharge Planning Note    Patient name Abelino Renee  Location /-01 MRN [id number]  : 1945 Date 2024       Current Admission Date: 2024  Current Admission Diagnosis:Community acquired pneumonia of left lung   Patient Active Problem List    Diagnosis Date Noted Date Diagnosed    Acute respiratory failure with hypoxia (HCC) 2024     Community acquired pneumonia of left lung 2024     Acute congestive heart failure, unspecified heart failure type (HCC) 2024     Diabetic nephropathy associated with type 2 diabetes mellitus ([facility]) 2021     Hypothyroidism 01/15/2021     Elevated serum protein level 2021     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     Status post transmetatarsal amputation of left foot ([facility]) 2019     Benign essential HTN 2018     CKD (chronic kidney disease) stage 3, GFR 30-59 ml/min ([facility]) 2018     Arthritis 2016     Hx of malignant carcinoid tumor of large intestine 2016     H/O Clostridium difficile infection 2015     Cardiomyopathy ([facility]) 2015     Chronic lymphocytic leukemia ([facility]) 2013     Type 2 diabetes mellitus with diabetic polyneuropathy, without long-term current use of insulin ([facility]) 2012       LOS (days): 6  Geometric Mean LOS (GMLOS) (days): 4.1  Days to GMLOS:-2.1     OBJECTIVE:  Risk of Unplanned Readmission Score: 18.99         Current admission status: Inpatient   Preferred Pharmacy:   [facility] [address] [place] [id number]  Phone: [phone] Fax: [phone]    [facility] - [place], [place] - [address]  [address] [facility] [address]  Phone: [phone] Fax: [phone]    Primary Care Provider: Edel Wright MD    Primary Insurance: MEDICARE  Secondary Insurance: COMMERCIAL MISCELLANEOUS    DISCHARGE DETAILS:       IMM Given (Date)::  06/13/24  IMM Given to:: Patient   IMM reviewed with patient, patient agrees with discharge determination. Original given to the patient and a copy was placed in the scan bin.

## 2024-06-13 NOTE — DISCHARGE INSTR - AVS FIRST PAGE
You may resume your torsemide tomorrow at your normal dose  Your blood pressure has been reasonable off of losartan, you may hold it now and restart on Monday.  Check your BP and keep a log of it.  If your systolic pressure (top number) is persistently over 180 or below 100, please call your PCP  If your symptoms return or worsen, please call your PCP or return to the ED  BMP ordered for 6/17  Keep a log of your sugars to to your PCP follow-up

## 2024-06-13 NOTE — ASSESSMENT & PLAN NOTE
Lab Results   Component Value Date    HGBA1C 8.5 (H) 06/11/2024       Recent Labs     06/12/24  2103 06/13/24  0707 06/13/24  1120 06/13/24  1551   POCGLU 247* 282* 173* 150*       Blood Sugar Average: Last 72 hrs:  (P) 249.9622889429117185    Hold home glipizide and metformin while inpatient  Persistent hyperglycemia while inpatient, likely in setting of pneumonia and hospitalization  Updated HgbA1c 8.5, given advanced age hold off on insulin upon discharge  Continue Lantus 10U qHS, Humalog 5U TID AC while inpatient  SSI coverage with Accu-Cheks ACHS  Hypoglycemia protocol, diabetic diet  May need to consider low-dose Lantus upon discharge

## 2024-06-13 NOTE — ASSESSMENT & PLAN NOTE
Lab Results   Component Value Date    EGFR 39 06/13/2024    EGFR 29 06/12/2024    EGFR 31 06/12/2024    CREATININE 1.66 (H) 06/13/2024    CREATININE 2.12 (H) 06/12/2024    CREATININE 1.97 (H) 06/12/2024     Creatinine 2.13 on admission. Baseline Cr closer to 1.2-1.5.  Initially thought to be due to dehydration with pneumonia   Consider possible diabetic nephropathy, ? mild volume overload  S/p gentle IVF hydration. S/p IV Lasix (6/8, 6/9).   -6lbs, -2.6 net output thus far. Clinically euvolemic.  Trialed 500 cc IVF bolus, Cr worse at 2.12  Check updated Echo, RUQ US to r/o underlying cirrhosis  Hold losartan, diuretics & check BMP in a.m.

## 2024-06-14 ENCOUNTER — TRANSITIONAL CARE MANAGEMENT (OUTPATIENT)
Dept: FAMILY MEDICINE CLINIC | Facility: CLINIC | Age: 79
End: 2024-06-14

## 2024-06-18 ENCOUNTER — APPOINTMENT (OUTPATIENT)
Dept: LAB | Facility: HOSPITAL | Age: 79
End: 2024-06-18
Payer: MEDICARE

## 2024-06-18 DIAGNOSIS — N17.9 AKI (ACUTE KIDNEY INJURY) (HCC): ICD-10-CM

## 2024-06-18 LAB
ANION GAP SERPL CALCULATED.3IONS-SCNC: 8 MMOL/L (ref 4–13)
BUN SERPL-MCNC: 19 MG/DL (ref 5–25)
CALCIUM SERPL-MCNC: 9.5 MG/DL (ref 8.4–10.2)
CHLORIDE SERPL-SCNC: 104 MMOL/L (ref 96–108)
CO2 SERPL-SCNC: 28 MMOL/L (ref 21–32)
CREAT SERPL-MCNC: 1.6 MG/DL (ref 0.6–1.3)
GFR SERPL CREATININE-BSD FRML MDRD: 40 ML/MIN/1.73SQ M
GLUCOSE P FAST SERPL-MCNC: 80 MG/DL (ref 65–99)
POTASSIUM SERPL-SCNC: 4.4 MMOL/L (ref 3.5–5.3)
SODIUM SERPL-SCNC: 140 MMOL/L (ref 135–147)

## 2024-06-18 PROCEDURE — 80048 BASIC METABOLIC PNL TOTAL CA: CPT

## 2024-06-18 PROCEDURE — 36415 COLL VENOUS BLD VENIPUNCTURE: CPT

## 2024-06-19 DIAGNOSIS — I10 BENIGN ESSENTIAL HTN: ICD-10-CM

## 2024-06-19 RX ORDER — AMLODIPINE BESYLATE 10 MG/1
10 TABLET ORAL DAILY
Qty: 90 TABLET | Refills: 1 | Status: SHIPPED | OUTPATIENT
Start: 2024-06-19

## 2024-06-19 RX ORDER — CARVEDILOL 6.25 MG/1
6.25 TABLET ORAL 2 TIMES DAILY WITH MEALS
Qty: 180 TABLET | Refills: 1 | Status: SHIPPED | OUTPATIENT
Start: 2024-06-19 | End: 2025-06-14

## 2024-06-19 NOTE — TELEPHONE ENCOUNTER
Patient called the RX Refill Line. Message is being forwarded to the office.     Patient is requesting a refill of  losartan (COZAAR) 100 MG tablet. Would like that sent to [facility]. Not on active med list to que up.       Please contact patient at [phone] if any issues.

## 2024-06-19 NOTE — TELEPHONE ENCOUNTER
Reason for call:   [x] Refill   [] Prior Auth  [] Other:     Office:   [x] PCP/Provider - Edel Wright/ Dc BANUELOS  [] Specialty/Provider -     Medication: Norvasc    Dose/Frequency: 10 mg     Quantity: #90    Pharmacy: [facility]    Does the patient have enough for 3 days?   [x] Yes   [] No - Send as HP to POD                  Reason for call:   [x] Refill   [] Prior Auth  [] Other:     Office:   [x] PCP/Provider - Edel Wright/ Dc   [] Specialty/Provider -     Medication: Coreg    Dose/Frequency: 6.25 mg     Quantity: #180    Pharmacy: [facility]    Does the patient have enough for 3 days?   [x] Yes   [] No - Send as HP to POD

## 2024-06-19 NOTE — TELEPHONE ENCOUNTER
Please ask patient to direct refill request to Renal as Rx was held during hospitalization due to Acute Kidney Injury.

## 2024-06-20 NOTE — TELEPHONE ENCOUNTER
Patient states that the hospital provider held the medication because his BP was reasonable without the medication. Patient was informed to track his BP readings and was instructed to start taking it again on 6/10/2024. Patient states that he has enough of the prescription to make it until his appointment with PCP.

## 2024-06-25 ENCOUNTER — RA CDI HCC (OUTPATIENT)
Dept: OTHER | Facility: HOSPITAL | Age: 79
End: 2024-06-25

## 2024-06-25 NOTE — PROGRESS NOTES
HCC coding opportunities          Chart Reviewed number of suggestions sent to Provider: 3     Patients Insurance   I13.0, E11.22 and E11.40  Medicare Insurance: Medicare

## 2024-06-28 ENCOUNTER — APPOINTMENT (OUTPATIENT)
Dept: LAB | Facility: HOSPITAL | Age: 79
End: 2024-06-28
Payer: MEDICARE

## 2024-06-28 DIAGNOSIS — I10 ESSENTIAL HYPERTENSION, MALIGNANT: ICD-10-CM

## 2024-06-28 DIAGNOSIS — N18.31 CHRONIC KIDNEY DISEASE (CKD) STAGE G3A/A1, MODERATELY DECREASED GLOMERULAR FILTRATION RATE (GFR) BETWEEN 45-59 ML/MIN/1.73 SQUARE METER AND ALBUMINURIA CREATININE RATIO LESS THAN 30 MG/G (HCC): ICD-10-CM

## 2024-06-28 LAB
ANION GAP SERPL CALCULATED.3IONS-SCNC: 7 MMOL/L (ref 4–13)
BUN SERPL-MCNC: 24 MG/DL (ref 5–25)
CALCIUM SERPL-MCNC: 9.4 MG/DL (ref 8.4–10.2)
CHLORIDE SERPL-SCNC: 105 MMOL/L (ref 96–108)
CO2 SERPL-SCNC: 27 MMOL/L (ref 21–32)
CREAT SERPL-MCNC: 1.4 MG/DL (ref 0.6–1.3)
GFR SERPL CREATININE-BSD FRML MDRD: 48 ML/MIN/1.73SQ M
GLUCOSE P FAST SERPL-MCNC: 100 MG/DL (ref 65–99)
POTASSIUM SERPL-SCNC: 4.5 MMOL/L (ref 3.5–5.3)
SODIUM SERPL-SCNC: 139 MMOL/L (ref 135–147)

## 2024-06-28 PROCEDURE — 80048 BASIC METABOLIC PNL TOTAL CA: CPT

## 2024-06-28 PROCEDURE — 36415 COLL VENOUS BLD VENIPUNCTURE: CPT

## 2024-07-02 ENCOUNTER — OFFICE VISIT (OUTPATIENT)
Dept: FAMILY MEDICINE CLINIC | Facility: CLINIC | Age: 79
End: 2024-07-02
Payer: MEDICARE

## 2024-07-02 VITALS
WEIGHT: 178.4 LBS | TEMPERATURE: 97.3 F | BODY MASS INDEX: 28.67 KG/M2 | HEART RATE: 70 BPM | SYSTOLIC BLOOD PRESSURE: 146 MMHG | OXYGEN SATURATION: 97 % | RESPIRATION RATE: 18 BRPM | DIASTOLIC BLOOD PRESSURE: 70 MMHG | HEIGHT: 66 IN

## 2024-07-02 DIAGNOSIS — J18.9 PNEUMONIA OF LEFT UPPER LOBE DUE TO INFECTIOUS ORGANISM: ICD-10-CM

## 2024-07-02 DIAGNOSIS — J96.01 ACUTE RESPIRATORY FAILURE WITH HYPOXIA (HCC): ICD-10-CM

## 2024-07-02 DIAGNOSIS — I10 BENIGN ESSENTIAL HTN: ICD-10-CM

## 2024-07-02 DIAGNOSIS — E11.42 TYPE 2 DIABETES MELLITUS WITH DIABETIC POLYNEUROPATHY, WITHOUT LONG-TERM CURRENT USE OF INSULIN (HCC): ICD-10-CM

## 2024-07-02 DIAGNOSIS — I50.9 ACUTE CONGESTIVE HEART FAILURE, UNSPECIFIED HEART FAILURE TYPE (HCC): ICD-10-CM

## 2024-07-02 DIAGNOSIS — D64.9 ANEMIA, UNSPECIFIED TYPE: Primary | ICD-10-CM

## 2024-07-02 PROCEDURE — 99214 OFFICE O/P EST MOD 30 MIN: CPT | Performed by: FAMILY MEDICINE

## 2024-07-02 PROCEDURE — G2211 COMPLEX E/M VISIT ADD ON: HCPCS | Performed by: FAMILY MEDICINE

## 2024-07-02 RX ORDER — LOSARTAN POTASSIUM 100 MG/1
100 TABLET ORAL DAILY
Qty: 100 TABLET | Refills: 1 | Status: SHIPPED | OUTPATIENT
Start: 2024-07-02

## 2024-07-02 NOTE — PATIENT INSTRUCTIONS
Stop metformin due to your kidney function.  Continue on the glipizide. If your fasting blood sugars are running lower than 75, let me know, and we can decrease the dose.   Continue losartan 100 mg daily- refill was sent  Referral placed for cardiology  Continue daily weights, continue monitoring BS and BP.  Keep the nephrology appointment for this month.  Keep your appointment with me in September

## 2024-07-02 NOTE — PROGRESS NOTES
Assessment/Plan:       Problem List Items Addressed This Visit        Cardiovascular and Mediastinum    Benign essential HTN     He resumed the losartan 100 mg daily after hospitalization  His creatinine has been back down near his baseline on most recent repeat  Has f/u with nephrology         Relevant Medications    losartan (COZAAR) 100 MG tablet    Acute congestive heart failure, unspecified heart failure type (HCC)     Wt Readings from Last 3 Encounters:   07/02/24 80.9 kg (178 lb 6.4 oz)   06/13/24 83 kg (183 lb)   03/14/24 87.5 kg (193 lb)       Monitoring daily weights  Acute CHF during hospitalization for multifocal pneuomonia  Hasn't seen cardiology in recent years  Refer back for eval             Relevant Orders    Ambulatory referral to Cardiology       Respiratory    Acute respiratory failure with hypoxia (HCC)     In setting of multifocal pneumonia  and acute chf.   Did not meet criteria for home o2  Has been monitoring sats at home which have been consistently normal              Endocrine    Type 2 diabetes mellitus with diabetic polyneuropathy, without long-term current use of insulin (HCC)     D/c metformin due to renal function  Continuing on glipizide  Reviewed BS readings which have been on the low side since returning home despite high A1C recently.   He is working on reducing carbs, but has been needing to eat extra carbs at night to offset hypoglycemia  Discussed having snacks that include carb/protein/fat   He will keep tracking and keep me posted on readings.    Lab Results   Component Value Date    HGBA1C 8.5 (H) 06/11/2024         Other Visit Diagnoses     Anemia, unspecified type    -  Primary    repeat CBC.  hemoglobin was increasing on most recent cbc, worse upon admission. elevated wbc but in line with his CLL    Relevant Orders    CBC and differential    Pneumonia of left upper lobe due to infectious organism        repeat cxr ordered for 6 weeks from d/c    Relevant Orders    XR  chest pa & lateral                 Most recent labs reviewed       Subjective:     Abelino is a 78 y.o. male here today with chief complaint below:  Chief Complaint   Patient presents with   • Transition of Care Management     Pt outside of TCM window, but following up.   • Hypertension     Losartan d/c'd, needs new script if this should be continued. Records BP and O2 daily, wants to discuss goal rate.   • Results     Labs from hospitalization   • Low Potassium     Was previously told to eat a low potassium diet, but now is taking potassium pills due to low level in the hospital.     - CC above per clinical staff and reviewed.    HPI:  Pt here for f/u from hospitalization for multifocal pneumonia, hypoxia, acute CHF, acute renal dysfunction.  He was admitted from 6/7- 6/13/24  He required 3L O2, but by the time he was discharged, he was stable on RA.  He notes that his legs haven't been swelling since he's been home.  He is walking a bit, walks to the lab and back, feels well w this.   No CP or cough.    He is following low K diet.      His BP at home has been up and down,   Off losartan since admission  He was told ok to restart once he was back home- so he has been taking the 100 mg dose daily.  Needs refill of this which I will send today.    BS readings- had two low readings in the 40-50 range. Then started having a nighttime snack which has helped him avoid low BS readings, however the past two days his readings have been in the 58 and 64.     He is eating and drinking well.   Notes he isn't tasting things the same way. Not as much appetite but improving.    Normal Bms, looser stools if he has salad or artificial sweetener (which is a chronic issue for him)     Hasn't seen cardiology in several years b/c he has been stable.       The following portions of the patient's history were reviewed and updated as appropriate: allergies, current medications, past family history, past medical history, past social  "history, past surgical history and problem list.    ROS:  Review of Systems     Objective:      /70   Pulse 70   Temp (!) 97.3 °F (36.3 °C) (Temporal)   Resp 18   Ht 5' 6\" (1.676 m)   Wt 80.9 kg (178 lb 6.4 oz)   SpO2 97%   BMI 28.79 kg/m²   BP Readings from Last 3 Encounters:   07/02/24 146/70   06/13/24 151/67   03/14/24 128/72     Wt Readings from Last 3 Encounters:   07/02/24 80.9 kg (178 lb 6.4 oz)   06/13/24 83 kg (183 lb)   03/14/24 87.5 kg (193 lb)               Physical Exam:   Physical Exam  Vitals and nursing note reviewed.   Constitutional:       General: He is not in acute distress.     Appearance: Normal appearance. He is well-developed. He is not ill-appearing.   HENT:      Head: Normocephalic and atraumatic.      Mouth/Throat:      Mouth: Mucous membranes are moist.   Neck:      Vascular: No carotid bruit.   Cardiovascular:      Rate and Rhythm: Normal rate and regular rhythm.      Heart sounds: Normal heart sounds.   Pulmonary:      Effort: Pulmonary effort is normal. No respiratory distress.      Breath sounds: Normal breath sounds. No stridor. No wheezing, rhonchi or rales.   Abdominal:      Palpations: Abdomen is soft.      Tenderness: There is no abdominal tenderness. There is no guarding or rebound.   Musculoskeletal:      Cervical back: Neck supple.      Right lower leg: Edema (1+ edema ankle) present.      Left lower leg: Edema (1+ edema ankle) present.   Lymphadenopathy:      Cervical: No cervical adenopathy.   Skin:     General: Skin is warm and dry.   Neurological:      Mental Status: He is alert and oriented to person, place, and time.      Gait: Gait normal.   Psychiatric:         Mood and Affect: Mood normal.         Behavior: Behavior normal.                "

## 2024-07-02 NOTE — PROGRESS NOTES
Diabetic Foot Exam    Patient's shoes and socks removed.    Right Foot/Ankle   Right Foot Inspection  Skin Exam: skin normal and skin intact. No dry skin, no warmth, no callus, no erythema, no maceration, no abnormal color, no pre-ulcer, no ulcer and no callus.     Toe Exam: ROM and strength within normal limits. No swelling, no tenderness, erythema and  no right toe deformity    Sensory   Monofilament testing: intact    Vascular  Capillary refills: < 3 seconds  The right PT pulse is 2+.     Left Foot/Ankle  Left Foot Inspection  Amputation: amputation left foot     Sensory   Monofilament testing: intact    Vascular  Capillary refills: < 3 seconds  The left PT pulse is 2+.     Assign Risk Category  Deformity present  Loss of protective sensation  Weak pulses  Risk: 2

## 2024-07-08 ENCOUNTER — APPOINTMENT (OUTPATIENT)
Dept: LAB | Facility: HOSPITAL | Age: 79
End: 2024-07-08
Payer: MEDICARE

## 2024-07-08 DIAGNOSIS — R80.9 PROTEINURIA, UNSPECIFIED TYPE: ICD-10-CM

## 2024-07-08 DIAGNOSIS — D64.9 ANEMIA, UNSPECIFIED TYPE: ICD-10-CM

## 2024-07-08 DIAGNOSIS — I10 ESSENTIAL HYPERTENSION, MALIGNANT: ICD-10-CM

## 2024-07-08 DIAGNOSIS — E11.29 CONTROLLED TYPE 2 DIABETES MELLITUS WITH OTHER DIABETIC KIDNEY COMPLICATION, UNSPECIFIED WHETHER LONG TERM INSULIN USE (HCC): ICD-10-CM

## 2024-07-08 DIAGNOSIS — N18.31 CHRONIC KIDNEY DISEASE (CKD) STAGE G3A/A1, MODERATELY DECREASED GLOMERULAR FILTRATION RATE (GFR) BETWEEN 45-59 ML/MIN/1.73 SQUARE METER AND ALBUMINURIA CREATININE RATIO LESS THAN 30 MG/G (HCC): ICD-10-CM

## 2024-07-08 PROBLEM — J18.9 COMMUNITY ACQUIRED PNEUMONIA OF LEFT LUNG: Status: RESOLVED | Noted: 2024-06-07 | Resolved: 2024-07-08

## 2024-07-08 LAB
25(OH)D3 SERPL-MCNC: 38.7 NG/ML (ref 30–100)
ALBUMIN SERPL BCG-MCNC: 4.3 G/DL (ref 3.5–5)
ALP SERPL-CCNC: 72 U/L (ref 34–104)
ALT SERPL W P-5'-P-CCNC: 12 U/L (ref 7–52)
ANA SER QL IA: NEGATIVE
ANION GAP SERPL CALCULATED.3IONS-SCNC: 10 MMOL/L (ref 4–13)
AST SERPL W P-5'-P-CCNC: 16 U/L (ref 13–39)
BACTERIA UR QL AUTO: ABNORMAL /HPF
BASOPHILS # BLD MANUAL: 0 THOUSAND/UL (ref 0–0.1)
BASOPHILS NFR MAR MANUAL: 0 % (ref 0–1)
BILIRUB SERPL-MCNC: 0.67 MG/DL (ref 0.2–1)
BILIRUB UR QL STRIP: NEGATIVE
BUN SERPL-MCNC: 25 MG/DL (ref 5–25)
C3 SERPL-MCNC: 97 MG/DL (ref 87–200)
C4 SERPL-MCNC: 10 MG/DL (ref 19–52)
CALCIUM SERPL-MCNC: 9.5 MG/DL (ref 8.4–10.2)
CHLORIDE SERPL-SCNC: 105 MMOL/L (ref 96–108)
CLARITY UR: CLEAR
CO2 SERPL-SCNC: 25 MMOL/L (ref 21–32)
COLOR UR: YELLOW
CREAT SERPL-MCNC: 1.54 MG/DL (ref 0.6–1.3)
CREAT UR-MCNC: 128.2 MG/DL
EOSINOPHIL # BLD MANUAL: 0.25 THOUSAND/UL (ref 0–0.4)
EOSINOPHIL NFR BLD MANUAL: 1 % (ref 0–6)
ERYTHROCYTE [DISTWIDTH] IN BLOOD BY AUTOMATED COUNT: 14.5 % (ref 11.6–15.1)
GFR SERPL CREATININE-BSD FRML MDRD: 42 ML/MIN/1.73SQ M
GLUCOSE SERPL-MCNC: 110 MG/DL (ref 65–140)
GLUCOSE UR STRIP-MCNC: NEGATIVE MG/DL
HBV SURFACE AB SER-ACNC: <3 MIU/ML
HBV SURFACE AG SER QL: NORMAL
HCT VFR BLD AUTO: 32.8 % (ref 36.5–49.3)
HCV AB SER QL: NORMAL
HGB BLD-MCNC: 10.4 G/DL (ref 12–17)
HGB UR QL STRIP.AUTO: NEGATIVE
HIV 1+2 AB+HIV1 P24 AG SERPL QL IA: NORMAL
HIV 2 AB SERPL QL IA: NORMAL
HIV1 AB SERPL QL IA: NORMAL
HIV1 P24 AG SERPL QL IA: NORMAL
HYALINE CASTS #/AREA URNS LPF: ABNORMAL /LPF
KETONES UR STRIP-MCNC: NEGATIVE MG/DL
LEUKOCYTE ESTERASE UR QL STRIP: NEGATIVE
LYMPHOCYTES # BLD AUTO: 19.08 THOUSAND/UL (ref 0.6–4.47)
LYMPHOCYTES # BLD AUTO: 76 % (ref 14–44)
MCH RBC QN AUTO: 30.2 PG (ref 26.8–34.3)
MCHC RBC AUTO-ENTMCNC: 31.7 G/DL (ref 31.4–37.4)
MCV RBC AUTO: 95 FL (ref 82–98)
MONOCYTES # BLD AUTO: 0.25 THOUSAND/UL (ref 0–1.22)
MONOCYTES NFR BLD: 1 % (ref 4–12)
NEUTROPHILS # BLD MANUAL: 5.52 THOUSAND/UL (ref 1.85–7.62)
NEUTS SEG NFR BLD AUTO: 22 % (ref 43–75)
NITRITE UR QL STRIP: NEGATIVE
NON-SQ EPI CELLS URNS QL MICRO: ABNORMAL /HPF
PH UR STRIP.AUTO: 6 [PH]
PHOSPHATE SERPL-MCNC: 3.9 MG/DL (ref 2.3–4.1)
PLATELET # BLD AUTO: 149 THOUSANDS/UL (ref 149–390)
PLATELET BLD QL SMEAR: ADEQUATE
PMV BLD AUTO: 9.7 FL (ref 8.9–12.7)
POTASSIUM SERPL-SCNC: 4 MMOL/L (ref 3.5–5.3)
PROT SERPL-MCNC: 8.2 G/DL (ref 6.4–8.4)
PROT UR STRIP-MCNC: ABNORMAL MG/DL
PROT UR-MCNC: 50 MG/DL
PROT/CREAT UR: 0.39 MG/G{CREAT} (ref 0–0.1)
PTH-INTACT SERPL-MCNC: 50.2 PG/ML (ref 12–88)
RBC # BLD AUTO: 3.44 MILLION/UL (ref 3.88–5.62)
RBC #/AREA URNS AUTO: ABNORMAL /HPF
RBC MORPH BLD: NORMAL
SODIUM SERPL-SCNC: 140 MMOL/L (ref 135–147)
SP GR UR STRIP.AUTO: 1.02 (ref 1–1.03)
URATE SERPL-MCNC: 5.5 MG/DL (ref 3.5–8.5)
UROBILINOGEN UR QL STRIP.AUTO: 0.2 E.U./DL
WBC # BLD AUTO: 25.11 THOUSAND/UL (ref 4.31–10.16)
WBC #/AREA URNS AUTO: ABNORMAL /HPF

## 2024-07-08 PROCEDURE — 86335 IMMUNFIX E-PHORSIS/URINE/CSF: CPT

## 2024-07-08 PROCEDURE — 85027 COMPLETE CBC AUTOMATED: CPT

## 2024-07-08 PROCEDURE — 82306 VITAMIN D 25 HYDROXY: CPT

## 2024-07-08 PROCEDURE — 84100 ASSAY OF PHOSPHORUS: CPT

## 2024-07-08 PROCEDURE — 86803 HEPATITIS C AB TEST: CPT

## 2024-07-08 PROCEDURE — 86038 ANTINUCLEAR ANTIBODIES: CPT

## 2024-07-08 PROCEDURE — 85007 BL SMEAR W/DIFF WBC COUNT: CPT

## 2024-07-08 PROCEDURE — 84550 ASSAY OF BLOOD/URIC ACID: CPT

## 2024-07-08 PROCEDURE — 81001 URINALYSIS AUTO W/SCOPE: CPT

## 2024-07-08 PROCEDURE — 84156 ASSAY OF PROTEIN URINE: CPT

## 2024-07-08 PROCEDURE — 83520 IMMUNOASSAY QUANT NOS NONAB: CPT

## 2024-07-08 PROCEDURE — 80053 COMPREHEN METABOLIC PANEL: CPT

## 2024-07-08 PROCEDURE — 86334 IMMUNOFIX E-PHORESIS SERUM: CPT

## 2024-07-08 PROCEDURE — 86160 COMPLEMENT ANTIGEN: CPT

## 2024-07-08 PROCEDURE — 82570 ASSAY OF URINE CREATININE: CPT

## 2024-07-08 PROCEDURE — 84166 PROTEIN E-PHORESIS/URINE/CSF: CPT

## 2024-07-08 PROCEDURE — 84165 PROTEIN E-PHORESIS SERUM: CPT

## 2024-07-08 PROCEDURE — 87340 HEPATITIS B SURFACE AG IA: CPT

## 2024-07-08 PROCEDURE — 87389 HIV-1 AG W/HIV-1&-2 AB AG IA: CPT

## 2024-07-08 PROCEDURE — 36415 COLL VENOUS BLD VENIPUNCTURE: CPT

## 2024-07-08 PROCEDURE — 86037 ANCA TITER EACH ANTIBODY: CPT

## 2024-07-08 PROCEDURE — 83970 ASSAY OF PARATHORMONE: CPT

## 2024-07-08 PROCEDURE — 86706 HEP B SURFACE ANTIBODY: CPT

## 2024-07-08 PROCEDURE — 86225 DNA ANTIBODY NATIVE: CPT

## 2024-07-09 LAB — DSDNA AB SER-ACNC: 1 IU/ML (ref 0–9)

## 2024-07-10 LAB
ALBUMIN SERPL ELPH-MCNC: 3.87 G/DL (ref 3.2–5.1)
ALBUMIN SERPL ELPH-MCNC: 51.6 % (ref 48–70)
ALBUMIN UR ELPH-MCNC: 35.2 %
ALPHA1 GLOB MFR UR ELPH: 6.5 %
ALPHA1 GLOB SERPL ELPH-MCNC: 0.19 G/DL (ref 0.15–0.47)
ALPHA1 GLOB SERPL ELPH-MCNC: 2.5 % (ref 1.8–7)
ALPHA2 GLOB MFR UR ELPH: 14.6 %
ALPHA2 GLOB SERPL ELPH-MCNC: 0.99 G/DL (ref 0.42–1.04)
ALPHA2 GLOB SERPL ELPH-MCNC: 13.2 % (ref 5.9–14.9)
B-GLOBULIN MFR UR ELPH: 4.2 %
BETA GLOB ABNORMAL SERPL ELPH-MCNC: 0.5 G/DL (ref 0.31–0.57)
BETA1 GLOB SERPL ELPH-MCNC: 6.6 % (ref 4.7–7.7)
BETA2 GLOB SERPL ELPH-MCNC: 3.4 % (ref 3.1–7.9)
BETA2+GAMMA GLOB SERPL ELPH-MCNC: 0.26 G/DL (ref 0.2–0.58)
C-ANCA TITR SER IF: NORMAL TITER
GAMMA GLOB ABNORMAL SERPL ELPH-MCNC: 1.7 G/DL (ref 0.4–1.66)
GAMMA GLOB MFR UR ELPH: 39.5 %
GAMMA GLOB SERPL ELPH-MCNC: 22.7 % (ref 6.9–22.3)
GBM AB SER IA-ACNC: <0.2 UNITS (ref 0–0.9)
IGG/ALB SER: 1.07 {RATIO} (ref 1.1–1.8)
INTERPRETATION UR IFE-IMP: NORMAL
INTERPRETATION UR IFE-IMP: NORMAL
M PROTEIN 1 MFR SERPL ELPH: 16.2 %
M PROTEIN 1 SERPL ELPH-MCNC: 1.22 G/DL
MYELOPEROXIDASE AB SER IA-ACNC: <0.2 UNITS (ref 0–0.9)
P-ANCA ATYPICAL TITR SER IF: NORMAL TITER
P-ANCA TITR SER IF: NORMAL TITER
PROT PATTERN SERPL ELPH-IMP: ABNORMAL
PROT PATTERN UR ELPH-IMP: NORMAL
PROT SERPL-MCNC: 7.5 G/DL (ref 6.4–8.2)
PROT UR-MCNC: 47.8 MG/DL
PROTEINASE3 AB SER IA-ACNC: <0.2 UNITS (ref 0–0.9)

## 2024-07-10 PROCEDURE — 86335 IMMUNFIX E-PHORSIS/URINE/CSF: CPT | Performed by: STUDENT IN AN ORGANIZED HEALTH CARE EDUCATION/TRAINING PROGRAM

## 2024-07-10 PROCEDURE — 86334 IMMUNOFIX E-PHORESIS SERUM: CPT | Performed by: STUDENT IN AN ORGANIZED HEALTH CARE EDUCATION/TRAINING PROGRAM

## 2024-07-10 PROCEDURE — 84166 PROTEIN E-PHORESIS/URINE/CSF: CPT | Performed by: STUDENT IN AN ORGANIZED HEALTH CARE EDUCATION/TRAINING PROGRAM

## 2024-07-10 PROCEDURE — 84165 PROTEIN E-PHORESIS SERUM: CPT | Performed by: STUDENT IN AN ORGANIZED HEALTH CARE EDUCATION/TRAINING PROGRAM

## 2024-07-16 ENCOUNTER — TELEPHONE (OUTPATIENT)
Age: 79
End: 2024-07-16

## 2024-07-16 ENCOUNTER — HOSPITAL ENCOUNTER (OUTPATIENT)
Dept: RADIOLOGY | Facility: HOSPITAL | Age: 79
Discharge: HOME | End: 2024-07-16
Payer: MEDICARE

## 2024-07-16 DIAGNOSIS — J18.9 PNEUMONIA OF LEFT UPPER LOBE DUE TO INFECTIOUS ORGANISM: ICD-10-CM

## 2024-07-16 PROCEDURE — 71046 X-RAY EXAM CHEST 2 VIEWS: CPT

## 2024-07-16 NOTE — TELEPHONE ENCOUNTER
Patient calling in to inform the doctor he saw the kidney specialist, they are faxing over information and would like for the doctor to see them sooner if possible. There are no openings, please advise on if patient should be seen sooner and return their call at [phone]

## 2024-07-25 ENCOUNTER — OFFICE VISIT (OUTPATIENT)
Dept: HEMATOLOGY ONCOLOGY | Facility: CLINIC | Age: 79
End: 2024-07-25
Payer: MEDICARE

## 2024-07-25 ENCOUNTER — APPOINTMENT (OUTPATIENT)
Dept: LAB | Facility: CLINIC | Age: 79
End: 2024-07-25
Payer: MEDICARE

## 2024-07-25 VITALS
BODY MASS INDEX: 29.09 KG/M2 | WEIGHT: 181 LBS | HEIGHT: 66 IN | DIASTOLIC BLOOD PRESSURE: 78 MMHG | OXYGEN SATURATION: 98 % | SYSTOLIC BLOOD PRESSURE: 144 MMHG | RESPIRATION RATE: 18 BRPM | HEART RATE: 77 BPM | TEMPERATURE: 98 F

## 2024-07-25 DIAGNOSIS — Z85.09 HISTORY OF MALIGNANT NEOPLASM OF APPENDIX: ICD-10-CM

## 2024-07-25 DIAGNOSIS — D47.2 MONOCLONAL GAMMOPATHY: ICD-10-CM

## 2024-07-25 DIAGNOSIS — N18.31 STAGE 3A CHRONIC KIDNEY DISEASE (HCC): ICD-10-CM

## 2024-07-25 DIAGNOSIS — C91.10 CHRONIC LYMPHOCYTIC LEUKEMIA (HCC): ICD-10-CM

## 2024-07-25 DIAGNOSIS — D64.9 ANEMIA, UNSPECIFIED TYPE: ICD-10-CM

## 2024-07-25 DIAGNOSIS — D47.2 MONOCLONAL GAMMOPATHY: Primary | ICD-10-CM

## 2024-07-25 DIAGNOSIS — D69.6 PLATELETS DECREASED (HCC): ICD-10-CM

## 2024-07-25 LAB
FERRITIN SERPL-MCNC: 30 NG/ML (ref 24–336)
HGB RETIC QN AUTO: 33.8 PG (ref 30–38.3)
IMM RETICS NFR: 20.5 % (ref 0–14)
IRON SATN MFR SERPL: 22 % (ref 15–50)
IRON SERPL-MCNC: 94 UG/DL (ref 50–212)
LDH SERPL-CCNC: 136 U/L (ref 140–271)
RETICS # AUTO: ABNORMAL 10*3/UL (ref 14356–105094)
RETICS # CALC: 3.14 % (ref 0.37–1.87)
TIBC SERPL-MCNC: 431 UG/DL (ref 250–450)
UIBC SERPL-MCNC: 337 UG/DL (ref 155–355)

## 2024-07-25 PROCEDURE — 82728 ASSAY OF FERRITIN: CPT

## 2024-07-25 PROCEDURE — 83521 IG LIGHT CHAINS FREE EACH: CPT

## 2024-07-25 PROCEDURE — 85046 RETICYTE/HGB CONCENTRATE: CPT

## 2024-07-25 PROCEDURE — 99214 OFFICE O/P EST MOD 30 MIN: CPT | Performed by: INTERNAL MEDICINE

## 2024-07-25 PROCEDURE — 83550 IRON BINDING TEST: CPT

## 2024-07-25 PROCEDURE — 82232 ASSAY OF BETA-2 PROTEIN: CPT

## 2024-07-25 PROCEDURE — 83540 ASSAY OF IRON: CPT

## 2024-07-25 PROCEDURE — 83615 LACTATE (LD) (LDH) ENZYME: CPT

## 2024-07-25 PROCEDURE — G2211 COMPLEX E/M VISIT ADD ON: HCPCS | Performed by: INTERNAL MEDICINE

## 2024-07-25 PROCEDURE — 36415 COLL VENOUS BLD VENIPUNCTURE: CPT

## 2024-07-25 NOTE — PROGRESS NOTES
[facility]  [address] [address]  [phone]     Date of Visit: 7/25/2024  Name: Abelino Renee   YOB: 1945         Assessment/Plan  In summary, Abelino Renee is a 79 y.o. male with history of CLL/SLL previously treated with BR.  Patient then started maintenance Rituxan but had CBC side effects; Rituxan was held.  Since that time, the plan has been surveillance.     Presently Mr. Renee feels well and clinically no adenopathy.  Recent admission as noted below for CAP from which he is now on his baseline. He attributes his weight loss to dietary changes and corrected fluid overload.  Discussed pathology of monoclonal gammopathy.  Serum immunofixation showed a monoclonal gammopathy identified as IgG kappa.   M protein 1.22 g/dL  UPEP showed a possible faint band.  WBC/ALC is stable. He is more anemic w/Hb around 10 now. Normal Plt. CKD stable.  Check light chain ratio, LDH and B2 microglobulin and whole-body CT  Hold bone marrow bx for now since m-protein is <1.5  Continue surveillance for CLL     Patient was previously diagnosed with appendiceal carcinoid.  Patient underwent right hemicolectomy with negative lymph nodes.  No GI issues recently.  Surveillance continues for this also.     Return for Imaging - See orders, Office visit with labs prior.     All the patient's questions were answered to their apparent satisfaction.  Patient has been strongly urged to call with any questions or concerns.         HISTORY OF PRESENT ILLNESS:   Abelino Renee is a 79 y.o. male  with CLL/SLL previously treated with 4 cycles of BR (June 2012) with good response and no evidence of residual disease.  Patient was then started on maintenance Rituxan but because of CBC abnormalities, the maintenance was discontinued.  Patient has been on surveillance.     Mr. Lazar was admitted to the hospital in 2015 for abdominal pain; underwent  appendectomy.  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  Patient subsequently underwent right hemicolectomy in May 2015 which showed no evidence of additional disease.  23 lymph nodes were negative.  The plan was to continue with surveillance.       Subjective  Patient here today with his wife  Recent admission for CAP.   Now feeling better      REVIEW OF SYSTEMS:  No fevers or chills.  Weight loss +  Denies any headaches  No night sweats, lymphadenopathy  Normal appetite.  No nausea or vomiting.  Denies any abdominal pain.  Normal bowel movements   No swelling of the extremities.   No urinary complaints  Skin lesions- eczema?   Denies any weakness.  14 point review of systems otherwise  negative      Current Outpatient Medications:     acetaminophen (TYLENOL) 325 mg tablet, Take 2 tablets (650 mg total) by mouth every 6 (six) hours as needed for fever, mild pain or headaches, Disp: , Rfl:     amLODIPine (NORVASC) 10 mg tablet, Take 1 tablet (10 mg total) by mouth daily, Disp: 90 tablet, Rfl: 1    brimonidine tartrate 0.2 % ophthalmic solution, INSTILL 1 DROP INTO EACH EYE TWICE DAILY, Disp: , Rfl:     carvedilol (Coreg) 6.25 mg tablet, Take 1 tablet (6.25 mg total) by mouth 2 (two) times a day with meals, Disp: 180 tablet, Rfl: 1    glipiZIDE (GLUCOTROL XL) 10 mg 24 hr tablet, Take 1 tablet (10 mg total) by mouth 2 (two) times a day, Disp: 180 tablet, Rfl: 1    Glucosamine HCl (GLUCOSAMINE PO), Take 1 tablet by mouth 2 (two) times a day , Disp: , Rfl:     levothyroxine (Synthroid) 75 mcg tablet, Take 1 tablet (75 mcg total) by mouth daily, Disp: 90 tablet, Rfl: 1    losartan (COZAAR) 100 MG tablet, Take 1 tablet (100 mg total) by mouth daily, Disp: 100 tablet, Rfl: 1    Multiple Vitamin (MULTIVITAMIN) tablet, Take 1 tablet by mouth daily, Disp: , Rfl:     Omega-3 Fatty Acids (FISH OIL PO), Take by mouth daily, Disp: , Rfl:     simvastatin (ZOCOR) 20 mg tablet, Take 1 tablet (20 mg total) by  "mouth daily at bedtime, Disp: 90 tablet, Rfl: 1    timolol (TIMOPTIC) 0.5 % ophthalmic solution, timolol maleate 0.5 % eye drops, Disp: , Rfl:     torsemide (DEMADEX) 5 MG tablet, Take 0.5 tablets (2.5 mg total) by mouth daily, Disp: , Rfl:      Allergies   Allergen Reactions    Oxycodone-Acetaminophen Hallucinations        ACTIVE PROBLEMS:  Patient Active Problem List   Diagnosis    Arthritis    Benign essential HTN    Cardiomyopathy (HCC)    Chronic lymphocytic leukemia (HCC)    CKD (chronic kidney disease) stage 3, GFR 30-59 ml/min (HCC)    H/O Clostridium difficile infection    Hx of malignant carcinoid tumor of large intestine    Type 2 diabetes mellitus with diabetic polyneuropathy, without long-term current use of insulin (HCC)    History of malignant neoplasm of appendix    Status post transmetatarsal amputation of left foot (HCC)    Elevated serum protein level    Hypothyroidism    Diabetic nephropathy associated with type 2 diabetes mellitus (HCC)    Acute congestive heart failure, unspecified heart failure type (HCC)    Acute respiratory failure with hypoxia (HCC)    Platelets decreased (HCC)          Past Medical History:   Diagnosis Date    Arthritis     Spine    Cancer (HCC)     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    Chronic lymphocytic leukemia of B-cell type (HCC)     \"remission 4-5yrs\"-chemo    DDD (degenerative disc disease), cervical     Diabetes mellitus (HCC)     bs checked by pt at home at 6am =92    Diabetic neuropathy (HCC)     Diabetic retinopathy (HCC)     Elevated WBC count     Heart attack (HCC) 04/2015    \"labeled as that and than tested later after appendix removed and stress test showed negative\"    History of cancer chemotherapy     last treatment approx 5yrs ago    Hypertension     Myocardial infarction (HCC) 04/2015    Scalp psoriasis     Shingles 2/10-15    Toe injury     left great toe, - 2017-internal braec-to be removed today 3/30/2018    Wears glasses  " "       Past Surgical History:   Procedure Laterality Date    APPENDECTOMY      BONE BIOPSY Left 3/30/2018    Procedure: GREAT TOE BONE BIOPSY;  Surgeon: Gerber Multani DPM;  Location: [facility];  Service: Podiatry    BOWEL RESECTION      CATARACT EXTRACTION Bilateral     CHOLECYSTECTOMY      COLECTOMY  06/2015    EYE SURGERY Right     \"retinal peel\"    FOOT FUSION Left 12/6/2017    Procedure: HALLUX INTERPHALANGEAL JOINT FUSION;  Surgeon: Gerber Multani DPM;  [facility];  Service: Podiatry    HARDWARE REMOVAL Left 4/18/2018    Procedure: REMOVAL STAPLES LEFT TOE;  Surgeon: Gerber Multani DPM;  [facility];  Service: Podiatry    ORIF FOOT FRACTURE Left 12/6/2017    Procedure: HALLUX INTERPHALANGEAL JOINT INTERNAL FIXATION; REPAIR OF ULCERATION WITH EXCISIONS AND REVISION OF SKIN HALLUX WITH USE OF ALLOGRAFT;  Surgeon: Gerber Multani DPM;  [facility];  Service: Podiatry    LA COLONOSCOPY FLX DX W/COLLJ SPEC WHEN PFRMD N/A 4/21/2016    Procedure: COLONOSCOPY;  Surgeon: Minal Ruelas DO;  Location: [facility];  Service: Gastroenterology    LA REMOVAL IMPLANT DEEP Left 3/30/2018    Procedure: REMOVAL HARDWARE FOOT;  Surgeon: Gerber Multani DPM;  [facility];  Service: Podiatry    RETINAL DETACHMENT SURGERY      TONSILLECTOMY          Social History     Socioeconomic History    Marital status: /Civil Union     Spouse name: Not on file    Number of children: Not on file    Years of education: 4 yr college    Highest education level: Not on file   Occupational History    Not on file   Tobacco Use    Smoking status: Never     Passive exposure: Never    Smokeless tobacco: Never   Vaping Use    Vaping status: Never Used   Substance and Sexual Activity    Alcohol use: Never    Drug use: Never    Sexual activity: Never     Partners: Female     Birth control/protection: None   Other Topics Concern    Not on file   Social History Narrative    Most recent tobacco use screening: " "2018    Education: 4 Year College    Marital status:     Exercise level: Occasional    Diet: Diabetic    General stress level: Low    Alcohol intake: None    Caffeine intake: Moderate    Guns present in home: No    Smoke alarm in home: Yes     Social Determinants of Health     Financial Resource Strain: Patient Declined (2023)    Overall Financial Resource Strain (CARDIA)     Difficulty of Paying Living Expenses: Patient declined   Food Insecurity: No Food Insecurity (6/10/2024)    Hunger Vital Sign     Worried About Running Out of Food in the Last Year: Never true     Ran Out of Food in the Last Year: Never true   Transportation Needs: No Transportation Needs (6/10/2024)    PRAPARE - Transportation     Lack of Transportation (Medical): No     Lack of Transportation (Non-Medical): No   Physical Activity: Not on file   Stress: Not on file   Social Connections: Not on file   Intimate Partner Violence: Not on file   Housing Stability: Unknown (6/10/2024)    Housing Stability Vital Sign     Unable to Pay for Housing in the Last Year: No     Number of Times Moved in the Last Year: Not on file     Homeless in the Last Year: No        Family History   Problem Relation Age of Onset    Alcohol abuse Mother             Pancreatic cancer Father         age 79    Hashimoto's thyroiditis Daughter     Rheum arthritis Daughter         Juvenile Rheum Arthitis     No Known Problems Sister          age 78    No Known Problems Brother          86    Kidney failure Brother         dialysis, 74    Diabetes Brother         Objective        Physical Exam  ECOG performance status: 0  Blood pressure 144/78, pulse 77, temperature 98 °F (36.7 °C), temperature source Temporal, resp. rate 18, height 5' 6\" (1.676 m), weight 82.1 kg (181 lb), SpO2 98%.     General appearance: Not in acute distress, well appearing.    Alert and oriented.    Normal breath sounds bilaterally.  Clear to auscultation without any added " sounds  Heart sounds are normal.  No murmurs noted.    Abdomen is soft and nontender.  No rebound.  No evidence of ascites.   Extremities without any edema.    No focal deficits.        I reviewed lab data in the chart as noted above.  Additional labs as noted below    WBC   Date Value Ref Range Status   07/08/2024 25.11 (H) 4.31 - 10.16 Thousand/uL Final   06/13/2024 22.94 (H) 4.31 - 10.16 Thousand/uL Final   06/12/2024 19.78 (H) 4.31 - 10.16 Thousand/uL Final   10/22/2015 6.60 4.31 - 10.16 Thousand/uL Final   07/16/2015 5.83 4.31 - 10.16 Thousand/uL Final   06/26/2015 8.90 4.31 - 10.16 Thousand/uL Final     Hemoglobin   Date Value Ref Range Status   07/08/2024 10.4 (L) 12.0 - 17.0 g/dL Final   06/13/2024 10.1 (L) 12.0 - 17.0 g/dL Final   06/12/2024 8.7 (L) 12.0 - 17.0 g/dL Final   10/22/2015 13.6 12.0 - 17.0 g/dL Final   07/16/2015 11.8 (L) 12.0 - 17.0 g/dL Final   06/26/2015 12.4 12.0 - 17.0 g/dL Final     Platelets   Date Value Ref Range Status   07/08/2024 149 149 - 390 Thousands/uL Final   06/13/2024 237 149 - 390 Thousands/uL Final   06/12/2024 218 149 - 390 Thousands/uL Final   10/22/2015 169 149 - 390 Thousand/uL Final   07/16/2015 190 149 - 390 Thousand/uL Final   06/26/2015 390 149 - 390 Thousand/uL Final     MCV   Date Value Ref Range Status   07/08/2024 95 82 - 98 fL Final   06/13/2024 94 82 - 98 fL Final   06/12/2024 93 82 - 98 fL Final   10/22/2015 85 82 - 98 fL Final   07/16/2015 83 82 - 98 fL Final   06/26/2015 83 82 - 98 fL Final      Sodium   Date Value Ref Range Status   10/22/2015 140 136 - 145 mmol/L Final   07/16/2015 139 136 - 145 mmol/L Final   07/02/2015 138 135 - 145 mmol/L Final     Potassium   Date Value Ref Range Status   07/08/2024 4.0 3.5 - 5.3 mmol/L Final   06/28/2024 4.5 3.5 - 5.3 mmol/L Final   06/18/2024 4.4 3.5 - 5.3 mmol/L Final   07/07/2021 4.9 3.5 - 5.2 mmol/L Final   10/09/2018 5.2 3.5 - 5.2 mmol/L Final   04/29/2018 4.3 3.5 - 5.2 mmol/L Final     Comment:     Specimen  slightly hemolyzed, results may be affected.     Chloride   Date Value Ref Range Status   07/08/2024 105 96 - 108 mmol/L Final   06/28/2024 105 96 - 108 mmol/L Final   06/18/2024 104 96 - 108 mmol/L Final   07/07/2021 107 100 - 109 mmol/L Final   10/09/2018 107 100 - 109 mmol/L Final   04/29/2018 107 100 - 109 mmol/L Final     Comment:     Specimen slightly hemolyzed, results may be affected.     Carbon Dioxide   Date Value Ref Range Status   07/07/2021 22 (L) 23 - 31 mmol/L Final   10/09/2018 25 23 - 31 mmol/L Final   04/29/2018 23 23 - 31 mmol/L Final     Comment:     Specimen slightly hemolyzed, results may be affected.     CO2   Date Value Ref Range Status   07/08/2024 25 21 - 32 mmol/L Final   06/28/2024 27 21 - 32 mmol/L Final   06/18/2024 28 21 - 32 mmol/L Final     BUN   Date Value Ref Range Status   07/08/2024 25 5 - 25 mg/dL Final   06/28/2024 24 5 - 25 mg/dL Final   06/18/2024 19 5 - 25 mg/dL Final   07/07/2021 28 7 - 28 mg/dL Final   10/09/2018 34 (H) 7 - 28 mg/dL Final   04/29/2018 15 7 - 28 mg/dL Final     Comment:     Specimen slightly hemolyzed, results may be affected.     Creatinine   Date Value Ref Range Status   07/08/2024 1.54 (H) 0.60 - 1.30 mg/dL Final     Comment:     Standardized to IDMS reference method   06/28/2024 1.40 (H) 0.60 - 1.30 mg/dL Final     Comment:     Standardized to IDMS reference method   06/18/2024 1.60 (H) 0.60 - 1.30 mg/dL Final     Comment:     Standardized to IDMS reference method   07/07/2021 1.18 0.53 - 1.30 mg/dL Final   10/09/2018 1.55 (H) 0.53 - 1.30 mg/dL Final   04/29/2018 1.34 (H) 0.53 - 1.30 mg/dL Final     Comment:     Specimen slightly hemolyzed, results may be affected.     Glucose   Date Value Ref Range Status   07/08/2024 110 65 - 140 mg/dL Final     Comment:     If the patient is fasting, the ADA then defines impaired fasting glucose as > 100 mg/dL and diabetes as > or equal to 123 mg/dL.   06/13/2024 250 (H) 65 - 140 mg/dL Final     Comment:     If the  patient is fasting, the ADA then defines impaired fasting glucose as > 100 mg/dL and diabetes as > or equal to 123 mg/dL.   06/12/2024 299 (H) 65 - 140 mg/dL Final     Comment:     If the patient is fasting, the ADA then defines impaired fasting glucose as > 100 mg/dL and diabetes as > or equal to 123 mg/dL.   07/07/2021 163 (H) 65 - 99 mg/dL Final   10/09/2018 90 65 - 99 mg/dL Final   04/29/2018 97 65 - 99 mg/dL Final     Comment:     Specimen slightly hemolyzed, results may be affected.     eGFR, Non-   Date Value Ref Range Status   07/07/2021 60 (L) >60 Final     eGFR,    Date Value Ref Range Status   07/07/2021 70 >60 Final     Calcium   Date Value Ref Range Status   07/08/2024 9.5 8.4 - 10.2 mg/dL Final   06/28/2024 9.4 8.4 - 10.2 mg/dL Final   06/18/2024 9.5 8.4 - 10.2 mg/dL Final   07/07/2021 9.2 8.5 - 10.1 mg/dL Final   10/09/2018 8.8 8.5 - 10.1 mg/dL Final   04/29/2018 8.6 8.5 - 10.1 mg/dL Final     Comment:     Specimen slightly hemolyzed, results may be affected.     Total Protein   Date Value Ref Range Status   10/22/2015 7.5 6.4 - 8.2 g/dL Final   07/16/2015 7.4 6.4 - 8.2 g/dL Final   06/16/2015 8.5 (H) 6.4 - 8.2 g/dL Final     Albumin   Date Value Ref Range Status   07/08/2024 4.3 3.5 - 5.0 g/dL Final   06/13/2024 3.5 3.5 - 5.0 g/dL Final   06/10/2024 3.4 (L) 3.5 - 5.0 g/dL Final     ALBUMIN   Date Value Ref Range Status   07/07/2021 4.0 3.5 - 4.8 g/dL Final   10/09/2018 4.1 3.5 - 4.8 g/dL Final   04/20/2018 3.6 3.5 - 4.8 g/dL Final     Total Bilirubin   Date Value Ref Range Status   07/08/2024 0.67 0.20 - 1.00 mg/dL Final     Comment:     Use of this assay is not recommended for patients undergoing treatment with eltrombopag due to the potential for falsely elevated results.  N-acetyl-p-benzoquinone imine (metabolite of Acetaminophen) will generate erroneously low results in samples for patients that have taken an overdose of Acetaminophen.   06/13/2024 0.53 0.20 - 1.00  mg/dL Final     Comment:     Use of this assay is not recommended for patients undergoing treatment with eltrombopag due to the potential for falsely elevated results.  N-acetyl-p-benzoquinone imine (metabolite of Acetaminophen) will generate erroneously low results in samples for patients that have taken an overdose of Acetaminophen.   06/10/2024 0.55 0.20 - 1.00 mg/dL Final     Comment:     Use of this assay is not recommended for patients undergoing treatment with eltrombopag due to the potential for falsely elevated results.  N-acetyl-p-benzoquinone imine (metabolite of Acetaminophen) will generate erroneously low results in samples for patients that have taken an overdose of Acetaminophen.   07/07/2021 0.6 0.2 - 1.0 mg/dL Final     Comment:     Use of this assay is not recommended for patients undergoing treatment with eltrombopag due to the potential for falsely elevated results.   10/09/2018 0.7 0.2 - 1.0 mg/dL Final   04/20/2018 0.5 0.2 - 1.0 mg/dL Final     Alkaline Phosphatase   Date Value Ref Range Status   07/08/2024 72 34 - 104 U/L Final   06/13/2024 101 34 - 104 U/L Final   06/10/2024 100 34 - 104 U/L Final   07/07/2021 85 35 - 120 U/L Final   10/09/2018 89 35 - 120 U/L Final   04/20/2018 107 35 - 120 U/L Final     AST   Date Value Ref Range Status   07/08/2024 16 13 - 39 U/L Final   06/13/2024 20 13 - 39 U/L Final   06/10/2024 22 13 - 39 U/L Final   07/07/2021 17 <41 U/L Final   10/09/2018 15 <41 U/L Final   04/20/2018 10 <41 U/L Final     ALT   Date Value Ref Range Status   07/08/2024 12 7 - 52 U/L Final     Comment:     Specimen collection should occur prior to Sulfasalazine administration due to the potential for falsely depressed results.    06/13/2024 35 7 - 52 U/L Final     Comment:     Specimen collection should occur prior to Sulfasalazine administration due to the potential for falsely depressed results.    06/10/2024 27 7 - 52 U/L Final     Comment:     Specimen collection should occur prior  "to Sulfasalazine administration due to the potential for falsely depressed results.    07/07/2021 30 <56 U/L Final   10/09/2018 25 <56 U/L Final   04/20/2018 31 <56 U/L Final      LD (LDH)   Date Value Ref Range Status   10/22/2015 159 81 - 234 U/L Final     Comment:     The above 1 analytes were performed by [facility] [place] [id number]     12/30/2014 157 5 - 200 U/L Final     Comment:     The above 1 analytes were performed by [facility] [place] [id number]     08/08/2014 151 5 - 200 U/L Final     Comment:     The above 1 analytes were performed by [facility] [place] [id number]       LD   Date Value Ref Range Status   02/27/2024 133 (L) 140 - 271 U/L Final   06/20/2023 121 (L) 140 - 271 U/L Final   03/03/2023 125 (L) 140 - 271 U/L Final     TSH   Date Value Ref Range Status   07/07/2021 4.19 (H) 0.36 - 3.74 uIU/mL Final     No results found for: \"[id number]\"   FREE T4   Date Value Ref Range Status   07/07/2021 0.81 0.76 - 1.46 ng/dL Final     Free T4   Date Value Ref Range Status   06/20/2023 0.83 0.61 - 1.12 ng/dL Final     Comment:     Specimens with biotin concentrations > 10 ng/mL can lead to significant (> 10%) positive bias in result.   04/25/2022 0.80 0.76 - 1.46 ng/dL Final     Comment:     Specimen collection should occur prior to Sulfasalazine administration due to the potential for falsely elevated results.   11/11/2021 0.89 0.76 - 1.46 ng/dL Final     Comment:     Specimen collection should occur prior to Sulfasalazine administration due to the potential for falsely elevated results.         RECENT IMAGING:  Procedure: XR chest pa & lateral    Result Date: 7/16/2024  Narrative: CHEST INDICATION:   Pneumonia, unspecified organism. COMPARISON:  None. EXAM PERFORMED/VIEWS:  XR CHEST PA & LATERAL FINDINGS: Cardiomediastinal silhouette appears unremarkable. The lungs are clear.  No pneumothorax or pleural effusion. Osseous structures appear within normal " "limits for patient age.     Impression: No acute cardiopulmonary disease. Complete clearing of the previously noted consolidation in the left upper lobe on radiograph of 6/7/2024 Electronically signed: 07/16/2024 02:27 PM Bucky Carlos MD      Total minutes spent reviewing EMR, seeing patient in clinic visit, documenting visit, placing treatment orders, and communicating with other medical providers: 35    Portions of the record may have been created with voice recognition software.  Occasional wrong word or \"sound a like\" substitutions may have occurred due to the inherent limitations of voice recognition software.  Read the chart carefully and recognize, using context, where substitutions have occurred.    "

## 2024-07-26 LAB
KAPPA LC FREE SER-MCNC: 60 MG/L (ref 3.3–19.4)
KAPPA LC FREE/LAMBDA FREE SER: 2.83 {RATIO} (ref 0.26–1.65)
LAMBDA LC FREE SERPL-MCNC: 21.2 MG/L (ref 5.7–26.3)

## 2024-07-27 LAB — B2 MICROGLOB SERPL-MCNC: 4 MG/L (ref 0.6–2.4)

## 2024-07-31 ENCOUNTER — APPOINTMENT (OUTPATIENT)
Dept: RADIOLOGY | Facility: HOSPITAL | Age: 79
End: 2024-07-31
Payer: MEDICARE

## 2024-07-31 ENCOUNTER — HOSPITAL ENCOUNTER (OUTPATIENT)
Dept: CT IMAGING | Facility: HOSPITAL | Age: 79
Discharge: HOME | End: 2024-07-31
Payer: MEDICARE

## 2024-07-31 DIAGNOSIS — D47.2 MONOCLONAL GAMMOPATHY: ICD-10-CM

## 2024-07-31 PROCEDURE — 76497 UNLISTED CT PROCEDURE: CPT

## 2024-08-08 ENCOUNTER — OFFICE VISIT (OUTPATIENT)
Dept: HEMATOLOGY ONCOLOGY | Facility: CLINIC | Age: 79
End: 2024-08-08
Payer: MEDICARE

## 2024-08-08 VITALS
HEIGHT: 66 IN | TEMPERATURE: 98.1 F | SYSTOLIC BLOOD PRESSURE: 138 MMHG | OXYGEN SATURATION: 98 % | RESPIRATION RATE: 17 BRPM | WEIGHT: 184 LBS | HEART RATE: 83 BPM | DIASTOLIC BLOOD PRESSURE: 82 MMHG | BODY MASS INDEX: 29.57 KG/M2

## 2024-08-08 DIAGNOSIS — C91.10 CHRONIC LYMPHOCYTIC LEUKEMIA (HCC): ICD-10-CM

## 2024-08-08 DIAGNOSIS — R21 RASH: ICD-10-CM

## 2024-08-08 DIAGNOSIS — D47.2 MONOCLONAL GAMMOPATHY: Primary | ICD-10-CM

## 2024-08-08 PROCEDURE — G2211 COMPLEX E/M VISIT ADD ON: HCPCS | Performed by: INTERNAL MEDICINE

## 2024-08-08 PROCEDURE — 99213 OFFICE O/P EST LOW 20 MIN: CPT | Performed by: INTERNAL MEDICINE

## 2024-08-08 NOTE — PATIENT INSTRUCTIONS
Start over the counter iron supplement. 65mg of elemental iron. Can start with one pill once daily. If not tolerating it, can take it every other day.     We will see you back in 6 weeks. Please have labs done at least 1-2 weeks prior.

## 2024-08-08 NOTE — PROGRESS NOTES
[facility]  [address] [address]  [phone]     Date of Visit: 8/8/2024  Name: Abelino Renee   YOB: 1945         Assessment/Plan  In summary, Abelino Renee is a 79 y.o. male with history of CLL/SLL previously treated with BR.  Patient then started maintenance Rituxan but had hematologic toxicities; Rituxan was held.  Since that time, he has been on surveillance.  And continues to do well from a CLL standpoint.  More recently, he was found to have a monoclonal gammopathy.    Serum immunofixation showed a monoclonal gammopathy identified as IgG kappa.   M protein 1.22 g/dL  UPEP showed a possible faint band.  WBC/ALC is stable. He is more anemic w/Hb around 10 now. Normal Plt. CKD stable.  Light chain ratio showed a kappa free light chain of 60, lambda 21 and a ratio of 2.83  Beta-2 microglobulin 4,  Whole-body CT scanning shows no osteolytic lesions.  Overall this is consistent with IgG kappa MGUS  Will defer bone marrow bx and continue with surveillance.    Iron studies low normal and will start him on oral iron supplements.  Continue surveillance for CLL     Patient was previously diagnosed with appendiceal carcinoid.  Patient underwent right hemicolectomy with negative lymph nodes.  No GI issues recently.  Surveillance continues for this also.     Referral made to derm per their request.    RTC in 6 mo w/ labs prior    All the patient's questions were answered to their apparent satisfaction.  Patient has been strongly urged to call with any questions or concerns.         HISTORY OF PRESENT ILLNESS:   Abelino Renee is a 79 y.o. male  with CLL/SLL previously treated with 4 cycles of BR (June 2012) with good response and no evidence of residual disease.  Patient was then started on maintenance Rituxan but because of CBC abnormalities, the maintenance was discontinued.  Patient has been on surveillance.     Mr. Lazar was  admitted to the hospital in 2015 for abdominal pain; underwent appendectomy.  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  Patient subsequently underwent right hemicolectomy in May 2015 which showed no evidence of additional disease.  23 lymph nodes were negative.  The plan was to continue with surveillance.       Subjective  Patient here today with his wife  No new complaints from last visit except for ongoing rash all over.   On steroid cream, not helpful . Requesting a derm referral      REVIEW OF SYSTEMS:  14 point review of systems otherwise  negative      Current Outpatient Medications:     acetaminophen (TYLENOL) 325 mg tablet, Take 2 tablets (650 mg total) by mouth every 6 (six) hours as needed for fever, mild pain or headaches, Disp: , Rfl:     amLODIPine (NORVASC) 10 mg tablet, Take 1 tablet (10 mg total) by mouth daily, Disp: 90 tablet, Rfl: 1    brimonidine tartrate 0.2 % ophthalmic solution, INSTILL 1 DROP INTO EACH EYE TWICE DAILY, Disp: , Rfl:     carvedilol (Coreg) 6.25 mg tablet, Take 1 tablet (6.25 mg total) by mouth 2 (two) times a day with meals, Disp: 180 tablet, Rfl: 1    glipiZIDE (GLUCOTROL XL) 10 mg 24 hr tablet, Take 1 tablet (10 mg total) by mouth 2 (two) times a day, Disp: 180 tablet, Rfl: 1    Glucosamine HCl (GLUCOSAMINE PO), Take 1 tablet by mouth 2 (two) times a day , Disp: , Rfl:     levothyroxine (Synthroid) 75 mcg tablet, Take 1 tablet (75 mcg total) by mouth daily, Disp: 90 tablet, Rfl: 1    losartan (COZAAR) 100 MG tablet, Take 1 tablet (100 mg total) by mouth daily, Disp: 100 tablet, Rfl: 1    Multiple Vitamin (MULTIVITAMIN) tablet, Take 1 tablet by mouth daily, Disp: , Rfl:     Omega-3 Fatty Acids (FISH OIL PO), Take by mouth daily, Disp: , Rfl:     simvastatin (ZOCOR) 20 mg tablet, Take 1 tablet (20 mg total) by mouth daily at bedtime, Disp: 90 tablet, Rfl: 1    timolol (TIMOPTIC) 0.5 % ophthalmic solution, timolol maleate 0.5 % eye drops, Disp: , Rfl:      "torsemide (DEMADEX) 5 MG tablet, Take 0.5 tablets (2.5 mg total) by mouth daily, Disp: , Rfl:      Allergies   Allergen Reactions    Oxycodone-Acetaminophen Hallucinations        ACTIVE PROBLEMS:  Patient Active Problem List   Diagnosis    Arthritis    Benign essential HTN    Cardiomyopathy (HCC)    Chronic lymphocytic leukemia (HCC)    CKD (chronic kidney disease) stage 3, GFR 30-59 ml/min (HCC)    H/O Clostridium difficile infection    Hx of malignant carcinoid tumor of large intestine    Type 2 diabetes mellitus with diabetic polyneuropathy, without long-term current use of insulin (HCC)    History of malignant neoplasm of appendix    Status post transmetatarsal amputation of left foot (HCC)    Elevated serum protein level    Hypothyroidism    Diabetic nephropathy associated with type 2 diabetes mellitus (HCC)    Acute congestive heart failure, unspecified heart failure type (HCC)    Acute respiratory failure with hypoxia (HCC)    Platelets decreased (HCC)          Past Medical History:   Diagnosis Date    Arthritis     Spine    Cancer (HCC)     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    Chronic lymphocytic leukemia of B-cell type (HCC)     \"remission 4-5yrs\"-chemo    DDD (degenerative disc disease), cervical     Diabetes mellitus (HCC)     bs checked by pt at home at 6am =92    Diabetic neuropathy (HCC)     Diabetic retinopathy (HCC)     Elevated WBC count     Heart attack (HCC) 04/2015    \"labeled as that and than tested later after appendix removed and stress test showed negative\"    History of cancer chemotherapy     last treatment approx 5yrs ago    Hypertension     Myocardial infarction (HCC) 04/2015    Scalp psoriasis     Shingles 2/10-15    Toe injury     left great toe, - 2017-internal braec-to be removed today 3/30/2018    Wears glasses         Past Surgical History:   Procedure Laterality Date    APPENDECTOMY      BONE BIOPSY Left 3/30/2018    Procedure: GREAT TOE BONE BIOPSY;  " "Surgeon: Gerber Multani DPM;  Location: [facility];  Service: Podiatry    BOWEL RESECTION      CATARACT EXTRACTION Bilateral     CHOLECYSTECTOMY      COLECTOMY  2015    EYE SURGERY Right     \"retinal peel\"    FOOT FUSION Left 2017    Procedure: HALLUX INTERPHALANGEAL JOINT FUSION;  Surgeon: Gerber Multani DPM;  [facility];  Service: Podiatry    HARDWARE REMOVAL Left 2018    Procedure: REMOVAL STAPLES LEFT TOE;  Surgeon: Gerber Multani DPM;  [facility];  Service: Podiatry    ORIF FOOT FRACTURE Left 2017    Procedure: HALLUX INTERPHALANGEAL JOINT INTERNAL FIXATION; REPAIR OF ULCERATION WITH EXCISIONS AND REVISION OF SKIN HALLUX WITH USE OF ALLOGRAFT;  Surgeon: Gerber Multani DPM;  [facility];  Service: Podiatry    DE COLONOSCOPY FLX DX W/COLLJ SPEC WHEN PFRMD N/A 2016    Procedure: COLONOSCOPY;  Surgeon: Minal Ruelas DO;  Location: [facility];  Service: Gastroenterology    DE REMOVAL IMPLANT DEEP Left 3/30/2018    Procedure: REMOVAL HARDWARE FOOT;  Surgeon: Gerber Multani DPM;  [facility];  Service: Podiatry    RETINAL DETACHMENT SURGERY      TONSILLECTOMY          Social History     Socioeconomic History    Marital status: /Civil Union     Spouse name: Not on file    Number of children: Not on file    Years of education: 4 yr college    Highest education level: Not on file   Occupational History    Not on file   Tobacco Use    Smoking status: Never     Passive exposure: Never    Smokeless tobacco: Never   Vaping Use    Vaping status: Never Used   Substance and Sexual Activity    Alcohol use: Never    Drug use: Never    Sexual activity: Never     Partners: Female     Birth control/protection: None   Other Topics Concern    Not on file   Social History Narrative    Most recent tobacco use screenin2018    Education: 4 Year College    Marital status:     Exercise level: Occasional    Diet: Diabetic    General stress level: Low " "   Alcohol intake: None    Caffeine intake: Moderate    Guns present in home: No    Smoke alarm in home: Yes     Social Determinants of Health     Financial Resource Strain: Patient Declined (2023)    Overall Financial Resource Strain (CARDIA)     Difficulty of Paying Living Expenses: Patient declined   Food Insecurity: No Food Insecurity (6/10/2024)    Hunger Vital Sign     Worried About Running Out of Food in the Last Year: Never true     Ran Out of Food in the Last Year: Never true   Transportation Needs: No Transportation Needs (6/10/2024)    PRAPARE - Transportation     Lack of Transportation (Medical): No     Lack of Transportation (Non-Medical): No   Physical Activity: Not on file   Stress: Not on file   Social Connections: Not on file   Intimate Partner Violence: Not on file   Housing Stability: Unknown (6/10/2024)    Housing Stability Vital Sign     Unable to Pay for Housing in the Last Year: No     Number of Times Moved in the Last Year: Not on file     Homeless in the Last Year: No        Family History   Problem Relation Age of Onset    Alcohol abuse Mother             Pancreatic cancer Father         age 79    Hashimoto's thyroiditis Daughter     Rheum arthritis Daughter         Juvenile Rheum Arthitis     No Known Problems Sister          age 78    No Known Problems Brother          86    Kidney failure Brother         dialysis, 74    Diabetes Brother         Objective        Physical Exam  ECOG performance status: 0  Blood pressure 138/82, pulse 83, temperature 98.1 °F (36.7 °C), temperature source Temporal, resp. rate 17, height 5' 6\" (1.676 m), weight 83.5 kg (184 lb), SpO2 98%.     General appearance: Not in acute distress, well appearing.      I reviewed lab data in the chart as noted above.  Additional labs as noted below    WBC   Date Value Ref Range Status   2024 25.11 (H) 4.31 - 10.16 Thousand/uL Final   2024 22.94 (H) 4.31 - 10.16 Thousand/uL Final   2024 " 19.78 (H) 4.31 - 10.16 Thousand/uL Final   10/22/2015 6.60 4.31 - 10.16 Thousand/uL Final   07/16/2015 5.83 4.31 - 10.16 Thousand/uL Final   06/26/2015 8.90 4.31 - 10.16 Thousand/uL Final     Hemoglobin   Date Value Ref Range Status   07/08/2024 10.4 (L) 12.0 - 17.0 g/dL Final   06/13/2024 10.1 (L) 12.0 - 17.0 g/dL Final   06/12/2024 8.7 (L) 12.0 - 17.0 g/dL Final   10/22/2015 13.6 12.0 - 17.0 g/dL Final   07/16/2015 11.8 (L) 12.0 - 17.0 g/dL Final   06/26/2015 12.4 12.0 - 17.0 g/dL Final     Platelets   Date Value Ref Range Status   07/08/2024 149 149 - 390 Thousands/uL Final   06/13/2024 237 149 - 390 Thousands/uL Final   06/12/2024 218 149 - 390 Thousands/uL Final   10/22/2015 169 149 - 390 Thousand/uL Final   07/16/2015 190 149 - 390 Thousand/uL Final   06/26/2015 390 149 - 390 Thousand/uL Final     MCV   Date Value Ref Range Status   07/08/2024 95 82 - 98 fL Final   06/13/2024 94 82 - 98 fL Final   06/12/2024 93 82 - 98 fL Final   10/22/2015 85 82 - 98 fL Final   07/16/2015 83 82 - 98 fL Final   06/26/2015 83 82 - 98 fL Final      Sodium   Date Value Ref Range Status   10/22/2015 140 136 - 145 mmol/L Final   07/16/2015 139 136 - 145 mmol/L Final   07/02/2015 138 135 - 145 mmol/L Final     Potassium   Date Value Ref Range Status   07/08/2024 4.0 3.5 - 5.3 mmol/L Final   06/28/2024 4.5 3.5 - 5.3 mmol/L Final   06/18/2024 4.4 3.5 - 5.3 mmol/L Final   07/07/2021 4.9 3.5 - 5.2 mmol/L Final   10/09/2018 5.2 3.5 - 5.2 mmol/L Final   04/29/2018 4.3 3.5 - 5.2 mmol/L Final     Comment:     Specimen slightly hemolyzed, results may be affected.     Chloride   Date Value Ref Range Status   07/08/2024 105 96 - 108 mmol/L Final   06/28/2024 105 96 - 108 mmol/L Final   06/18/2024 104 96 - 108 mmol/L Final   07/07/2021 107 100 - 109 mmol/L Final   10/09/2018 107 100 - 109 mmol/L Final   04/29/2018 107 100 - 109 mmol/L Final     Comment:     Specimen slightly hemolyzed, results may be affected.     Carbon Dioxide   Date Value  Ref Range Status   07/07/2021 22 (L) 23 - 31 mmol/L Final   10/09/2018 25 23 - 31 mmol/L Final   04/29/2018 23 23 - 31 mmol/L Final     Comment:     Specimen slightly hemolyzed, results may be affected.     CO2   Date Value Ref Range Status   07/08/2024 25 21 - 32 mmol/L Final   06/28/2024 27 21 - 32 mmol/L Final   06/18/2024 28 21 - 32 mmol/L Final     BUN   Date Value Ref Range Status   07/08/2024 25 5 - 25 mg/dL Final   06/28/2024 24 5 - 25 mg/dL Final   06/18/2024 19 5 - 25 mg/dL Final   07/07/2021 28 7 - 28 mg/dL Final   10/09/2018 34 (H) 7 - 28 mg/dL Final   04/29/2018 15 7 - 28 mg/dL Final     Comment:     Specimen slightly hemolyzed, results may be affected.     Creatinine   Date Value Ref Range Status   07/08/2024 1.54 (H) 0.60 - 1.30 mg/dL Final     Comment:     Standardized to IDMS reference method   06/28/2024 1.40 (H) 0.60 - 1.30 mg/dL Final     Comment:     Standardized to IDMS reference method   06/18/2024 1.60 (H) 0.60 - 1.30 mg/dL Final     Comment:     Standardized to IDMS reference method   07/07/2021 1.18 0.53 - 1.30 mg/dL Final   10/09/2018 1.55 (H) 0.53 - 1.30 mg/dL Final   04/29/2018 1.34 (H) 0.53 - 1.30 mg/dL Final     Comment:     Specimen slightly hemolyzed, results may be affected.     Glucose   Date Value Ref Range Status   07/08/2024 110 65 - 140 mg/dL Final     Comment:     If the patient is fasting, the ADA then defines impaired fasting glucose as > 100 mg/dL and diabetes as > or equal to 123 mg/dL.   06/13/2024 250 (H) 65 - 140 mg/dL Final     Comment:     If the patient is fasting, the ADA then defines impaired fasting glucose as > 100 mg/dL and diabetes as > or equal to 123 mg/dL.   06/12/2024 299 (H) 65 - 140 mg/dL Final     Comment:     If the patient is fasting, the ADA then defines impaired fasting glucose as > 100 mg/dL and diabetes as > or equal to 123 mg/dL.   07/07/2021 163 (H) 65 - 99 mg/dL Final   10/09/2018 90 65 - 99 mg/dL Final   04/29/2018 97 65 - 99 mg/dL Final      Comment:     Specimen slightly hemolyzed, results may be affected.     eGFR, Non-   Date Value Ref Range Status   07/07/2021 60 (L) >60 Final     eGFR,    Date Value Ref Range Status   07/07/2021 70 >60 Final     Calcium   Date Value Ref Range Status   07/08/2024 9.5 8.4 - 10.2 mg/dL Final   06/28/2024 9.4 8.4 - 10.2 mg/dL Final   06/18/2024 9.5 8.4 - 10.2 mg/dL Final   07/07/2021 9.2 8.5 - 10.1 mg/dL Final   10/09/2018 8.8 8.5 - 10.1 mg/dL Final   04/29/2018 8.6 8.5 - 10.1 mg/dL Final     Comment:     Specimen slightly hemolyzed, results may be affected.     Total Protein   Date Value Ref Range Status   10/22/2015 7.5 6.4 - 8.2 g/dL Final   07/16/2015 7.4 6.4 - 8.2 g/dL Final   06/16/2015 8.5 (H) 6.4 - 8.2 g/dL Final     Albumin   Date Value Ref Range Status   07/08/2024 4.3 3.5 - 5.0 g/dL Final   06/13/2024 3.5 3.5 - 5.0 g/dL Final   06/10/2024 3.4 (L) 3.5 - 5.0 g/dL Final     ALBUMIN   Date Value Ref Range Status   07/07/2021 4.0 3.5 - 4.8 g/dL Final   10/09/2018 4.1 3.5 - 4.8 g/dL Final   04/20/2018 3.6 3.5 - 4.8 g/dL Final     Total Bilirubin   Date Value Ref Range Status   07/08/2024 0.67 0.20 - 1.00 mg/dL Final     Comment:     Use of this assay is not recommended for patients undergoing treatment with eltrombopag due to the potential for falsely elevated results.  N-acetyl-p-benzoquinone imine (metabolite of Acetaminophen) will generate erroneously low results in samples for patients that have taken an overdose of Acetaminophen.   06/13/2024 0.53 0.20 - 1.00 mg/dL Final     Comment:     Use of this assay is not recommended for patients undergoing treatment with eltrombopag due to the potential for falsely elevated results.  N-acetyl-p-benzoquinone imine (metabolite of Acetaminophen) will generate erroneously low results in samples for patients that have taken an overdose of Acetaminophen.   06/10/2024 0.55 0.20 - 1.00 mg/dL Final     Comment:     Use of this assay is not  recommended for patients undergoing treatment with eltrombopag due to the potential for falsely elevated results.  N-acetyl-p-benzoquinone imine (metabolite of Acetaminophen) will generate erroneously low results in samples for patients that have taken an overdose of Acetaminophen.   07/07/2021 0.6 0.2 - 1.0 mg/dL Final     Comment:     Use of this assay is not recommended for patients undergoing treatment with eltrombopag due to the potential for falsely elevated results.   10/09/2018 0.7 0.2 - 1.0 mg/dL Final   04/20/2018 0.5 0.2 - 1.0 mg/dL Final     Alkaline Phosphatase   Date Value Ref Range Status   07/08/2024 72 34 - 104 U/L Final   06/13/2024 101 34 - 104 U/L Final   06/10/2024 100 34 - 104 U/L Final   07/07/2021 85 35 - 120 U/L Final   10/09/2018 89 35 - 120 U/L Final   04/20/2018 107 35 - 120 U/L Final     AST   Date Value Ref Range Status   07/08/2024 16 13 - 39 U/L Final   06/13/2024 20 13 - 39 U/L Final   06/10/2024 22 13 - 39 U/L Final   07/07/2021 17 <41 U/L Final   10/09/2018 15 <41 U/L Final   04/20/2018 10 <41 U/L Final     ALT   Date Value Ref Range Status   07/08/2024 12 7 - 52 U/L Final     Comment:     Specimen collection should occur prior to Sulfasalazine administration due to the potential for falsely depressed results.    06/13/2024 35 7 - 52 U/L Final     Comment:     Specimen collection should occur prior to Sulfasalazine administration due to the potential for falsely depressed results.    06/10/2024 27 7 - 52 U/L Final     Comment:     Specimen collection should occur prior to Sulfasalazine administration due to the potential for falsely depressed results.    07/07/2021 30 <56 U/L Final   10/09/2018 25 <56 U/L Final   04/20/2018 31 <56 U/L Final      LD (LDH)   Date Value Ref Range Status   10/22/2015 159 81 - 234 U/L Final     Comment:     The above 1 analytes [place] [id number]     12/30/2014 157 5 - 200 U/L Final     Comment:     The  "above 1 analytes [facility] [place] [id number]     08/08/2014 151 5 - 200 U/L Final     Comment:     The above 1 analytes [place] [id number]       LD   Date Value Ref Range Status   07/25/2024 136 (L) 140 - 271 U/L Final   02/27/2024 133 (L) 140 - 271 U/L Final   06/20/2023 121 (L) 140 - 271 U/L Final     TSH   Date Value Ref Range Status   07/07/2021 4.19 (H) 0.36 - 3.74 uIU/mL Final     No results found for: \"D7YJJAN\"   FREE T4   Date Value Ref Range Status   07/07/2021 0.81 0.76 - 1.46 ng/dL Final     Free T4   Date Value Ref Range Status   06/20/2023 0.83 0.61 - 1.12 ng/dL Final     Comment:     Specimens with biotin concentrations > 10 ng/mL can lead to significant (> 10%) positive bias in result.   04/25/2022 0.80 0.76 - 1.46 ng/dL Final     Comment:     Specimen collection should occur prior to Sulfasalazine administration due to the potential for falsely elevated results.   11/11/2021 0.89 0.76 - 1.46 ng/dL Final     Comment:     Specimen collection should occur prior to Sulfasalazine administration due to the potential for falsely elevated results.         RECENT IMAGING:  Procedure: CT low dose whole body myeloma scan    Result Date: 8/1/2024  Narrative: WHOLE BODY LOW DOSE CT WITHOUT IV CONTRAST (MULTIPLE MYELOMA PROTOCOL.) INDICATION:   D47.2: Monoclonal gammopathy. COMPARISON:  None. TECHNIQUE: Low radiation dose thin section CT examination of the whole body was performed without intravenous or oral contrast according to a protocol specifically designed to evaluate for possible multiple myeloma. Scan included the head, cervical spine, chest, abdomen, pelvis, as well as the humeri and femurs.  Axial, sagittal, and coronal 2D reformatted images were created from the source data and submitted for interpretation.  Evaluation for pathology in nonosseous structures is limited. Radiation dose length product (DLP) for this visit: "  1433.27 mGy-cm .  This examination, like all CT scans performed in the [facility], was performed utilizing techniques to minimize radiation dose exposure, including the use of iterative reconstruction and automated exposure control. Enteric contrast was not administered. FINDINGS: Reporting of Bone findings below are based on the 2014 International Myeloma Working Group (IMWG) recommendations. BONE FINDINGS: SUSPICIOUS OSTEOLYTIC LESIONS: DEFINITION OF SUSPICIOUS OSTEOLYTIC LESIONS: -must not have sclerotic border -5 mm or greater diameter -should not be fat density pre-treatment (zzosteolytic lesion) VERTEBRAL COMPRESSION FRACTURES: There are no potentially acute/malignant compression fracture. There are chronic, benign compression fracture(s) at: T9 (moderate) and T10 (mild). PERIPHERAL MEDULLARY PATTERN: Mixed pattern. Assessment of the parenchymal organs is suboptimal due to whole-body technique and lack of intravenous contrast VISUALIZED BRAIN: Moderate diffuse parenchymal atrophy. HEAD/NECK: Unremarkable. CHEST LUNGS: There are faint patchy pleural-parenchymal opacities within the left upper lobe, markedly improved since 6/7/2024. This is consistent with a resolving pneumonia an possible mild residual scarring. PLEURA: The pleural effusions have resolved. HEART/GREAT VESSELS: Heart is unremarkable for patient's age. Mild aortic arch calcification. MEDIASTINUM AND YOBANY: There are borderline enlarged diffuse mediastinal lymph nodes, which appear overall either unchanged or slightly decreased in size compared to the prior study. However, these are mildly enlarged since 2019. The largest left paratracheal lymph node measures 1.0 x 1.4 cm. This previously measured 1.1 x 1.7 cm (7/7/2024) ABDOMEN LIVER/BILIARY TREE:  Unremarkable. GALLBLADDER: Status post cholecystectomy. No suspicious biliary dilation. SPLEEN:  Unremarkable. PANCREAS:  Unremarkable. ADRENAL GLANDS:  Unremarkable. KIDNEYS/URETERS:  The right kidney and ureter are grossly unremarkable. 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 The small left renal cysts are grossly unchanged, but incompletely assessed without IV contrast. The left ureter is unremarkable. STOMACH AND BOWEL: The stomach and small bowel loops are unremarkable. The patient is status post right hemicolectomy and ileocolonic reanastomosis. There are numerous diverticula throughout the sigmoid colon, without associated inflammation. ABDOMINOPELVIC CAVITY:  No ascites.  No pneumoperitoneum.  No lymphadenopathy. VESSELS:  Unremarkable for patient's age. PELVIS REPRODUCTIVE ORGANS:  Unremarkable for patient's age. URINARY BLADDER:  Unremarkable. ABDOMINAL WALL/INGUINAL REGIONS: Moderate size fat-containing left and tiny fat-containing right inguinal hernias. There are mildly prominent bilateral inguinal lymph nodes, which maintain normal fatty davidson and ovoid morphology. These have not significantly changed since 2019     Impression: 1.  WHOLE BODY LOW DOSE CT FOR EVALUATION OF MULTIPLE MYELOMA: OSTEOLYTIC LESIONS: None. PROBABLY MALIGNANT VERTEBRAL FRACTURE: None. SUSPICIOUS PERIPHERAL MEDULLARY PATTERN: No. 2. Near complete resolution of the recent left upper lobe pneumonia. 3. Sigmoid diverticulosis. Workstation performed: [id number]     Procedure: XR chest pa & lateral    Result Date: 7/16/2024  Narrative: CHEST INDICATION:   Pneumonia, unspecified organism. COMPARISON:  None. EXAM PERFORMED/VIEWS:  XR CHEST PA & LATERAL FINDINGS: Cardiomediastinal silhouette appears unremarkable. The lungs are clear.  No pneumothorax or pleural effusion. Osseous structures appear within normal limits for patient age.     Impression: No acute cardiopulmonary disease. Complete clearing of the previously noted consolidation in the left upper lobe on radiograph of 6/7/2024 Electronically signed: 07/16/2024 02:27 PM Bucky Carlos MD      Total minutes  "spent reviewing EMR, seeing patient in clinic visit, documenting visit, placing treatment orders, and communicating with other medical providers: 25    Portions of the record may have been created with voice recognition software.  Occasional wrong word or \"sound a like\" substitutions may have occurred due to the inherent limitations of voice recognition software.  Read the chart carefully and recognize, using context, where substitutions have occurred.    "

## 2024-08-09 ENCOUNTER — TELEPHONE (OUTPATIENT)
Age: 79
End: 2024-08-09

## 2024-08-09 NOTE — TELEPHONE ENCOUNTER
Patient called has an ASAP referral in the chart for rash and monoclonal gammopathy he said the rash has been present for about 3 months sometimes itchy and no history of skin cancer I offered next available and patient said they would call back

## 2024-08-09 NOTE — TELEPHONE ENCOUNTER
Pt stated Oncology Provider: Dr. Moe    What is the reason for the call/chief complaint? Patient calling in inquiring when he is supposed to return to the office to see Dr. Moe, reporting that she told him 6 months, but on the AVS, it said 6 weeks. I confirmed with patient that it is 6 months, not 6 weeks- based off two other places in her note that state RTC in 6 months and his labs are also dated out 6 months from now.  Patient verbalized understanding and has no other questions or concerns at this moment.

## 2024-08-14 ENCOUNTER — APPOINTMENT (OUTPATIENT)
Dept: LAB | Facility: CLINIC | Age: 79
End: 2024-08-14
Payer: MEDICARE

## 2024-08-14 DIAGNOSIS — N18.32 CHRONIC KIDNEY DISEASE (CKD) STAGE G3B/A1, MODERATELY DECREASED GLOMERULAR FILTRATION RATE (GFR) BETWEEN 30-44 ML/MIN/1.73 SQUARE METER AND ALBUMINURIA CREATININE RATIO LESS THAN 30 MG/G (HCC): ICD-10-CM

## 2024-08-14 DIAGNOSIS — C91.10 CHRONIC LYMPHOCYTIC LEUKEMIA (HCC): ICD-10-CM

## 2024-08-14 LAB
ANION GAP SERPL CALCULATED.3IONS-SCNC: 5 MMOL/L (ref 4–13)
BUN SERPL-MCNC: 32 MG/DL (ref 5–25)
CALCIUM SERPL-MCNC: 9.1 MG/DL (ref 8.4–10.2)
CHLORIDE SERPL-SCNC: 108 MMOL/L (ref 96–108)
CO2 SERPL-SCNC: 26 MMOL/L (ref 21–32)
CREAT SERPL-MCNC: 1.46 MG/DL (ref 0.6–1.3)
GFR SERPL CREATININE-BSD FRML MDRD: 45 ML/MIN/1.73SQ M
GLUCOSE P FAST SERPL-MCNC: 189 MG/DL (ref 65–99)
POTASSIUM SERPL-SCNC: 4.2 MMOL/L (ref 3.5–5.3)
SODIUM SERPL-SCNC: 139 MMOL/L (ref 135–147)

## 2024-08-14 PROCEDURE — 36415 COLL VENOUS BLD VENIPUNCTURE: CPT

## 2024-08-14 PROCEDURE — 80048 BASIC METABOLIC PNL TOTAL CA: CPT

## 2024-08-20 ENCOUNTER — TELEPHONE (OUTPATIENT)
Dept: ADMINISTRATIVE | Facility: OTHER | Age: 79
End: 2024-08-20

## 2024-08-20 NOTE — TELEPHONE ENCOUNTER
Upon review of the In Basket request we were able to locate, review, and update the patient chart as requested for Diabetic Eye Exam.    Any additional questions or concerns should be emailed to the Practice Liaisons via the appropriate education email address, please do not reply via In Basket.    Thank you  Ella Gavin MA   [facility]

## 2024-08-20 NOTE — TELEPHONE ENCOUNTER
----- Message from Nova WALDEN sent at 8/19/2024  2:50 PM EDT -----  08/19/24 2:50 PM    Hello, our patient Abelino Renee has had Diabetic Eye Exam completed/performed. Please assist in updating the patient chart by pulling the document from the Media Tab. The date of service is 10/25/2023.     Thank you,  DAGOBERTO Burton [facility]

## 2024-09-11 ENCOUNTER — RA CDI HCC (OUTPATIENT)
Dept: OTHER | Facility: HOSPITAL | Age: 79
End: 2024-09-11

## 2024-09-11 ENCOUNTER — TELEPHONE (OUTPATIENT)
Dept: HEMATOLOGY ONCOLOGY | Facility: CLINIC | Age: 79
End: 2024-09-11

## 2024-09-11 PROBLEM — E11.22 TYPE 2 DIABETES MELLITUS WITH DIABETIC CHRONIC KIDNEY DISEASE (HCC): Status: ACTIVE | Noted: 2024-09-11

## 2024-09-11 NOTE — PROGRESS NOTES
HCC coding opportunities          Chart Reviewed number of suggestions sent to Provider: 3     Patients Insurance   E11.22, E11.40 and I13.0  Medicare Insurance: Medicare

## 2024-09-11 NOTE — TELEPHONE ENCOUNTER
Spoke w/ pt. He is scheduled for DINORAH to Dr. Acosta on 2/13 at 4:00. Pt voiced understanding and accepted appt

## 2024-09-18 ENCOUNTER — OFFICE VISIT (OUTPATIENT)
Dept: FAMILY MEDICINE CLINIC | Facility: CLINIC | Age: 79
End: 2024-09-18
Payer: MEDICARE

## 2024-09-18 VITALS
SYSTOLIC BLOOD PRESSURE: 130 MMHG | DIASTOLIC BLOOD PRESSURE: 60 MMHG | HEIGHT: 66 IN | TEMPERATURE: 97.8 F | RESPIRATION RATE: 16 BRPM | WEIGHT: 181 LBS | BODY MASS INDEX: 29.09 KG/M2 | HEART RATE: 83 BPM | OXYGEN SATURATION: 96 %

## 2024-09-18 DIAGNOSIS — Z23 ENCOUNTER FOR IMMUNIZATION: ICD-10-CM

## 2024-09-18 DIAGNOSIS — C91.10 CHRONIC LYMPHOCYTIC LEUKEMIA (HCC): ICD-10-CM

## 2024-09-18 DIAGNOSIS — E11.42 TYPE 2 DIABETES MELLITUS WITH DIABETIC POLYNEUROPATHY, WITHOUT LONG-TERM CURRENT USE OF INSULIN (HCC): ICD-10-CM

## 2024-09-18 DIAGNOSIS — I10 BENIGN ESSENTIAL HTN: ICD-10-CM

## 2024-09-18 DIAGNOSIS — E03.9 ACQUIRED HYPOTHYROIDISM: ICD-10-CM

## 2024-09-18 DIAGNOSIS — N18.32 STAGE 3B CHRONIC KIDNEY DISEASE (HCC): ICD-10-CM

## 2024-09-18 DIAGNOSIS — N18.31 STAGE 3A CHRONIC KIDNEY DISEASE (HCC): ICD-10-CM

## 2024-09-18 DIAGNOSIS — Z00.00 MEDICARE ANNUAL WELLNESS VISIT, SUBSEQUENT: Primary | ICD-10-CM

## 2024-09-18 PROBLEM — J96.01 ACUTE RESPIRATORY FAILURE WITH HYPOXIA (HCC): Status: RESOLVED | Noted: 2024-06-07 | Resolved: 2024-09-18

## 2024-09-18 PROBLEM — I50.9 ACUTE CONGESTIVE HEART FAILURE, UNSPECIFIED HEART FAILURE TYPE (HCC): Status: RESOLVED | Noted: 2024-03-07 | Resolved: 2024-09-18

## 2024-09-18 PROCEDURE — 90662 IIV NO PRSV INCREASED AG IM: CPT | Performed by: FAMILY MEDICINE

## 2024-09-18 PROCEDURE — G0008 ADMIN INFLUENZA VIRUS VAC: HCPCS | Performed by: FAMILY MEDICINE

## 2024-09-18 PROCEDURE — G0439 PPPS, SUBSEQ VISIT: HCPCS | Performed by: FAMILY MEDICINE

## 2024-09-18 PROCEDURE — 99214 OFFICE O/P EST MOD 30 MIN: CPT | Performed by: FAMILY MEDICINE

## 2024-09-18 RX ORDER — CLOBETASOL PROPIONATE 0.5 MG/G
OINTMENT TOPICAL
COMMUNITY
Start: 2024-08-14

## 2024-09-18 RX ORDER — CARVEDILOL 6.25 MG/1
6.25 TABLET ORAL 2 TIMES DAILY WITH MEALS
Qty: 180 TABLET | Refills: 1 | Status: SHIPPED | OUTPATIENT
Start: 2024-09-18 | End: 2025-09-13

## 2024-09-18 RX ORDER — AMLODIPINE BESYLATE 10 MG/1
10 TABLET ORAL DAILY
Qty: 90 TABLET | Refills: 1 | Status: SHIPPED | OUTPATIENT
Start: 2024-09-18

## 2024-09-18 RX ORDER — LEVOTHYROXINE SODIUM 75 UG/1
75 TABLET ORAL DAILY
Qty: 90 TABLET | Refills: 1 | Status: SHIPPED | OUTPATIENT
Start: 2024-09-18

## 2024-09-18 RX ORDER — LOSARTAN POTASSIUM 100 MG/1
100 TABLET ORAL DAILY
Qty: 90 TABLET | Refills: 1 | Status: SHIPPED | OUTPATIENT
Start: 2024-09-18

## 2024-09-18 NOTE — ASSESSMENT & PLAN NOTE
Following with heme/onc, reviewed most recent consult note.  Orders:    CBC and differential; Future

## 2024-09-18 NOTE — PROGRESS NOTES
Ambulatory Visit  Name: Abelino Renee      : 1945      MRN: [id number]  Encounter Provider: Edel Wright MD  Encounter Date: 2024   Encounter department: [facility]  Medicare annual wellness visit, subsequent  See plan as noted below.  Up-to-date colonoscopy, will obtain report.    Last colonosocpy Dr Joya 2023- no further colonoscopies needed due to age.        Type 2 diabetes mellitus with diabetic polyneuropathy, without long-term current use of insulin (HCC)  His most recent A1c was elevated at 8.5.  However, his fasting blood sugars have been better than they were previously.  He had been hospitalized within the 3 months prior to that last A1c, and I suspect his illness was certainly contributing.  Repeat A1c ordered.  He is no longer on metformin.  He states that he is taking glipizide regularly  He required insulin when he was hospitalized.  Lab Results   Component Value Date    HGBA1C 8.5 (H) 2024       Orders:    Comprehensive metabolic panel; Future    Hemoglobin A1C; Future    Lipid Panel with Direct LDL reflex; Future    TSH, 3rd generation with Free T4 reflex; Future    CBC and differential; Future    Albumin / creatinine urine ratio; Future    Stage 3a chronic kidney disease (HCC)  Lab Results   Component Value Date    EGFR 45 2024    EGFR 42 2024    EGFR 48 2024    CREATININE 1.46 (H) 2024    CREATININE 1.54 (H) 2024    CREATININE 1.40 (H) 2024   He continues to follow with [facility].    Orders:    Comprehensive metabolic panel; Future    Chronic lymphocytic leukemia (HCC)  Following with heme/onc, reviewed most recent consult note.  Orders:    CBC and differential; Future    Benign essential HTN  Stable, controlled  No changes today.  Follows with both cardiology and nephrology.  Orders:    amLODIPine (NORVASC) 10 mg tablet; Take 1 tablet (10 mg total) by mouth daily    carvedilol  (Coreg) 6.25 mg tablet; Take 1 tablet (6.25 mg total) by mouth 2 (two) times a day with meals    losartan (COZAAR) 100 MG tablet; Take 1 tablet (100 mg total) by mouth daily    Comprehensive metabolic panel; Future    Acquired hypothyroidism  He is due for repeat TSH.  On levothyroxine 75 mcg daily as noted.  Once he has labs, can adjust the dose if needed.    Orders:    levothyroxine (Synthroid) 75 mcg tablet; Take 1 tablet (75 mcg total) by mouth daily    TSH, 3rd generation with Free T4 reflex; Future       Preventive health issues were discussed with patient, and age appropriate screening tests were ordered as noted in patient's After Visit Summary. Personalized health advice and appropriate referrals for health education or preventive services given if needed, as noted in patient's After Visit Summary.    History of Present Illness     Patient is here for with his wife for an annual Medicare wellness visit as well as follow-up on chronic conditions.    Had pneumonia in June.  He was hospitalized for this.  Multifocal pneumonia.  He was negative for COVID/flu/RSV, negative Legionella, negative strep pneumo  He and his wife have gotten sick a few times over the past year.  Each time they have been sick it has been after they were visiting with her grandkids.  They are trying to be more mindful about avoiding visits if the grandkids are sick, hand hygiene, distance.    He is taking iron - half tab daily of lowest otc dose.  He is tolerating this well.  This was at the advice of his hematologist.  He is following up with them regularly.    DM- He notes his BS ranges  now.  He notes that previously, his blood sugars were higher when he was acutely ill.  Suspects that the higher A1c was related to this.  Blood sugars have improved now.  He is trying to be mindful of carbs in his diet.  Off metformin.  No hypoglycemic events  He has hypoglycemic awareness.    Following with podiatry for routine care, and  diabetic monitoring.    Following with cardiology.   Seeing nephrology soon           Patient Care Team:  Edel Wright MD as PCP - General (Family Medicine)  DO Sheikh Pacheco Mathews MD Darius Desai, MD Anne E Mani, MD Kimberly Jegel Chaput, DO as Endoscopist  Onesimo Rainey MD (Ophthalmology)  Kacy Moe MD (Hematology and Oncology)    Review of Systems  Medical History Reviewed by provider this encounter:       Annual Wellness Visit Questionnaire   Abelino is here for his Subsequent Wellness visit.     Health Risk Assessment:   Patient rates overall health as good. Patient feels that their physical health rating is same. Patient is satisfied with their life. Eyesight was rated as same. Hearing was rated as same. Patient feels that their emotional and mental health rating is same. Patients states they are never, rarely angry. Patient states they are never, rarely unusually tired/fatigued. Pain experienced in the last 7 days has been some. Patient's pain rating has been 3/10. Patient states that he has experienced no weight loss or gain in last 6 months.     Depression Screening:   PHQ-2 Score: 0      Fall Risk Screening:   In the past year, patient has experienced: no history of falling in past year      Home Safety:  Patient does not have trouble with stairs inside or outside of their home. Patient has working smoke alarms and has no working carbon monoxide detector. Home safety hazards include: none.     Nutrition:   Current diet is Diabetic.     Medications:   Patient is currently taking over-the-counter supplements. OTC medications include: see medication list. Patient is able to manage medications.     Activities of Daily Living (ADLs)/Instrumental Activities of Daily Living (IADLs):   Walk and transfer into and out of bed and chair?: Yes  Dress and groom yourself?: Yes    Bathe or shower yourself?: Yes    Feed yourself? Yes  Do your laundry/housekeeping?: Yes  Manage your money,  pay your bills and track your expenses?: Yes  Make your own meals?: Yes    Do your own shopping?: Yes    Previous Hospitalizations:   Any hospitalizations or ED visits within the last 12 months?: Yes    How many hospitalizations have you had in the last year?: 1-2    Advance Care Planning:   Living will: No    Durable POA for healthcare: No    Advanced directive: No      Cognitive Screening:   Provider or family/friend/caregiver concerned regarding cognition?: No    PREVENTIVE SCREENINGS      Cardiovascular Screening:    General: Screening Current      Diabetes Screening:     General: Screening Not Indicated and History Diabetes      Colorectal Cancer Screening:     General: History Colorectal Cancer      Prostate Cancer Screening:    General: Screening Not Indicated      Lung Cancer Screening:     General: Screening Not Indicated      Hepatitis C Screening:    General: Screening Current    Screening, Brief Intervention, and Referral to Treatment (SBIRT)    Screening  Typical number of drinks in a day: 0  Typical number of drinks in a week: 0  Interpretation: Low risk drinking behavior.    AUDIT-C Screenin) How often did you have a drink containing alcohol in the past year? never  2) How many drinks did you have on a typical day when you were drinking in the past year? 0  3) How often did you have 6 or more drinks on one occasion in the past year? never    AUDIT-C Score: 0  Interpretation: Score 0-3 (male): Negative screen for alcohol misuse    Single Item Drug Screening:  How often have you used an illegal drug (including marijuana) or a prescription medication for non-medical reasons in the past year? never    Single Item Drug Screen Score: 0  Interpretation: Negative screen for possible drug use disorder    Social Determinants of Health     Financial Resource Strain: Patient Declined (2023)    Overall Financial Resource Strain (CARDIA)     Difficulty of Paying Living Expenses: Patient declined   Food  "Insecurity: No Food Insecurity (9/18/2024)    Hunger Vital Sign     Worried About Running Out of Food in the Last Year: Never true     Ran Out of Food in the Last Year: Never true   Transportation Needs: No Transportation Needs (9/18/2024)    PRAPARE - Transportation     Lack of Transportation (Medical): No     Lack of Transportation (Non-Medical): No   Housing Stability: Unknown (9/18/2024)    Housing Stability Vital Sign     Unable to Pay for Housing in the Last Year: No     Homeless in the Last Year: No   Utilities: Not At Risk (9/18/2024)    [facility] Utilities     Threatened with loss of utilities: No     No results found.    Objective     /60 (BP Location: Left arm, Patient Position: Sitting, Cuff Size: Standard)   Pulse 83   Temp 97.8 °F (36.6 °C) (Temporal)   Resp 16   Ht 5' 6\" (1.676 m)   Wt 82.1 kg (181 lb)   SpO2 96%   BMI 29.21 kg/m²     Physical Exam  Vitals and nursing note reviewed.   Constitutional:       General: He is not in acute distress.     Appearance: Normal appearance. He is well-developed. He is not ill-appearing.   HENT:      Head: Normocephalic and atraumatic.   Neck:      Vascular: No carotid bruit.   Cardiovascular:      Rate and Rhythm: Normal rate and regular rhythm.      Heart sounds: No murmur heard.  Pulmonary:      Effort: Pulmonary effort is normal. No respiratory distress.      Breath sounds: Normal breath sounds. No wheezing or rhonchi.   Abdominal:      Palpations: Abdomen is soft.      Tenderness: There is no abdominal tenderness. There is no guarding or rebound.   Musculoskeletal:      Cervical back: Neck supple.      Right lower leg: Edema (1+) present.      Left lower leg: Edema (1+) present.   Lymphadenopathy:      Cervical: No cervical adenopathy.   Skin:     General: Skin is warm and dry.   Neurological:      Mental Status: He is alert and oriented to person, place, and time.   Psychiatric:         Mood and Affect: Mood normal.         Behavior: Behavior normal. "

## 2024-09-18 NOTE — ASSESSMENT & PLAN NOTE
Stable, controlled  No changes today.  Follows with both cardiology and nephrology.  Orders:    amLODIPine (NORVASC) 10 mg tablet; Take 1 tablet (10 mg total) by mouth daily    carvedilol (Coreg) 6.25 mg tablet; Take 1 tablet (6.25 mg total) by mouth 2 (two) times a day with meals    losartan (COZAAR) 100 MG tablet; Take 1 tablet (100 mg total) by mouth daily    Comprehensive metabolic panel; Future

## 2024-09-18 NOTE — ASSESSMENT & PLAN NOTE
He is due for repeat TSH.  On levothyroxine 75 mcg daily as noted.  Once he has labs, can adjust the dose if needed.    Orders:    levothyroxine (Synthroid) 75 mcg tablet; Take 1 tablet (75 mcg total) by mouth daily    TSH, 3rd generation with Free T4 reflex; Future

## 2024-09-18 NOTE — ASSESSMENT & PLAN NOTE
Lab Results   Component Value Date    EGFR 45 08/14/2024    EGFR 42 07/08/2024    EGFR 48 06/28/2024    CREATININE 1.46 (H) 08/14/2024    CREATININE 1.54 (H) 07/08/2024    CREATININE 1.40 (H) 06/28/2024   He continues to follow with [facility].    Orders:    Comprehensive metabolic panel; Future

## 2024-09-18 NOTE — ASSESSMENT & PLAN NOTE
His most recent A1c was elevated at 8.5.  However, his fasting blood sugars have been better than they were previously.  He had been hospitalized within the 3 months prior to that last A1c, and I suspect his illness was certainly contributing.  Repeat A1c ordered.  He is no longer on metformin.  He states that he is taking glipizide regularly  He required insulin when he was hospitalized.  Lab Results   Component Value Date    HGBA1C 8.5 (H) 06/11/2024       Orders:    Comprehensive metabolic panel; Future    Hemoglobin A1C; Future    Lipid Panel with Direct LDL reflex; Future    TSH, 3rd generation with Free T4 reflex; Future    CBC and differential; Future    Albumin / creatinine urine ratio; Future

## 2024-09-18 NOTE — PATIENT INSTRUCTIONS
Medicare Preventive Visit Patient Instructions  Thank you for completing your Welcome to Medicare Visit or Medicare Annual Wellness Visit today. Your next wellness visit will be due in one year (9/19/2025).  The screening/preventive services that you may require over the next 5-10 years are detailed below. Some tests may not apply to you based off risk factors and/or age. Screening tests ordered at today's visit but not completed yet may show as past due. Also, please note that scanned in results may not display below.  Preventive Screenings:  Service Recommendations Previous Testing/Comments   Colorectal Cancer Screening  Colonoscopy    Fecal Occult Blood Test (FOBT)/Fecal Immunochemical Test (FIT)  Fecal DNA/Cologuard Test  Flexible Sigmoidoscopy Age: 45-75 years old   Colonoscopy: every 10 years (May be performed more frequently if at higher risk)  OR  FOBT/FIT: every 1 year  OR  Cologuard: every 3 years  OR  Sigmoidoscopy: every 5 years  Screening may be recommended earlier than age 45 if at higher risk for colorectal cancer. Also, an individualized decision between you and your healthcare provider will decide whether screening between the ages of 76-85 would be appropriate. Colonoscopy: 04/21/2016  FOBT/FIT: Not on file  Cologuard: Not on file  Sigmoidoscopy: Not on file    History Colorectal Cancer     Prostate Cancer Screening Individualized decision between patient and health care provider in men between ages of 55-69   Medicare will cover every 12 months beginning on the day after your 50th birthday PSA: No results in last 5 years     Screening Not Indicated     Hepatitis C Screening Once for adults born between 1945 and 1965  More frequently in patients at high risk for Hepatitis C Hep C Antibody: 07/08/2024    Screening Current   Diabetes Screening 1-2 times per year if you're at risk for diabetes or have pre-diabetes Fasting glucose: 189 mg/dL (8/14/2024)  A1C: 8.5 % (6/11/2024)  Screening Not  Indicated  History Diabetes   Cholesterol Screening Once every 5 years if you don't have a lipid disorder. May order more often based on risk factors. Lipid panel: 02/27/2024  Screening Current      Other Preventive Screenings Covered by Medicare:  Abdominal Aortic Aneurysm (AAA) Screening: covered once if your at risk. You're considered to be at risk if you have a family history of AAA or a male between the age of 65-75 who smoking at least 100 cigarettes in your lifetime.  Lung Cancer Screening: covers low dose CT scan once per year if you meet all of the following conditions: (1) Age 55-77; (2) No signs or symptoms of lung cancer; (3) Current smoker or have quit smoking within the last 15 years; (4) You have a tobacco smoking history of at least 20 pack years (packs per day x number of years you smoked); (5) You get a written order from a healthcare provider.  Glaucoma Screening: covered annually if you're considered high risk: (1) You have diabetes OR (2) Family history of glaucoma OR (3)  aged 50 and older OR (4)  American aged 65 and older  Osteoporosis Screening: covered every 2 years if you meet one of the following conditions: (1) Have a vertebral abnormality; (2) On glucocorticoid therapy for more than 3 months; (3) Have primary hyperparathyroidism; (4) On osteoporosis medications and need to assess response to drug therapy.  HIV Screening: covered annually if you're between the age of 15-65. Also covered annually if you are younger than 15 and older than 65 with risk factors for HIV infection. For pregnant patients, it is covered up to 3 times per pregnancy.    Immunizations:  Immunization Recommendations   Influenza Vaccine Annual influenza vaccination during flu season is recommended for all persons aged >= 6 months who do not have contraindications   Pneumococcal Vaccine   * Pneumococcal conjugate vaccine = PCV13 (Prevnar 13), PCV15 (Vaxneuvance), PCV20 (Prevnar 20)  *  Pneumococcal polysaccharide vaccine = PPSV23 (Pneumovax) Adults 19-65 yo with certain risk factors or if 65+ yo  If never received any pneumonia vaccine: recommend Prevnar 20 (PCV20)  Give PCV20 if previously received 1 dose of PCV13 or PPSV23   Hepatitis B Vaccine 3 dose series if at intermediate or high risk (ex: diabetes, end stage renal disease, liver disease)   Respiratory syncytial virus (RSV) Vaccine - COVERED BY MEDICARE PART D  * RSVPreF3 (Arexvy) CDC recommends that adults 60 years of age and older may receive a single dose of RSV vaccine using shared clinical decision-making (SCDM)   Tetanus (Td) Vaccine - COST NOT COVERED BY MEDICARE PART B Following completion of primary series, a booster dose should be given every 10 years to maintain immunity against tetanus. Td may also be given as tetanus wound prophylaxis.   Tdap Vaccine - COST NOT COVERED BY MEDICARE PART B Recommended at least once for all adults. For pregnant patients, recommended with each pregnancy.   Shingles Vaccine (Shingrix) - COST NOT COVERED BY MEDICARE PART B  2 shot series recommended in those 19 years and older who have or will have weakened immune systems or those 50 years and older     Health Maintenance Due:      Topic Date Due   • Hepatitis C Screening  Completed     Immunizations Due:      Topic Date Due   • COVID-19 Vaccine (4 - 2023-24 season) 09/01/2024   • Influenza Vaccine (1) 09/01/2024     Advance Directives   What are advance directives?  Advance directives are legal documents that state your wishes and plans for medical care. These plans are made ahead of time in case you lose your ability to make decisions for yourself. Advance directives can apply to any medical decision, such as the treatments you want, and if you want to donate organs.   What are the types of advance directives?  There are many types of advance directives, and each state has rules about how to use them. You may choose a combination of any of the  following:  Living will:  This is a written record of the treatment you want. You can also choose which treatments you do not want, which to limit, and which to stop at a certain time. This includes surgery, medicine, IV fluid, and tube feedings.   Durable power of  for healthcare (DPAHC):  This is a written record that states who you want to make healthcare choices for you when you are unable to make them for yourself. This person, called a proxy, is usually a family member or a friend. You may choose more than 1 proxy.  Do not resuscitate (DNR) order:  A DNR order is used in case your heart stops beating or you stop breathing. It is a request not to have certain forms of treatment, such as CPR. A DNR order may be included in other types of advance directives.  Medical directive:  This covers the care that you want if you are in a coma, near death, or unable to make decisions for yourself. You can list the treatments you want for each condition. Treatment may include pain medicine, surgery, blood transfusions, dialysis, IV or tube feedings, and a ventilator (breathing machine).  Values history:  This document has questions about your views, beliefs, and how you feel and think about life. This information can help others choose the care that you would choose.  Why are advance directives important?  An advance directive helps you control your care. Although spoken wishes may be used, it is better to have your wishes written down. Spoken wishes can be misunderstood, or not followed. Treatments may be given even if you do not want them. An advance directive may make it easier for your family to make difficult choices about your care.   Weight Management   Why it is important to manage your weight:  Being overweight increases your risk of health conditions such as heart disease, high blood pressure, type 2 diabetes, and certain types of cancer. It can also increase your risk for osteoarthritis, sleep apnea, and  other respiratory problems. Aim for a slow, steady weight loss. Even a small amount of weight loss can lower your risk of health problems.  How to lose weight safely:  A safe and healthy way to lose weight is to eat fewer calories and get regular exercise. You can lose up about 1 pound a week by decreasing the number of calories you eat by 500 calories each day.   Healthy meal plan for weight management:  A healthy meal plan includes a variety of foods, contains fewer calories, and helps you stay healthy. A healthy meal plan includes the following:  Eat whole-grain foods more often.  A healthy meal plan should contain fiber. Fiber is the part of grains, fruits, and vegetables that is not broken down by your body. Whole-grain foods are healthy and provide extra fiber in your diet. Some examples of whole-grain foods are whole-wheat breads and pastas, oatmeal, brown rice, and bulgur.  Eat a variety of vegetables every day.  Include dark, leafy greens such as spinach, kale, anabel greens, and mustard greens. Eat yellow and orange vegetables such as carrots, sweet potatoes, and winter squash.   Eat a variety of fruits every day.  Choose fresh or canned fruit (canned in its own juice or light syrup) instead of juice. Fruit juice has very little or no fiber.  Eat low-fat dairy foods.  Drink fat-free (skim) milk or 1% milk. Eat fat-free yogurt and low-fat cottage cheese. Try low-fat cheeses such as mozzarella and other reduced-fat cheeses.  Choose meat and other protein foods that are low in fat.  Choose beans or other legumes such as split peas or lentils. Choose fish, skinless poultry (chicken or turkey), or lean cuts of red meat (beef or pork). Before you cook meat or poultry, cut off any visible fat.   Use less fat and oil.  Try baking foods instead of frying them. Add less fat, such as margarine, sour cream, regular salad dressing and mayonnaise to foods. Eat fewer high-fat foods. Some examples of high-fat foods  include french fries, doughnuts, ice cream, and cakes.  Eat fewer sweets.  Limit foods and drinks that are high in sugar. This includes candy, cookies, regular soda, and sweetened drinks.  Exercise:  Exercise at least 30 minutes per day on most days of the week. Some examples of exercise include walking, biking, dancing, and swimming. You can also fit in more physical activity by taking the stairs instead of the elevator or parking farther away from stores. Ask your healthcare provider about the best exercise plan for you.      © Copyright [employer] 2018 Information is for End User's use only and may not be sold, redistributed or otherwise used for commercial purposes. All illustrations and images included in CareNotes® are the copyrighted property of [employer] or [employer]

## 2024-09-20 ENCOUNTER — TELEPHONE (OUTPATIENT)
Dept: ADMINISTRATIVE | Facility: OTHER | Age: 79
End: 2024-09-20

## 2024-09-20 NOTE — TELEPHONE ENCOUNTER
Upon review of the In Basket request we were able to locate, review, and update the patient chart as requested for CRC: Colonoscopy.    Any additional questions or concerns should be emailed to the Practice Liaisons via the appropriate education email address, please do not reply via In Basket.    Thank you  Bennett Quintana MA   [facility]

## 2024-09-20 NOTE — TELEPHONE ENCOUNTER
----- Message from Edel Wright MD sent at 9/19/2024  6:59 PM EDT -----  Regarding: Colonoscopy  Patient had colonoscopy 11/2023 with colorectal at [facility], Dr. Joya

## 2024-09-20 NOTE — TELEPHONE ENCOUNTER
----- Message from Angeline VILLASENOR sent at 9/18/2024  1:47 PM EDT -----  Regarding: care gap request  09/18/24 1:47 PM    Hello, our patient attached above has had CRC: Colonoscopy completed/performed. Please assist in updating the patient chart by making an External outreach to Dr. Neelam Brand facility located in [place]. The date of service is September/October 2023.    Thank you,  Angeline Nesbitt [facility]  [facility]

## 2024-09-20 NOTE — TELEPHONE ENCOUNTER
Patient called to check the status of refills. Patient made aware that they were sent 09/18/24. Patient will contact the pharmacy.

## 2024-10-27 NOTE — PROGRESS NOTES
Cardiology Office Note  MD David Beltran MD, [facility]  Alfred Green DO, [facility]  MD Alyssa Still DO, [facility]  Wale Hurd DO, [facility]  ----------------------------------------------------------------  [place] [address]    Abelino Renee 79 y.o. male MRN: [id number]  Unit/Bed#:  Encounter: [id number]      History of Present Illness:  It was a pleasure to see Abelino Renee in the office today for initial CV evaluation. He has a past medical history of hypertension, type 2 diabetes mellitus, hypothyroid, CKD, monoclonal gammopathy and CLL.  He establish care with us in October 2024. The patient had an acute appendicitis with rupture in April 2015.  At the time, the patient had significant inflammatory response and was hospitalized.  He was found to have elevated troponin with concern for ACS.  Echocardiogram however demonstrated findings consistent with stress-induced/Takotsubo cardiomyopathy.  He underwent ischemic evaluation with stress testing which demonstrated no evidence of myocardial ischemia.  Initially, EF was found to be as low as 50%, but ejection fraction improved up to over 60% on the gated study of the stress test in 2015.  In June 2024, the patient went for repeat echocardiogram showing normal left ventricular systolic function with normal diastolic function, mild LVH and moderate right ventricular dilatation.  Pulmonary pressures were elevated at 54 mmHg.  He was lost to cardiovascular follow-up following this episode and reestablished care in October 2024.    He denies any chest pain, pressure, tightness or squeezing.  Denies lightheadedness, dizziness or palpitations.  Denies lower extremity swelling, orthopnea or paroxysmal nocturnal dyspnea.    Review of Systems:  Review of Systems   Constitutional: Negative for decreased appetite, fever, weight gain and weight loss.   HENT:  Negative for congestion and sore  "throat.    Eyes:  Negative for visual disturbance.   Cardiovascular:  Negative for chest pain, dyspnea on exertion, leg swelling, near-syncope and palpitations.   Respiratory:  Negative for cough and shortness of breath.    Hematologic/Lymphatic: Negative for bleeding problem.   Skin:  Negative for rash.   Musculoskeletal:  Negative for myalgias and neck pain.   Gastrointestinal:  Negative for abdominal pain and nausea.   Neurological:  Negative for light-headedness and weakness.   Psychiatric/Behavioral:  Negative for depression.        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    Chronic lymphocytic leukemia of B-cell type (HCC)     \"remission 4-5yrs\"-chemo    DDD (degenerative disc disease), cervical     Diabetes mellitus (HCC)     bs checked by pt at home at 6am =92    Diabetic neuropathy (HCC)     Diabetic retinopathy (HCC)     Elevated WBC count     Heart attack (HCC) 04/2015    \"labeled as that and than tested later after appendix removed and stress test showed negative\"    History of cancer chemotherapy     last treatment approx 5yrs ago    Hypertension     Myocardial infarction (HCC) 04/2015    Scalp psoriasis     Shingles 2/10-15    Toe injury     left great toe, - 2017-internal braec-to be removed today 3/30/2018    Wears glasses        Past Surgical History:   Procedure Laterality Date    APPENDECTOMY      BONE BIOPSY Left 3/30/2018    Procedure: GREAT TOE BONE BIOPSY;  Surgeon: Gerber Multani DPM;  Location: [facility];  Service: Podiatry    BOWEL RESECTION      CATARACT EXTRACTION Bilateral     CHOLECYSTECTOMY      COLECTOMY  06/2015    EYE SURGERY Right     \"retinal peel\"    FOOT FUSION Left 12/6/2017    Procedure: HALLUX INTERPHALANGEAL JOINT FUSION;  Surgeon: Gerber Multani DPM;  [facility];  Service: " Podiatry    HARDWARE REMOVAL Left 2018    Procedure: REMOVAL STAPLES LEFT TOE;  Surgeon: Gerber Multani DPM;  [facility];  Service: Podiatry    ORIF FOOT FRACTURE Left 2017    Procedure: HALLUX INTERPHALANGEAL JOINT INTERNAL FIXATION; REPAIR OF ULCERATION WITH EXCISIONS AND REVISION OF SKIN HALLUX WITH USE OF ALLOGRAFT;  Surgeon: Gerber Multani DPM;  [facility];  Service: Podiatry    MD COLONOSCOPY FLX DX W/COLLJ SPEC WHEN PFRMD N/A 2016    Procedure: COLONOSCOPY;  Surgeon: Minal Ruelas DO;  Location: [facility];  Service: Gastroenterology    MD REMOVAL IMPLANT DEEP Left 3/30/2018    Procedure: REMOVAL HARDWARE FOOT;  Surgeon: Gerber Multani DPM;  [facility];  Service: Podiatry    RETINAL DETACHMENT SURGERY      TONSILLECTOMY         Social History     Socioeconomic History    Marital status: /Civil Union     Spouse name: None    Number of children: None    Years of education: 4 yr college    Highest education level: None   Occupational History    None   Tobacco Use    Smoking status: Never     Passive exposure: Never    Smokeless tobacco: Never   Vaping Use    Vaping status: Never Used   Substance and Sexual Activity    Alcohol use: Never    Drug use: Never    Sexual activity: Never     Partners: Female     Birth control/protection: None   Other Topics Concern    None   Social History Narrative    Most recent tobacco use screenin2018    Education: 4 Year College    Marital status:     Exercise level: Occasional    Diet: Diabetic    General stress level: Low    Alcohol intake: None    Caffeine intake: Moderate    Guns present in home: No    Smoke alarm in home: Yes     Social Determinants of Health     Financial Resource Strain: Patient Declined (2023)    Overall Financial Resource Strain (CARDIA)     Difficulty of Paying Living Expenses: Patient declined   Food Insecurity: No Food Insecurity (2024)    Nursing - Inadequate Food Risk  Classification     Worried About Running Out of Food in the Last Year: Never true     Ran Out of Food in the Last Year: Never true     Ran Out of Food in the Last Year: Not on file   Transportation Needs: No Transportation Needs (2024)    PRAPARE - Transportation     Lack of Transportation (Medical): No     Lack of Transportation (Non-Medical): No   Physical Activity: Not on file   Stress: Not on file   Social Connections: Not on file   Intimate Partner Violence: Not on file   Housing Stability: Unknown (2024)    Housing Stability Vital Sign     Unable to Pay for Housing in the Last Year: No     Number of Times Moved in the Last Year: Not on file     Homeless in the Last Year: No       Family History   Problem Relation Age of Onset    Alcohol abuse Mother             Pancreatic cancer Father         age 79    Hashimoto's thyroiditis Daughter     Rheum arthritis Daughter         Juvenile Rheum Arthitis     No Known Problems Sister          age 78    No Known Problems Brother          86    Kidney failure Brother         dialysis, 74    Diabetes Brother        Allergies   Allergen Reactions    Oxycodone-Acetaminophen Hallucinations         Current Outpatient Medications:     apixaban (Eliquis) 5 mg, Take 1 tablet (5 mg total) by mouth 2 (two) times a day, Disp: 180 tablet, Rfl: 3    acetaminophen (TYLENOL) 325 mg tablet, Take 2 tablets (650 mg total) by mouth every 6 (six) hours as needed for fever, mild pain or headaches, Disp: , Rfl:     amLODIPine (NORVASC) 10 mg tablet, Take 1 tablet (10 mg total) by mouth daily, Disp: 90 tablet, Rfl: 1    brimonidine tartrate 0.2 % ophthalmic solution, INSTILL 1 DROP INTO EACH EYE TWICE DAILY, Disp: , Rfl:     carvedilol (Coreg) 6.25 mg tablet, Take 1 tablet (6.25 mg total) by mouth 2 (two) times a day with meals, Disp: 180 tablet, Rfl: 1    clobetasol (TEMOVATE) 0.05 % ointment, APPLY AT BEDTIME TWO TIMES DAILY TO ITCHY AREAS FOR 2-4 WEEKS MAX AS  "NEEDED, Disp: , Rfl:     glipiZIDE (GLUCOTROL XL) 10 mg 24 hr tablet, Take 1 tablet (10 mg total) by mouth 2 (two) times a day, Disp: 180 tablet, Rfl: 1    Glucosamine HCl (GLUCOSAMINE PO), Take 1 tablet by mouth 2 (two) times a day , Disp: , Rfl:     levothyroxine (Synthroid) 75 mcg tablet, Take 1 tablet (75 mcg total) by mouth daily, Disp: 90 tablet, Rfl: 1    losartan (COZAAR) 100 MG tablet, Take 1 tablet (100 mg total) by mouth daily, Disp: 90 tablet, Rfl: 1    Multiple Vitamin (MULTIVITAMIN) tablet, Take 1 tablet by mouth daily, Disp: , Rfl:     Omega-3 Fatty Acids (FISH OIL PO), Take by mouth daily, Disp: , Rfl:     simvastatin (ZOCOR) 20 mg tablet, Take 1 tablet (20 mg total) by mouth daily at bedtime, Disp: 90 tablet, Rfl: 1    timolol (TIMOPTIC) 0.5 % ophthalmic solution, timolol maleate 0.5 % eye drops, Disp: , Rfl:     torsemide (DEMADEX) 5 MG tablet, Take 0.5 tablets (2.5 mg total) by mouth daily, Disp: , Rfl:     Vitals:    10/28/24 1340   BP: 112/64   Pulse: 69   Weight: 82.1 kg (181 lb)   Height: 5' 6\" (1.676 m)     Body mass index is 29.21 kg/m².    PHYSICAL EXAMINATION:  Gen: Awake, Alert, NAD   Head/eyes: AT/NC, pupils equal and round, Anicteric  ENT: mmm  Neck: Supple, No elevated JVP, trachea midline  Resp: CTA bilaterally no w/r/r  CV: irreg irreg +S1, S2, No m/r/g  Abd: Soft, NT/ND + BS  Ext: +1 pitting LE edema bilaterally  Neuro: Follows commands, moves all extermities  Psych: Appropriate affect, normal mood, pleasant attitude, non-combative  Skin: warm; no rash, erythema or venous stasis changes on exposed skin    --------------------------------------------------------------------------------  TREADMILL STRESS  No results found for this or any previous visit.     --------------------------------------------------------------------------------  NUCLEAR STRESS TEST: No results found for this or any previous visit.    No results found for this or any previous " "visit.      --------------------------------------------------------------------------------  CATH:  No results found for this or any previous visit.    --------------------------------------------------------------------------------  ECHO:   No results found for this or any previous visit.    No results found for this or any previous visit.    --------------------------------------------------------------------------------  HOLTER  No results found for this or any previous visit.    No results found for this or any previous visit.    --------------------------------------------------------------------------------  CAROTIDS  No results found for this or any previous visit.     --------------------------------------------------------------------------------  ECGs:  Results for orders placed or performed in visit on 10/28/24   POCT ECG    Impression    Atrial fibrillation with controlled ventricular response 69 bpm, low voltage, poor R wave progression        Lab Results   Component Value Date    WBC 25.11 (H) 07/08/2024    HGB 10.4 (L) 07/08/2024    HCT 32.8 (L) 07/08/2024    MCV 95 07/08/2024     07/08/2024      Lab Results   Component Value Date    SODIUM 139 08/14/2024    K 4.2 08/14/2024     08/14/2024    CO2 26 08/14/2024    BUN 32 (H) 08/14/2024    CREATININE 1.46 (H) 08/14/2024    GLUC 110 07/08/2024    CALCIUM 9.1 08/14/2024      Lab Results   Component Value Date    HGBA1C 8.5 (H) 06/11/2024      Lab Results   Component Value Date    CHOL 143 04/29/2015    CHOL 89 04/08/2015    CHOL 194 01/15/2014     Lab Results   Component Value Date    HDL 39 (L) 02/27/2024    HDL 37 (L) 06/20/2023    HDL 37 (L) 10/19/2022     Lab Results   Component Value Date    LDLCALC 71 02/27/2024    LDLCALC 51 06/20/2023    LDLCALC 59 10/19/2022     Lab Results   Component Value Date    TRIG 93 02/27/2024    TRIG 151 (H) 06/20/2023    TRIG 128 10/19/2022     No results found for: \"CHOLHDL\"   Lab Results   Component " Value Date    INR 1.0 04/20/2018    INR 1.27 (H) 04/07/2015    PROTIME 15.1 (H) 04/07/2015        1. Chronic heart failure with preserved ejection fraction (HFpEF) ([facility])  -     POCT ECG  -     Basic metabolic panel; Future; Expected date: 11/04/2024  -     B-Type Natriuretic Peptide(BNP); Future  -     Ambulatory Referral to Sleep Medicine; Future  2. Essential hypertension  -     apixaban (Eliquis) 5 mg; Take 1 tablet (5 mg total) by mouth 2 (two) times a day  -     Ambulatory Referral to Sleep Medicine; Future  3. Type 2 diabetes mellitus with diabetic polyneuropathy, without long-term current use of insulin ([facility])  4. Acute congestive heart failure, unspecified heart failure type ([facility])  -     Ambulatory referral to Cardiology  5. Paroxysmal atrial fibrillation ([facility])  -     Holter monitor; Future; Expected date: 10/28/2024  -     apixaban (Eliquis) 5 mg; Take 1 tablet (5 mg total) by mouth 2 (two) times a day  -     Ambulatory Referral to Sleep Medicine; Future      IMPRESSION:    CAD with coronary calcifications by CT chest, June 2024  Acute on chronic HFimpEF secondary to Takotsubo cardiomyopathy, April 2015  LVEF 60%, mild LVH, normal diastolic function, moderate RV dilatation with normal function, mild LA dilatation, mild MR/TR with PASP 54 mmHg, June 2024  Nonischemic cardiomyopathy with an LV function of 50%, April 2015  Nuclear stress test appears negative for myocardial ischemia, April 2015  Newly discovered paroxysmal atrial fibrillation with CVR  Hypertension  Dyslipidemia w/ LDL 71 mg/dL, HDL 39 mg/dL, TG 93 mg/dL, February 2024  Type 2 diabetes mellitus  Hypothyroid  CKD stage III  Monoclonal gammopathy  CLL    PLAN:  It was a pleasure to see Ramos in the office today for initial CV evaluation.  He is here today due to his history of Takotsubo cardiomyopathy as well as coronary calcifications and nonischemic cardiomyopathy with recovered LV function.  In the office today, the patient is being diagnosed  with new atrial fibrillation with controlled ventricular response.  He has had no chest pain concerning for angina and no signs or symptoms of heart failure.  Clinically he examines to be volume up with +1 pitting edema bilaterally.  Lungs are currently clear.  His echocardiogram in June 2024 shows mild to moderate pulmonary hypertension.  Blood pressure and heart rate are currently stable.  He is tolerating his current medications without any reported adverse effects.  Denies any history of significant bleeding.  Based on his clinical presentation, I have the following recommendations:    1.  Given the patient's new onset atrial fibrillation, we will check a 48-hour Holter monitor to assess his burden of atrial fibrillation  2.  As he has a [id number]-HILd score of over 5, would be reasonable to initiate anticoagulation.  He is under the age of 81 and with weight greater than 60 kg.  At this time, I would initiate him on Eliquis 5 mg twice daily.  Risks and benefits discussed.  3.  We may consider him for RAJ guided cardioversion based on the results of his monitor.  If there is a paroxysmal nature to his AF and he is going back into sinus rhythm, would initiate amiodarone.  If the patient has nearly 100% burden of AF, I would schedule him for RAJ guided cardioversion.  4.  Reviewing his weights as well as examining his extremities, the patient appears to have mild volume overload with +1 pitting edema bilaterally.  Would increase his torsemide to 5 mg daily for the next 4 to 5 days and then reinstitute torsemide 2.5 mg daily.  Metabolic panel and BNP has been ordered.  We will have him follow-up with nephrology for discussion of further titration of his diuretics given his chronic kidney disease.  5.  Continue current antihypertensive regimen including amlodipine, carvedilol and losartan.  6.  May consider changing over his statin therapy in the future.  For now, continue simvastatin as prescribed.  7.  Continue  "glipizide.  Blood glucose management as per primary care.  8.  Sleep medicine evaluation with AF and hypertension.   9.  Should he develop any significant palpitations, worsening shortness of breath or peripheral edema worsening in severity, recommend seeking immediate medical attention.  10.  We will follow-up with him to reassess his progress following his Holter monitor.    As always, please do not hesitate to call with any questions.    Portions of the record may have been created with voice recognition software. Occasional wrong word or \"sound a like\" substitutions may have occurred due to the inherent limitations of voice recognition software. Read the chart carefully and recognize, using context, where substitutions have occurred.      Signed: Alfred Green DO, FACC, FASE, FASNC, FACP  "

## 2024-10-28 ENCOUNTER — CONSULT (OUTPATIENT)
Dept: CARDIOLOGY CLINIC | Facility: CLINIC | Age: 79
End: 2024-10-28
Payer: MEDICARE

## 2024-10-28 VITALS
HEART RATE: 69 BPM | SYSTOLIC BLOOD PRESSURE: 112 MMHG | BODY MASS INDEX: 29.09 KG/M2 | HEIGHT: 66 IN | DIASTOLIC BLOOD PRESSURE: 64 MMHG | WEIGHT: 181 LBS

## 2024-10-28 DIAGNOSIS — E11.42 TYPE 2 DIABETES MELLITUS WITH DIABETIC POLYNEUROPATHY, WITHOUT LONG-TERM CURRENT USE OF INSULIN (HCC): ICD-10-CM

## 2024-10-28 DIAGNOSIS — I50.32 CHRONIC HEART FAILURE WITH PRESERVED EJECTION FRACTION (HFPEF) (HCC): Primary | ICD-10-CM

## 2024-10-28 DIAGNOSIS — I10 ESSENTIAL HYPERTENSION: ICD-10-CM

## 2024-10-28 DIAGNOSIS — I48.0 PAROXYSMAL ATRIAL FIBRILLATION (HCC): ICD-10-CM

## 2024-10-28 DIAGNOSIS — I50.9 ACUTE CONGESTIVE HEART FAILURE, UNSPECIFIED HEART FAILURE TYPE (HCC): ICD-10-CM

## 2024-10-28 PROCEDURE — 99204 OFFICE O/P NEW MOD 45 MIN: CPT | Performed by: INTERNAL MEDICINE

## 2024-10-28 PROCEDURE — 93000 ELECTROCARDIOGRAM COMPLETE: CPT | Performed by: INTERNAL MEDICINE

## 2024-11-05 ENCOUNTER — APPOINTMENT (OUTPATIENT)
Dept: LAB | Facility: HOSPITAL | Age: 79
End: 2024-11-05
Payer: MEDICARE

## 2024-11-05 DIAGNOSIS — I10 BENIGN ESSENTIAL HTN: ICD-10-CM

## 2024-11-05 DIAGNOSIS — N18.31 STAGE 3A CHRONIC KIDNEY DISEASE (HCC): ICD-10-CM

## 2024-11-05 DIAGNOSIS — N18.32 CHRONIC KIDNEY DISEASE (CKD) STAGE G3B/A1, MODERATELY DECREASED GLOMERULAR FILTRATION RATE (GFR) BETWEEN 30-44 ML/MIN/1.73 SQUARE METER AND ALBUMINURIA CREATININE RATIO LESS THAN 30 MG/G (HCC): ICD-10-CM

## 2024-11-05 DIAGNOSIS — E03.9 ACQUIRED HYPOTHYROIDISM: ICD-10-CM

## 2024-11-05 DIAGNOSIS — C91.10 CHRONIC LYMPHOCYTIC LEUKEMIA (HCC): ICD-10-CM

## 2024-11-05 DIAGNOSIS — E03.9 HYPOTHYROIDISM, UNSPECIFIED TYPE: ICD-10-CM

## 2024-11-05 DIAGNOSIS — E11.42 TYPE 2 DIABETES MELLITUS WITH DIABETIC POLYNEUROPATHY, WITHOUT LONG-TERM CURRENT USE OF INSULIN (HCC): ICD-10-CM

## 2024-11-05 DIAGNOSIS — I50.32 CHRONIC HEART FAILURE WITH PRESERVED EJECTION FRACTION (HFPEF) (HCC): ICD-10-CM

## 2024-11-05 DIAGNOSIS — R80.9 PROTEINURIA, UNSPECIFIED TYPE: ICD-10-CM

## 2024-11-05 LAB
ALBUMIN SERPL BCG-MCNC: 3.8 G/DL (ref 3.5–5)
ALP SERPL-CCNC: 78 U/L (ref 34–104)
ALT SERPL W P-5'-P-CCNC: 18 U/L (ref 7–52)
ANION GAP SERPL CALCULATED.3IONS-SCNC: 8 MMOL/L (ref 4–13)
AST SERPL W P-5'-P-CCNC: 15 U/L (ref 13–39)
BASOPHILS # BLD AUTO: 0.05 THOUSANDS/ΜL (ref 0–0.1)
BASOPHILS NFR BLD AUTO: 0 % (ref 0–1)
BILIRUB SERPL-MCNC: 0.51 MG/DL (ref 0.2–1)
BNP SERPL-MCNC: 331 PG/ML (ref 0–100)
BUN SERPL-MCNC: 30 MG/DL (ref 5–25)
C4 SERPL-MCNC: 13 MG/DL (ref 19–52)
CALCIUM SERPL-MCNC: 9 MG/DL (ref 8.4–10.2)
CHLORIDE SERPL-SCNC: 106 MMOL/L (ref 96–108)
CHOLEST SERPL-MCNC: 83 MG/DL
CO2 SERPL-SCNC: 20 MMOL/L (ref 21–32)
CREAT SERPL-MCNC: 1.5 MG/DL (ref 0.6–1.3)
CREAT UR-MCNC: 93.3 MG/DL
CREAT UR-MCNC: 95.4 MG/DL
EOSINOPHIL # BLD AUTO: 0.21 THOUSAND/ΜL (ref 0–0.61)
EOSINOPHIL NFR BLD AUTO: 1 % (ref 0–6)
ERYTHROCYTE [DISTWIDTH] IN BLOOD BY AUTOMATED COUNT: 13.8 % (ref 11.6–15.1)
EST. AVERAGE GLUCOSE BLD GHB EST-MCNC: 197 MG/DL
GFR SERPL CREATININE-BSD FRML MDRD: 43 ML/MIN/1.73SQ M
GLUCOSE P FAST SERPL-MCNC: 152 MG/DL (ref 65–99)
HBA1C MFR BLD: 8.5 %
HCT VFR BLD AUTO: 27.7 % (ref 36.5–49.3)
HDLC SERPL-MCNC: 25 MG/DL
HGB BLD-MCNC: 9.1 G/DL (ref 12–17)
IMM GRANULOCYTES # BLD AUTO: 0.04 THOUSAND/UL (ref 0–0.2)
IMM GRANULOCYTES NFR BLD AUTO: 0 % (ref 0–2)
LDH SERPL-CCNC: 123 U/L (ref 140–271)
LDLC SERPL CALC-MCNC: 43 MG/DL (ref 0–100)
LYMPHOCYTES # BLD AUTO: 16.22 THOUSANDS/ΜL (ref 0.6–4.47)
LYMPHOCYTES NFR BLD AUTO: 78 % (ref 14–44)
MCH RBC QN AUTO: 30.3 PG (ref 26.8–34.3)
MCHC RBC AUTO-ENTMCNC: 32.9 G/DL (ref 31.4–37.4)
MCV RBC AUTO: 92 FL (ref 82–98)
MICROALBUMIN UR-MCNC: 172.7 MG/L
MICROALBUMIN/CREAT 24H UR: 185 MG/G CREATININE (ref 0–30)
MONOCYTES # BLD AUTO: 0.34 THOUSAND/ΜL (ref 0.17–1.22)
MONOCYTES NFR BLD AUTO: 2 % (ref 4–12)
NEUTROPHILS # BLD AUTO: 3.87 THOUSANDS/ΜL (ref 1.85–7.62)
NEUTS SEG NFR BLD AUTO: 19 % (ref 43–75)
NRBC BLD AUTO-RTO: 0 /100 WBCS
PLATELET # BLD AUTO: 182 THOUSANDS/UL (ref 149–390)
PMV BLD AUTO: 9.4 FL (ref 8.9–12.7)
POTASSIUM SERPL-SCNC: 4.4 MMOL/L (ref 3.5–5.3)
PROT SERPL-MCNC: 7.8 G/DL (ref 6.4–8.4)
PROT UR-MCNC: 63.5 MG/DL
PROT/CREAT UR: 0.7 MG/G{CREAT} (ref 0–0.1)
RBC # BLD AUTO: 3 MILLION/UL (ref 3.88–5.62)
SODIUM SERPL-SCNC: 134 MMOL/L (ref 135–147)
TRIGL SERPL-MCNC: 77 MG/DL
TSH SERPL DL<=0.05 MIU/L-ACNC: 3.84 UIU/ML (ref 0.45–4.5)
WBC # BLD AUTO: 20.73 THOUSAND/UL (ref 4.31–10.16)

## 2024-11-05 PROCEDURE — 86160 COMPLEMENT ANTIGEN: CPT

## 2024-11-05 PROCEDURE — 82043 UR ALBUMIN QUANTITATIVE: CPT

## 2024-11-05 PROCEDURE — 83880 ASSAY OF NATRIURETIC PEPTIDE: CPT

## 2024-11-05 PROCEDURE — 84443 ASSAY THYROID STIM HORMONE: CPT

## 2024-11-05 PROCEDURE — 83036 HEMOGLOBIN GLYCOSYLATED A1C: CPT

## 2024-11-05 PROCEDURE — 82570 ASSAY OF URINE CREATININE: CPT

## 2024-11-05 PROCEDURE — 80061 LIPID PANEL: CPT

## 2024-11-05 PROCEDURE — 84156 ASSAY OF PROTEIN URINE: CPT

## 2024-11-05 PROCEDURE — 36415 COLL VENOUS BLD VENIPUNCTURE: CPT

## 2024-11-06 ENCOUNTER — PATIENT MESSAGE (OUTPATIENT)
Dept: FAMILY MEDICINE CLINIC | Facility: CLINIC | Age: 79
End: 2024-11-06

## 2024-11-06 ENCOUNTER — TELEPHONE (OUTPATIENT)
Age: 79
End: 2024-11-06

## 2024-11-06 DIAGNOSIS — E11.22 TYPE 2 DIABETES MELLITUS WITH STAGE 3 CHRONIC KIDNEY DISEASE, WITHOUT LONG-TERM CURRENT USE OF INSULIN, UNSPECIFIED WHETHER STAGE 3A OR 3B CKD (HCC): Primary | ICD-10-CM

## 2024-11-06 DIAGNOSIS — N18.30 TYPE 2 DIABETES MELLITUS WITH STAGE 3 CHRONIC KIDNEY DISEASE, WITHOUT LONG-TERM CURRENT USE OF INSULIN, UNSPECIFIED WHETHER STAGE 3A OR 3B CKD (HCC): Primary | ICD-10-CM

## 2024-11-06 NOTE — TELEPHONE ENCOUNTER
I do not want him to start back on metformin due to his kidney function.  I had tried adding januvia in the past but this was cost prohibitive.  This was in 2022, so may have different formulary now.   Tradjenta (linagliptin) may be covered by plan.  Let him know I will send this to his [facility] pharmacy.  It is to be taken once a day.  Continue taking glipizide twice a day  Have him update me with how his bs look after taking it a few weeks (call if any low readings)

## 2024-11-06 NOTE — TELEPHONE ENCOUNTER
Encouraged therapeutic lifestyle changes Patient's wife calling stating the medication is just too expensive is there an alternative to try?  Please let them know

## 2024-11-06 NOTE — TELEPHONE ENCOUNTER
Patient called will be mailing sugar logs, would like to know if he should go back on Metformin. States has been 178 and this morning was 110. If agree to restart Metformin would like sent to [facility] pharmacy

## 2024-11-07 ENCOUNTER — TELEPHONE (OUTPATIENT)
Age: 79
End: 2024-11-07

## 2024-11-07 DIAGNOSIS — E11.42 TYPE 2 DIABETES MELLITUS WITH DIABETIC POLYNEUROPATHY, WITHOUT LONG-TERM CURRENT USE OF INSULIN (HCC): Primary | ICD-10-CM

## 2024-11-07 RX ORDER — METFORMIN HYDROCHLORIDE 500 MG/1
TABLET, EXTENDED RELEASE ORAL
Qty: 180 TABLET | Refills: 1 | Status: SHIPPED | OUTPATIENT
Start: 2024-11-07 | End: 2025-11-21

## 2024-11-07 NOTE — TELEPHONE ENCOUNTER
Please let pt know that I rec'd the message from his nephrologist that it is ok to start back on metformin.  I sent in a new prescription for him  He can start on one 500 mg tab daily for 2 weeks, then increase to 2 tabs daily (extended release)

## 2024-11-07 NOTE — TELEPHONE ENCOUNTER
Duplicate encounter- will update separate encounter but nephrology is ok with pt starting back on metformin so will have pt restart.

## 2024-11-07 NOTE — TELEPHONE ENCOUNTER
Patient's spouse Nivia called in to to follow up on order for Metformin. RN advised that nephrologist agreed with order and PCP did send order today to [facility] mail order pharmacy.Reviewed dosing instructions with Nivia and to advise provider when medication is received and patient starts taking medication.    Nivia reports out of pocket costs for Eliquis with no discount on GoodRx. RN advised to check  website for possible coupon for lower cost.

## 2024-11-07 NOTE — TELEPHONE ENCOUNTER
The office of  Dr Aj Su [facility] called to let PCP know she is ok with the patient starting Metformin.

## 2024-11-09 ENCOUNTER — PATIENT MESSAGE (OUTPATIENT)
Dept: FAMILY MEDICINE CLINIC | Facility: CLINIC | Age: 79
End: 2024-11-09

## 2024-11-13 ENCOUNTER — APPOINTMENT (OUTPATIENT)
Dept: LAB | Facility: HOSPITAL | Age: 79
End: 2024-11-13
Payer: MEDICARE

## 2024-11-13 DIAGNOSIS — C44.319 BASAL CELL CARCINOMA OF SKIN OF OTHER PARTS OF FACE: ICD-10-CM

## 2024-11-13 LAB
PLATELET # BLD AUTO: 224 THOUSANDS/UL (ref 149–390)
PMV BLD AUTO: 9.2 FL (ref 8.9–12.7)

## 2024-11-13 PROCEDURE — 85049 AUTOMATED PLATELET COUNT: CPT

## 2024-11-13 PROCEDURE — 36415 COLL VENOUS BLD VENIPUNCTURE: CPT

## 2024-12-13 ENCOUNTER — RESULTS FOLLOW-UP (OUTPATIENT)
Dept: FAMILY MEDICINE CLINIC | Facility: CLINIC | Age: 79
End: 2024-12-13

## 2024-12-13 ENCOUNTER — APPOINTMENT (OUTPATIENT)
Dept: LAB | Facility: HOSPITAL | Age: 79
End: 2024-12-13
Payer: MEDICARE

## 2024-12-13 DIAGNOSIS — N18.32 CHRONIC KIDNEY DISEASE (CKD) STAGE G3B/A1, MODERATELY DECREASED GLOMERULAR FILTRATION RATE (GFR) BETWEEN 30-44 ML/MIN/1.73 SQUARE METER AND ALBUMINURIA CREATININE RATIO LESS THAN 30 MG/G (HCC): ICD-10-CM

## 2024-12-13 LAB
ANION GAP SERPL CALCULATED.3IONS-SCNC: 7 MMOL/L (ref 4–13)
BUN SERPL-MCNC: 37 MG/DL (ref 5–25)
CALCIUM SERPL-MCNC: 9.1 MG/DL (ref 8.4–10.2)
CHLORIDE SERPL-SCNC: 109 MMOL/L (ref 96–108)
CO2 SERPL-SCNC: 25 MMOL/L (ref 21–32)
CREAT SERPL-MCNC: 1.66 MG/DL (ref 0.6–1.3)
GFR SERPL CREATININE-BSD FRML MDRD: 38 ML/MIN/1.73SQ M
GLUCOSE P FAST SERPL-MCNC: 86 MG/DL (ref 65–99)
POTASSIUM SERPL-SCNC: 4.7 MMOL/L (ref 3.5–5.3)
SODIUM SERPL-SCNC: 141 MMOL/L (ref 135–147)

## 2024-12-13 PROCEDURE — 80048 BASIC METABOLIC PNL TOTAL CA: CPT

## 2024-12-13 PROCEDURE — 36415 COLL VENOUS BLD VENIPUNCTURE: CPT

## 2025-01-11 DIAGNOSIS — I48.0 PAROXYSMAL ATRIAL FIBRILLATION (HCC): ICD-10-CM

## 2025-01-11 DIAGNOSIS — I10 ESSENTIAL HYPERTENSION: ICD-10-CM

## 2025-01-13 ENCOUNTER — APPOINTMENT (OUTPATIENT)
Dept: LAB | Facility: HOSPITAL | Age: 80
End: 2025-01-13
Payer: MEDICARE

## 2025-01-13 DIAGNOSIS — N18.32 CHRONIC KIDNEY DISEASE (CKD) STAGE G3B/A1, MODERATELY DECREASED GLOMERULAR FILTRATION RATE (GFR) BETWEEN 30-44 ML/MIN/1.73 SQUARE METER AND ALBUMINURIA CREATININE RATIO LESS THAN 30 MG/G (HCC): ICD-10-CM

## 2025-01-13 DIAGNOSIS — I10 ESSENTIAL HYPERTENSION, MALIGNANT: ICD-10-CM

## 2025-01-13 DIAGNOSIS — E11.42 TYPE 2 DIABETES MELLITUS WITH DIABETIC POLYNEUROPATHY, WITHOUT LONG-TERM CURRENT USE OF INSULIN (HCC): ICD-10-CM

## 2025-01-13 LAB
ANION GAP SERPL CALCULATED.3IONS-SCNC: 6 MMOL/L (ref 4–13)
BUN SERPL-MCNC: 41 MG/DL (ref 5–25)
CALCIUM SERPL-MCNC: 9.6 MG/DL (ref 8.4–10.2)
CHLORIDE SERPL-SCNC: 108 MMOL/L (ref 96–108)
CO2 SERPL-SCNC: 25 MMOL/L (ref 21–32)
CREAT SERPL-MCNC: 1.74 MG/DL (ref 0.6–1.3)
GFR SERPL CREATININE-BSD FRML MDRD: 36 ML/MIN/1.73SQ M
GLUCOSE P FAST SERPL-MCNC: 104 MG/DL (ref 65–99)
POTASSIUM SERPL-SCNC: 5.2 MMOL/L (ref 3.5–5.3)
SODIUM SERPL-SCNC: 139 MMOL/L (ref 135–147)

## 2025-01-13 PROCEDURE — 36415 COLL VENOUS BLD VENIPUNCTURE: CPT

## 2025-01-13 PROCEDURE — 80048 BASIC METABOLIC PNL TOTAL CA: CPT

## 2025-01-14 RX ORDER — GLIPIZIDE 10 MG/1
10 TABLET, FILM COATED, EXTENDED RELEASE ORAL 2 TIMES DAILY
Qty: 180 TABLET | Refills: 1 | Status: SHIPPED | OUTPATIENT
Start: 2025-01-14

## 2025-01-14 RX ORDER — SIMVASTATIN 20 MG
20 TABLET ORAL
Qty: 90 TABLET | Refills: 1 | Status: SHIPPED | OUTPATIENT
Start: 2025-01-14

## 2025-01-14 RX ORDER — APIXABAN 5 MG/1
5 TABLET, FILM COATED ORAL 2 TIMES DAILY
Qty: 180 TABLET | Refills: 0 | Status: SHIPPED | OUTPATIENT
Start: 2025-01-14

## 2025-02-03 ENCOUNTER — TELEPHONE (OUTPATIENT)
Dept: CARDIOLOGY CLINIC | Facility: CLINIC | Age: 80
End: 2025-02-03

## 2025-02-03 NOTE — TELEPHONE ENCOUNTER
Contact was made with Quincy Thelma and he states that he is still taking his Eliquis 5MG BID and he gets this through [employer]. Patient states that at this time it is reasonable. Patient verbally states that he understands Dr. Farias message and no current questions or concerns at this time.

## 2025-02-04 ENCOUNTER — APPOINTMENT (OUTPATIENT)
Dept: LAB | Facility: HOSPITAL | Age: 80
End: 2025-02-04
Attending: INTERNAL MEDICINE
Payer: MEDICARE

## 2025-02-04 DIAGNOSIS — N18.32 STAGE 3B CHRONIC KIDNEY DISEASE (HCC): ICD-10-CM

## 2025-02-04 DIAGNOSIS — D47.2 MONOCLONAL GAMMOPATHY: ICD-10-CM

## 2025-02-04 LAB
ALBUMIN SERPL BCG-MCNC: 4.2 G/DL (ref 3.5–5)
ALP SERPL-CCNC: 72 U/L (ref 34–104)
ALT SERPL W P-5'-P-CCNC: 14 U/L (ref 7–52)
ANION GAP SERPL CALCULATED.3IONS-SCNC: 7 MMOL/L (ref 4–13)
ANISOCYTOSIS BLD QL SMEAR: PRESENT
AST SERPL W P-5'-P-CCNC: 17 U/L (ref 13–39)
BASOPHILS # BLD MANUAL: 0.28 THOUSAND/UL (ref 0–0.1)
BASOPHILS NFR MAR MANUAL: 1 % (ref 0–1)
BILIRUB SERPL-MCNC: 0.85 MG/DL (ref 0.2–1)
BUN SERPL-MCNC: 29 MG/DL (ref 5–25)
CALCIUM SERPL-MCNC: 9.2 MG/DL (ref 8.4–10.2)
CHLORIDE SERPL-SCNC: 103 MMOL/L (ref 96–108)
CO2 SERPL-SCNC: 26 MMOL/L (ref 21–32)
CREAT SERPL-MCNC: 1.6 MG/DL (ref 0.6–1.3)
EOSINOPHIL # BLD MANUAL: 0.28 THOUSAND/UL (ref 0–0.4)
EOSINOPHIL NFR BLD MANUAL: 1 % (ref 0–6)
ERYTHROCYTE [DISTWIDTH] IN BLOOD BY AUTOMATED COUNT: 15.1 % (ref 11.6–15.1)
GFR SERPL CREATININE-BSD FRML MDRD: 40 ML/MIN/1.73SQ M
GLUCOSE P FAST SERPL-MCNC: 93 MG/DL (ref 65–99)
HCT VFR BLD AUTO: 34.5 % (ref 36.5–49.3)
HGB BLD-MCNC: 11 G/DL (ref 12–17)
HYPERCHROMIA BLD QL SMEAR: PRESENT
LYMPHOCYTES # BLD AUTO: 21.88 THOUSAND/UL (ref 0.6–4.47)
LYMPHOCYTES # BLD AUTO: 68 % (ref 14–44)
MCH RBC QN AUTO: 30.9 PG (ref 26.8–34.3)
MCHC RBC AUTO-ENTMCNC: 31.9 G/DL (ref 31.4–37.4)
MCV RBC AUTO: 97 FL (ref 82–98)
MONOCYTES # BLD AUTO: 0.85 THOUSAND/UL (ref 0–1.22)
MONOCYTES NFR BLD: 3 % (ref 4–12)
NEUTROPHILS # BLD MANUAL: 5.11 THOUSAND/UL (ref 1.85–7.62)
NEUTS SEG NFR BLD AUTO: 18 % (ref 43–75)
PLATELET # BLD AUTO: 168 THOUSANDS/UL (ref 149–390)
PLATELET BLD QL SMEAR: ADEQUATE
PMV BLD AUTO: 9.5 FL (ref 8.9–12.7)
POLYCHROMASIA BLD QL SMEAR: PRESENT
POTASSIUM SERPL-SCNC: 4.8 MMOL/L (ref 3.5–5.3)
PROT SERPL-MCNC: 8.1 G/DL (ref 6.4–8.4)
RBC # BLD AUTO: 3.56 MILLION/UL (ref 3.88–5.62)
RBC MORPH BLD: PRESENT
SODIUM SERPL-SCNC: 136 MMOL/L (ref 135–147)
VARIANT LYMPHS # BLD AUTO: 9 %
WBC # BLD AUTO: 28.41 THOUSAND/UL (ref 4.31–10.16)

## 2025-02-04 PROCEDURE — 83521 IG LIGHT CHAINS FREE EACH: CPT

## 2025-02-04 PROCEDURE — 80053 COMPREHEN METABOLIC PANEL: CPT

## 2025-02-04 PROCEDURE — 85007 BL SMEAR W/DIFF WBC COUNT: CPT

## 2025-02-04 PROCEDURE — 85027 COMPLETE CBC AUTOMATED: CPT

## 2025-02-04 PROCEDURE — 84165 PROTEIN E-PHORESIS SERUM: CPT

## 2025-02-04 PROCEDURE — 36415 COLL VENOUS BLD VENIPUNCTURE: CPT

## 2025-02-05 LAB
ALBUMIN SERPL ELPH-MCNC: 3.97 G/DL (ref 3.2–5.1)
ALBUMIN SERPL ELPH-MCNC: 52.2 % (ref 48–70)
ALPHA1 GLOB SERPL ELPH-MCNC: 0.19 G/DL (ref 0.15–0.47)
ALPHA1 GLOB SERPL ELPH-MCNC: 2.5 % (ref 1.8–7)
ALPHA2 GLOB SERPL ELPH-MCNC: 0.9 G/DL (ref 0.42–1.04)
ALPHA2 GLOB SERPL ELPH-MCNC: 11.9 % (ref 5.9–14.9)
BETA GLOB ABNORMAL SERPL ELPH-MCNC: 0.47 G/DL (ref 0.31–0.57)
BETA1 GLOB SERPL ELPH-MCNC: 6.2 % (ref 4.7–7.7)
BETA2 GLOB SERPL ELPH-MCNC: 3.3 % (ref 3.1–7.9)
BETA2+GAMMA GLOB SERPL ELPH-MCNC: 0.25 G/DL (ref 0.2–0.58)
GAMMA GLOB ABNORMAL SERPL ELPH-MCNC: 1.82 G/DL (ref 0.4–1.66)
GAMMA GLOB SERPL ELPH-MCNC: 23.9 % (ref 6.9–22.3)
IGG/ALB SER: 1.09 {RATIO} (ref 1.1–1.8)
KAPPA LC FREE SER-MCNC: 63.2 MG/L (ref 3.3–19.4)
KAPPA LC FREE/LAMBDA FREE SER: 3.05 {RATIO} (ref 0.26–1.65)
LAMBDA LC FREE SERPL-MCNC: 20.7 MG/L (ref 5.7–26.3)
M PROTEIN 1 MFR SERPL ELPH: 15.6 %
M PROTEIN 1 SERPL ELPH-MCNC: 1.19 G/DL
PROT PATTERN SERPL ELPH-IMP: ABNORMAL
PROT SERPL-MCNC: 7.6 G/DL (ref 6.4–8.2)

## 2025-02-05 PROCEDURE — 84165 PROTEIN E-PHORESIS SERUM: CPT | Performed by: PATHOLOGY

## 2025-02-20 ENCOUNTER — OFFICE VISIT (OUTPATIENT)
Dept: HEMATOLOGY ONCOLOGY | Facility: CLINIC | Age: 80
End: 2025-02-20
Payer: MEDICARE

## 2025-02-20 ENCOUNTER — TELEPHONE (OUTPATIENT)
Dept: FAMILY MEDICINE CLINIC | Facility: CLINIC | Age: 80
End: 2025-02-20

## 2025-02-20 VITALS
HEIGHT: 66 IN | RESPIRATION RATE: 16 BRPM | WEIGHT: 181.5 LBS | DIASTOLIC BLOOD PRESSURE: 78 MMHG | BODY MASS INDEX: 29.17 KG/M2 | TEMPERATURE: 97.6 F | SYSTOLIC BLOOD PRESSURE: 128 MMHG | OXYGEN SATURATION: 95 % | HEART RATE: 81 BPM

## 2025-02-20 DIAGNOSIS — I50.9 ACUTE CONGESTIVE HEART FAILURE, UNSPECIFIED HEART FAILURE TYPE (HCC): ICD-10-CM

## 2025-02-20 DIAGNOSIS — N18.32 STAGE 3B CHRONIC KIDNEY DISEASE (HCC): ICD-10-CM

## 2025-02-20 DIAGNOSIS — C91.10 CLL (CHRONIC LYMPHOCYTIC LEUKEMIA) (HCC): Primary | ICD-10-CM

## 2025-02-20 PROCEDURE — 99213 OFFICE O/P EST LOW 20 MIN: CPT | Performed by: INTERNAL MEDICINE

## 2025-02-20 NOTE — TELEPHONE ENCOUNTER
Provider: Dr. Wright  Name of form: Diabetic Shoe Form  Form placed: Clinical Form  Form to be faxed (fax #), mailed (address), or picked up (by whom): Fax to number on form [phone] and call patient when complete  Patient was made aware of the 7-10 business day form policy.

## 2025-02-21 NOTE — PROGRESS NOTES
Name: Abelino Renee      : 1945      MRN: [id number]  Encounter Provider: Mikey Acosta MD  Encounter Date: 2025   Encounter department: [facility]  :  Assessment & Plan  CLL (chronic lymphocytic leukemia) (HCC)  79 y.o. male with chronic lymphocytic leukemia with bulky lymphadenopathy at the time of initial presentation in . Currently he is undergoing surveillance/observation. Initially, he receive 4 cycles of bendamustine and rituximab (BR) starting in 2012 with a very nice response and no residual disease on FDG-PET CT scan.  Subsequently, he initiated maintenance rituximab. After a first dose of maintenance rituximab, he developed leukopenia and a decision was made to stop maintenance rituximab.  He then, initiated surveillance for CLL/SLL.    Of note, on 2015 he was admitted for appendicitis. 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 Only one lymph node was removed. Subsequently, on May 28 2015 he underwent right hemicolectomy which showed no evidence of residual disease. Twenty three regional lymph nodes were negative.  He then, initiated observation.    Interim Assessment: Mr. Renee is undergoing surveillance for CLL/SLL.  At the present time, he does not have indication for re initiation of systemic therapy such as presence of B symptoms, recurrent infection, progressive cytopenias, progressive adenopathy, immune-mediated cytopenias such as autoimmune hemolytic anemia, ITP, or transformation to diffuse large B-cell lymphoma (Barnes's transformation).     Plan:  Continue surveillance.  At the present time, the patient does not have indication for systemic therapy for CLL/SLL  Blood testing on 2025 including CBC with differential, CMP, serum LDH, SPEP, UPEP, serum immunoglobulin levels, serum beta-2 microglobulin and serum free light chains  Return to medical oncology clinic  for history and physical exam on August 29, 2025    Acute congestive heart failure, unspecified heart failure type (HCC)  Wt Readings from Last 3 Encounters:   02/20/25 82.3 kg (181 lb 8 oz)   10/28/24 82.1 kg (181 lb)   09/18/24 82.1 kg (181 lb)   Continue care with cardiologist       Stage 3b chronic kidney disease (HCC)  Lab Results   Component Value Date    EGFR 40 02/04/2025    EGFR 36 01/13/2025    EGFR 38 12/13/2024    CREATININE 1.60 (H) 02/04/2025    CREATININE 1.74 (H) 01/13/2025    CREATININE 1.66 (H) 12/13/2024   Continue care with primary care physician         Mr. Renee understands and agrees with my management recommendations and plan of care. I answered questions to satisfaction.      Return in about 6 months (around 8/29/2025).    History of Present Illness   Chief Complaint   Patient presents with    Follow-up   79 y.o. male with chronic lymphocytic leukemia with bulky lymphadenopathy at the time of initial presentation. Currently he is undergoing surveillance/observation. Initially, he receive 4 cycles of bendamustine and rituximab (BR) starting in June 2012 with a very nice response and no residual disease on FDG-PET CT scan.  Subsequently, he initiated maintenance rituximab. After a first dose of maintenance rituximab, he developed leukopenia and a decision was made to stop maintenance rituximab.  He then, initiated surveillance for CLL/SLL.    Of note, on April 13, 2015 he was admitted for appendicitis. 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 Only one lymph node was removed. Subsequently, on May 28 2015 he underwent right hemicolectomy which showed no evidence of residual disease. Twenty three regional lymph nodes were negative.  He then, initiated observation.    Interim History: Currently, the patient is undergoing surveillance for his CLL/SLL.  He is doing clinically well.  He does not have new complaints.  He denies new systemic,  "cardiorespiratory, gastrointestinal, genitourinary, muscle skeletal, dermatological, or neurologic symptoms.  He remains independent for daily living activities and has good quality of life.    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    Chronic lymphocytic leukemia of B-cell type (HCC)     \"remission 4-5yrs\"-chemo    DDD (degenerative disc disease), cervical     Diabetes mellitus (HCC)     bs checked by pt at home at 6am =92    Diabetic neuropathy (HCC)     Diabetic retinopathy (HCC)     Elevated WBC count     Heart attack (HCC) 04/2015    \"labeled as that and than tested later after appendix removed and stress test showed negative\"    History of cancer chemotherapy     last treatment approx 5yrs ago    Hypertension     Myocardial infarction (HCC) 04/2015    Scalp psoriasis     Shingles 2/10-15    Toe injury     left great toe, - 2017-internal braec-to be removed today 3/30/2018    Wears glasses         Review of Systems   Constitutional:  Negative for activity change, appetite change, chills, diaphoresis, fatigue, fever and unexpected weight change.   HENT:  Negative for congestion, dental problem, ear discharge, ear pain, facial swelling, hearing loss, mouth sores, nosebleeds, postnasal drip, rhinorrhea, sinus pain, sneezing, sore throat, tinnitus, trouble swallowing and voice change.    Eyes:  Negative for photophobia, pain, discharge, redness, itching and visual disturbance.   Respiratory:  Negative for apnea, cough, choking, chest tightness, shortness of breath, wheezing and stridor.    Cardiovascular:  Negative for chest pain, palpitations and leg swelling.   Gastrointestinal:  Negative for abdominal distention, abdominal pain, anal bleeding, blood in stool, constipation, diarrhea, nausea, rectal " "pain and vomiting.   Endocrine: Negative for cold intolerance and heat intolerance.   Genitourinary:  Negative for decreased urine volume, difficulty urinating, dysuria, enuresis, flank pain, frequency, hematuria and urgency.   Musculoskeletal:  Negative for arthralgias, back pain, gait problem, joint swelling, myalgias, neck pain and neck stiffness.   Skin:  Negative for color change, pallor, rash and wound.   Neurological:  Negative for dizziness, tremors, seizures, syncope, facial asymmetry, speech difficulty, weakness, light-headedness, numbness and headaches.   Hematological:  Negative for adenopathy. Does not bruise/bleed easily.   Psychiatric/Behavioral:  Negative for behavioral problems, confusion, dysphoric mood and sleep disturbance. The patient is not nervous/anxious.        Objective   /78 (BP Location: Left arm, Patient Position: Sitting, Cuff Size: Adult)   Pulse 81   Temp 97.6 °F (36.4 °C) (Temporal)   Resp 16   Ht 5' 6\" (1.676 m)   Wt 82.3 kg (181 lb 8 oz)   SpO2 95%   BMI 29.29 kg/m²     Pain Screening:  Pain Score: 0-No pain  ECOG   0  Physical Exam  Constitutional:       Comments: Male patient well-developed, well-nourished without acute respiratory distress   HENT:      Head: Normocephalic and atraumatic.      Nose: Nose normal.      Mouth/Throat:      Mouth: Mucous membranes are moist.      Pharynx: Oropharynx is clear.   Eyes:      Extraocular Movements: Extraocular movements intact.      Conjunctiva/sclera: Conjunctivae normal.      Pupils: Pupils are equal, round, and reactive to light.      Comments: No scleral icterus   Neck:      Comments: No cervical, supraclavicular, axillary, epitrochlear, or inguinal lymphadenopathy.   Cardiovascular:      Rate and Rhythm: Normal rate and regular rhythm.      Pulses: Normal pulses.      Heart sounds: Normal heart sounds.   Pulmonary:      Effort: Pulmonary effort is normal.      Breath sounds: Normal breath sounds.   Abdominal:      " General: Bowel sounds are normal.      Palpations: Abdomen is soft.      Comments: Abdomen soft, nontender, nonpainful.  No hepatomegaly.  No splenomegaly.  No rebound tenderness.  No lateral or shifting dullness.  Bowel sounds present, normal.    Musculoskeletal:         General: Normal range of motion.      Cervical back: Normal range of motion and neck supple.   Skin:     General: Skin is warm and dry.      Capillary Refill: Capillary refill takes less than 2 seconds.   Neurological:      General: No focal deficit present.      Mental Status: He is alert and oriented to person, place, and time. Mental status is at baseline.   Psychiatric:         Mood and Affect: Mood normal.         Behavior: Behavior normal.         Thought Content: Thought content normal.         Judgment: Judgment normal.       Labs: I have reviewed the following labs:  Lab Results   Component Value Date/Time    WBC 28.41 (H) 02/04/2025 12:08 PM    RBC 3.56 (L) 02/04/2025 12:08 PM    Hemoglobin 11.0 (L) 02/04/2025 12:08 PM    Hematocrit 34.5 (L) 02/04/2025 12:08 PM    MCV 97 02/04/2025 12:08 PM    MCH 30.9 02/04/2025 12:08 PM    RDW 15.1 02/04/2025 12:08 PM    Platelets 168 02/04/2025 12:08 PM    Segmented % 19 (L) 11/05/2024 11:22 AM    Lymphocytes % 68 (H) 02/04/2025 12:08 PM    Lymphocytes % 78 (H) 11/05/2024 11:22 AM    Monocytes % 3 (L) 02/04/2025 12:08 PM    Monocytes % 2 (L) 11/05/2024 11:22 AM    Eosinophils % 1 02/04/2025 12:08 PM    Eosinophils Relative 1 11/05/2024 11:22 AM    Basophils % 1 02/04/2025 12:08 PM    Basophils Relative 0 11/05/2024 11:22 AM    Immature Grans % 0 11/05/2024 11:22 AM    Absolute Neutrophils 3.87 11/05/2024 11:22 AM     Lab Results   Component Value Date/Time    Potassium 4.8 02/04/2025 12:08 PM    Chloride 103 02/04/2025 12:08 PM    CO2 26 02/04/2025 12:08 PM    BUN 29 (H) 02/04/2025 12:08 PM    Creatinine 1.60 (H) 02/04/2025 12:08 PM    Glucose, Fasting 93 02/04/2025 12:08 PM    Calcium 9.2 02/04/2025  12:08 PM    Corrected Calcium 9.7 06/10/2024 05:33 AM    AST 17 02/04/2025 12:08 PM    ALT 14 02/04/2025 12:08 PM    Alkaline Phosphatase 72 02/04/2025 12:08 PM    Total Protein 8.1 02/04/2025 12:08 PM    Total Protein 7.6 02/04/2025 12:08 PM    Albumin 4.2 02/04/2025 12:08 PM    Total Bilirubin 0.85 02/04/2025 12:08 PM    eGFR 40 02/04/2025 12:08 PM     SERUM LDH LEVELS:    Component      Latest Ref Rng 5/8/2020 12/4/2020 6/4/2021 10/19/2022 3/3/2023 6/20/2023 2/27/2024   LD (LDH)      140 - 271 U/L 156  169  162  154  125 (L)  121 (L)  133 (L)      Component      Latest Ref Rng 7/25/2024 11/5/2024   LD (LDH)      140 - 271 U/L 136 (L)  123 (L)

## 2025-02-21 NOTE — ASSESSMENT & PLAN NOTE
Lab Results   Component Value Date    EGFR 40 02/04/2025    EGFR 36 01/13/2025    EGFR 38 12/13/2024    CREATININE 1.60 (H) 02/04/2025    CREATININE 1.74 (H) 01/13/2025    CREATININE 1.66 (H) 12/13/2024   Continue care with primary care physician

## 2025-02-28 NOTE — TELEPHONE ENCOUNTER
Patient was made aware form was faxed on 2/21. Confirmation was scanned with the completed form under media tab/ No further assistance needed at this time.

## 2025-03-12 ENCOUNTER — RA CDI HCC (OUTPATIENT)
Dept: OTHER | Facility: HOSPITAL | Age: 80
End: 2025-03-12

## 2025-03-12 NOTE — PROGRESS NOTES
HCC coding opportunities          Chart Reviewed number of suggestions sent to Provider: 3     Patients Insurance   E11.40, E11.42 and I13.0  Medicare Insurance: Medicare

## 2025-03-17 ENCOUNTER — APPOINTMENT (EMERGENCY)
Dept: CT IMAGING | Facility: HOSPITAL | Age: 80
DRG: 480 | End: 2025-03-17
Payer: MEDICARE

## 2025-03-17 ENCOUNTER — APPOINTMENT (EMERGENCY)
Dept: RADIOLOGY | Facility: HOSPITAL | Age: 80
DRG: 480 | End: 2025-03-17
Payer: MEDICARE

## 2025-03-17 ENCOUNTER — APPOINTMENT (OUTPATIENT)
Dept: RADIOLOGY | Facility: HOSPITAL | Age: 80
DRG: 480 | End: 2025-03-17
Payer: MEDICARE

## 2025-03-17 ENCOUNTER — HOSPITAL ENCOUNTER (INPATIENT)
Facility: HOSPITAL | Age: 80
LOS: 4 days | Discharge: NURSING, REHAB OR LONG-TERM CARE | DRG: 480 | End: 2025-03-22
Attending: EMERGENCY MEDICINE | Admitting: INTERNAL MEDICINE
Payer: MEDICARE

## 2025-03-17 DIAGNOSIS — N17.9 AKI (ACUTE KIDNEY INJURY) (HCC): ICD-10-CM

## 2025-03-17 DIAGNOSIS — K59.03 DRUG-INDUCED CONSTIPATION: ICD-10-CM

## 2025-03-17 DIAGNOSIS — S72.002A CLOSED FRACTURE OF NECK OF LEFT FEMUR, INITIAL ENCOUNTER (HCC): ICD-10-CM

## 2025-03-17 DIAGNOSIS — E11.42 TYPE 2 DIABETES MELLITUS WITH DIABETIC POLYNEUROPATHY, WITHOUT LONG-TERM CURRENT USE OF INSULIN (HCC): ICD-10-CM

## 2025-03-17 DIAGNOSIS — I50.9 ACUTE CONGESTIVE HEART FAILURE, UNSPECIFIED HEART FAILURE TYPE (HCC): Primary | ICD-10-CM

## 2025-03-17 DIAGNOSIS — S72.142A CLOSED INTERTROCHANTERIC FRACTURE OF HIP, LEFT, INITIAL ENCOUNTER (HCC): ICD-10-CM

## 2025-03-17 PROBLEM — I48.91 ATRIAL FIBRILLATION (HCC): Status: ACTIVE | Noted: 2025-03-17

## 2025-03-17 LAB
ABO GROUP BLD: NORMAL
ANION GAP SERPL CALCULATED.3IONS-SCNC: 9 MMOL/L (ref 4–13)
ANISOCYTOSIS BLD QL SMEAR: PRESENT
APTT PPP: 28 SECONDS (ref 23–34)
BASOPHILS # BLD MANUAL: 0.4 THOUSAND/UL (ref 0–0.1)
BASOPHILS NFR MAR MANUAL: 1 % (ref 0–1)
BLD GP AB SCN SERPL QL: NEGATIVE
BNP SERPL-MCNC: 279 PG/ML (ref 0–100)
BUN SERPL-MCNC: 29 MG/DL (ref 5–25)
CALCIUM SERPL-MCNC: 9.9 MG/DL (ref 8.4–10.2)
CHLORIDE SERPL-SCNC: 102 MMOL/L (ref 96–108)
CK SERPL-CCNC: 127 U/L (ref 39–308)
CO2 SERPL-SCNC: 25 MMOL/L (ref 21–32)
CREAT SERPL-MCNC: 1.52 MG/DL (ref 0.6–1.3)
DACRYOCYTES BLD QL SMEAR: PRESENT
EOSINOPHIL # BLD MANUAL: 0 THOUSAND/UL (ref 0–0.4)
EOSINOPHIL NFR BLD MANUAL: 0 % (ref 0–6)
ERYTHROCYTE [DISTWIDTH] IN BLOOD BY AUTOMATED COUNT: 14.1 % (ref 11.6–15.1)
GFR SERPL CREATININE-BSD FRML MDRD: 43 ML/MIN/1.73SQ M
GLUCOSE SERPL-MCNC: 254 MG/DL (ref 65–140)
HCT VFR BLD AUTO: 37.6 % (ref 36.5–49.3)
HGB BLD-MCNC: 12.2 G/DL (ref 12–17)
INR PPP: 1.14 (ref 0.85–1.19)
LG PLATELETS BLD QL SMEAR: PRESENT
LYMPHOCYTES # BLD AUTO: 14.7 THOUSAND/UL (ref 0.6–4.47)
LYMPHOCYTES # BLD AUTO: 37 % (ref 14–44)
MACROCYTES BLD QL AUTO: PRESENT
MCH RBC QN AUTO: 30.9 PG (ref 26.8–34.3)
MCHC RBC AUTO-ENTMCNC: 32.4 G/DL (ref 31.4–37.4)
MCV RBC AUTO: 95 FL (ref 82–98)
MONOCYTES # BLD AUTO: 1.19 THOUSAND/UL (ref 0–1.22)
MONOCYTES NFR BLD: 3 % (ref 4–12)
NEUTROPHILS # BLD MANUAL: 23.43 THOUSAND/UL (ref 1.85–7.62)
NEUTS SEG NFR BLD AUTO: 59 % (ref 43–75)
OVALOCYTES BLD QL SMEAR: PRESENT
PLATELET # BLD AUTO: 129 THOUSANDS/UL (ref 149–390)
PLATELET BLD QL SMEAR: ABNORMAL
PMV BLD AUTO: 9.9 FL (ref 8.9–12.7)
POIKILOCYTOSIS BLD QL SMEAR: PRESENT
POLYCHROMASIA BLD QL SMEAR: PRESENT
POTASSIUM SERPL-SCNC: 5.1 MMOL/L (ref 3.5–5.3)
PROTHROMBIN TIME: 15 SECONDS (ref 12.3–15)
RBC # BLD AUTO: 3.95 MILLION/UL (ref 3.88–5.62)
RBC MORPH BLD: PRESENT
RH BLD: POSITIVE
SODIUM SERPL-SCNC: 136 MMOL/L (ref 135–147)
SPECIMEN EXPIRATION DATE: NORMAL
WBC # BLD AUTO: 39.72 THOUSAND/UL (ref 4.31–10.16)

## 2025-03-17 PROCEDURE — 83880 ASSAY OF NATRIURETIC PEPTIDE: CPT | Performed by: EMERGENCY MEDICINE

## 2025-03-17 PROCEDURE — 82550 ASSAY OF CK (CPK): CPT | Performed by: EMERGENCY MEDICINE

## 2025-03-17 PROCEDURE — 93005 ELECTROCARDIOGRAM TRACING: CPT

## 2025-03-17 PROCEDURE — 86850 RBC ANTIBODY SCREEN: CPT | Performed by: EMERGENCY MEDICINE

## 2025-03-17 PROCEDURE — 96374 THER/PROPH/DIAG INJ IV PUSH: CPT

## 2025-03-17 PROCEDURE — 99285 EMERGENCY DEPT VISIT HI MDM: CPT

## 2025-03-17 PROCEDURE — 72125 CT NECK SPINE W/O DYE: CPT

## 2025-03-17 PROCEDURE — 86920 COMPATIBILITY TEST SPIN: CPT

## 2025-03-17 PROCEDURE — 73564 X-RAY EXAM KNEE 4 OR MORE: CPT

## 2025-03-17 PROCEDURE — 80048 BASIC METABOLIC PNL TOTAL CA: CPT | Performed by: EMERGENCY MEDICINE

## 2025-03-17 PROCEDURE — 85027 COMPLETE CBC AUTOMATED: CPT | Performed by: EMERGENCY MEDICINE

## 2025-03-17 PROCEDURE — 99223 1ST HOSP IP/OBS HIGH 75: CPT

## 2025-03-17 PROCEDURE — 85730 THROMBOPLASTIN TIME PARTIAL: CPT | Performed by: EMERGENCY MEDICINE

## 2025-03-17 PROCEDURE — 86900 BLOOD TYPING SEROLOGIC ABO: CPT | Performed by: EMERGENCY MEDICINE

## 2025-03-17 PROCEDURE — 99285 EMERGENCY DEPT VISIT HI MDM: CPT | Performed by: EMERGENCY MEDICINE

## 2025-03-17 PROCEDURE — 70450 CT HEAD/BRAIN W/O DYE: CPT

## 2025-03-17 PROCEDURE — 74177 CT ABD & PELVIS W/CONTRAST: CPT

## 2025-03-17 PROCEDURE — 36415 COLL VENOUS BLD VENIPUNCTURE: CPT | Performed by: EMERGENCY MEDICINE

## 2025-03-17 PROCEDURE — 71045 X-RAY EXAM CHEST 1 VIEW: CPT

## 2025-03-17 PROCEDURE — 71260 CT THORAX DX C+: CPT

## 2025-03-17 PROCEDURE — 73502 X-RAY EXAM HIP UNI 2-3 VIEWS: CPT

## 2025-03-17 PROCEDURE — 96375 TX/PRO/DX INJ NEW DRUG ADDON: CPT

## 2025-03-17 PROCEDURE — 86901 BLOOD TYPING SEROLOGIC RH(D): CPT | Performed by: EMERGENCY MEDICINE

## 2025-03-17 PROCEDURE — 85610 PROTHROMBIN TIME: CPT | Performed by: EMERGENCY MEDICINE

## 2025-03-17 PROCEDURE — 85007 BL SMEAR W/DIFF WBC COUNT: CPT | Performed by: EMERGENCY MEDICINE

## 2025-03-17 RX ORDER — HYDROMORPHONE HCL IN WATER/PF 6 MG/30 ML
0.2 PATIENT CONTROLLED ANALGESIA SYRINGE INTRAVENOUS EVERY 2 HOUR PRN
Status: DISCONTINUED | OUTPATIENT
Start: 2025-03-17 | End: 2025-03-22 | Stop reason: HOSPADM

## 2025-03-17 RX ORDER — ACETAMINOPHEN 325 MG/1
650 TABLET ORAL EVERY 6 HOURS PRN
Status: DISCONTINUED | OUTPATIENT
Start: 2025-03-17 | End: 2025-03-22 | Stop reason: HOSPADM

## 2025-03-17 RX ORDER — DOCUSATE SODIUM 100 MG/1
100 CAPSULE, LIQUID FILLED ORAL 2 TIMES DAILY
Status: DISCONTINUED | OUTPATIENT
Start: 2025-03-18 | End: 2025-03-22 | Stop reason: HOSPADM

## 2025-03-17 RX ORDER — PRAVASTATIN SODIUM 40 MG
40 TABLET ORAL
Status: DISCONTINUED | OUTPATIENT
Start: 2025-03-18 | End: 2025-03-22 | Stop reason: HOSPADM

## 2025-03-17 RX ORDER — MORPHINE SULFATE 15 MG/1
7.5 TABLET ORAL EVERY 4 HOURS PRN
Refills: 0 | Status: DISCONTINUED | OUTPATIENT
Start: 2025-03-17 | End: 2025-03-22 | Stop reason: HOSPADM

## 2025-03-17 RX ORDER — LEVOTHYROXINE SODIUM 75 UG/1
75 TABLET ORAL
Status: DISCONTINUED | OUTPATIENT
Start: 2025-03-18 | End: 2025-03-22 | Stop reason: HOSPADM

## 2025-03-17 RX ORDER — AMLODIPINE BESYLATE 10 MG/1
10 TABLET ORAL DAILY
Status: DISCONTINUED | OUTPATIENT
Start: 2025-03-18 | End: 2025-03-22 | Stop reason: HOSPADM

## 2025-03-17 RX ORDER — CARVEDILOL 6.25 MG/1
6.25 TABLET ORAL 2 TIMES DAILY WITH MEALS
Status: DISCONTINUED | OUTPATIENT
Start: 2025-03-18 | End: 2025-03-22 | Stop reason: HOSPADM

## 2025-03-17 RX ORDER — LOSARTAN POTASSIUM 50 MG/1
100 TABLET ORAL DAILY
Status: DISCONTINUED | OUTPATIENT
Start: 2025-03-18 | End: 2025-03-19

## 2025-03-17 RX ORDER — BRIMONIDINE TARTRATE 2 MG/ML
1 SOLUTION/ DROPS OPHTHALMIC 3 TIMES DAILY
Status: DISCONTINUED | OUTPATIENT
Start: 2025-03-18 | End: 2025-03-22 | Stop reason: HOSPADM

## 2025-03-17 RX ORDER — FUROSEMIDE 10 MG/ML
20 INJECTION INTRAMUSCULAR; INTRAVENOUS ONCE
Status: COMPLETED | OUTPATIENT
Start: 2025-03-17 | End: 2025-03-17

## 2025-03-17 RX ORDER — HEPARIN SODIUM 5000 [USP'U]/ML
5000 INJECTION, SOLUTION INTRAVENOUS; SUBCUTANEOUS EVERY 8 HOURS SCHEDULED
Status: DISCONTINUED | OUTPATIENT
Start: 2025-03-18 | End: 2025-03-19

## 2025-03-17 RX ORDER — TORSEMIDE 5 MG/1
2.5 TABLET ORAL DAILY
Status: DISCONTINUED | OUTPATIENT
Start: 2025-03-18 | End: 2025-03-18

## 2025-03-17 RX ORDER — MORPHINE SULFATE 15 MG/1
15 TABLET ORAL EVERY 4 HOURS PRN
Refills: 0 | Status: DISCONTINUED | OUTPATIENT
Start: 2025-03-17 | End: 2025-03-22 | Stop reason: HOSPADM

## 2025-03-17 RX ORDER — TIMOLOL MALEATE 5 MG/ML
1 SOLUTION/ DROPS OPHTHALMIC 2 TIMES DAILY
Status: DISCONTINUED | OUTPATIENT
Start: 2025-03-18 | End: 2025-03-22 | Stop reason: HOSPADM

## 2025-03-17 RX ORDER — INSULIN LISPRO 100 [IU]/ML
1-5 INJECTION, SOLUTION INTRAVENOUS; SUBCUTANEOUS EVERY 6 HOURS SCHEDULED
Status: DISCONTINUED | OUTPATIENT
Start: 2025-03-18 | End: 2025-03-19

## 2025-03-17 RX ORDER — MORPHINE SULFATE 4 MG/ML
4 INJECTION, SOLUTION INTRAMUSCULAR; INTRAVENOUS ONCE
Status: COMPLETED | OUTPATIENT
Start: 2025-03-17 | End: 2025-03-17

## 2025-03-17 RX ORDER — CHLORAL HYDRATE 500 MG
1000 CAPSULE ORAL DAILY
Status: DISCONTINUED | OUTPATIENT
Start: 2025-03-18 | End: 2025-03-22 | Stop reason: HOSPADM

## 2025-03-17 RX ADMIN — FUROSEMIDE 20 MG: 10 INJECTION, SOLUTION INTRAMUSCULAR; INTRAVENOUS at 22:43

## 2025-03-17 RX ADMIN — IOHEXOL 100 ML: 350 INJECTION, SOLUTION INTRAVENOUS at 21:42

## 2025-03-17 RX ADMIN — MORPHINE SULFATE 4 MG: 4 INJECTION INTRAVENOUS at 21:00

## 2025-03-18 ENCOUNTER — APPOINTMENT (INPATIENT)
Dept: RADIOLOGY | Facility: HOSPITAL | Age: 80
DRG: 480 | End: 2025-03-18
Payer: MEDICARE

## 2025-03-18 ENCOUNTER — ANESTHESIA EVENT (INPATIENT)
Dept: PERIOP | Facility: HOSPITAL | Age: 80
DRG: 481 | End: 2025-03-18
Payer: MEDICARE

## 2025-03-18 ENCOUNTER — ANESTHESIA (INPATIENT)
Dept: PERIOP | Facility: HOSPITAL | Age: 80
DRG: 481 | End: 2025-03-18
Payer: MEDICARE

## 2025-03-18 PROBLEM — N18.31 STAGE 3A CHRONIC KIDNEY DISEASE (HCC): Status: ACTIVE | Noted: 2018-04-20

## 2025-03-18 LAB
ANION GAP SERPL CALCULATED.3IONS-SCNC: 9 MMOL/L (ref 4–13)
BUN SERPL-MCNC: 29 MG/DL (ref 5–25)
CALCIUM SERPL-MCNC: 9.5 MG/DL (ref 8.4–10.2)
CHLORIDE SERPL-SCNC: 102 MMOL/L (ref 96–108)
CO2 SERPL-SCNC: 25 MMOL/L (ref 21–32)
CREAT SERPL-MCNC: 1.59 MG/DL (ref 0.6–1.3)
ERYTHROCYTE [DISTWIDTH] IN BLOOD BY AUTOMATED COUNT: 14.3 % (ref 11.6–15.1)
EST. AVERAGE GLUCOSE BLD GHB EST-MCNC: 180 MG/DL
GFR SERPL CREATININE-BSD FRML MDRD: 40 ML/MIN/1.73SQ M
GLUCOSE SERPL-MCNC: 212 MG/DL (ref 65–140)
GLUCOSE SERPL-MCNC: 244 MG/DL (ref 65–140)
GLUCOSE SERPL-MCNC: 248 MG/DL (ref 65–140)
GLUCOSE SERPL-MCNC: 260 MG/DL (ref 65–140)
GLUCOSE SERPL-MCNC: 260 MG/DL (ref 65–140)
GLUCOSE SERPL-MCNC: 267 MG/DL (ref 65–140)
GLUCOSE SERPL-MCNC: 270 MG/DL (ref 65–140)
GLUCOSE SERPL-MCNC: 300 MG/DL (ref 65–140)
HBA1C MFR BLD: 7.9 %
HCT VFR BLD AUTO: 34.5 % (ref 36.5–49.3)
HGB BLD-MCNC: 11.1 G/DL (ref 12–17)
MCH RBC QN AUTO: 31.2 PG (ref 26.8–34.3)
MCHC RBC AUTO-ENTMCNC: 32.2 G/DL (ref 31.4–37.4)
MCV RBC AUTO: 97 FL (ref 82–98)
PLATELET # BLD AUTO: 117 THOUSANDS/UL (ref 149–390)
PMV BLD AUTO: 10.1 FL (ref 8.9–12.7)
POTASSIUM SERPL-SCNC: 4.4 MMOL/L (ref 3.5–5.3)
RBC # BLD AUTO: 3.56 MILLION/UL (ref 3.88–5.62)
SODIUM SERPL-SCNC: 136 MMOL/L (ref 135–147)
WBC # BLD AUTO: 33.67 THOUSAND/UL (ref 4.31–10.16)

## 2025-03-18 PROCEDURE — 73502 X-RAY EXAM HIP UNI 2-3 VIEWS: CPT

## 2025-03-18 PROCEDURE — C1713 ANCHOR/SCREW BN/BN,TIS/BN: HCPCS | Performed by: ORTHOPAEDIC SURGERY

## 2025-03-18 PROCEDURE — 82948 REAGENT STRIP/BLOOD GLUCOSE: CPT

## 2025-03-18 PROCEDURE — 27245 TREAT THIGH FRACTURE: CPT | Performed by: ORTHOPAEDIC SURGERY

## 2025-03-18 PROCEDURE — 99232 SBSQ HOSP IP/OBS MODERATE 35: CPT | Performed by: INTERNAL MEDICINE

## 2025-03-18 PROCEDURE — 80048 BASIC METABOLIC PNL TOTAL CA: CPT

## 2025-03-18 PROCEDURE — 27245 TREAT THIGH FRACTURE: CPT | Performed by: PHYSICIAN ASSISTANT

## 2025-03-18 PROCEDURE — 99223 1ST HOSP IP/OBS HIGH 75: CPT | Performed by: PHYSICIAN ASSISTANT

## 2025-03-18 PROCEDURE — 83036 HEMOGLOBIN GLYCOSYLATED A1C: CPT

## 2025-03-18 PROCEDURE — 85027 COMPLETE CBC AUTOMATED: CPT

## 2025-03-18 DEVICE — LAG SCREW, TI
Type: IMPLANTABLE DEVICE | Site: LEG | Status: FUNCTIONAL
Brand: GAMMA

## 2025-03-18 DEVICE — LOCKING SCREW, FULLY THREADED: Type: IMPLANTABLE DEVICE | Site: LEG | Status: FUNCTIONAL

## 2025-03-18 DEVICE — TROCHANTERIC NAIL KIT, TI
Type: IMPLANTABLE DEVICE | Site: LEG | Status: FUNCTIONAL
Brand: GAMMA

## 2025-03-18 RX ORDER — HYDROMORPHONE HCL/PF 1 MG/ML
SYRINGE (ML) INJECTION AS NEEDED
Status: DISCONTINUED | OUTPATIENT
Start: 2025-03-18 | End: 2025-03-18

## 2025-03-18 RX ORDER — ACETAMINOPHEN 10 MG/ML
1000 INJECTION, SOLUTION INTRAVENOUS ONCE
Status: COMPLETED | OUTPATIENT
Start: 2025-03-18 | End: 2025-03-18

## 2025-03-18 RX ORDER — SODIUM CHLORIDE, SODIUM GLUCONATE, SODIUM ACETATE, POTASSIUM CHLORIDE, MAGNESIUM CHLORIDE, SODIUM PHOSPHATE, DIBASIC, AND POTASSIUM PHOSPHATE .53; .5; .37; .037; .03; .012; .00082 G/100ML; G/100ML; G/100ML; G/100ML; G/100ML; G/100ML; G/100ML
100 INJECTION, SOLUTION INTRAVENOUS CONTINUOUS
Status: DISCONTINUED | OUTPATIENT
Start: 2025-03-18 | End: 2025-03-19

## 2025-03-18 RX ORDER — LIDOCAINE HYDROCHLORIDE 20 MG/ML
INJECTION, SOLUTION EPIDURAL; INFILTRATION; INTRACAUDAL; PERINEURAL AS NEEDED
Status: DISCONTINUED | OUTPATIENT
Start: 2025-03-18 | End: 2025-03-18

## 2025-03-18 RX ORDER — CEFAZOLIN SODIUM 2 G/50ML
2000 SOLUTION INTRAVENOUS ONCE
Status: COMPLETED | OUTPATIENT
Start: 2025-03-18 | End: 2025-03-18

## 2025-03-18 RX ORDER — METOPROLOL TARTRATE 1 MG/ML
INJECTION, SOLUTION INTRAVENOUS AS NEEDED
Status: DISCONTINUED | OUTPATIENT
Start: 2025-03-18 | End: 2025-03-18

## 2025-03-18 RX ORDER — ONDANSETRON 2 MG/ML
4 INJECTION INTRAMUSCULAR; INTRAVENOUS ONCE AS NEEDED
Status: DISCONTINUED | OUTPATIENT
Start: 2025-03-18 | End: 2025-03-18 | Stop reason: HOSPADM

## 2025-03-18 RX ORDER — TRANEXAMIC ACID 10 MG/ML
1000 INJECTION, SOLUTION INTRAVENOUS ONCE
Status: COMPLETED | OUTPATIENT
Start: 2025-03-18 | End: 2025-03-18

## 2025-03-18 RX ORDER — MAGNESIUM HYDROXIDE 1200 MG/15ML
LIQUID ORAL AS NEEDED
Status: DISCONTINUED | OUTPATIENT
Start: 2025-03-18 | End: 2025-03-18 | Stop reason: HOSPADM

## 2025-03-18 RX ORDER — ROCURONIUM BROMIDE 10 MG/ML
INJECTION, SOLUTION INTRAVENOUS AS NEEDED
Status: DISCONTINUED | OUTPATIENT
Start: 2025-03-18 | End: 2025-03-18

## 2025-03-18 RX ORDER — FENTANYL CITRATE/PF 50 MCG/ML
25 SYRINGE (ML) INJECTION
Status: DISCONTINUED | OUTPATIENT
Start: 2025-03-18 | End: 2025-03-18 | Stop reason: HOSPADM

## 2025-03-18 RX ORDER — FUROSEMIDE 10 MG/ML
5 SOLUTION ORAL DAILY
Status: DISCONTINUED | OUTPATIENT
Start: 2025-03-18 | End: 2025-03-19

## 2025-03-18 RX ORDER — DEXAMETHASONE SODIUM PHOSPHATE 10 MG/ML
INJECTION, SOLUTION INTRAMUSCULAR; INTRAVENOUS AS NEEDED
Status: DISCONTINUED | OUTPATIENT
Start: 2025-03-18 | End: 2025-03-18

## 2025-03-18 RX ORDER — SODIUM CHLORIDE, SODIUM LACTATE, POTASSIUM CHLORIDE, CALCIUM CHLORIDE 600; 310; 30; 20 MG/100ML; MG/100ML; MG/100ML; MG/100ML
INJECTION, SOLUTION INTRAVENOUS CONTINUOUS PRN
Status: DISCONTINUED | OUTPATIENT
Start: 2025-03-18 | End: 2025-03-18

## 2025-03-18 RX ORDER — PROPOFOL 10 MG/ML
INJECTION, EMULSION INTRAVENOUS AS NEEDED
Status: DISCONTINUED | OUTPATIENT
Start: 2025-03-18 | End: 2025-03-18

## 2025-03-18 RX ORDER — MIDAZOLAM HYDROCHLORIDE 2 MG/2ML
INJECTION, SOLUTION INTRAMUSCULAR; INTRAVENOUS AS NEEDED
Status: DISCONTINUED | OUTPATIENT
Start: 2025-03-18 | End: 2025-03-18

## 2025-03-18 RX ORDER — CARVEDILOL 6.25 MG/1
6.25 TABLET ORAL ONCE
Status: COMPLETED | OUTPATIENT
Start: 2025-03-18 | End: 2025-03-18

## 2025-03-18 RX ORDER — ONDANSETRON 2 MG/ML
INJECTION INTRAMUSCULAR; INTRAVENOUS AS NEEDED
Status: DISCONTINUED | OUTPATIENT
Start: 2025-03-18 | End: 2025-03-18

## 2025-03-18 RX ORDER — BUPIVACAINE HYDROCHLORIDE 2.5 MG/ML
INJECTION, SOLUTION EPIDURAL; INFILTRATION; INTRACAUDAL; PERINEURAL
Status: COMPLETED | OUTPATIENT
Start: 2025-03-18 | End: 2025-03-18

## 2025-03-18 RX ORDER — HYDROMORPHONE HCL/PF 1 MG/ML
0.25 SYRINGE (ML) INJECTION
Status: DISCONTINUED | OUTPATIENT
Start: 2025-03-18 | End: 2025-03-18 | Stop reason: HOSPADM

## 2025-03-18 RX ORDER — FENTANYL CITRATE 50 UG/ML
INJECTION, SOLUTION INTRAMUSCULAR; INTRAVENOUS AS NEEDED
Status: DISCONTINUED | OUTPATIENT
Start: 2025-03-18 | End: 2025-03-18

## 2025-03-18 RX ADMIN — LEVOTHYROXINE SODIUM 75 MCG: 75 TABLET ORAL at 06:05

## 2025-03-18 RX ADMIN — ONDANSETRON 4 MG: 2 INJECTION INTRAMUSCULAR; INTRAVENOUS at 16:03

## 2025-03-18 RX ADMIN — ROCURONIUM BROMIDE 50 MG: 10 INJECTION, SOLUTION INTRAVENOUS at 16:03

## 2025-03-18 RX ADMIN — TRANEXAMIC ACID 1000 MG: 10 INJECTION, SOLUTION INTRAVENOUS at 17:20

## 2025-03-18 RX ADMIN — HYDROMORPHONE HYDROCHLORIDE 0.5 MG: 1 INJECTION, SOLUTION INTRAMUSCULAR; INTRAVENOUS; SUBCUTANEOUS at 17:29

## 2025-03-18 RX ADMIN — SUGAMMADEX 200 MG: 100 INJECTION, SOLUTION INTRAVENOUS at 17:40

## 2025-03-18 RX ADMIN — INSULIN LISPRO 2 UNITS: 100 INJECTION, SOLUTION INTRAVENOUS; SUBCUTANEOUS at 01:04

## 2025-03-18 RX ADMIN — MORPHINE SULFATE 15 MG: 15 TABLET ORAL at 20:06

## 2025-03-18 RX ADMIN — HYDROMORPHONE HYDROCHLORIDE 0.2 MG: 0.2 INJECTION, SOLUTION INTRAMUSCULAR; INTRAVENOUS; SUBCUTANEOUS at 22:14

## 2025-03-18 RX ADMIN — MIDAZOLAM HYDROCHLORIDE 1 MG: 1 INJECTION, SOLUTION INTRAMUSCULAR; INTRAVENOUS at 15:14

## 2025-03-18 RX ADMIN — PHENYLEPHRINE HYDROCHLORIDE 100 MCG: 10 INJECTION INTRAVENOUS at 16:09

## 2025-03-18 RX ADMIN — CEFAZOLIN SODIUM 2000 MG: 2 SOLUTION INTRAVENOUS at 16:00

## 2025-03-18 RX ADMIN — BRIMONIDINE TARTRATE 1 DROP: 2 SOLUTION/ DROPS OPHTHALMIC at 22:05

## 2025-03-18 RX ADMIN — DEXAMETHASONE SODIUM PHOSPHATE 10 MG: 10 INJECTION, SOLUTION INTRAMUSCULAR; INTRAVENOUS at 16:03

## 2025-03-18 RX ADMIN — MORPHINE SULFATE 15 MG: 15 TABLET ORAL at 06:04

## 2025-03-18 RX ADMIN — FENTANYL CITRATE 50 MCG: 50 INJECTION, SOLUTION INTRAMUSCULAR; INTRAVENOUS at 16:55

## 2025-03-18 RX ADMIN — HYDROMORPHONE HYDROCHLORIDE 0.5 MG: 1 INJECTION, SOLUTION INTRAMUSCULAR; INTRAVENOUS; SUBCUTANEOUS at 17:42

## 2025-03-18 RX ADMIN — INSULIN HUMAN 10 UNITS: 100 INJECTION, SOLUTION PARENTERAL at 15:50

## 2025-03-18 RX ADMIN — TRANEXAMIC ACID 1000 MG: 10 INJECTION, SOLUTION INTRAVENOUS at 16:00

## 2025-03-18 RX ADMIN — PROPOFOL 100 MG: 10 INJECTION, EMULSION INTRAVENOUS at 16:03

## 2025-03-18 RX ADMIN — ACETAMINOPHEN 1000 MG: 10 INJECTION INTRAVENOUS at 16:27

## 2025-03-18 RX ADMIN — SODIUM CHLORIDE, SODIUM GLUCONATE, SODIUM ACETATE, POTASSIUM CHLORIDE, MAGNESIUM CHLORIDE, SODIUM PHOSPHATE, DIBASIC, AND POTASSIUM PHOSPHATE 100 ML/HR: .53; .5; .37; .037; .03; .012; .00082 INJECTION, SOLUTION INTRAVENOUS at 19:27

## 2025-03-18 RX ADMIN — MORPHINE SULFATE 7.5 MG: 15 TABLET ORAL at 01:04

## 2025-03-18 RX ADMIN — FENTANYL CITRATE 50 MCG: 50 INJECTION, SOLUTION INTRAMUSCULAR; INTRAVENOUS at 16:05

## 2025-03-18 RX ADMIN — BUPIVACAINE HYDROCHLORIDE 20 ML: 2.5 INJECTION, SOLUTION EPIDURAL; INFILTRATION; INTRACAUDAL; PERINEURAL at 15:15

## 2025-03-18 RX ADMIN — HEPARIN SODIUM 5000 UNITS: 5000 INJECTION INTRAVENOUS; SUBCUTANEOUS at 22:05

## 2025-03-18 RX ADMIN — METOROPROLOL TARTRATE 2.5 MG: 5 INJECTION, SOLUTION INTRAVENOUS at 16:10

## 2025-03-18 RX ADMIN — LIDOCAINE HYDROCHLORIDE 40 MG: 20 INJECTION, SOLUTION EPIDURAL; INFILTRATION; INTRACAUDAL; PERINEURAL at 16:03

## 2025-03-18 RX ADMIN — CARVEDILOL 6.25 MG: 6.25 TABLET, FILM COATED ORAL at 15:55

## 2025-03-18 RX ADMIN — PRAVASTATIN SODIUM 40 MG: 40 TABLET ORAL at 19:58

## 2025-03-18 RX ADMIN — INSULIN LISPRO 2 UNITS: 100 INJECTION, SOLUTION INTRAVENOUS; SUBCUTANEOUS at 19:59

## 2025-03-18 RX ADMIN — PHENYLEPHRINE HYDROCHLORIDE 200 MCG: 10 INJECTION INTRAVENOUS at 16:20

## 2025-03-18 RX ADMIN — SODIUM CHLORIDE, SODIUM GLUCONATE, SODIUM ACETATE, POTASSIUM CHLORIDE, MAGNESIUM CHLORIDE, SODIUM PHOSPHATE, DIBASIC, AND POTASSIUM PHOSPHATE 100 ML/HR: .53; .5; .37; .037; .03; .012; .00082 INJECTION, SOLUTION INTRAVENOUS at 13:55

## 2025-03-18 RX ADMIN — SODIUM CHLORIDE, SODIUM LACTATE, POTASSIUM CHLORIDE, AND CALCIUM CHLORIDE: .6; .31; .03; .02 INJECTION, SOLUTION INTRAVENOUS at 16:00

## 2025-03-18 RX ADMIN — INSULIN LISPRO 2 UNITS: 100 INJECTION, SOLUTION INTRAVENOUS; SUBCUTANEOUS at 06:04

## 2025-03-18 RX ADMIN — FENTANYL CITRATE 50 MCG: 50 INJECTION, SOLUTION INTRAMUSCULAR; INTRAVENOUS at 16:03

## 2025-03-18 RX ADMIN — MORPHINE SULFATE 15 MG: 15 TABLET ORAL at 14:21

## 2025-03-18 RX ADMIN — TIMOLOL MALEATE 1 DROP: 5 SOLUTION OPHTHALMIC at 19:58

## 2025-03-18 RX ADMIN — FENTANYL CITRATE 50 MCG: 50 INJECTION, SOLUTION INTRAMUSCULAR; INTRAVENOUS at 17:02

## 2025-03-18 NOTE — ASSESSMENT & PLAN NOTE
Lab Results   Component Value Date    HGBA1C 8.5 (H) 11/05/2024     Recent Labs     03/18/25  0010   POCGLU 260*     Blood Sugar Average: Last 72 hrs:  (P) 260  Home regimen: Glipizide 10 mg twice daily, and metformin 500 mg daily-hold while inpatient.  Start insulin sliding scale with fingersticks every 6 hours while NPO  Hypoglycemia protocol

## 2025-03-18 NOTE — ASSESSMENT & PLAN NOTE
History of CLL/SLL previously treated with 4 cycles of BR (June 2012).  Outpatient plan is surveillance.  WBC elevated in setting of CLL.

## 2025-03-18 NOTE — ASSESSMENT & PLAN NOTE
Lab Results   Component Value Date    HGBA1C 8.5 (H) 11/05/2024       Recent Labs     03/18/25  0010 03/18/25  0601   POCGLU 260* 248*       Blood Sugar Average: Last 72 hrs:  (P) 254

## 2025-03-18 NOTE — ASSESSMENT & PLAN NOTE
Lab Results   Component Value Date    EGFR 40 03/18/2025    EGFR 43 03/17/2025    EGFR 40 02/04/2025    CREATININE 1.59 (H) 03/18/2025    CREATININE 1.52 (H) 03/17/2025    CREATININE 1.60 (H) 02/04/2025   Creatinine 1.52 on admission, appears stable at baseline of 1.4-1.6   Avoid nephrotoxic agents  Follow-up creatinine in the morning with BMP

## 2025-03-18 NOTE — ANESTHESIA POSTPROCEDURE EVALUATION
Post-Op Assessment Note    CV Status:  Stable  Pain Score: 0    Pain management: adequate       Mental Status:  Arousable and sleepy   Hydration Status:  Stable   PONV Controlled:  Controlled   Airway Patency:  Patent     Post Op Vitals Reviewed: Yes    No anethesia notable event occurred.    Staff: CRNA           Last Filed PACU Vitals:  Vitals Value Taken Time   Temp 98    Pulse 110    /67    Resp 18    SpO2 99

## 2025-03-18 NOTE — ASSESSMENT & PLAN NOTE
Left intertrochanteric fracture.  Plan for operative for fixation today with Dr. Nevarez  Informed consent was obtained for left intramedullary nail  Patient was cleared by medicine.  N.p.o.  Hold Eliquis and anticoagulation  Orthopedics will follow

## 2025-03-18 NOTE — OP NOTE
OPERATIVE REPORT  PATIENT NAME: Abelino Renee    :  1945  MRN: [id number]  Pt Location:  OR ROOM 01    SURGERY DATE: 3/18/2025    Surgeons and Role:     * Jacqui Nevarez,  - Primary     * Giorgio Merida PA-C - Assisting    Preop Diagnosis:  Closed intertrochanteric fracture of hip, left, initial encounter ([facility]) [S72.142A]    Post-Op Diagnosis Codes:     * Closed intertrochanteric fracture of hip, left, initial encounter ([facility]) [S72.142A]    Procedure(s):  Left - INSERTION NAIL IM FEMUR ANTEGRADE (TROCHANTERIC)    Specimen(s):  * No specimens in log *    Estimated Blood Loss:   Minimal    Drains:  * No LDAs found *    Anesthesia Type:   Choice    Operative Indications:  Closed intertrochanteric fracture of hip, left, initial encounter ([facility]) [S72.142A]      Operative Findings:  Left hip stable intertrochanteric fracture      Complications:   None    Procedure and Technique:      IMPLANTS: Hammett gamma nail    INDICATIONS AND HISTORY:  This is a 79 y.o. male  with acute left hip pain and inability to ambulate s/p reported injury.  The patient's imaging demonstrates an intertrochanteric fracture.  The patient was indicated for cephalomedullary nail of the hip.  The patient understood the risks and benefits of the procedure, the risks including pain, infection, blood loss, blood clots, neurovascular injury, fracture, implant failure, malunion, nonunion, stiffness, stroke, heart attack all up to and including death.  The patient  understood and did consent for surgery today.    DESCRIPTION OF OPERATION:  The patient was identified, and the procedure was verified.  The patient was seen in the pre-op holding area where the operative extremity was marked.  The patient was taken to the operating room, placed in the supine position.  The operative extremity was prepped and draped in the usual sterile fashion.  A time-out was called.  The patient was administered IV antibiotics prior to incision.  Using a 10-blade  knife incision was made just proximal to the tip of the greater trochanter.  Subcutaneous dissection was taken down to the fascia which was incised with a 10 blade knife.  Blunt finger dissection was taken down to the tip of the greater trochanter which was palpated.  A guidewire was then placed at the tip of the greater trochanter and advanced down the femoral shaft and shown to be in good position under intraoperative fluoroscopy.  An opening Reamer was then placed down the guidewire to the tip of the greater trochanter and the proximal femur was reamed.  Once this was done the femoral nail portion was placed down the femoral shaft and seated in good position under fluoroscopy.  Once this was done a distal lateral incision was made to introduce the guide sleeve for the femoral implant.  A guide pin was then advanced using the guide sleeve into the femoral head as close as possible to the center-center position on the AP and lateral intraoperative fluoroscopic views.  The femoral implant was then measured using a measuring guide.  Once the implant size was determined an opening Reamer for the lateral cortex was then inserted to the guide sleeve opening the lateral cortex.  The measured femoral implant was then advanced up into the femoral head under fluoroscopic imaging and deemed to be in adequate position.  And interlocking screw was then placed to lock the construct.  Attention was brought to the distal interlocking screw.  A guide sleeve was inserted on the distal aspect of the jig and skin incision was made advancing the guide sleeve to the cortical bone.  A drill was then used to drill the bone bicortically in this area.  A measuring guide was then used to measure the interlocking screw.  The measured interlocking screws had inserted to lock the implant distally.  The remaining proximal accessory jig was removed.  Final intraoperative imaging demonstrated a well position cephalomedullary implant with adequate  alignment of the fracture site.  The wounds were copiously irrigated with normal saline.  The fascia was closed with 0 Vicryl.  The subcutaneous tissue was closed with 2-0 Vicryl.  The epidermis was closed with staples.  Dressings included Xeroform, 4 x 4 gauze, ABD's, and foam tape.  The patient tolerated the procedure well.  There were no complications throughout the case.  The patient was placed in PACU in stable condition.     I was present for the entire procedure., A qualified resident physician was not available., and A physician assistant was required during the procedure for retraction, tissue handling, dissection and suturing.    Patient Disposition:  PACU              SIGNATURE: [place] DO Roque  DATE: March 18, 2025  TIME: 5:44 PM

## 2025-03-18 NOTE — ANESTHESIA PREPROCEDURE EVALUATION
Procedure:  INSERTION NAIL IM FEMUR ANTEGRADE (TROCHANTERIC) (Left: Leg Upper)    Relevant Problems   CARDIO   (+) Atrial fibrillation (HCC)   (+) Benign essential HTN   (+) CHF (congestive heart failure) (HCC)      ENDO   (+) Hypothyroidism   (+) Type 2 diabetes mellitus with diabetic chronic kidney disease (HCC)   (+) Type 2 diabetes mellitus with diabetic polyneuropathy, without long-term current use of insulin (HCC)      /RENAL   (+) Diabetic nephropathy associated with type 2 diabetes mellitus (HCC)   (+) Stage 3a chronic kidney disease (HCC)   (+) Type 2 diabetes mellitus with diabetic chronic kidney disease (HCC)      MUSCULOSKELETAL   (+) Arthritis      NEURO/PSYCH   (+) Type 2 diabetes mellitus with diabetic polyneuropathy, without long-term current use of insulin (HCC)      Other   (+) Chronic lymphocytic leukemia (HCC)      TTE (06/2024):  Left Ventricle Left ventricular cavity size is normal. Wall thickness is mildly increased. There is mild concentric hypertrophy. The left ventricular ejection fraction is 60%. Systolic function is normal. Wall motion is normal. Diastolic function is normal.   Right Ventricle Right ventricular cavity size is moderately dilated. Systolic function is normal. Wall thickness is normal.   Left Atrium The atrium is mildly dilated.   Right Atrium The atrium is normal in size.   Aortic Valve The aortic valve is trileaflet. The leaflets are mildly thickened. The leaflets are mildly calcified. The leaflets exhibit normal mobility. There is trace regurgitation. The aortic valve has no significant stenosis.   Mitral Valve Mitral valve structure is normal.  There is mild regurgitation. There is no evidence of stenosis.   Tricuspid Valve Tricuspid valve structure is normal. There is mild regurgitation. There is no evidence of stenosis. The right ventricular systolic pressure is moderately elevated.   Pulmonic Valve Pulmonic valve structure is normal. There is no evidence of  regurgitation. There is no evidence of stenosis.   Ascending Aorta The aortic root is normal in size.   IVC/SVC The inferior vena cava is normal in size. Respirophasic changes were normal.   Pericardium There is no pericardial effusion. The pericardium is normal in appearance.     Physical Exam    Airway    Mallampati score: II  TM Distance: >3 FB  Neck ROM: limited     Dental   No notable dental hx     Cardiovascular  Rhythm: regular, Rate: normal    Pulmonary   Breath sounds clear to auscultation    Other Findings  Intercisor Distance > 3cm          Anesthesia Plan  ASA Score- 3     Anesthesia Type- general with ASA Monitors.         Additional Monitors:     Airway Plan: ETT.    Comment: Discussed benefits/risks of general anesthesia including possibility of mouth/throat pain, injury to lips/teeth, nausea/vomiting, and surgical pain along with more rare complications such as stroke, MI, pneumonia, aspiration, and injury to blood vessels. All questions answered.    Discussed nerve block to assist with post-operative analgesia. Discussed possibility of uncommon complications including permanent nerve injury, damage to blood vessels, infection local anesthetic toxicity, and nerve block failure. Patient understands and wishes to proceed.       .       Plan Factors-Exercise tolerance (METS): >4 METS.    Chart reviewed. EKG reviewed.  Existing labs reviewed.                   Induction- intravenous.    Postoperative Plan- Plan for postoperative opioid use. Planned trial extubation    Perioperative Resuscitation Plan - Level 1 - Full Code.       Informed Consent- Anesthetic plan and risks discussed with patient.  I personally reviewed this patient with the CRNA. Discussed and agreed on the Anesthesia Plan with the CRNA..      NPO Status:  No vitals data found for the desired time range.

## 2025-03-18 NOTE — ASSESSMENT & PLAN NOTE
Patient appearing euvolemic on exam, benign lung exam, no peripheral edema patient denies any CP or SOB.  Does not appear to have acute exacerbation.  .  CXR showing prominent interstitial markings bilaterally suspicious for mild edema.  CT chest abdomen pelvis showing probable trace bilateral pleural effusions.  And mild patchy groundglass opacities and interlobular septal thickening in the lungs consistent with interstitial edema.  Patient states he recently recovered from pneumonia a few months ago.  Initially treated in ED with the furosemide 20 mg x 1 dose.  Continue PTA for torsemide 2.5 mg daily.  Daily weights, strict intake and output.

## 2025-03-18 NOTE — ASSESSMENT & PLAN NOTE
Wt Readings from Last 3 Encounters:   03/17/25 80.4 kg (177 lb 4 oz)   02/20/25 82.3 kg (181 lb 8 oz)   10/28/24 82.1 kg (181 lb)

## 2025-03-18 NOTE — ASSESSMENT & PLAN NOTE
Hx PAF, HR stable, controlled A-fib  ECG on admission with atrial fibrillation HR 99, QTc 410, no ST-T changes or ischemia.  Continue PTA carvedilol 6.25 mg twice daily  Last dose of Eliquis yesterday morning will hold Eliquis pending OR procedure.

## 2025-03-18 NOTE — ASSESSMENT & PLAN NOTE
Patient with PMHx CLL, A-fib on Eliquis, HTN, hypothyroid, NIDDM type 2, and CKD who presents after a fall after tripping over floorboards in [facility].  Patient fell on his left side and states he was lying on the ground for about 3 hours unable to weight-bear.  .  CT chest abdomen pelvis showing acute intertrochanteric fracture of left femoral neck.  NPO after midnight for OR tomorrow  NON weight bearing to LLE  Appreciate orthopedic consultation  Pain regimen: APAP as needed, morphine IR for moderate and severe pain with IV Dilaudid for breakthrough pain.  Risk Factor Score (Yes=1, No=0)   Hx of TIA/CVA 0   Hx of prior ischemic heart disease (AMI, unstable angina, Q waves on EKG, CABG) 1   Hx of congestive heart failure 1   Serum Creatinine >2 mg/dl 0   Insulin dependent diabetes mellitus 0   Total Score 2     Risk of MACE (30-day)     Points Risk % (95% CI), Rupali, 2017 Risk % (95% CI) Brant, 1999   0 3.9 (2.8-5.4) 0.4 (0.05-1.5)   1 6 (4.9-7.4) 0.9 (0.3-2.1)   2 10.1 (8.1-12.6) 6.6 (3.9-10.3)   3 or more 15 (11.1-20) >11 (5.8-18.4)       Patient is relatively low risk for a low risk procedure, he is medically cleared to proceed.  He is overall very active and can perform over 4 METS    In patients with elevated risk (RCRI >/= 2), assess functional capacity (METs/DASI).   If >4 METs functional capacity, may proceed to surgery. If unknown/poor (<4 METs) functional capacity consider, may need preoperative cardiac testing depending on symptoms and if testing will .

## 2025-03-18 NOTE — ED PROVIDER NOTES
Time reflects when diagnosis was documented in both MDM as applicable and the Disposition within this note       Time User Action Codes Description Comment    3/17/2025 10:44 PM Nikolay Granado [I50.9] Acute congestive heart failure, unspecified heart failure type (HCC)     3/17/2025 10:44 PM Nikolay Granado [S72.142A] Closed intertrochanteric fracture of hip, left, initial encounter (HCC)           ED Disposition       ED Disposition   Admit    Condition   Stable    Date/Time   Mon Mar 17, 2025 10:47 PM    Comment   Case was discussed with RADHA Diaz and the patient's admission status was agreed to be Admission Status: observation status to the service of Dr. Eddie Quinn .               Assessment & Plan       Medical Decision Making  Left intertrochanter fracture: Case discussed with orthopedic Dr. Lachman who will see patient as consultant.  Pulmonary edema: Borderline low pulse ox despite lack of dyspnea so treating with IV lasix.    Leukocytosis: Patient with CLL.    Amount and/or Complexity of Data Reviewed  Labs: ordered. Decision-making details documented in ED Course.  Radiology: ordered and independent interpretation performed.    Risk  Prescription drug management.  Decision regarding hospitalization.        ED Course as of 03/17/25 2300   Mon Mar 17, 2025   2113 EKG: AF at 99 with normal QRS, normal ST-t, QTc 410   2124 Case discussed with radiologist who advised x-ray suspicious but not conclusive for hip fracture and advised CT chest/abd/pelvis, which I ordered.   2136 WBC(!): 39.72  Consistent with CLL   2300 CT reviewed.  IV lasix ordered for pulmonary edema with low pulse ox.  Case discussed with RADHA Diaz, who accepted the admission.       Medications   morphine injection 4 mg (4 mg Intravenous Given 3/17/25 2100)   iohexol (OMNIPAQUE) 350 MG/ML injection (SINGLE-DOSE) 100 mL (100 mL Intravenous Given 3/17/25 2142)   furosemide (LASIX) injection 20 mg (20 mg Intravenous Given 3/17/25  "6893)       ED Risk Strat Scores                                                History of Present Illness       Chief Complaint   Patient presents with    Fall     Patient brought in by EMS for trip and fall in attic falling on L hip, no head strike, no loc, on Eliquis. 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    Chronic lymphocytic leukemia of B-cell type (HCC)     \"remission 4-5yrs\"-chemo    DDD (degenerative disc disease), cervical     Diabetes mellitus (HCC)     bs checked by pt at home at 6am =92    Diabetic neuropathy (HCC)     Diabetic retinopathy (HCC)     Elevated WBC count     Heart attack (HCC) 04/2015    \"labeled as that and than tested later after appendix removed and stress test showed negative\"    History of cancer chemotherapy     last treatment approx 5yrs ago    Hypertension     Myocardial infarction (HCC) 04/2015    Scalp psoriasis     Shingles 2/10-15    Toe injury     left great toe, - 2017-internal braec-to be removed today 3/30/2018    Wears glasses       Past Surgical History:   Procedure Laterality Date    APPENDECTOMY      BONE BIOPSY Left 3/30/2018    Procedure: GREAT TOE BONE BIOPSY;  Surgeon: Gerber Multani DPM;  Location: [facility];  Service: Podiatry    BOWEL RESECTION      CATARACT EXTRACTION Bilateral     CHOLECYSTECTOMY      COLECTOMY  06/2015    EYE SURGERY Right     \"retinal peel\"    FOOT FUSION Left 12/6/2017    Procedure: HALLUX INTERPHALANGEAL JOINT FUSION;  Surgeon: Gerber Multani DPM;  [facility];  Service: Podiatry    HARDWARE REMOVAL Left 4/18/2018    Procedure: REMOVAL STAPLES LEFT TOE;  Surgeon: Gerber Multani DPM;  [facility];  Service: Podiatry    ORIF FOOT FRACTURE Left 12/6/2017    Procedure: HALLUX INTERPHALANGEAL JOINT INTERNAL " FIXATION; REPAIR OF ULCERATION WITH EXCISIONS AND REVISION OF SKIN HALLUX WITH USE OF ALLOGRAFT;  Surgeon: Gerber Multani DPM;  [facility];  Service: Podiatry    DE COLONOSCOPY FLX DX W/COLLJ SPEC WHEN PFRMD N/A 2016    Procedure: COLONOSCOPY;  Surgeon: Minal Ruelas DO;  Location: [facility];  Service: Gastroenterology    DE REMOVAL IMPLANT DEEP Left 3/30/2018    Procedure: REMOVAL HARDWARE FOOT;  Surgeon: Gerber Multani DPM;  [facility];  Service: Podiatry    RETINAL DETACHMENT SURGERY      TONSILLECTOMY        Family History   Problem Relation Age of Onset    Alcohol abuse Mother             Pancreatic cancer Father         age 79    Hashimoto's thyroiditis Daughter     Rheum arthritis Daughter         Juvenile Rheum Arthitis     No Known Problems Sister          age 78    No Known Problems Brother          86    Kidney failure Brother         dialysis, 74    Diabetes Brother       Social History     Tobacco Use    Smoking status: Never     Passive exposure: Never    Smokeless tobacco: Never   Vaping Use    Vaping status: Never Used   Substance Use Topics    Alcohol use: Never    Drug use: Never      E-Cigarette/Vaping    E-Cigarette Use Never User       E-Cigarette/Vaping Substances    Nicotine No     THC No     CBD No     Flavoring No     Other No     Unknown No       I have reviewed and agree with the history as documented.     Patient reports he was on the ground for about 3 hours before anyone helped him and he was brought to the hospital.  He denies any pain apart from that hip pain radiating down to his left knee.  Had a trip and fall when working in the [employer].  He fell on his left side and had severe pain in his hip.  He tried to stand up but could not put weight on his leg causing him to fall again.  He does not think he hit his head or lost consciousness.  He does take eliquis for atrial fibrillation.  He denies chest pain or dyspnea.      Fall      Review of  Systems   All other systems reviewed and are negative.          Objective       ED Triage Vitals [03/17/25 2011]   Temperature Pulse Blood Pressure Respirations SpO2 Patient Position - Orthostatic VS   98.6 °F (37 °C) (!) 107 169/87 20 90 % Sitting      Temp Source Heart Rate Source BP Location FiO2 (%) Pain Score    Oral Monitor Left arm -- 8      Vitals      Date and Time Temp Pulse SpO2 Resp BP Pain Score FACES Pain Rating User   03/17/25 2215 -- 101 95 % 22 134/83 -- -- MS   03/17/25 2200 -- 100 94 % 22 141/68 -- -- MS   03/17/25 2145 -- 98 93 % 18 137/63 -- -- MS   03/17/25 2130 -- 90 94 % 20 140/86 -- -- MS   03/17/25 2115 -- 100 93 % 18 149/66 -- -- MS   03/17/25 2100 -- 100 93 % 20 144/78 8 -- MS   03/17/25 2045 -- 95 92 % 22 161/77 -- -- MS   03/17/25 2030 -- 99 93 % 18 160/81 8 -- MS   03/17/25 2011 98.6 °F (37 °C) 107 90 % 20 169/87 8 -- CW            Physical Exam  Vitals and nursing note reviewed.   Constitutional:       General: He is not in acute distress.     Appearance: He is well-developed.   HENT:      Head: Normocephalic and atraumatic.   Eyes:      Conjunctiva/sclera: Conjunctivae normal.   Cardiovascular:      Rate and Rhythm: Normal rate and regular rhythm.      Heart sounds: No murmur heard.  Pulmonary:      Effort: Pulmonary effort is normal. No respiratory distress.      Breath sounds: Normal breath sounds.   Abdominal:      Palpations: Abdomen is soft.      Tenderness: There is no abdominal tenderness.   Musculoskeletal:         General: Tenderness (Mild left hip tenderness, no knee tenderness) and deformity (Left leg shortened.  Left foot amputations.) present. No swelling.      Cervical back: Neck supple.   Skin:     General: Skin is warm and dry.      Capillary Refill: Capillary refill takes less than 2 seconds.   Neurological:      Mental Status: He is alert.   Psychiatric:         Mood and Affect: Mood normal.         Results Reviewed       Procedure Component Value Units Date/Time     RBC Morphology Reflex Test [[id number]] Collected: 03/17/25 2027    Lab Status: Final result Specimen: Blood from Arm, Left Updated: 03/17/25 2201    CBC and differential [[id number]]  (Abnormal) Collected: 03/17/25 2027    Lab Status: Final result Specimen: Blood from Arm, Left Updated: 03/17/25 2134     WBC 39.72 Thousand/uL      RBC 3.95 Million/uL      Hemoglobin 12.2 g/dL      Hematocrit 37.6 %      MCV 95 fL      MCH 30.9 pg      MCHC 32.4 g/dL      RDW 14.1 %      MPV 9.9 fL      Platelets 129 Thousands/uL     Narrative:      This is an appended report.  These results have been appended to a previously verified report.    Manual Differential(PHLEBS Do Not Order) [[id number]]  (Abnormal) Collected: 03/17/25 2027    Lab Status: Final result Specimen: Blood from Arm, Left Updated: 03/17/25 2134     Segmented % 59 %      Lymphocytes % 37 %      Monocytes % 3 %      Eosinophils % 0 %      Basophils % 1 %      Absolute Neutrophils 23.43 Thousand/uL      Absolute Lymphocytes 14.70 Thousand/uL      Absolute Monocytes 1.19 Thousand/uL      Absolute Eosinophils 0.00 Thousand/uL      Absolute Basophils 0.40 Thousand/uL      Total Counted --     RBC Morphology Present     Platelet Estimate Decreased     Large Platelet Present     Anisocytosis Present     Macrocytes Present     Ovalocytes Present     Poikilocytes Present     Polychromasia Present     Tear Drop Cells Present    Protime-INR [[id number]]  (Normal) Collected: 03/17/25 2107    Lab Status: Final result Specimen: Blood from Arm, Left Updated: 03/17/25 2124     Protime 15.0 seconds      INR 1.14    Narrative:      INR Therapeutic Range    Indication                                             INR Range      Atrial Fibrillation                                               2.0-3.0  Hypercoagulable State                                    2.0.2.3  Left Ventricular Asist Device                            2.0-3.0  Mechanical Heart Valve                                   -    Aortic(with afib, MI, embolism, HF, LA enlargement,    and/or coagulopathy)                                     2.0-3.0 (2.5-3.5)     Mitral                                                             2.5-3.5  Prosthetic/Bioprosthetic Heart Valve               2.0-3.0  Venous thromboembolism (VTE: VT, PE        2.0-3.0    APTT [[id number]]  (Normal) Collected: 03/17/25 2107    Lab Status: Final result Specimen: Blood from Arm, Left Updated: 03/17/25 2124     PTT 28 seconds     B-Type Natriuretic Peptide(BNP) [[id number]]  (Abnormal) Collected: 03/17/25 2027    Lab Status: Final result Specimen: Blood from Arm, Left Updated: 03/17/25 2055      pg/mL     Basic metabolic panel [[id number]]  (Abnormal) Collected: 03/17/25 2027    Lab Status: Final result Specimen: Blood from Arm, Left Updated: 03/17/25 2050     Sodium 136 mmol/L      Potassium 5.1 mmol/L      Chloride 102 mmol/L      CO2 25 mmol/L      ANION GAP 9 mmol/L      BUN 29 mg/dL      Creatinine 1.52 mg/dL      Glucose 254 mg/dL      Calcium 9.9 mg/dL      eGFR 43 ml/min/1.73sq m     Narrative:      National Kidney Disease Foundation guidelines for Chronic Kidney Disease (CKD):     Stage 1 with normal or high GFR (GFR > 90 mL/min/1.73 square meters)    Stage 2 Mild CKD (GFR = 60-89 mL/min/1.73 square meters)    Stage 3A Moderate CKD (GFR = 45-59 mL/min/1.73 square meters)    Stage 3B Moderate CKD (GFR = 30-44 mL/min/1.73 square meters)    Stage 4 Severe CKD (GFR = 15-29 mL/min/1.73 square meters)    Stage 5 End Stage CKD (GFR <15 mL/min/1.73 square meters)  Note: GFR calculation is accurate only with a steady state creatinine    CK [[id number]]  (Normal) Collected: 03/17/25 2027    Lab Status: Final result Specimen: Blood from Arm, Left Updated: 03/17/25 2050     Total  U/L             CT chest abdomen pelvis w contrast   Final Interpretation by Haydee Parra MD (03/17 2229)      Acute intertrochanteric fracture of left femoral neck.       Small focus of subcutaneous fat stranding in the left lower flank and gluteal region likely posttraumatic contusion.      Mild patchy groundglass opacity and interlobular septal thickening in the lungs consistent with interstitial edema.      Probable trace bilateral pleural effusions.      No significant change in multiple mildly prominent mediastinal and hilar lymph nodes, nonspecific but may be related to patient's known CLL or reactive. Follow-up is recommended.         I personally discussed this study with SUKI MUÑOZ on 3/17/2025 10:26 PM.                  Workstation performed: [id number]         CT cervical spine without contrast   Final Interpretation by Haydee Parra MD (03/17 2231)      No cervical spine fracture or traumatic malalignment.         Workstation performed: [id number]         CT head without contrast   Final Interpretation by Haydee Parra MD (03/17 2231)      No acute intracranial abnormality.                                 Workstation performed: [id number]         XR hip/pelv 2-3 vws left   ED Interpretation by Suki Muñoz MD (03/17 2043)   Left intertroch hip fracture      XR chest 1 view portable   ED Interpretation by Suki Muñoz MD (03/17 2041)   No pneumothorax.  Increased pulmonary vascular congestion      Final Interpretation by Haydee Parra MD (03/17 2232)      Prominent interstitial markings bilaterally suspicious for mild edema.               Workstation performed: [id number]         XR knee 4+ views left injury    (Results Pending)       Procedures    ED Medication and Procedure Management   Prior to Admission Medications   Prescriptions Last Dose Informant Patient Reported? Taking?   Glucosamine HCl (GLUCOSAMINE PO)  Self Yes No   Sig: Take 1 tablet by mouth 2 (two) times a day    Multiple Vitamin (MULTIVITAMIN) tablet  Self Yes No   Sig: Take 1 tablet by mouth daily   Omega-3 Fatty Acids (FISH OIL PO)  Self Yes No   Sig: Take by mouth  daily   acetaminophen (TYLENOL) 325 mg tablet  Self No No   Sig: Take 2 tablets (650 mg total) by mouth every 6 (six) hours as needed for fever, mild pain or headaches   amLODIPine (NORVASC) 10 mg tablet   No No   Sig: Take 1 tablet (10 mg total) by mouth daily   apixaban (Eliquis) 5 mg   No No   Sig: TAKE 1 TABLET TWICE DAILY   brimonidine tartrate 0.2 % ophthalmic solution  Self Yes No   Sig: INSTILL 1 DROP INTO EACH EYE TWICE DAILY   carvedilol (Coreg) 6.25 mg tablet   No No   Sig: Take 1 tablet (6.25 mg total) by mouth 2 (two) times a day with meals   clobetasol (TEMOVATE) 0.05 % ointment   Yes No   Sig: APPLY AT BEDTIME TWO TIMES DAILY TO ITCHY AREAS FOR 2-4 WEEKS MAX AS NEEDED   glipiZIDE (GLUCOTROL XL) 10 mg 24 hr tablet   No No   Sig: TAKE 1 TABLET TWICE DAILY   levothyroxine (Synthroid) 75 mcg tablet   No No   Sig: Take 1 tablet (75 mcg total) by mouth daily   linaGLIPtin 5 MG TABS   No No   Sig: Take 5 mg by mouth daily with or without food   losartan (COZAAR) 100 MG tablet   No No   Sig: Take 1 tablet (100 mg total) by mouth daily   metFORMIN (GLUCOPHAGE-XR) 500 mg 24 hr tablet   No No   Sig: Take 1 tablet (500 mg total) by mouth daily with breakfast for 14 days, THEN 2 tablets (1,000 mg total) daily with breakfast.   simvastatin (ZOCOR) 20 mg tablet   No No   Sig: TAKE 1 TABLET AT BEDTIME   timolol (TIMOPTIC) 0.5 % ophthalmic solution  Self Yes No   Sig: timolol maleate 0.5 % eye drops   torsemide (DEMADEX) 5 MG tablet  Self No No   Sig: Take 0.5 tablets (2.5 mg total) by mouth daily      Facility-Administered Medications: None     Patient's Medications   Discharge Prescriptions    No medications on file     No discharge procedures on file.  ED SEPSIS DOCUMENTATION   Time reflects when diagnosis was documented in both MDM as applicable and the Disposition within this note       Time User Action Codes Description Comment    3/17/2025 10:44 PM Nikolay Granado Add [I50.9] Acute congestive heart failure,  unspecified heart failure type (HCC)     3/17/2025 10:44 PM Nikolay Granado Add [S72.142A] Closed intertrochanteric fracture of hip, left, initial encounter ([facility])                  Nikolay Granado MD  03/17/25 3112

## 2025-03-18 NOTE — ASSESSMENT & PLAN NOTE
Hx PAF, HR stable, controlled A-fib  ECG on admission with atrial fibrillation HR 99, QTc 410, no ST-T changes or ischemia.  Continue PTA carvedilol 6.25 mg twice daily  Last dose of Eliquis this morning, will hold Eliquis pending OR procedure.

## 2025-03-18 NOTE — ASSESSMENT & PLAN NOTE
Lab Results   Component Value Date    HGBA1C 8.5 (H) 11/05/2024     Recent Labs     03/18/25  0010 03/18/25  0601   POCGLU 260* 248*     Blood Sugar Average: Last 72 hrs:  (P) 254  Home regimen: Glipizide 10 mg twice daily, and metformin 500 mg daily-hold while inpatient.  Start insulin sliding scale with fingersticks every 6 hours while NPO  Hypoglycemia protocol

## 2025-03-18 NOTE — PHYSICAL THERAPY NOTE
Physical Therapy Cancellation Note       03/18/25 0808   PT Last Visit   PT Visit Date 03/18/25   Note Type   Note type Cancelled Session   Cancel Reasons Medical status   Additional Comments 78 y/o presented after a fall with subsequent left hip pain.  Imaging revealing acute left greater trochanter fx.  Pt currently LLE NWB and awaiting ortho consult.  Will hold therapy evaluation until POC is established and/or pt undergoes surgery.

## 2025-03-18 NOTE — H&P
H&P - Hospitalist   Name: Abelino Renee 79 y.o. male I MRN: [id number]  Unit/Bed#: -01 I Date of Admission: 3/17/2025   Date of Service: 3/18/2025 I Hospital Day: 0     Assessment & Plan  Closed fracture of neck of left femur (HCC)  Patient with PMHx CLL, A-fib on Eliquis, HTN, hypothyroid, NIDDM type 2, and CKD who presents after a fall after tripping over floorboards in [facility].  Patient fell on his left side and states he was lying on the ground for about 3 hours unable to weight-bear.  .  CT chest abdomen pelvis showing acute intertrochanteric fracture of left femoral neck.  NPO after midnight for OR tomorrow  NON weight bearing to LLE  Appreciate orthopedic consultation  Pain regimen: APAP as needed, morphine IR for moderate and severe pain with IV Dilaudid for breakthrough pain.  Atrial fibrillation (HCC)  Hx PAF, HR stable, controlled A-fib  ECG on admission with atrial fibrillation HR 99, QTc 410, no ST-T changes or ischemia.  Continue PTA carvedilol 6.25 mg twice daily  Last dose of Eliquis this morning, will hold Eliquis pending OR procedure.  CHF (congestive heart failure) (HCC)  Patient appearing euvolemic on exam, benign lung exam, no peripheral edema patient denies any CP or SOB.  Does not appear to have acute exacerbation.  .  CXR showing prominent interstitial markings bilaterally suspicious for mild edema.  CT chest abdomen pelvis showing probable trace bilateral pleural effusions.  And mild patchy groundglass opacities and interlobular septal thickening in the lungs consistent with interstitial edema.  Patient states he recently recovered from pneumonia a few months ago.  Initially treated in ED with the furosemide 20 mg x 1 dose.  Continue PTA for torsemide 2.5 mg daily.  Daily weights, strict intake and output.  Benign essential HTN  BP stable, hemodynamically stable  Monitor with routine vital signs  Continue PTA amlodipine 10 mg daily, carvedilol 6.25 mg twice daily, losartan  100 mg daily, and torsemide 2.5 mg daily with hold parameters.  Stage 3a chronic kidney disease (HCC)  Lab Results   Component Value Date    EGFR 43 03/17/2025    EGFR 40 02/04/2025    EGFR 36 01/13/2025    CREATININE 1.52 (H) 03/17/2025    CREATININE 1.60 (H) 02/04/2025    CREATININE 1.74 (H) 01/13/2025   Creatinine 1.52 on admission, appears stable at baseline of 1.4-1.6   Avoid nephrotoxic agents  Follow-up creatinine in the morning with BMP  Diabetic nephropathy associated with type 2 diabetes mellitus (HCC)  Lab Results   Component Value Date    HGBA1C 8.5 (H) 11/05/2024     Recent Labs     03/18/25  0010   POCGLU 260*     Blood Sugar Average: Last 72 hrs:  (P) 260  Home regimen: Glipizide 10 mg twice daily, and metformin 500 mg daily-hold while inpatient.  Start insulin sliding scale with fingersticks every 6 hours while NPO  Hypoglycemia protocol  Chronic lymphocytic leukemia (HCC)  History of CLL/SLL previously treated with 4 cycles of BR (June 2012).  Outpatient plan is surveillance.  WBC elevated in setting of CLL.  Hypothyroidism  Continue PTA levothyroxine      VTE Pharmacologic Prophylaxis: VTE Score: 5 High Risk (Score >/= 5) - Pharmacological DVT Prophylaxis Ordered: heparin. Sequential Compression Devices Ordered.  Code Status: Level 1 - Full Code   Discussion with family: Updated  (wife) at bedside.    Anticipated Length of Stay: Patient will be admitted on an inpatient basis with an anticipated length of stay of greater than 2 midnights secondary to left femoral neck fracture.    History of Present Illness   Chief Complaint: Fall with left hip pain    Abelino Renee is a 79 y.o. male with PMHx CLL, A-fib on Eliquis, HTN, hypothyroid, NIDDM type 2, and CKD who presents after a fall after tripping over floorboards in [facility].  Patient fell on his left side and states he was lying on the ground for about 3 hours unable to weight-bear.  . CT chest abdomen pelvis showing acute  intertrochanteric fracture of left femoral neck. NPO after midnight for OR tomorrow. Hx PAF, HR stable, controlled A-fib  ECG on admission with atrial fibrillation HR 99, QTc 410, no ST-T changes or ischemia. Patient appearing euvolemic on exam, benign lung exam, no peripheral edema patient denies any CP or SOB.  Does not appear to have acute exacerbation.  .  CXR showing prominent interstitial markings bilaterally suspicious for mild edema.  CT chest abdomen pelvis showing probable trace bilateral pleural effusions.  And mild patchy groundglass opacities and interlobular septal thickening in the lungs consistent with interstitial edema.  Patient states he recently recovered from pneumonia a few months ago.  Initially treated in ED with the furosemide 20 mg x 1 dose. Creatinine 1.52 on admission, appears stable at baseline of 1.4-1.6 .  Patient denies any CP, SOB, RAIN.  Patient denies any headaches, lightheadedness or dizziness.  Patient denies any N/V/D, abdominal pain or constipation.  Patient denies any urinary symptoms.  Patient denies any fever or chills.      Review of Systems   Constitutional:  Negative for chills and fever.   HENT:  Negative for ear pain and sore throat.    Eyes:  Negative for pain and visual disturbance.   Respiratory:  Negative for cough, chest tightness, shortness of breath, wheezing and stridor.    Cardiovascular:  Negative for chest pain, palpitations and leg swelling.   Gastrointestinal:  Negative for abdominal pain, constipation, diarrhea, nausea and vomiting.   Genitourinary:  Negative for dysuria and hematuria.   Musculoskeletal:  Positive for gait problem. Negative for arthralgias and back pain.   Skin:  Negative for color change and rash.   Neurological:  Negative for seizures and syncope.   All other systems reviewed and are negative.      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    Chronic lymphocytic leukemia of B-cell type (HCC)     \"remission 4-5yrs\"-chemo    DDD (degenerative disc disease), cervical     Diabetes mellitus (HCC)     bs checked by pt at home at 6am =92    Diabetic neuropathy (HCC)     Diabetic retinopathy (HCC)     Elevated WBC count     Heart attack (HCC) 04/2015    \"labeled as that and than tested later after appendix removed and stress test showed negative\"    History of cancer chemotherapy     last treatment approx 5yrs ago    Hypertension     Myocardial infarction (HCC) 04/2015    Scalp psoriasis     Shingles 2/10-15    Toe injury     left great toe, - 2017-internal braec-to be removed today 3/30/2018    Wears glasses      Past Surgical History:   Procedure Laterality Date    APPENDECTOMY      BONE BIOPSY Left 3/30/2018    Procedure: GREAT TOE BONE BIOPSY;  Surgeon: Gerber Multani DPM;  [facility];  Service: Podiatry    BOWEL RESECTION      CATARACT EXTRACTION Bilateral     CHOLECYSTECTOMY      COLECTOMY  06/2015    EYE SURGERY Right     \"retinal peel\"    FOOT FUSION Left 12/6/2017    Procedure: HALLUX INTERPHALANGEAL JOINT FUSION;  Surgeon: Gerber Multani DPM;  [facility];  Service: Podiatry    HARDWARE REMOVAL Left 4/18/2018    Procedure: REMOVAL STAPLES LEFT TOE;  Surgeon: Gerber Multani DPM;  [facility];  Service: Podiatry    ORIF FOOT FRACTURE Left 12/6/2017    Procedure: HALLUX INTERPHALANGEAL JOINT INTERNAL FIXATION; REPAIR OF ULCERATION WITH EXCISIONS AND REVISION OF SKIN HALLUX WITH USE OF ALLOGRAFT;  Surgeon: Gerber Multani DPM;  [facility];  Service: Podiatry    ID COLONOSCOPY FLX DX W/COLLJ SPEC WHEN PFRMD N/A 4/21/2016    Procedure: COLONOSCOPY;  Surgeon: Minal Ruelas DO;  [facility]: [facility];  Service: Gastroenterology    ID REMOVAL IMPLANT DEEP Left 3/30/2018    Procedure: REMOVAL HARDWARE " FOOT;  Surgeon: Gerber Multani DPM;  [facility];  Service: Podiatry    RETINAL DETACHMENT SURGERY      TONSILLECTOMY       Social History     Tobacco Use    Smoking status: Never     Passive exposure: Never    Smokeless tobacco: Never   Vaping Use    Vaping status: Never Used   Substance and Sexual Activity    Alcohol use: Never    Drug use: Never    Sexual activity: Never     Partners: Female     Birth control/protection: None     E-Cigarette/Vaping    E-Cigarette Use Never User      E-Cigarette/Vaping Substances    Nicotine No     THC No     CBD No     Flavoring No     Other No     Unknown No      Family History   Problem Relation Age of Onset    Alcohol abuse Mother             Pancreatic cancer Father         age 79    Hashimoto's thyroiditis Daughter     Rheum arthritis Daughter         Juvenile Rheum Arthitis     No Known Problems Sister          age 78    No Known Problems Brother          86    Kidney failure Brother         dialysis, 74    Diabetes Brother      Social History:  Marital Status: /Civil Union   Occupation: not addressed  Patient Pre-hospital Living Situation: Home  Patient Pre-hospital Level of Mobility: unable to be assessed at time of evaluation  Patient Pre-hospital Diet Restrictions: diabetic    Meds/Allergies   I have reviewed home medications with patient personally.  Prior to Admission medications    Medication Sig Start Date End Date Taking? Authorizing Provider   amLODIPine (NORVASC) 10 mg tablet Take 1 tablet (10 mg total) by mouth daily 24  Yes Edel Wright MD   apixaban (Eliquis) 5 mg TAKE 1 TABLET TWICE DAILY 25  Yes Alfred Green DO   brimonidine tartrate 0.2 % ophthalmic solution INSTILL 1 DROP INTO EACH EYE TWICE DAILY 10/6/22  Yes Historical Provider, MD   carvedilol (Coreg) 6.25 mg tablet Take 1 tablet (6.25 mg total) by mouth 2 (two) times a day with meals 24 Yes Edel Wright MD   glipiZIDE (GLUCOTROL  XL) 10 mg 24 hr tablet TAKE 1 TABLET TWICE DAILY 1/14/25  Yes Edel Wright MD   Glucosamine HCl (GLUCOSAMINE PO) Take 1 tablet by mouth 2 (two) times a day    Yes Historical Provider, MD   levothyroxine (Synthroid) 75 mcg tablet Take 1 tablet (75 mcg total) by mouth daily 9/18/24  Yes Edel Wright MD   losartan (COZAAR) 100 MG tablet Take 1 tablet (100 mg total) by mouth daily 9/18/24  Yes Edel Wright MD   metFORMIN (GLUCOPHAGE-XR) 500 mg 24 hr tablet Take 1 tablet (500 mg total) by mouth daily with breakfast for 14 days, THEN 2 tablets (1,000 mg total) daily with breakfast. 11/7/24 11/21/25 Yes Edel Wright MD   Multiple Vitamin (MULTIVITAMIN) tablet Take 1 tablet by mouth daily   Yes Historical Provider, MD   simvastatin (ZOCOR) 20 mg tablet TAKE 1 TABLET AT BEDTIME 1/14/25  Yes Edel Wright MD   timolol (TIMOPTIC) 0.5 % ophthalmic solution Administer 1 drop to both eyes 2 (two) times a day   Yes Historical Provider, MD   torsemide (DEMADEX) 5 MG tablet Take 0.5 tablets (2.5 mg total) by mouth daily 6/13/24  Yes Melchor Ware   acetaminophen (TYLENOL) 325 mg tablet Take 2 tablets (650 mg total) by mouth every 6 (six) hours as needed for fever, mild pain or headaches 6/13/24   Melchor Ware   clobetasol (TEMOVATE) 0.05 % ointment APPLY AT BEDTIME TWO TIMES DAILY TO ITCHY AREAS FOR 2-4 WEEKS MAX AS NEEDED 8/14/24   Historical Provider, MD   linaGLIPtin 5 MG TABS Take 5 mg by mouth daily with or without food  Patient not taking: Reported on 3/17/2025 11/6/24   Edel Wright MD   Omega-3 Fatty Acids (FISH OIL PO) Take by mouth daily    Historical Provider, MD     Allergies   Allergen Reactions    Oxycodone-Acetaminophen Hallucinations       Objective :  Temp:  [98.6 °F (37 °C)-99.7 °F (37.6 °C)] 99.7 °F (37.6 °C)  HR:  [] 92  BP: (132-169)/(63-87) 132/77  Resp:  [18-22] 22  SpO2:  [90 %-97 %] 97 %  O2 Device: Nasal cannula  Nasal Cannula O2 Flow Rate (L/min):  [4 L/min] 4  L/min    Physical Exam  Vitals and nursing note reviewed.   Constitutional:       General: He is not in acute distress.     Appearance: Normal appearance. He is well-developed. He is not ill-appearing or toxic-appearing.   HENT:      Head: Normocephalic and atraumatic.      Mouth/Throat:      Mouth: Mucous membranes are moist.      Pharynx: Oropharynx is clear.   Eyes:      Conjunctiva/sclera: Conjunctivae normal.   Cardiovascular:      Rate and Rhythm: Normal rate and regular rhythm.      Pulses: Normal pulses.      Heart sounds: Normal heart sounds. No murmur heard.  Pulmonary:      Effort: Pulmonary effort is normal. No respiratory distress.      Breath sounds: Normal breath sounds. No wheezing, rhonchi or rales.   Abdominal:      General: Abdomen is flat. Bowel sounds are normal. There is no distension.      Palpations: Abdomen is soft.      Tenderness: There is no abdominal tenderness. There is no guarding.   Musculoskeletal:         General: No swelling.      Cervical back: Neck supple.      Right lower leg: No edema.      Left lower leg: Bony tenderness present. No edema.   Skin:     General: Skin is warm and dry.   Neurological:      General: No focal deficit present.      Mental Status: He is alert and oriented to person, place, and time.      Sensory: No sensory deficit.      Motor: No weakness.   Psychiatric:         Mood and Affect: Mood normal.          Lines/Drains:            Lab Results: I have reviewed the following results:  Results from last 7 days   Lab Units 03/17/25 2027   WBC Thousand/uL 39.72*   HEMOGLOBIN g/dL 12.2   HEMATOCRIT % 37.6   PLATELETS Thousands/uL 129*   LYMPHO PCT % 37   MONO PCT % 3*   EOS PCT % 0     Results from last 7 days   Lab Units 03/17/25 2027   SODIUM mmol/L 136   POTASSIUM mmol/L 5.1   CHLORIDE mmol/L 102   CO2 mmol/L 25   BUN mg/dL 29*   CREATININE mg/dL 1.52*   ANION GAP mmol/L 9   CALCIUM mg/dL 9.9   GLUCOSE RANDOM mg/dL 254*     Results from last 7 days   Lab  Units 03/17/25  2107   INR  1.14     Results from last 7 days   Lab Units 03/18/25  0010   POC GLUCOSE mg/dl 260*     Lab Results   Component Value Date    HGBA1C 8.5 (H) 11/05/2024    HGBA1C 8.5 (H) 06/11/2024    HGBA1C 7.3 (H) 02/27/2024           Imaging Results Review: I reviewed radiology reports from this admission including: CT chest and CT abdomen/pelvis.  Other Study Results Review: EKG was personally reviewed and my interpretation is: atrial fibrillation HR 99, QTc 410, no ST-T changes or ischemia...    Administrative Statements       ** Please Note: This note has been constructed using a voice recognition system. **

## 2025-03-18 NOTE — ASSESSMENT & PLAN NOTE
Lab Results   Component Value Date    EGFR 43 03/17/2025    EGFR 40 02/04/2025    EGFR 36 01/13/2025    CREATININE 1.52 (H) 03/17/2025    CREATININE 1.60 (H) 02/04/2025    CREATININE 1.74 (H) 01/13/2025   Creatinine 1.52 on admission, appears stable at baseline of 1.4-1.6   Avoid nephrotoxic agents  Follow-up creatinine in the morning with BMP

## 2025-03-18 NOTE — ASSESSMENT & PLAN NOTE
Lab Results   Component Value Date    EGFR 40 03/18/2025    EGFR 43 03/17/2025    EGFR 40 02/04/2025    CREATININE 1.59 (H) 03/18/2025    CREATININE 1.52 (H) 03/17/2025    CREATININE 1.60 (H) 02/04/2025

## 2025-03-18 NOTE — ASSESSMENT & PLAN NOTE
Patient with PMHx CLL, A-fib on Eliquis, HTN, hypothyroid, NIDDM type 2, and CKD who presents after a fall after tripping over floorboards in [facility].  Patient fell on his left side and states he was lying on the ground for about 3 hours unable to weight-bear.  .  CT chest abdomen pelvis showing acute intertrochanteric fracture of left femoral neck.  NPO after midnight for OR tomorrow  NON weight bearing to LLE  Appreciate orthopedic consultation  Pain regimen: APAP as needed, morphine IR for moderate and severe pain with IV Dilaudid for breakthrough pain.

## 2025-03-18 NOTE — CONSULTS
Consultation - Orthopedics   Name: Abelino Renee 79 y.o. male I MRN: [id number]  Unit/Bed#: -01 I Date of Admission: 3/17/2025   Date of Service: 3/18/2025 I Hospital Day: 0   Inpatient consult to Orthopedic Surgery  Consult performed by: Giorgio Merida PA-C  Consult ordered by: RADHA Spencer        Physician Requesting Evaluation: Kyle Dodge DO   Reason for Evaluation / Principal Problem: Left hip pain    Assessment & Plan  Closed fracture of neck of left femur (HCC)  Left intertrochanteric fracture.  Plan for operative for fixation today with Dr. Nevarez  Informed consent was obtained for left intramedullary nail  Patient was cleared by medicine.  N.p.o.  Hold Eliquis and anticoagulation  Orthopedics will follow  Benign essential HTN    Chronic lymphocytic leukemia (HCC)    Stage 3a chronic kidney disease (HCC)  Lab Results   Component Value Date    EGFR 40 03/18/2025    EGFR 43 03/17/2025    EGFR 40 02/04/2025    CREATININE 1.59 (H) 03/18/2025    CREATININE 1.52 (H) 03/17/2025    CREATININE 1.60 (H) 02/04/2025     Hypothyroidism    Diabetic nephropathy associated with type 2 diabetes mellitus (HCC)  Lab Results   Component Value Date    HGBA1C 8.5 (H) 11/05/2024       Recent Labs     03/18/25  0010 03/18/25  0601   POCGLU 260* 248*       Blood Sugar Average: Last 72 hrs:  (P) 254    CHF (congestive heart failure) (HCC)  Wt Readings from Last 3 Encounters:   03/17/25 80.4 kg (177 lb 4 oz)   02/20/25 82.3 kg (181 lb 8 oz)   10/28/24 82.1 kg (181 lb)             Atrial fibrillation (HCC)    Orthopedics service will follow.    History of Present Illness   HPI: Abelino Renee is a 79 y.o. year old male who presents for left hip pain.  He is a community ambulator.  He states he tripped on some sheet rock in his attic and fell on his left hip.  He was unable to weight-bear.  He does have a history of left transmetatarsal amputation about 10 years ago due to diabetic wound that was not  "healing.  He reports pain in the left hip.  No numbness or tingling the extremity.  Reports his last A1c was 7.8.  He is on Eliquis and took it last yesterday morning over 24 hours ago.    Review of Systems significant for findings described in the HPI.  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    Chronic lymphocytic leukemia of B-cell type (HCC)     \"remission 4-5yrs\"-chemo    DDD (degenerative disc disease), cervical     Diabetes mellitus (HCC)     bs checked by pt at home at 6am =92    Diabetic neuropathy (HCC)     Diabetic retinopathy (HCC)     Elevated WBC count     Heart attack (HCC) 04/2015    \"labeled as that and than tested later after appendix removed and stress test showed negative\"    History of cancer chemotherapy     last treatment approx 5yrs ago    Hypertension     Myocardial infarction (HCC) 04/2015    Scalp psoriasis     Shingles 2/10-15    Toe injury     left great toe, - 2017-internal braec-to be removed today 3/30/2018    Wears glasses      Past Surgical History:   Procedure Laterality Date    APPENDECTOMY      BONE BIOPSY Left 3/30/2018    Procedure: GREAT TOE BONE BIOPSY;  Surgeon: Gerber Multani DPM;  [facility];  Service: Podiatry    BOWEL RESECTION      CATARACT EXTRACTION Bilateral     CHOLECYSTECTOMY      COLECTOMY  06/2015    EYE SURGERY Right     \"retinal peel\"    FOOT FUSION Left 12/6/2017    Procedure: HALLUX INTERPHALANGEAL JOINT FUSION;  Surgeon: Gerber Multani DPM;  [facility];  Service: Podiatry    HARDWARE REMOVAL Left 4/18/2018    Procedure: REMOVAL STAPLES LEFT TOE;  Surgeon: Gerber Multani DPM;  [facility];  Service: Podiatry    ORIF FOOT FRACTURE Left 12/6/2017    Procedure: HALLUX INTERPHALANGEAL JOINT INTERNAL FIXATION; REPAIR OF " ULCERATION WITH EXCISIONS AND REVISION OF SKIN HALLUX WITH USE OF ALLOGRAFT;  Surgeon: Gerber Multani DPM;  [facility];  Service: Podiatry    GA COLONOSCOPY FLX DX W/COLLJ SPEC WHEN PFRMD N/A 4/21/2016    Procedure: COLONOSCOPY;  Surgeon: Minal Ruelas DO;  [facility];  Service: Gastroenterology    GA REMOVAL IMPLANT DEEP Left 3/30/2018    Procedure: REMOVAL HARDWARE FOOT;  Surgeon: Gerber Multani DPM;  [facility];  Service: Podiatry    RETINAL DETACHMENT SURGERY      TONSILLECTOMY       Social History     Tobacco Use    Smoking status: Never     Passive exposure: Never    Smokeless tobacco: Never   Vaping Use    Vaping status: Never Used   Substance and Sexual Activity    Alcohol use: Never    Drug use: Never    Sexual activity: Never     Partners: Female     Birth control/protection: None     E-Cigarette/Vaping    E-Cigarette Use Never User      E-Cigarette/Vaping Substances    Nicotine No     THC No     CBD No     Flavoring No     Other No     Unknown No      Family history non-contributory    Objective :  Temp:  [98.5 °F (36.9 °C)-99.7 °F (37.6 °C)] 98.5 °F (36.9 °C)  HR:  [] 83  BP: (125-169)/(51-87) 125/51  Resp:  [18-22] 22  SpO2:  [90 %-97 %] 97 %  O2 Device: Nasal cannula  Nasal Cannula O2 Flow Rate (L/min):  [3 L/min-4 L/min] 3 L/min  Physical ExamOrtho Exam   Musculoskeletal: Left hip  No Open wounds  Patient has a left transmetatarsal amputation and is well-healed  Positive dorsiflexion plantarflexion ankle  No hip range of motion due to known fracture  Leg slightly shortened and externally rotated  Sensation intact to the lower extremity        Lab Results: I have reviewed the following results:   Recent Labs     03/17/25 2027 03/17/25 2107 03/18/25  0443   WBC 39.72*  --  33.67*   HGB 12.2  --  11.1*   HCT 37.6  --  34.5*   *  --  117*   BUN 29*  --  29*   CREATININE 1.52*  --  1.59*   PTT  --  28  --    INR  --  1.14  --      Blood Culture:   Lab  "Results   Component Value Date    BLOODCX No Growth After 5 Days. 06/07/2024     Wound Culture: No results found for: \"WOUNDCULT\"    Imaging Results Review: I personally reviewed the following image studies/reports in PACS and discussed pertinent findings with Radiology: Left hip x-ray demonstrates intertrochanteric fracture.  Other Study Results Review: No additional pertinent studies reviewed.  "

## 2025-03-18 NOTE — ANESTHESIA PROCEDURE NOTES
Peripheral Block    Patient location during procedure: holding area  Start time: 3/18/2025 3:15 PM  Reason for block: at surgeon's request and post-op pain management  Staffing  Performed by: Bryant Gonzáles MD  Authorized by: Bryant oGnzáles MD    Preanesthetic Checklist  Completed: patient identified, IV checked, site marked, risks and benefits discussed, surgical consent, monitors and equipment checked, pre-op evaluation and timeout performed  Peripheral Block  Patient position: supine  Prep: ChloraPrep  Patient monitoring: continuous pulse ox, frequent blood pressure checks and heart rate  Anesthesia block type: PENG.  Laterality: left  Injection technique: single-shot  Procedures: ultrasound guided, Ultrasound guidance required for the procedure to increase accuracy and safety of medication placement and decrease risk of complications.  Ultrasound permanent image saved  bupivacaine (PF) (MARCAINE) 0.25 % injection 20 mL - Perineural   20 mL - 3/18/2025 3:15:00 PM  Needle  Needle type: Stimuplex   Needle gauge: 20 G  Needle length: 4 in  Needle localization: ultrasound guidance  Assessment  Injection assessment: incremental injection, local visualized surrounding nerve on ultrasound and negative aspiration for heme  Paresthesia pain: none  patient tolerated the procedure well with no immediate complications  Additional Notes  Uncomplicated PENG Block  Local anesthetic deposited above iliopubic ramus  Supplemented with LFCN block 10ml 0.25% bupivacaine

## 2025-03-18 NOTE — CASE MANAGEMENT
Case Management Assessment    Patient name Abelino Renee  Location /-01 MRN [id number]  : 1945 Date 3/18/2025       Current Admission Date: 3/17/2025  Current Admission Diagnosis:Closed fracture of neck of left femur (HCC)   Patient Active Problem List    Diagnosis Date Noted Date Diagnosed    Closed fracture of neck of left femur (HCC) 2025     Atrial fibrillation (HCC) 2025     Type 2 diabetes mellitus with diabetic chronic kidney disease ([facility]) 2024     Monoclonal gammopathy 2024     Rash 2024     Platelets decreased ([facility]) 2024     Diabetic nephropathy associated with type 2 diabetes mellitus ([facility]) 2021     Hypothyroidism 01/15/2021     Elevated serum protein level 2021     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     Status post transmetatarsal amputation of left foot ([facility]) 2019     Benign essential HTN 2018     Stage 3a chronic kidney disease ([facility]) 2018     Arthritis 2016     Hx of malignant carcinoid tumor of large intestine 2016     H/O Clostridium difficile infection 2015     CHF (congestive heart failure) ([facility]) 2015     Chronic lymphocytic leukemia ([facility]) 2013     Type 2 diabetes mellitus with diabetic polyneuropathy, without long-term current use of insulin ([facility]) 2012       LOS (days): 0  Geometric Mean LOS (GMLOS) (days):   Days to GMLOS:     OBJECTIVE:     Current admission status: Observation     Preferred Pharmacy:   [facility] [address] [place] [address]  Phone: [phone] Fax: [phone]    [facility] - [place], [place] - [address]  [address] [address]  Phone: [phone] Fax: [phone]    Primary Care Provider: Edel Wright MD    Primary Insurance: MEDICARE  Secondary Insurance: COMMERCIAL MISCELLANEOUS    ASSESSMENT:  Active Health Care  Proxies    There are no active Health Care Proxies on file.    Advance Directives  Does patient have a Health Care POA?: No  Does patient have Advance Directives?: No    Obs Notice Signed: 03/18/25    Readmission Root Cause  30 Day Readmission: No    Patient Information  Admitted from:: Home  Mental Status: Alert  During Assessment patient was accompanied by: Not accompanied during assessment  Assessment information provided by:: Patient  Primary Caregiver: Self  Support Systems: Family members, Spouse/significant other  County of Residence: [place]  What city do you live in?: [place]  Type of Current Residence: 2 story home  Upon entering residence, is there a bedroom on the main floor (no further steps)?: No  A bedroom is located on the following floor levels of residence (select all that apply):: 2nd Floor  Upon entering residence, is there a bathroom on the main floor (no further steps)?: No  Indicate which floors of current residence have a bathroom (select all the apply):: 2nd Floor  Number of steps to 2nd floor from main floor: One Flight  Living Arrangements: Lives w/ Spouse/significant other  Is patient a ?: Yes ([employer])  Is [facility] with [facility] ( [employer])?: Yes  Is patient service connected?: Yes    Activities of Daily Living Prior to Admission  Functional Status: Independent  Completes ADLs independently?: Yes  Ambulates independently?: Yes  Does patient use assisted devices?: No  Does patient currently own DME?: No  Does patient have a history of Outpatient Therapy (PT/OT)?: No  Does the patient have a history of Short-Term Rehab?: No  Does patient have a history of HHC?: No  Does patient currently have HHC?: No    Patient Information Continued  Income Source: Pension/[facility]  Does patient have prescription coverage?: Yes  Can the patient afford their medications and any related supplies (such as glucometers or test strips)?: Yes  Does patient receive dialysis treatments?: No  Does  patient have a history of substance abuse?: No  Does patient have a history of Mental Health Diagnosis?: No    Means of Transportation  Means of Transport to Appts:: Drives Self    CM met with the patient bedside. The patient reports living at home with his wife and being independent at baseline. The patient denied having unmet needs at home/in community. PT/OT evals pending for level of care recommendations post op, CM will continue to follow the patient through discharge.

## 2025-03-18 NOTE — ASSESSMENT & PLAN NOTE
BP stable, hemodynamically stable  Monitor with routine vital signs  Continue PTA amlodipine 10 mg daily, carvedilol 6.25 mg twice daily, losartan 100 mg daily, and torsemide 2.5 mg daily with hold parameters.

## 2025-03-18 NOTE — ANESTHESIA POSTPROCEDURE EVALUATION
Post-Op Assessment Note    CV Status:  Stable    Pain management: adequate       Mental Status:  Awake   Hydration Status:  Euvolemic   PONV Controlled:  Controlled   Airway Patency:  Patent     Post Op Vitals Reviewed: Yes    No anethesia notable event occurred.    Staff: Anesthesiologist           Last Filed PACU Vitals:  Vitals Value Taken Time   Temp 98.4 °F (36.9 °C) 03/18/25 1820   Pulse 88 03/18/25 1831   BP 98/56 03/18/25 1830   Resp 14 03/18/25 1831   SpO2 96 % 03/18/25 1830   Vitals shown include unfiled device data.    Modified Morales:     Vitals Value Taken Time   Activity 2 03/18/25 1820   Respiration 2 03/18/25 1820   Circulation 2 03/18/25 1820   Consciousness 2 03/18/25 1820   Oxygen Saturation 1 03/18/25 1820     Modified Morales Score: 9

## 2025-03-18 NOTE — PROGRESS NOTES
Progress Note - Hospitalist   Name: Abelino Renee 79 y.o. male I MRN: [id number]  Unit/Bed#: -01 I Date of Admission: 3/17/2025   Date of Service: 3/18/2025 I Hospital Day: 0    Assessment & Plan  Closed fracture of neck of left femur (HCC)  Patient with PMHx CLL, A-fib on Eliquis, HTN, hypothyroid, NIDDM type 2, and CKD who presents after a fall after tripping over floorboards in [facility].  Patient fell on his left side and states he was lying on the ground for about 3 hours unable to weight-bear.  .  CT chest abdomen pelvis showing acute intertrochanteric fracture of left femoral neck.  NPO after midnight for OR tomorrow  NON weight bearing to LLE  Appreciate orthopedic consultation  Pain regimen: APAP as needed, morphine IR for moderate and severe pain with IV Dilaudid for breakthrough pain.  Risk Factor Score (Yes=1, No=0)   Hx of TIA/CVA 0   Hx of prior ischemic heart disease (AMI, unstable angina, Q waves on EKG, CABG) 1   Hx of congestive heart failure 1   Serum Creatinine >2 mg/dl 0   Insulin dependent diabetes mellitus 0   Total Score 2     Risk of MACE (30-day)     Points Risk % (95% CI), Rupali, 2017 Risk % (95% CI) Brant, 1999   0 3.9 (2.8-5.4) 0.4 (0.05-1.5)   1 6 (4.9-7.4) 0.9 (0.3-2.1)   2 10.1 (8.1-12.6) 6.6 (3.9-10.3)   3 or more 15 (11.1-20) >11 (5.8-18.4)       Patient is relatively low risk for a low risk procedure, he is medically cleared to proceed.  He is overall very active and can perform over 4 METS    In patients with elevated risk (RCRI >/= 2), assess functional capacity (METs/DASI).   If >4 METs functional capacity, may proceed to surgery. If unknown/poor (<4 METs) functional capacity consider, may need preoperative cardiac testing depending on symptoms and if testing will .         Atrial fibrillation (HCC)  Hx PAF, HR stable, controlled A-fib  ECG on admission with atrial fibrillation HR 99, QTc 410, no ST-T changes or ischemia.  Continue PTA carvedilol 6.25  mg twice daily  Last dose of Eliquis yesterday morning will hold Eliquis pending OR procedure.  CHF (congestive heart failure) (HCC)  Patient appearing euvolemic on exam, benign lung exam, no peripheral edema patient denies any CP or SOB.  Does not appear to have acute exacerbation.  .  CXR showing prominent interstitial markings bilaterally suspicious for mild edema.  CT chest abdomen pelvis showing probable trace bilateral pleural effusions.  And mild patchy groundglass opacities and interlobular septal thickening in the lungs consistent with interstitial edema.  Patient states he recently recovered from pneumonia a few months ago.  Initially treated in ED with the furosemide 20 mg x 1 dose.  Continue PTA for torsemide 2.5 mg daily.  Daily weights, strict intake and output.  Benign essential HTN  BP stable, hemodynamically stable  Monitor with routine vital signs  Continue PTA amlodipine 10 mg daily, carvedilol 6.25 mg twice daily, losartan 100 mg daily, and torsemide 2.5 mg daily with hold parameters.  Stage 3a chronic kidney disease ([facility])  Lab Results   Component Value Date    EGFR 40 03/18/2025    EGFR 43 03/17/2025    EGFR 40 02/04/2025    CREATININE 1.59 (H) 03/18/2025    CREATININE 1.52 (H) 03/17/2025    CREATININE 1.60 (H) 02/04/2025   Creatinine 1.52 on admission, appears stable at baseline of 1.4-1.6   Avoid nephrotoxic agents  Follow-up creatinine in the morning with BMP  Diabetic nephropathy associated with type 2 diabetes mellitus ([facility])  Lab Results   Component Value Date    HGBA1C 8.5 (H) 11/05/2024     Recent Labs     03/18/25  0010 03/18/25  0601   POCGLU 260* 248*     Blood Sugar Average: Last 72 hrs:  (P) 254  Home regimen: Glipizide 10 mg twice daily, and metformin 500 mg daily-hold while inpatient.  Start insulin sliding scale with fingersticks every 6 hours while NPO  Hypoglycemia protocol  Chronic lymphocytic leukemia (HCC)  History of CLL/SLL previously treated with 4 cycles of BR  (June 2012).  Outpatient plan is surveillance.  WBC elevated in setting of CLL.  Hypothyroidism  Continue PTA levothyroxine    VTE Pharmacologic Prophylaxis: VTE Score: 5 Low Risk (Score 0-2) - Encourage Ambulation.    Mobility:   Basic Mobility Inpatient Raw Score: 11  JH-HLM Goal: 4: Move to chair/commode  JH-HLM Achieved: 2: Bed activities/Dependent transfer  JH-HLM Goal achieved. Continue to encourage appropriate mobility.    Patient Centered Rounds: I performed bedside rounds with nursing staff today.   Discussions with Specialists or Other Care Team Provider:     Education and Discussions with Family / Patient: Patient declined call to .     Current Length of Stay: 0 day(s)  Current Patient Status: Inpatient   Certification Statement: The patient will continue to require additional inpatient hospital stay due to surgery  Discharge Plan: Anticipate discharge in 48 hrs to discharge location to be determined pending rehab evaluations.    Code Status: Level 1 - Full Code    Subjective   Patient reports some pain in his left hip otherwise denies any acute complaints including numbness tingling    Objective :  Temp:  [98.5 °F (36.9 °C)-99.7 °F (37.6 °C)] 98.5 °F (36.9 °C)  HR:  [] 83  BP: (125-169)/(51-87) 125/51  Resp:  [18-22] 22  SpO2:  [90 %-97 %] 97 %  O2 Device: Nasal cannula  Nasal Cannula O2 Flow Rate (L/min):  [3 L/min-4 L/min] 3 L/min    Body mass index is 28.61 kg/m².     Input and Output Summary (last 24 hours):     Intake/Output Summary (Last 24 hours) at 3/18/2025 1007  Last data filed at 3/18/2025 0608  Gross per 24 hour   Intake 270 ml   Output 890 ml   Net -620 ml       Physical Exam  Vitals and nursing note reviewed.   Constitutional:       General: He is not in acute distress.     Appearance: He is well-developed. He is not toxic-appearing or diaphoretic.   HENT:      Head: Normocephalic and atraumatic.   Eyes:      General: No scleral icterus.     Conjunctiva/sclera:  Conjunctivae normal.   Cardiovascular:      Rate and Rhythm: Normal rate and regular rhythm.      Heart sounds: No murmur heard.     No friction rub. No gallop.   Pulmonary:      Effort: Pulmonary effort is normal. No respiratory distress.      Breath sounds: Normal breath sounds. No stridor. No wheezing, rhonchi or rales.   Chest:      Chest wall: No tenderness.   Abdominal:      General: There is no distension.      Palpations: Abdomen is soft. There is no mass.      Tenderness: There is no abdominal tenderness. There is no guarding or rebound.      Hernia: No hernia is present.   Musculoskeletal:         General: Tenderness present. No swelling.      Cervical back: Neck supple.   Skin:     General: Skin is warm and dry.      Capillary Refill: Capillary refill takes less than 2 seconds.   Neurological:      Mental Status: He is alert and oriented to person, place, and time.   Psychiatric:         Mood and Affect: Mood normal.           Lines/Drains:              Lab Results: I have reviewed the following results:   Results from last 7 days   Lab Units 03/18/25  0443 03/17/25 2027   WBC Thousand/uL 33.67* 39.72*   HEMOGLOBIN g/dL 11.1* 12.2   HEMATOCRIT % 34.5* 37.6   PLATELETS Thousands/uL 117* 129*   LYMPHO PCT %  --  37   MONO PCT %  --  3*   EOS PCT %  --  0     Results from last 7 days   Lab Units 03/18/25  0443   SODIUM mmol/L 136   POTASSIUM mmol/L 4.4   CHLORIDE mmol/L 102   CO2 mmol/L 25   BUN mg/dL 29*   CREATININE mg/dL 1.59*   ANION GAP mmol/L 9   CALCIUM mg/dL 9.5   GLUCOSE RANDOM mg/dL 270*     Results from last 7 days   Lab Units 03/17/25  2107   INR  1.14     Results from last 7 days   Lab Units 03/18/25  0601 03/18/25  0010   POC GLUCOSE mg/dl 248* 260*               Recent Cultures (last 7 days):         Imaging Results Review: No pertinent imaging studies reviewed.  Other Study Results Review: No additional pertinent studies reviewed.    Last 24 Hours Medication List:     Current  Facility-Administered Medications:     acetaminophen (TYLENOL) tablet 650 mg, Q6H PRN    amLODIPine (NORVASC) tablet 10 mg, Daily    [Held by provider] apixaban (ELIQUIS) tablet 5 mg, BID    brimonidine tartrate 0.2 % ophthalmic solution 1 drop, TID    carvedilol (COREG) tablet 6.25 mg, BID With Meals    docusate sodium (COLACE) capsule 100 mg, BID    fish oil capsule 1,000 mg, Daily    furosemide (LASIX) oral solution 5 mg, Daily    heparin (porcine) subcutaneous injection 5,000 Units, Q8H MICHAEL    HYDROmorphone HCl (DILAUDID) injection 0.2 mg, Q2H PRN    insulin lispro (HumALOG/ADMELOG) 100 units/mL subcutaneous injection 1-5 Units, Q6H MICHAEL **AND** Fingerstick Glucose (POCT), Q6H    levothyroxine tablet 75 mcg, Early Morning    losartan (COZAAR) tablet 100 mg, Daily    morphine (MSIR) IR tablet 7.5 mg, Q4H PRN **OR** morphine (MSIR) IR tablet 15 mg, Q4H PRN    multivitamin-minerals (CENTRUM) tablet 1 tablet, Daily    pravastatin (PRAVACHOL) tablet 40 mg, Daily With Dinner    timolol (TIMOPTIC) 0.5 % ophthalmic solution 1 drop, BID    Administrative Statements   Today, Patient Was Seen By: Kyle Dodge DO      **Please Note: This note may have been constructed using a voice recognition system.**

## 2025-03-18 NOTE — OCCUPATIONAL THERAPY NOTE
Occupational Therapy Cx Note     Patient Name: Abelino Renee  Today's Date: 3/18/2025  Problem List  Principal Problem:    Closed fracture of neck of left femur (HCC)  Active Problems:    Benign essential HTN    Chronic lymphocytic leukemia (HCC)    Stage 3a chronic kidney disease (HCC)    Hypothyroidism    Diabetic nephropathy associated with type 2 diabetes mellitus (HCC)    CHF (congestive heart failure) (HCC)    Atrial fibrillation (HCC)            03/18/25 1155   OT Last Visit   OT Visit Date 03/18/25   Note Type   Note type Cancelled Session   Additional Comments OT orders received. Chart reviewed. Pt presents to [facility] s/p fall. Found to have acute left greater trochanter fx.  Plan for OR later today. OT to follow post-operativealy as able and appropriate.             Sara Landry, OTR/L

## 2025-03-19 PROBLEM — D62 ABLA (ACUTE BLOOD LOSS ANEMIA): Status: ACTIVE | Noted: 2025-03-19

## 2025-03-19 LAB
ANION GAP SERPL CALCULATED.3IONS-SCNC: 11 MMOL/L (ref 4–13)
ATRIAL RATE: 258 BPM
BASOPHILS # BLD AUTO: 0.03 THOUSANDS/ÂΜL (ref 0–0.1)
BASOPHILS NFR BLD AUTO: 0 % (ref 0–1)
BUN SERPL-MCNC: 46 MG/DL (ref 5–25)
CALCIUM SERPL-MCNC: 7.9 MG/DL (ref 8.4–10.2)
CHLORIDE SERPL-SCNC: 99 MMOL/L (ref 96–108)
CO2 SERPL-SCNC: 23 MMOL/L (ref 21–32)
CREAT SERPL-MCNC: 2.65 MG/DL (ref 0.6–1.3)
EOSINOPHIL # BLD AUTO: 0.01 THOUSAND/ÂΜL (ref 0–0.61)
EOSINOPHIL NFR BLD AUTO: 0 % (ref 0–6)
ERYTHROCYTE [DISTWIDTH] IN BLOOD BY AUTOMATED COUNT: 14.6 % (ref 11.6–15.1)
ERYTHROCYTE [DISTWIDTH] IN BLOOD BY AUTOMATED COUNT: 15 % (ref 11.6–15.1)
GFR SERPL CREATININE-BSD FRML MDRD: 22 ML/MIN/1.73SQ M
GLUCOSE SERPL-MCNC: 272 MG/DL (ref 65–140)
GLUCOSE SERPL-MCNC: 276 MG/DL (ref 65–140)
GLUCOSE SERPL-MCNC: 280 MG/DL (ref 65–140)
GLUCOSE SERPL-MCNC: 296 MG/DL (ref 65–140)
GLUCOSE SERPL-MCNC: 318 MG/DL (ref 65–140)
GLUCOSE SERPL-MCNC: 327 MG/DL (ref 65–140)
HCT VFR BLD AUTO: 21.3 % (ref 36.5–49.3)
HCT VFR BLD AUTO: 22.2 % (ref 36.5–49.3)
HCT VFR BLD AUTO: 25.4 % (ref 36.5–49.3)
HGB BLD-MCNC: 6.8 G/DL (ref 12–17)
HGB BLD-MCNC: 6.9 G/DL (ref 12–17)
HGB BLD-MCNC: 8.2 G/DL (ref 12–17)
IMM GRANULOCYTES # BLD AUTO: 0.08 THOUSAND/UL (ref 0–0.2)
IMM GRANULOCYTES NFR BLD AUTO: 0 % (ref 0–2)
LYMPHOCYTES # BLD AUTO: 23.62 THOUSANDS/ÂΜL (ref 0.6–4.47)
LYMPHOCYTES NFR BLD AUTO: 77 % (ref 14–44)
MCH RBC QN AUTO: 30.6 PG (ref 26.8–34.3)
MCH RBC QN AUTO: 30.7 PG (ref 26.8–34.3)
MCHC RBC AUTO-ENTMCNC: 31.1 G/DL (ref 31.4–37.4)
MCHC RBC AUTO-ENTMCNC: 32.3 G/DL (ref 31.4–37.4)
MCV RBC AUTO: 95 FL (ref 82–98)
MCV RBC AUTO: 99 FL (ref 82–98)
MONOCYTES # BLD AUTO: 0.82 THOUSAND/ÂΜL (ref 0.17–1.22)
MONOCYTES NFR BLD AUTO: 3 % (ref 4–12)
NEUTROPHILS # BLD AUTO: 6.21 THOUSANDS/ÂΜL (ref 1.85–7.62)
NEUTS SEG NFR BLD AUTO: 20 % (ref 43–75)
NRBC BLD AUTO-RTO: 0 /100 WBCS
PLATELET # BLD AUTO: 102 THOUSANDS/UL (ref 149–390)
PLATELET # BLD AUTO: 96 THOUSANDS/UL (ref 149–390)
PMV BLD AUTO: 10.2 FL (ref 8.9–12.7)
PMV BLD AUTO: 10.4 FL (ref 8.9–12.7)
POTASSIUM SERPL-SCNC: 5.1 MMOL/L (ref 3.5–5.3)
QRS AXIS: 36 DEGREES
QRSD INTERVAL: 82 MS
QT INTERVAL: 320 MS
QTC INTERVAL: 410 MS
RBC # BLD AUTO: 2.25 MILLION/UL (ref 3.88–5.62)
RBC # BLD AUTO: 2.68 MILLION/UL (ref 3.88–5.62)
SODIUM SERPL-SCNC: 133 MMOL/L (ref 135–147)
T WAVE AXIS: 51 DEGREES
VENTRICULAR RATE: 99 BPM
WBC # BLD AUTO: 30.77 THOUSAND/UL (ref 4.31–10.16)
WBC # BLD AUTO: 36.01 THOUSAND/UL (ref 4.31–10.16)

## 2025-03-19 PROCEDURE — 93010 ELECTROCARDIOGRAM REPORT: CPT | Performed by: INTERNAL MEDICINE

## 2025-03-19 PROCEDURE — 97116 GAIT TRAINING THERAPY: CPT

## 2025-03-19 PROCEDURE — 0QH706Z INSERTION OF INTRAMEDULLARY INTERNAL FIXATION DEVICE INTO LEFT UPPER FEMUR, OPEN APPROACH: ICD-10-PCS | Performed by: ORTHOPAEDIC SURGERY

## 2025-03-19 PROCEDURE — 97530 THERAPEUTIC ACTIVITIES: CPT

## 2025-03-19 PROCEDURE — 99232 SBSQ HOSP IP/OBS MODERATE 35: CPT | Performed by: INTERNAL MEDICINE

## 2025-03-19 PROCEDURE — 97163 PT EVAL HIGH COMPLEX 45 MIN: CPT

## 2025-03-19 PROCEDURE — 85014 HEMATOCRIT: CPT | Performed by: INTERNAL MEDICINE

## 2025-03-19 PROCEDURE — 80048 BASIC METABOLIC PNL TOTAL CA: CPT | Performed by: INTERNAL MEDICINE

## 2025-03-19 PROCEDURE — 99024 POSTOP FOLLOW-UP VISIT: CPT | Performed by: PHYSICIAN ASSISTANT

## 2025-03-19 PROCEDURE — 97110 THERAPEUTIC EXERCISES: CPT

## 2025-03-19 PROCEDURE — 85027 COMPLETE CBC AUTOMATED: CPT | Performed by: INTERNAL MEDICINE

## 2025-03-19 PROCEDURE — 85018 HEMOGLOBIN: CPT | Performed by: INTERNAL MEDICINE

## 2025-03-19 PROCEDURE — 30233N1 TRANSFUSION OF NONAUTOLOGOUS RED BLOOD CELLS INTO PERIPHERAL VEIN, PERCUTANEOUS APPROACH: ICD-10-PCS | Performed by: INTERNAL MEDICINE

## 2025-03-19 PROCEDURE — 82948 REAGENT STRIP/BLOOD GLUCOSE: CPT

## 2025-03-19 PROCEDURE — P9040 RBC LEUKOREDUCED IRRADIATED: HCPCS

## 2025-03-19 RX ORDER — SODIUM CHLORIDE, SODIUM LACTATE, POTASSIUM CHLORIDE, CALCIUM CHLORIDE 600; 310; 30; 20 MG/100ML; MG/100ML; MG/100ML; MG/100ML
100 INJECTION, SOLUTION INTRAVENOUS CONTINUOUS
Status: DISCONTINUED | OUTPATIENT
Start: 2025-03-19 | End: 2025-03-20

## 2025-03-19 RX ORDER — INSULIN GLARGINE 100 [IU]/ML
10 INJECTION, SOLUTION SUBCUTANEOUS ONCE
Status: COMPLETED | OUTPATIENT
Start: 2025-03-19 | End: 2025-03-19

## 2025-03-19 RX ORDER — INSULIN GLARGINE 100 [IU]/ML
5 INJECTION, SOLUTION SUBCUTANEOUS
Status: DISCONTINUED | OUTPATIENT
Start: 2025-03-19 | End: 2025-03-19

## 2025-03-19 RX ORDER — INSULIN GLARGINE 100 [IU]/ML
10 INJECTION, SOLUTION SUBCUTANEOUS
Status: DISCONTINUED | OUTPATIENT
Start: 2025-03-19 | End: 2025-03-22 | Stop reason: HOSPADM

## 2025-03-19 RX ORDER — INSULIN LISPRO 100 [IU]/ML
1-5 INJECTION, SOLUTION INTRAVENOUS; SUBCUTANEOUS
Status: DISCONTINUED | OUTPATIENT
Start: 2025-03-19 | End: 2025-03-22 | Stop reason: HOSPADM

## 2025-03-19 RX ORDER — INSULIN LISPRO 100 [IU]/ML
3 INJECTION, SOLUTION INTRAVENOUS; SUBCUTANEOUS
Status: DISCONTINUED | OUTPATIENT
Start: 2025-03-19 | End: 2025-03-22 | Stop reason: HOSPADM

## 2025-03-19 RX ADMIN — BRIMONIDINE TARTRATE 1 DROP: 2 SOLUTION/ DROPS OPHTHALMIC at 08:21

## 2025-03-19 RX ADMIN — INSULIN LISPRO 3 UNITS: 100 INJECTION, SOLUTION INTRAVENOUS; SUBCUTANEOUS at 09:38

## 2025-03-19 RX ADMIN — TIMOLOL MALEATE 1 DROP: 5 SOLUTION OPHTHALMIC at 17:25

## 2025-03-19 RX ADMIN — SODIUM CHLORIDE, SODIUM GLUCONATE, SODIUM ACETATE, POTASSIUM CHLORIDE, MAGNESIUM CHLORIDE, SODIUM PHOSPHATE, DIBASIC, AND POTASSIUM PHOSPHATE 100 ML/HR: .53; .5; .37; .037; .03; .012; .00082 INJECTION, SOLUTION INTRAVENOUS at 05:39

## 2025-03-19 RX ADMIN — OMEGA-3 FATTY ACIDS CAP 1000 MG 1000 MG: 1000 CAP at 08:21

## 2025-03-19 RX ADMIN — INSULIN LISPRO 2 UNITS: 100 INJECTION, SOLUTION INTRAVENOUS; SUBCUTANEOUS at 08:21

## 2025-03-19 RX ADMIN — INSULIN LISPRO 3 UNITS: 100 INJECTION, SOLUTION INTRAVENOUS; SUBCUTANEOUS at 12:14

## 2025-03-19 RX ADMIN — INSULIN GLARGINE 10 UNITS: 100 INJECTION, SOLUTION SUBCUTANEOUS at 21:21

## 2025-03-19 RX ADMIN — INSULIN LISPRO 3 UNITS: 100 INJECTION, SOLUTION INTRAVENOUS; SUBCUTANEOUS at 17:25

## 2025-03-19 RX ADMIN — TIMOLOL MALEATE 1 DROP: 5 SOLUTION OPHTHALMIC at 08:21

## 2025-03-19 RX ADMIN — HEPARIN SODIUM 5000 UNITS: 5000 INJECTION INTRAVENOUS; SUBCUTANEOUS at 05:40

## 2025-03-19 RX ADMIN — SODIUM CHLORIDE, SODIUM LACTATE, POTASSIUM CHLORIDE, AND CALCIUM CHLORIDE 100 ML/HR: .6; .31; .03; .02 INJECTION, SOLUTION INTRAVENOUS at 20:15

## 2025-03-19 RX ADMIN — INSULIN LISPRO 2 UNITS: 100 INJECTION, SOLUTION INTRAVENOUS; SUBCUTANEOUS at 17:24

## 2025-03-19 RX ADMIN — SODIUM CHLORIDE, SODIUM LACTATE, POTASSIUM CHLORIDE, AND CALCIUM CHLORIDE 100 ML/HR: .6; .31; .03; .02 INJECTION, SOLUTION INTRAVENOUS at 09:38

## 2025-03-19 RX ADMIN — LEVOTHYROXINE SODIUM 75 MCG: 75 TABLET ORAL at 05:39

## 2025-03-19 RX ADMIN — BRIMONIDINE TARTRATE 1 DROP: 2 SOLUTION/ DROPS OPHTHALMIC at 17:23

## 2025-03-19 RX ADMIN — PRAVASTATIN SODIUM 40 MG: 40 TABLET ORAL at 17:24

## 2025-03-19 RX ADMIN — MORPHINE SULFATE 15 MG: 15 TABLET ORAL at 17:29

## 2025-03-19 RX ADMIN — BRIMONIDINE TARTRATE 1 DROP: 2 SOLUTION/ DROPS OPHTHALMIC at 21:20

## 2025-03-19 RX ADMIN — INSULIN GLARGINE 10 UNITS: 100 INJECTION, SOLUTION SUBCUTANEOUS at 09:38

## 2025-03-19 RX ADMIN — DOCUSATE SODIUM 100 MG: 100 CAPSULE, LIQUID FILLED ORAL at 08:21

## 2025-03-19 RX ADMIN — INSULIN GLARGINE 5 UNITS: 100 INJECTION, SOLUTION SUBCUTANEOUS at 00:39

## 2025-03-19 RX ADMIN — Medication 1 TABLET: at 08:21

## 2025-03-19 NOTE — ASSESSMENT & PLAN NOTE
S/p left hip CMN for IT fracture on 3/18 with Dr. Nevarez  WBAT LLE  Hg dropped from 11.1 to 6.9. discussed with primary team. Plan to recheck CBC and orthostatics. Monitor and may required PRBC per primary  Ok to restart eliquis today  PT/OT  Glucose control  Ortho will follow

## 2025-03-19 NOTE — PLAN OF CARE
Problem: PAIN - ADULT  Goal: Verbalizes/displays adequate comfort level or baseline comfort level  Description: Interventions:  - Encourage patient to monitor pain and request assistance  - Assess pain using appropriate pain scale  - Administer analgesics based on type and severity of pain and evaluate response  - Implement non-pharmacological measures as appropriate and evaluate response  - Consider cultural and social influences on pain and pain management  - Notify physician/advanced practitioner if interventions unsuccessful or patient reports new pain  3/19/2025 0000 by Vivi Aviles RN  Outcome: Not Progressing  3/19/2025 0000 by Vivi Aviles RN  Outcome: Not Progressing     Problem: SAFETY ADULT  Goal: Patient will remain free of falls  Description: INTERVENTIONS:  - Educate patient/family on patient safety including physical limitations  - Instruct patient to call for assistance with activity   - Consult OT/PT to assist with strengthening/mobility   - Keep Call bell within reach  - Keep bed low and locked with side rails adjusted as appropriate  - Keep care items and personal belongings within reach  - Initiate and maintain comfort rounds  - Make Fall Risk Sign visible to staff  - Offer Toileting every prn Hours, in advance of need  - Initiate/Maintain bed alarm  - Obtain necessary fall risk management equipment: n/a  - Apply yellow socks and bracelet for high fall risk patients  - Consider moving patient to room near nurses station  3/19/2025 0000 by Vivi Aviles RN  Outcome: Not Progressing  3/19/2025 0000 by Vivi Aviles RN  Outcome: Not Progressing  Goal: Maintain or return to baseline ADL function  Description: INTERVENTIONS:  -  Assess patient's ability to carry out ADLs; assess patient's baseline for ADL function and identify physical deficits which impact ability to perform ADLs (bathing, care of mouth/teeth, toileting, grooming, dressing, etc.)  - Assess/evaluate cause of self-care deficits   -  Assess range of motion  - Assess patient's mobility; develop plan if impaired  - Assess patient's need for assistive devices and provide as appropriate  - Encourage maximum independence but intervene and supervise when necessary  - Involve family in performance of ADLs  - Assess for home care needs following discharge   - Consider OT consult to assist with ADL evaluation and planning for discharge  - Provide patient education as appropriate  3/19/2025 0000 by Vivi Aviles RN  Outcome: Not Progressing  3/19/2025 0000 by Vivi Aviles RN  Outcome: Not Progressing  Goal: Maintains/Returns to pre admission functional level  Description: INTERVENTIONS:  - Perform AM-PAC 6 Click Basic Mobility/ Daily Activity assessment daily.  - Set and communicate daily mobility goal to care team and patient/family/caregiver.   - Collaborate with rehabilitation services on mobility goals if consulted  - Perform Range of Motion prn times a day.  - Reposition patient every 2 hours.  - Dangle patient prn times a day  - Stand patient prn times a day  - Ambulate patient prn times a day  - Out of bed to chair prn times a day   - Out of bed for meals prn times a day  - Out of bed for toileting  - Record patient progress and toleration of activity level   Outcome: Not Progressing     Problem: DISCHARGE PLANNING  Goal: Discharge to home or other facility with appropriate resources  Description: INTERVENTIONS:  - Identify barriers to discharge w/patient and caregiver  - Arrange for needed discharge resources and transportation as appropriate  - Identify discharge learning needs (meds, wound care, etc.)  - Arrange for interpretive services to assist at discharge as needed  - Refer to Case Management Department for coordinating discharge planning if the patient needs post-hospital services based on physician/advanced practitioner order or complex needs related to functional status, cognitive ability, or social support system  Outcome: Not  Progressing     Problem: Knowledge Deficit  Goal: Patient/family/caregiver demonstrates understanding of disease process, treatment plan, medications, and discharge instructions  Description: Complete learning assessment and assess knowledge base.  Interventions:  - Provide teaching at level of understanding  - Provide teaching via preferred learning methods  Outcome: Not Progressing     Problem: MUSCULOSKELETAL - ADULT  Goal: Maintain or return mobility to safest level of function  Description: INTERVENTIONS:  - Assess patient's ability to carry out ADLs; assess patient's baseline for ADL function and identify physical deficits which impact ability to perform ADLs (bathing, care of mouth/teeth, toileting, grooming, dressing, etc.)  - Assess/evaluate cause of self-care deficits   - Assess range of motion  - Assess patient's mobility  - Assess patient's need for assistive devices and provide as appropriate  - Encourage maximum independence but intervene and supervise when necessary  - Involve family in performance of ADLs  - Assess for home care needs following discharge   - Consider OT consult to assist with ADL evaluation and planning for discharge  - Provide patient education as appropriate  Outcome: Not Progressing  Goal: Maintain proper alignment of affected body part  Description: INTERVENTIONS:  - Support, maintain and protect limb and body alignment  - Provide patient/ family with appropriate education  Outcome: Not Progressing     Problem: Prexisting or High Potential for Compromised Skin Integrity  Goal: Skin integrity is maintained or improved  Description: INTERVENTIONS:  - Identify patients at risk for skin breakdown  - Assess and monitor skin integrity  - Assess and monitor nutrition and hydration status  - Monitor labs   - Assess for incontinence   - Turn and reposition patient  - Assist with mobility/ambulation  - Relieve pressure over bony prominences  - Avoid friction and shearing  - Provide  appropriate hygiene as needed including keeping skin clean and dry  - Evaluate need for skin moisturizer/barrier cream  - Collaborate with interdisciplinary team   - Patient/family teaching  - Consider wound care consult   Outcome: Not Progressing

## 2025-03-19 NOTE — PHYSICAL THERAPY NOTE
"   PHYSICAL THERAPY Evaluation and Treatment  DATE: 03/19/25  TIME: 0376-8212    NAME:  Abelino Renee  AGE:   79 y.o.  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    appendectomy-colon resection    Chronic lymphocytic leukemia of B-cell type ([facility])     \"remission 4-5yrs\"-chemo    DDD (degenerative disc disease), cervical     Diabetes mellitus ([facility])     bs checked by pt at home at 6am =92    Diabetic neuropathy ([facility])     Diabetic retinopathy ([facility])     Elevated WBC count     Heart attack ([facility]) 04/2015    \"labeled as that and than tested later after appendix removed and stress test showed negative\"    History of cancer chemotherapy     last treatment approx 5yrs ago    Hypertension     Myocardial infarction ([facility]) 04/2015    Scalp psoriasis     Shingles 2/10-15    Toe injury     left great toe, - 2017-internal braec-to be removed today 3/30/2018    Wears glasses      Past Surgical History:   Procedure Laterality Date    APPENDECTOMY      BONE BIOPSY Left 3/30/2018    Procedure: GREAT TOE BONE BIOPSY;  Surgeon: Gerber Multani DPM;  Location: [facility];  Service: Podiatry    BOWEL RESECTION      CATARACT EXTRACTION Bilateral     CHOLECYSTECTOMY      COLECTOMY  06/2015    EYE SURGERY Right     \"retinal peel\"    FOOT FUSION Left 12/6/2017    Procedure: HALLUX INTERPHALANGEAL JOINT FUSION;  Surgeon: Gerber Multani DPM;  [facility];  Service: Podiatry    HARDWARE REMOVAL Left 4/18/2018    Procedure: REMOVAL STAPLES LEFT TOE;  Surgeon: Gerber Multani DPM;  [facility];  Service: Podiatry    ORIF FOOT FRACTURE Left 12/6/2017    Procedure: HALLUX " INTERPHALANGEAL JOINT INTERNAL FIXATION; REPAIR OF ULCERATION WITH EXCISIONS AND REVISION OF SKIN HALLUX WITH USE OF ALLOGRAFT;  Surgeon: Gerber Multani DPM;  [facility];  Service: Podiatry    MI COLONOSCOPY FLX DX W/COLLJ SPEC WHEN PFRMD N/A 4/21/2016    Procedure: COLONOSCOPY;  Surgeon: Minal Ruelas DO;  Location: [facility];  Service: Gastroenterology    MI OPTX FEM SHFT FX W/INSJ IMED IMPLT W/WO SCREW Left 3/18/2025    Procedure: INSERTION NAIL IM FEMUR ANTEGRADE (TROCHANTERIC);  Surgeon: Jacqui Nevarez DO;  Location: [facility];  Service: Orthopedics    MI REMOVAL IMPLANT DEEP Left 3/30/2018    Procedure: REMOVAL HARDWARE FOOT;  Surgeon: Gerber Multani DPM;  [facility];  Service: Podiatry    RETINAL DETACHMENT SURGERY      TONSILLECTOMY          03/19/25 0850   PT Last Visit   PT Visit Date 03/19/25   Note Type   Note type Evaluation   Additional Comments 80 y/o presents following a fall with left hip pain with prolonged time down.  Imaging confirms left intertrochanteric fx. Additionally, showing ester chest pleural effusions.  Pt s/p left femoral IM nailing on 3/18.   Pain Assessment   Pain Assessment Tool 0-10   Pain Score 6   Pain Location/Orientation Orientation: Left;Location: Hip  (Pt also c/o chronic LBP)   Hospital Pain Intervention(s) Repositioned;Ambulation/increased activity;Rest   Restrictions/Precautions   Weight Bearing Precautions Per Order Yes   LLE Weight Bearing Per Order WBAT   Other Precautions Bed Alarm;THR;WBS;Pain;Fall Risk;O2  (RA to 1L NC O2)   Home Living   Additional Comments Pt lives with wife in 2-level house, 2+1 NATALIA (pole on left to steady).  flat bed, half bath first floor with standard toilet.  full bathroom/bedroom on second floor: shower/tub with chair, elevated toilet, flat bed.  DME: RW   Prior Function   Comments Prior to admission: independent with ADL, IADLs, drive (+), ambulates without AD. work: retired.   General   Additional Pertinent History  vital measures: supine - 90/50. sitting - 87/43.  sitting 5 minutes - 115/53. standing 94/47. standing 3 minutes - 105/54.  Transfer to bed side chair - 61/34. supine in chair for 2 minutes - 74/49. supine in chair for 5 minutes 84/43.   Family/Caregiver Present No   Cognition   Overall Cognitive Status WFL   Arousal/Participation Alert   Orientation Level Oriented X4   Subjective   Subjective Pt pleasant and agreeable to participate in eval/tx.  Pt reports pain is appropriately controlled.  Upon getting to sitting and standing, pt reports feeling dizziness/lightheaded.   RUE Assessment   RUE Assessment WFL   LUE Assessment   LUE Assessment WFL   RLE Assessment   RLE Assessment WFL   LLE Assessment   LLE Assessment   (hip 2+/5 grossly.  knee and ankle WFL)   Coordination   Movements are Fluid and Coordinated 1   Bed Mobility   Rolling L 5  Supervision   Additional items Assist x 1;Bedrails;Increased time required;Verbal cues   Supine to Sit 4  Minimal assistance   Additional items Assist x 1;Bedrails;Increased time required;Verbal cues;LE management   Transfers   Sit to Stand 3  Moderate assistance   Additional items Assist x 1;Increased time required;Verbal cues;Bedrails  (RW)   Stand to Sit 3  Moderate assistance   Additional items Assist x 1;Armrests;Increased time required;Verbal cues;Impulsive   Stand pivot 2  Maximal assistance   Additional items Assist x 1;Increased time required;Impulsive;Verbal cues  (RW)   Additional Comments Pt tolerated standing at walker for 3-4 minutes before needing to have seated rest break.   Balance   Static Sitting Fair +   Dynamic Sitting Fair   Static Standing Poor +  (RW)   Ambulatory Poor  (RW)   Endurance Deficit   Endurance Deficit Yes   Activity Tolerance   Activity Tolerance Patient limited by fatigue;Patient limited by pain   Medical Staff Made Aware attending and nursing aware of patient's symptoms/vitals, and mobility performance   Assessment   Prognosis Good   Problem  List Decreased range of motion;Decreased strength;Decreased endurance;Impaired balance;Decreased mobility;Decreased safety awareness;Pain;Orthopedic restrictions   Assessment Orders for PT eval and treat received. Pt's Pmhx: CHF, OA of spine, Cx/leukemia, cervical DDD, neuropathy, retinopathy, MI, shingles, left transmetatarsal amputation.  Pt exhibits physical deficits noted in problem list above.  Deficits listed contribute to functional limitations that are significant from the patient PLOF and include: difficulty with bed mobility, impaired sitting balance, impaired standing balance, inability to perform safe transfers, tolerance for OOB functional activities, unsafe/inefficient ambulation, fall risk, and unable to safely manage stairs/steps/ramp    Additionally, patient is limited by restrictions/precautions/DME: LLE WBAT.    Additional considerations affecting pt's discharge planning: Significant amount of stairs to manage in order to access living quarters, Inability to manage basic self care ADLs with the assistance that is currently available, and Inability to perform necessary transfers and/or mobility out of bed, with the assistance that is currently available    During today's session, formal PT evaluation performed.  Initiated session with supine bed exercises and monitored vitals closely.  Instructed and performed bed mobility before transferring to sitting.  Pt's BP fluctuated significantly and pt c/o lightheadedness, but both improved appropriately before moving on.  Pt assisted to standing at RW and needing extra time to managing balance. Pt tolerated 3-4 minutes of standing and exercises, but again had unstable symptoms and BP.  Pt requesting to return to sitting due to UE fatigue and hip pain.  After prolonged rest break and vitals stabilizing, pt assisted to bedside chair.  Following transfer pt's BP dropped significantly but improved slowly with care.  Nursing and attending notified of vitals  and performance.  Pt reports no symptoms and reports comfort as therapist exited.  CC notified that pt would benefit from SNF placement at time of discharge.     The AM-PAC Mobility Score was used to assist in determining pt safety w/ mobility/self care & appropriate d/c recommendations, see above for scores. Patient's clinical presentation is evolving due to significant acute change in functional independence, uncontrolled pain, uncontrolled BP/HR, increased supplemental O2 demands compared to baseline, significant need for care assistance compared to baseline, recent surgery/procedure, and ongoing medical management needs.  From a PT/mobility standpoint given the above findings, DC recommendation is level: II (Moderate Rehab Resource Intensity)    Considering the patient's PLOF, co-morbidities, acute functional limitations, functional outcome measures, and/or goal to progress functional independence; this patient would benefit from skilled Physical Therapy intervention in the acute care setting.    Barriers to Discharge Inaccessible home environment;Decreased caregiver support   Goals   Patient Goals return to PLOF   STG Expiration Date 03/29/25   Short Term Goal #1 1: Pt will perform supine<>sit transfer on flat bed, mod I.  2: Pt will perform stand pivot transfer with RW, mod I.  3: Pt will ambulate 150' with reciprocal gait and appropriate pace using RW, mod I. 4: Pt will perform written HEP, mod I. 5: Pt will ascend/descend a curb step and/or stairs require to mobilize around the the patient's home with RW/rails, Sup A.   Plan   Treatment/Interventions Functional transfer training;LE strengthening/ROM;Therapeutic exercise;Elevations;Endurance training;Patient/family training;Equipment eval/education;Bed mobility;Gait training   PT Frequency 4-6x/wk   Discharge Recommendation   Rehab Resource Intensity Level, PT II (Moderate Resource Intensity)   AM-PAC Basic Mobility Inpatient   Turning in Flat Bed Without  Bedrails 3   Lying on Back to Sitting on Edge of Flat Bed Without Bedrails 2   Moving Bed to Chair 2   Standing Up From Chair Using Arms 2   Walk in Room 2   Climb 3-5 Stairs With Railing 1   Basic Mobility Inpatient Raw Score 12   Basic Mobility Standardized Score 32.23   [facility] Highest Level Of Mobility   -[facility] Goal 4: Move to chair/commode   -[facility] Achieved 5: Stand (1 or more minutes)   Additional Treatment Session   Start Time 0900   End Time 1014   Treatment Assessment Activity progressed very slowly due to unstable vitals and symptomology.  Pt responded well to supine and standing exercises, with improved hip ROM, strength, and pain tolerance.  Pt educated and isntructed to perform bed mobilty and scooting/transfers at EOB.  Extra time needed to manage symptoms after getting to sitting and standing.  Educated and isntructed pt on sit<>stand tranfser with RW, with fair carry over but needed significant assist to manage balance and due to pain.  with time and practice, pt progressed standing balance to Fair-.  Pt struggled to safely manage steps, posture, and walker managemetn when performing pivot transfer due to pain and dizziness.   Exercises   Heelslides Supine;15 reps;AAROM;AROM;Left  (2 sets)   Hip Abduction Supine;10 reps;AAROM;Left  (SL)   Hip Adduction Supine;10 reps;AAROM;Left  (SL)   Ankle Pumps Sitting;10 reps;AROM;Bilateral   Marching Standing;10 reps;AROM;Left  (RW)   Bridging Supine;10 reps;AROM;Bilateral   End of Consult   Patient Position at End of Consult Bedside chair;Bed/Chair alarm activated;All needs within reach   The patient's AM-PAC Basic Mobility Inpatient Short Form Raw Score is 12. A Raw score of less than 16 suggests the patient may benefit from discharge to post-acute rehabilitation services. Please also refer to the recommendation of the Physical Therapist for safe discharge planning.    Judson Owen, PT, DPT  [place] Licensure #[id number]

## 2025-03-19 NOTE — ASSESSMENT & PLAN NOTE
BP stable, hemodynamically stable  Monitor with routine vital signs  Setting of worsening anemia we will hold all antihypertensives

## 2025-03-19 NOTE — ASSESSMENT & PLAN NOTE
Hx PAF, HR stable, controlled A-fib  ECG on admission with atrial fibrillation HR 99, QTc 410, no ST-T changes or ischemia.  Continue PTA carvedilol 6.25 mg twice daily  Continue to hold Eliquis today

## 2025-03-19 NOTE — ASSESSMENT & PLAN NOTE
Patient with PMHx CLL, A-fib on Eliquis, HTN, hypothyroid, NIDDM type 2, and CKD who presents after a fall after tripping over floorboards in [facility].  Patient fell on his left side and states he was lying on the ground for about 3 hours unable to weight-bear.  .  CT chest abdomen pelvis showing acute intertrochanteric fracture of left femoral neck.  Status post operative intervention, had some significant blood loss, will transfuse 2 units of packed red blood cells

## 2025-03-19 NOTE — ASSESSMENT & PLAN NOTE
Lab Results   Component Value Date    HGBA1C 7.9 (H) 03/18/2025       Recent Labs     03/18/25  1938 03/18/25  2111 03/19/25  0017 03/19/25  0714   POCGLU 244* 267* 327* 276*       Blood Sugar Average: Last 72 hrs:  (P) 266

## 2025-03-19 NOTE — PLAN OF CARE
Problem: PHYSICAL THERAPY ADULT  Goal: Performs mobility at highest level of function for planned discharge setting.  See evaluation for individualized goals.  Description: Treatment/Interventions: Functional transfer training, LE strengthening/ROM, Therapeutic exercise, Elevations, Endurance training, Patient/family training, Equipment eval/education, Bed mobility, Gait training          See flowsheet documentation for full assessment, interventions and recommendations.  Outcome: Progressing  Note: Prognosis: Good  Problem List: Decreased range of motion, Decreased strength, Decreased endurance, Impaired balance, Decreased mobility, Decreased safety awareness, Pain, Orthopedic restrictions  Assessment: Orders for PT eval and treat received. Pt's Pmhx: CHF, OA of spine, Cx/leukemia, cervical DDD, neuropathy, retinopathy, MI, shingles, left transmetatarsal amputation.  Pt exhibits physical deficits noted in problem list above.  Deficits listed contribute to functional limitations that are significant from the patient PLOF and include: difficulty with bed mobility, impaired sitting balance, impaired standing balance, inability to perform safe transfers, tolerance for OOB functional activities, unsafe/inefficient ambulation, fall risk, and unable to safely manage stairs/steps/ramp    Additionally, patient is limited by restrictions/precautions/DME: LLE WBAT.    Additional considerations affecting pt's discharge planning: Significant amount of stairs to manage in order to access living quarters, Inability to manage basic self care ADLs with the assistance that is currently available, and Inability to perform necessary transfers and/or mobility out of bed, with the assistance that is currently available    During today's session, formal PT evaluation performed.  Initiated session with supine bed exercises and monitored vitals closely.  Instructed and performed bed mobility before transferring to sitting.  Pt's BP  fluctuated significantly and pt c/o lightheadedness, but both improved appropriately before moving on.  Pt assisted to standing at RW and needing extra time to managing balance. Pt tolerated 3-4 minutes of standing and exercises, but again had unstable symptoms and BP.  Pt requesting to return to sitting due to UE fatigue and hip pain.  After prolonged rest break and vitals stabilizing, pt assisted to bedside chair.  Following transfer pt's BP dropped significantly but improved slowly with care.  Nursing and attending notified of vitals and performance.  Pt reports no symptoms and reports comfort as therapist exited.  CC notified that pt would benefit from SNF placement at time of discharge.     The AM-PAC Mobility Score was used to assist in determining pt safety w/ mobility/self care & appropriate d/c recommendations, see above for scores. Patient's clinical presentation is evolving due to significant acute change in functional independence, uncontrolled pain, uncontrolled BP/HR, increased supplemental O2 demands compared to baseline, significant need for care assistance compared to baseline, recent surgery/procedure, and ongoing medical management needs.  Barriers to Discharge: Inaccessible home environment, Decreased caregiver support     Rehab Resource Intensity Level, PT: II (Moderate Resource Intensity)    See flowsheet documentation for full assessment.

## 2025-03-19 NOTE — ASSESSMENT & PLAN NOTE
Lab Results   Component Value Date    EGFR 22 03/19/2025    EGFR 40 03/18/2025    EGFR 43 03/17/2025    CREATININE 2.65 (H) 03/19/2025    CREATININE 1.59 (H) 03/18/2025    CREATININE 1.52 (H) 03/17/2025      Pt here for biologic injection. Medication was pt supplied. Pt tolerated the injection well.   Medication: Dupixent   Dose: 300 MG/2ML (Pen-Injector)  Site: L thigh   Route: SQ  LOT#: [id number]  Exp: 2024-11-30  NDC: 2888-1392-20

## 2025-03-19 NOTE — OCCUPATIONAL THERAPY NOTE
Occupational Therapy Cx Note     Patient Name: Abelino Renee  Today's Date: 3/19/2025  Problem List  Principal Problem:    Closed fracture of neck of left femur (HCC)  Active Problems:    Benign essential HTN    Chronic lymphocytic leukemia (HCC)    Stage 3a chronic kidney disease (HCC)    Hypothyroidism    Diabetic nephropathy associated with type 2 diabetes mellitus (HCC)    CHF (congestive heart failure) (HCC)    Atrial fibrillation (HCC)    ABLA (acute blood loss anemia)            03/19/25 1154   OT Last Visit   OT Visit Date 03/19/25   Note Type   Note type Cancelled Session   Cancel Reasons Medical status   Additional Comments OT orders received. Chart reviewed. Pt with hgb of 6.8. Awaiting blood transfusion. Will hold and follow at later time as able.             Sara Landry, OTR/L

## 2025-03-19 NOTE — CASE MANAGEMENT
Case Management Progress Note    Patient name Abelino Renee  Location /-01 MRN [id number]  : 1945 Date 3/19/2025       LOS (days): 1  Geometric Mean LOS (GMLOS) (days): 2.8  Days to GMLOS:1.7        OBJECTIVE:    Current admission status: Inpatient  Preferred Pharmacy:   [facility] [address] [place] [id number]  Phone: [phone] Fax: [phone]    [facility] - [facility] [address] [facility] [facility]  [address] [facility] [address]  Phone: [phone] Fax: [phone]    Primary Care Provider: Edel Wright MD    Primary Insurance: MEDICARE  Secondary Insurance: COMMERCIAL MISCELLANEOUS    PROGRESS NOTE:    CM spoke to the patient re: recommendations for STR. The patient declined, reporting he is independent and will manage at home. The patient also declined HHC due to not wanting people in the home. The patient is agreeable to OP PT/OT. Provider made aware. CM will continue to follow the patient through discharge.

## 2025-03-19 NOTE — ASSESSMENT & PLAN NOTE
Lab Results   Component Value Date    HGBA1C 7.9 (H) 03/18/2025     Recent Labs     03/18/25  1938 03/18/25  2111 03/19/25  0017 03/19/25  0714   POCGLU 244* 267* 327* 276*     Blood Sugar Average: Last 72 hrs:  (P) 266  Home regimen: Glipizide 10 mg twice daily, and metformin 500 mg daily-hold while inpatient.  Start insulin sliding scale with fingersticks every 6 hours while NPO  Hypoglycemia protocol

## 2025-03-19 NOTE — ASSESSMENT & PLAN NOTE
History of CLL/SLL previously treated with 4 cycles of BR (June 2012).  Outpatient plan is surveillance.  WBC elevated in setting of CLL.  Patient with anemia today secondary to blood loss from procedure, will transfuse 2 units of leukoreduced and irradiated packed red blood cells

## 2025-03-19 NOTE — OCCUPATIONAL THERAPY NOTE
Occupational Therapy Cx Note     Patient Name: Abelino Renee  Today's Date: 3/19/2025  Problem List  Principal Problem:    Closed fracture of neck of left femur (HCC)  Active Problems:    Benign essential HTN    Chronic lymphocytic leukemia (HCC)    Stage 3a chronic kidney disease (HCC)    Hypothyroidism    Diabetic nephropathy associated with type 2 diabetes mellitus (HCC)    CHF (congestive heart failure) (HCC)    Atrial fibrillation (HCC)    ABLA (acute blood loss anemia)            03/19/25 4677   OT Last Visit   OT Visit Date 03/19/25   Note Type   Note type Cancelled Session   Cancel Reasons Medical status   Additional Comments OT evaluation remain pending. Pt continues to be awaiting blood transfusion. Will continue to hold and follow as able and appropriate.         Sara Landry, OTR/L

## 2025-03-19 NOTE — ASSESSMENT & PLAN NOTE
Patient appearing euvolemic on exam, benign lung exam, no peripheral edema patient denies any CP or SOB.  Does not appear to have acute exacerbation.  .  CXR showing prominent interstitial markings bilaterally suspicious for mild edema.  CT chest abdomen pelvis showing probable trace bilateral pleural effusions.  And mild patchy groundglass opacities and interlobular septal thickening in the lungs consistent with interstitial edema.  Patient states he recently recovered from pneumonia a few months ago.  Hold Lasix for today

## 2025-03-19 NOTE — ASSESSMENT & PLAN NOTE
As above, recheck CBC,and orthostatics. Discussed with medicine, consider PRBC if repeat CBC is under 8.

## 2025-03-19 NOTE — PROGRESS NOTES
Progress Note - Hospitalist   Name: Abelino Renee 79 y.o. male I MRN: [id number]  Unit/Bed#: -01 I Date of Admission: 3/17/2025   Date of Service: 3/19/2025 I Hospital Day: 1    Assessment & Plan  Closed fracture of neck of left femur (HCC)  Patient with PMHx CLL, A-fib on Eliquis, HTN, hypothyroid, NIDDM type 2, and CKD who presents after a fall after tripping over floorboards in [facility].  Patient fell on his left side and states he was lying on the ground for about 3 hours unable to weight-bear.  .  CT chest abdomen pelvis showing acute intertrochanteric fracture of left femoral neck.  Status post operative intervention, had some significant blood loss, will transfuse 2 units of packed red blood cells  Atrial fibrillation (HCC)  Hx PAF, HR stable, controlled A-fib  ECG on admission with atrial fibrillation HR 99, QTc 410, no ST-T changes or ischemia.  Continue PTA carvedilol 6.25 mg twice daily  Continue to hold Eliquis today  CHF (congestive heart failure) (HCC)  Patient appearing euvolemic on exam, benign lung exam, no peripheral edema patient denies any CP or SOB.  Does not appear to have acute exacerbation.  .  CXR showing prominent interstitial markings bilaterally suspicious for mild edema.  CT chest abdomen pelvis showing probable trace bilateral pleural effusions.  And mild patchy groundglass opacities and interlobular septal thickening in the lungs consistent with interstitial edema.  Patient states he recently recovered from pneumonia a few months ago.  Hold Lasix for today  Benign essential HTN  BP stable, hemodynamically stable  Monitor with routine vital signs  Setting of worsening anemia we will hold all antihypertensives  Stage 3a chronic kidney disease (HCC)  Lab Results   Component Value Date    EGFR 22 03/19/2025    EGFR 40 03/18/2025    EGFR 43 03/17/2025    CREATININE 2.65 (H) 03/19/2025    CREATININE 1.59 (H) 03/18/2025    CREATININE 1.52 (H) 03/17/2025   Creatinine 1.52 on  admission, appears stable at baseline of 1.4-1.6   Avoid nephrotoxic agents  Follow-up creatinine in the morning with BMP  Patient with acute kidney injury with creatinine 2.65 likely secondary to hypotension from blood loss  Diabetic nephropathy associated with type 2 diabetes mellitus (HCC)  Lab Results   Component Value Date    HGBA1C 7.9 (H) 03/18/2025     Recent Labs     03/18/25  1938 03/18/25  2111 03/19/25  0017 03/19/25  0714   POCGLU 244* 267* 327* 276*     Blood Sugar Average: Last 72 hrs:  (P) 266  Home regimen: Glipizide 10 mg twice daily, and metformin 500 mg daily-hold while inpatient.  Start insulin sliding scale with fingersticks every 6 hours while NPO  Hypoglycemia protocol  Chronic lymphocytic leukemia (HCC)  History of CLL/SLL previously treated with 4 cycles of BR (June 2012).  Outpatient plan is surveillance.  WBC elevated in setting of CLL.  Patient with anemia today secondary to blood loss from procedure, will transfuse 2 units of leukoreduced and irradiated packed red blood cells  Hypothyroidism  Continue PTA levothyroxine  ABLA (acute blood loss anemia)  Transfuse 2 units packed red blood cells leukoreduced and irradiated in the setting of CLL    VTE Pharmacologic Prophylaxis: VTE Score: 5 Low Risk (Score 0-2) - Encourage Ambulation.    Mobility:   Basic Mobility Inpatient Raw Score: 10  JH-HLM Goal: 4: Move to chair/commode  JH-HLM Achieved: 3: Sit at edge of bed  JH-HLM Goal achieved. Continue to encourage appropriate mobility.    Patient Centered Rounds: I performed bedside rounds with nursing staff today.   Discussions with Specialists or Other Care Team Provider:     Education and Discussions with Family / Patient: Patient declined call to .     Current Length of Stay: 1 day(s)  Current Patient Status: Inpatient   Certification Statement: The patient will continue to require additional inpatient hospital stay due to anemia, kathi  Discharge Plan: Anticipate discharge in  24-48 hrs to discharge location to be determined pending rehab evaluations.    Code Status: Level 1 - Full Code    Subjective   Patient denies any acute complaints today    Objective :  Temp:  [97.4 °F (36.3 °C)-98.7 °F (37.1 °C)] 97.8 °F (36.6 °C)  HR:  [] 89  BP: ()/(50-72) 107/60  Resp:  [14-18] 16  SpO2:  [90 %-97 %] 90 %  O2 Device: None (Room air)  Nasal Cannula O2 Flow Rate (L/min):  [2.5 L/min-3 L/min] 2.5 L/min    Body mass index is 28.61 kg/m².     Input and Output Summary (last 24 hours):     Intake/Output Summary (Last 24 hours) at 3/19/2025 0950  Last data filed at 3/19/2025 0517  Gross per 24 hour   Intake 1720 ml   Output 550 ml   Net 1170 ml       Physical Exam  Vitals and nursing note reviewed.   Constitutional:       General: He is not in acute distress.     Appearance: He is well-developed. He is not toxic-appearing or diaphoretic.   HENT:      Head: Normocephalic and atraumatic.   Eyes:      General: No scleral icterus.     Conjunctiva/sclera: Conjunctivae normal.   Cardiovascular:      Rate and Rhythm: Normal rate and regular rhythm.      Heart sounds: No murmur heard.     No friction rub. No gallop.   Pulmonary:      Effort: Pulmonary effort is normal. No respiratory distress.      Breath sounds: Normal breath sounds. No stridor. No wheezing, rhonchi or rales.   Chest:      Chest wall: No tenderness.   Abdominal:      General: There is no distension.      Palpations: Abdomen is soft. There is no mass.      Tenderness: There is no abdominal tenderness. There is no guarding or rebound.      Hernia: No hernia is present.   Musculoskeletal:         General: No swelling or tenderness.      Cervical back: Neck supple.      Comments: dressing clean dry intact   Skin:     General: Skin is warm and dry.      Capillary Refill: Capillary refill takes less than 2 seconds.   Neurological:      Mental Status: He is alert and oriented to person, place, and time.   Psychiatric:         Mood and  Affect: Mood normal.           Lines/Drains:              Lab Results: I have reviewed the following results:   Results from last 7 days   Lab Units 03/19/25  0914 03/19/25  0518   WBC Thousand/uL  --  30.77*   HEMOGLOBIN g/dL 6.8* 6.9*   HEMATOCRIT % 21.3* 22.2*   PLATELETS Thousands/uL  --  96*   SEGS PCT %  --  20*   LYMPHO PCT %  --  77*   MONO PCT %  --  3*   EOS PCT %  --  0     Results from last 7 days   Lab Units 03/19/25  0518   SODIUM mmol/L 133*   POTASSIUM mmol/L 5.1   CHLORIDE mmol/L 99   CO2 mmol/L 23   BUN mg/dL 46*   CREATININE mg/dL 2.65*   ANION GAP mmol/L 11   CALCIUM mg/dL 7.9*   GLUCOSE RANDOM mg/dL 296*     Results from last 7 days   Lab Units 03/17/25  2107   INR  1.14     Results from last 7 days   Lab Units 03/19/25  0714 03/19/25  0017 03/18/25  2111 03/18/25  1938 03/18/25  1805 03/18/25  1532 03/18/25  1117 03/18/25  0601 03/18/25  0010   POC GLUCOSE mg/dl 276* 327* 267* 244* 260* 300* 212* 248* 260*     Results from last 7 days   Lab Units 03/18/25  0443   HEMOGLOBIN A1C % 7.9*           Recent Cultures (last 7 days):         Imaging Results Review: No pertinent imaging studies reviewed.  Other Study Results Review: No additional pertinent studies reviewed.    Last 24 Hours Medication List:     Current Facility-Administered Medications:     acetaminophen (TYLENOL) tablet 650 mg, Q6H PRN    [Held by provider] amLODIPine (NORVASC) tablet 10 mg, Daily    [Held by provider] apixaban (ELIQUIS) tablet 5 mg, BID    brimonidine tartrate 0.2 % ophthalmic solution 1 drop, TID    carvedilol (COREG) tablet 6.25 mg, BID With Meals    docusate sodium (COLACE) capsule 100 mg, BID    fish oil capsule 1,000 mg, Daily    HYDROmorphone HCl (DILAUDID) injection 0.2 mg, Q2H PRN    insulin glargine (LANTUS) subcutaneous injection 10 Units 0.1 mL, HS    insulin lispro (HumALOG/ADMELOG) 100 units/mL subcutaneous injection 1-5 Units, TID With Meals **AND** Fingerstick Glucose (POCT), 4x Daily AC and at bedtime     insulin lispro (HumALOG/ADMELOG) 100 units/mL subcutaneous injection 3 Units, TID With Meals    lactated ringers bolus 1,000 mL, Once PRN **AND** lactated ringers bolus 1,000 mL, Once PRN    lactated ringers infusion, Continuous, Last Rate: 100 mL/hr (03/19/25 0938)    levothyroxine tablet 75 mcg, Early Morning    morphine (MSIR) IR tablet 7.5 mg, Q4H PRN **OR** morphine (MSIR) IR tablet 15 mg, Q4H PRN    multivitamin-minerals (CENTRUM) tablet 1 tablet, Daily    pravastatin (PRAVACHOL) tablet 40 mg, Daily With Dinner    sodium chloride 0.9 % bolus 1,000 mL, Once PRN **AND** sodium chloride 0.9 % bolus 1,000 mL, Once PRN    timolol (TIMOPTIC) 0.5 % ophthalmic solution 1 drop, BID    Administrative Statements   Today, Patient Was Seen By: Kyle Dodge DO      **Please Note: This note may have been constructed using a voice recognition system.**

## 2025-03-19 NOTE — PROGRESS NOTES
Progress Note - Orthopedics   Name: Abelino Renee 79 y.o. male I MRN: [id number]  Unit/Bed#: -01 I Date of Admission: 3/17/2025   Date of Service: 3/19/2025 I Hospital Day: 1    Assessment & Plan  Closed fracture of neck of left femur (HCC)  S/p left hip CMN for IT fracture on 3/18 with Dr. Nevarez  WBAT LLE  Hg dropped from 11.1 to 6.9. discussed with primary team. Plan to recheck CBC and orthostatics. Monitor and may required PRBC per primary  Ok to restart eliquis today  PT/OT  Glucose control  Ortho will follow  Benign essential HTN    Chronic lymphocytic leukemia (HCC)    Stage 3a chronic kidney disease (HCC)  Lab Results   Component Value Date    EGFR 22 03/19/2025    EGFR 40 03/18/2025    EGFR 43 03/17/2025    CREATININE 2.65 (H) 03/19/2025    CREATININE 1.59 (H) 03/18/2025    CREATININE 1.52 (H) 03/17/2025     Hypothyroidism    Diabetic nephropathy associated with type 2 diabetes mellitus (HCC)  Lab Results   Component Value Date    HGBA1C 7.9 (H) 03/18/2025       Recent Labs     03/18/25  1938 03/18/25  2111 03/19/25  0017 03/19/25  0714   POCGLU 244* 267* 327* 276*       Blood Sugar Average: Last 72 hrs:  (P) 266    CHF (congestive heart failure) (HCC)  Wt Readings from Last 3 Encounters:   03/17/25 80.4 kg (177 lb 4 oz)   02/20/25 82.3 kg (181 lb 8 oz)   10/28/24 82.1 kg (181 lb)             Atrial fibrillation (HCC)    ABLA (acute blood loss anemia)  As above, recheck CBC,and orthostatics. Discussed with medicine, consider PRBC if repeat CBC is under 8.   . Orthopedics service will follow.    Subjective   79 y.o.male s/p left short CMN on 3/18 with Dr. Nevarez.  No acute events, no new complaints. Pain well controlled. Denies fevers, chills, CP, SOB, N/V, numbness or tingling. Patient reports no issues with urination or bowel movements. Patient states he has not flexed his hip yet. He denies cp/sob. Not lightheaded or dizzy.     Objective :  Temp:  [97.4 °F (36.3 °C)-98.7 °F (37.1 °C)] 97.8 °F (36.6  "°C)  HR:  [] 89  BP: ()/(50-72) 107/60  Resp:  [14-18] 16  SpO2:  [90 %-97 %] 90 %  O2 Device: None (Room air)  Nasal Cannula O2 Flow Rate (L/min):  [2.5 L/min-3 L/min] 2.5 L/min    Physical Exam  Musculoskeletal: Left hip      Dressing c/d/I  Thigh supple, NT  Motor intact to +FHL/EHL, +ankle dorsi/plantar flexion  No calf swelling or tenderness to palpation      Lab Results: I have reviewed the following results:  Recent Labs     03/17/25 2027 03/17/25  2107 03/18/25  0443 03/19/25  0518   WBC 39.72*  --  33.67* 30.77*   HGB 12.2  --  11.1* 6.9*   HCT 37.6  --  34.5* 22.2*   *  --  117* 96*   BUN 29*  --  29* 46*   CREATININE 1.52*  --  1.59* 2.65*   PTT  --  28  --   --    INR  --  1.14  --   --      Blood Culture:    Lab Results   Component Value Date    BLOODCX No Growth After 5 Days. 06/07/2024     Wound Culture: No results found for: \"WOUNDCULT\"    Imaging Results Review: No pertinent imaging studies reviewed.  Other Study Results Review: No additional pertinent studies reviewed.  "

## 2025-03-19 NOTE — ASSESSMENT & PLAN NOTE
Lab Results   Component Value Date    EGFR 22 03/19/2025    EGFR 40 03/18/2025    EGFR 43 03/17/2025    CREATININE 2.65 (H) 03/19/2025    CREATININE 1.59 (H) 03/18/2025    CREATININE 1.52 (H) 03/17/2025   Creatinine 1.52 on admission, appears stable at baseline of 1.4-1.6   Avoid nephrotoxic agents  Follow-up creatinine in the morning with BMP  Patient with acute kidney injury with creatinine 2.65 likely secondary to hypotension from blood loss

## 2025-03-20 ENCOUNTER — APPOINTMENT (INPATIENT)
Dept: RADIOLOGY | Facility: HOSPITAL | Age: 80
DRG: 480 | End: 2025-03-20
Payer: MEDICARE

## 2025-03-20 PROBLEM — N18.32 TYPE 2 DIABETES MELLITUS WITH STAGE 3B CHRONIC KIDNEY DISEASE AND HYPERTENSION (HCC): Status: ACTIVE | Noted: 2025-03-20

## 2025-03-20 PROBLEM — I12.9 TYPE 2 DIABETES MELLITUS WITH STAGE 3B CHRONIC KIDNEY DISEASE AND HYPERTENSION (HCC): Status: ACTIVE | Noted: 2025-03-20

## 2025-03-20 PROBLEM — N17.9 AKI (ACUTE KIDNEY INJURY) (HCC): Status: ACTIVE | Noted: 2025-03-20

## 2025-03-20 PROBLEM — E11.22 TYPE 2 DIABETES MELLITUS WITH STAGE 3B CHRONIC KIDNEY DISEASE AND HYPERTENSION (HCC): Status: ACTIVE | Noted: 2025-03-20

## 2025-03-20 LAB
ALBUMIN SERPL BCG-MCNC: 3.3 G/DL (ref 3.5–5)
ALP SERPL-CCNC: 51 U/L (ref 34–104)
ALT SERPL W P-5'-P-CCNC: 9 U/L (ref 7–52)
ANION GAP SERPL CALCULATED.3IONS-SCNC: 10 MMOL/L (ref 4–13)
AST SERPL W P-5'-P-CCNC: 18 U/L (ref 13–39)
BILIRUB SERPL-MCNC: 0.41 MG/DL (ref 0.2–1)
BUN SERPL-MCNC: 61 MG/DL (ref 5–25)
CALCIUM ALBUM COR SERPL-MCNC: 8.6 MG/DL (ref 8.3–10.1)
CALCIUM SERPL-MCNC: 8 MG/DL (ref 8.4–10.2)
CHLORIDE SERPL-SCNC: 97 MMOL/L (ref 96–108)
CO2 SERPL-SCNC: 24 MMOL/L (ref 21–32)
CREAT SERPL-MCNC: 3.95 MG/DL (ref 0.6–1.3)
ERYTHROCYTE [DISTWIDTH] IN BLOOD BY AUTOMATED COUNT: 15.4 % (ref 11.6–15.1)
GFR SERPL CREATININE-BSD FRML MDRD: 13 ML/MIN/1.73SQ M
GLUCOSE SERPL-MCNC: 106 MG/DL (ref 65–140)
GLUCOSE SERPL-MCNC: 117 MG/DL (ref 65–140)
GLUCOSE SERPL-MCNC: 138 MG/DL (ref 65–140)
GLUCOSE SERPL-MCNC: 178 MG/DL (ref 65–140)
GLUCOSE SERPL-MCNC: 98 MG/DL (ref 65–140)
HCT VFR BLD AUTO: 23.6 % (ref 36.5–49.3)
HGB BLD-MCNC: 7.6 G/DL (ref 12–17)
MCH RBC QN AUTO: 30.5 PG (ref 26.8–34.3)
MCHC RBC AUTO-ENTMCNC: 32.2 G/DL (ref 31.4–37.4)
MCV RBC AUTO: 95 FL (ref 82–98)
NRBC BLD AUTO-RTO: 0 /100 WBCS
PLATELET # BLD AUTO: 98 THOUSANDS/UL (ref 149–390)
PMV BLD AUTO: 10 FL (ref 8.9–12.7)
POTASSIUM SERPL-SCNC: 4.6 MMOL/L (ref 3.5–5.3)
PROT SERPL-MCNC: 6.1 G/DL (ref 6.4–8.4)
RBC # BLD AUTO: 2.49 MILLION/UL (ref 3.88–5.62)
SODIUM SERPL-SCNC: 131 MMOL/L (ref 135–147)
WBC # BLD AUTO: 29.62 THOUSAND/UL (ref 4.31–10.16)

## 2025-03-20 PROCEDURE — 99024 POSTOP FOLLOW-UP VISIT: CPT

## 2025-03-20 PROCEDURE — 80053 COMPREHEN METABOLIC PANEL: CPT | Performed by: INTERNAL MEDICINE

## 2025-03-20 PROCEDURE — P9040 RBC LEUKOREDUCED IRRADIATED: HCPCS

## 2025-03-20 PROCEDURE — 99232 SBSQ HOSP IP/OBS MODERATE 35: CPT | Performed by: INTERNAL MEDICINE

## 2025-03-20 PROCEDURE — 82948 REAGENT STRIP/BLOOD GLUCOSE: CPT

## 2025-03-20 PROCEDURE — 99223 1ST HOSP IP/OBS HIGH 75: CPT | Performed by: INTERNAL MEDICINE

## 2025-03-20 PROCEDURE — 97167 OT EVAL HIGH COMPLEX 60 MIN: CPT

## 2025-03-20 PROCEDURE — 97530 THERAPEUTIC ACTIVITIES: CPT

## 2025-03-20 PROCEDURE — 30233N1 TRANSFUSION OF NONAUTOLOGOUS RED BLOOD CELLS INTO PERIPHERAL VEIN, PERCUTANEOUS APPROACH: ICD-10-PCS | Performed by: INTERNAL MEDICINE

## 2025-03-20 PROCEDURE — 71045 X-RAY EXAM CHEST 1 VIEW: CPT

## 2025-03-20 PROCEDURE — 97110 THERAPEUTIC EXERCISES: CPT

## 2025-03-20 PROCEDURE — 85027 COMPLETE CBC AUTOMATED: CPT | Performed by: INTERNAL MEDICINE

## 2025-03-20 RX ORDER — SODIUM CHLORIDE, SODIUM GLUCONATE, SODIUM ACETATE, POTASSIUM CHLORIDE, MAGNESIUM CHLORIDE, SODIUM PHOSPHATE, DIBASIC, AND POTASSIUM PHOSPHATE .53; .5; .37; .037; .03; .012; .00082 G/100ML; G/100ML; G/100ML; G/100ML; G/100ML; G/100ML; G/100ML
75 INJECTION, SOLUTION INTRAVENOUS CONTINUOUS
Status: DISPENSED | OUTPATIENT
Start: 2025-03-20 | End: 2025-03-21

## 2025-03-20 RX ADMIN — INSULIN GLARGINE 10 UNITS: 100 INJECTION, SOLUTION SUBCUTANEOUS at 22:05

## 2025-03-20 RX ADMIN — BRIMONIDINE TARTRATE 1 DROP: 2 SOLUTION/ DROPS OPHTHALMIC at 15:35

## 2025-03-20 RX ADMIN — ACETAMINOPHEN 650 MG: 325 TABLET, FILM COATED ORAL at 05:23

## 2025-03-20 RX ADMIN — MORPHINE SULFATE 15 MG: 15 TABLET ORAL at 11:57

## 2025-03-20 RX ADMIN — OMEGA-3 FATTY ACIDS CAP 1000 MG 1000 MG: 1000 CAP at 08:04

## 2025-03-20 RX ADMIN — BRIMONIDINE TARTRATE 1 DROP: 2 SOLUTION/ DROPS OPHTHALMIC at 08:04

## 2025-03-20 RX ADMIN — LEVOTHYROXINE SODIUM 75 MCG: 75 TABLET ORAL at 05:16

## 2025-03-20 RX ADMIN — DOCUSATE SODIUM 100 MG: 100 CAPSULE, LIQUID FILLED ORAL at 08:04

## 2025-03-20 RX ADMIN — INSULIN LISPRO 3 UNITS: 100 INJECTION, SOLUTION INTRAVENOUS; SUBCUTANEOUS at 07:51

## 2025-03-20 RX ADMIN — MORPHINE SULFATE 7.5 MG: 15 TABLET ORAL at 07:59

## 2025-03-20 RX ADMIN — PRAVASTATIN SODIUM 40 MG: 40 TABLET ORAL at 15:36

## 2025-03-20 RX ADMIN — SODIUM CHLORIDE, SODIUM GLUCONATE, SODIUM ACETATE, POTASSIUM CHLORIDE, MAGNESIUM CHLORIDE, SODIUM PHOSPHATE, DIBASIC, AND POTASSIUM PHOSPHATE 75 ML/HR: .53; .5; .37; .037; .03; .012; .00082 INJECTION, SOLUTION INTRAVENOUS at 10:12

## 2025-03-20 RX ADMIN — TIMOLOL MALEATE 1 DROP: 5 SOLUTION OPHTHALMIC at 17:08

## 2025-03-20 RX ADMIN — BRIMONIDINE TARTRATE 1 DROP: 2 SOLUTION/ DROPS OPHTHALMIC at 20:56

## 2025-03-20 RX ADMIN — TIMOLOL MALEATE 1 DROP: 5 SOLUTION OPHTHALMIC at 08:17

## 2025-03-20 RX ADMIN — CARVEDILOL 6.25 MG: 6.25 TABLET, FILM COATED ORAL at 08:03

## 2025-03-20 RX ADMIN — Medication 1 TABLET: at 08:06

## 2025-03-20 RX ADMIN — CARVEDILOL 6.25 MG: 6.25 TABLET, FILM COATED ORAL at 15:34

## 2025-03-20 RX ADMIN — SODIUM CHLORIDE, SODIUM LACTATE, POTASSIUM CHLORIDE, AND CALCIUM CHLORIDE 100 ML/HR: .6; .31; .03; .02 INJECTION, SOLUTION INTRAVENOUS at 05:46

## 2025-03-20 NOTE — PLAN OF CARE
Problem: PAIN - ADULT  Goal: Verbalizes/displays adequate comfort level or baseline comfort level  Description: Interventions:  - Encourage patient to monitor pain and request assistance  - Assess pain using appropriate pain scale  - Administer analgesics based on type and severity of pain and evaluate response  - Implement non-pharmacological measures as appropriate and evaluate response  - Consider cultural and social influences on pain and pain management  - Notify physician/advanced practitioner if interventions unsuccessful or patient reports new pain  Outcome: Progressing     Problem: PAIN - ADULT  Goal: Verbalizes/displays adequate comfort level or baseline comfort level  Description: Interventions:  - Encourage patient to monitor pain and request assistance  - Assess pain using appropriate pain scale  - Administer analgesics based on type and severity of pain and evaluate response  - Implement non-pharmacological measures as appropriate and evaluate response  - Consider cultural and social influences on pain and pain management  - Notify physician/advanced practitioner if interventions unsuccessful or patient reports new pain  Outcome: Progressing     Problem: SAFETY ADULT  Goal: Patient will remain free of falls  Description: INTERVENTIONS:  - Educate patient/family on patient safety including physical limitations  - Instruct patient to call for assistance with activity   - Consult OT/PT to assist with strengthening/mobility   - Keep Call bell within reach  - Keep bed low and locked with side rails adjusted as appropriate  - Keep care items and personal belongings within reach  - Initiate and maintain comfort rounds  - Make Fall Risk Sign visible to staff  - Offer Toileting every 2 Hours, in advance of need  - Initiate/Maintain bed alarm  - Obtain necessary fall risk management equipment:   - Apply yellow socks and bracelet for high fall risk patients  - Consider moving patient to room near nurses  station  Outcome: Progressing  Goal: Maintain or return to baseline ADL function  Description: INTERVENTIONS:  -  Assess patient's ability to carry out ADLs; assess patient's baseline for ADL function and identify physical deficits which impact ability to perform ADLs (bathing, care of mouth/teeth, toileting, grooming, dressing, etc.)  - Assess/evaluate cause of self-care deficits   - Assess range of motion  - Assess patient's mobility; develop plan if impaired  - Assess patient's need for assistive devices and provide as appropriate  - Encourage maximum independence but intervene and supervise when necessary  - Involve family in performance of ADLs  - Assess for home care needs following discharge   - Consider OT consult to assist with ADL evaluation and planning for discharge  - Provide patient education as appropriate  Outcome: Progressing  Goal: Maintains/Returns to pre admission functional level  Description: INTERVENTIONS:  - Perform AM-PAC 6 Click Basic Mobility/ Daily Activity assessment daily.  - Set and communicate daily mobility goal to care team and patient/family/caregiver.   - Collaborate with rehabilitation services on mobility goals if consulted  - Perform Range of Motion 1 times a day.  - Reposition patient every 2 hours.  - Dangle patient 1 times a day  - Stand patient 1 times a day  - Ambulate patient 1 times a day  - Out of bed to chair 1 times a day   - Out of bed for meals 1 times a day  - Out of bed for toileting  - Record patient progress and toleration of activity level   Outcome: Progressing     Problem: SAFETY ADULT  Goal: Patient will remain free of falls  Description: INTERVENTIONS:  - Educate patient/family on patient safety including physical limitations  - Instruct patient to call for assistance with activity   - Consult OT/PT to assist with strengthening/mobility   - Keep Call bell within reach  - Keep bed low and locked with side rails adjusted as appropriate  - Keep care items and  personal belongings within reach  - Initiate and maintain comfort rounds  - Make Fall Risk Sign visible to staff  - Offer Toileting every 2 Hours, in advance of need  - Initiate/Maintain bed alarm  - Obtain necessary fall risk management equipment:   - Apply yellow socks and bracelet for high fall risk patients  - Consider moving patient to room near nurses station  Outcome: Progressing     Problem: SAFETY ADULT  Goal: Maintain or return to baseline ADL function  Description: INTERVENTIONS:  -  Assess patient's ability to carry out ADLs; assess patient's baseline for ADL function and identify physical deficits which impact ability to perform ADLs (bathing, care of mouth/teeth, toileting, grooming, dressing, etc.)  - Assess/evaluate cause of self-care deficits   - Assess range of motion  - Assess patient's mobility; develop plan if impaired  - Assess patient's need for assistive devices and provide as appropriate  - Encourage maximum independence but intervene and supervise when necessary  - Involve family in performance of ADLs  - Assess for home care needs following discharge   - Consider OT consult to assist with ADL evaluation and planning for discharge  - Provide patient education as appropriate  Outcome: Progressing     Problem: SAFETY ADULT  Goal: Maintains/Returns to pre admission functional level  Description: INTERVENTIONS:  - Perform AM-PAC 6 Click Basic Mobility/ Daily Activity assessment daily.  - Set and communicate daily mobility goal to care team and patient/family/caregiver.   - Collaborate with rehabilitation services on mobility goals if consulted  - Perform Range of Motion 1 times a day.  - Reposition patient every 2 hours.  - Dangle patient 1 times a day  - Stand patient 1 times a day  - Ambulate patient 1 times a day  - Out of bed to chair 1 times a day   - Out of bed for meals 1 times a day  - Out of bed for toileting  - Record patient progress and toleration of activity level   Outcome:  Progressing     Problem: DISCHARGE PLANNING  Goal: Discharge to home or other facility with appropriate resources  Description: INTERVENTIONS:  - Identify barriers to discharge w/patient and caregiver  - Arrange for needed discharge resources and transportation as appropriate  - Identify discharge learning needs (meds, wound care, etc.)  - Arrange for interpretive services to assist at discharge as needed  - Refer to Case Management Department for coordinating discharge planning if the patient needs post-hospital services based on physician/advanced practitioner order or complex needs related to functional status, cognitive ability, or social support system  Outcome: Progressing     Problem: DISCHARGE PLANNING  Goal: Discharge to home or other facility with appropriate resources  Description: INTERVENTIONS:  - Identify barriers to discharge w/patient and caregiver  - Arrange for needed discharge resources and transportation as appropriate  - Identify discharge learning needs (meds, wound care, etc.)  - Arrange for interpretive services to assist at discharge as needed  - Refer to Case Management Department for coordinating discharge planning if the patient needs post-hospital services based on physician/advanced practitioner order or complex needs related to functional status, cognitive ability, or social support system  Outcome: Progressing     Problem: Knowledge Deficit  Goal: Patient/family/caregiver demonstrates understanding of disease process, treatment plan, medications, and discharge instructions  Description: Complete learning assessment and assess knowledge base.  Interventions:  - Provide teaching at level of understanding  - Provide teaching via preferred learning methods  Outcome: Progressing     Problem: Knowledge Deficit  Goal: Patient/family/caregiver demonstrates understanding of disease process, treatment plan, medications, and discharge instructions  Description: Complete learning assessment and  assess knowledge base.  Interventions:  - Provide teaching at level of understanding  - Provide teaching via preferred learning methods  Outcome: Progressing     Problem: Knowledge Deficit  Goal: Patient/family/caregiver demonstrates understanding of disease process, treatment plan, medications, and discharge instructions  Description: Complete learning assessment and assess knowledge base.  Interventions:  - Provide teaching at level of understanding  - Provide teaching via preferred learning methods  Outcome: Progressing     Problem: Knowledge Deficit  Goal: Patient/family/caregiver demonstrates understanding of disease process, treatment plan, medications, and discharge instructions  Description: Complete learning assessment and assess knowledge base.  Interventions:  - Provide teaching at level of understanding  - Provide teaching via preferred learning methods  Outcome: Progressing     Problem: MUSCULOSKELETAL - ADULT  Goal: Maintain or return mobility to safest level of function  Description: INTERVENTIONS:  - Assess patient's ability to carry out ADLs; assess patient's baseline for ADL function and identify physical deficits which impact ability to perform ADLs (bathing, care of mouth/teeth, toileting, grooming, dressing, etc.)  - Assess/evaluate cause of self-care deficits   - Assess range of motion  - Assess patient's mobility  - Assess patient's need for assistive devices and provide as appropriate  - Encourage maximum independence but intervene and supervise when necessary  - Involve family in performance of ADLs  - Assess for home care needs following discharge   - Consider OT consult to assist with ADL evaluation and planning for discharge  - Provide patient education as appropriate  Outcome: Progressing  Goal: Maintain proper alignment of affected body part  Description: INTERVENTIONS:  - Support, maintain and protect limb and body alignment  - Provide patient/ family with appropriate  education  Outcome: Progressing     Problem: MUSCULOSKELETAL - ADULT  Goal: Maintain or return mobility to safest level of function  Description: INTERVENTIONS:  - Assess patient's ability to carry out ADLs; assess patient's baseline for ADL function and identify physical deficits which impact ability to perform ADLs (bathing, care of mouth/teeth, toileting, grooming, dressing, etc.)  - Assess/evaluate cause of self-care deficits   - Assess range of motion  - Assess patient's mobility  - Assess patient's need for assistive devices and provide as appropriate  - Encourage maximum independence but intervene and supervise when necessary  - Involve family in performance of ADLs  - Assess for home care needs following discharge   - Consider OT consult to assist with ADL evaluation and planning for discharge  - Provide patient education as appropriate  Outcome: Progressing     Problem: MUSCULOSKELETAL - ADULT  Goal: Maintain proper alignment of affected body part  Description: INTERVENTIONS:  - Support, maintain and protect limb and body alignment  - Provide patient/ family with appropriate education  Outcome: Progressing     Problem: Prexisting or High Potential for Compromised Skin Integrity  Goal: Skin integrity is maintained or improved  Description: INTERVENTIONS:  - Identify patients at risk for skin breakdown  - Assess and monitor skin integrity  - Assess and monitor nutrition and hydration status  - Monitor labs   - Assess for incontinence   - Turn and reposition patient  - Assist with mobility/ambulation  - Relieve pressure over bony prominences  - Avoid friction and shearing  - Provide appropriate hygiene as needed including keeping skin clean and dry  - Evaluate need for skin moisturizer/barrier cream  - Collaborate with interdisciplinary team   - Patient/family teaching  - Consider wound care consult   Outcome: Progressing     Problem: Prexisting or High Potential for Compromised Skin Integrity  Goal: Skin  integrity is maintained or improved  Description: INTERVENTIONS:  - Identify patients at risk for skin breakdown  - Assess and monitor skin integrity  - Assess and monitor nutrition and hydration status  - Monitor labs   - Assess for incontinence   - Turn and reposition patient  - Assist with mobility/ambulation  - Relieve pressure over bony prominences  - Avoid friction and shearing  - Provide appropriate hygiene as needed including keeping skin clean and dry  - Evaluate need for skin moisturizer/barrier cream  - Collaborate with interdisciplinary team   - Patient/family teaching  - Consider wound care consult   Outcome: Progressing

## 2025-03-20 NOTE — PLAN OF CARE
Problem: DISCHARGE PLANNING  Goal: Discharge to home or other facility with appropriate resources  Description: INTERVENTIONS:  - Identify barriers to discharge w/patient and caregiver  - Arrange for needed discharge resources and transportation as appropriate  - Identify discharge learning needs (meds, wound care, etc.)  - Arrange for interpretive services to assist at discharge as needed  - Refer to Case Management Department for coordinating discharge planning if the patient needs post-hospital services based on physician/advanced practitioner order or complex needs related to functional status, cognitive ability, or social support system  3/20/2025 1744 by Elizabeth Bajwa RN  Outcome: Progressing  3/20/2025 1742 by Elizabeth Bajwa RN  Outcome: Progressing     Problem: Knowledge Deficit  Goal: Patient/family/caregiver demonstrates understanding of disease process, treatment plan, medications, and discharge instructions  Description: Complete learning assessment and assess knowledge base.  Interventions:  - Provide teaching at level of understanding  - Provide teaching via preferred learning methods  3/20/2025 1744 by Elizabeth Bajwa RN  Outcome: Progressing  3/20/2025 1742 by Elizabeth Bajwa RN  Outcome: Progressing     Problem: Knowledge Deficit  Goal: Patient/family/caregiver demonstrates understanding of disease process, treatment plan, medications, and discharge instructions  Description: Complete learning assessment and assess knowledge base.  Interventions:  - Provide teaching at level of understanding  - Provide teaching via preferred learning methods  3/20/2025 1744 by Elizabeth Bajwa RN  Outcome: Progressing  3/20/2025 1742 by Elizabeth Bajwa RN  Outcome: Progressing     Problem: MUSCULOSKELETAL - ADULT  Goal: Maintain or return mobility to safest level of function  Description: INTERVENTIONS:  - Assess patient's ability to carry out ADLs; assess patient's baseline for ADL function and  identify physical deficits which impact ability to perform ADLs (bathing, care of mouth/teeth, toileting, grooming, dressing, etc.)  - Assess/evaluate cause of self-care deficits   - Assess range of motion  - Assess patient's mobility  - Assess patient's need for assistive devices and provide as appropriate  - Encourage maximum independence but intervene and supervise when necessary  - Involve family in performance of ADLs  - Assess for home care needs following discharge   - Consider OT consult to assist with ADL evaluation and planning for discharge  - Provide patient education as appropriate  3/20/2025 1744 by Elizabeth Bajwa RN  Outcome: Progressing  3/20/2025 1742 by Elizabeth Bajwa RN  Outcome: Progressing  Goal: Maintain proper alignment of affected body part  Description: INTERVENTIONS:  - Support, maintain and protect limb and body alignment  - Provide patient/ family with appropriate education  3/20/2025 1744 by Elizabeth Bajwa RN  Outcome: Progressing  3/20/2025 1742 by Elizabeth Bajwa RN  Outcome: Progressing     Problem: MUSCULOSKELETAL - ADULT  Goal: Maintain or return mobility to safest level of function  Description: INTERVENTIONS:  - Assess patient's ability to carry out ADLs; assess patient's baseline for ADL function and identify physical deficits which impact ability to perform ADLs (bathing, care of mouth/teeth, toileting, grooming, dressing, etc.)  - Assess/evaluate cause of self-care deficits   - Assess range of motion  - Assess patient's mobility  - Assess patient's need for assistive devices and provide as appropriate  - Encourage maximum independence but intervene and supervise when necessary  - Involve family in performance of ADLs  - Assess for home care needs following discharge   - Consider OT consult to assist with ADL evaluation and planning for discharge  - Provide patient education as appropriate  3/20/2025 1744 by Elizabeth Bajwa RN  Outcome: Progressing  3/20/2025 1742  by Elizabeth Bajwa RN  Outcome: Progressing     Problem: MUSCULOSKELETAL - ADULT  Goal: Maintain proper alignment of affected body part  Description: INTERVENTIONS:  - Support, maintain and protect limb and body alignment  - Provide patient/ family with appropriate education  3/20/2025 1744 by Elizabeth Bajwa RN  Outcome: Progressing  3/20/2025 1742 by Elizabeth Bajwa RN  Outcome: Progressing     Problem: Prexisting or High Potential for Compromised Skin Integrity  Goal: Skin integrity is maintained or improved  Description: INTERVENTIONS:  - Identify patients at risk for skin breakdown  - Assess and monitor skin integrity  - Assess and monitor nutrition and hydration status  - Monitor labs   - Assess for incontinence   - Turn and reposition patient  - Assist with mobility/ambulation  - Relieve pressure over bony prominences  - Avoid friction and shearing  - Provide appropriate hygiene as needed including keeping skin clean and dry  - Evaluate need for skin moisturizer/barrier cream  - Collaborate with interdisciplinary team   - Patient/family teaching  - Consider wound care consult   3/20/2025 1744 by Elizabeth Bajwa RN  Outcome: Progressing  3/20/2025 1742 by Elizabeth Bajwa RN  Outcome: Progressing     Problem: Prexisting or High Potential for Compromised Skin Integrity  Goal: Skin integrity is maintained or improved  Description: INTERVENTIONS:  - Identify patients at risk for skin breakdown  - Assess and monitor skin integrity  - Assess and monitor nutrition and hydration status  - Monitor labs   - Assess for incontinence   - Turn and reposition patient  - Assist with mobility/ambulation  - Relieve pressure over bony prominences  - Avoid friction and shearing  - Provide appropriate hygiene as needed including keeping skin clean and dry  - Evaluate need for skin moisturizer/barrier cream  - Collaborate with interdisciplinary team   - Patient/family teaching  - Consider wound care consult   3/20/2025  1744 by Elizabeth Bajwa, RN  Outcome: Progressing  3/20/2025 1742 by Elizabeth Bajwa, RN  Outcome: Progressing

## 2025-03-20 NOTE — ASSESSMENT & PLAN NOTE
-- Chest x-ray from today showed resolution of pulmonary interstitial edema  --No indication for IV fluids  --Anesthesia operative report reviewed.  Hemodynamic fluctuations noted.  --Blood pressure currently stable  --Examines slightly volume overloaded  --Currently receiving colloid expansion with blood  --Saturating well on 2 L nasal cannula

## 2025-03-20 NOTE — ASSESSMENT & PLAN NOTE
-- Occurred during this hospitalization, admission creatinine was 1.5 mg/dL which is his baseline creatinine  --Renal function began to worsen in the past 24 to 48 hours as the creatinine increased to 2.65 mg/dL yesterday and has now worsened to 3.95 mg/dL  --With underlying chronic kidney disease stage IIIb  --Suspect this has progressed to acute tubular necrosis, in the setting of hemodynamic fluctuations intraoperatively, acute on chronic anemia, with contrast exposure on March 17  --No indication for intravenous fluids  --Monitor urine output if it remains poor or any evidence of volume overload recommended dose of IV Lasix 80 mg x 1  --Avoid NSAIDs  --Currently getting colloid expansion with blood transfusion, low hemoglobin will slow renal recovery  --CT scan with contrast prior to [facility] did not show any evidence of hydronephrosis.  --Urinary retention protocol  --If the renal function worsens tomorrow consider a renal ultrasound

## 2025-03-20 NOTE — PHYSICAL THERAPY NOTE
"PHYSICAL THERAPY Treatment  DATE: 03/20/25  TIME: 7185-6471 and 6450-4005    NAME:  Abelino Renee  AGE:   79 y.o.  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    appendectomy-colon resection    Chronic lymphocytic leukemia of B-cell type ([facility])     \"remission 4-5yrs\"-chemo    DDD (degenerative disc disease), cervical     Diabetes mellitus ([facility])     bs checked by pt at home at 6am =92    Diabetic neuropathy ([facility])     Diabetic retinopathy ([facility])     Elevated WBC count     Heart attack ([facility]) 04/2015    \"labeled as that and than tested later after appendix removed and stress test showed negative\"    History of cancer chemotherapy     last treatment approx 5yrs ago    Hypertension     Myocardial infarction ([facility]) 04/2015    Scalp psoriasis     Shingles 2/10-15    Toe injury     left great toe, - 2017-internal braec-to be removed today 3/30/2018    Wears glasses      Past Surgical History:   Procedure Laterality Date    APPENDECTOMY      BONE BIOPSY Left 3/30/2018    Procedure: GREAT TOE BONE BIOPSY;  Surgeon: Gerber Multani DPM;  Location: [facility];  Service: Podiatry    BOWEL RESECTION      CATARACT EXTRACTION Bilateral     CHOLECYSTECTOMY      COLECTOMY  06/2015    EYE SURGERY Right     \"retinal peel\"    FOOT FUSION Left 12/6/2017    Procedure: HALLUX INTERPHALANGEAL JOINT FUSION;  Surgeon: Gerber Multani DPM;  [facility];  Service: Podiatry    HARDWARE REMOVAL Left 4/18/2018    Procedure: REMOVAL STAPLES LEFT TOE;  Surgeon: Gerber Multani DPM;  [facility];  Service: Podiatry    ORIF FOOT FRACTURE Left 12/6/2017    Procedure: HALLUX " INTERPHALANGEAL JOINT INTERNAL FIXATION; REPAIR OF ULCERATION WITH EXCISIONS AND REVISION OF SKIN HALLUX WITH USE OF ALLOGRAFT;  Surgeon: Gerber Multani DPM;  [facility];  Service: Podiatry    IA COLONOSCOPY FLX DX W/COLLJ SPEC WHEN PFRMD N/A 4/21/2016    Procedure: COLONOSCOPY;  Surgeon: Minal Ruelas DO;  Location: [facility];  Service: Gastroenterology    IA OPTX FEM SHFT FX W/INSJ IMED IMPLT W/WO SCREW Left 3/18/2025    Procedure: INSERTION NAIL IM FEMUR ANTEGRADE (TROCHANTERIC);  Surgeon: Jacqui Nevarez DO;  Location: [facility];  Service: Orthopedics    IA REMOVAL IMPLANT DEEP Left 3/30/2018    Procedure: REMOVAL HARDWARE FOOT;  Surgeon: Gerber Multani DPM;  [facility];  Service: Podiatry    RETINAL DETACHMENT SURGERY      TONSILLECTOMY          03/20/25 1344   PT Last Visit   PT Visit Date 03/20/25   Note Type   Note Type Treatment   Pain Assessment   Pain Assessment Tool 0-10   Pain Score 8   Pain Location/Orientation Orientation: Left;Location: Hip   Hospital Pain Intervention(s) Repositioned;Ambulation/increased activity;Rest   Restrictions/Precautions   Weight Bearing Precautions Per Order Yes   LLE Weight Bearing Per Order WBAT   Other Precautions Bed Alarm;WBS;THR;O2;Fall Risk;Pain  (2L)   General   Chart Reviewed Yes   Additional Pertinent History vital measures: supine - 133/65. sitting - 120/64. standing - 136/75.   Family/Caregiver Present No   Cognition   Overall Cognitive Status WFL   Arousal/Participation Lethargic   Orientation Level Oriented X4   Subjective   Subjective Pt reporting increased pain throughout today's session.  Additionally, pt reporting having increased difficulty moving LLE or mobilizing in bed.   Bed Mobility   Rolling R 3  Moderate assistance   Additional items Assist x 1;Bedrails;Increased time required;Impulsive;Verbal cues;LE management  (3x with rails on flat bed, 1x on flat bed without rails.  increased assist needed when rail eliminated.)    Rolling L 3  Moderate assistance   Additional items Assist x 1;Bedrails;Increased time required;Impulsive;Verbal cues;LE management  (3x with rails on flat bed, 1x on flat bed without rails. increased assist needed when rail eliminated.)   Supine to Sit 2  Maximal assistance   Additional items Assist x 1;Increased time required;Verbal cues;Impulsive;LE management  (flat bed with no rail)   Sit to Supine 1  Dependent   Additional items Assist x 2;Increased time required;Verbal cues;LE management   Transfers   Sit to Stand 2  Maximal assistance   Additional items Assist x 1;Bedrails;Armrests;Increased time required;Impulsive;Verbal cues   Stand to Sit 2  Maximal assistance   Additional items Assist x 1;Bedrails;Armrests;Increased time required;Verbal cues;Impulsive   Stand pivot 2  Maximal assistance   Additional items Assist x 1;Bedrails;Armrests;Increased time required;Impulsive;Verbal cues  (performed 2x)   Ambulation/Elevation   Ambulation/Elevation Additional Comments Unable to tolerate today   Balance   Static Sitting Fair -   Dynamic Sitting Fair -   Static Standing Poor -   Endurance Deficit   Endurance Deficit Yes   Activity Tolerance   Activity Tolerance Patient limited by fatigue;Patient limited by pain   Exercises   Heelslides Sitting  (AAROM 10x3)   Hip Abduction Supine;15 reps;AAROM;Left   Hip Adduction Supine;15 reps;AAROM;Left   Knee AROM Long Arc Quad Sitting;5 reps;AROM;Left   Trunk ROM Hook lying Lower quarter twist - 10x  Sitting flexion/extension - 10x  Sitting lateral side bending left/right - 10x   Assessment   Prognosis Fair   Problem List Decreased range of motion;Decreased strength;Decreased endurance;Impaired balance;Decreased mobility;Decreased safety awareness;Pain;Orthopedic restrictions   Assessment Pt exhibits physical deficits noted in problem list above.  Deficits listed contribute to functional limitations that are significant from the patient PLOF and include: difficulty with bed  mobility, impaired sitting balance, impaired standing balance, inability to perform safe transfers, tolerance for OOB functional activities, unsafe/inefficient ambulation, fall risk, and unable to safely manage stairs/steps/ramp    Additionally, patient is limited by restrictions/precautions/DME: LLE WBAT.    Additional considerations affecting pt's discharge planning: Insufficient or unavailable assistance at home, Significant amount of stairs to manage in order to access living quarters, Inability to manage basic self care ADLs with the assistance that is currently available, and Inability to perform necessary transfers and/or mobility out of bed, with the assistance that is currently available    During today's session, pt displaying increased pain, increased LLE stiffness and weakness, decreased hip/knee active/active-assisted ROM, decreased tolerance for transfers and mobility.  Pt required extra time and assistance this session to perform HEP, bed mobility, and transfers.  Pt continues to be appropriate for SNF placement at time of discharge.     The -PAC Mobility Score was used to assist in determining pt safety w/ mobility/self care & appropriate d/c recommendations, see above for scores. Patient's clinical presentation is unstable/unpredictable due to significant acute change in functional independence, uncontrolled pain, increased supplemental O2 demands compared to baseline, abnormal lab values, significant need for care assistance compared to baseline, recent surgery/procedure, ongoing medical management needs, and complicated social/support system.     From a PT/mobility standpoint given the above findings, DC recommendation is level: II (Moderate Rehab Resource Intensity)    Considering the patient's PLOF, co-morbidities, acute functional limitations, functional outcome measures, and/or goal to progress functional independence; this patient would continue to benefit from skilled Physical Therapy  intervention in the acute care setting.   Barriers to Discharge Decreased caregiver support;Inaccessible home environment   Goals   Patient Goals return to PLOF   STG Expiration Date 03/29/25   Short Term Goal #1 1: Pt will perform supine<>sit transfer on flat bed, mod I. 2: Pt will perform stand pivot transfer with RW, mod I. 3: Pt will ambulate 150' with reciprocal gait and appropriate pace using RW, mod I. 4: Pt will perform written HEP, mod I. 5: Pt will ascend/descend a curb step and/or stairs require to mobilize around the the patient's home with RW/rails, Sup A.   Plan   Treatment/Interventions ADL retraining;LE strengthening/ROM;Functional transfer training;Elevations;Therapeutic exercise;Endurance training;Patient/family training;Equipment eval/education;Bed mobility;Gait training   Progress Slow progress, medical status limitations   PT Frequency 4-6x/wk   Discharge Recommendation   Rehab Resource Intensity Level, PT II (Moderate Resource Intensity)   AM-PAC Basic Mobility Inpatient   Turning in Flat Bed Without Bedrails 2   Lying on Back to Sitting on Edge of Flat Bed Without Bedrails 2   Moving Bed to Chair 2   Standing Up From Chair Using Arms 2   Walk in Room 1   Climb 3-5 Stairs With Railing 1   Basic Mobility Inpatient Raw Score 10   Turning Head Towards Sound 4   Follow Simple Instructions 3   Low Function Basic Mobility Raw Score  17   Low Function Basic Mobility Standardized Score  27.46   [facility] Highest Level Of Mobility   -HL Goal 4: Move to chair/commode   -HL Achieved 4: Move to chair/commode   Education   Education Provided Mobility training;Assistive device;Home exercise program   Patient Reinforcement needed;Demonstrates verbal understanding   End of Consult   Patient Position at End of Consult Supine;All needs within reach;Bed/Chair alarm activated   The patient's AM-PAC Basic Mobility Inpatient Short Form Raw Score is 10. A Raw score of less than 16 suggests the patient may  benefit from discharge to post-acute rehabilitation services. Please also refer to the recommendation of the Physical Therapist for safe discharge planning.    Judson Owen PT, DPT  [place] Licensure #[id number]

## 2025-03-20 NOTE — CASE MANAGEMENT
Case Management Progress Note    Patient name Abelino Renee  Location /-01 MRN [id number]  : 1945 Date 3/20/2025       LOS (days): 2  Geometric Mean LOS (GMLOS) (days): 4.4  Days to GMLOS:2.2        OBJECTIVE:    Current admission status: Inpatient  Preferred Pharmacy:   [facility] [address] [place] [id number]  Phone: [phone] Fax: [phone]    [facility] - [facility] [address]  [address] [address]  Phone: [phone] Fax: [phone]    Primary Care Provider: Edel Wright MD    Primary Insurance: MEDICARE  Secondary Insurance: COMMERCIAL MISCELLANEOUS    PROGRESS NOTE:  CM sent out blanket referrals to [facility] facilities in the [facility] per the patient/patient's wife request. CM will continue to follow the patient through discharge.

## 2025-03-20 NOTE — ASSESSMENT & PLAN NOTE
-- Status post left hip CMN for IT fracture on March 18  --Acute blood loss  --Currently receiving a blood transfusion  --Management as per orthopedic surgery  Currently undergoing PT/OT--

## 2025-03-20 NOTE — PLAN OF CARE
Problem: OCCUPATIONAL THERAPY ADULT  Goal: Performs self-care activities at highest level of function for planned discharge setting.  See evaluation for individualized goals.  Description: Treatment Interventions: ADL retraining, Functional transfer training, Endurance training, Patient/family training, Compensatory technique education, Equipment evaluation/education          See flowsheet documentation for full assessment, interventions and recommendations.   Note: Limitation: Decreased ADL status, Decreased self-care trans, Decreased high-level ADLs, Decreased endurance  Prognosis: Fair  Assessment: Pt is a 79 y.o. male seen for OT evaluation at [facility], admitted 3/17/2025 w/ Closed fracture of neck of left femur (HCC). Pt now s/p L hip CMN. Comorbidities affecting pt's functional performance at time of assessment include: CLL, A-fib on Eliquis, HTN, hypothyroid, NIDDM type 2, and CKD.  Prior to admission, pt was living with wife in house with steps to manage.  Pt was I w/  ADLS and IADLS, (+) drove, & required no DME/AD PTA. Upon evaluation, pt appears to be performing below baseline functional status. Pt currently requires total Ax 2 for bed mobility, max-total A x 1 for functional mobility/transfers, sup for UB ADLs and max A for LB ADLS 2* the following deficits impacting occupational performance: weakness, decreased ROM, decreased strength, decreased balance, decreased tolerance, and increased pain. Full objective findings from OT assessment regarding body systems outlined above. Personal factors affecting pt at time of IE include:steps to enter environment, difficulty performing ADLS, difficulty performing IADLS , and decreased functional mobility . These impairments, as well as risk for falls  limit pt's ability to safely engage in all baseline areas of occupation and mobility. Pt to benefit from continued skilled OT tx while in the hospital to address deficits as defined above and  maximize level of functional independence w ADL's and functional mobility. Occupational Performance areas to address include: bathing/shower, toilet hygiene, dressing, and functional mobility.  This evaluation required an extensive review of medical and/or therapy records and additional review of physical, cognitive and psychosocial history related to functional performance. Based upon functional performance deficits and assessments, this evaluation has been identified as a high complexity evaluation.  At this time, OT recommendations at time of discharge are level 2 mod intensity resources.     Rehab Resource Intensity Level, OT: II (Moderate Resource Intensity)

## 2025-03-20 NOTE — ASSESSMENT & PLAN NOTE
-- Postoperative blood loss  --Underlying CLL and abnormal SPEP as an outpatient  --Admission hemoglobin 12.2 hemoglobin dropped to as low as 6.8 most recent hemoglobin 7.6  --Hematology on board currently receiving a blood transfusion

## 2025-03-20 NOTE — ASSESSMENT & PLAN NOTE
Lab Results   Component Value Date    HGBA1C 7.9 (H) 03/18/2025     Recent Labs     03/19/25  1514 03/19/25  2055 03/20/25  0712 03/20/25  1121   POCGLU 272* 280* 98 106     Blood Sugar Average: Last 72 hrs:  (P) [id number]  Home regimen: Glipizide 10 mg twice daily, and metformin 500 mg daily-hold while inpatient.  Start insulin sliding scale with fingersticks every 6 hours while NPO  Hypoglycemia protocol

## 2025-03-20 NOTE — PROGRESS NOTES
Progress Note - Hospitalist   Name: Abelino Renee 79 y.o. male I MRN: [id number]  Unit/Bed#: -01 I Date of Admission: 3/17/2025   Date of Service: 3/20/2025 I Hospital Day: 2    Assessment & Plan  Closed fracture of neck of left femur (HCC)  Patient with PMHx CLL, A-fib on Eliquis, HTN, hypothyroid, NIDDM type 2, and CKD who presents after a fall after tripping over floorboards in [facility].  Patient fell on his left side and states he was lying on the ground for about 3 hours unable to weight-bear.  .  CT chest abdomen pelvis showing acute intertrochanteric fracture of left femoral neck.  Status post operative intervention, had some significant blood loss, will transfuse 2 units of packed red blood cells  Atrial fibrillation (HCC)  Hx PAF, HR stable, controlled A-fib  ECG on admission with atrial fibrillation HR 99, QTc 410, no ST-T changes or ischemia.  Continue PTA carvedilol 6.25 mg twice daily  Continue to hold Eliquis today  CHF (congestive heart failure) (HCC)  Patient appearing euvolemic on exam, benign lung exam, no peripheral edema patient denies any CP or SOB.  Does not appear to have acute exacerbation.  .  CXR showing prominent interstitial markings bilaterally suspicious for mild edema.  CT chest abdomen pelvis showing probable trace bilateral pleural effusions.  And mild patchy groundglass opacities and interlobular septal thickening in the lungs consistent with interstitial edema.  Patient states he recently recovered from pneumonia a few months ago.  Hold Lasix for today  Benign essential HTN  BP stable, hemodynamically stable  Monitor with routine vital signs  Setting of worsening anemia we will hold all antihypertensives  Stage 3a chronic kidney disease (HCC)  Lab Results   Component Value Date    EGFR 13 03/20/2025    EGFR 22 03/19/2025    EGFR 40 03/18/2025    CREATININE 3.95 (H) 03/20/2025    CREATININE 2.65 (H) 03/19/2025    CREATININE 1.59 (H) 03/18/2025   Creatinine 1.52 on  admission  Patient with worsening kidney injury with creatinine 3.95 suspect this is from prior insult secondary to hypotension would expect this to improve over next 24 to 48 hours, will consult nephrology for further management  Patient also did receive IV contrast on 3/17  Diabetic nephropathy associated with type 2 diabetes mellitus (HCC)  Lab Results   Component Value Date    HGBA1C 7.9 (H) 03/18/2025     Recent Labs     03/19/25  1514 03/19/25  2055 03/20/25  0712 03/20/25  1121   POCGLU 272* 280* 98 106     Blood Sugar Average: Last 72 hrs:  (P) 247.1079629002386736  Home regimen: Glipizide 10 mg twice daily, and metformin 500 mg daily-hold while inpatient.  Start insulin sliding scale with fingersticks every 6 hours while NPO  Hypoglycemia protocol  Chronic lymphocytic leukemia (HCC)  History of CLL/SLL previously treated with 4 cycles of BR (June 2012).  Outpatient plan is surveillance.  WBC elevated in setting of CLL.  Patient with anemia today secondary to blood loss from procedure, will transfuse 2 units of leukoreduced and irradiated packed red blood cells  Hypothyroidism  Continue PTA levothyroxine  ABLA (acute blood loss anemia)  Transfuse 2 units packed red blood cells leukoreduced and irradiated in the setting of CLL  RAINE (acute kidney injury) (HCC)  Secondary to hypotension from anemia, will continue on fluids, also received IV contrast    VTE Pharmacologic Prophylaxis: VTE Score: 5 Low Risk (Score 0-2) - Encourage Ambulation.    Mobility:   Basic Mobility Inpatient Raw Score: 12  JH-HLM Goal: 4: Move to chair/commode  JH-HLM Achieved: 1: Laying in bed  JH-HLM Goal achieved. Continue to encourage appropriate mobility.    Patient Centered Rounds: I performed bedside rounds with nursing staff today.   Discussions with Specialists or Other Care Team Provider:     Education and Discussions with Family / Patient: Patient declined call to .     Current Length of Stay: 2 day(s)  Current  Patient Status: Inpatient   Certification Statement: The patient will continue to require additional inpatient hospital stay due to kathi  Discharge Plan: Anticipate discharge in 24-48 hrs to discharge location to be determined pending rehab evaluations.    Code Status: Level 1 - Full Code    Subjective   Patient denies any new acute complaints today, still feels fatigued but improved from yesterday    Objective :  Temp:  [97.3 °F (36.3 °C)-99.7 °F (37.6 °C)] 99.7 °F (37.6 °C)  HR:  [74-85] 74  BP: (106-145)/(56-74) 145/69  Resp:  [15-19] 15  SpO2:  [94 %-99 %] 94 %  O2 Device: None (Room air)  Nasal Cannula O2 Flow Rate (L/min):  [2 L/min-3 L/min] 3 L/min    Body mass index is 28.61 kg/m².     Input and Output Summary (last 24 hours):     Intake/Output Summary (Last 24 hours) at 3/20/2025 1222  Last data filed at 3/20/2025 0546  Gross per 24 hour   Intake 2861.67 ml   Output 320 ml   Net 2541.67 ml       Physical Exam  Vitals and nursing note reviewed.   Constitutional:       General: He is not in acute distress.     Appearance: He is well-developed.   HENT:      Head: Normocephalic and atraumatic.   Eyes:      Conjunctiva/sclera: Conjunctivae normal.   Cardiovascular:      Rate and Rhythm: Normal rate and regular rhythm.      Heart sounds: No murmur heard.     No friction rub. No gallop.   Pulmonary:      Effort: Pulmonary effort is normal. No respiratory distress.      Breath sounds: Normal breath sounds. No stridor. No wheezing, rhonchi or rales.   Chest:      Chest wall: No tenderness.   Abdominal:      General: There is no distension.      Palpations: Abdomen is soft. There is no mass.      Tenderness: There is no abdominal tenderness. There is no guarding or rebound.      Hernia: No hernia is present.   Musculoskeletal:         General: No swelling or tenderness.      Cervical back: Neck supple.   Skin:     General: Skin is warm and dry.      Capillary Refill: Capillary refill takes less than 2 seconds.    Neurological:      Mental Status: He is alert.   Psychiatric:         Mood and Affect: Mood normal.           Lines/Drains:              Lab Results: I have reviewed the following results:   Results from last 7 days   Lab Units 03/20/25  0432 03/19/25  0914 03/19/25  0518   WBC Thousand/uL 29.62*   < > 30.77*   HEMOGLOBIN g/dL 7.6*   < > 6.9*   HEMATOCRIT % 23.6*   < > 22.2*   PLATELETS Thousands/uL 98*   < > 96*   SEGS PCT %  --   --  20*   LYMPHO PCT %  --   --  77*   MONO PCT %  --   --  3*   EOS PCT %  --   --  0    < > = values in this interval not displayed.     Results from last 7 days   Lab Units 03/20/25  0432   SODIUM mmol/L 131*   POTASSIUM mmol/L 4.6   CHLORIDE mmol/L 97   CO2 mmol/L 24   BUN mg/dL 61*   CREATININE mg/dL 3.95*   ANION GAP mmol/L 10   CALCIUM mg/dL 8.0*   ALBUMIN g/dL 3.3*   TOTAL BILIRUBIN mg/dL 0.41   ALK PHOS U/L 51   ALT U/L 9   AST U/L 18   GLUCOSE RANDOM mg/dL 117     Results from last 7 days   Lab Units 03/17/25  2107   INR  1.14     Results from last 7 days   Lab Units 03/20/25  1121 03/20/25  0712 03/19/25  2055 03/19/25  1514 03/19/25  1112 03/19/25  0714 03/19/25  0017 03/18/25  2111 03/18/25  1938 03/18/25  1805 03/18/25  1532 03/18/25  1117   POC GLUCOSE mg/dl 106 98 280* 272* 318* 276* 327* 267* 244* 260* 300* 212*     Results from last 7 days   Lab Units 03/18/25  0443   HEMOGLOBIN A1C % 7.9*           Recent Cultures (last 7 days):         Imaging Results Review: No pertinent imaging studies reviewed.  Other Study Results Review: No additional pertinent studies reviewed.    Last 24 Hours Medication List:     Current Facility-Administered Medications:     acetaminophen (TYLENOL) tablet 650 mg, Q6H PRN    [Held by provider] amLODIPine (NORVASC) tablet 10 mg, Daily    [Held by provider] apixaban (ELIQUIS) tablet 5 mg, BID    brimonidine tartrate 0.2 % ophthalmic solution 1 drop, TID    carvedilol (COREG) tablet 6.25 mg, BID With Meals    docusate sodium (COLACE) capsule 100  mg, BID    fish oil capsule 1,000 mg, Daily    HYDROmorphone HCl (DILAUDID) injection 0.2 mg, Q2H PRN    insulin glargine (LANTUS) subcutaneous injection 10 Units 0.1 mL, HS    insulin lispro (HumALOG/ADMELOG) 100 units/mL subcutaneous injection 1-5 Units, TID With Meals **AND** Fingerstick Glucose (POCT), 4x Daily AC and at bedtime    insulin lispro (HumALOG/ADMELOG) 100 units/mL subcutaneous injection 3 Units, TID With Meals    levothyroxine tablet 75 mcg, Early Morning    morphine (MSIR) IR tablet 7.5 mg, Q4H PRN **OR** morphine (MSIR) IR tablet 15 mg, Q4H PRN    multi-electrolyte (Plasmalyte-A/Isolyte-S PH 7.4/Normosol-R) IV solution, Continuous, Last Rate: 75 mL/hr (03/20/25 1012)    multivitamin-minerals (CENTRUM) tablet 1 tablet, Daily    pravastatin (PRAVACHOL) tablet 40 mg, Daily With Dinner    timolol (TIMOPTIC) 0.5 % ophthalmic solution 1 drop, BID    Administrative Statements   Today, Patient Was Seen By: Kyle Dodge DO      **Please Note: This note may have been constructed using a voice recognition system.**

## 2025-03-20 NOTE — ASSESSMENT & PLAN NOTE
S/p left hip CMN for IT fracture on 3/18 with Dr. Nevarez  WBAT LLE  Hgb 7.6 today after 1 unit 03/19- continue to monitor, transfuse per primary team   PT/OT  Medical management per primary team   Pain control per primary team   DVT ppx per primary team: OK to resume Eliquis

## 2025-03-20 NOTE — ASSESSMENT & PLAN NOTE
-- Abnormal SPEP as an outpatient  --Outpatient hematologist Dr. Acosta  -- Currently undergoing surveillance/observation  --Had been treated with 4 cycles of Bendamustine and rituximab in June 2012 with a good response.  Initially was started on maintenance rituximab but developed leukopenia and it was then decided to stop the rituximab.

## 2025-03-20 NOTE — ASSESSMENT & PLAN NOTE
Wt Readings from Last 3 Encounters:   03/17/25 80.4 kg (177 lb 4 oz)   02/20/25 82.3 kg (181 lb 8 oz)   10/28/24 82.1 kg (181 lb)   -- Weight is stable  -- Hold off on IV fluids  -- Chest x-ray shows resolution of interstitial pulmonary edema  -- 2D echocardiogram from June 2024 reviewed.  Preserved ejection fraction of 60% with mild concentric left ventricular hypertrophy.  Systolic function is normal.  Diastolic function is normal.

## 2025-03-20 NOTE — PLAN OF CARE
Problem: Knowledge Deficit  Goal: Patient/family/caregiver demonstrates understanding of disease process, treatment plan, medications, and discharge instructions  Description: Complete learning assessment and assess knowledge base.  Interventions:  - Provide teaching at level of understanding  - Provide teaching via preferred learning methods  3/20/2025 1744 by Elizabeth Bajwa RN  Outcome: Progressing  3/20/2025 1744 by Elizabeth Bajwa RN  Outcome: Progressing  3/20/2025 1742 by Elizabeth Bajwa RN  Outcome: Progressing     Problem: Knowledge Deficit  Goal: Patient/family/caregiver demonstrates understanding of disease process, treatment plan, medications, and discharge instructions  Description: Complete learning assessment and assess knowledge base.  Interventions:  - Provide teaching at level of understanding  - Provide teaching via preferred learning methods  3/20/2025 1744 by Elizabeth Bajwa RN  Outcome: Progressing  3/20/2025 1744 by Elizabeth Bajwa RN  Outcome: Progressing  3/20/2025 1742 by Elizabeth Bajwa RN  Outcome: Progressing     Problem: MUSCULOSKELETAL - ADULT  Goal: Maintain or return mobility to safest level of function  Description: INTERVENTIONS:  - Assess patient's ability to carry out ADLs; assess patient's baseline for ADL function and identify physical deficits which impact ability to perform ADLs (bathing, care of mouth/teeth, toileting, grooming, dressing, etc.)  - Assess/evaluate cause of self-care deficits   - Assess range of motion  - Assess patient's mobility  - Assess patient's need for assistive devices and provide as appropriate  - Encourage maximum independence but intervene and supervise when necessary  - Involve family in performance of ADLs  - Assess for home care needs following discharge   - Consider OT consult to assist with ADL evaluation and planning for discharge  - Provide patient education as appropriate  3/20/2025 1744 by Elizabeth Bajwa RN  Outcome:  Progressing  3/20/2025 1744 by Elizabeth Bajwa RN  Outcome: Progressing  3/20/2025 1742 by Elizabeth Bajwa RN  Outcome: Progressing  Goal: Maintain proper alignment of affected body part  Description: INTERVENTIONS:  - Support, maintain and protect limb and body alignment  - Provide patient/ family with appropriate education  3/20/2025 1744 by Elizabeth Bajwa RN  Outcome: Progressing  3/20/2025 1744 by Elizabeth Bajwa RN  Outcome: Progressing  3/20/2025 1742 by Elizabeth Bajwa RN  Outcome: Progressing     Problem: MUSCULOSKELETAL - ADULT  Goal: Maintain or return mobility to safest level of function  Description: INTERVENTIONS:  - Assess patient's ability to carry out ADLs; assess patient's baseline for ADL function and identify physical deficits which impact ability to perform ADLs (bathing, care of mouth/teeth, toileting, grooming, dressing, etc.)  - Assess/evaluate cause of self-care deficits   - Assess range of motion  - Assess patient's mobility  - Assess patient's need for assistive devices and provide as appropriate  - Encourage maximum independence but intervene and supervise when necessary  - Involve family in performance of ADLs  - Assess for home care needs following discharge   - Consider OT consult to assist with ADL evaluation and planning for discharge  - Provide patient education as appropriate  3/20/2025 1744 by Elizabeth Bajwa RN  Outcome: Progressing  3/20/2025 1744 by Elizabeth Bajwa RN  Outcome: Progressing  3/20/2025 1742 by Elizabeth Bajwa RN  Outcome: Progressing     Problem: MUSCULOSKELETAL - ADULT  Goal: Maintain proper alignment of affected body part  Description: INTERVENTIONS:  - Support, maintain and protect limb and body alignment  - Provide patient/ family with appropriate education  3/20/2025 1744 by Elizabeth Bajwa RN  Outcome: Progressing  3/20/2025 1744 by Elizabeth Bajwa RN  Outcome: Progressing  3/20/2025 1742 by Elizabeth Bajwa RN  Outcome: Progressing      Problem: Prexisting or High Potential for Compromised Skin Integrity  Goal: Skin integrity is maintained or improved  Description: INTERVENTIONS:  - Identify patients at risk for skin breakdown  - Assess and monitor skin integrity  - Assess and monitor nutrition and hydration status  - Monitor labs   - Assess for incontinence   - Turn and reposition patient  - Assist with mobility/ambulation  - Relieve pressure over bony prominences  - Avoid friction and shearing  - Provide appropriate hygiene as needed including keeping skin clean and dry  - Evaluate need for skin moisturizer/barrier cream  - Collaborate with interdisciplinary team   - Patient/family teaching  - Consider wound care consult   3/20/2025 1744 by Elizabeth Bajwa RN  Outcome: Progressing  3/20/2025 1744 by Elizabeth Bajwa RN  Outcome: Progressing  3/20/2025 1742 by Elizabeth Bajwa RN  Outcome: Progressing     Problem: Prexisting or High Potential for Compromised Skin Integrity  Goal: Skin integrity is maintained or improved  Description: INTERVENTIONS:  - Identify patients at risk for skin breakdown  - Assess and monitor skin integrity  - Assess and monitor nutrition and hydration status  - Monitor labs   - Assess for incontinence   - Turn and reposition patient  - Assist with mobility/ambulation  - Relieve pressure over bony prominences  - Avoid friction and shearing  - Provide appropriate hygiene as needed including keeping skin clean and dry  - Evaluate need for skin moisturizer/barrier cream  - Collaborate with interdisciplinary team   - Patient/family teaching  - Consider wound care consult   3/20/2025 1744 by Elizabeth Bajwa RN  Outcome: Progressing  3/20/2025 1744 by Elizabeth Bajwa RN  Outcome: Progressing  3/20/2025 1742 by Elizabeth Bajwa RN  Outcome: Progressing

## 2025-03-20 NOTE — ASSESSMENT & PLAN NOTE
Patient with PMHx CLL, A-fib on Eliquis, HTN, hypothyroid, NIDDM type 2, and CKD who presents after a fall after tripping over floorboards in [facility].  Patient fell on his left side and states he was lying on the ground for about 3 hours unable to weight-bear.  .  CT chest abdomen pelvis showing acute intertrochanteric fracture of left femoral neck.  Status post operative intervention, had some significant blood loss, will transfuse 2 units of packed red blood cells

## 2025-03-20 NOTE — ASSESSMENT & PLAN NOTE
Lab Results   Component Value Date    HGBA1C 7.9 (H) 03/18/2025     Chronic Kidney Disease Stage IIIB  -- outpatient nephrologist: Dr. Rocha ([facility])  -- baseline creatinine: 1.4 to 1.6 mg/dL  -- Etiology: Presumed be secondary to diabetic kidney disease, hypertensive nephrosclerosis    Diabetes mellitus type 2  -hemoglobin A1c: 7.9 (A1C values may be falsely elevated or decreased in those with CKD, assay method should be certified by National glycohemoglobin standardization Program). Target A1C less then 7 (will need to be individualized for each patient), and would utilize A1C  in conjunction with continuous glucose monitoring (CGM).  It should also be noted that the A1c with more advanced kidney disease would be inaccurate due to decreased red blood cell life along with anemia.  -current medications: Insulin lispro and insulin glargine  -proteinuria: Yes as an outpatient subnephrotic range  -please follow with an ophthalmologist and podiatrist every year  -continued self monitoring of blood glucose at home  -Management in CKD Recommendations:   Metformin:  Avoid if creatinine clearance is less than 30 cc/min (concern for lactic acidosis)  Sodium-Glucose Cotransporter-2 (SGLT2) Inhibitors:  May see an acute drop in the eGFR initially when starting the medication but then a stabilization of the renal function, with slower loss of renal function as compared to placebo.  Relative risk of end-stage renal disease, doubling of serum creatinine or death from renal causes were also found to be lower as compared to placebo. ([facility]).  DAPA-CKD study, showed that patients with chronic kidney disease regardless of the presence or absence of diabetes the risk of composite of a sustained decline in the estimated GFR of at least 50%, end-stage renal disease or death from renal or cardiovascular causes was significantly lower with Dapagliflozin than with placebo. EMPEROR-Reduced study also showed beneficial effects.  EMPA-CKD, among wide range of patients with CKD, who were at risk for progression, empagliflozin led a lower risk of kidney disease progression or death from cardiovascular causes than placebo.  If no contraindications exist I would recommend this medication added to the regiment. If eGFR < 60 cc/min (avoid ertugliflozin); avoid or caution with GFR < 20 mL/min, not an absolute contraindication, likely lower benefits   Sulfonylureas:  Preferred short-acting (glipizide, glimepiride, repaglinide), relatively safe in patients with non dialysis CKD  Thiazolidinedones/Alpha-Gluosidase inhibitors/Dipeptidyl peptidase-4 inhibitors:  Generally not considered first-line agents in CKD (limited data in long-term safety and efficacy)  Insulin:  Starting dose may need to be lower than what would ordinarily be used, as there is a decrease metabolism of insulin (no dose adjustment if the GFR > 50 mL/min, dose should be reduced to 75% of baseline if GFR 10-50 mL/min, and 50% baseline if GFR < 10 mL/min)  Finerenone: Patients with CKD with type 2 diabetes, treatment led to lower risks of CKD progression and cardiovascular events than placebo (FIDELIO-DKD). Avoid or caution if serum potassium more then 4.8.    Hypertension  -- Stage II (American College of Cardiology/American Heart Association)  -- with underlying chronic kidney disease   -- Body mass index is 28.61 kg/m².  -- Volume status: Hypervolemic/volume overload  -- Etiology: Primary hypertension and underlying chronic kidney disease  -- Secondary Work-Up: No clinical indication  -- Target Goal: < 130/80 (ACC/AHA, CKD with proteinuria, CKD in diabetics) ; if tolerated can target SBP < 120 mmHg in high cardiovascular risk ( Age > 75, CKD, CVD, No Diabetics SPRINT)  -- Lifestyle Modifications: DASH Diet, Weight Loss for ideal body weight, 45 mins of cardiovascular exercise 3 times a week as tolerated, and if no contraindications exist, smoking cessation, limit alcohol use, avoid  NSAIDS, monitor blood pressure at home  -- Status: Blood pressure blood pressure at target  -- Current antihypertensive regiment: Currently holding amlodipine.  Currently on carvedilol 6.25 mg twice a day  -- Changes: Continue carvedilol at this time.  Avoid RAAS blockade.  No objections to as needed IV diuretics as needed for volume management for low urine output.      Recent Labs     03/19/25  1514 03/19/25  2055 03/20/25  0712 03/20/25  1121   POCGLU 272* 280* 98 106       Blood Sugar Average: Last 72 hrs:  (P) [id number]

## 2025-03-20 NOTE — CONSULTS
NEPHROLOGY HOSPITAL CONSULTATION   Abelino Renee 79 y.o. male MRN: [id number]  Unit/Bed#: -01 Encounter: [id number]    Brief History of Admission -79-year-old male with a history of CLL, chronic kidney disease stage III, hypertension and diabetes who presented for a fall and sustained a closed fracture of the left femur neck.  Nephrology consult for acute kidney injury.    Assessment & Plan  RAINE (acute kidney injury) (HCC)  -- Occurred during this hospitalization, admission creatinine was 1.5 mg/dL which is his baseline creatinine  --Renal function began to worsen in the past 24 to 48 hours as the creatinine increased to 2.65 mg/dL yesterday and has now worsened to 3.95 mg/dL  --With underlying chronic kidney disease stage IIIb  --Suspect this has progressed to acute tubular necrosis, in the setting of hemodynamic fluctuations intraoperatively, acute on chronic anemia, with contrast exposure on March 17  --No indication for intravenous fluids  --Monitor urine output if it remains poor or any evidence of volume overload recommended dose of IV Lasix 80 mg x 1  --Avoid NSAIDs  --Currently getting colloid expansion with blood transfusion, low hemoglobin will slow renal recovery  --CT scan with contrast prior to RAINE did not show any evidence of hydronephrosis.  --Urinary retention protocol  --If the renal function worsens tomorrow consider a renal ultrasound  Closed fracture of neck of left femur (HCC)  -- Status post left hip CMN for IT fracture on March 18  --Acute blood loss  --Currently receiving a blood transfusion  --Management as per orthopedic surgery  Currently undergoing PT/OT--  Benign essential HTN  -- Chest x-ray from today showed resolution of pulmonary interstitial edema  --No indication for IV fluids  --Anesthesia operative report reviewed.  Hemodynamic fluctuations noted.  --Blood pressure currently stable  --Examines slightly volume overloaded  --Currently receiving colloid expansion with  blood  --Saturating well on 2 L nasal cannula  Chronic lymphocytic leukemia (HCC)  -- Abnormal SPEP as an outpatient  --Outpatient hematologist Dr. Acosta  -- Currently undergoing surveillance/observation  --Had been treated with 4 cycles of Bendamustine and rituximab in June 2012 with a good response.  Initially was started on maintenance rituximab but developed leukopenia and it was then decided to stop the rituximab.  Hypothyroidism  -- Continue levothyroxine 75 mcg daily  CHF (congestive heart failure) ([facility])  Wt Readings from Last 3 Encounters:   03/17/25 80.4 kg (177 lb 4 oz)   02/20/25 82.3 kg (181 lb 8 oz)   10/28/24 82.1 kg (181 lb)   -- Weight is stable  -- Hold off on IV fluids  -- Chest x-ray shows resolution of interstitial pulmonary edema  -- 2D echocardiogram from June 2024 reviewed.  Preserved ejection fraction of 60% with mild concentric left ventricular hypertrophy.  Systolic function is normal.  Diastolic function is normal.    ABLA (acute blood loss anemia)  -- Postoperative blood loss  --Underlying CLL and abnormal SPEP as an outpatient  --Admission hemoglobin 12.2 hemoglobin dropped to as low as 6.8 most recent hemoglobin 7.6  --Hematology on board currently receiving a blood transfusion  Type 2 diabetes mellitus with stage 3b chronic kidney disease and hypertension ([facility])  Lab Results   Component Value Date    HGBA1C 7.9 (H) 03/18/2025     Chronic Kidney Disease Stage IIIB  -- outpatient nephrologist: Dr. Rocha ([facility])  -- baseline creatinine: 1.4 to 1.6 mg/dL  -- Etiology: Presumed be secondary to diabetic kidney disease, hypertensive nephrosclerosis    Diabetes mellitus type 2  -hemoglobin A1c: 7.9 (A1C values may be falsely elevated or decreased in those with CKD, assay method should be certified by National glycohemoglobin standardization Program). Target A1C less then 7 (will need to be individualized for each patient), and would utilize A1C  in conjunction with continuous  glucose monitoring (CGM).  It should also be noted that the A1c with more advanced kidney disease would be inaccurate due to decreased red blood cell life along with anemia.  -current medications: Insulin lispro and insulin glargine  -proteinuria: Yes as an outpatient subnephrotic range  -please follow with an ophthalmologist and podiatrist every year  -continued self monitoring of blood glucose at home  -Management in CKD Recommendations:   Metformin:  Avoid if creatinine clearance is less than 30 cc/min (concern for lactic acidosis)  Sodium-Glucose Cotransporter-2 (SGLT2) Inhibitors:  May see an acute drop in the eGFR initially when starting the medication but then a stabilization of the renal function, with slower loss of renal function as compared to placebo.  Relative risk of end-stage renal disease, doubling of serum creatinine or death from renal causes were also found to be lower as compared to placebo. ([facility]).  DAPA-CKD study, showed that patients with chronic kidney disease regardless of the presence or absence of diabetes the risk of composite of a sustained decline in the estimated GFR of at least 50%, end-stage renal disease or death from renal or cardiovascular causes was significantly lower with Dapagliflozin than with placebo. EMPEROR-Reduced study also showed beneficial effects. EMPA-CKD, among wide range of patients with CKD, who were at risk for progression, empagliflozin led a lower risk of kidney disease progression or death from cardiovascular causes than placebo.  If no contraindications exist I would recommend this medication added to the regiment. If eGFR < 60 cc/min (avoid ertugliflozin); avoid or caution with GFR < 20 mL/min, not an absolute contraindication, likely lower benefits   Sulfonylureas:  Preferred short-acting (glipizide, glimepiride, repaglinide), relatively safe in patients with non dialysis CKD  Thiazolidinedones/Alpha-Gluosidase inhibitors/Dipeptidyl peptidase-4  inhibitors:  Generally not considered first-line agents in CKD (limited data in long-term safety and efficacy)  Insulin:  Starting dose may need to be lower than what would ordinarily be used, as there is a decrease metabolism of insulin (no dose adjustment if the GFR > 50 mL/min, dose should be reduced to 75% of baseline if GFR 10-50 mL/min, and 50% baseline if GFR < 10 mL/min)  Finerenone: Patients with CKD with type 2 diabetes, treatment led to lower risks of CKD progression and cardiovascular events than placebo (FIDELIO-DKD). Avoid or caution if serum potassium more then 4.8.    Hypertension  -- Stage II (American College of Cardiology/American Heart Association)  -- with underlying chronic kidney disease   -- Body mass index is 28.61 kg/m².  -- Volume status: Hypervolemic/volume overload  -- Etiology: Primary hypertension and underlying chronic kidney disease  -- Secondary Work-Up: No clinical indication  -- Target Goal: < 130/80 (ACC/AHA, CKD with proteinuria, CKD in diabetics) ; if tolerated can target SBP < 120 mmHg in high cardiovascular risk ( Age > 75, CKD, CVD, No Diabetics SPRINT)  -- Lifestyle Modifications: DASH Diet, Weight Loss for ideal body weight, 45 mins of cardiovascular exercise 3 times a week as tolerated, and if no contraindications exist, smoking cessation, limit alcohol use, avoid NSAIDS, monitor blood pressure at home  -- Status: Blood pressure blood pressure at target  -- Current antihypertensive regiment: Currently holding amlodipine.  Currently on carvedilol 6.25 mg twice a day  -- Changes: Continue carvedilol at this time.  Avoid RAAS blockade.  No objections to as needed IV diuretics as needed for volume management for low urine output.      Recent Labs     03/19/25  1514 03/19/25  2055 03/20/25  0712 03/20/25  1121   POCGLU 272* 280* 98 106       Blood Sugar Average: Last 72 hrs:  (P) [id number]      I have reviewed the nephrology recommendations including assessment and  "plan, with patient and primary team Dr. Dodge, and we are in agreement with renal plan including the information outlined above.    HISTORY OF PRESENT ILLNESS:  Requesting Physician: Kyle Dodge DO  Reason for Consult: Acute kidney injury with underlying chronic kidney disease    Abelino Renee is a 79 y.o. male who was admitted to [facility] after presenting with fall. A renal consultation is requested today for assistance in the management of acute kidney injury with underlying chronic kidney disease.  Patient follows with Dr. Rocha from [facility] for chronic kidney disease stage IIIb last was seen back in November 2024.  Baseline creatinine 1.4 to 1.6 mg/dL.  Patient presented for a fall and was found to have a closed fracture of the left femur neck.  Patient's renal function was stable at baseline at 1.5 mg/dL with a stable hemoglobin.  Patient underwent a contrast study on March 17 and went to the OR for orthopedic surgery on March 18.  Renal function appeared to worsen on March 19 along with a drop in the hemoglobin.  Patient currently reporting no chest pain shortness of breath.    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   • Chronic lymphocytic leukemia of B-cell type (HCC)     \"remission 4-5yrs\"-chemo   • DDD (degenerative disc disease), cervical    • Diabetes mellitus (HCC)     bs checked by pt at home at 6am =92   • Diabetic neuropathy (HCC)    • Diabetic retinopathy (HCC)    • Elevated WBC count    • Heart attack (HCC) 04/2015    \"labeled as that and than tested later after appendix removed and stress test showed negative\"   • History of cancer chemotherapy     last treatment approx 5yrs ago   • Hypertension    • Myocardial infarction (HCC) 04/2015   • Scalp psoriasis  " "  • Shingles 2/10-15   • Toe injury     left great toe, - 2017-internal braec-to be removed today 3/30/2018   • Wears glasses        PAST SURGICAL HISTORY:  Past Surgical History:   Procedure Laterality Date   • APPENDECTOMY     • BONE BIOPSY Left 3/30/2018    Procedure: GREAT TOE BONE BIOPSY;  Surgeon: Gerber Multani DPM;  Location: [facility];  Service: Podiatry   • BOWEL RESECTION     • CATARACT EXTRACTION Bilateral    • CHOLECYSTECTOMY     • COLECTOMY  06/2015   • EYE SURGERY Right     \"retinal peel\"   • FOOT FUSION Left 12/6/2017    Procedure: HALLUX INTERPHALANGEAL JOINT FUSION;  Surgeon: Gerber Multani DPM;  [facility];  Service: Podiatry   • HARDWARE REMOVAL Left 4/18/2018    Procedure: REMOVAL STAPLES LEFT TOE;  Surgeon: Gerber Multani DPM;  [facility];  Service: Podiatry   • ORIF FOOT FRACTURE Left 12/6/2017    Procedure: HALLUX INTERPHALANGEAL JOINT INTERNAL FIXATION; REPAIR OF ULCERATION WITH EXCISIONS AND REVISION OF SKIN HALLUX WITH USE OF ALLOGRAFT;  Surgeon: Gerber Multani DPM;  [facility];  Service: Podiatry   • IL COLONOSCOPY FLX DX W/COLLJ SPEC WHEN PFRMD N/A 4/21/2016    Procedure: COLONOSCOPY;  Surgeon: Minal Ruelas DO;  Location: [facility];  Service: Gastroenterology   • IL OPTX FEM SHFT FX W/INSJ IMED IMPLT W/WO SCREW Left 3/18/2025    Procedure: INSERTION NAIL IM FEMUR ANTEGRADE (TROCHANTERIC);  Surgeon: Jacqui Nevarez DO;  Location: [facility];  Service: Orthopedics   • IL REMOVAL IMPLANT DEEP Left 3/30/2018    Procedure: REMOVAL HARDWARE FOOT;  Surgeon: Gerber Multani DPM;  [facility];  Service: Podiatry   • RETINAL DETACHMENT SURGERY     • TONSILLECTOMY         ALLERGIES:  Allergies   Allergen Reactions   • Oxycodone-Acetaminophen Hallucinations       SOCIAL HISTORY:  Social History     Substance and Sexual Activity   Alcohol Use Never     Social History     Substance and Sexual Activity   Drug Use Never     Social History     Tobacco Use "   Smoking Status Never   • Passive exposure: Never   Smokeless Tobacco Never       FAMILY HISTORY:  Family History   Problem Relation Age of Onset   • Alcohol abuse Mother            • Pancreatic cancer Father         age 79   • Hashimoto's thyroiditis Daughter    • Rheum arthritis Daughter         Juvenile Rheum Arthitis    • No Known Problems Sister          age 78   • No Known Problems Brother          86   • Kidney failure Brother         dialysis, 74   • Diabetes Brother        MEDICATIONS:    Current Facility-Administered Medications:   •  acetaminophen (TYLENOL) tablet 650 mg, 650 mg, Oral, Q6H PRN, RADHA Spencer, 650 mg at 25 0523  •  [Held by provider] amLODIPine (NORVASC) tablet 10 mg, 10 mg, Oral, Daily, RADHA Spencer  •  [Held by provider] apixaban (ELIQUIS) tablet 5 mg, 5 mg, Oral, BID, RADHA Spencer  •  brimonidine tartrate 0.2 % ophthalmic solution 1 drop, 1 drop, Both Eyes, TID, RADHA Spencer, 1 drop at 25 0804  •  carvedilol (COREG) tablet 6.25 mg, 6.25 mg, Oral, BID With Meals, RADHA Spencer, 6.25 mg at 25 0803  •  docusate sodium (COLACE) capsule 100 mg, 100 mg, Oral, BID, RADHA Spencer, 100 mg at 25 0804  •  fish oil capsule 1,000 mg, 1,000 mg, Oral, Daily, RADHA Spencer, 1,000 mg at 25 0804  •  HYDROmorphone HCl (DILAUDID) injection 0.2 mg, 0.2 mg, Intravenous, Q2H PRN, RADHA Spencer, 0.2 mg at 25 2214  •  insulin glargine (LANTUS) subcutaneous injection 10 Units 0.1 mL, 10 Units, Subcutaneous, HS, Kyle Banai, DO, 10 Units at 25  •  insulin lispro (HumALOG/ADMELOG) 100 units/mL subcutaneous injection 1-5 Units, 1-5 Units, Subcutaneous, TID With Meals, 2 Units at 25 1724 **AND** Fingerstick Glucose (POCT), , , 4x Daily AC and at bedtime, RADHA Russ  •  insulin lispro  (HumALOG/ADMELOG) 100 units/mL subcutaneous injection 3 Units, 3 Units, Subcutaneous, TID With Meals, Kyle Banai, DO, 3 Units at 03/20/25 0751  •  levothyroxine tablet 75 mcg, 75 mcg, Oral, Early Morning, YAZMIN SpencerNP, 75 mcg at 03/20/25 0516  •  morphine (MSIR) IR tablet 7.5 mg, 7.5 mg, Oral, Q4H PRN, 7.5 mg at 03/20/25 0759 **OR** morphine (MSIR) IR tablet 15 mg, 15 mg, Oral, Q4H PRN, Deandra Diaz, YAZMINNP, 15 mg at 03/20/25 1157  •  multi-electrolyte (Plasmalyte-A/Isolyte-S PH 7.4/Normosol-R) IV solution, 75 mL/hr, Intravenous, Continuous, Kyle Banai, DO, Last Rate: 75 mL/hr at 03/20/25 1012, 75 mL/hr at 03/20/25 1012  •  multivitamin-minerals (CENTRUM) tablet 1 tablet, 1 tablet, Oral, Daily, RADHA Spencer, 1 tablet at 03/20/25 0806  •  pravastatin (PRAVACHOL) tablet 40 mg, 40 mg, Oral, Daily With Dinner, YAZMIN SpencerNP, 40 mg at 03/19/25 1724  •  timolol (TIMOPTIC) 0.5 % ophthalmic solution 1 drop, 1 drop, Both Eyes, BID, RADHA Spencer, 1 drop at 03/20/25 0817    REVIEW OF SYSTEMS:  Constitutional: Negative for fatigue, anorexia, fever, chills, diaphoresis  HENT: Negative for postnasal drip  Eyes: Negative for visual disturbance.   Respiratory: Negative for cough, shortness of breath and wheezing.   Cardiovascular: Negative for chest pain, palpitations and leg swelling.   Gastrointestinal: Negative for abdominal pain, constipation, diarrhea, nausea and vomiting.   Genitourinary: No dysuria, hematuria  Endocrine: Negative for polyuria.   Musculoskeletal: Negative for arthralgias, back pain and joint swelling.   Skin: Negative for rash.   Neurological: Negative for focal weakness, headaches, dizziness.  Hematological: Negative for easy bruising or bleeding.  Psychiatric/Behavioral: Negative for confusion and sleep disturbance.   All the systems were reviewed and were negative except as documented on the HPI.    PHYSICAL EXAM:  Current  Weight: Weight - Scale: 80.4 kg (177 lb 4 oz)  First Weight: Weight - Scale: 91.2 kg (201 lb 1 oz)  Vitals:    03/20/25 1139 03/20/25 1154 03/20/25 1210 03/20/25 1239   BP: 118/65 134/68 145/69 125/68   BP Location: Right arm Right arm Right arm Right arm   Pulse: 81 74 74 78   Resp: 18 18 15 14   Temp: 98 °F (36.7 °C) 98.2 °F (36.8 °C) 99.7 °F (37.6 °C) 98.2 °F (36.8 °C)   TempSrc: Tympanic Tympanic Oral Tympanic   SpO2: 94%  94% 94%   Weight:       Height:           Intake/Output Summary (Last 24 hours) at 3/20/2025 1329  Last data filed at 3/20/2025 0546  Gross per 24 hour   Intake 2861.67 ml   Output 320 ml   Net 2541.67 ml     Physical Exam  Vitals and nursing note reviewed.   Constitutional:       General: He is not in acute distress.     Appearance: He is well-developed. He is not diaphoretic.   HENT:      Head: Normocephalic and atraumatic.   Eyes:      General: No scleral icterus.     Pupils: Pupils are equal, round, and reactive to light.   Cardiovascular:      Rate and Rhythm: Normal rate and regular rhythm.      Heart sounds: Normal heart sounds. No murmur heard.     No friction rub. No gallop.   Pulmonary:      Effort: Pulmonary effort is normal. No respiratory distress.      Breath sounds: Normal breath sounds. No wheezing or rales.   Chest:      Chest wall: No tenderness.   Abdominal:      General: Bowel sounds are normal. There is no distension.      Palpations: Abdomen is soft.      Tenderness: There is no abdominal tenderness. There is no rebound.   Musculoskeletal:         General: Normal range of motion.      Cervical back: Normal range of motion and neck supple.      Right lower leg: Edema present.      Left lower leg: Edema present.   Skin:     Findings: No rash.   Neurological:      Mental Status: He is alert and oriented to person, place, and time.         Invasive Devices:      Lab Results:   Results from last 7 days   Lab Units 03/20/25  0432 03/19/25  1902 03/19/25  0914 03/19/25  0518  "03/18/25  0443   WBC Thousand/uL 29.62* 36.01*  --  30.77* 33.67*   HEMOGLOBIN g/dL 7.6* 8.2* 6.8* 6.9* 11.1*   HEMATOCRIT % 23.6* 25.4* 21.3* 22.2* 34.5*   PLATELETS Thousands/uL 98* 102*  --  96* 117*   POTASSIUM mmol/L 4.6  --   --  5.1 4.4   CHLORIDE mmol/L 97  --   --  99 102   CO2 mmol/L 24  --   --  23 25   BUN mg/dL 61*  --   --  46* 29*   CREATININE mg/dL 3.95*  --   --  2.65* 1.59*   CALCIUM mg/dL 8.0*  --   --  7.9* 9.5   ALK PHOS U/L 51  --   --   --   --    ALT U/L 9  --   --   --   --    AST U/L 18  --   --   --   --      Other Studies:     Portions of the record may have been created with voice recognition software. Occasional wrong word or \"sound a like\" substitutions may have occurred due to the inherent limitations of voice recognition software. Read the chart carefully and recognize, using context, where substitutions have occurred.If you have any questions, please contact the dictating provider.    "

## 2025-03-20 NOTE — ASSESSMENT & PLAN NOTE
Problem: Communication  Goal: The ability to communicate needs accurately and effectively will improve  Outcome: PROGRESSING AS EXPECTED     Problem: Safety  Goal: Will remain free from injury  Outcome: PROGRESSING AS EXPECTED  Goal: Will remain free from falls  Outcome: PROGRESSING AS EXPECTED     Problem: Venous Thromboembolism (VTW)/Deep Vein Thrombosis (DVT) Prevention:  Goal: Patient will participate in Venous Thrombosis (VTE)/Deep Vein Thrombosis (DVT)Prevention Measures  Outcome: PROGRESSING AS EXPECTED     Problem: Knowledge Deficit  Goal: Knowledge of disease process/condition, treatment plan, diagnostic tests, and medications will improve  Outcome: PROGRESSING AS EXPECTED      Lab Results   Component Value Date    EGFR 13 03/20/2025    EGFR 22 03/19/2025    EGFR 40 03/18/2025    CREATININE 3.95 (H) 03/20/2025    CREATININE 2.65 (H) 03/19/2025    CREATININE 1.59 (H) 03/18/2025   Creatinine 1.52 on admission  Patient with worsening kidney injury with creatinine 3.95 suspect this is from prior insult secondary to hypotension would expect this to improve over next 24 to 48 hours, will consult nephrology for further management  Patient also did receive IV contrast on 3/17

## 2025-03-20 NOTE — PROGRESS NOTES
Progress Note - Orthopedics   Name: Abelino Renee 79 y.o. male I MRN: [id number]  Unit/Bed#: -01 I Date of Admission: 3/17/2025   Date of Service: 3/20/2025 I Hospital Day: 2    Assessment & Plan  Closed fracture of neck of left femur (HCC)  S/p left hip CMN for IT fracture on 3/18 with Dr. Nevarez  WBAT LLE  Hgb 7.6 today after 1 unit 03/19- continue to monitor, transfuse per primary team   PT/OT  Medical management per primary team   Pain control per primary team   DVT ppx per primary team: OK to resume Eliquis       Ok for discharge from Orthopedics service perspective.  Please contact the SecureChat role for the Orthopedics service with any questions/concerns.    Subjective   79 y.o.male seen and evaluated at bedside. Patient resting comfortably in bed. Notes eagerness to continue working with PT/OT. Eager to return home. Denies fever, chills. Denies SOB, chest pain.     Objective :  Temp:  [97.3 °F (36.3 °C)-99.4 °F (37.4 °C)] 97.8 °F (36.6 °C)  HR:  [76-85] 82  BP: (106-133)/(56-74) 106/58  Resp:  [15-19] 18  SpO2:  [94 %-99 %] 94 %  O2 Device: Nasal cannula  Nasal Cannula O2 Flow Rate (L/min):  [2 L/min-3 L/min] 3 L/min    Physical Exam  Vitals and nursing note reviewed.   Constitutional:       General: He is not in acute distress.     Appearance: He is well-developed.   HENT:      Head: Normocephalic and atraumatic.   Eyes:      Conjunctiva/sclera: Conjunctivae normal.   Cardiovascular:      Rate and Rhythm: Normal rate and regular rhythm.      Heart sounds: No murmur heard.  Pulmonary:      Effort: Pulmonary effort is normal. No respiratory distress.      Breath sounds: Normal breath sounds.   Abdominal:      Palpations: Abdomen is soft.      Tenderness: There is no abdominal tenderness.   Musculoskeletal:         General: Swelling and tenderness present.      Cervical back: Neck supple.   Skin:     General: Skin is warm and dry.      Capillary Refill: Capillary refill takes less than 2 seconds.  "  Neurological:      Mental Status: He is alert.   Psychiatric:         Mood and Affect: Mood normal.       Musculoskeletal: Left lower extremity  Surgical dressings clean, dry, intact   Moderate tanesha-incisional tenderness to palpation    Thigh and calf musculature soft and compressible   Full sensation to dorsal aspect of the foot. 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   +ankle dorsi/plantar flexion      Lab Results: I have reviewed the following results:  Recent Labs     03/17/25  2107 03/18/25  0443 03/19/25  0518 03/19/25  0914 03/19/25  1902 03/20/25  0432   WBC  --  33.67* 30.77*  --  36.01* 29.62*   HGB  --  11.1* 6.9* 6.8* 8.2* 7.6*   HCT  --  34.5* 22.2* 21.3* 25.4* 23.6*   PLT  --  117* 96*  --  102* 98*   BUN  --  29* 46*  --   --  61*   CREATININE  --  1.59* 2.65*  --   --  3.95*   PTT 28  --   --   --   --   --    INR 1.14  --   --   --   --   --      Blood Culture:    Lab Results   Component Value Date    BLOODCX No Growth After 5 Days. 06/07/2024     Wound Culture: No results found for: \"WOUNDCULT\"    Other Study Results Review: No additional pertinent studies reviewed.  "

## 2025-03-20 NOTE — PLAN OF CARE
Problem: PHYSICAL THERAPY ADULT  Goal: Performs mobility at highest level of function for planned discharge setting.  See evaluation for individualized goals.  Description: Treatment/Interventions: ADL retraining, LE strengthening/ROM, Functional transfer training, Elevations, Therapeutic exercise, Endurance training, Patient/family training, Equipment eval/education, Bed mobility, Gait training          See flowsheet documentation for full assessment, interventions and recommendations.  Outcome: Progressing  Note: Prognosis: Fair  Problem List: Decreased range of motion, Decreased strength, Decreased endurance, Impaired balance, Decreased mobility, Decreased safety awareness, Pain, Orthopedic restrictions  Assessment: Pt exhibits physical deficits noted in problem list above.  Deficits listed contribute to functional limitations that are significant from the patient PLOF and include: difficulty with bed mobility, impaired sitting balance, impaired standing balance, inability to perform safe transfers, tolerance for OOB functional activities, unsafe/inefficient ambulation, fall risk, and unable to safely manage stairs/steps/ramp    Additionally, patient is limited by restrictions/precautions/DME: LLE WBAT.    Additional considerations affecting pt's discharge planning: Insufficient or unavailable assistance at home, Significant amount of stairs to manage in order to access living quarters, Inability to manage basic self care ADLs with the assistance that is currently available, and Inability to perform necessary transfers and/or mobility out of bed, with the assistance that is currently available    During today's session, pt displaying increased pain, increased LLE stiffness and weakness, decreased hip/knee active/active-assisted ROM, decreased tolerance for transfers and mobility.  Pt required extra time and assistance this session to perform HEP, bed mobility, and transfers.  Pt continues to be appropriate for  SNF placement at time of discharge.     The -PAC Mobility Score was used to assist in determining pt safety w/ mobility/self care & appropriate d/c recommendations, see above for scores. Patient's clinical presentation is unstable/unpredictable due to significant acute change in functional independence, uncontrolled pain, increased supplemental O2 demands compared to baseline, abnormal lab values, significant need for care assistance compared to baseline, recent surgery/procedure, ongoing medical management needs, and complicated social/support system.     From a PT/mobility standpoint given the above findings, DC recommendation is level: II (Moderate Rehab Resource Intensity)    Considering the patient's PLOF, co-morbidities, acute functional limitations, functional outcome measures, and/or goal to progress functional independence; this patient would continue to benefit from skilled Physical Therapy intervention in the acute care setting.  Barriers to Discharge: Decreased caregiver support, Inaccessible home environment     Rehab Resource Intensity Level, PT: II (Moderate Resource Intensity)    See flowsheet documentation for full assessment.

## 2025-03-21 PROBLEM — E87.1 HYPONATREMIA: Status: ACTIVE | Noted: 2025-03-21

## 2025-03-21 LAB
ABO GROUP BLD BPU: NORMAL
ABO GROUP BLD BPU: NORMAL
ALBUMIN SERPL BCG-MCNC: 3.5 G/DL (ref 3.5–5)
ALP SERPL-CCNC: 65 U/L (ref 34–104)
ALT SERPL W P-5'-P-CCNC: 9 U/L (ref 7–52)
ANION GAP SERPL CALCULATED.3IONS-SCNC: 9 MMOL/L (ref 4–13)
AST SERPL W P-5'-P-CCNC: 25 U/L (ref 13–39)
BASOPHILS # BLD AUTO: 0.04 THOUSANDS/ÂΜL (ref 0–0.1)
BASOPHILS NFR BLD AUTO: 0 % (ref 0–1)
BILIRUB SERPL-MCNC: 0.67 MG/DL (ref 0.2–1)
BPU ID: NORMAL
BPU ID: NORMAL
BUN SERPL-MCNC: 57 MG/DL (ref 5–25)
CALCIUM SERPL-MCNC: 8.5 MG/DL (ref 8.4–10.2)
CHLORIDE SERPL-SCNC: 98 MMOL/L (ref 96–108)
CO2 SERPL-SCNC: 25 MMOL/L (ref 21–32)
CREAT SERPL-MCNC: 3.27 MG/DL (ref 0.6–1.3)
CROSSMATCH: NORMAL
CROSSMATCH: NORMAL
EOSINOPHIL # BLD AUTO: 0.39 THOUSAND/ÂΜL (ref 0–0.61)
EOSINOPHIL NFR BLD AUTO: 2 % (ref 0–6)
ERYTHROCYTE [DISTWIDTH] IN BLOOD BY AUTOMATED COUNT: 15.1 % (ref 11.6–15.1)
GFR SERPL CREATININE-BSD FRML MDRD: 17 ML/MIN/1.73SQ M
GLUCOSE SERPL-MCNC: 118 MG/DL (ref 65–140)
GLUCOSE SERPL-MCNC: 125 MG/DL (ref 65–140)
GLUCOSE SERPL-MCNC: 133 MG/DL (ref 65–140)
GLUCOSE SERPL-MCNC: 168 MG/DL (ref 65–140)
GLUCOSE SERPL-MCNC: 171 MG/DL (ref 65–140)
HCT VFR BLD AUTO: 26.6 % (ref 36.5–49.3)
HGB BLD-MCNC: 8.9 G/DL (ref 12–17)
IMM GRANULOCYTES # BLD AUTO: 0.07 THOUSAND/UL (ref 0–0.2)
IMM GRANULOCYTES NFR BLD AUTO: 0 % (ref 0–2)
LYMPHOCYTES # BLD AUTO: 20.24 THOUSANDS/ÂΜL (ref 0.6–4.47)
LYMPHOCYTES NFR BLD AUTO: 79 % (ref 14–44)
MCH RBC QN AUTO: 31 PG (ref 26.8–34.3)
MCHC RBC AUTO-ENTMCNC: 33.5 G/DL (ref 31.4–37.4)
MCV RBC AUTO: 93 FL (ref 82–98)
MONOCYTES # BLD AUTO: 0.28 THOUSAND/ÂΜL (ref 0.17–1.22)
MONOCYTES NFR BLD AUTO: 1 % (ref 4–12)
NEUTROPHILS # BLD AUTO: 4.66 THOUSANDS/ÂΜL (ref 1.85–7.62)
NEUTS SEG NFR BLD AUTO: 18 % (ref 43–75)
NRBC BLD AUTO-RTO: 0 /100 WBCS
PLATELET # BLD AUTO: 99 THOUSANDS/UL (ref 149–390)
PMV BLD AUTO: 10.4 FL (ref 8.9–12.7)
POTASSIUM SERPL-SCNC: 4.8 MMOL/L (ref 3.5–5.3)
PROT SERPL-MCNC: 6.8 G/DL (ref 6.4–8.4)
RBC # BLD AUTO: 2.87 MILLION/UL (ref 3.88–5.62)
SODIUM SERPL-SCNC: 132 MMOL/L (ref 135–147)
UNIT DISPENSE STATUS: NORMAL
UNIT DISPENSE STATUS: NORMAL
UNIT PRODUCT CODE: NORMAL
UNIT PRODUCT CODE: NORMAL
UNIT PRODUCT VOLUME: 350 ML
UNIT PRODUCT VOLUME: 350 ML
UNIT RH: NORMAL
UNIT RH: NORMAL
WBC # BLD AUTO: 25.68 THOUSAND/UL (ref 4.31–10.16)

## 2025-03-21 PROCEDURE — 97110 THERAPEUTIC EXERCISES: CPT

## 2025-03-21 PROCEDURE — 99024 POSTOP FOLLOW-UP VISIT: CPT

## 2025-03-21 PROCEDURE — 97535 SELF CARE MNGMENT TRAINING: CPT

## 2025-03-21 PROCEDURE — 99232 SBSQ HOSP IP/OBS MODERATE 35: CPT | Performed by: INTERNAL MEDICINE

## 2025-03-21 PROCEDURE — 97530 THERAPEUTIC ACTIVITIES: CPT

## 2025-03-21 PROCEDURE — 85025 COMPLETE CBC W/AUTO DIFF WBC: CPT | Performed by: INTERNAL MEDICINE

## 2025-03-21 PROCEDURE — 97116 GAIT TRAINING THERAPY: CPT

## 2025-03-21 PROCEDURE — 82948 REAGENT STRIP/BLOOD GLUCOSE: CPT

## 2025-03-21 PROCEDURE — 80053 COMPREHEN METABOLIC PANEL: CPT | Performed by: INTERNAL MEDICINE

## 2025-03-21 RX ORDER — SODIUM CHLORIDE, SODIUM GLUCONATE, SODIUM ACETATE, POTASSIUM CHLORIDE, MAGNESIUM CHLORIDE, SODIUM PHOSPHATE, DIBASIC, AND POTASSIUM PHOSPHATE .53; .5; .37; .037; .03; .012; .00082 G/100ML; G/100ML; G/100ML; G/100ML; G/100ML; G/100ML; G/100ML
75 INJECTION, SOLUTION INTRAVENOUS CONTINUOUS
Status: DISCONTINUED | OUTPATIENT
Start: 2025-03-21 | End: 2025-03-21

## 2025-03-21 RX ADMIN — CARVEDILOL 6.25 MG: 6.25 TABLET, FILM COATED ORAL at 17:17

## 2025-03-21 RX ADMIN — Medication 1 TABLET: at 08:38

## 2025-03-21 RX ADMIN — LEVOTHYROXINE SODIUM 75 MCG: 75 TABLET ORAL at 05:25

## 2025-03-21 RX ADMIN — BRIMONIDINE TARTRATE 1 DROP: 2 SOLUTION/ DROPS OPHTHALMIC at 08:38

## 2025-03-21 RX ADMIN — BRIMONIDINE TARTRATE 1 DROP: 2 SOLUTION/ DROPS OPHTHALMIC at 22:02

## 2025-03-21 RX ADMIN — INSULIN LISPRO 3 UNITS: 100 INJECTION, SOLUTION INTRAVENOUS; SUBCUTANEOUS at 12:50

## 2025-03-21 RX ADMIN — INSULIN LISPRO 3 UNITS: 100 INJECTION, SOLUTION INTRAVENOUS; SUBCUTANEOUS at 17:17

## 2025-03-21 RX ADMIN — INSULIN LISPRO 1 UNITS: 100 INJECTION, SOLUTION INTRAVENOUS; SUBCUTANEOUS at 17:17

## 2025-03-21 RX ADMIN — MORPHINE SULFATE 15 MG: 15 TABLET ORAL at 04:52

## 2025-03-21 RX ADMIN — CARVEDILOL 6.25 MG: 6.25 TABLET, FILM COATED ORAL at 08:38

## 2025-03-21 RX ADMIN — PRAVASTATIN SODIUM 40 MG: 40 TABLET ORAL at 17:17

## 2025-03-21 RX ADMIN — INSULIN GLARGINE 10 UNITS: 100 INJECTION, SOLUTION SUBCUTANEOUS at 22:02

## 2025-03-21 RX ADMIN — SODIUM CHLORIDE, SODIUM GLUCONATE, SODIUM ACETATE, POTASSIUM CHLORIDE, MAGNESIUM CHLORIDE, SODIUM PHOSPHATE, DIBASIC, AND POTASSIUM PHOSPHATE 75 ML/HR: .53; .5; .37; .037; .03; .012; .00082 INJECTION, SOLUTION INTRAVENOUS at 10:50

## 2025-03-21 RX ADMIN — SODIUM CHLORIDE, SODIUM GLUCONATE, SODIUM ACETATE, POTASSIUM CHLORIDE, MAGNESIUM CHLORIDE, SODIUM PHOSPHATE, DIBASIC, AND POTASSIUM PHOSPHATE 75 ML/HR: .53; .5; .37; .037; .03; .012; .00082 INJECTION, SOLUTION INTRAVENOUS at 04:26

## 2025-03-21 RX ADMIN — TIMOLOL MALEATE 1 DROP: 5 SOLUTION OPHTHALMIC at 17:17

## 2025-03-21 RX ADMIN — TIMOLOL MALEATE 1 DROP: 5 SOLUTION OPHTHALMIC at 08:38

## 2025-03-21 RX ADMIN — INSULIN LISPRO 3 UNITS: 100 INJECTION, SOLUTION INTRAVENOUS; SUBCUTANEOUS at 08:38

## 2025-03-21 RX ADMIN — OMEGA-3 FATTY ACIDS CAP 1000 MG 1000 MG: 1000 CAP at 08:38

## 2025-03-21 RX ADMIN — APIXABAN 5 MG: 5 TABLET, FILM COATED ORAL at 17:17

## 2025-03-21 RX ADMIN — BRIMONIDINE TARTRATE 1 DROP: 2 SOLUTION/ DROPS OPHTHALMIC at 17:17

## 2025-03-21 NOTE — ASSESSMENT & PLAN NOTE
Hx PAF, HR stable, controlled A-fib  ECG on admission with atrial fibrillation HR 99, QTc 410, no ST-T changes or ischemia.  Continue PTA carvedilol 6.25 mg twice daily  Resume eliquis today

## 2025-03-21 NOTE — PLAN OF CARE
Problem: PHYSICAL THERAPY ADULT  Goal: Performs mobility at highest level of function for planned discharge setting.  See evaluation for individualized goals.  Description: Treatment/Interventions: Functional transfer training, LE strengthening/ROM, Elevations, Therapeutic exercise, Endurance training, Bed mobility, Gait training          See flowsheet documentation for full assessment, interventions and recommendations.  Outcome: Progressing  Note: Prognosis: Fair  Problem List: Decreased range of motion, Decreased strength, Decreased endurance, Impaired balance, Decreased mobility, Decreased safety awareness, Pain, Orthopedic restrictions  Assessment: Pt exhibits physical deficits noted in problem list above.  Deficits listed contribute to functional limitations that are significant from the patient PLOF and include: difficulty with bed mobility, impaired sitting balance, impaired standing balance, inability to perform safe transfers, tolerance for OOB functional activities, unsafe/inefficient ambulation, fall risk, and unable to safely manage stairs/steps/ramp    Additionally, patient is limited by restrictions/precautions/DME: LLE WBAT.    Additional considerations affecting pt's discharge planning: Insufficient or unavailable assistance at home, Significant amount of stairs to manage in order to access living quarters, Inability to manage basic self care ADLs with the assistance that is currently available, and Inability to perform necessary transfers and/or mobility out of bed, with the assistance that is currently available    During today's session, initiated activity with AAROM exercises of left hip both with hip IR/ER and with heel slides.  Pt responded well to exercises.  In sitting, pt performing scooting forward and trunk ROM flexion/side bending.  Pt progressed to standing at walker with heavy assist initially.  With max cues and assistance, pt able to make posture and balance corrections with  walker, but has difficulty sustaining corrections.  Pt performed trunk ROM flex/ext 5x in standing, before attempting repeated single leg marching, 5x each.  Pt poorly tolerated wbing on LLE needing assist.  Pt performed stand pivot transfer with RW, but needed a lot of assist to perform and prevent falling.    The -PAC Mobility Score was used to assist in determining pt safety w/ mobility/self care & appropriate d/c recommendations, see above for scores. Patient's clinical presentation is evolving due to significant acute change in functional independence, uncontrolled pain, abnormal lab values, significant need for care assistance compared to baseline, recent surgery/procedure, ongoing medical management needs, and complicated social/support system.     From a PT/mobility standpoint given the above findings, DC recommendation is level: II (Moderate Rehab Resource Intensity)    Considering the patient's PLOF, co-morbidities, acute functional limitations, functional outcome measures, and/or goal to progress functional independence; this patient would continue to benefit from skilled Physical Therapy intervention in the acute care setting.  Barriers to Discharge: Decreased caregiver support, Inaccessible home environment     Rehab Resource Intensity Level, PT: II (Moderate Resource Intensity)    See flowsheet documentation for full assessment.

## 2025-03-21 NOTE — PROGRESS NOTES
Progress Note - Nephrology   Name: Abelino Renee 79 y.o. male I MRN: [id number]  Unit/Bed#: -01 I Date of Admission: 3/17/2025   Date of Service: 3/21/2025 I Hospital Day: 3     Assessment & Plan  RAINE (acute kidney injury) (HCC)  -- Occurred during this hospitalization, admission creatinine was 1.5 mg/dL which is his baseline creatinine  --Creatinine peaked at 3.95 mg/dL and now renal function is improving today to 3.27 mg/dL  --With underlying chronic kidney disease stage IIIb  --Suspect this has progressed to acute tubular necrosis, in the setting of hemodynamic fluctuations intraoperatively, acute on chronic anemia, with contrast exposure on March 17  --Nonoliguric good urine output  --Avoid NSAIDs  --Received colloid expansion with blood transfusion and balanced crystalloids with Isolyte  --CT scan with contrast prior to RAINE did not show any evidence of hydronephrosis.  --Urinary retention protocol  -- As the renal function is improving no indication for imaging  --Discontinue IV fluids  --BMP in the morning  Closed fracture of neck of left femur (HCC)  -- Status post left hip CMN for IT fracture on March 18  --Acute blood loss  --Currently receiving a blood transfusion  --Management as per orthopedic surgery  Currently undergoing PT/OT--  Benign essential HTN  -- Chest x-ray from today showed resolution of pulmonary interstitial edema  --No indication for IV fluids  --Anesthesia operative report reviewed.  Hemodynamic fluctuations noted.  --Blood pressure currently stable  --Examines slightly volume overloaded  --Currently receiving colloid expansion with blood  --Saturating well on 2 L nasal cannula  Chronic lymphocytic leukemia (HCC)  -- Abnormal SPEP as an outpatient  --Outpatient hematologist Dr. Acosta  -- Currently undergoing surveillance/observation  --Had been treated with 4 cycles of Bendamustine and rituximab in June 2012 with a good response.  Initially was started on maintenance rituximab  but developed leukopenia and it was then decided to stop the rituximab.  Hypothyroidism  -- Continue levothyroxine 75 mcg daily  CHF (congestive heart failure) ([facility])  Wt Readings from Last 3 Encounters:   03/17/25 80.4 kg (177 lb 4 oz)   02/20/25 82.3 kg (181 lb 8 oz)   10/28/24 82.1 kg (181 lb)   -- Weight is stable  --Discontinue intravenous fluid  -- Chest x-ray shows resolution of interstitial pulmonary edema  -- 2D echocardiogram from June 2024 reviewed.  Preserved ejection fraction of 60% with mild concentric left ventricular hypertrophy.  Systolic function is normal.  Diastolic function is normal.    ABLA (acute blood loss anemia)  -- Postoperative blood loss  --Underlying CLL and abnormal SPEP as an outpatient  --Admission hemoglobin 12.2 hemoglobin dropped to as low as 6.8  --Transfusions as per the primary team  --Hematology on board currently receiving a blood transfusion  --Hemoglobin improving to 8.9  Type 2 diabetes mellitus with stage 3b chronic kidney disease and hypertension ([facility])  Lab Results   Component Value Date    HGBA1C 7.9 (H) 03/18/2025     Chronic Kidney Disease Stage IIIB  -- outpatient nephrologist: Dr. Rocha ([facility])  -- baseline creatinine: 1.4 to 1.6 mg/dL  -- Etiology: Presumed be secondary to diabetic kidney disease, hypertensive nephrosclerosis    Diabetes mellitus type 2  -hemoglobin A1c: 7.9 (A1C values may be falsely elevated or decreased in those with CKD, assay method should be certified by National glycohemoglobin standardization Program). Target A1C less then 7 (will need to be individualized for each patient), and would utilize A1C  in conjunction with continuous glucose monitoring (CGM).  It should also be noted that the A1c with more advanced kidney disease would be inaccurate due to decreased red blood cell life along with anemia.  -current medications: Insulin lispro and insulin glargine  -proteinuria: Yes as an outpatient subnephrotic range  -please follow  with an ophthalmologist and podiatrist every year  -continued self monitoring of blood glucose at home  -Management in CKD Recommendations:   Metformin:  Avoid if creatinine clearance is less than 30 cc/min (concern for lactic acidosis)  Sodium-Glucose Cotransporter-2 (SGLT2) Inhibitors:  May see an acute drop in the eGFR initially when starting the medication but then a stabilization of the renal function, with slower loss of renal function as compared to placebo.  Relative risk of end-stage renal disease, doubling of serum creatinine or death from renal causes were also found to be lower as compared to placebo. ([facility]).  DAPA-CKD study, showed that patients with chronic kidney disease regardless of the presence or absence of diabetes the risk of composite of a sustained decline in the estimated GFR of at least 50%, end-stage renal disease or death from renal or cardiovascular causes was significantly lower with Dapagliflozin than with placebo. EMPEROR-Reduced study also showed beneficial effects. EMPA-CKD, among wide range of patients with CKD, who were at risk for progression, empagliflozin led a lower risk of kidney disease progression or death from cardiovascular causes than placebo.  If no contraindications exist I would recommend this medication added to the regiment. If eGFR < 60 cc/min (avoid ertugliflozin); avoid or caution with GFR < 20 mL/min, not an absolute contraindication, likely lower benefits   Sulfonylureas:  Preferred short-acting (glipizide, glimepiride, repaglinide), relatively safe in patients with non dialysis CKD  Thiazolidinedones/Alpha-Gluosidase inhibitors/Dipeptidyl peptidase-4 inhibitors:  Generally not considered first-line agents in CKD (limited data in long-term safety and efficacy)  Insulin:  Starting dose may need to be lower than what would ordinarily be used, as there is a decrease metabolism of insulin (no dose adjustment if the GFR > 50 mL/min, dose should be reduced to 75%  of baseline if GFR 10-50 mL/min, and 50% baseline if GFR < 10 mL/min)  Finerenone: Patients with CKD with type 2 diabetes, treatment led to lower risks of CKD progression and cardiovascular events than placebo (FIDELIO-DKD). Avoid or caution if serum potassium more then 4.8.    Hypertension  -- Stage II (American College of Cardiology/American Heart Association)  -- with underlying chronic kidney disease   -- Body mass index is 28.61 kg/m².  -- Volume status: Hypervolemic/volume overload  -- Etiology: Primary hypertension and underlying chronic kidney disease  -- Secondary Work-Up: No clinical indication  -- Target Goal: < 130/80 (ACC/AHA, CKD with proteinuria, CKD in diabetics) ; if tolerated can target SBP < 120 mmHg in high cardiovascular risk ( Age > 75, CKD, CVD, No Diabetics SPRINT)  -- Lifestyle Modifications: DASH Diet, Weight Loss for ideal body weight, 45 mins of cardiovascular exercise 3 times a week as tolerated, and if no contraindications exist, smoking cessation, limit alcohol use, avoid NSAIDS, monitor blood pressure at home  -- Status: Blood pressure blood pressure at target  -- Current antihypertensive regiment: Currently holding amlodipine.  Currently on carvedilol 6.25 mg twice a day  -- Changes: Continue carvedilol at this time.  Avoid RAAS blockade.  No objections to as needed IV diuretics as needed for volume management for low urine output.      Recent Labs     03/20/25  1529 03/20/25  2111 03/21/25  0720 03/21/25  1132   POCGLU 138 178* 125 118       Blood Sugar Average: Last 72 hrs:  (P) [id number]    Hyponatremia  -- Mild at 132 and slight improvement from yesterday  -- Discontinue Isolyte    I have reviewed the nephrology recommendations including assessment and plan, with patient, and we are in agreement with renal plan including the information outlined above. Nephrology service will follow.  If renal function continues to improve tomorrow no strong objections to  discharge.    Subjective   Brief History of Admission - 79-year-old male with a history of CLL, chronic kidney disease stage III, hypertension and diabetes who presented for a fall and sustained a closed fracture of the left femur neck. Nephrology consult for acute kidney injury.     No chest pain or shortness of breath reporting good oral intake.    Objective :  Temp:  [98 °F (36.7 °C)-99.6 °F (37.6 °C)] 98.9 °F (37.2 °C)  HR:  [79-87] 80  BP: (129-154)/(52-70) 130/63  Resp:  [16-17] 16  SpO2:  [93 %-95 %] 95 %  O2 Device: Nasal cannula  Nasal Cannula O2 Flow Rate (L/min):  [2 L/min] 2 L/min    Current Weight: Weight - Scale: 80.4 kg (177 lb 4 oz)  First Weight: Weight - Scale: 91.2 kg (201 lb 1 oz)  I/O         03/19 0701  03/20 0700 03/20 0701  03/21 0700 03/21 0701  03/22 0700    P.O. 600 600     I.V. (mL/kg) 1911.7 (23.8) 785.8 (9.8)     Blood 350 300     Total Intake(mL/kg) 2861.7 (35.6) 1685.8 (21)     Urine (mL/kg/hr) 320 (0.2) 2050 (1.1)     Stool  0     Total Output 320 2050     Net +2541.7 -364.2            Unmeasured Stool Occurrence  0 x           Physical Exam  Vitals and nursing note reviewed.   Constitutional:       General: He is not in acute distress.     Appearance: He is well-developed. He is not diaphoretic.   HENT:      Head: Normocephalic and atraumatic.   Eyes:      General: No scleral icterus.     Pupils: Pupils are equal, round, and reactive to light.   Cardiovascular:      Rate and Rhythm: Normal rate and regular rhythm.      Heart sounds: Normal heart sounds. No murmur heard.     No friction rub. No gallop.   Pulmonary:      Effort: Pulmonary effort is normal. No respiratory distress.      Breath sounds: Normal breath sounds. No wheezing or rales.   Chest:      Chest wall: No tenderness.   Abdominal:      General: Bowel sounds are normal. There is no distension.      Palpations: Abdomen is soft.      Tenderness: There is no abdominal tenderness. There is no rebound.   Musculoskeletal:          General: Normal range of motion.      Cervical back: Normal range of motion and neck supple.      Right lower leg: Edema present.      Left lower leg: Edema present.   Skin:     Findings: No rash.   Neurological:      Mental Status: He is alert and oriented to person, place, and time.         Medications:    Current Facility-Administered Medications:     acetaminophen (TYLENOL) tablet 650 mg, 650 mg, Oral, Q6H PRN, Deandra Matiasv, CRNP, 650 mg at 03/20/25 0523    [Held by provider] amLODIPine (NORVASC) tablet 10 mg, 10 mg, Oral, Daily, Deandra Nolenna Pollyv, CRNP    apixaban (ELIQUIS) tablet 5 mg, 5 mg, Oral, BID, Kyle Dodge DO    brimonidine tartrate 0.2 % ophthalmic solution 1 drop, 1 drop, Both Eyes, TID, Deandra Diaz, YAZMINNP, 1 drop at 03/21/25 0838    carvedilol (COREG) tablet 6.25 mg, 6.25 mg, Oral, BID With Meals, Deandrahannah Matiasv, CRNP, 6.25 mg at 03/21/25 0838    docusate sodium (COLACE) capsule 100 mg, 100 mg, Oral, BID, Deandra Matiasv, CRNP, 100 mg at 03/20/25 0804    fish oil capsule 1,000 mg, 1,000 mg, Oral, Daily, Deandra Bennettovna Dariuszvalev, CRNP, 1,000 mg at 03/21/25 0838    HYDROmorphone HCl (DILAUDID) injection 0.2 mg, 0.2 mg, Intravenous, Q2H PRN, Deandra Matiasv, CRNP, 0.2 mg at 03/18/25 2214    insulin glargine (LANTUS) subcutaneous injection 10 Units 0.1 mL, 10 Units, Subcutaneous, HS, Kyle Dodge DO, 10 Units at 03/20/25 2205    insulin lispro (HumALOG/ADMELOG) 100 units/mL subcutaneous injection 1-5 Units, 1-5 Units, Subcutaneous, TID With Meals, 2 Units at 03/19/25 1724 **AND** Fingerstick Glucose (POCT), , , 4x Daily AC and at bedtime, RADHA Russ    insulin lispro (HumALOG/ADMELOG) 100 units/mL subcutaneous injection 3 Units, 3 Units, Subcutaneous, TID With Meals, Kyle Dodge DO, 3 Units at 03/21/25 1250    levothyroxine tablet 75 mcg, 75 mcg, Oral, Early Morning, RADHA Spencer, 75 mcg at 03/21/25 0502     "morphine (MSIR) IR tablet 7.5 mg, 7.5 mg, Oral, Q4H PRN, 7.5 mg at 03/20/25 0759 **OR** morphine (MSIR) IR tablet 15 mg, 15 mg, Oral, Q4H PRN, Deandra RADHA Soni, 15 mg at 03/21/25 0452    multivitamin-minerals (CENTRUM) tablet 1 tablet, 1 tablet, Oral, Daily, RADHA Spencer, 1 tablet at 03/21/25 0838    pravastatin (PRAVACHOL) tablet 40 mg, 40 mg, Oral, Daily With Dinner, RADHA Spencer, 40 mg at 03/20/25 1536    timolol (TIMOPTIC) 0.5 % ophthalmic solution 1 drop, 1 drop, Both Eyes, BID, RADHA Spencer, 1 drop at 03/21/25 0838      Lab Results: I have reviewed the following results:  Results from last 7 days   Lab Units 03/21/25  0440 03/20/25  0432 03/19/25  1902 03/19/25  0914 03/19/25  0518 03/18/25  0443 03/17/25 2027   WBC Thousand/uL 25.68* 29.62* 36.01*  --  30.77* 33.67* 39.72*   HEMOGLOBIN g/dL 8.9* 7.6* 8.2* 6.8* 6.9* 11.1* 12.2   HEMATOCRIT % 26.6* 23.6* 25.4* 21.3* 22.2* 34.5* 37.6   PLATELETS Thousands/uL 99* 98* 102*  --  96* 117* 129*   POTASSIUM mmol/L 4.8 4.6  --   --  5.1 4.4 5.1   CHLORIDE mmol/L 98 97  --   --  99 102 102   CO2 mmol/L 25 24  --   --  23 25 25   BUN mg/dL 57* 61*  --   --  46* 29* 29*   CREATININE mg/dL 3.27* 3.95*  --   --  2.65* 1.59* 1.52*   CALCIUM mg/dL 8.5 8.0*  --   --  7.9* 9.5 9.9   ALBUMIN g/dL 3.5 3.3*  --   --   --   --   --        Administrative Statements   I have spent a total time of 25 minutes in caring for this patient on the day of the visit/encounter including Counseling / Coordination of care, Documenting in the medical record, Reviewing/placing orders in the medical record (including tests, medications, and/or procedures), Obtaining or reviewing history  , and Communicating with other healthcare professionals .  Portions of the record may have been created with voice recognition software. Occasional wrong word or \"sound a like\" substitutions may have occurred due to the inherent limitations of " voice recognition software. Read the chart carefully and recognize, using context, where substitutions have occurred.If you have any questions, please contact the dictating provider.

## 2025-03-21 NOTE — PROGRESS NOTES
Progress Note - Hospitalist   Name: Abelino Renee 79 y.o. male I MRN: [id number]  Unit/Bed#: -01 I Date of Admission: 3/17/2025   Date of Service: 3/21/2025 I Hospital Day: 3    Assessment & Plan  Closed fracture of neck of left femur (HCC)  Patient with PMHx CLL, A-fib on Eliquis, HTN, hypothyroid, NIDDM type 2, and CKD who presents after a fall after tripping over floorboards in [facility].  Patient fell on his left side and states he was lying on the ground for about 3 hours unable to weight-bear.  .  CT chest abdomen pelvis showing acute intertrochanteric fracture of left femoral neck.  Status post operative intervention, had some significant blood loss, will transfuse 2 units of packed red blood cells  Atrial fibrillation (HCC)  Hx PAF, HR stable, controlled A-fib  ECG on admission with atrial fibrillation HR 99, QTc 410, no ST-T changes or ischemia.  Continue PTA carvedilol 6.25 mg twice daily  Resume eliquis today  CHF (congestive heart failure) (HCC)  Patient appearing euvolemic on exam, benign lung exam, no peripheral edema patient denies any CP or SOB.  Does not appear to have acute exacerbation.  .  CXR showing prominent interstitial markings bilaterally suspicious for mild edema.  CT chest abdomen pelvis showing probable trace bilateral pleural effusions.  And mild patchy groundglass opacities and interlobular septal thickening in the lungs consistent with interstitial edema.  Patient states he recently recovered from pneumonia a few months ago.  Hold Lasix for today  Benign essential HTN  BP stable, hemodynamically stable  Monitor with routine vital signs  Setting of worsening anemia we will hold all antihypertensives  Stage 3a chronic kidney disease (HCC)  Lab Results   Component Value Date    EGFR 17 03/21/2025    EGFR 13 03/20/2025    EGFR 22 03/19/2025    CREATININE 3.27 (H) 03/21/2025    CREATININE 3.95 (H) 03/20/2025    CREATININE 2.65 (H) 03/19/2025   Creatinine 1.52 on  admission  Patient with worsening kidney injury with creatinine 3.95 suspect this is from prior insult secondary to hypotension would expect this to improve over next 24 to 48 hours, will consult nephrology for further management  Patient also did receive IV contrast on 3/17  Creat oimproving today with fluids  Diabetic nephropathy associated with type 2 diabetes mellitus (HCC)  Lab Results   Component Value Date    HGBA1C 7.9 (H) 03/18/2025     Recent Labs     03/20/25  1121 03/20/25  1529 03/20/25  2111 03/21/25  0720   POCGLU 106 138 178* 125     Blood Sugar Average: Last 72 hrs:  (P) 229.7695003303569351  Home regimen: Glipizide 10 mg twice daily, and metformin 500 mg daily-hold while inpatient.  Start insulin sliding scale with fingersticks every 6 hours while NPO  Hypoglycemia protocol  Chronic lymphocytic leukemia (HCC)  History of CLL/SLL previously treated with 4 cycles of BR (June 2012).  Outpatient plan is surveillance.  WBC elevated in setting of CLL.  Patient anemia stable  Hypothyroidism  Continue PTA levothyroxine  ABLA (acute blood loss anemia)  Transfuse 2 units packed red blood cells leukoreduced and irradiated in the setting of CLL  RAINE (acute kidney injury) (HCC)  Secondary to hypotension from anemia, will continue on fluids, also received IV contrast  Type 2 diabetes mellitus with stage 3b chronic kidney disease and hypertension (HCC)  Lab Results   Component Value Date    HGBA1C 7.9 (H) 03/18/2025       Recent Labs     03/20/25  1121 03/20/25  1529 03/20/25  2111 03/21/25  0720   POCGLU 106 138 178* 125       Blood Sugar Average: Last 72 hrs:  (P) 229.7220444343074822      VTE Pharmacologic Prophylaxis: VTE Score: 5 High Risk (Score >/= 5) - Pharmacological DVT Prophylaxis Ordered: apixaban (Eliquis). Sequential Compression Devices Ordered.    Mobility:   Basic Mobility Inpatient Raw Score: 10  JH-HLM Goal: 4: Move to chair/commode  JH-HLM Achieved: 4: Move to chair/commode  JH-HLM Goal  achieved. Continue to encourage appropriate mobility.    Patient Centered Rounds: I performed bedside rounds with nursing staff today.   Discussions with Specialists or Other Care Team Provider:     Education and Discussions with Family / Patient: Updated  (significant other) at bedside.    Current Length of Stay: 3 day(s)  Current Patient Status: Inpatient   Certification Statement: The patient will continue to require additional inpatient hospital stay due to kathi, anemia  Discharge Plan: Anticipate discharge in 24-48 hrs to discharge location to be determined pending rehab evaluations.    Code Status: Level 1 - Full Code    Subjective   Patient denies any acute complaints    Objective :  Temp:  [97.8 °F (36.6 °C)-99.7 °F (37.6 °C)] 98.9 °F (37.2 °C)  HR:  [74-87] 80  BP: (106-154)/(52-77) 130/63  Resp:  [14-18] 16  SpO2:  [92 %-95 %] 95 %  O2 Device: Nasal cannula  Nasal Cannula O2 Flow Rate (L/min):  [2 L/min-3 L/min] 2 L/min    Body mass index is 28.61 kg/m².     Input and Output Summary (last 24 hours):     Intake/Output Summary (Last 24 hours) at 3/21/2025 1037  Last data filed at 3/21/2025 0550  Gross per 24 hour   Intake 1685.83 ml   Output 2050 ml   Net -364.17 ml       Physical Exam  Vitals and nursing note reviewed.   Constitutional:       General: He is not in acute distress.     Appearance: He is well-developed. He is not toxic-appearing or diaphoretic.   HENT:      Head: Normocephalic and atraumatic.   Eyes:      General: No scleral icterus.     Conjunctiva/sclera: Conjunctivae normal.   Cardiovascular:      Rate and Rhythm: Normal rate and regular rhythm.      Heart sounds: No murmur heard.     No friction rub. No gallop.   Pulmonary:      Effort: Pulmonary effort is normal. No respiratory distress.      Breath sounds: Normal breath sounds. No stridor. No wheezing, rhonchi or rales.   Chest:      Chest wall: No tenderness.   Abdominal:      General: There is no distension.       Palpations: Abdomen is soft. There is no mass.      Tenderness: There is no abdominal tenderness. There is no guarding or rebound.      Hernia: No hernia is present.   Musculoskeletal:         General: No swelling or tenderness.      Cervical back: Neck supple.   Skin:     General: Skin is warm and dry.      Capillary Refill: Capillary refill takes less than 2 seconds.   Neurological:      Mental Status: He is alert and oriented to person, place, and time.   Psychiatric:         Mood and Affect: Mood normal.           Lines/Drains:              Lab Results: I have reviewed the following results:   Results from last 7 days   Lab Units 03/21/25  0440   WBC Thousand/uL 25.68*   HEMOGLOBIN g/dL 8.9*   HEMATOCRIT % 26.6*   PLATELETS Thousands/uL 99*   SEGS PCT % 18*   LYMPHO PCT % 79*   MONO PCT % 1*   EOS PCT % 2     Results from last 7 days   Lab Units 03/21/25  0440   SODIUM mmol/L 132*   POTASSIUM mmol/L 4.8   CHLORIDE mmol/L 98   CO2 mmol/L 25   BUN mg/dL 57*   CREATININE mg/dL 3.27*   ANION GAP mmol/L 9   CALCIUM mg/dL 8.5   ALBUMIN g/dL 3.5   TOTAL BILIRUBIN mg/dL 0.67   ALK PHOS U/L 65   ALT U/L 9   AST U/L 25   GLUCOSE RANDOM mg/dL 133     Results from last 7 days   Lab Units 03/17/25  2107   INR  1.14     Results from last 7 days   Lab Units 03/21/25  0720 03/20/25  2111 03/20/25  1529 03/20/25  1121 03/20/25  0712 03/19/25  2055 03/19/25  1514 03/19/25  1112 03/19/25  0714 03/19/25  0017 03/18/25  2111 03/18/25  1938   POC GLUCOSE mg/dl 125 178* 138 106 98 280* 272* 318* 276* 327* 267* 244*     Results from last 7 days   Lab Units 03/18/25  0443   HEMOGLOBIN A1C % 7.9*           Recent Cultures (last 7 days):         Imaging Results Review: No pertinent imaging studies reviewed.  Other Study Results Review: No additional pertinent studies reviewed.    Last 24 Hours Medication List:     Current Facility-Administered Medications:     acetaminophen (TYLENOL) tablet 650 mg, Q6H PRN    [Held by provider] amLODIPine  (NORVASC) tablet 10 mg, Daily    apixaban (ELIQUIS) tablet 5 mg, BID    brimonidine tartrate 0.2 % ophthalmic solution 1 drop, TID    carvedilol (COREG) tablet 6.25 mg, BID With Meals    docusate sodium (COLACE) capsule 100 mg, BID    fish oil capsule 1,000 mg, Daily    HYDROmorphone HCl (DILAUDID) injection 0.2 mg, Q2H PRN    insulin glargine (LANTUS) subcutaneous injection 10 Units 0.1 mL, HS    insulin lispro (HumALOG/ADMELOG) 100 units/mL subcutaneous injection 1-5 Units, TID With Meals **AND** Fingerstick Glucose (POCT), 4x Daily AC and at bedtime    insulin lispro (HumALOG/ADMELOG) 100 units/mL subcutaneous injection 3 Units, TID With Meals    levothyroxine tablet 75 mcg, Early Morning    morphine (MSIR) IR tablet 7.5 mg, Q4H PRN **OR** morphine (MSIR) IR tablet 15 mg, Q4H PRN    multi-electrolyte (Plasmalyte-A/Isolyte-S PH 7.4/Normosol-R) IV solution, Continuous    multivitamin-minerals (CENTRUM) tablet 1 tablet, Daily    pravastatin (PRAVACHOL) tablet 40 mg, Daily With Dinner    timolol (TIMOPTIC) 0.5 % ophthalmic solution 1 drop, BID    Administrative Statements   Today, Patient Was Seen By: Kyle Dodge DO      **Please Note: This note may have been constructed using a voice recognition system.**

## 2025-03-21 NOTE — CASE MANAGEMENT
Case Management Progress Note    Patient name Abelino Renee  Location /-01 MRN [id number]  : 1945 Date 3/21/2025       LOS (days): 3  Geometric Mean LOS (GMLOS) (days): 4.4  Days to GMLOS:1.4        OBJECTIVE:    Current admission status: Inpatient  Preferred Pharmacy:   [facility] [address] [place] [address]  Phone: [phone] Fax: [phone]    [facility] Mail Delivery - [facility] [address] [facility]  [address] [facility] [address]  Phone: [phone] Fax: [phone]    Primary Care Provider: Edel Wright MD    Primary Insurance: MEDICARE  Secondary Insurance: COMMERCIAL MISCELLANEOUS    PROGRESS NOTE:  Patient pre-accepted at 4 [facility] facilities. Choice list provided to the patient who reported he would review with his spouse and let CM know facility choice.

## 2025-03-21 NOTE — ASSESSMENT & PLAN NOTE
Lab Results   Component Value Date    HGBA1C 7.9 (H) 03/18/2025     Recent Labs     03/20/25  1121 03/20/25  1529 03/20/25  2111 03/21/25  0720   POCGLU 106 138 178* 125     Blood Sugar Average: Last 72 hrs:  (P) [id number]  Home regimen: Glipizide 10 mg twice daily, and metformin 500 mg daily-hold while inpatient.  Start insulin sliding scale with fingersticks every 6 hours while NPO  Hypoglycemia protocol

## 2025-03-21 NOTE — ASSESSMENT & PLAN NOTE
Lab Results   Component Value Date    HGBA1C 7.9 (H) 03/18/2025     Chronic Kidney Disease Stage IIIB  -- outpatient nephrologist: Dr. Rocha ([facility])  -- baseline creatinine: 1.4 to 1.6 mg/dL  -- Etiology: Presumed be secondary to diabetic kidney disease, hypertensive nephrosclerosis    Diabetes mellitus type 2  -hemoglobin A1c: 7.9 (A1C values may be falsely elevated or decreased in those with CKD, assay method should be certified by National glycohemoglobin standardization Program). Target A1C less then 7 (will need to be individualized for each patient), and would utilize A1C  in conjunction with continuous glucose monitoring (CGM).  It should also be noted that the A1c with more advanced kidney disease would be inaccurate due to decreased red blood cell life along with anemia.  -current medications: Insulin lispro and insulin glargine  -proteinuria: Yes as an outpatient subnephrotic range  -please follow with an ophthalmologist and podiatrist every year  -continued self monitoring of blood glucose at home  -Management in CKD Recommendations:   Metformin:  Avoid if creatinine clearance is less than 30 cc/min (concern for lactic acidosis)  Sodium-Glucose Cotransporter-2 (SGLT2) Inhibitors:  May see an acute drop in the eGFR initially when starting the medication but then a stabilization of the renal function, with slower loss of renal function as compared to placebo.  Relative risk of end-stage renal disease, doubling of serum creatinine or death from renal causes were also found to be lower as compared to placebo. ([facility]).  DAPA-CKD study, showed that patients with chronic kidney disease regardless of the presence or absence of diabetes the risk of composite of a sustained decline in the estimated GFR of at least 50%, end-stage renal disease or death from renal or cardiovascular causes was significantly lower with Dapagliflozin than with placebo. EMPEROR-Reduced study also showed beneficial effects.  EMPA-CKD, among wide range of patients with CKD, who were at risk for progression, empagliflozin led a lower risk of kidney disease progression or death from cardiovascular causes than placebo.  If no contraindications exist I would recommend this medication added to the regiment. If eGFR < 60 cc/min (avoid ertugliflozin); avoid or caution with GFR < 20 mL/min, not an absolute contraindication, likely lower benefits   Sulfonylureas:  Preferred short-acting (glipizide, glimepiride, repaglinide), relatively safe in patients with non dialysis CKD  Thiazolidinedones/Alpha-Gluosidase inhibitors/Dipeptidyl peptidase-4 inhibitors:  Generally not considered first-line agents in CKD (limited data in long-term safety and efficacy)  Insulin:  Starting dose may need to be lower than what would ordinarily be used, as there is a decrease metabolism of insulin (no dose adjustment if the GFR > 50 mL/min, dose should be reduced to 75% of baseline if GFR 10-50 mL/min, and 50% baseline if GFR < 10 mL/min)  Finerenone: Patients with CKD with type 2 diabetes, treatment led to lower risks of CKD progression and cardiovascular events than placebo (FIDELIO-DKD). Avoid or caution if serum potassium more then 4.8.    Hypertension  -- Stage II (American College of Cardiology/American Heart Association)  -- with underlying chronic kidney disease   -- Body mass index is 28.61 kg/m².  -- Volume status: Hypervolemic/volume overload  -- Etiology: Primary hypertension and underlying chronic kidney disease  -- Secondary Work-Up: No clinical indication  -- Target Goal: < 130/80 (ACC/AHA, CKD with proteinuria, CKD in diabetics) ; if tolerated can target SBP < 120 mmHg in high cardiovascular risk ( Age > 75, CKD, CVD, No Diabetics SPRINT)  -- Lifestyle Modifications: DASH Diet, Weight Loss for ideal body weight, 45 mins of cardiovascular exercise 3 times a week as tolerated, and if no contraindications exist, smoking cessation, limit alcohol use, avoid  NSAIDS, monitor blood pressure at home  -- Status: Blood pressure blood pressure at target  -- Current antihypertensive regiment: Currently holding amlodipine.  Currently on carvedilol 6.25 mg twice a day  -- Changes: Continue carvedilol at this time.  Avoid RAAS blockade.  No objections to as needed IV diuretics as needed for volume management for low urine output.      Recent Labs     03/20/25  1529 03/20/25  2111 03/21/25  0720 03/21/25  1132   POCGLU 138 178* 125 118       Blood Sugar Average: Last 72 hrs:  (P) 223.3779376338339652

## 2025-03-21 NOTE — ASSESSMENT & PLAN NOTE
Patient with PMHx CLL, A-fib on Eliquis, HTN, hypothyroid, NIDDM type 2, and CKD who presents after a fall after tripping over floorboards in [facility].  Patient fell on his left side and states he was lying on the ground for about 3 hours unable to weight-bear.  .  CT chest abdomen pelvis showing acute intertrochanteric fracture of left femoral neck.  Status post operative intervention, had some significant blood loss, will transfuse 2 units of packed red blood cells

## 2025-03-21 NOTE — ASSESSMENT & PLAN NOTE
History of CLL/SLL previously treated with 4 cycles of BR (June 2012).  Outpatient plan is surveillance.  WBC elevated in setting of CLL.  Patient anemia stable

## 2025-03-21 NOTE — ASSESSMENT & PLAN NOTE
-- Postoperative blood loss  --Underlying CLL and abnormal SPEP as an outpatient  --Admission hemoglobin 12.2 hemoglobin dropped to as low as 6.8  --Transfusions as per the primary team  --Hematology on board currently receiving a blood transfusion  --Hemoglobin improving to 8.9

## 2025-03-21 NOTE — ASSESSMENT & PLAN NOTE
S/p left hip CMN for IT fracture on 3/18 with Dr. Nevarez  WBAT LLE  Hgb 8.9 today,continue to monitor, transfuse per primary team   PT/OT  Medical management per primary team   Pain control per primary team   DVT ppx per primary team: OK to resume Eliquis   Ortho stable. OK for discharge from an orthopedic standpoint when medically stable  Follow-up with Dr. Nevarez as outpatient

## 2025-03-21 NOTE — ASSESSMENT & PLAN NOTE
-- Occurred during this hospitalization, admission creatinine was 1.5 mg/dL which is his baseline creatinine  --Creatinine peaked at 3.95 mg/dL and now renal function is improving today to 3.27 mg/dL  --With underlying chronic kidney disease stage IIIb  --Suspect this has progressed to acute tubular necrosis, in the setting of hemodynamic fluctuations intraoperatively, acute on chronic anemia, with contrast exposure on March 17  --Nonoliguric good urine output  --Avoid NSAIDs  --Received colloid expansion with blood transfusion and balanced crystalloids with Isolyte  --CT scan with contrast prior to RAINE did not show any evidence of hydronephrosis.  --Urinary retention protocol  -- As the renal function is improving no indication for imaging  --Discontinue IV fluids  --BMP in the morning

## 2025-03-21 NOTE — ASSESSMENT & PLAN NOTE
Lab Results   Component Value Date    EGFR 17 03/21/2025    EGFR 13 03/20/2025    EGFR 22 03/19/2025    CREATININE 3.27 (H) 03/21/2025    CREATININE 3.95 (H) 03/20/2025    CREATININE 2.65 (H) 03/19/2025   Creatinine 1.52 on admission  Patient with worsening kidney injury with creatinine 3.95 suspect this is from prior insult secondary to hypotension would expect this to improve over next 24 to 48 hours, will consult nephrology for further management  Patient also did receive IV contrast on 3/17  Creat oimproving today with fluids

## 2025-03-21 NOTE — ASSESSMENT & PLAN NOTE
Wt Readings from Last 3 Encounters:   03/17/25 80.4 kg (177 lb 4 oz)   02/20/25 82.3 kg (181 lb 8 oz)   10/28/24 82.1 kg (181 lb)   -- Weight is stable  --Discontinue intravenous fluid  -- Chest x-ray shows resolution of interstitial pulmonary edema  -- 2D echocardiogram from June 2024 reviewed.  Preserved ejection fraction of 60% with mild concentric left ventricular hypertrophy.  Systolic function is normal.  Diastolic function is normal.

## 2025-03-21 NOTE — PHYSICAL THERAPY NOTE
"   PHYSICAL THERAPY Treatment  DATE: 03/21/25  TIME: 4083-8477    NAME:  Abelino Renee  AGE:   79 y.o.  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    appendectomy-colon resection    Chronic lymphocytic leukemia of B-cell type ([facility])     \"remission 4-5yrs\"-chemo    DDD (degenerative disc disease), cervical     Diabetes mellitus (HCC)     bs checked by pt at home at 6am =92    Diabetic neuropathy ([facility])     Diabetic retinopathy ([facility])     Elevated WBC count     Heart attack ([facility]) 04/2015    \"labeled as that and than tested later after appendix removed and stress test showed negative\"    History of cancer chemotherapy     last treatment approx 5yrs ago    Hypertension     Myocardial infarction ([facility]) 04/2015    Scalp psoriasis     Shingles 2/10-15    Toe injury     left great toe, - 2017-internal braec-to be removed today 3/30/2018    Wears glasses      Past Surgical History:   Procedure Laterality Date    APPENDECTOMY      BONE BIOPSY Left 3/30/2018    Procedure: GREAT TOE BONE BIOPSY;  Surgeon: Gerber Multani DPM;  Location: [facility];  Service: Podiatry    BOWEL RESECTION      CATARACT EXTRACTION Bilateral     CHOLECYSTECTOMY      COLECTOMY  06/2015    EYE SURGERY Right     \"retinal peel\"    FOOT FUSION Left 12/6/2017    Procedure: HALLUX INTERPHALANGEAL JOINT FUSION;  Surgeon: Gerber Multani DPM;  [facility];  Service: Podiatry    HARDWARE REMOVAL Left 4/18/2018    Procedure: REMOVAL STAPLES LEFT TOE;  Surgeon: Gerber Multani DPM;  [facility];  Service: Podiatry    ORIF FOOT FRACTURE Left 12/6/2017    Procedure: HALLUX " INTERPHALANGEAL JOINT INTERNAL FIXATION; REPAIR OF ULCERATION WITH EXCISIONS AND REVISION OF SKIN HALLUX WITH USE OF ALLOGRAFT;  Surgeon: Gerber Multain DPM;  [facility];  Service: Podiatry    MN COLONOSCOPY FLX DX W/COLLJ SPEC WHEN PFRMD N/A 4/21/2016    Procedure: COLONOSCOPY;  Surgeon: Minal Ruelas DO;  Location: [facility];  Service: Gastroenterology    MN OPTX FEM SHFT FX W/INSJ IMED IMPLT W/WO SCREW Left 3/18/2025    Procedure: INSERTION NAIL IM FEMUR ANTEGRADE (TROCHANTERIC);  Surgeon: Jacqui Nevarez DO;  Location: [facility];  Service: Orthopedics    MN REMOVAL IMPLANT DEEP Left 3/30/2018    Procedure: REMOVAL HARDWARE FOOT;  Surgeon: Gerber Multani DPM;  [facility];  Service: Podiatry    RETINAL DETACHMENT SURGERY      TONSILLECTOMY          03/21/25 1417   PT Last Visit   PT Visit Date 03/21/25   Note Type   Note Type Treatment   Pain Assessment   Pain Assessment Tool 0-10   Pain Score 6   Pain Location/Orientation Orientation: Left;Location: Hip   Hospital Pain Intervention(s) Repositioned;Ambulation/increased activity;Rest   Restrictions/Precautions   Weight Bearing Precautions Per Order Yes   LLE Weight Bearing Per Order WBAT   Other Precautions Chair Alarm;WBS;Fall Risk;Pain   General   Chart Reviewed Yes   Family/Caregiver Present Yes   Cognition   Overall Cognitive Status WFL   Arousal/Participation Alert   Orientation Level Oriented X4   Subjective   Subjective Pt continueing to report left hip pain.   Bed Mobility   Sit to Supine 2  Maximal assistance   Additional items Assist x 1;Bedrails;Increased time required;Impulsive;Verbal cues;LE management  (flat bed)   Transfers   Sit to Stand 3  Moderate assistance   Additional items Assist x 1;Armrests;Increased time required;Impulsive;Verbal cues  (RW)   Stand to Sit 3  Moderate assistance   Additional items Assist x 1;Bedrails;Increased time required;Verbal cues;Impulsive   Stand pivot 2  Maximal assistance   Additional items  Assist x 1;Increased time required;Impulsive;Verbal cues  (RW)   Additional Comments Pt able to take 3 lateral side steps each with RW, and turn in place.  pt requires max assist at gait belt each time he advances RLE.   Balance   Static Sitting Fair -   Dynamic Sitting Fair -   Static Standing Fair -  (RW)   Ambulatory Poor -  (RW)   Endurance Deficit   Endurance Deficit Yes   Activity Tolerance   Activity Tolerance Patient limited by fatigue;Patient limited by pain   Exercises   Heelslides Supine;15 reps;AAROM;Left  (2 sets)   Assessment   Prognosis Fair   Problem List Decreased range of motion;Decreased strength;Decreased endurance;Impaired balance;Decreased mobility;Decreased safety awareness;Pain;Orthopedic restrictions   Assessment Pt exhibits physical deficits noted in problem list above.  Deficits listed contribute to functional limitations that are significant from the patient PLOF and include: difficulty with bed mobility, impaired sitting balance, impaired standing balance, inability to perform safe transfers, tolerance for OOB functional activities, unsafe/inefficient ambulation, fall risk, and unable to safely manage stairs/steps/ramp    Additionally, patient is limited by restrictions/precautions/DME: LLE WBAT.    Additional considerations affecting pt's discharge planning: Insufficient or unavailable assistance at home, Significant amount of stairs to manage in order to access living quarters, Inability to manage basic self care ADLs with the assistance that is currently available, and Inability to perform necessary transfers and/or mobility out of bed, with the assistance that is currently available    During today's session, initiated activity with AAROM exercises of left hip both with hip IR/ER and with heel slides.  Pt responded well to exercises.  In sitting, pt performing scooting forward and trunk ROM flexion/side bending.  Pt progressed to standing at walker with heavy assist initially.  With  max cues and assistance, pt able to make posture and balance corrections with walker, but has difficulty sustaining corrections.  Pt performed trunk ROM flex/ext 5x in standing, before attempting repeated single leg marching, 5x each.  Pt poorly tolerated wbing on LLE needing assist.  Pt performed stand pivot transfer with RW, but needed a lot of assist to perform and prevent falling.    The -PAC Mobility Score was used to assist in determining pt safety w/ mobility/self care & appropriate d/c recommendations, see above for scores. Patient's clinical presentation is evolving due to significant acute change in functional independence, uncontrolled pain, abnormal lab values, significant need for care assistance compared to baseline, recent surgery/procedure, ongoing medical management needs, and complicated social/support system.     From a PT/mobility standpoint given the above findings, DC recommendation is level: II (Moderate Rehab Resource Intensity)    Considering the patient's PLOF, co-morbidities, acute functional limitations, functional outcome measures, and/or goal to progress functional independence; this patient would continue to benefit from skilled Physical Therapy intervention in the acute care setting.   Barriers to Discharge Decreased caregiver support;Inaccessible home environment   Goals   Patient Goals return home   STG Expiration Date 03/31/25   Short Term Goal #1 1: Pt will perform supine<>sit transfer on flat bed, mod I. 2: Pt will perform stand pivot transfer with RW, mod I. 3: Pt will ambulate 150' with reciprocal gait and appropriate pace using RW, mod I. 4: Pt will perform written HEP, mod I. 5: Pt will ascend/descend a curb step and/or stairs require to mobilize around the the patient's home with RW/rails, Sup A.   Plan   Treatment/Interventions Functional transfer training;LE strengthening/ROM;Elevations;Therapeutic exercise;Endurance training;Bed mobility;Gait training   Progress Slow  progress, medical status limitations   Discharge Recommendation   Rehab Resource Intensity Level, PT II (Moderate Resource Intensity)   AM-PAC Basic Mobility Inpatient   Turning in Flat Bed Without Bedrails 2   Lying on Back to Sitting on Edge of Flat Bed Without Bedrails 2   Moving Bed to Chair 2   Standing Up From Chair Using Arms 2   Walk in Room 1   Climb 3-5 Stairs With Railing 1   Basic Mobility Inpatient Raw Score 10   Turning Head Towards Sound 4   Follow Simple Instructions 3   Low Function Basic Mobility Raw Score  17   Low Function Basic Mobility Standardized Score  27.46   [facility] Highest Level Of Mobility   -[facility] Goal 4: Move to chair/commode   -[facility] Achieved 5: Stand (1 or more minutes)   Education   Education Provided Mobility training;Home exercise program;Assistive device   Patient Reinforcement needed;Demonstrates verbal understanding   End of Consult   Patient Position at End of Consult Supine;Bed/Chair alarm activated;All needs within reach     The patient's AM-PAC Basic Mobility Inpatient Short Form Raw Score is 10. A Raw score of less than 16 suggests the patient may benefit from discharge to post-acute rehabilitation services. Please also refer to the recommendation of the Physical Therapist for safe discharge planning.    Judson Owen, PT, DPT  [place] Licensure #[id number]

## 2025-03-21 NOTE — ASSESSMENT & PLAN NOTE
Lab Results   Component Value Date    HGBA1C 7.9 (H) 03/18/2025       Recent Labs     03/20/25  1121 03/20/25  1529 03/20/25  2111 03/21/25  0720   POCGLU 106 138 178* 125       Blood Sugar Average: Last 72 hrs:  (P) [id number]

## 2025-03-21 NOTE — PROGRESS NOTES
"Progress Note - Orthopedics   Name: Abelino Renee 79 y.o. male I MRN: [id number]  Unit/Bed#: -01 I Date of Admission: 3/17/2025   Date of Service: 3/21/2025 I Hospital Day: 3    Assessment & Plan  Closed fracture of neck of left femur (HCC)  S/p left hip CMN for IT fracture on 3/18 with Dr. Nevarez  WBAT LLE  Hgb 8.9 today,continue to monitor, transfuse per primary team   PT/OT  Medical management per primary team   Pain control per primary team   DVT ppx per primary team: OK to resume Eliquis   Ortho stable. OK for discharge from an orthopedic standpoint when medically stable  Follow-up with Dr. Nevarez as outpatient      Ortho stable. OK for discharge from an orthopedic standpoint when medically stable.  Please contact the SecureChat role for the Orthopedics service with any questions/concerns.    Subjective   79 y.o.male evaluated at bedside.  Patient states he is tolerating oral liquids.  Voiding appropriately.  Resting comfortably in bed and denies pain.  States he worked with physical therapy and he was able to get from the chair to the bed.  Notes that physical therapist states that the session went \"great\".  Notes that social work is discussing possible acute rehab.  Patient agreeable to acute rehab.  He denies fever, chills.  He denies numbness and tingling of the left lower extremity.    Objective :  Temp:  [98 °F (36.7 °C)-99.6 °F (37.6 °C)] 98.9 °F (37.2 °C)  HR:  [79-87] 80  BP: (129-154)/(52-70) 130/63  Resp:  [16] 16  SpO2:  [93 %-95 %] 95 %  O2 Device: Nasal cannula  Nasal Cannula O2 Flow Rate (L/min):  [2 L/min] 2 L/min    Physical Exam  Vitals and nursing note reviewed.   Constitutional:       General: He is not in acute distress.     Appearance: He is well-developed.   HENT:      Head: Normocephalic and atraumatic.   Eyes:      Conjunctiva/sclera: Conjunctivae normal.   Cardiovascular:      Rate and Rhythm: Normal rate and regular rhythm.      Heart sounds: No murmur heard.  Pulmonary:      " "Effort: Pulmonary effort is normal. No respiratory distress.      Breath sounds: Normal breath sounds.   Abdominal:      Palpations: Abdomen is soft.      Tenderness: There is no abdominal tenderness.   Musculoskeletal:         General: Swelling and tenderness present.      Cervical back: Neck supple.   Skin:     General: Skin is warm and dry.      Capillary Refill: Capillary refill takes less than 2 seconds.   Neurological:      Mental Status: He is alert.   Psychiatric:         Mood and Affect: Mood normal.       Musculoskeletal: Left lower extremity  Surgical dressings clean dry and intact.  Thigh and calf musculature soft and compressible  Mild tanesha-incisional tenderness  Full sensation to dorsal aspect of the foot.  Patient with prior amputation of all digits.  Incisional sites well-healed without breakdown or erythema.  +Plantar/dorsiflexion       Lab Results: I have reviewed the following results:  Recent Labs     03/19/25  0518 03/19/25  0914 03/19/25  1902 03/20/25  0432 03/21/25  0440   WBC 30.77*  --  36.01* 29.62* 25.68*   HGB 6.9*   < > 8.2* 7.6* 8.9*   HCT 22.2*   < > 25.4* 23.6* 26.6*   PLT 96*  --  102* 98* 99*   BUN 46*  --   --  61* 57*   CREATININE 2.65*  --   --  3.95* 3.27*    < > = values in this interval not displayed.     Blood Culture:    Lab Results   Component Value Date    BLOODCX No Growth After 5 Days. 06/07/2024     Wound Culture: No results found for: \"WOUNDCULT\"    Other Study Results Review: No additional pertinent studies reviewed.  "

## 2025-03-21 NOTE — OCCUPATIONAL THERAPY NOTE
"  Occupational Therapy Tx Note     Patient Name: Abelino Renee  Today's Date: 3/21/2025  Problem List  Principal Problem:    Closed fracture of neck of left femur (HCC)  Active Problems:    Benign essential HTN    Chronic lymphocytic leukemia (HCC)    Stage 3a chronic kidney disease (HCC)    Hypothyroidism    Diabetic nephropathy associated with type 2 diabetes mellitus (HCC)    CHF (congestive heart failure) (HCC)    Atrial fibrillation (HCC)    ABLA (acute blood loss anemia)    RAINE (acute kidney injury) (HCC)    Type 2 diabetes mellitus with stage 3b chronic kidney disease and hypertension (HCC)    Hyponatremia            03/21/25 1207   OT Last Visit   OT Visit Date 03/21/25   Note Type   Note Type Treatment   Pain Assessment   Pain Assessment Tool Dietz-Baker FACES   Dietz-Baker FACES Pain Rating 6   Pain Location/Orientation Orientation: Left;Location: Leg  (thigh)   Restrictions/Precautions   Weight Bearing Precautions Per Order Yes   LLE Weight Bearing Per Order WBAT   ADL   Where Assessed Edge of bed   Toileting Assistance  4  Minimal Assistance   Toileting Deficit Perineal hygiene;Use of bedpan/urinal setup   Bed Mobility   Supine to Sit 3  Moderate assistance   Additional items Assist x 1;Verbal cues;Increased time required   Additional Comments Utilized tre pad to assist pt in scooting to EOB. Pt able to perform bridging in supine with mod Ax 1 while receiving assist to unweight LLE.   Transfers   Sit to Stand 2  Maximal assistance   Additional items Assist x 1;Verbal cues;Increased time required  (bed elevated)   Stand to Sit 2  Maximal assistance   Additional items Assist x 1;Verbal cues;Increased time required   Stand pivot 2  Maximal assistance   Additional items Assist x 1;Verbal cues  (RW)   Additional Comments Pt able to take several lateral steps with max A x 1 to weightshift in standing. Pt endorsing BUE fatigue t/o.   Subjective   Subjective \"This went a little better than yesterday\" "   Cognition   Overall Cognitive Status WFL   Arousal/Participation Alert   Attention Within functional limits   Orientation Level Oriented X4   Memory Decreased recall of precautions   Following Commands Follows one step commands with increased time or repetition   Activity Tolerance   Activity Tolerance Patient limited by fatigue;Patient limited by pain   Medical Staff Made Aware PT Lorenzo   Assessment   Assessment Pt seen for OT tx session with focus on functional balance, functional mobility, ADL status, and transfer safety. Pt received supine in bed. Pt eager to participate with therapy. Pt able to perform supine>sit with mod Ax 1. Pt able to tolerate multiple reps of bridging in order to facilitate bed mobility. Pt demonstrated F+ balance seated EOB while attempting to use urinal with min A to manage. With bed at elevated position, pt able to perform sit>stand with max Ax 1 and stand pivot with max Ax 1. There was some improvement in pt's ability to weightshift in order to successfully perform bed >chair transfer. Pt's pain also improved today.  Patient continues to be functioning below baseline level, occupational performance remains limited secondary to factors listed above, and pt at increased risk for falls and injury.  From OT standpoint, recommendation at time of d/c would be level 2 mod intensity resources.  Patient to benefit from continued Occupational Therapy treatment while in the hospital to address deficits as defined above and maximize level of functional independence with ADLs and functional mobility. Pt left with call bell in reach, tray table in reach, needs met, chair alarm activated.   Plan   Treatment Interventions ADL retraining;Functional transfer training;Patient/family training;Endurance training;UE splinting;Activityengagement   Goal Expiration Date 03/30/25   OT Treatment Day 1   OT Frequency 3-5x/wk   Discharge Recommendation   Rehab Resource Intensity Level, OT II (Moderate Resource  Intensity)   Additional Comments  The patient's raw score on the AM-PAC Daily Activity inpatient short form is 18, standardized score is 38.66, less than 39.4. Patients at this level are likely to benefit from DC to post-acute rehabilitation services. Please refer to the recommendation of the Occupational Therapist for safe DC planning.   AM-PAC Daily Activity Inpatient   Lower Body Dressing 2   Bathing 2   Toileting 2   Upper Body Dressing 4   Grooming 4   Eating 4   Daily Activity Raw Score 18   Daily Activity Standardized Score (Calc for Raw Score >=11) 38.66   AM-PAC Applied Cognition Inpatient   Following a Speech/Presentation 4   Understanding Ordinary Conversation 4   Taking Medications 4   Remembering Where Things Are Placed or Put Away 4   Remembering List of 4-5 Errands 3   Taking Care of Complicated Tasks 3   Applied Cognition Raw Score 22   Applied Cognition Standardized Score 47.83   End of Consult   Education Provided Yes   Patient Position at End of Consult Bedside chair;Bed/Chair alarm activated;All needs within reach   Nurse Communication Nurse aware of consult           Sara Landry OTR/L

## 2025-03-21 NOTE — PLAN OF CARE
Problem: OCCUPATIONAL THERAPY ADULT  Goal: Performs self-care activities at highest level of function for planned discharge setting.  See evaluation for individualized goals.  Description: Treatment Interventions: ADL retraining, Functional transfer training, Patient/family training, Endurance training, UE splinting, Activityengagement          See flowsheet documentation for full assessment, interventions and recommendations.   Outcome: Progressing  Note: Limitation: Decreased ADL status, Decreased self-care trans, Decreased high-level ADLs, Decreased endurance  Prognosis: Fair  Assessment: Pt seen for OT tx session with focus on functional balance, functional mobility, ADL status, and transfer safety. Pt received supine in bed. Pt eager to participate with therapy. Pt able to perform supine>sit with mod Ax 1. Pt able to tolerate multiple reps of bridging in order to facilitate bed mobility. Pt demonstrated F+ balance seated EOB while attempting to use urinal with min A to manage. With bed at elevated position, pt able to perform sit>stand with max Ax 1 and stand pivot with max Ax 1. There was some improvement in pt's ability to weightshift in order to successfully perform bed >chair transfer. Pt's pain also improved today.  Patient continues to be functioning below baseline level, occupational performance remains limited secondary to factors listed above, and pt at increased risk for falls and injury.  From OT standpoint, recommendation at time of d/c would be level 2 mod intensity resources.  Patient to benefit from continued Occupational Therapy treatment while in the hospital to address deficits as defined above and maximize level of functional independence with ADLs and functional mobility. Pt left with call bell in reach, tray table in reach, needs met, chair alarm activated.     Rehab Resource Intensity Level, OT: II (Moderate Resource Intensity)

## 2025-03-22 VITALS
WEIGHT: 177.25 LBS | TEMPERATURE: 98.8 F | HEART RATE: 88 BPM | RESPIRATION RATE: 16 BRPM | BODY MASS INDEX: 28.49 KG/M2 | OXYGEN SATURATION: 96 % | HEIGHT: 66 IN | SYSTOLIC BLOOD PRESSURE: 126 MMHG | DIASTOLIC BLOOD PRESSURE: 79 MMHG

## 2025-03-22 LAB
ANION GAP SERPL CALCULATED.3IONS-SCNC: 10 MMOL/L (ref 4–13)
BASOPHILS # BLD AUTO: 0.04 THOUSANDS/ÂΜL (ref 0–0.1)
BASOPHILS NFR BLD AUTO: 0 % (ref 0–1)
BUN SERPL-MCNC: 40 MG/DL (ref 5–25)
CALCIUM SERPL-MCNC: 8.7 MG/DL (ref 8.4–10.2)
CHLORIDE SERPL-SCNC: 99 MMOL/L (ref 96–108)
CO2 SERPL-SCNC: 25 MMOL/L (ref 21–32)
CREAT SERPL-MCNC: 2.05 MG/DL (ref 0.6–1.3)
EOSINOPHIL # BLD AUTO: 0.24 THOUSAND/ÂΜL (ref 0–0.61)
EOSINOPHIL NFR BLD AUTO: 1 % (ref 0–6)
ERYTHROCYTE [DISTWIDTH] IN BLOOD BY AUTOMATED COUNT: 14.8 % (ref 11.6–15.1)
GFR SERPL CREATININE-BSD FRML MDRD: 30 ML/MIN/1.73SQ M
GLUCOSE SERPL-MCNC: 160 MG/DL (ref 65–140)
GLUCOSE SERPL-MCNC: 166 MG/DL (ref 65–140)
GLUCOSE SERPL-MCNC: 166 MG/DL (ref 65–140)
HCT VFR BLD AUTO: 27.1 % (ref 36.5–49.3)
HGB BLD-MCNC: 9 G/DL (ref 12–17)
IMM GRANULOCYTES # BLD AUTO: 0.04 THOUSAND/UL (ref 0–0.2)
IMM GRANULOCYTES NFR BLD AUTO: 0 % (ref 0–2)
LYMPHOCYTES # BLD AUTO: 16.55 THOUSANDS/ÂΜL (ref 0.6–4.47)
LYMPHOCYTES NFR BLD AUTO: 77 % (ref 14–44)
MCH RBC QN AUTO: 31.4 PG (ref 26.8–34.3)
MCHC RBC AUTO-ENTMCNC: 33.2 G/DL (ref 31.4–37.4)
MCV RBC AUTO: 94 FL (ref 82–98)
MONOCYTES # BLD AUTO: 0.28 THOUSAND/ÂΜL (ref 0.17–1.22)
MONOCYTES NFR BLD AUTO: 1 % (ref 4–12)
NEUTROPHILS # BLD AUTO: 4.42 THOUSANDS/ÂΜL (ref 1.85–7.62)
NEUTS SEG NFR BLD AUTO: 21 % (ref 43–75)
NRBC BLD AUTO-RTO: 0 /100 WBCS
PLATELET # BLD AUTO: 100 THOUSANDS/UL (ref 149–390)
PMV BLD AUTO: 10 FL (ref 8.9–12.7)
POTASSIUM SERPL-SCNC: 4.3 MMOL/L (ref 3.5–5.3)
RBC # BLD AUTO: 2.87 MILLION/UL (ref 3.88–5.62)
SODIUM SERPL-SCNC: 134 MMOL/L (ref 135–147)
WBC # BLD AUTO: 21.57 THOUSAND/UL (ref 4.31–10.16)

## 2025-03-22 PROCEDURE — 99232 SBSQ HOSP IP/OBS MODERATE 35: CPT | Performed by: INTERNAL MEDICINE

## 2025-03-22 PROCEDURE — NC001 PR NO CHARGE: Performed by: INTERNAL MEDICINE

## 2025-03-22 PROCEDURE — 82948 REAGENT STRIP/BLOOD GLUCOSE: CPT

## 2025-03-22 PROCEDURE — 99239 HOSP IP/OBS DSCHRG MGMT >30: CPT | Performed by: INTERNAL MEDICINE

## 2025-03-22 PROCEDURE — 80048 BASIC METABOLIC PNL TOTAL CA: CPT | Performed by: INTERNAL MEDICINE

## 2025-03-22 PROCEDURE — 85025 COMPLETE CBC W/AUTO DIFF WBC: CPT | Performed by: INTERNAL MEDICINE

## 2025-03-22 RX ORDER — MORPHINE SULFATE 15 MG/1
7.5 TABLET ORAL EVERY 6 HOURS PRN
Qty: 10 TABLET | Refills: 0 | Status: SHIPPED | OUTPATIENT
Start: 2025-03-22 | End: 2025-03-23

## 2025-03-22 RX ORDER — INSULIN LISPRO 100 [IU]/ML
1-5 INJECTION, SOLUTION INTRAVENOUS; SUBCUTANEOUS
Start: 2025-03-22

## 2025-03-22 RX ORDER — INSULIN GLARGINE 100 [IU]/ML
10 INJECTION, SOLUTION SUBCUTANEOUS
Start: 2025-03-22

## 2025-03-22 RX ORDER — INSULIN LISPRO 100 [IU]/ML
3 INJECTION, SOLUTION INTRAVENOUS; SUBCUTANEOUS
Start: 2025-03-22

## 2025-03-22 RX ORDER — DOCUSATE SODIUM 100 MG/1
100 CAPSULE, LIQUID FILLED ORAL 2 TIMES DAILY
Start: 2025-03-22

## 2025-03-22 RX ADMIN — INSULIN LISPRO 3 UNITS: 100 INJECTION, SOLUTION INTRAVENOUS; SUBCUTANEOUS at 08:28

## 2025-03-22 RX ADMIN — OMEGA-3 FATTY ACIDS CAP 1000 MG 1000 MG: 1000 CAP at 08:28

## 2025-03-22 RX ADMIN — INSULIN LISPRO 1 UNITS: 100 INJECTION, SOLUTION INTRAVENOUS; SUBCUTANEOUS at 13:02

## 2025-03-22 RX ADMIN — HYDROMORPHONE HYDROCHLORIDE 0.2 MG: 0.2 INJECTION, SOLUTION INTRAMUSCULAR; INTRAVENOUS; SUBCUTANEOUS at 13:50

## 2025-03-22 RX ADMIN — BRIMONIDINE TARTRATE 1 DROP: 2 SOLUTION/ DROPS OPHTHALMIC at 08:28

## 2025-03-22 RX ADMIN — APIXABAN 5 MG: 5 TABLET, FILM COATED ORAL at 08:28

## 2025-03-22 RX ADMIN — Medication 1 TABLET: at 08:28

## 2025-03-22 RX ADMIN — TIMOLOL MALEATE 1 DROP: 5 SOLUTION OPHTHALMIC at 08:28

## 2025-03-22 RX ADMIN — CARVEDILOL 6.25 MG: 6.25 TABLET, FILM COATED ORAL at 08:28

## 2025-03-22 RX ADMIN — LEVOTHYROXINE SODIUM 75 MCG: 75 TABLET ORAL at 06:29

## 2025-03-22 RX ADMIN — MORPHINE SULFATE 15 MG: 15 TABLET ORAL at 13:02

## 2025-03-22 RX ADMIN — INSULIN LISPRO 1 UNITS: 100 INJECTION, SOLUTION INTRAVENOUS; SUBCUTANEOUS at 08:28

## 2025-03-22 RX ADMIN — INSULIN LISPRO 3 UNITS: 100 INJECTION, SOLUTION INTRAVENOUS; SUBCUTANEOUS at 13:02

## 2025-03-22 RX ADMIN — AMLODIPINE BESYLATE 10 MG: 10 TABLET ORAL at 08:28

## 2025-03-22 NOTE — ASSESSMENT & PLAN NOTE
Wt Readings from Last 3 Encounters:   03/17/25 80.4 kg (177 lb 4 oz)   02/20/25 82.3 kg (181 lb 8 oz)   10/28/24 82.1 kg (181 lb)   -- Weight is stable  --Discontinued intravenous fluid 3/21  -- Chest x-ray shows resolution of interstitial pulmonary edema  -- 2D echocardiogram from June 2024 reviewed.  Preserved ejection fraction of 60% with mild concentric left ventricular hypertrophy.  Systolic function is normal.  Diastolic function is normal.

## 2025-03-22 NOTE — ASSESSMENT & PLAN NOTE
Lab Results   Component Value Date    HGBA1C 7.9 (H) 03/18/2025       Recent Labs     03/21/25  1624 03/21/25  2102 03/22/25  0703 03/22/25  1114   POCGLU 168* 171* 160* 166*       Blood Sugar Average: Last 72 hrs:  (P) 193.4

## 2025-03-22 NOTE — ASSESSMENT & PLAN NOTE
Has some scrotal edema and left lower extremity swelling  CXR showing prominent interstitial markings bilaterally suspicious for mild edema.  CT chest abdomen pelvis showing probable trace bilateral pleural effusions.  And mild patchy groundglass opacities and interlobular septal thickening in the lungs consistent with interstitial edema.  Patient states he recently recovered from pneumonia a few months ago.  Torsemide on hold, can likely be restarted once cleared by nephrology

## 2025-03-22 NOTE — PROGRESS NOTES
Progress Note - Nephrology   Name: Abelino Renee 79 y.o. male I MRN: [id number]  Unit/Bed#: -01 I Date of Admission: 3/17/2025   Date of Service: 3/22/2025 I Hospital Day: 4     Assessment & Plan  RAINE (acute kidney injury) (HCC)  -- Occurred during this hospitalization, admission creatinine was 1.5 mg/dL which is his baseline creatinine  --Creatinine peaked at 3.95 mg/dL and now renal function is improving today to 2.0 mg/dL  --With underlying chronic kidney disease stage IIIb  --Suspect this has progressed to acute tubular necrosis, in the setting of hemodynamic fluctuations intraoperatively, acute on chronic anemia, with contrast exposure on March 17  --Nonoliguric good urine output  --Avoid NSAIDs  --Received colloid expansion with blood transfusion and balanced crystalloids with Isolyte  --CT scan with contrast prior to RAINE did not show any evidence of hydronephrosis.  --Urinary retention protocol  -- As the renal function is improving no indication for imaging  --Discontinued IV fluids 3/21  --Stable for discharge from a renal standpoint  Closed fracture of neck of left femur (HCC)  -- Status post left hip CMN for IT fracture on March 18  --Management as per orthopedic surgery  --Currently undergoing PT/OT  Benign essential HTN  --Blood pressure currently stable  --Amlodipine and coreg  CHF (congestive heart failure) (HCC)  Wt Readings from Last 3 Encounters:   03/17/25 80.4 kg (177 lb 4 oz)   02/20/25 82.3 kg (181 lb 8 oz)   10/28/24 82.1 kg (181 lb)   -- Weight is stable  --Discontinued intravenous fluid 3/21  -- Chest x-ray shows resolution of interstitial pulmonary edema  -- 2D echocardiogram from June 2024 reviewed.  Preserved ejection fraction of 60% with mild concentric left ventricular hypertrophy.  Systolic function is normal.  Diastolic function is normal.    Chronic lymphocytic leukemia (HCC)  -- Abnormal SPEP as an outpatient  --Outpatient hematologist Dr. Acosta  -- Currently undergoing  surveillance/observation  --Had been treated with 4 cycles of Bendamustine and rituximab in June 2012 with a good response.  Initially was started on maintenance rituximab but developed leukopenia and it was then decided to stop the rituximab.  ABLA (acute blood loss anemia)  --Postoperative blood loss  --Underlying CLL and abnormal SPEP as an outpatient  --Admission hemoglobin 12.2 hemoglobin dropped to as low as 6.8  --Transfusions as per the primary team  --Hematology on board currently receiving a blood transfusion  --Hemoglobin improving to 9  Type 2 diabetes mellitus with stage 3b chronic kidney disease and hypertension ([facility])  Lab Results   Component Value Date    HGBA1C 7.9 (H) 03/18/2025     Chronic Kidney Disease Stage IIIB  -- outpatient nephrologist: Dr. Rocha ([facility])  -- baseline creatinine: 1.4 to 1.6 mg/dL  -- Etiology: Presumed be secondary to diabetic kidney disease, hypertensive nephrosclerosis  --continue outpatient follow up upon discharge    Diabetes mellitus type 2  -hemoglobin A1c: 7.9 (A1C values may be falsely elevated or decreased in those with CKD, assay method should be certified by National glycohemoglobin standardization Program). Target A1C less then 7 (will need to be individualized for each patient), and would utilize A1C  in conjunction with continuous glucose monitoring (CGM).  It should also be noted that the A1c with more advanced kidney disease would be inaccurate due to decreased red blood cell life along with anemia.  -current medications: Insulin lispro and insulin glargine  -proteinuria: Yes as an outpatient subnephrotic range  -please follow with an ophthalmologist and podiatrist every year  -continued self monitoring of blood glucose at home    Hypertension  -- Stage II (American College of Cardiology/American Heart Association)  -- with underlying chronic kidney disease   -- Body mass index is 28.61 kg/m².  -- Volume status: Hypervolemic/volume overload  --  Etiology: Primary hypertension and underlying chronic kidney disease  -- Secondary Work-Up: No clinical indication  -- Target Goal: < 130/80 (ACC/AHA, CKD with proteinuria, CKD in diabetics) ; if tolerated can target SBP < 120 mmHg in high cardiovascular risk ( Age > 75, CKD, CVD, No Diabetics SPRINT)  -- Lifestyle Modifications: DASH Diet, Weight Loss for ideal body weight, 45 mins of cardiovascular exercise 3 times a week as tolerated, and if no contraindications exist, smoking cessation, limit alcohol use, avoid NSAIDS, monitor blood pressure at home  -- Status: Blood pressure blood pressure at target  -- Current antihypertensive regiment: amlodipine 10mg daily, carvedilol 6.25 mg twice a day  -- Changes: Continue carvedilol at this time.  Avoid RAAS blockade.  Okay to restart torsemide 2.5mg daily at discharge    Hyponatremia  -- continues to improve s/p IVF    I have reviewed the nephrology recommendations including discharge plannin, with slim attending, and we are in agreement with renal plan including the information outlined above. Ok for discharge from Nephrology service perspective.    Subjective   Patient feeling well.  Denies sob.  Hoping to get to rehab soon.      Objective :  Temp:  [98 °F (36.7 °C)-99.1 °F (37.3 °C)] 98.8 °F (37.1 °C)  HR:  [77-88] 88  BP: (118-137)/(64-79) 126/79  Resp:  [16-21] 16  SpO2:  [94 %-96 %] 96 %  O2 Device: Nasal cannula  Nasal Cannula O2 Flow Rate (L/min):  [1 L/min] 1 L/min    Current Weight: Weight - Scale: 80.4 kg (177 lb 4 oz)  First Weight: Weight - Scale: 91.2 kg (201 lb 1 oz)  I/O         03/20 0701  03/21 0700 03/21 0701  03/22 0700 03/22 0701  03/23 0700    P.O. 600 480     I.V. (mL/kg) 785.8 (9.8)      Blood 300      Total Intake(mL/kg) 1685.8 (21) 480 (6)     Urine (mL/kg/hr) 2050 (1.1) 1300 (0.7)     Stool 0 0     Total Output 2050 1300     Net -364.2 -820            Unmeasured Stool Occurrence 0 x 0 x           Physical Exam  General: NAD  Neuro: alert  awake  Psych: mood and affect appropriate  Skin: no rash  Eyes: anicteric  ENMT: mm moist  Neck: no masses  Respiratory: ctab  Cardiovascular: rrr  Extremities: no edema  Gastrointestinal: soft nt nd    Medications:    Current Facility-Administered Medications:     acetaminophen (TYLENOL) tablet 650 mg, 650 mg, Oral, Q6H PRN, YAZMIN SpencerNP, 650 mg at 03/20/25 0523    amLODIPine (NORVASC) tablet 10 mg, 10 mg, Oral, Daily, Deandra Diaz, YAZMINNP, 10 mg at 03/22/25 0828    apixaban (ELIQUIS) tablet 5 mg, 5 mg, Oral, BID, Kyle Dodge, DO, 5 mg at 03/22/25 0828    brimonidine tartrate 0.2 % ophthalmic solution 1 drop, 1 drop, Both Eyes, TID, Deandra Diaz, YAZMINNP, 1 drop at 03/22/25 0828    carvedilol (COREG) tablet 6.25 mg, 6.25 mg, Oral, BID With Meals, Deandra Matiasv, CRNP, 6.25 mg at 03/22/25 0828    docusate sodium (COLACE) capsule 100 mg, 100 mg, Oral, BID, Deandra Diaz, CRNP, 100 mg at 03/20/25 0804    fish oil capsule 1,000 mg, 1,000 mg, Oral, Daily, Deandra Nolenna Pollyv, CRNP, 1,000 mg at 03/22/25 0828    HYDROmorphone HCl (DILAUDID) injection 0.2 mg, 0.2 mg, Intravenous, Q2H PRN, Deandra Diaz, CRNP, 0.2 mg at 03/18/25 2214    insulin glargine (LANTUS) subcutaneous injection 10 Units 0.1 mL, 10 Units, Subcutaneous, HS, Kyle Normaai, DO, 10 Units at 03/21/25 2202    insulin lispro (HumALOG/ADMELOG) 100 units/mL subcutaneous injection 1-5 Units, 1-5 Units, Subcutaneous, TID With Meals, 1 Units at 03/22/25 1302 **AND** Fingerstick Glucose (POCT), , , 4x Daily AC and at bedtime, Concha Iachini, CRNP    insulin lispro (HumALOG/ADMELOG) 100 units/mL subcutaneous injection 3 Units, 3 Units, Subcutaneous, TID With Meals, Kyle Dodge DO, 3 Units at 03/22/25 1302    levothyroxine tablet 75 mcg, 75 mcg, Oral, Early Morning, RADHA Spencer, 75 mcg at 03/22/25 0629    morphine (MSIR) IR tablet 7.5 mg, 7.5 mg, Oral, Q4H PRN, 7.5 mg at  03/20/25 0759 **OR** morphine (MSIR) IR tablet 15 mg, 15 mg, Oral, Q4H PRN, RADHA Spencer, 15 mg at 03/22/25 1302    multivitamin-minerals (CENTRUM) tablet 1 tablet, 1 tablet, Oral, Daily, RADHA Spencer, 1 tablet at 03/22/25 0828    pravastatin (PRAVACHOL) tablet 40 mg, 40 mg, Oral, Daily With Dinner, RADHA Spencer, 40 mg at 03/21/25 1717    timolol (TIMOPTIC) 0.5 % ophthalmic solution 1 drop, 1 drop, Both Eyes, BID, RADHA Spencer, 1 drop at 03/22/25 0828      Lab Results: I have reviewed the following results:  Results from last 7 days   Lab Units 03/22/25  0635 03/21/25  0440 03/20/25  0432 03/19/25  1902 03/19/25  0914 03/19/25  0518 03/18/25  0443 03/17/25  2027   WBC Thousand/uL 21.57* 25.68* 29.62* 36.01*  --  30.77* 33.67* 39.72*   HEMOGLOBIN g/dL 9.0* 8.9* 7.6* 8.2* 6.8* 6.9* 11.1* 12.2   HEMATOCRIT % 27.1* 26.6* 23.6* 25.4* 21.3* 22.2* 34.5* 37.6   PLATELETS Thousands/uL 100* 99* 98* 102*  --  96* 117* 129*   POTASSIUM mmol/L 4.3 4.8 4.6  --   --  5.1 4.4 5.1   CHLORIDE mmol/L 99 98 97  --   --  99 102 102   CO2 mmol/L 25 25 24  --   --  23 25 25   BUN mg/dL 40* 57* 61*  --   --  46* 29* 29*   CREATININE mg/dL 2.05* 3.27* 3.95*  --   --  2.65* 1.59* 1.52*   CALCIUM mg/dL 8.7 8.5 8.0*  --   --  7.9* 9.5 9.9   ALBUMIN g/dL  --  3.5 3.3*  --   --   --   --   --

## 2025-03-22 NOTE — ASSESSMENT & PLAN NOTE
Patient with PMHx CLL, A-fib on Eliquis, HTN, hypothyroid, NIDDM type 2, and CKD who presents after a fall after tripping over floorboards in [facility].  Patient fell on his left side and states he was lying on the ground for about 3 hours unable to weight-bear.  .  CT chest abdomen pelvis showing acute intertrochanteric fracture of left femoral neck.  S/p IM nail for left hip intertrochanteric fracture  Pain is controlled.  Continue PT OT out of bed to chair and ambulation  Currently pending rehab placement

## 2025-03-22 NOTE — ASSESSMENT & PLAN NOTE
-- Occurred during this hospitalization, admission creatinine was 1.5 mg/dL which is his baseline creatinine  --Creatinine peaked at 3.95 mg/dL and now renal function is improving today to 2.0 mg/dL  --With underlying chronic kidney disease stage IIIb  --Suspect this has progressed to acute tubular necrosis, in the setting of hemodynamic fluctuations intraoperatively, acute on chronic anemia, with contrast exposure on March 17  --Nonoliguric good urine output  --Avoid NSAIDs  --Received colloid expansion with blood transfusion and balanced crystalloids with Isolyte  --CT scan with contrast prior to RAINE did not show any evidence of hydronephrosis.  --Urinary retention protocol  -- As the renal function is improving no indication for imaging  --Discontinued IV fluids 3/21  --Stable for discharge from a renal standpoint

## 2025-03-22 NOTE — ASSESSMENT & PLAN NOTE
Lab Results   Component Value Date    HGBA1C 7.9 (H) 03/18/2025       Recent Labs     03/21/25  1132 03/21/25  1624 03/21/25  2102 03/22/25  0703   POCGLU 118 168* 171* 160*       Blood Sugar Average: Last 72 hrs:  (P) [id number]

## 2025-03-22 NOTE — CASE MANAGEMENT
Case Management Discharge Planning Note    Patient name Abelino Renee  Location /-01 MRN [id number]  : 1945 Date 3/22/2025       Current Admission Date: 3/17/2025  Current Admission Diagnosis:Closed fracture of neck of left femur (HCC)   Patient Active Problem List    Diagnosis Date Noted Date Diagnosed    Hyponatremia 2025     RAINE (acute kidney injury) (HCC) 2025     Type 2 diabetes mellitus with stage 3b chronic kidney disease and hypertension ([facility]) 2025     ABLA (acute blood loss anemia) 2025     Closed fracture of neck of left femur ([facility]) 2025     Atrial fibrillation ([facility]) 2025     Type 2 diabetes mellitus with diabetic chronic kidney disease ([facility]) 2024     Monoclonal gammopathy 2024     Rash 2024     Platelets decreased ([facility]) 2024     Diabetic nephropathy associated with type 2 diabetes mellitus ([facility]) 2021     Hypothyroidism 01/15/2021     Elevated serum protein level 2021     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     Status post transmetatarsal amputation of left foot ([facility]) 2019     Benign essential HTN 2018     Stage 3a chronic kidney disease ([facility]) 2018     Arthritis 2016     Hx of malignant carcinoid tumor of large intestine 2016     H/O Clostridium difficile infection 2015     CHF (congestive heart failure) ([facility]) 2015     Chronic lymphocytic leukemia ([facility]) 2013     Type 2 diabetes mellitus with diabetic polyneuropathy, without long-term current use of insulin ([facility]) 2012       LOS (days): 4  Geometric Mean LOS (GMLOS) (days): 4.4  Days to GMLOS:0.3     OBJECTIVE:  Risk of Unplanned Readmission Score: 22.14         Current admission status: Inpatient   Preferred Pharmacy:   [facility] [address] [place] [id number]  Phone: [phone] Fax: [phone]    [facility] Mail  [facility] - [place], [place] - [address] [facility] [address]  [address] [address]  Phone: [phone] Fax: [phone]    Primary Care Provider: Edel Wright MD    Primary Insurance: MEDICARE  Secondary Insurance: COMMERCIAL MISCELLANEOUS    DISCHARGE DETAILS:    Discharge planning discussed with:: Patient, Janina-RN and Angelica-Marquis Gong  Freedom of Choice: Yes  Comments - Freedom of Choice: CM notified all the above menitoned individuals of the Pt's d/c to [facility] today.. Roundtrip referral made for a bls transport.          Contacts  Patient Contacts: Nivia(phone message left)  Relationship to Patient:: Family  Contact Method: Phone  Phone Number: [phone]  Reason/Outcome: Discharge Planning    Requested Home Health Care         Is the patient interested in HHC at discharge?: No    DME Referral Provided  Referral made for DME?: No    Other Referral/Resources/Interventions Provided:  Interventions: Short Term Rehab  Referral Comments: CM received notification from Marquis Farideh Dominguez that a rehab bed for available today for the Pt's admission.         Treatment Team Recommendation: Short Term Rehab  Discharge Destination Plan:: Short Term Rehab  Transport at Discharge : BLS Ambulance     Number/Name of Dispatcher: [id number]     ETA of Transport (Date): 03/22/25  ETA of Transport (Time): 1430         Accepting Facility Name, City & State : [facility]  Receiving Facility/Agency Phone Number: [phone]  Facility/Agency Fax Number: [phone]

## 2025-03-22 NOTE — PROGRESS NOTES
Progress Note - Hospitalist   Name: Abelino Renee 79 y.o. male I MRN: [id number]  Unit/Bed#: -01 I Date of Admission: 3/17/2025   Date of Service: 3/22/2025 I Hospital Day: 4    Assessment & Plan  Closed fracture of neck of left femur (HCC)  Patient with PMHx CLL, A-fib on Eliquis, HTN, hypothyroid, NIDDM type 2, and CKD who presents after a fall after tripping over floorboards in [facility].  Patient fell on his left side and states he was lying on the ground for about 3 hours unable to weight-bear.  .  CT chest abdomen pelvis showing acute intertrochanteric fracture of left femoral neck.  S/p IM nail for left hip intertrochanteric fracture  Pain is controlled.  Continue PT OT out of bed to chair and ambulation  Currently pending rehab placement  Atrial fibrillation (HCC)  Hx PAF, HR stable, controlled A-fib  ECG on admission with atrial fibrillation HR 99, QTc 410, no ST-T changes or ischemia.  Continue PTA carvedilol 6.25 mg twice daily  Continue Eliquis for anticoagulation  CHF (congestive heart failure) ([facility])  Has some scrotal edema and left lower extremity swelling  CXR showing prominent interstitial markings bilaterally suspicious for mild edema.  CT chest abdomen pelvis showing probable trace bilateral pleural effusions.  And mild patchy groundglass opacities and interlobular septal thickening in the lungs consistent with interstitial edema.  Patient states he recently recovered from pneumonia a few months ago.  Torsemide on hold, can likely be restarted once cleared by nephrology  Benign essential HTN  BP stable, hemodynamically stable  Monitor with routine vital signs  Currently back on Coreg and amlodipine  Losartan on hold.  Torsemide on hold  Stage 3a chronic kidney disease (HCC)  Lab Results   Component Value Date    EGFR 30 03/22/2025    EGFR 17 03/21/2025    EGFR 13 03/20/2025    CREATININE 2.05 (H) 03/22/2025    CREATININE 3.27 (H) 03/21/2025    CREATININE 3.95 (H) 03/20/2025   Creatinine  1.52 on admission  Patient with worsening kidney injury with creatinine 3.95 suspect this is from prior insult secondary to hypotension .  Patient also did receive IV contrast on 3/17  Creatinine improved to 2  Off of IV fluids  Nephrology following  Diabetic nephropathy associated with type 2 diabetes mellitus (HCC)  Lab Results   Component Value Date    HGBA1C 7.9 (H) 03/18/2025     Recent Labs     03/21/25  1132 03/21/25  1624 03/21/25  2102 03/22/25  0703   POCGLU 118 168* 171* 160*     Blood Sugar Average: Last 72 hrs:  (P) [id number]  Home regimen: Glipizide 10 mg twice daily, and metformin 500 mg daily-hold while inpatient.  Start insulin sliding scale with fingersticks every 6 hours while NPO  Hypoglycemia protocol  Chronic lymphocytic leukemia (HCC)  History of CLL/SLL previously treated with 4 cycles of BR (June 2012).  Outpatient plan is surveillance.  WBC elevated in setting of CLL.  Patient anemia stable  Hypothyroidism  Continue PTA levothyroxine  ABLA (acute blood loss anemia)  S/p PRBC x 2  Hemoglobin stable  RAINE (acute kidney injury) ([facility])  Improving  Type 2 diabetes mellitus with stage 3b chronic kidney disease and hypertension ([facility])  Lab Results   Component Value Date    HGBA1C 7.9 (H) 03/18/2025       Recent Labs     03/21/25  1132 03/21/25  1624 03/21/25  2102 03/22/25  0703   POCGLU 118 168* 171* 160*       Blood Sugar Average: Last 72 hrs:  (P) 195.5145783340318459    Hyponatremia      VTE Pharmacologic Prophylaxis: VTE Score: 5 Moderate Risk (Score 3-4) - Pharmacological DVT Prophylaxis Ordered: apixaban (Eliquis).    Mobility:   Basic Mobility Inpatient Raw Score: 10  JH-HLM Goal: 4: Move to chair/commode  JH-HLM Achieved: 5: Stand (1 or more minutes)  JH-HLM Goal NOT achieved. Continue with multidisciplinary rounding and encourage appropriate mobility to improve upon JH-HLM goals.    Patient Centered Rounds: I performed bedside rounds with nursing staff today.   Discussions with  Specialists or Other Care Team Provider:     Education and Discussions with Family / Patient:  Wife will be visiting patient later will update at bedside.     Current Length of Stay: 4 day(s)  Current Patient Status: Inpatient   Certification Statement: The patient will continue to require additional inpatient hospital stay due to postop  Discharge Plan: Anticipate discharge in 24-48 hrs to rehab facility.    Code Status: Level 1 - Full Code    Subjective   Patient feels fairly well today.  Denies any pain.  Ambulation is still limited.  Working with PT.  Nuys any chest pain shortness of breath or palpitations.     Objective :  Temp:  [98 °F (36.7 °C)-99.1 °F (37.3 °C)] 98.8 °F (37.1 °C)  HR:  [77-88] 88  BP: (118-137)/(64-79) 126/79  Resp:  [16-21] 16  SpO2:  [94 %-96 %] 96 %  O2 Device: Nasal cannula  Nasal Cannula O2 Flow Rate (L/min):  [1 L/min] 1 L/min    Body mass index is 28.61 kg/m².     Input and Output Summary (last 24 hours):     Intake/Output Summary (Last 24 hours) at 3/22/2025 1058  Last data filed at 3/22/2025 0511  Gross per 24 hour   Intake 480 ml   Output 1300 ml   Net -820 ml       Physical Exam    Gen.-Patient comfortable at rest  Neck- Supple. No thyromegaly or lymphadenopathy  Lungs-Clear bilaterally without any wheeze or rales   Heart S1-S2, regular rate and rhythm, no murmurs  Abdomen-soft nontender, no organomegaly. Bowel sounds present  Scrotal edema  Extremities-no cyanosi,  clubbing or edema  Left lower extremity edema.  Skin- no rash  Neuro-awake alert and oriented       Lines/Drains:              Lab Results: I have reviewed the following results:   Results from last 7 days   Lab Units 03/22/25  0635   WBC Thousand/uL 21.57*   HEMOGLOBIN g/dL 9.0*   HEMATOCRIT % 27.1*   PLATELETS Thousands/uL 100*   SEGS PCT % 21*   LYMPHO PCT % 77*   MONO PCT % 1*   EOS PCT % 1     Results from last 7 days   Lab Units 03/22/25  0635 03/21/25  0440   SODIUM mmol/L 134* 132*   POTASSIUM mmol/L 4.3 4.8    CHLORIDE mmol/L 99 98   CO2 mmol/L 25 25   BUN mg/dL 40* 57*   CREATININE mg/dL 2.05* 3.27*   ANION GAP mmol/L 10 9   CALCIUM mg/dL 8.7 8.5   ALBUMIN g/dL  --  3.5   TOTAL BILIRUBIN mg/dL  --  0.67   ALK PHOS U/L  --  65   ALT U/L  --  9   AST U/L  --  25   GLUCOSE RANDOM mg/dL 166* 133     Results from last 7 days   Lab Units 03/17/25  2107   INR  1.14     Results from last 7 days   Lab Units 03/22/25  0703 03/21/25  2102 03/21/25  1624 03/21/25  1132 03/21/25  0720 03/20/25  2111 03/20/25  1529 03/20/25  1121 03/20/25  0712 03/19/25  2055 03/19/25  1514 03/19/25  1112   POC GLUCOSE mg/dl 160* 171* 168* 118 125 178* 138 106 98 280* 272* 318*     Results from last 7 days   Lab Units 03/18/25  0443   HEMOGLOBIN A1C % 7.9*           Recent Cultures (last 7 days):               Last 24 Hours Medication List:     Current Facility-Administered Medications:     acetaminophen (TYLENOL) tablet 650 mg, Q6H PRN    amLODIPine (NORVASC) tablet 10 mg, Daily    apixaban (ELIQUIS) tablet 5 mg, BID    brimonidine tartrate 0.2 % ophthalmic solution 1 drop, TID    carvedilol (COREG) tablet 6.25 mg, BID With Meals    docusate sodium (COLACE) capsule 100 mg, BID    fish oil capsule 1,000 mg, Daily    HYDROmorphone HCl (DILAUDID) injection 0.2 mg, Q2H PRN    insulin glargine (LANTUS) subcutaneous injection 10 Units 0.1 mL, HS    insulin lispro (HumALOG/ADMELOG) 100 units/mL subcutaneous injection 1-5 Units, TID With Meals **AND** Fingerstick Glucose (POCT), 4x Daily AC and at bedtime    insulin lispro (HumALOG/ADMELOG) 100 units/mL subcutaneous injection 3 Units, TID With Meals    levothyroxine tablet 75 mcg, Early Morning    morphine (MSIR) IR tablet 7.5 mg, Q4H PRN **OR** morphine (MSIR) IR tablet 15 mg, Q4H PRN    multivitamin-minerals (CENTRUM) tablet 1 tablet, Daily    pravastatin (PRAVACHOL) tablet 40 mg, Daily With Dinner    timolol (TIMOPTIC) 0.5 % ophthalmic solution 1 drop, BID    Administrative Statements   Today, Patient  Was Seen By: Ti Johnson MD      **Please Note: This note may have been constructed using a voice recognition system.**

## 2025-03-22 NOTE — PLAN OF CARE
Problem: PAIN - ADULT  Goal: Verbalizes/displays adequate comfort level or baseline comfort level  Description: Interventions:  - Encourage patient to monitor pain and request assistance  - Assess pain using appropriate pain scale  - Administer analgesics based on type and severity of pain and evaluate response  - Implement non-pharmacological measures as appropriate and evaluate response  - Consider cultural and social influences on pain and pain management  - Notify physician/advanced practitioner if interventions unsuccessful or patient reports new pain  3/22/2025 0205 by Vivi Aviles RN  Outcome: Progressing  3/22/2025 0205 by Vvii Aviles RN  Outcome: Progressing

## 2025-03-22 NOTE — ASSESSMENT & PLAN NOTE
-- Status post left hip CMN for IT fracture on March 18  --Management as per orthopedic surgery  --Currently undergoing PT/OT

## 2025-03-22 NOTE — ASSESSMENT & PLAN NOTE
Lab Results   Component Value Date    HGBA1C 7.9 (H) 03/18/2025     Chronic Kidney Disease Stage IIIB  -- outpatient nephrologist: Dr. Rocha ([facility])  -- baseline creatinine: 1.4 to 1.6 mg/dL  -- Etiology: Presumed be secondary to diabetic kidney disease, hypertensive nephrosclerosis  --continue outpatient follow up upon discharge    Diabetes mellitus type 2  -hemoglobin A1c: 7.9 (A1C values may be falsely elevated or decreased in those with CKD, assay method should be certified by National glycohemoglobin standardization Program). Target A1C less then 7 (will need to be individualized for each patient), and would utilize A1C  in conjunction with continuous glucose monitoring (CGM).  It should also be noted that the A1c with more advanced kidney disease would be inaccurate due to decreased red blood cell life along with anemia.  -current medications: Insulin lispro and insulin glargine  -proteinuria: Yes as an outpatient subnephrotic range  -please follow with an ophthalmologist and podiatrist every year  -continued self monitoring of blood glucose at home    Hypertension  -- Stage II (American College of Cardiology/American Heart Association)  -- with underlying chronic kidney disease   -- Body mass index is 28.61 kg/m².  -- Volume status: Hypervolemic/volume overload  -- Etiology: Primary hypertension and underlying chronic kidney disease  -- Secondary Work-Up: No clinical indication  -- Target Goal: < 130/80 (ACC/AHA, CKD with proteinuria, CKD in diabetics) ; if tolerated can target SBP < 120 mmHg in high cardiovascular risk ( Age > 75, CKD, CVD, No Diabetics SPRINT)  -- Lifestyle Modifications: DASH Diet, Weight Loss for ideal body weight, 45 mins of cardiovascular exercise 3 times a week as tolerated, and if no contraindications exist, smoking cessation, limit alcohol use, avoid NSAIDS, monitor blood pressure at home  -- Status: Blood pressure blood pressure at target  -- Current antihypertensive  regiment: amlodipine 10mg daily, carvedilol 6.25 mg twice a day  -- Changes: Continue carvedilol at this time.  Avoid RAAS blockade.  Okay to restart torsemide 2.5mg daily at discharge

## 2025-03-22 NOTE — ASSESSMENT & PLAN NOTE
Hx PAF, HR stable, controlled A-fib  ECG on admission with atrial fibrillation HR 99, QTc 410, no ST-T changes or ischemia.  Continue PTA carvedilol 6.25 mg twice daily  Continue Eliquis for anticoagulation

## 2025-03-22 NOTE — ASSESSMENT & PLAN NOTE
Patient with PMHx CLL, A-fib on Eliquis, HTN, hypothyroid, NIDDM type 2, and CKD who presents after a fall after tripping over floorboards in [facility].  Patient fell on his left side and states he was lying on the ground for about 3 hours unable to weight-bear.  .  CT chest abdomen pelvis showing acute intertrochanteric fracture of left femoral neck.  S/p IM nail for left hip intertrochanteric fracture  Pain is controlled.  Continue PT OT out of bed to chair and ambulation  Discharge to rehab today

## 2025-03-22 NOTE — ASSESSMENT & PLAN NOTE
--Postoperative blood loss  --Underlying CLL and abnormal SPEP as an outpatient  --Admission hemoglobin 12.2 hemoglobin dropped to as low as 6.8  --Transfusions as per the primary team  --Hematology on board currently receiving a blood transfusion  --Hemoglobin improving to 9

## 2025-03-22 NOTE — ASSESSMENT & PLAN NOTE
Lab Results   Component Value Date    HGBA1C 7.9 (H) 03/18/2025     Recent Labs     03/21/25  1132 03/21/25  1624 03/21/25  2102 03/22/25  0703   POCGLU 118 168* 171* 160*     Blood Sugar Average: Last 72 hrs:  (P) 195.3225637472893301  Home regimen: Glipizide 10 mg twice daily, and metformin 500 mg daily-hold while inpatient.  Start insulin sliding scale with fingersticks every 6 hours while NPO  Hypoglycemia protocol

## 2025-03-22 NOTE — ASSESSMENT & PLAN NOTE
Lab Results   Component Value Date    EGFR 30 03/22/2025    EGFR 17 03/21/2025    EGFR 13 03/20/2025    CREATININE 2.05 (H) 03/22/2025    CREATININE 3.27 (H) 03/21/2025    CREATININE 3.95 (H) 03/20/2025   Creatinine 1.52 on admission  Patient with worsening kidney injury with creatinine 3.95 suspect this is from prior insult secondary to hypotension .  Patient also did receive IV contrast on 3/17  Creatinine improved to 2  Off of IV fluids  Nephrology following

## 2025-03-22 NOTE — DISCHARGE SUMMARY
Discharge Summary - Hospitalist   Name: Abelino Renee 79 y.o. male I MRN: [id number]  Unit/Bed#: -01 I Date of Admission: 3/17/2025   Date of Service: 3/22/2025 I Hospital Day: 4     Assessment & Plan  Closed fracture of neck of left femur (HCC)  Patient with PMHx CLL, A-fib on Eliquis, HTN, hypothyroid, NIDDM type 2, and CKD who presents after a fall [facility].  Patient fell on his left side and states he was lying on the ground for about 3 hours unable to weight-bear.  .  CT chest abdomen pelvis showing acute intertrochanteric fracture of left femoral neck.  S/p IM nail for left hip intertrochanteric fracture  Pain is controlled.  Continue PT OT out of bed to chair and ambulation  Discharge to rehab today  Atrial fibrillation (HCC)  Hx PAF, HR stable, controlled A-fib  ECG on admission with atrial fibrillation HR 99, QTc 410, no ST-T changes or ischemia.  Continue PTA carvedilol 6.25 mg twice daily  Continue Eliquis for anticoagulation  CHF (congestive heart failure) ([facility])  Has some scrotal edema and left lower extremity swelling  CXR showing prominent interstitial markings bilaterally suspicious for mild edema.  CT chest abdomen pelvis showing probable trace bilateral pleural effusions.  And mild patchy groundglass opacities and interlobular septal thickening in the lungs consistent with interstitial edema.  Patient states he recently recovered from pneumonia a few months ago.  Torsemide on hold, can likely be restarted once cleared by nephrology  Benign essential HTN  BP stable, hemodynamically stable  Monitor with routine vital signs  Currently back on Coreg and amlodipine  Losartan on hold.  Torsemide on hold  Stage 3a chronic kidney disease (HCC)  Lab Results   Component Value Date    EGFR 30 03/22/2025    EGFR 17 03/21/2025    EGFR 13 03/20/2025    CREATININE 2.05 (H) 03/22/2025    CREATININE 3.27 (H) 03/21/2025    CREATININE 3.95 (H) 03/20/2025   Creatinine 1.52  on admission  Patient with worsening kidney injury with creatinine 3.95 suspect this is from prior insult secondary to hypotension .  Patient also did receive IV contrast on 3/17  Creatinine improved to 2  Off of IV fluids  Nephrology following  Diabetic nephropathy associated with type 2 diabetes mellitus (HCC)  Lab Results   Component Value Date    HGBA1C 7.9 (H) 03/18/2025     Recent Labs     03/21/25  1624 03/21/25  2102 03/22/25  0703 03/22/25  1114   POCGLU 168* 171* 160* 166*     Blood Sugar Average: Last 72 hrs:  (P) 193.4  Home regimen: Glipizide 10 mg twice daily, and metformin 500 mg daily-hold while inpatient.  Start insulin sliding scale with fingersticks every 6 hours while NPO  Hypoglycemia protocol  Chronic lymphocytic leukemia (HCC)  History of CLL/SLL previously treated with 4 cycles of BR (June 2012).  Outpatient plan is surveillance.  WBC elevated in setting of CLL.  Patient anemia stable  Hypothyroidism  Continue PTA levothyroxine  ABLA (acute blood loss anemia)  S/p PRBC x 2  Hemoglobin stable  RAINE (acute kidney injury) ([facility])  Improving  Type 2 diabetes mellitus with stage 3b chronic kidney disease and hypertension ([facility])  Lab Results   Component Value Date    HGBA1C 7.9 (H) 03/18/2025       Recent Labs     03/21/25  1624 03/21/25  2102 03/22/25  0703 03/22/25  1114   POCGLU 168* 171* 160* 166*       Blood Sugar Average: Last 72 hrs:  (P) 193.4    Hyponatremia       Medical Problems       Resolved Problems  Date Reviewed: 3/21/2025   None       Discharging Physician / Practitioner: Ti Johnson MD  PCP: Edel Wright MD  Admission Date:   Admission Orders (From admission, onward)       Ordered        03/18/25 1005  INPATIENT ADMISSION  Once            03/17/25 2247  Place in Observation  Once                          Discharge Date: 03/22/25    Consultations During Hospital Stay:  Orthopedics    Procedures Performed:   IM nail left hip      Reason for Admission: Left hip  fracture    Hospital Course:   Abelino Renee is a 79 y.o. male patient who originally presented to the hospital on 3/17/2025 due to fall.  Patient was evaluated in the emergency department and noted to have left intertrochanteric fracture of the femur.  Patient was admitted and seen by orthopedics.  He underwent IM nail procedure.  Postoperatively patient developed acute kidney injury and was seen by nephrology.  With IV fluids creatinine returned to baseline.  Patient is pain is adequately controlled and working with PT.  Patient is medically cleared for discharge to short-term rehabilitation.    Please see above list of diagnoses and related plan for additional information.     Condition at Discharge: good    Discharge Day Visit / Exam:   * Please refer to separate progress note for these details *    Discussion with Family: Updated  (wife) at bedside.    Discharge instructions/Information to patient and family:   See after visit summary for information provided to patient and family.      Provisions for Follow-Up Care:  See after visit summary for information related to follow-up care and any pertinent home health orders.      Mobility at time of Discharge:   Basic Mobility Inpatient Raw Score: 10  -HLM Goal: 4: Move to chair/commode  JH-HLM Achieved: 5: Stand (1 or more minutes)  HLM Goal achieved. Continue to encourage appropriate mobility.     Disposition:   [facility]    Planned Readmission: no    Discharge Medications:  See after visit summary for reconciled discharge medications provided to patient and/or family.      Administrative Statements   Discharge Statement:  I have spent a total time of 35 minutes in caring for this patient on the day of the visit/encounter. >30 minutes of time was spent on: Instructions for management, Patient and family education, Importance of tx compliance, Risk factor reductions, Impressions, and Counseling / Coordination of  care.    **Please Note: This note may have been constructed using a voice recognition system**

## 2025-03-22 NOTE — ASSESSMENT & PLAN NOTE
BP stable, hemodynamically stable  Monitor with routine vital signs  Currently back on Coreg and amlodipine  Losartan on hold.  Torsemide on hold

## 2025-03-22 NOTE — ASSESSMENT & PLAN NOTE
Lab Results   Component Value Date    HGBA1C 7.9 (H) 03/18/2025     Recent Labs     03/21/25  1624 03/21/25  2102 03/22/25  0703 03/22/25  1114   POCGLU 168* 171* 160* 166*     Blood Sugar Average: Last 72 hrs:  (P) 193.4  Home regimen: Glipizide 10 mg twice daily, and metformin 500 mg daily-hold while inpatient.  Start insulin sliding scale with fingersticks every 6 hours while NPO  Hypoglycemia protocol

## 2025-03-23 RX ORDER — MORPHINE SULFATE 15 MG/1
15 TABLET ORAL EVERY 6 HOURS PRN
Qty: 10 TABLET | Refills: 0 | Status: SHIPPED | OUTPATIENT
Start: 2025-03-23 | End: 2025-03-28

## 2025-03-24 ENCOUNTER — PATIENT OUTREACH (OUTPATIENT)
Dept: CASE MANAGEMENT | Facility: OTHER | Age: 80
End: 2025-03-24

## 2025-03-24 NOTE — PROGRESS NOTES
Outpatient Care Management RAINE/SNF Pathway. Discharged 3/22/25 to [facility]. Email sent to SNF Coordinator to inform the facility that I am following the patient while in SNF and to add them to the facilities PCC.   This Admin Coordinator will continue to monitor via chart review.

## 2025-03-26 ENCOUNTER — OFFICE VISIT (OUTPATIENT)
Dept: CARDIOLOGY CLINIC | Facility: SKILLED NURSING FACILITY | Age: 80
End: 2025-03-26
Payer: MEDICARE

## 2025-03-26 DIAGNOSIS — N18.32 STAGE 3B CHRONIC KIDNEY DISEASE (HCC): ICD-10-CM

## 2025-03-26 DIAGNOSIS — I50.32 CHRONIC DIASTOLIC CONGESTIVE HEART FAILURE (HCC): ICD-10-CM

## 2025-03-26 DIAGNOSIS — I48.19 PERSISTENT ATRIAL FIBRILLATION (HCC): Primary | ICD-10-CM

## 2025-03-26 PROCEDURE — 99306 1ST NF CARE HIGH MDM 50: CPT | Performed by: INTERNAL MEDICINE

## 2025-03-26 NOTE — ASSESSMENT & PLAN NOTE
Atrial fibrillation diagnosed in 2024.  Has been offered cardioversion or ablation options both by [facility] and [facility] and outpatient evaluations.  Patient historically has opted for rate control and anticoagulation.  This is reasonable.  He remains asymptomatic from the atrial fibrillation without any reported episodes of palpitations or high ventricular rates.  No syncope reported.  Continue current doses of carvedilol and Eliquis.

## 2025-03-26 NOTE — ASSESSMENT & PLAN NOTE
Wt Readings from Last 3 Encounters:   03/17/25 80.4 kg (177 lb 4 oz)   02/20/25 82.3 kg (181 lb 8 oz)   10/28/24 82.1 kg (181 lb)   Prior history of stress-induced cardiomyopathy in 2015 with recovery of LV function.  Subsequent diagnosis of heart failure with preserved EF.  Weights reviewed.  Last echo reviewed.  Clinically appears to be euvolemic.  Continue monitoring daily weights.  Keep additional diuretics for weight gain over 3 pounds overnight.  Closely monitor renal function.

## 2025-03-26 NOTE — PROGRESS NOTES
[facility] Consultation - Cardiology     Abelino Renee 79 y.o. male MRN: [id number]        DATE: 3/26/2025  TIME: 2:26 PM      Assessment & Plan  Persistent atrial fibrillation (HCC)  Atrial fibrillation diagnosed in 2024.  Has been offered cardioversion or ablation options both by [facility] and [facility] and outpatient evaluations.  Patient historically has opted for rate control and anticoagulation.  This is reasonable.  He remains asymptomatic from the atrial fibrillation without any reported episodes of palpitations or high ventricular rates.  No syncope reported.  Continue current doses of carvedilol and Eliquis.  Stage 3b chronic kidney disease (HCC)  Lab Results   Component Value Date    EGFR 30 03/22/2025    EGFR 17 03/21/2025    EGFR 13 03/20/2025    CREATININE 2.05 (H) 03/22/2025    CREATININE 3.27 (H) 03/21/2025    CREATININE 3.95 (H) 03/20/2025   Recent RAINE noted in the postoperative period.  Creatinine has been trending down.  Nephrology has seen the patient.  Losartan has been on hold.    Chronic diastolic congestive heart failure (HCC)  Wt Readings from Last 3 Encounters:   03/17/25 80.4 kg (177 lb 4 oz)   02/20/25 82.3 kg (181 lb 8 oz)   10/28/24 82.1 kg (181 lb)   Prior history of stress-induced cardiomyopathy in 2015 with recovery of LV function.  Subsequent diagnosis of heart failure with preserved EF.  Weights reviewed.  Last echo reviewed.  Clinically appears to be euvolemic.  Continue monitoring daily weights.  Keep additional diuretics for weight gain over 3 pounds overnight.  Closely monitor renal function.    Recommendations: Continue current medicines.  Closely follow renal function and daily weights.  Give additional diuretics for any weight gain over 3 pounds overnight.  Outpatient cardiology follow-up at [facility] cardiology.    Chief complaint: Atrial fibrillation      HPI:    Abelino Renee is a 79 y.o.-year-old male who is seen in the [facility]  home at bedside for initial cardiology consultation.    Last cardiology visit with Dr. Green on 10/28/2024 reviewed.  Currently follows up with [facility] cardiology.    He has a history of hypertension, type 2 diabetes, CLL and chronic heart failure with preserved EF.  Prior history of Takotsubo cardiomyopathy in 2015 with subsequent recovery of LVEF.  Echo in June 2024 showed normal EF and mild to moderate pulmonary hypertension.  During his last cardiac visit, he had no active complaints of chest pain or shortness of breath.  He has a history of stage IIIa CKD.  He has history of hypertension and type 2 diabetes.  He also has a history of persistent but not longstanding atrial fibrillation with controlled ventricular rate noted first in 2024.  At the last visit he was recommended a Holter monitor and plans to start amiodarone with eventual plans for cardioversion.  However none of these were performed.  EKG from 3/17/2025 showed persistent atrial fibrillation with ventricular rate of 99.  Prior EKG from 10/24 showed A-fib with ventricular rate of 69.  EKG from 6/24 showed sinus rhythm.  Prior ischemic evaluation in 2015 showed no ischemia.    He was then seen by [facility] cardiology in 11/24 for transfer of care.  Their notes also were reviewed.  Again there was a discussion of rate control versus cardioversion or ablation.  Patient opted for rate control strategy with a future option for rhythm control if symptoms do worsen.  He was continued on Eliquis and carvedilol at that point.  Outpatient follow-up was planned for 4 months later.  Not yet scheduled.    He was in the hospital between 3/17 and 3/22/2025 with closed fracture of the neck of femur for which she underwent intramedullary nail placement.  No cardiac complications.  He was noted to be in atrial fibrillation during the hospital stay with controlled ventricular response.  Anticoagulation also was continued.  Blood pressure remained controlled.   "Postoperative RAINE was noted.  Creatinine was 2.05 on 3/22/2025.  Nephrology was following.    He only reports very mild swelling of his left leg after the fracture.  No orthopnea or PND.  No palpitations or chest pain.    Patient's care was coordinated with nursing facility staff. Recent vitals, labs, and updated medications were review on [facility] system in facility.    Past history, family history, social history, current medications, vital signs, recent lab and imaging studies and  prior cardiology studies reviewed independently on this visit.    Nursing home medication list and medical records independently reviewed.  Findings discussed with nursing home staff.  Other consultant's notes independently reviewed during this visit.    ALLERGIES:  Allergies   Allergen Reactions    Oxycodone-Acetaminophen Hallucinations       Review of Systems   Constitutional:  Positive for fatigue.   HENT: Negative.     Eyes: Negative.    Respiratory:  Negative for shortness of breath.    Cardiovascular:  Positive for leg swelling. Negative for chest pain and palpitations.   Gastrointestinal: Negative.    Endocrine: Negative.    Musculoskeletal:  Positive for arthralgias and gait problem.   Neurological:  Negative for syncope.   Hematological: Negative.    Psychiatric/Behavioral:  Positive for sleep disturbance.    All other systems reviewed and are negative.      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    Chronic lymphocytic leukemia of B-cell type (HCC)     \"remission 4-5yrs\"-chemo    DDD (degenerative disc disease), cervical     Diabetes mellitus (HCC)     bs checked by pt at home at 6am =92    Diabetic neuropathy (HCC)     Diabetic retinopathy (HCC)     Elevated WBC count     Heart attack (HCC) 04/2015    " "\"labeled as that and than tested later after appendix removed and stress test showed negative\"    History of cancer chemotherapy     last treatment approx 5yrs ago    Hypertension     Myocardial infarction (HCC) 2015    Scalp psoriasis     Shingles 2/10-15    Toe injury     left great toe, - -internal braec-to be removed today 3/30/2018    Wears glasses      Past Surgical History:   Procedure Laterality Date    APPENDECTOMY      BONE BIOPSY Left 3/30/2018    Procedure: GREAT TOE BONE BIOPSY;  Surgeon: Gerber Multani DPM;  Location: [facility];  Service: Podiatry    BOWEL RESECTION      CATARACT EXTRACTION Bilateral     CHOLECYSTECTOMY      COLECTOMY  2015    EYE SURGERY Right     \"retinal peel\"    FOOT FUSION Left 2017    Procedure: HALLUX INTERPHALANGEAL JOINT FUSION;  Surgeon: Gerber Multani DPM;  Location: [facility];  Service: Podiatry    HARDWARE REMOVAL Left 2018    Procedure: REMOVAL STAPLES LEFT TOE;  Surgeon: Gerber Multani DPM;  [facility]: [facility];  Service: Podiatry    ORIF FOOT FRACTURE Left 2017    Procedure: HALLUX INTERPHALANGEAL JOINT INTERNAL FIXATION; REPAIR OF ULCERATION WITH EXCISIONS AND REVISION OF SKIN HALLUX WITH USE OF ALLOGRAFT;  Surgeon: Gerber Multani DPM;  Location: [facility];  Service: Podiatry    NJ COLONOSCOPY FLX DX W/COLLJ SPEC WHEN PFRMD N/A 2016    Procedure: COLONOSCOPY;  Surgeon: Minal Ruelas DO;  Location: [facility];  Service: Gastroenterology    NJ OPTX FEM SHFT FX W/INSJ IMED IMPLT W/WO SCREW Left 3/18/2025    Procedure: INSERTION NAIL IM FEMUR ANTEGRADE (TROCHANTERIC);  Surgeon: Jacqui Nevarez DO;  Location: [facility];  Service: Orthopedics    NJ REMOVAL IMPLANT DEEP Left 3/30/2018    Procedure: REMOVAL HARDWARE FOOT;  Surgeon: Gerber Multani DPM;  Location: [facility];  Service: Podiatry    RETINAL DETACHMENT SURGERY      TONSILLECTOMY       Family History   Problem Relation Age of Onset    Alcohol abuse Mother             " Pancreatic cancer Father         age 79    Hashimoto's thyroiditis Daughter     Rheum arthritis Daughter         Juvenile Rheum Arthitis     No Known Problems Sister          age 78    No Known Problems Brother          86    Kidney failure Brother         dialysis, 74    Diabetes Brother      Social History   reports that he has never smoked. He has never been exposed to tobacco smoke. He has never used smokeless tobacco. He reports that he does not drink alcohol and does not use drugs.     Objective:     Blood pressure remains well-controlled  Heart rate between 70-80  Weight has been stable  Wt Readings from Last 3 Encounters:   25 80.4 kg (177 lb 4 oz)   25 82.3 kg (181 lb 8 oz)   10/28/24 82.1 kg (181 lb)     Pulse Readings from Last 3 Encounters:   25 88   25 81   10/28/24 69     BP Readings from Last 3 Encounters:   25 126/79   25 128/78   10/28/24 112/64       Physical Exam  Vitals reviewed.   Constitutional:       General: He is not in acute distress.     Appearance: He is not ill-appearing.   Eyes:      General: No scleral icterus.  Neck:      Vascular: No carotid bruit.   Cardiovascular:      Rate and Rhythm: Normal rate. Rhythm irregularly irregular.      Heart sounds: Heart sounds are distant. No murmur heard.     No friction rub.   Pulmonary:      Breath sounds: No wheezing or rhonchi.   Abdominal:      Tenderness: There is no abdominal tenderness.   Musculoskeletal:      Left lower leg: Edema present.      Comments: Trace left lower extremity edema   Skin:     General: Skin is warm.   Neurological:      General: No focal deficit present.      Mental Status: He is alert.   Psychiatric:         Mood and Affect: Mood normal.              Pertinent Laboratory/Diagnostic Studies:    Laboratory studies reviewed personally by Emilio Guzman MD    BMP:   Lab Results   Component Value Date    SODIUM 134 (L) 2025    K 4.3 2025    CL 99 2025    CO2 25  "03/22/2025    BUN 40 (H) 03/22/2025    CREATININE 2.05 (H) 03/22/2025    GLUC 166 (H) 03/22/2025    CALCIUM 8.7 03/22/2025     CBC:   Lab Results   Component Value Date    WBC 21.57 (H) 03/22/2025    HGB 9.0 (L) 03/22/2025    HCT 27.1 (L) 03/22/2025    MCV 94 03/22/2025     (L) 03/22/2025       Imaging Studies:     All pertinent cardiac studies, imaging studies were personally reviewed and results summarized.      Emilio Guzman MD, [facility]    Portions of the record may have been created with voice recognition software.  Occasional wrong word or \"sound a like\" substitutions may have occurred due to the inherent limitations of voice recognition software.  Read the chart carefully and recognize, using context, where substitutions have occurred.  If you have any questions or concerns about the context, text or information contained within the body of this dictation, please contact myself, the provider, for further clarification.  "

## 2025-03-26 NOTE — ASSESSMENT & PLAN NOTE
Lab Results   Component Value Date    EGFR 30 03/22/2025    EGFR 17 03/21/2025    EGFR 13 03/20/2025    CREATININE 2.05 (H) 03/22/2025    CREATININE 3.27 (H) 03/21/2025    CREATININE 3.95 (H) 03/20/2025   Recent RAINE noted in the postoperative period.  Creatinine has been trending down.  Nephrology has seen the patient.  Losartan has been on hold.

## 2025-03-27 ENCOUNTER — PATIENT OUTREACH (OUTPATIENT)
Dept: CASE MANAGEMENT | Facility: OTHER | Age: 80
End: 2025-03-27

## 2025-03-28 DIAGNOSIS — I10 ESSENTIAL HYPERTENSION: ICD-10-CM

## 2025-03-28 DIAGNOSIS — I48.0 PAROXYSMAL ATRIAL FIBRILLATION (HCC): ICD-10-CM

## 2025-03-28 RX ORDER — APIXABAN 5 MG/1
5 TABLET, FILM COATED ORAL 2 TIMES DAILY
Qty: 180 TABLET | Refills: 1 | Status: SHIPPED | OUTPATIENT
Start: 2025-03-28

## 2025-04-04 ENCOUNTER — PATIENT OUTREACH (OUTPATIENT)
Dept: CASE MANAGEMENT | Facility: OTHER | Age: 80
End: 2025-04-04

## 2025-04-10 ENCOUNTER — TELEPHONE (OUTPATIENT)
Dept: OBGYN CLINIC | Facility: CLINIC | Age: 80
End: 2025-04-10

## 2025-04-10 ENCOUNTER — TELEPHONE (OUTPATIENT)
Dept: OBGYN CLINIC | Facility: MEDICAL CENTER | Age: 80
End: 2025-04-10

## 2025-04-10 NOTE — TELEPHONE ENCOUNTER
Caller: Patient    Doctor: Dr. Nevarez    Reason for call:     Patient notified to sohail Lacy nurse to call Dr Nevarez's office, advised the patient.    Call back#: n/a

## 2025-04-10 NOTE — TELEPHONE ENCOUNTER
CALL CENTER CALLED IN WITH PATIENT'S NURSE ON THE OTHER LINE, REGARDING THE APPOINTMENT AT 1:45 TODAY.  WANTED TO KNOW IF HE SHOULD STILL BE SEEN, ADVISED THE PATIENT'S HAS WADE.  SPOIKE TO   DR. RAYA, ADVISED TO ASKED HOW THE PATIENT IS DOING, AND IF HE IS DOING OK, DID NOT NEED TO RESCHEDULE AFTER TREATMENT.  IF NOT TO CALL US BACK TO SCHEDULE.  SPOKE TO PATIENT'S NURSE: WILLI AT [facility]

## 2025-04-11 ENCOUNTER — TELEPHONE (OUTPATIENT)
Dept: OBGYN CLINIC | Facility: HOSPITAL | Age: 80
End: 2025-04-11

## 2025-04-11 ENCOUNTER — PATIENT OUTREACH (OUTPATIENT)
Dept: CASE MANAGEMENT | Facility: OTHER | Age: 80
End: 2025-04-11

## 2025-04-11 NOTE — TELEPHONE ENCOUNTER
Called and spoke with [facility], told them, per Dr. Nevarez, we would like them to remove the staples and apply steri strips. The nurse is requesting those instructions in writing. Are you able to create a letter for their office? I can fax over once ready.

## 2025-04-11 NOTE — TELEPHONE ENCOUNTER
Patient called back to see if letter had been faxed; confirmed per prev message. Patient will check with facility and CB with new fax number if not received.

## 2025-04-11 NOTE — TELEPHONE ENCOUNTER
Caller: Pushpa - [facility]    Doctor: Dr. Nevarez    Reason for call: Patient missed his appt yesterday and the facility would like to know if they are able to remove his staples. If so can something be faxed in writing that they can be removed and also state any instructions for the dressing after - fax# [phone]    Thank you.    Call back#: [phone] - [facility]

## 2025-04-11 NOTE — TELEPHONE ENCOUNTER
Would it be okay for me to write a letter stating it is okay to remove the staples and place steri strips?

## 2025-04-11 NOTE — TELEPHONE ENCOUNTER
Caller: Self    Doctor: Dr. Nevarez    Reason for call: Patient following up on previous message left. States he will be discharge on Monday whether the staples are removed or not and he does not know when he will be able to f/u with surgeon in office. Can someone call nurse back to advise what to do with staples    Call back#: Pushpa at [place] [phone]

## 2025-04-15 ENCOUNTER — PATIENT OUTREACH (OUTPATIENT)
Dept: CASE MANAGEMENT | Facility: OTHER | Age: 80
End: 2025-04-15

## 2025-04-15 NOTE — PROGRESS NOTES
Update obtained from [facility] the patient Discharged 4/14/25 to home. I updated the care coordination note, removed myself as the responsible staff, added the appropriate responsible staff.

## 2025-04-16 ENCOUNTER — PATIENT OUTREACH (OUTPATIENT)
Dept: CASE MANAGEMENT | Facility: OTHER | Age: 80
End: 2025-04-16

## 2025-04-18 ENCOUNTER — PATIENT OUTREACH (OUTPATIENT)
Dept: CASE MANAGEMENT | Facility: OTHER | Age: 80
End: 2025-04-18

## 2025-04-18 NOTE — PROGRESS NOTES
Left message with my contact information for patient to return my call  Unable to reach letter sent

## 2025-04-18 NOTE — PROGRESS NOTES
Ramos and his wife returned my phone call. They report that Ramos is doing good. No home health agency ordered. He is doing exercises that were shown to him in rehab.   FBS  midday blood sugars are average 135. Holds morning dose of metformin if blood sugar is below 100, will take afternoon dose. Using walker to ambulate.   Following low salt diet. Denies lower extremity swelling.   Wife Nivia states his incision  looks good. Staples were removed before he left rehab.   Does not want to make follow up appointments until he is no longer using walker  Encouraged him to call orthopedic office to see if he can be seen sooner than 5/22/25,   Ramos declines further outreaches at this time.

## 2025-04-18 NOTE — LETTER
Date: 04/18/25    Dear Abelino Renee,   My name is Cassia Gee; I am a registered nurse care manager working with [employer] [address] [address] [phone].   I have not been able to reach you and would like to set a time that I can talk with you over the phone.  My work is to help patients that have complex medical conditions get the care they need. This includes patients who may have been in the hospital or emergency room.    Please call me with any questions you may have. I look forward to speaking with you.  Sincerely,  Cassia Flynn  [phone]  Outpatient Care Manager

## 2025-04-21 DIAGNOSIS — E11.42 TYPE 2 DIABETES MELLITUS WITH DIABETIC POLYNEUROPATHY, WITHOUT LONG-TERM CURRENT USE OF INSULIN (HCC): ICD-10-CM

## 2025-04-21 DIAGNOSIS — I10 BENIGN ESSENTIAL HTN: ICD-10-CM

## 2025-04-22 ENCOUNTER — TELEPHONE (OUTPATIENT)
Dept: FAMILY MEDICINE CLINIC | Facility: CLINIC | Age: 80
End: 2025-04-22

## 2025-04-22 DIAGNOSIS — E11.42 TYPE 2 DIABETES MELLITUS WITH DIABETIC POLYNEUROPATHY, WITHOUT LONG-TERM CURRENT USE OF INSULIN (HCC): ICD-10-CM

## 2025-04-22 DIAGNOSIS — I10 BENIGN ESSENTIAL HTN: ICD-10-CM

## 2025-04-22 DIAGNOSIS — N17.9 ACUTE KIDNEY INJURY (HCC): Primary | ICD-10-CM

## 2025-04-22 RX ORDER — SIMVASTATIN 20 MG
20 TABLET ORAL
Qty: 90 TABLET | Refills: 1 | Status: SHIPPED | OUTPATIENT
Start: 2025-04-22

## 2025-04-22 RX ORDER — METFORMIN HYDROCHLORIDE 500 MG/1
TABLET, EXTENDED RELEASE ORAL
Qty: 180 TABLET | Refills: 1 | Status: CANCELLED | OUTPATIENT
Start: 2025-04-22 | End: 2026-05-05

## 2025-04-22 RX ORDER — LOSARTAN POTASSIUM 100 MG/1
100 TABLET ORAL DAILY
Qty: 90 TABLET | Refills: 1 | Status: SHIPPED | OUTPATIENT
Start: 2025-04-22

## 2025-04-22 RX ORDER — AMLODIPINE BESYLATE 10 MG/1
10 TABLET ORAL DAILY
Qty: 90 TABLET | Refills: 1 | Status: SHIPPED | OUTPATIENT
Start: 2025-04-22

## 2025-04-22 RX ORDER — CARVEDILOL 6.25 MG/1
6.25 TABLET ORAL 2 TIMES DAILY WITH MEALS
Qty: 180 TABLET | Refills: 0 | Status: SHIPPED | OUTPATIENT
Start: 2025-04-22 | End: 2025-04-22 | Stop reason: SDUPTHER

## 2025-04-22 RX ORDER — CARVEDILOL 6.25 MG/1
6.25 TABLET ORAL 2 TIMES DAILY WITH MEALS
Qty: 180 TABLET | Refills: 1 | Status: SHIPPED | OUTPATIENT
Start: 2025-04-22 | End: 2025-10-19

## 2025-04-22 RX ORDER — AMLODIPINE BESYLATE 10 MG/1
10 TABLET ORAL DAILY
Qty: 90 TABLET | Refills: 1 | Status: SHIPPED | OUTPATIENT
Start: 2025-04-22 | End: 2025-04-22 | Stop reason: SDUPTHER

## 2025-04-22 RX ORDER — GLIPIZIDE 10 MG/1
10 TABLET, FILM COATED, EXTENDED RELEASE ORAL 2 TIMES DAILY
Qty: 180 TABLET | Refills: 1 | Status: SHIPPED | OUTPATIENT
Start: 2025-04-22

## 2025-04-22 NOTE — TELEPHONE ENCOUNTER
Patient is calling to check on status of previous medication encounter .Please follow up and advise. Thank you in advance.

## 2025-04-22 NOTE — TELEPHONE ENCOUNTER
Call if any issues and please call and leave a message. Needs to go to [facility]

## 2025-04-22 NOTE — TELEPHONE ENCOUNTER
Patient called the RX Refill Line. Message is being forwarded to the office.     Patient is requesting a refill of Metformin  mg  1 tablet twice a day. Would like that sent to [facility]. Not on active med list to que up     Please contact patient at [phone]

## 2025-04-23 ENCOUNTER — TELEPHONE (OUTPATIENT)
Dept: LAB | Facility: HOSPITAL | Age: 80
End: 2025-04-23

## 2025-04-23 NOTE — TELEPHONE ENCOUNTER
The metforming was discontinued several months ago due to his worsening kidney function.  He should not be taking metformin.

## 2025-04-23 NOTE — TELEPHONE ENCOUNTER
Per Patient Dr Tar[facility]  [phone] told patient he is to continue taking Please advise

## 2025-04-23 NOTE — TELEPHONE ENCOUNTER
Patient called to inform his provider that his kidney doctor, Dr Rocha at [facility] said it is ok for him to take 1 metformin pill in the morning and one at night because it has been controlling his blood sugar and it does not bother his kidneys. Patient would like a call back to further discuss. Please advise. Thank you.

## 2025-04-23 NOTE — TELEPHONE ENCOUNTER
Dr Rocha's office called as well stating they would like to put the pt back on Metformin. Would Dr Wright like to prescribe it? Would she like Dr Rocha to prescribe it? Or would she like to discuss it further with Dr Rocha? Please advise.

## 2025-04-23 NOTE — TELEPHONE ENCOUNTER
Please obtain discharge records from [facility] to review his discharge summary and med list.

## 2025-04-23 NOTE — TELEPHONE ENCOUNTER
"Not taking insulin - only given at rehab, was on glipizide and metformin at home. Let pt know that before ordering metformin for him again Dr. Wright would need him to get blood work down - pt interrupted me to let me know that he can't move around currently due to his recent surgery and I let him know that we have a Mobile lab that can go to him. Pt declined stating that he does not want anyone in his home , I reiterated that in order for his pcp to order medications she would need to know how well his kidneys are functioning through lab work - pt stated that it looks as though he wouldn't be continuing with \"us\" then because he does not want them in his home. I let the pt know that I will ask his pcp how best I could assist him and put him on a brief hold. Went to speak to his pcp who stated she was not comfortable ordering it without up to date labs - came back to my desk to let the pt know but they had hung up.     Called pt back to let him know what his pcp recommended and he let me know that he called the mobile lab and will be getting his labs done.   Also asked the pt how his sugars were running at home and he stated that this morning was 100, normally between  since getting out of rehab has been controlling it well - has been taking left over metformins since getting out of rehab - was given while in rehab as well and insulin if it went really high  "

## 2025-04-23 NOTE — TELEPHONE ENCOUNTER
Called rehab facility and was transferred to Mala - who was not in office at the time, left a voicemail with our information to give us a call back.     Called back and was transferred to the nurse who stated that they normally send a copy with the patient and we would need to request it from them - can fax labs over - can't send a med list over because they discontinued them after discharge and it does not/cannot populate, she stated we would have to get that from the pt - provided our fax # and she stated she would send that over today.    Accepting Facility Name, City & State : [facility]  Receiving Facility/Agency Phone Number: [phone]  Facility/Agency Fax Number: [phone]

## 2025-04-23 NOTE — TELEPHONE ENCOUNTER
Can you please clarify with patient- is he continuing to take lantus 10 units nightly with lispro insulin with meals?    I don't see a discharge summary from the rehab facility he was in.  I was concerned b/c his kidney function was worse in the hospital (likely due to his fall/ hip fracture/ surgery) but I don't see a repeat lab test done since discharge.  How are his blood sugars running at home?    Let pt know that I ordered a repeat BMP and CBC since I don't see that this was checked during his rehab stay.  His GFR on discharge was 30, but was down as low as 13 while hospitalized, so I want to be sure his kidney function has continued to improve so he is safe to start on metformin again.     They can set up with mobile lab   For Mobile Lab Now, call [phone]

## 2025-04-29 ENCOUNTER — APPOINTMENT (OUTPATIENT)
Dept: LAB | Facility: HOSPITAL | Age: 80
End: 2025-04-29
Payer: MEDICARE

## 2025-04-29 ENCOUNTER — TELEPHONE (OUTPATIENT)
Age: 80
End: 2025-04-29

## 2025-04-29 ENCOUNTER — RESULTS FOLLOW-UP (OUTPATIENT)
Dept: FAMILY MEDICINE CLINIC | Facility: CLINIC | Age: 80
End: 2025-04-29

## 2025-04-29 DIAGNOSIS — E11.22 TYPE 2 DIABETES MELLITUS WITH STAGE 3B CHRONIC KIDNEY DISEASE AND HYPERTENSION (HCC): Primary | ICD-10-CM

## 2025-04-29 DIAGNOSIS — I12.9 TYPE 2 DIABETES MELLITUS WITH STAGE 3B CHRONIC KIDNEY DISEASE AND HYPERTENSION (HCC): Primary | ICD-10-CM

## 2025-04-29 DIAGNOSIS — N18.32 TYPE 2 DIABETES MELLITUS WITH STAGE 3B CHRONIC KIDNEY DISEASE AND HYPERTENSION (HCC): Primary | ICD-10-CM

## 2025-04-29 LAB
ANION GAP SERPL CALCULATED.3IONS-SCNC: 7 MMOL/L (ref 4–13)
ANISOCYTOSIS BLD QL SMEAR: PRESENT
BASOPHILS # BLD MANUAL: 0.17 THOUSAND/UL (ref 0–0.1)
BASOPHILS NFR MAR MANUAL: 1 % (ref 0–1)
BUN SERPL-MCNC: 27 MG/DL (ref 5–25)
CALCIUM SERPL-MCNC: 9 MG/DL (ref 8.4–10.2)
CHLORIDE SERPL-SCNC: 107 MMOL/L (ref 96–108)
CO2 SERPL-SCNC: 23 MMOL/L (ref 21–32)
CREAT SERPL-MCNC: 1.36 MG/DL (ref 0.6–1.3)
EOSINOPHIL # BLD MANUAL: 0.17 THOUSAND/UL (ref 0–0.4)
EOSINOPHIL NFR BLD MANUAL: 1 % (ref 0–6)
ERYTHROCYTE [DISTWIDTH] IN BLOOD BY AUTOMATED COUNT: 16.1 % (ref 11.6–15.1)
GFR SERPL CREATININE-BSD FRML MDRD: 49 ML/MIN/1.73SQ M
GLUCOSE SERPL-MCNC: 76 MG/DL (ref 65–140)
HCT VFR BLD AUTO: 29.7 % (ref 36.5–49.3)
HGB BLD-MCNC: 9.2 G/DL (ref 12–17)
LYMPHOCYTES # BLD AUTO: 12.62 THOUSAND/UL (ref 0.6–4.47)
LYMPHOCYTES # BLD AUTO: 74 % (ref 14–44)
MCH RBC QN AUTO: 30.7 PG (ref 26.8–34.3)
MCHC RBC AUTO-ENTMCNC: 31 G/DL (ref 31.4–37.4)
MCV RBC AUTO: 99 FL (ref 82–98)
MONOCYTES # BLD AUTO: 0.34 THOUSAND/UL (ref 0–1.22)
MONOCYTES NFR BLD: 2 % (ref 4–12)
NEUTROPHILS # BLD MANUAL: 3.75 THOUSAND/UL (ref 1.85–7.62)
NEUTS SEG NFR BLD AUTO: 22 % (ref 43–75)
PLATELET # BLD AUTO: 203 THOUSANDS/UL (ref 149–390)
PLATELET BLD QL SMEAR: ADEQUATE
PMV BLD AUTO: 9.3 FL (ref 8.9–12.7)
POTASSIUM SERPL-SCNC: 4.2 MMOL/L (ref 3.5–5.3)
RBC # BLD AUTO: 3 MILLION/UL (ref 3.88–5.62)
RBC MORPH BLD: PRESENT
SMUDGE CELLS BLD QL SMEAR: PRESENT
SODIUM SERPL-SCNC: 137 MMOL/L (ref 135–147)
WBC # BLD AUTO: 17.05 THOUSAND/UL (ref 4.31–10.16)

## 2025-04-29 PROCEDURE — 85027 COMPLETE CBC AUTOMATED: CPT | Performed by: FAMILY MEDICINE

## 2025-04-29 PROCEDURE — 36415 COLL VENOUS BLD VENIPUNCTURE: CPT | Performed by: FAMILY MEDICINE

## 2025-04-29 PROCEDURE — 80048 BASIC METABOLIC PNL TOTAL CA: CPT | Performed by: FAMILY MEDICINE

## 2025-04-29 PROCEDURE — 85007 BL SMEAR W/DIFF WBC COUNT: CPT | Performed by: FAMILY MEDICINE

## 2025-04-29 RX ORDER — METFORMIN HYDROCHLORIDE 500 MG/1
500 TABLET, EXTENDED RELEASE ORAL 2 TIMES DAILY WITH MEALS
Qty: 180 TABLET | Refills: 1 | Status: SHIPPED | OUTPATIENT
Start: 2025-04-29

## 2025-04-29 NOTE — TELEPHONE ENCOUNTER
Patient called because he had lab work done today and would like the results. He is specifically looking for the GFR number and his glucose number. Please call back once reviewed. Thank you.

## 2025-04-29 NOTE — TELEPHONE ENCOUNTER
Ramos wants to know if you can order the Metformin. He said you told him if his GFR got above 30 he could go back on it. It would sent to [facility].

## 2025-04-30 NOTE — TELEPHONE ENCOUNTER
Patients wife calling this morning to check to see if metformin was called into [facility].  I let her know that Dr. Wright sent it in yesterday and a mychart message was sent to her  who acknowledge the message.    No further action needed

## 2025-05-21 ENCOUNTER — HOSPITAL ENCOUNTER (INPATIENT)
Facility: HOSPITAL | Age: 80
LOS: 6 days | Discharge: HOME | DRG: 981 | End: 2025-05-27
Attending: EMERGENCY MEDICINE
Payer: MEDICARE

## 2025-05-21 ENCOUNTER — OFFICE VISIT (OUTPATIENT)
Dept: FAMILY MEDICINE CLINIC | Facility: CLINIC | Age: 80
End: 2025-05-21
Payer: MEDICARE

## 2025-05-21 ENCOUNTER — APPOINTMENT (INPATIENT)
Dept: CT IMAGING | Facility: HOSPITAL | Age: 80
DRG: 981 | End: 2025-05-21
Payer: MEDICARE

## 2025-05-21 ENCOUNTER — APPOINTMENT (INPATIENT)
Dept: NON INVASIVE DIAGNOSTICS | Facility: HOSPITAL | Age: 80
DRG: 981 | End: 2025-05-21
Payer: MEDICARE

## 2025-05-21 ENCOUNTER — APPOINTMENT (EMERGENCY)
Dept: RADIOLOGY | Facility: HOSPITAL | Age: 80
DRG: 981 | End: 2025-05-21
Payer: MEDICARE

## 2025-05-21 ENCOUNTER — APPOINTMENT (EMERGENCY)
Dept: CT IMAGING | Facility: HOSPITAL | Age: 80
DRG: 981 | End: 2025-05-21
Payer: MEDICARE

## 2025-05-21 ENCOUNTER — APPOINTMENT (INPATIENT)
Dept: VASCULAR ULTRASOUND | Facility: HOSPITAL | Age: 80
DRG: 981 | End: 2025-05-21
Payer: MEDICARE

## 2025-05-21 VITALS
HEIGHT: 66 IN | WEIGHT: 179.8 LBS | OXYGEN SATURATION: 93 % | BODY MASS INDEX: 28.9 KG/M2 | DIASTOLIC BLOOD PRESSURE: 68 MMHG | HEART RATE: 82 BPM | SYSTOLIC BLOOD PRESSURE: 122 MMHG | TEMPERATURE: 97.7 F

## 2025-05-21 DIAGNOSIS — N18.32 STAGE 3B CHRONIC KIDNEY DISEASE (HCC): ICD-10-CM

## 2025-05-21 DIAGNOSIS — J18.9 PNA (PNEUMONIA): ICD-10-CM

## 2025-05-21 DIAGNOSIS — A41.9 SEPSIS (HCC): ICD-10-CM

## 2025-05-21 DIAGNOSIS — R09.02 HYPOXIA: ICD-10-CM

## 2025-05-21 DIAGNOSIS — I50.9 ACUTE CONGESTIVE HEART FAILURE, UNSPECIFIED HEART FAILURE TYPE (HCC): ICD-10-CM

## 2025-05-21 DIAGNOSIS — T14.8XXA INTRAMUSCULAR HEMATOMA: ICD-10-CM

## 2025-05-21 DIAGNOSIS — D64.9 SYMPTOMATIC ANEMIA: ICD-10-CM

## 2025-05-21 DIAGNOSIS — J96.01 ACUTE HYPOXIC RESPIRATORY FAILURE (HCC): Primary | ICD-10-CM

## 2025-05-21 DIAGNOSIS — S70.02XA HEMATOMA OF LEFT HIP, INITIAL ENCOUNTER: ICD-10-CM

## 2025-05-21 DIAGNOSIS — D64.9 ANEMIA: ICD-10-CM

## 2025-05-21 DIAGNOSIS — I72.9 PSEUDOANEURYSM (HCC): ICD-10-CM

## 2025-05-21 DIAGNOSIS — R93.89 ABNORMAL CT OF THE CHEST: ICD-10-CM

## 2025-05-21 DIAGNOSIS — K57.90 DIVERTICULAR DISEASE: ICD-10-CM

## 2025-05-21 DIAGNOSIS — I50.9 CHF (CONGESTIVE HEART FAILURE) (HCC): ICD-10-CM

## 2025-05-21 DIAGNOSIS — I48.19 PERSISTENT ATRIAL FIBRILLATION (HCC): ICD-10-CM

## 2025-05-21 DIAGNOSIS — R06.02 SHORTNESS OF BREATH: Primary | ICD-10-CM

## 2025-05-21 DIAGNOSIS — R91.8 MULTIPLE LUNG NODULES ON CT: ICD-10-CM

## 2025-05-21 DIAGNOSIS — E11.42 TYPE 2 DIABETES MELLITUS WITH DIABETIC POLYNEUROPATHY, WITHOUT LONG-TERM CURRENT USE OF INSULIN (HCC): ICD-10-CM

## 2025-05-21 LAB
2HR DELTA HS TROPONIN: 0 NG/L
4HR DELTA HS TROPONIN: 0 NG/L
ABO GROUP BLD: NORMAL
ALBUMIN SERPL BCG-MCNC: 3.7 G/DL (ref 3.5–5)
ALP SERPL-CCNC: 94 U/L (ref 34–104)
ALT SERPL W P-5'-P-CCNC: 13 U/L (ref 7–52)
ANION GAP SERPL CALCULATED.3IONS-SCNC: 10 MMOL/L (ref 4–13)
ANISOCYTOSIS BLD QL SMEAR: PRESENT
APTT PPP: 30 SECONDS (ref 23–34)
AST SERPL W P-5'-P-CCNC: 13 U/L (ref 13–39)
ATRIAL RATE: 101 BPM
ATRIAL RATE: 107 BPM
BASOPHILS # BLD MANUAL: 0.15 THOUSAND/UL (ref 0–0.1)
BASOPHILS NFR MAR MANUAL: 1 % (ref 0–1)
BILIRUB SERPL-MCNC: 0.9 MG/DL (ref 0.2–1)
BLD GP AB SCN SERPL QL: NEGATIVE
BNP SERPL-MCNC: 673 PG/ML (ref 0–100)
BUN SERPL-MCNC: 34 MG/DL (ref 5–25)
CALCIUM SERPL-MCNC: 8.9 MG/DL (ref 8.4–10.2)
CARDIAC TROPONIN I PNL SERPL HS: 8 NG/L (ref ?–50)
CHLORIDE SERPL-SCNC: 102 MMOL/L (ref 96–108)
CO2 SERPL-SCNC: 24 MMOL/L (ref 21–32)
CREAT SERPL-MCNC: 1.42 MG/DL (ref 0.6–1.3)
EOSINOPHIL # BLD MANUAL: 0 THOUSAND/UL (ref 0–0.4)
EOSINOPHIL NFR BLD MANUAL: 0 % (ref 0–6)
ERYTHROCYTE [DISTWIDTH] IN BLOOD BY AUTOMATED COUNT: 15.9 % (ref 11.6–15.1)
FERRITIN SERPL-MCNC: 177 NG/ML (ref 30–336)
FLUAV AG UPPER RESP QL IA.RAPID: NEGATIVE
FLUBV AG UPPER RESP QL IA.RAPID: NEGATIVE
GFR SERPL CREATININE-BSD FRML MDRD: 46 ML/MIN/1.73SQ M
GLUCOSE SERPL-MCNC: 276 MG/DL (ref 65–140)
GLUCOSE SERPL-MCNC: 318 MG/DL (ref 65–140)
GLUCOSE SERPL-MCNC: 343 MG/DL (ref 65–140)
HCT VFR BLD AUTO: 24.4 % (ref 36.5–49.3)
HGB BLD-MCNC: 7.9 G/DL (ref 12–17)
INR PPP: 1.51 (ref 0.85–1.19)
IRON SATN MFR SERPL: 7 % (ref 15–50)
IRON SERPL-MCNC: 24 UG/DL (ref 50–212)
L PNEUMO1 AG UR QL IA.RAPID: NEGATIVE
LACTATE SERPL-SCNC: 1.1 MMOL/L (ref 0.5–2)
LYMPHOCYTES # BLD AUTO: 32 % (ref 14–44)
LYMPHOCYTES # BLD AUTO: 4.66 THOUSAND/UL (ref 0.6–4.47)
MCH RBC QN AUTO: 31 PG (ref 26.8–34.3)
MCHC RBC AUTO-ENTMCNC: 32.4 G/DL (ref 31.4–37.4)
MCV RBC AUTO: 96 FL (ref 82–98)
MONOCYTES # BLD AUTO: 0.58 THOUSAND/UL (ref 0–1.22)
MONOCYTES NFR BLD: 4 % (ref 4–12)
NEUTROPHILS # BLD MANUAL: 9.17 THOUSAND/UL (ref 1.85–7.62)
NEUTS SEG NFR BLD AUTO: 63 % (ref 43–75)
PLATELET # BLD AUTO: 172 THOUSANDS/UL (ref 149–390)
PLATELET BLD QL SMEAR: ADEQUATE
PMV BLD AUTO: 9.7 FL (ref 8.9–12.7)
POTASSIUM SERPL-SCNC: 4.4 MMOL/L (ref 3.5–5.3)
PROCALCITONIN SERPL-MCNC: 0.21 NG/ML
PROT SERPL-MCNC: 7.8 G/DL (ref 6.4–8.4)
PROTHROMBIN TIME: 18.9 SECONDS (ref 12.3–15)
QRS AXIS: 56 DEGREES
QRS AXIS: 75 DEGREES
QRSD INTERVAL: 72 MS
QRSD INTERVAL: 80 MS
QT INTERVAL: 342 MS
QT INTERVAL: 352 MS
QTC INTERVAL: 430 MS
QTC INTERVAL: 436 MS
RBC # BLD AUTO: 2.55 MILLION/UL (ref 3.88–5.62)
RBC MORPH BLD: PRESENT
RH BLD: POSITIVE
S PNEUM AG UR QL: NEGATIVE
SARS-COV+SARS-COV-2 AG RESP QL IA.RAPID: NEGATIVE
SMUDGE CELLS BLD QL SMEAR: PRESENT
SODIUM SERPL-SCNC: 136 MMOL/L (ref 135–147)
SPECIMEN EXPIRATION DATE: NORMAL
T WAVE AXIS: 44 DEGREES
T WAVE AXIS: 64 DEGREES
TIBC SERPL-MCNC: 320.6 UG/DL (ref 250–450)
TRANSFERRIN SERPL-MCNC: 229 MG/DL (ref 203–362)
UIBC SERPL-MCNC: 297 UG/DL (ref 155–355)
VENTRICULAR RATE: 90 BPM
VENTRICULAR RATE: 98 BPM
WBC # BLD AUTO: 14.56 THOUSAND/UL (ref 4.31–10.16)

## 2025-05-21 PROCEDURE — 99291 CRITICAL CARE FIRST HOUR: CPT | Performed by: EMERGENCY MEDICINE

## 2025-05-21 PROCEDURE — 93005 ELECTROCARDIOGRAM TRACING: CPT

## 2025-05-21 PROCEDURE — 84484 ASSAY OF TROPONIN QUANT: CPT

## 2025-05-21 PROCEDURE — 99215 OFFICE O/P EST HI 40 MIN: CPT | Performed by: FAMILY MEDICINE

## 2025-05-21 PROCEDURE — 86901 BLOOD TYPING SEROLOGIC RH(D): CPT

## 2025-05-21 PROCEDURE — 93306 TTE W/DOPPLER COMPLETE: CPT | Performed by: INTERNAL MEDICINE

## 2025-05-21 PROCEDURE — 73701 CT LOWER EXTREMITY W/DYE: CPT

## 2025-05-21 PROCEDURE — 82948 REAGENT STRIP/BLOOD GLUCOSE: CPT

## 2025-05-21 PROCEDURE — 87040 BLOOD CULTURE FOR BACTERIA: CPT

## 2025-05-21 PROCEDURE — 96365 THER/PROPH/DIAG IV INF INIT: CPT

## 2025-05-21 PROCEDURE — 82728 ASSAY OF FERRITIN: CPT

## 2025-05-21 PROCEDURE — 94002 VENT MGMT INPAT INIT DAY: CPT

## 2025-05-21 PROCEDURE — 96367 TX/PROPH/DG ADDL SEQ IV INF: CPT

## 2025-05-21 PROCEDURE — 85007 BL SMEAR W/DIFF WBC COUNT: CPT

## 2025-05-21 PROCEDURE — 93010 ELECTROCARDIOGRAM REPORT: CPT | Performed by: INTERNAL MEDICINE

## 2025-05-21 PROCEDURE — 99291 CRITICAL CARE FIRST HOUR: CPT

## 2025-05-21 PROCEDURE — 84443 ASSAY THYROID STIM HORMONE: CPT

## 2025-05-21 PROCEDURE — 86900 BLOOD TYPING SEROLOGIC ABO: CPT

## 2025-05-21 PROCEDURE — 94760 N-INVAS EAR/PLS OXIMETRY 1: CPT

## 2025-05-21 PROCEDURE — 87804 INFLUENZA ASSAY W/OPTIC: CPT

## 2025-05-21 PROCEDURE — 71045 X-RAY EXAM CHEST 1 VIEW: CPT

## 2025-05-21 PROCEDURE — 87449 NOS EACH ORGANISM AG IA: CPT

## 2025-05-21 PROCEDURE — 87147 CULTURE TYPE IMMUNOLOGIC: CPT

## 2025-05-21 PROCEDURE — 83540 ASSAY OF IRON: CPT

## 2025-05-21 PROCEDURE — 96361 HYDRATE IV INFUSION ADD-ON: CPT

## 2025-05-21 PROCEDURE — 84145 PROCALCITONIN (PCT): CPT

## 2025-05-21 PROCEDURE — 86850 RBC ANTIBODY SCREEN: CPT

## 2025-05-21 PROCEDURE — 87081 CULTURE SCREEN ONLY: CPT

## 2025-05-21 PROCEDURE — 83605 ASSAY OF LACTIC ACID: CPT

## 2025-05-21 PROCEDURE — 80053 COMPREHEN METABOLIC PANEL: CPT

## 2025-05-21 PROCEDURE — 96375 TX/PRO/DX INJ NEW DRUG ADDON: CPT

## 2025-05-21 PROCEDURE — 85610 PROTHROMBIN TIME: CPT

## 2025-05-21 PROCEDURE — 83880 ASSAY OF NATRIURETIC PEPTIDE: CPT

## 2025-05-21 PROCEDURE — G2211 COMPLEX E/M VISIT ADD ON: HCPCS | Performed by: FAMILY MEDICINE

## 2025-05-21 PROCEDURE — 87811 SARS-COV-2 COVID19 W/OPTIC: CPT

## 2025-05-21 PROCEDURE — 71250 CT THORAX DX C-: CPT

## 2025-05-21 PROCEDURE — 93306 TTE W/DOPPLER COMPLETE: CPT

## 2025-05-21 PROCEDURE — 83550 IRON BINDING TEST: CPT

## 2025-05-21 PROCEDURE — 36415 COLL VENOUS BLD VENIPUNCTURE: CPT

## 2025-05-21 PROCEDURE — 85730 THROMBOPLASTIN TIME PARTIAL: CPT

## 2025-05-21 PROCEDURE — 85027 COMPLETE CBC AUTOMATED: CPT

## 2025-05-21 RX ORDER — METFORMIN HYDROCHLORIDE 500 MG/1
500 TABLET, EXTENDED RELEASE ORAL 2 TIMES DAILY WITH MEALS
Status: ON HOLD | COMMUNITY
Start: 2025-04-14

## 2025-05-21 RX ORDER — HYDRALAZINE HYDROCHLORIDE 20 MG/ML
5 INJECTION INTRAMUSCULAR; INTRAVENOUS EVERY 6 HOURS PRN
Status: DISCONTINUED | OUTPATIENT
Start: 2025-05-21 | End: 2025-05-22

## 2025-05-21 RX ORDER — VANCOMYCIN HYDROCHLORIDE 1 G/200ML
1000 INJECTION, SOLUTION INTRAVENOUS EVERY 24 HOURS
Status: DISCONTINUED | OUTPATIENT
Start: 2025-05-22 | End: 2025-05-22

## 2025-05-21 RX ORDER — LEVOTHYROXINE SODIUM 75 UG/1
75 TABLET ORAL
Status: DISCONTINUED | OUTPATIENT
Start: 2025-05-21 | End: 2025-05-27 | Stop reason: HOSPADM

## 2025-05-21 RX ORDER — AZITHROMYCIN 250 MG/1
500 TABLET, FILM COATED ORAL ONCE
Status: COMPLETED | OUTPATIENT
Start: 2025-05-21 | End: 2025-05-21

## 2025-05-21 RX ORDER — NITROGLYCERIN 0.4 MG/1
0.4 TABLET SUBLINGUAL ONCE
Status: COMPLETED | OUTPATIENT
Start: 2025-05-21 | End: 2025-05-21

## 2025-05-21 RX ORDER — INSULIN LISPRO 100 [IU]/ML
1-6 INJECTION, SOLUTION INTRAVENOUS; SUBCUTANEOUS
Status: DISCONTINUED | OUTPATIENT
Start: 2025-05-21 | End: 2025-05-27 | Stop reason: HOSPADM

## 2025-05-21 RX ORDER — TIMOLOL MALEATE 5 MG/ML
1 SOLUTION/ DROPS OPHTHALMIC 2 TIMES DAILY
Status: DISCONTINUED | OUTPATIENT
Start: 2025-05-21 | End: 2025-05-27 | Stop reason: HOSPADM

## 2025-05-21 RX ORDER — AMLODIPINE BESYLATE 10 MG/1
10 TABLET ORAL DAILY
Status: DISCONTINUED | OUTPATIENT
Start: 2025-05-21 | End: 2025-05-27 | Stop reason: HOSPADM

## 2025-05-21 RX ORDER — NITROGLYCERIN 20 MG/100ML
5-200 INJECTION INTRAVENOUS
Status: CANCELLED | OUTPATIENT
Start: 2025-05-21

## 2025-05-21 RX ORDER — NITROGLYCERIN 20 MG/100ML
5-200 INJECTION INTRAVENOUS
Status: DISCONTINUED | OUTPATIENT
Start: 2025-05-21 | End: 2025-05-22

## 2025-05-21 RX ORDER — CHLORHEXIDINE GLUCONATE ORAL RINSE 1.2 MG/ML
15 SOLUTION DENTAL EVERY 12 HOURS SCHEDULED
Status: DISCONTINUED | OUTPATIENT
Start: 2025-05-21 | End: 2025-05-27

## 2025-05-21 RX ORDER — PRAVASTATIN SODIUM 40 MG
40 TABLET ORAL
Status: DISCONTINUED | OUTPATIENT
Start: 2025-05-21 | End: 2025-05-27 | Stop reason: HOSPADM

## 2025-05-21 RX ORDER — AZITHROMYCIN 250 MG/1
500 TABLET, FILM COATED ORAL DAILY
Status: DISCONTINUED | OUTPATIENT
Start: 2025-05-22 | End: 2025-05-23

## 2025-05-21 RX ORDER — CARVEDILOL 6.25 MG/1
6.25 TABLET ORAL 2 TIMES DAILY WITH MEALS
Status: DISCONTINUED | OUTPATIENT
Start: 2025-05-21 | End: 2025-05-27 | Stop reason: HOSPADM

## 2025-05-21 RX ORDER — FUROSEMIDE 10 MG/ML
60 INJECTION INTRAMUSCULAR; INTRAVENOUS ONCE
Status: COMPLETED | OUTPATIENT
Start: 2025-05-21 | End: 2025-05-21

## 2025-05-21 RX ORDER — LOPERAMIDE HYDROCHLORIDE 2 MG/1
2 TABLET ORAL AS NEEDED
Status: ON HOLD | COMMUNITY
Start: 2025-04-12

## 2025-05-21 RX ORDER — FUROSEMIDE 10 MG/ML
40 INJECTION INTRAMUSCULAR; INTRAVENOUS
Status: DISCONTINUED | OUTPATIENT
Start: 2025-05-21 | End: 2025-05-23

## 2025-05-21 RX ADMIN — APIXABAN 5 MG: 5 TABLET, FILM COATED ORAL at 21:42

## 2025-05-21 RX ADMIN — NITROGLYCERIN 0.4 MG: 0.4 TABLET SUBLINGUAL at 12:47

## 2025-05-21 RX ADMIN — IOHEXOL 85 ML: 350 INJECTION, SOLUTION INTRAVENOUS at 11:46

## 2025-05-21 RX ADMIN — SODIUM CHLORIDE 500 ML: 0.9 INJECTION, SOLUTION INTRAVENOUS at 11:18

## 2025-05-21 RX ADMIN — CARVEDILOL 6.25 MG: 6.25 TABLET, FILM COATED ORAL at 17:14

## 2025-05-21 RX ADMIN — CHLORHEXIDINE GLUCONATE 15 ML: 1.2 SOLUTION ORAL at 21:42

## 2025-05-21 RX ADMIN — CEFEPIME 2000 MG: 2 INJECTION, POWDER, FOR SOLUTION INTRAVENOUS at 16:56

## 2025-05-21 RX ADMIN — AZITHROMYCIN DIHYDRATE 500 MG: 250 TABLET ORAL at 12:43

## 2025-05-21 RX ADMIN — PIPERACILLIN AND TAZOBACTAM 4.5 G: 36; 4.5 INJECTION, POWDER, LYOPHILIZED, FOR SOLUTION INTRAVENOUS at 12:51

## 2025-05-21 RX ADMIN — FUROSEMIDE 60 MG: 10 INJECTION, SOLUTION INTRAVENOUS at 12:44

## 2025-05-21 RX ADMIN — FUROSEMIDE 40 MG: 10 INJECTION, SOLUTION INTRAMUSCULAR; INTRAVENOUS at 16:56

## 2025-05-21 RX ADMIN — TIMOLOL MALEATE 1 DROP: 5 SOLUTION OPHTHALMIC at 21:42

## 2025-05-21 RX ADMIN — PRAVASTATIN SODIUM 40 MG: 40 TABLET ORAL at 17:14

## 2025-05-21 RX ADMIN — VANCOMYCIN HYDROCHLORIDE 2000 MG: 5 INJECTION, POWDER, LYOPHILIZED, FOR SOLUTION INTRAVENOUS at 13:30

## 2025-05-21 RX ADMIN — NITROGLYCERIN 50 MCG/MIN: 20 INJECTION INTRAVENOUS at 13:21

## 2025-05-21 NOTE — ASSESSMENT & PLAN NOTE
Lab Results   Component Value Date    HGBA1C 7.9 (H) 03/18/2025       Recent Labs     05/21/25  1041   POCGLU 318*       Blood Sugar Average: Last 72 hrs:  (P) 318  On metformin 500mg qD  Glipizide 10mg    Plan:  Insulin sliding scale while inpatient

## 2025-05-21 NOTE — ASSESSMENT & PLAN NOTE
HR up to 120s on exam-- persists 100-120  On anticoagulation    Orders:  •  Transfer to other facility

## 2025-05-21 NOTE — ED PROVIDER NOTES
Time reflects when diagnosis was documented in both MDM as applicable and the Disposition within this note       Time User Action Codes Description Comment    5/21/2025 11:52 AM Wale Waddell [J18.9] PNA (pneumonia)     5/21/2025 12:19 PM Wale Waddell [A41.9] Sepsis (HCC)     5/21/2025 12:32 PM Wale Waddell [D64.9] Anemia     5/21/2025 12:32 PM Wale Waddell [I50.9] CHF (congestive heart failure) (HCC)     5/21/2025 12:34 PM AgrestDamon veras Add [R09.02] Hypoxia     5/21/2025  1:06 PM Wale Waddell [K57.90] Diverticular disease     5/21/2025  1:07 PM Wale Waddell [S70.02XA] Hematoma of left hip, initial encounter     5/21/2025  2:22 PM AgreiDamon J Modify [J18.9] PNA (pneumonia)     5/21/2025  2:22 PM AgrestiSorinin J Modify [I50.9] CHF (congestive heart failure) (HCC)     5/21/2025  2:22 PM AgrestiSorinin J Add [I50.9] Acute congestive heart failure, unspecified heart failure type (HCC)     5/21/2025  2:22 PM Agresti, Damon J Modify [I50.9] CHF (congestive heart failure) (HCC)     5/21/2025  2:22 PM AgrestiSorinin J Modify [I50.9] Acute congestive heart failure, unspecified heart failure type (HCC)     5/21/2025  2:22 PM AgrestiSorinin J Add [J96.01] Acute hypoxic respiratory failure (HCC)     5/21/2025  2:22 PM Agresti Damon J Modify [I50.9] Acute congestive heart failure, unspecified heart failure type (HCC)     5/21/2025  2:22 PM AgrestiSorinin J Modify [J96.01] Acute hypoxic respiratory failure (HCC)     5/21/2025  2:22 PM AgrestiSorinin J Add [N18.32] Stage 3b chronic kidney disease (HCC)     5/21/2025  2:22 PM Damon Bowens Add [E11.42] Type 2 diabetes mellitus with diabetic polyneuropathy, without long-term current use of insulin (Formerly [facility])     5/21/2025  2:22 PM Daomn Bowens Add [D64.9] Symptomatic anemia           ED Disposition       ED Disposition   Admit    Condition   Stable    Date/Time   Wed May 21, 2025  1:53 PM    Comment   Case was discussed with critical  care and the patient's admission status was agreed to be Admission Status: inpatient status to the service of Dr. Veronica .               Assessment & Plan       Medical Decision Making  See ED course for additional MDM. On presentation Pt was not in respiratory distress, however, required 2L O2 due to hypoxia. CXR c/f mixed multilobar PNA and interstitial edema. EKG with Afib RVR likely from underlying infection. Labs notable for leukocytosis, anemia, and elevated BNP. Given the anemia, recent surgery, eliquis use, and concerning findings on hip exam, CT was ordered to rule out active bleeding. Imaging showed hematoma without extravasation of contrast. Following CT scan Pt went into respiratory distress for which he was placed on Bipap and given Lasix. Additionally, his BP was rising making flash pulmonary edema a concerns. He was given SL nitroglycerin and placed on a nitroglycerin gtt. His sx improved significantly. Pt was treated for PNA with vancomycin, zosyn, and azithromycin. Critical care consulted who evaluated the Pt and agreed to take the Pt under their care.     Amount and/or Complexity of Data Reviewed  Labs: ordered. Decision-making details documented in ED Course.  Radiology: ordered and independent interpretation performed. Decision-making details documented in ED Course.    Risk  Prescription drug management.  Decision regarding hospitalization.        ED Course as of 05/21/25 1634   Wed May 21, 2025   1043 Ddx includes but not limited to ACS, CHF, PE, anemia, PNA.   1121 WBC(!): 14.56   1123 Hemoglobin(!): 7.9   1218 XR chest 1 view portable  IMPRESSION:     Bilateral patchy airspace disease may be on the basis of pulmonary edema versus infection.     1241 Pt developed worsening SOB and increased WOB after CT scan. Bipap and lasix ordered for likely CHF exacerbation   1307 CT lower extremity w contrast left  IMPRESSION:  Near-anatomic alignment status post ORIF for left intertrochanteric hip  fracture with hyperattenuation noted in the region of the left vastus medialis suspicious for intramuscular hematoma after trauma and recent surgical intervention.     1307 Pt on bipap         Medications   nitroGLYcerin (TRIDIL) 50 mg in 250 ml infusion (premix) (0 mcg/min Intravenous Stopped 5/21/25 1544)   amLODIPine (NORVASC) tablet 10 mg (0 mg Oral Hold 5/21/25 1530)   apixaban (ELIQUIS) tablet 5 mg (has no administration in time range)   carvedilol (COREG) tablet 6.25 mg (has no administration in time range)   levothyroxine tablet 75 mcg (has no administration in time range)   pravastatin (PRAVACHOL) tablet 40 mg (has no administration in time range)   timolol (TIMOPTIC) 0.5 % ophthalmic solution 1 drop (has no administration in time range)   chlorhexidine (PERIDEX) 0.12 % oral rinse 15 mL (has no administration in time range)   insulin lispro (HumALOG/ADMELOG) 100 units/mL subcutaneous injection 1-6 Units (has no administration in time range)   hydrALAZINE (APRESOLINE) injection 5 mg (has no administration in time range)   furosemide (LASIX) injection 40 mg (has no administration in time range)   sodium chloride 0.9 % bolus 500 mL (0 mL Intravenous Stopped 5/21/25 1219)   iohexol (OMNIPAQUE) 350 MG/ML injection (MULTI-DOSE) 85 mL (85 mL Intravenous Given 5/21/25 1146)   vancomycin (VANCOCIN) 2,000 mg in sodium chloride 0.9 % 500 mL IVPB (2,000 mg Intravenous New Bag 5/21/25 1330)   piperacillin-tazobactam (ZOSYN) IVPB 4.5 g (0 g Intravenous Stopped 5/21/25 1328)   azithromycin (ZITHROMAX) tablet 500 mg (500 mg Oral Given 5/21/25 1243)   furosemide (LASIX) injection 60 mg (60 mg Intravenous Given 5/21/25 1244)   nitroglycerin (NITROSTAT) SL tablet 0.4 mg (0.4 mg Sublingual Given 5/21/25 1247)       ED Risk Strat Scores                    No data recorded                            History of Present Illness       Chief Complaint   Patient presents with    Shortness of Breath     Pt endorses shortness of  "breath, wheezing x 3 days. Hx of hip surgery 2 months ago. 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    Chronic lymphocytic leukemia of B-cell type (HCC)     \"remission 4-5yrs\"-chemo    DDD (degenerative disc disease), cervical     Diabetes mellitus (HCC)     bs checked by pt at home at 6am =92    Diabetic neuropathy (HCC)     Diabetic retinopathy (HCC)     Elevated WBC count     Heart attack (HCC) 04/2015    \"labeled as that and than tested later after appendix removed and stress test showed negative\"    History of cancer chemotherapy     last treatment approx 5yrs ago    Hypertension     Lymphoma (HCC)     Myocardial infarction (HCC) 04/2015    Scalp psoriasis     Shingles 2/10-15    Toe injury     left great toe, - 2017-internal braec-to be removed today 3/30/2018    Wears glasses       Past Surgical History:   Procedure Laterality Date    APPENDECTOMY      BONE BIOPSY Left 3/30/2018    Procedure: GREAT TOE BONE BIOPSY;  Surgeon: Gerber Multani DPM;  Location: [facility];  Service: Podiatry    BOWEL RESECTION      CATARACT EXTRACTION Bilateral     CHOLECYSTECTOMY      COLECTOMY  06/2015    EYE SURGERY Right     \"retinal peel\"    FOOT FUSION Left 12/6/2017    Procedure: HALLUX INTERPHALANGEAL JOINT FUSION;  Surgeon: Gerber Multani DPM;  [facility];  Service: Podiatry    HARDWARE REMOVAL Left 4/18/2018    Procedure: REMOVAL STAPLES LEFT TOE;  Surgeon: Gerber Multani DPM;  [facility];  Service: Podiatry    ORIF FOOT FRACTURE Left 12/6/2017    Procedure: HALLUX INTERPHALANGEAL JOINT INTERNAL FIXATION; REPAIR OF ULCERATION WITH EXCISIONS AND REVISION OF SKIN HALLUX WITH USE OF ALLOGRAFT;  Surgeon: Gerber Multani DPM;  [facility];  Service: Podiatry    CT COLONOSCOPY FLX DX " W/COLLJ SPEC WHEN PFRMD N/A 2016    Procedure: COLONOSCOPY;  Surgeon: Minal Ruelas DO;  Location: [facility];  Service: Gastroenterology    KS OPTX FEM SHFT FX W/INSJ IMED IMPLT W/WO SCREW Left 3/18/2025    Procedure: INSERTION NAIL IM FEMUR ANTEGRADE (TROCHANTERIC);  Surgeon: Jacqui Nevarez DO;  Location: [facility];  Service: Orthopedics    KS REMOVAL IMPLANT DEEP Left 3/30/2018    Procedure: REMOVAL HARDWARE FOOT;  Surgeon: Gerber Multani DPM;  [facility];  Service: Podiatry    RETINAL DETACHMENT SURGERY      TONSILLECTOMY        Family History   Problem Relation Name Age of Onset    Alcohol abuse Mother Kymberly             Pancreatic cancer Father          age 79    Hashimoto's thyroiditis Daughter      Rheum arthritis Daughter          Juvenile Rheum Arthitis     No Known Problems Sister           age 78    No Known Problems Brother           86    Kidney failure Brother Melchor         dialysis, 74    Diabetes Brother Melchor       Social History[1]   E-Cigarette/Vaping    E-Cigarette Use Never User       E-Cigarette/Vaping Substances    Nicotine No     THC No     CBD No     Flavoring No     Other No     Unknown No       I have reviewed and agree with the history as documented.     79y.o M w/ h/o DM, CLL, Afib, eliquis, hypothyroidism, anemia, left femur surgery (2 months ago), CKD, CHF presenting for SOB. 3d ago Pt developed sudden onset SOB, worse with exertion. Has had associated cough. Denies abdominal pain, N/V, CP, back pain, pleurisy, tobacco use, fever, LE pain.      History provided by:  Patient      Review of Systems   Constitutional:  Negative for activity change, appetite change and fever.   Respiratory:  Positive for cough and shortness of breath.    Cardiovascular:  Negative for chest pain, palpitations and leg swelling.   Gastrointestinal:  Negative for abdominal pain, nausea and vomiting.   Musculoskeletal:  Negative for back pain.           Objective       ED  Triage Vitals   Temperature Pulse Blood Pressure Respirations SpO2 Patient Position - Orthostatic VS   05/21/25 1031 05/21/25 1028 05/21/25 1028 05/21/25 1028 05/21/25 1028 05/21/25 1230   97.8 °F (36.6 °C) 96 104/60 (!) 28 92 % Sitting      Temp Source Heart Rate Source BP Location FiO2 (%) Pain Score    05/21/25 1031 05/21/25 1028 05/21/25 1028 -- 05/21/25 1611    Oral Monitor Right arm  No Pain      Vitals      Date and Time Temp Pulse SpO2 Resp BP Pain Score FACES Pain Rating User   05/21/25 1630 -- 86 100 % 21 128/76 -- -- LR   05/21/25 1611 97 °F (36.1 °C) 83 -- 21 136/73 No Pain -- IC   05/21/25 1611 -- -- 100 % -- -- -- -- JK   05/21/25 1530 -- 86 99 % 20 118/71 -- -- EG   05/21/25 1500 -- 85 100 % 20 123/65 -- -- EG   05/21/25 1445 -- 93 100 % 20 133/70 -- -- RL   05/21/25 1430 -- 98 100 % 20 121/73 -- -- RL   05/21/25 1415 -- 90 100 % 20 118/67 -- -- RL   05/21/25 1345 -- 85 98 % 20 114/61 -- -- RL   05/21/25 1330 -- 93 98 % -- 118/66 -- -- RL   05/21/25 1315 -- -- -- 22 -- -- -- RL   05/21/25 1315 -- 93 100 % -- 179/74 -- -- MK   05/21/25 1253 -- -- 95 % -- -- -- -- JK   05/21/25 1236 -- --  89 % -- -- -- -- RL   05/21/25 1230 -- 98  84 % 32 145/76 -- -- RL   05/21/25 1046 -- -- 96 % -- -- -- -- JDT   05/21/25 1031 97.8 °F (36.6 °C) -- -- -- -- -- --    05/21/25 1028 -- 96 92 % 28 104/60 -- --             Physical Exam  Constitutional:       General: He is in acute distress.      Appearance: Normal appearance.   HENT:      Head: Normocephalic and atraumatic.      Nose: Nose normal. No rhinorrhea.      Mouth/Throat:      Mouth: Mucous membranes are moist.      Pharynx: Oropharynx is clear.     Eyes:      Extraocular Movements: Extraocular movements intact.      Conjunctiva/sclera: Conjunctivae normal.       Cardiovascular:      Rate and Rhythm: Tachycardia present. Rhythm irregular.      Pulses: Normal pulses.      Heart sounds: Normal heart sounds.   Pulmonary:      Effort: Pulmonary effort is normal.       Breath sounds: Examination of the right-lower field reveals rhonchi. Examination of the left-lower field reveals rhonchi. Rhonchi present.   Abdominal:      General: Abdomen is flat.      Palpations: Abdomen is soft.     Musculoskeletal:         General: No tenderness.      Right lower leg: Edema present.      Left lower leg: Edema present.        Legs:      Skin:     General: Skin is warm and dry.      Capillary Refill: Capillary refill takes less than 2 seconds.     Neurological:      General: No focal deficit present.      Mental Status: He is alert and oriented to person, place, and time.         Results Reviewed       Procedure Component Value Units Date/Time    HS Troponin I 4hr [[id number]]  (Normal) Collected: 05/21/25 1543    Lab Status: Final result Specimen: Blood from Arm, Left Updated: 05/21/25 1613     hs TnI 4hr 8 ng/L      Delta 4hr hsTnI 0 ng/L     TIBC Panel (incl. Iron, TIBC, % Iron Saturation) [[id number]] Collected: 05/21/25 1236    Lab Status: In process Specimen: Blood from Arm, Left Updated: 05/21/25 1554    Ferritin [[id number]] Collected: 05/21/25 1236    Lab Status: In process Specimen: Blood from Arm, Left Updated: 05/21/25 1554    Strep Pneumoniae, Urine [[id number]]     Lab Status: No result Specimen: Urine     MRSA culture [[id number]]     Lab Status: No result Specimen: Nares from Nose     Legionella antigen, urine [[id number]]     Lab Status: No result Specimen: Urine     Sputum culture and Gram stain [[id number]]     Lab Status: No result Specimen: Sputum     HS Troponin I 2hr [[id number]]  (Normal) Collected: 05/21/25 1236    Lab Status: Final result Specimen: Blood from Arm, Left Updated: 05/21/25 1308     hs TnI 2hr 8 ng/L      Delta 2hr hsTnI 0 ng/L     Protime-INR [[id number]]  (Abnormal) Collected: 05/21/25 1236    Lab Status: Final result Specimen: Blood from Arm, Left Updated: 05/21/25 1307     Protime 18.9 seconds      INR 1.51    Narrative:      INR Therapeutic  Range    Indication                                             INR Range      Atrial Fibrillation                                               2.0-3.0  Hypercoagulable State                                    2.0.2.3  Left Ventricular Asist Device                            2.0-3.0  Mechanical Heart Valve                                  -    Aortic(with afib, MI, embolism, HF, LA enlargement,    and/or coagulopathy)                                     2.0-3.0 (2.5-3.5)     Mitral                                                             2.5-3.5  Prosthetic/Bioprosthetic Heart Valve               2.0-3.0  Venous thromboembolism (VTE: VT, PE        2.0-3.0    APTT [[id number]]  (Normal) Collected: 05/21/25 1236    Lab Status: Final result Specimen: Blood from Arm, Left Updated: 05/21/25 1307     PTT 30 seconds     Lactic acid, plasma (w/reflex if result > 2.0) [[id number]]  (Normal) Collected: 05/21/25 1236    Lab Status: Final result Specimen: Blood from Arm, Left Updated: 05/21/25 1300     LACTIC ACID 1.1 mmol/L     Narrative:      Result may be elevated if tourniquet was used during collection.    Blood culture #2 [[id number]] Collected: 05/21/25 1236    Lab Status: In process Specimen: Blood from Arm, Left Updated: 05/21/25 1244    Blood culture #1 [[id number]] Collected: 05/21/25 1236    Lab Status: In process Specimen: Blood from Arm, Left Updated: 05/21/25 1244    Procalcitonin [[id number]]  (Normal) Collected: 05/21/25 1104    Lab Status: Final result Specimen: Blood from Arm, Left Updated: 05/21/25 1214     Procalcitonin 0.21 ng/ml     B-Type Natriuretic Peptide(BNP) [[id number]]  (Abnormal) Collected: 05/21/25 1104    Lab Status: Final result Specimen: Blood from Arm, Left Updated: 05/21/25 1211      pg/mL     RBC Morphology Reflex Test [[id number]] Collected: 05/21/25 1104    Lab Status: Final result Specimen: Blood from Arm, Left Updated: 05/21/25 1201    Manual Differential(PHLEBS Do Not Order)  [[id number]]  (Abnormal) Collected: 05/21/25 1104    Lab Status: Final result Specimen: Blood from Arm, Left Updated: 05/21/25 1148     Segmented % 63 %      Lymphocytes % 32 %      Monocytes % 4 %      Eosinophils % 0 %      Basophils % 1 %      Absolute Neutrophils 9.17 Thousand/uL      Absolute Lymphocytes 4.66 Thousand/uL      Absolute Monocytes 0.58 Thousand/uL      Absolute Eosinophils 0.00 Thousand/uL      Absolute Basophils 0.15 Thousand/uL      Total Counted --     Smudge Cells Present     RBC Morphology Present     Platelet Estimate Adequate     Anisocytosis Present    CBC and differential [[id number]]  (Abnormal) Collected: 05/21/25 1104    Lab Status: Final result Specimen: Blood from Arm, Left Updated: 05/21/25 1148     WBC 14.56 Thousand/uL      RBC 2.55 Million/uL      Hemoglobin 7.9 g/dL      Hematocrit 24.4 %      MCV 96 fL      MCH 31.0 pg      MCHC 32.4 g/dL      RDW 15.9 %      MPV 9.7 fL      Platelets 172 Thousands/uL     Narrative:      This is an appended report.  These results have been appended to a previously verified report.    HS Troponin 0hr (reflex protocol) [[id number]]  (Normal) Collected: 05/21/25 1104    Lab Status: Final result Specimen: Blood from Arm, Left Updated: 05/21/25 1139     hs TnI 0hr 8 ng/L     Comprehensive metabolic panel [[id number]]  (Abnormal) Collected: 05/21/25 1104    Lab Status: Final result Specimen: Blood from Arm, Left Updated: 05/21/25 1131     Sodium 136 mmol/L      Potassium 4.4 mmol/L      Chloride 102 mmol/L      CO2 24 mmol/L      ANION GAP 10 mmol/L      BUN 34 mg/dL      Creatinine 1.42 mg/dL      Glucose 343 mg/dL      Calcium 8.9 mg/dL      AST 13 U/L      ALT 13 U/L      Alkaline Phosphatase 94 U/L      Total Protein 7.8 g/dL      Albumin 3.7 g/dL      Total Bilirubin 0.90 mg/dL      eGFR 46 ml/min/1.73sq m     Narrative:      National Kid[employer] Disease Foundation guidelines for Chronic Kidney Disease (CKD):     Stage 1 with normal or high GFR (GFR > 90  mL/min/1.73 square meters)    Stage 2 Mild CKD (GFR = 60-89 mL/min/1.73 square meters)    Stage 3A Moderate CKD (GFR = 45-59 mL/min/1.73 square meters)    Stage 3B Moderate CKD (GFR = 30-44 mL/min/1.73 square meters)    Stage 4 Severe CKD (GFR = 15-29 mL/min/1.73 square meters)    Stage 5 End Stage CKD (GFR <15 mL/min/1.73 square meters)  Note: GFR calculation is accurate only with a steady state creatinine    FLU/COVID Rapid Antigen (30 min. TAT) - Preferred screening test in ED [[id number]]  (Normal) Collected: 05/21/25 1104    Lab Status: Final result Specimen: Nares from Nose Updated: 05/21/25 1129     SARS COV Rapid Antigen Negative     Influenza A Rapid Antigen Negative     Influenza B Rapid Antigen Negative    Narrative:      This test has been performed using the Quidel Jyoti 2 FLU+SARS Antigen test under the Emergency Use Authorization (EUA). This test has been validated by the  and verified by the performing laboratory. The Jyoti uses lateral flow immunofluorescent sandwich assay to detect SARS-COV, Influenza A and Influenza B Antigen.     The Quidel Jyoti 2 SARS Antigen test does not differentiate between SARS-CoV and SARS-CoV-2.     Negative results are presumptive and may be confirmed with a molecular assay, if necessary, for patient management. Negative results do not rule out SARS-CoV-2 or influenza infection and should not be used as the sole basis for treatment or patient management decisions. A negative test result may occur if the level of antigen in a sample is below the limit of detection of this test.     Positive results are indicative of the presence of viral antigens, but do not rule out bacterial infection or co-infection with other viruses.     All test results should be used as an adjunct to clinical observations and other information available to the provider.    FOR PEDIATRIC PATIENTS - copy/paste COVID Guidelines URL to browser:  [email or web address]    Fingerstick Glucose (POCT) [[id number]]  (Abnormal) Collected: 05/21/25 1041    Lab Status: Final result Specimen: Blood Updated: 05/21/25 1041     POC Glucose 318 mg/dl             CT chest wo contrast   Final Interpretation by Yuan Leums MD (05/21 1614)      CT lower extremity w contrast left   Final Interpretation by Sang Peñaloza MD (05/21 1302)   Near-anatomic alignment status post ORIF for left intertrochanteric hip fracture with hyperattenuation noted in the region of the left vastus medialis suspicious for intramuscular hematoma after trauma and recent surgical intervention.         Workstation performed: [id number]         XR chest 1 view portable   ED Interpretation by Damon Bowens DO (05/21 1141)         Right-sided infiltrate.      Final Interpretation by Emilio Denis MD (05/21 1215)      Bilateral patchy airspace disease may be on the basis of pulmonary edema versus infection.            Workstation performed: [id number]          VAS VENOUS DUPLEX - LOWER LIMB BILATERAL    (Results Pending)       ECG 12 Lead Documentation Only    Date/Time: 5/21/2025 10:36 AM    Performed by: Wale Waddell MD  Authorized by: Wale Waddell MD    ECG reviewed by me, the ED Provider: yes    Patient location:  ED  Interpretation:     Interpretation: abnormal    Rate:     ECG rate:  90    ECG rate assessment: normal    Rhythm:     Rhythm: atrial fibrillation    Ectopy:     Ectopy: none    QRS:     QRS axis:  Normal    QRS intervals:  Normal  Conduction:     Conduction: normal    ST segments:     ST segments:  Normal  T waves:     T waves: normal        ED Medication and Procedure Management   Prior to Admission Medications   Prescriptions Last Dose Informant Patient Reported? Taking?   Diclofenac Sodium (Voltaren) 1 %   Yes No   Sig: Apply 2 g topically daily as needed   Glucosamine HCl (GLUCOSAMINE PO)  Self Yes No   Sig: Take 1 tablet by  mouth in the morning and 1 tablet before bedtime.   Multiple Vitamin (MULTIVITAMIN) tablet  Self Yes No   Sig: Take 1 tablet by mouth in the morning.   Omega-3 Fatty Acids (FISH OIL PO)  Self Yes No   Sig: Take by mouth daily   acetaminophen (TYLENOL) 325 mg tablet  Self No No   Sig: Take 2 tablets (650 mg total) by mouth every 6 (six) hours as needed for fever, mild pain or headaches   amLODIPine (NORVASC) 10 mg tablet   No No   Sig: Take 1 tablet (10 mg total) by mouth daily   apixaban (Eliquis) 5 mg   No No   Sig: TAKE 1 TABLET TWICE DAILY   brimonidine tartrate 0.2 % ophthalmic solution  Self Yes No   carvedilol (Coreg) 6.25 mg tablet   No No   Sig: Take 1 tablet (6.25 mg total) by mouth 2 (two) times a day with meals   glipiZIDE (GLUCOTROL XL) 10 mg 24 hr tablet   No No   Sig: Take 1 tablet (10 mg total) by mouth 2 (two) times a day   levothyroxine (Synthroid) 75 mcg tablet   No No   Sig: Take 1 tablet (75 mcg total) by mouth daily   loperamide (Imodium A-D) 2 MG tablet   Yes No   Sig: Take 2 mg by mouth as needed   losartan (COZAAR) 100 MG tablet   No No   Sig: Take 1 tablet (100 mg total) by mouth daily   metFORMIN (GLUCOPHAGE-XR) 500 mg 24 hr tablet   No No   Sig: Take 1 tablet (500 mg total) by mouth 2 (two) times a day with meals   metFORMIN (GLUCOPHAGE-XR) 500 mg 24 hr tablet   Yes No   Sig: Take 500 mg by mouth 2 (two) times a day with meals   simvastatin (ZOCOR) 20 mg tablet   No No   Sig: Take 1 tablet (20 mg total) by mouth daily at bedtime   timolol (TIMOPTIC) 0.5 % ophthalmic solution  Self Yes No   Sig: Administer 1 drop to both eyes in the morning and 1 drop in the evening.   torsemide (DEMADEX) 5 MG tablet  Self No No   Sig: Take 0.5 tablets (2.5 mg total) by mouth daily      Facility-Administered Medications: None     Current Discharge Medication List        CONTINUE these medications which have NOT CHANGED    Details   acetaminophen (TYLENOL) 325 mg tablet Take 2 tablets (650 mg total) by mouth  every 6 (six) hours as needed for fever, mild pain or headaches    Associated Diagnoses: Multifocal pneumonia      amLODIPine (NORVASC) 10 mg tablet Take 1 tablet (10 mg total) by mouth daily  Qty: 90 tablet, Refills: 1    Associated Diagnoses: Benign essential HTN      apixaban (Eliquis) 5 mg TAKE 1 TABLET TWICE DAILY  Qty: 180 tablet, Refills: 1    Associated Diagnoses: Essential hypertension; Paroxysmal atrial fibrillation (HCC)      brimonidine tartrate 0.2 % ophthalmic solution       carvedilol (Coreg) 6.25 mg tablet Take 1 tablet (6.25 mg total) by mouth 2 (two) times a day with meals  Qty: 180 tablet, Refills: 1    Associated Diagnoses: Benign essential HTN      Diclofenac Sodium (Voltaren) 1 % Apply 2 g topically daily as needed      glipiZIDE (GLUCOTROL XL) 10 mg 24 hr tablet Take 1 tablet (10 mg total) by mouth 2 (two) times a day  Qty: 180 tablet, Refills: 1    Associated Diagnoses: Type 2 diabetes mellitus with diabetic polyneuropathy, without long-term current use of insulin (HCC)      Glucosamine HCl (GLUCOSAMINE PO) Take 1 tablet by mouth in the morning and 1 tablet before bedtime.      levothyroxine (Synthroid) 75 mcg tablet Take 1 tablet (75 mcg total) by mouth daily  Qty: 90 tablet, Refills: 1    Associated Diagnoses: Acquired hypothyroidism      loperamide (Imodium A-D) 2 MG tablet Take 2 mg by mouth as needed      losartan (COZAAR) 100 MG tablet Take 1 tablet (100 mg total) by mouth daily  Qty: 90 tablet, Refills: 1    Associated Diagnoses: Benign essential HTN      !! metFORMIN (GLUCOPHAGE-XR) 500 mg 24 hr tablet Take 1 tablet (500 mg total) by mouth 2 (two) times a day with meals  Qty: 180 tablet, Refills: 1    Associated Diagnoses: Type 2 diabetes mellitus with stage 3b chronic kidney disease and hypertension (HCC)      !! metFORMIN (GLUCOPHAGE-XR) 500 mg 24 hr tablet Take 500 mg by mouth 2 (two) times a day with meals      Multiple Vitamin (MULTIVITAMIN) tablet Take 1 tablet by mouth in the  morning.      Omega-3 Fatty Acids (FISH OIL PO) Take by mouth daily      simvastatin (ZOCOR) 20 mg tablet Take 1 tablet (20 mg total) by mouth daily at bedtime  Qty: 90 tablet, Refills: 1    Associated Diagnoses: Type 2 diabetes mellitus with diabetic polyneuropathy, without long-term current use of insulin (HCC)      timolol (TIMOPTIC) 0.5 % ophthalmic solution Administer 1 drop to both eyes in the morning and 1 drop in the evening.      torsemide (DEMADEX) 5 MG tablet Take 0.5 tablets (2.5 mg total) by mouth daily    Associated Diagnoses: Acute congestive heart failure, unspecified heart failure type ([facility])       !! - Potential duplicate medications found. Please discuss with provider.        No discharge procedures on file.  ED SEPSIS DOCUMENTATION   Time reflects when diagnosis was documented in both MDM as applicable and the Disposition within this note       Time User Action Codes Description Comment    5/21/2025 11:52 AM Wale Waddell [J18.9] PNA (pneumonia)     5/21/2025 12:19 PM Wale Waddell [A41.9] Sepsis (HCC)     5/21/2025 12:32 PM Wale Waddell [D64.9] Anemia     5/21/2025 12:32 PM Wale Waddell [I50.9] CHF (congestive heart failure) (HCC)     5/21/2025 12:34 PM Damon Bowens Add [R09.02] Hypoxia     5/21/2025  1:06 PM Wale Waddell [K57.90] Diverticular disease     5/21/2025  1:07 PM Wale Waddell [S70.02XA] Hematoma of left hip, initial encounter     5/21/2025  2:22 PM Damon Bowens Modify [J18.9] PNA (pneumonia)     5/21/2025  2:22 PM Damon Bowens Modify [I50.9] CHF (congestive heart failure) (HCC)     5/21/2025  2:22 PM Damon Bowens Add [I50.9] Acute congestive heart failure, unspecified heart failure type (HCC)     5/21/2025  2:22 PM Damon Bowens Modify [I50.9] CHF (congestive heart failure) (HCC)     5/21/2025  2:22 PM Damon Bowens Modify [I50.9] Acute congestive heart failure, unspecified heart failure type (HCC)     5/21/2025  2:22 PM Kwan  Damon BA Add [J96.01] Acute hypoxic respiratory failure (HCC)     5/21/2025  2:22 PM Damon Bowens Modify [I50.9] Acute congestive heart failure, unspecified heart failure type (HCC)     5/21/2025  2:22 PM Damon Bowens Modify [J96.01] Acute hypoxic respiratory failure (HCC)     5/21/2025  2:22 PM Damon Bowens Add [N18.32] Stage 3b chronic kidney disease (HCC)     5/21/2025  2:22 PM Damon Bowens Add [E11.42] Type 2 diabetes mellitus with diabetic polyneuropathy, without long-term current use of insulin ([facility])     5/21/2025  2:22 PM Damon Bowens Add [D64.9] Symptomatic anemia                      [1]   Social History  Tobacco Use    Smoking status: Never     Passive exposure: Never    Smokeless tobacco: Never   Vaping Use    Vaping status: Never Used   Substance Use Topics    Alcohol use: No    Drug use: No        Wale Waddell MD  05/21/25 9351

## 2025-05-21 NOTE — H&P
H&P - Critical Care/ICU   Name: Abelino Renee 79 y.o. male I MRN: [id number]  Unit/Bed#: ICU 10 I Date of Admission: 5/21/2025   Date of Service: 5/21/2025 I Hospital Day: 0       Assessment & Plan  Acute hypoxic respiratory failure (HCC)  Patient presenting acutely hypoxic, not on home O2.  The day history of shortness of breath/apnea.  Iso PNA versus acute HF exacerbation, patient on Eliquis but PE on differential.    Plan:  Continue bipap  Continue diuresis, net goal -1 to 2L  See plans under PNA and HF exacerbation  Follow-up lower extremity Doppler  Pneumonia  CXR: Bilateral patchy airspace disease may be on the basis of pulmonary edema versus infection.  Status post Zosyn, Zithromax, vancomycin.    Plan:  Follow-up Legionella, strep, MRSA  Continue empiric antibiotics  Follow-up CT  Acute exacerbation of CHF (congestive heart failure) (HCC)  Wt Readings from Last 3 Encounters:   05/21/25 81.6 kg (179 lb 12.8 oz)   03/17/25 80.4 kg (177 lb 4 oz)   02/20/25 82.3 kg (181 lb 8 oz)   Last echo June 2024 EF 60%, home diuresis torsemide 2.5 daily    Plan:  Follow-up echo  Continue diuresis,  Intramuscular hematoma  Closed fracture of neck of left femur (HCC)  Near-anatomic alignment status post ORIF for left intertrochanteric hip fracture with hyperattenuation noted in the region of the left vastus medialis suspicious for intramuscular hematoma after trauma and recent surgical intervention.     Patient with PMHx CLL, A-fib on Eliquis, HTN, hypothyroid, NIDDM type 2, and CKD who presents after a fall after tripping over floorboards in [facility].  Patient fell on his left side and states he was lying on the ground for about 3 hours unable to weight-bear.  .  CT chest abdomen pelvis showing acute intertrochanteric fracture of left femoral neck.  S/p IM nail for left hip intertrochanteric fracture    Plan:  Monitor CBC  Transfuse for hemoglobin less than 7  Chronic lymphocytic leukemia (HCC)  History of CLL/SLL  previously treated with 4 cycles of BR (June 2012). Outpatient plan is surveillance.   Anemia  Recent Labs     05/21/25  1104   HGB 7.9*   S/p type and screen.  Has not been consented    Plan:  Transfuse for hemoglobin less than 7  Stage 3b chronic kidney disease (HCC)  Lab Results   Component Value Date    EGFR 46 05/21/2025    EGFR 49 04/29/2025    EGFR 30 03/22/2025    CREATININE 1.42 (H) 05/21/2025    CREATININE 1.36 (H) 04/29/2025    CREATININE 2.05 (H) 03/22/2025   Iso DM. Appears stable, at baseline.  Type 2 diabetes mellitus with diabetic polyneuropathy, without long-term current use of insulin (HCC)  Lab Results   Component Value Date    HGBA1C 7.9 (H) 03/18/2025       Recent Labs     05/21/25  1041   POCGLU 318*       Blood Sugar Average: Last 72 hrs:  (P) 318  On metformin 500mg qD  Glipizide 10mg    Plan:  Insulin sliding scale while inpatient  Hypothyroidism   levothyroxine (Synthroid) 75 mcg tablet; Take 1 tablet (75 mcg total) by mouth daily    Plan  Fu TSH  Continue PTA meds  Persistent atrial fibrillation (HCC)  Atrial fibrillation diagnosed in 2024. Patient historically has opted for rate control and anticoagulation.  This is reasonable.  He remains asymptomatic from the atrial fibrillation without any reported episodes of palpitations or high ventricular rates.  Disposition: Stepdown Level 1    History of Present Illness   Abelino Renee is a 79 y.o. who presents Patient with PMHx CLL, A-fib on Eliquis, HTN, hypothyroid, NIDDM type 2, and CKD.  . 79y.o M w/ h/o CKD, CLL, DM, Afib (on eliquis), left hip surgery (2 months ago) presenting for SOB. 3d ago developed RAIN with associated cough. Exam shows bilobar rhonchi and diminished air movement. CXR shows opacities. Labs notable for WBC 14k, Hgb 7.9, . On arrival was satting 86% so he was placed on 2L NC; no prior h/o O2 use. After CT scan of the hip, which showed hematoma, he went into respiratory distress with HTN. Bipap, SL nitro, and  "lasix given. Started him on vanc, zosyn, and azithromycin for presumed PNA.     History obtained from the patient.  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[facility])      Comment:  appendectomy-colon resection  No date: Chronic lymphocytic leukemia of B-cell type ([facility])      Comment:  \"remission 4-5yrs\"-chemo  No date: DDD (degenerative disc disease), cervical  No date: Diabetes mellitus ([facility])      Comment:  bs checked by pt at home at 6am =92  No date: Diabetic neuropathy ([facility])  No date: Diabetic retinopathy ([facility])  No date: Elevated WBC count  04/2015: Heart attack ([facility])      Comment:  \"labeled as that and than tested later after appendix                removed and stress test showed negative\"  No date: History of cancer chemotherapy      Comment:  last treatment approx 5yrs ago  No date: Hypertension  No date: Lymphoma ([facility])  04/2015: Myocardial infarction ([facility])  No date: Scalp psoriasis  2/10-15: Shingles  No date: Toe injury      Comment:  left great toe, - 2017-internal braec-to be removed                today 3/30/2018  No date: Wears glasses Past Surgical History:  No date: APPENDECTOMY  3/30/2018: BONE BIOPSY; Left      Comment:  Procedure: GREAT TOE BONE BIOPSY;  Surgeon: Gerber Multani DPM;  Location: [facility];  Service: Podiatry  No date: BOWEL RESECTION  No date: CATARACT EXTRACTION; Bilateral  No date: CHOLECYSTECTOMY  06/2015: COLECTOMY  No date: EYE SURGERY; Right      Comment:  \"retinal peel\"  12/6/2017: FOOT FUSION; Left      Comment:  Procedure: HALLUX INTERPHALANGEAL JOINT FUSION;                 Surgeon: Gerber Multani DPM;  [facility];                 Service: Podiatry  4/18/2018: HARDWARE REMOVAL; Left      Comment:  Procedure: REMOVAL " STAPLES LEFT TOE;  Surgeon: Gerber Multani DPM;  [facility];  Service: Podiatry  12/6/2017: ORIF FOOT FRACTURE; Left      Comment:  Procedure: HALLUX INTERPHALANGEAL JOINT INTERNAL                FIXATION; REPAIR OF ULCERATION WITH EXCISIONS AND                REVISION OF SKIN HALLUX WITH USE OF ALLOGRAFT;  Surgeon:                Gerber Multani DPM;  [facility]: [facility];  Service:                Podiatry  4/21/2016: AZ COLONOSCOPY FLX DX W/COLLJ SPEC WHEN PFRMD; N/A      Comment:  Procedure: COLONOSCOPY;  Surgeon: Minal Ruelas DO;                 Location: [facility];  Service: Gastroenterology  3/18/2025: AZ OPTX FEM SHFT FX W/INSJ IMED IMPLT W/WO SCREW; Left      Comment:  Procedure: INSERTION NAIL IM FEMUR ANTEGRADE                (TROCHANTERIC);  Surgeon: Jacqui Nevarez DO;  Location: [facility];  Service: Orthopedics  3/30/2018: AZ REMOVAL IMPLANT DEEP; Left      Comment:  Procedure: REMOVAL HARDWARE FOOT;  Surgeon: Gerber Multani DPM;  [facility];  Service: Podiatry  No date: RETINAL DETACHMENT SURGERY  No date: TONSILLECTOMY   Current Outpatient Medications   Medication Instructions    acetaminophen (TYLENOL) 650 mg, Oral, Every 6 hours PRN    amLODIPine (NORVASC) 10 mg, Oral, Daily    brimonidine tartrate 0.2 % ophthalmic solution     carvedilol (COREG) 6.25 mg, Oral, 2 times daily with meals    Diclofenac Sodium (VOLTAREN) 2 g, Daily PRN    Eliquis 5 mg, Oral, 2 times daily    glipiZIDE (GLUCOTROL XL) 10 mg, Oral, 2 times daily    Glucosamine HCl (GLUCOSAMINE PO) 1 tablet, 2 times daily    levothyroxine (SYNTHROID) 75 mcg, Oral, Daily    loperamide (IMODIUM A-D) 2 mg, As needed    losartan (COZAAR) 100 mg, Oral, Daily    metFORMIN (GLUCOPHAGE-XR) 500 mg, Oral, 2 times daily with meals    metFORMIN (GLUCOPHAGE-XR) 500 mg, 2 times daily with meals    Multiple Vitamin (MULTIVITAMIN) tablet 1 tablet, Daily    Omega-3 Fatty Acids (FISH OIL  PO) Oral, Daily    simvastatin (ZOCOR) 20 mg, Oral, Daily at bedtime    timolol (TIMOPTIC) 0.5 % ophthalmic solution 1 drop, 2 times daily    torsemide (DEMADEX) 2.5 mg, Oral, Daily    Allergies[1]   Social History[2] Family History[3]         Objective :                   Vitals I/O      Most Recent Min/Max in 24hrs   Temp 97.8 °F (36.6 °C) Temp  Min: 97.7 °F (36.5 °C)  Max: 97.8 °F (36.6 °C)   Pulse 86 Pulse  Min: 82  Max: 98   Resp 20 Resp  Min: 20  Max: 32   /71 BP  Min: 104/60  Max: 179/74   O2 Sat 99 % SpO2  Min: 84 %  Max: 100 %      Intake/Output Summary (Last 24 hours) at 2025 1608  Last data filed at 2025 1328  Gross per 24 hour   Intake 550 ml   Output --   Net 550 ml       Diet NPO    Invasive Monitoring           Physical Exam   Physical Exam  Skin:     General: Skin is warm.      Coloration: Skin is pale.      Comments: Well-healed surgical scars, no purulence or drainage noted   Cardiovascular:      Rate and Rhythm: Normal rate and regular rhythm.      Heart sounds: Normal heart sounds.   Musculoskeletal:         General: Swelling present.      Right lower leg: Trace Edema present.      Left lower le+ Edema present.   Abdominal: General: There is distension.     Palpations: Abdomen is soft.      Tenderness: There is no abdominal tenderness.   Constitutional:       Appearance: He is well-developed.      Comments: Tolerating BiPAP well   Pulmonary:      Breath sounds: No stridor.   Neurological:      Mental Status: He is alert and oriented to person, place and time. He is calm.          Diagnostic Studies        Lab Results: I have reviewed the following results:     Medications:  Scheduled PRN   [Held by provider] amLODIPine, 10 mg, Daily  apixaban, 5 mg, BID  carvedilol, 6.25 mg, BID With Meals  chlorhexidine, 15 mL, Q12H MICHAEL  furosemide, 40 mg, BID (diuretic)  insulin lispro, 1-6 Units, TID AC  levothyroxine, 75 mcg, Early Morning  pravastatin, 40 mg, Daily With Dinner  timolol, 1  drop, BID    Zosyn 4.5, Zithromax 500 mg  S/p Lasix 60 mg  500 cc bolus normal saline hydrALAZINE, 5 mg, Q6H PRN       Continuous    nitroGLYcerin, 5-200 mcg/min, Last Rate: Stopped (05/21/25 1544)         Labs:   CBC    Recent Labs     05/21/25  1104   WBC 14.56*   HGB 7.9*   HCT 24.4*        BMP    Recent Labs     05/21/25  1104   SODIUM 136   K 4.4      CO2 24   AGAP 10   BUN 34*   CREATININE 1.42*   CALCIUM 8.9       Coags    Recent Labs     05/21/25  1236   INR 1.51*   PTT 30        Additional Electrolytes  No recent results    Recent Labs     05/21/25  1236   LACTICACID 1.1       Blood Gas    No recent results  No recent results  Lab Results   Component Value Date    HSTNI0 8 05/21/2025    HSTNI2 8 05/21/2025    HSTNID2 0 05/21/2025    HSTNI4 31 06/07/2024    HSTNID4 4 06/07/2024      Lab Results   Component Value Date    SARSCOV2 Negative 06/07/2024    SARSCOVAG Negative 05/21/2025    INFLUA Negative 06/07/2024    RAPFLUA Negative 05/21/2025    INFLUB Negative 06/07/2024    RAPFLUB Negative 05/21/2025    RSV Negative 06/07/2024     LFTs  Recent Labs     05/21/25  1104   ALT 13   AST 13   ALKPHOS 94   ALB 3.7   TBILI 0.90       Infectious  Recent Labs     05/21/25  1104   PROCALCITONI 0.21     Glucose  Recent Labs     05/21/25  1104   GLUC 343*      Acute hypoxic respiratory failure in setting of possible pneumonia versus CHF exacerbation  Neuro: Pain controlled,  CV: Hypertensive on nitro drip.  PTA meds including losartan 100 mg, discontinue  Resp: Currently on BiPAP, continue diuresis 40 mg Lasix twice daily?  On torsemide 2.5+ outpatient  GI: GI ppx, bowel regimen  Renal: No Ochoa electrolytes   Heme: hemoglobin stable  ID:   Endo: glucose elevated, metformin as an outpatient.  Continue levothyroxine.  ISS     MSK: no pressure ulcers         lines:  Dispo:           [1]   Allergies  Allergen Reactions    Oxycodone-Acetaminophen Hallucinations   [2]   Social History  Tobacco Use    Smoking  status: Never     Passive exposure: Never    Smokeless tobacco: Never   Vaping Use    Vaping status: Never Used   Substance Use Topics    Alcohol use: No    Drug use: No   [3]   Family History  Problem Relation Name Age of Onset    Alcohol abuse Mother Kymberly             Pancreatic cancer Father          age 79    Hashimoto's thyroiditis Daughter      Rheum arthritis Daughter          Juvenile Rheum Arthitis     No Known Problems Sister           age 78    No Known Problems Brother           86    Kidney failure Brother Melchor         dialysis, 74    Diabetes Brother Melchor

## 2025-05-21 NOTE — ASSESSMENT & PLAN NOTE
Near-anatomic alignment status post ORIF for left intertrochanteric hip fracture with hyperattenuation noted in the region of the left vastus medialis suspicious for intramuscular hematoma after trauma and recent surgical intervention.     Patient with PMHx CLL, A-fib on Eliquis, HTN, hypothyroid, NIDDM type 2, and CKD who presents after a fall after tripping over floorboards in [facility].  Patient fell on his left side and states he was lying on the ground for about 3 hours unable to weight-bear.  .  CT chest abdomen pelvis showing acute intertrochanteric fracture of left femoral neck.  S/p IM nail for left hip intertrochanteric fracture    Plan:  Monitor CBC  Transfuse for hemoglobin less than 7

## 2025-05-21 NOTE — ASSESSMENT & PLAN NOTE
levothyroxine (Synthroid) 75 mcg tablet; Take 1 tablet (75 mcg total) by mouth daily    Plan  Fu TSH  Continue PTA meds

## 2025-05-21 NOTE — ED PROCEDURE NOTE
Procedure  POC Cardiac US    Date/Time: 5/21/2025 12:15 PM    Performed by: Wale Ramirez MD  Authorized by: Wale Ramirez MD    Patient location:  ED  Procedure details:     Exam Type:  Transthoracic Echocardiography (TTE), limited    Purpose of Exam: diagnostic    Assessment / Evaluation for: cardiac function, pericardial effusion and right heart strain (suspected pulmonary embolism)      Exam Type: initial exam      Image quality: diagnostic      Image availability:  Images available in PACS  Patient Details:     Cardiac Rhythm:  Regular    Mechanical ventilation: No    Cardiac findings:     Echo modes evaluated: limited 2D      Views obtained: parasternal long axis, parasternal short axis, subcostal and apical 4-chamber      Pericardial effusion: absent      Tamponade physiology: absent      Wall motion: normal      LV systolic function: normal      RV dilation: none    Pulmonary findings:     Left Lung Findings: left lung sliding      Right lung findings: right lung sliding      B-lines: more than 3    Interpretation:      Exam suggestive of pulmonary vascular congestion  POC Lung US    Date/Time: 5/21/2025 12:21 PM    Performed by: Wale Ramirez MD  Authorized by: Wale Ramirez MD    Patient location:  ED  Procedure details:     Exam Type:  Diagnostic    Indications: dyspnea and hypoxia      Assessment / Evaluation for:  Pneumothorax, hemothorax, pleural effusion and interstitial syndrome    Structures Visualized: pleural line, rib, diaphragm, left hemithorax and right hemithorax      Exam Type: initial exam      Image quality: diagnostic      Image availability:  Images available in PACS  Left Hemithorax Findings:     Left pleura visualized:  Visualized    Left Hemithorax Findings: B-line pattern      Left lung findings: normal interstitium    Right Lung Findings:     Right pleural visualized:  Visualized    Right hemithorax findings: B-line pattern      Right lung findings: normal interstitium     Interpretation:     Findings: abnormal thoracic ultrasound       Exam suggestive of pulmonary vascular congestion                   Wale Ramirez MD  05/21/25 [id number]

## 2025-05-21 NOTE — PROGRESS NOTES
Assessment & Plan  Shortness of breath  Pulse ox 86-87% after ambulating a short distance down hallway.  If he is sitting and not talking, pulse ox goes up to 93%, but drops into high 80s with speaking, when supine, or with ambulation.    This is new for him over the past three days  He notes having to stop to catch his breath to walk out to his car to get to the office.     Concern for CHF- weight is up two lbs from previous, but also, his LE edema from the hip fracture has improved, so difficult to really assess the weight change.  Is not monitoring weights at home.     ddx also includes PE given hx of hip fracture, decreased mobility, but less likely given that he is anticoagulated for his Afib.      Orders:  •  Transfer to other facility    Persistent atrial fibrillation (HCC)  HR up to 120s on exam-- persists 100-120  On anticoagulation    Orders:  •  Transfer to other facility                 Subjective:     Abelino is a 79 y.o. male here today and has the below chronic conditions:    Problem List[1]  Current Outpatient Medications   Medication Sig Dispense Refill   • acetaminophen (TYLENOL) 325 mg tablet Take 2 tablets (650 mg total) by mouth every 6 (six) hours as needed for fever, mild pain or headaches     • amLODIPine (NORVASC) 10 mg tablet Take 1 tablet (10 mg total) by mouth daily 90 tablet 1   • apixaban (Eliquis) 5 mg TAKE 1 TABLET TWICE DAILY 180 tablet 1   • brimonidine tartrate 0.2 % ophthalmic solution      • carvedilol (Coreg) 6.25 mg tablet Take 1 tablet (6.25 mg total) by mouth 2 (two) times a day with meals 180 tablet 1   • Diclofenac Sodium (Voltaren) 1 % Apply 2 g topically daily as needed     • glipiZIDE (GLUCOTROL XL) 10 mg 24 hr tablet Take 1 tablet (10 mg total) by mouth 2 (two) times a day 180 tablet 1   • Glucosamine HCl (GLUCOSAMINE PO) Take 1 tablet by mouth in the morning and 1 tablet before bedtime.     • levothyroxine (Synthroid) 75 mcg tablet Take 1 tablet (75 mcg total)  "by mouth daily 90 tablet 1   • loperamide (Imodium A-D) 2 MG tablet Take 2 mg by mouth as needed     • losartan (COZAAR) 100 MG tablet Take 1 tablet (100 mg total) by mouth daily 90 tablet 1   • metFORMIN (GLUCOPHAGE-XR) 500 mg 24 hr tablet Take 1 tablet (500 mg total) by mouth 2 (two) times a day with meals 180 tablet 1   • metFORMIN (GLUCOPHAGE-XR) 500 mg 24 hr tablet Take 500 mg by mouth 2 (two) times a day with meals     • Multiple Vitamin (MULTIVITAMIN) tablet Take 1 tablet by mouth in the morning.     • simvastatin (ZOCOR) 20 mg tablet Take 1 tablet (20 mg total) by mouth daily at bedtime 90 tablet 1   • timolol (TIMOPTIC) 0.5 % ophthalmic solution Administer 1 drop to both eyes in the morning and 1 drop in the evening.     • torsemide (DEMADEX) 5 MG tablet Take 0.5 tablets (2.5 mg total) by mouth daily     • Omega-3 Fatty Acids (FISH OIL PO) Take by mouth daily       No current facility-administered medications for this visit.          Chief Complaint   Patient presents with   • Shortness of Breath     SOB and Wheezing - pt had pneumonia last June and feels like it ids re approaching      HPI:  - CC above per clinical staff and reviewed.    History of Present Illness  The patient presents with concerns of shortness of breath and \"gurgling\" sound when laying down.  No \"cat sound\"  which seems to be what he refers to for wheezing. He is worried that he may have pneumonia bc he had this last year.   Does not have a cough or fever.      He was previously hospitalized due to a hip fracture (L) with subsequent surgery and rehab stay.     Hx of afib.  Taking eliquis as prescribed.    Shortness of breath notable for the past 2-3 days.  - Worse laying down and with walking  - Hears a gurgle when he is laying down- of note, is not flat- is reclined in recliner when he feels this and needs to sit up.  - Took DayQuil this AM, which he finds ineffective  - Does not experience any coughing or fever  - Breathing does not " "improve upon sitting up from a lying position, but gurgling sounds cease when he sits up  - Attempts to regulate breathing by taking deeper breaths while walking down the [place] when he is at home.  - Wife notes light snoring during sleep but no wheezing or humming sounds  - Reports no chest pain or pressure  - SOB worse when he walks at all.   - Today he felt SOB walking to his car to come here.  Had to stop a few times to catch his breath.   - SOB walking from waiting room to exam room      Hip fracture  - Surgically repaired  - Upcoming appointment with the surgeon  - Has not done outpatient PT. Is doing exercises recommended at the rehab.    Blood glucose levels  - Typically ranging from 87 to 110          The following portions of the patient's history were reviewed and updated as appropriate: allergies, current medications, past family history, past medical history, past social history, past surgical history and problem list.    ROS:  Review of Systems   As noted above.    Objective:      /68 (Patient Position: Sitting, Cuff Size: Large)   Pulse 82   Temp 97.7 °F (36.5 °C) (Temporal)   Ht 5' 6\" (1.676 m)   Wt 81.6 kg (179 lb 12.8 oz)   SpO2 93%   BMI 29.02 kg/m²     BP Readings from Last 3 Encounters:   05/21/25 122/68   03/22/25 126/79   02/20/25 128/78     Wt Readings from Last 3 Encounters:   05/21/25 81.6 kg (179 lb 12.8 oz)   03/17/25 80.4 kg (177 lb 4 oz)   02/20/25 82.3 kg (181 lb 8 oz)               Physical Exam:   Physical Exam  Vitals and nursing note reviewed.   Constitutional:       General: He is in acute distress (dyspneic).   HENT:      Mouth/Throat:      Mouth: Mucous membranes are moist.     Cardiovascular:      Comments: Irregular, tachycardic (120s)  Pulmonary:      Effort: Respiratory distress (with walking in hallway- RR increases to 24, complains of dyspnea) present.      Breath sounds: Rales (faint at bases) present. No wheezing or rhonchi.   Abdominal:      Palpations: " Abdomen is soft.      Tenderness: There is no abdominal tenderness.     Musculoskeletal:      Right lower leg: Edema (trace) present.      Left lower leg: Edema (1+ to shin. no erythema) present.   Lymphadenopathy:      Cervical: No cervical adenopathy.     Skin:     Coloration: Skin is pale.      Comments: Incision upper part of the L hip clean/dry/intact     Neurological:      Mental Status: He is alert.      Comments: Ambulates w cane                This office visit note was generated in part with the use of [employer] and/or voice recognition dictation.          [1]  Patient Active Problem List  Diagnosis   • Arthritis   • Benign essential HTN   • Chronic lymphocytic leukemia (HCC)   • Stage 3b chronic kidney disease (HCC)   • H/O Clostridium difficile infection   • Hx of malignant carcinoid tumor of large intestine   • Type 2 diabetes mellitus with diabetic polyneuropathy, without long-term current use of insulin (HCC)   • History of malignant neoplasm of appendix   • Status post transmetatarsal amputation of left foot (HCC)   • Elevated serum protein level   • Hypothyroidism   • Diabetic nephropathy associated with type 2 diabetes mellitus (HCC)   • Platelets decreased (HCC)   • Monoclonal gammopathy   • Rash   • Type 2 diabetes mellitus with diabetic chronic kidney disease (HCC)   • Closed fracture of neck of left femur (HCC)   • Chronic diastolic congestive heart failure (HCC)   • Persistent atrial fibrillation (HCC)   • ABLA (acute blood loss anemia)   • RAINE (acute kidney injury) (HCC)   • Type 2 diabetes mellitus with stage 3b chronic kidney disease and hypertension (HCC)   • Hyponatremia

## 2025-05-21 NOTE — INCIDENTAL FINDINGS
The following findings require follow up:  Radiographic finding   Finding:    New diffuse irregular shaped pulmonary nodules and groundglass opacities suspicious for multifocal pneumonia.   Mild interstitial edema with small bilateral pleural effusions.  Mediastinal lymphadenopathy. Attention on follow-up.    Follow up required: Recommend short interval follow-up in 6 to 8 weeks.    Please notify the following clinician to assist with the follow up:   Dr. Edel Wright    Incidental finding results were discussed with the Patient and Patient's POA by Macarena Mobley MD on 05/21/25.   They expressed understanding and all questions answered.

## 2025-05-21 NOTE — ASSESSMENT & PLAN NOTE
CXR: Bilateral patchy airspace disease may be on the basis of pulmonary edema versus infection.  Status post Zosyn, Zithromax, vancomycin.    Plan:  Follow-up Legionella, strep, MRSA  Continue empiric antibiotics  Follow-up CT

## 2025-05-21 NOTE — ED ATTENDING ATTESTATION
5/21/2025  I, Damon Bowens DO, saw and evaluated the patient. I have discussed the patient with the resident/non-physician practitioner and agree with the resident's/non-physician practitioner's findings, Plan of Care, and MDM as documented in the resident's/non-physician practitioner's note, except where noted. All available labs and Radiology studies were reviewed.  I was present for key portions of any procedure(s) performed by the resident/non-physician practitioner and I was immediately available to provide assistance.       At this point I agree with the current assessment done in the Emergency Department.  I have conducted an independent evaluation of this patient a history and physical is as follows:        1. Acute hypoxic respiratory failure (HCC)    2. PNA (pneumonia)    3. Sepsis (HCC)    4. Anemia    5. CHF (congestive heart failure) (HCC)    6. Hypoxia    7. Diverticular disease    8. Hematoma of left hip, initial encounter    9. Acute congestive heart failure, unspecified heart failure type (HCC)    10. Stage 3b chronic kidney disease (HCC)    11. Type 2 diabetes mellitus with diabetic polyneuropathy, without long-term current use of insulin (HCC)    12. Symptomatic anemia            MDM  Number of Diagnoses or Management Options  Acute congestive heart failure, unspecified heart failure type (HCC)  Acute hypoxic respiratory failure (HCC)  Anemia  CHF (congestive heart failure) (HCC)  Diverticular disease  Hematoma of left hip, initial encounter  Hypoxia  PNA (pneumonia)  Sepsis (HCC)  Stage 3b chronic kidney disease (HCC)  Diagnosis management comments:       Initial ED assessment:     79-year-old male, increased work of breathing, cough, signs of fluid overload on exam.  Was hypoxic during initial evaluation down to 86%.,  During my evaluation 92% on 3 L nasal cannula.    Pathology at risk for includes but is not limited to:    CHF pneumonia, ACS, pleural effusion, malignancy.   Pneumothorax.    Initial ED plan:    Blood work chest x-ray, diuresis, consider antibiotics if imaging more suggestive of infiltrate.          Final ED summary/disposition:   After evaluation and workup in the emergency department, for Ultimately patient decompensated in the emergency department was required to be placed on BiPAP and nitroglycerin infusion.,  Admitted to stepdown level 1 intensive care unit. decompensated CHF.               Time reflects when diagnosis was documented in both MDM as applicable and the Disposition within this note       Time User Action Codes Description Comment    5/21/2025 11:52 AM Wale Waddell [J18.9] PNA (pneumonia)     5/21/2025 12:19 PM Wale Waddell [A41.9] Sepsis (HCC)     5/21/2025 12:32 PM Wale Waddell [D64.9] Anemia     5/21/2025 12:32 PM Wale Waddell [I50.9] CHF (congestive heart failure) (HCC)     5/21/2025 12:34 PM Damon Bowens Add [R09.02] Hypoxia     5/21/2025  1:06 PM Wale Waddell [K57.90] Diverticular disease     5/21/2025  1:07 PM Wale Waddell [S70.02XA] Hematoma of left hip, initial encounter     5/21/2025  2:22 PM Damon Bowens Modify [J18.9] PNA (pneumonia)     5/21/2025  2:22 PM AgreDamon monk J Modify [I50.9] CHF (congestive heart failure) (HCC)     5/21/2025  2:22 PM Sorin Bowensin J Add [I50.9] Acute congestive heart failure, unspecified heart failure type (HCC)     5/21/2025  2:22 PM AgrestiSorinin J Modify [I50.9] CHF (congestive heart failure) (HCC)     5/21/2025  2:22 PM AgrestiSorinin J Modify [I50.9] Acute congestive heart failure, unspecified heart failure type (HCC)     5/21/2025  2:22 PM AgrestiSroinin J Add [J96.01] Acute hypoxic respiratory failure (HCC)     5/21/2025  2:22 PM AgrestiSorinin J Modify [I50.9] Acute congestive heart failure, unspecified heart failure type ([facility])     5/21/2025  2:22 PM Damon Bowens Modify [J96.01] Acute hypoxic respiratory failure ([facility])     5/21/2025  2:22 PM Damon Bowens  MEJIA Add [N18.32] Stage 3b chronic kidney disease ([facility])     5/21/2025  2:22 PM AlvinDamon veras Add [E11.42] Type 2 diabetes mellitus with diabetic polyneuropathy, without long-term current use of insulin ([facility])     5/21/2025  2:22 PM Vitoeda Damon J Add [D64.9] Symptomatic anemia           ED Disposition       ED Disposition   Admit    Condition   Stable    Date/Time   Wed May 21, 2025  1:53 PM    Comment   Case was discussed with critical care and the patient's admission status was agreed to be Admission Status: inpatient status to the service of Dr. Veronica .               Follow-up Information    None                       Chief Complaint   Patient presents with    Shortness of Breath     Pt endorses shortness of breath, wheezing x 3 days. Hx of hip surgery 2 months ago. Denies chest pain                 79-year-old male, brought in for cough, shortness of breath.  Noted to be hypoxic upon resident physician examination, oxygen 86%.,  Increasing cough increasing work of breathing respiratory rate in the 20s.  Difficulty ambulating, shortness of breath on exertion.  No falls or injury.  No headache no neck pain.  History of CHF.,  No fevers no chills.,  Decreased appetite.                    Visit Vitals  /67 (BP Location: Right arm)   Pulse 90   Temp 97.8 °F (36.6 °C) (Oral)   Resp 20   SpO2 100%   Smoking Status Never        Physical Exam  Vitals reviewed.   Constitutional:       General: He is in acute distress.      Appearance: He is well-developed. He is not diaphoretic.   HENT:      Head: Normocephalic and atraumatic.      Right Ear: External ear normal.      Left Ear: External ear normal.      Nose: Nose normal.     Eyes:      General:         Right eye: No discharge.         Left eye: No discharge.      Pupils: Pupils are equal, round, and reactive to light.     Neck:      Trachea: No tracheal deviation.     Cardiovascular:      Rate and Rhythm: Normal rate and regular rhythm.      Heart sounds:  Normal heart sounds. No murmur heard.  Pulmonary:      Effort: Tachypnea and respiratory distress present.      Breath sounds: Normal breath sounds. No stridor.   Abdominal:      General: There is no distension.      Palpations: Abdomen is soft.      Tenderness: There is no abdominal tenderness. There is no guarding or rebound.     Musculoskeletal:         General: Normal range of motion.      Cervical back: Normal range of motion and neck supple.     Skin:     General: Skin is warm and dry.      Coloration: Skin is not pale.      Findings: No erythema.     Neurological:      General: No focal deficit present.      Mental Status: He is alert and oriented to person, place, and time.               ED Course as of 05/21/25 1425   Wed May 21, 2025   1251 Called to patient's room, increased work of breathing.,  Patient 84% on 5 L.  Wheezing, showing signs of worsening heart failure, will give Lasix and placed on BiPAP          Medications   vancomycin (VANCOCIN) 2,000 mg in sodium chloride 0.9 % 500 mL IVPB (2,000 mg Intravenous New Bag 5/21/25 1330)   nitroGLYcerin (TRIDIL) 50 mg in 250 ml infusion (premix) (50 mcg/min Intravenous New Bag 5/21/25 1321)   sodium chloride 0.9 % bolus 500 mL (0 mL Intravenous Stopped 5/21/25 1219)   iohexol (OMNIPAQUE) 350 MG/ML injection (MULTI-DOSE) 85 mL (85 mL Intravenous Given 5/21/25 1146)   piperacillin-tazobactam (ZOSYN) IVPB 4.5 g (0 g Intravenous Stopped 5/21/25 1328)   azithromycin (ZITHROMAX) tablet 500 mg (500 mg Oral Given 5/21/25 1243)   furosemide (LASIX) injection 60 mg (60 mg Intravenous Given 5/21/25 1244)   nitroglycerin (NITROSTAT) SL tablet 0.4 mg (0.4 mg Sublingual Given 5/21/25 1247)             Labs Reviewed   CBC AND DIFFERENTIAL - Abnormal       Result Value Ref Range Status    WBC 14.56 (*) 4.31 - 10.16 Thousand/uL Final    RBC 2.55 (*) 3.88 - 5.62 Million/uL Final    Hemoglobin 7.9 (*) 12.0 - 17.0 g/dL Final    Hematocrit 24.4 (*) 36.5 - 49.3 % Final    MCV 96   82 - 98 fL Final    MCH 31.0  26.8 - 34.3 pg Final    MCHC 32.4  31.4 - 37.4 g/dL Final    RDW 15.9 (*) 11.6 - 15.1 % Final    MPV 9.7  8.9 - 12.7 fL Final    Platelets 172  149 - 390 Thousands/uL Final    Narrative:     This is an appended report.  These results have been appended to a previously verified report.   COMPREHENSIVE METABOLIC PANEL - Abnormal    Sodium 136  135 - 147 mmol/L Final    Potassium 4.4  3.5 - 5.3 mmol/L Final    Chloride 102  96 - 108 mmol/L Final    CO2 24  21 - 32 mmol/L Final    ANION GAP 10  4 - 13 mmol/L Final    BUN 34 (*) 5 - 25 mg/dL Final    Creatinine 1.42 (*) 0.60 - 1.30 mg/dL Final    Comment: Standardized to IDMS reference method    Glucose 343 (*) 65 - 140 mg/dL Final    Comment: If the patient is fasting, the ADA then defines impaired fasting glucose as > 100 mg/dL and diabetes as > or equal to 123 mg/dL.    Calcium 8.9  8.4 - 10.2 mg/dL Final    AST 13  13 - 39 U/L Final    ALT 13  7 - 52 U/L Final    Comment: Specimen collection should occur prior to Sulfasalazine administration due to the potential for falsely depressed results.     Alkaline Phosphatase 94  34 - 104 U/L Final    Total Protein 7.8  6.4 - 8.4 g/dL Final    Albumin 3.7  3.5 - 5.0 g/dL Final    Total Bilirubin 0.90  0.20 - 1.00 mg/dL Final    Comment: Use of this assay is not recommended for patients undergoing treatment with eltrombopag due to the potential for falsely elevated results.  N-acetyl-p-benzoquinone imine (metabolite of Acetaminophen) will generate erroneously low results in samples for patients that have taken an overdose of Acetaminophen.    eGFR 46  ml/min/1.73sq m Final    Narrative:     National Kidney Disease Foundation guidelines for Chronic Kidney Disease (CKD):     Stage 1 with normal or high GFR (GFR > 90 mL/min/1.73 square meters)    Stage 2 Mild CKD (GFR = 60-89 mL/min/1.73 square meters)    Stage 3A Moderate CKD (GFR = 45-59 mL/min/1.73 square meters)    Stage 3B Moderate CKD (GFR =  30-44 mL/min/1.73 square meters)    Stage 4 Severe CKD (GFR = 15-29 mL/min/1.73 square meters)    Stage 5 End Stage CKD (GFR <15 mL/min/1.73 square meters)  Note: GFR calculation is accurate only with a steady state creatinine   B-TYPE NATRIURETIC PEPTIDE (BNP) - Abnormal     (*) 0 - 100 pg/mL Final   PROTIME-INR - Abnormal    Protime 18.9 (*) 12.3 - 15.0 seconds Final    INR 1.51 (*) 0.85 - 1.19 Final    Narrative:     INR Therapeutic Range    Indication                                             INR Range      Atrial Fibrillation                                               2.0-3.0  Hypercoagulable State                                    2.0.2.3  Left Ventricular Asist Device                            2.0-3.0  Mechanical Heart Valve                                  -    Aortic(with afib, MI, embolism, HF, LA enlargement,    and/or coagulopathy)                                     2.0-3.0 (2.5-3.5)     Mitral                                                             2.5-3.5  Prosthetic/Bioprosthetic Heart Valve               2.0-3.0  Venous thromboembolism (VTE: VT, PE        2.0-3.0   POCT GLUCOSE - Abnormal    POC Glucose 318 (*) 65 - 140 mg/dl Final   MANUAL DIFFERENTIAL(PHLEBS DO NOT ORDER) - Abnormal    Segmented % 63  43 - 75 % Final    Lymphocytes % 32  14 - 44 % Final    Monocytes % 4  4 - 12 % Final    Eosinophils % 0  0 - 6 % Final    Basophils % 1  0 - 1 % Final    Absolute Neutrophils 9.17 (*) 1.85 - 7.62 Thousand/uL Final    Absolute Lymphocytes 4.66 (*) 0.60 - 4.47 Thousand/uL Final    Absolute Monocytes 0.58  0.00 - 1.22 Thousand/uL Final    Absolute Eosinophils 0.00  0.00 - 0.40 Thousand/uL Final    Absolute Basophils 0.15 (*) 0.00 - 0.10 Thousand/uL Final    Total Counted     Final    Smudge Cells Present   Final    RBC Morphology Present   Final    Platelet Estimate Adequate  Adequate Final    Anisocytosis Present   Final   COVID-19/INFLUENZA A/B RAPID ANTIGEN (30 MIN.TAT) - Normal     "SARS COV Rapid Antigen Negative  Negative Final    Influenza A Rapid Antigen Negative  Negative Final    Influenza B Rapid Antigen Negative  Negative Final    Narrative:     This test has been performed using the Yasmo Jyoti 2 FLU+SARS Antigen test under the Emergency Use Authorization (EUA). This test has been validated by the  and verified by the performing laboratory. The Jyoti uses lateral flow immunofluorescent sandwich assay to detect SARS-COV, Influenza A and Influenza B Antigen.     The Quidel Jyoti 2 SARS Antigen test does not differentiate between SARS-CoV and SARS-CoV-2.     Negative results are presumptive and may be confirmed with a molecular assay, if necessary, for patient management. Negative results do not rule out SARS-CoV-2 or influenza infection and should not be used as the sole basis for treatment or patient management decisions. A negative test result may occur if the level of antigen in a sample is below the limit of detection of this test.     Positive results are indicative of the presence of viral antigens, but do not rule out bacterial infection or co-infection with other viruses.     All test results should be used as an adjunct to clinical observations and other information available to the provider.    FOR PEDIATRIC PATIENTS - copy/paste COVID Guidelines URL to browser: [email or web address]   HS TROPONIN I 0HR - Normal    hs TnI 0hr 8  \"Refer to ACS Flowchart\"- see link ng/L Final    Comment:                                              Initial (time 0) result  If >=50 ng/L, Myocardial injury suggested ;  Type of myocardial injury and treatment strategy  to be determined.  If 5-49 ng/L, a delta result at 2 hours will be needed to further evaluate.  If <4 ng/L, and chest pain has been >3 hours since onset, patient may qualify for discharge based on the HEART score in the ED.  If <5 ng/L and <3hours since onset of chest " "pain, a delta result at 2 hours will be needed to further evaluate.    HS Troponin 99th Percentile URL of a Health Population=12 ng/L with a 95% Confidence Interval of 8-18 ng/L.    Second Troponin (time 2 hours)  If calculated delta >= 20 ng/L,  Myocardial injury suggested ; Type of myocardial injury and treatment strategy to be determined.  If 5-49 ng/L and the calculated delta is 5-19 ng/L, consult medical service for evaluation.  Continue evaluation for ischemia on ecg and other possible etiology and repeat hs troponin at 4 hours.  If delta is <5 ng/L at 2 hours, consider discharge based on risk stratification via the HEART score (if in ED), or VISHAL risk score in IP/Observation.    HS Troponin 99th Percentile URL of a Health Population=12 ng/L with a 95% Confidence Interval of 8-18 ng/L.   HS TROPONIN I 2HR - Normal    hs TnI 2hr 8  \"Refer to ACS Flowchart\"- see link ng/L Final    Comment:                                              Initial (time 0) result  If >=50 ng/L, Myocardial injury suggested ;  Type of myocardial injury and treatment strategy  to be determined.  If 5-49 ng/L, a delta result at 2 hours will be needed to further evaluate.  If <4 ng/L, and chest pain has been >3 hours since onset, patient may qualify for discharge based on the HEART score in the ED.  If <5 ng/L and <3hours since onset of chest pain, a delta result at 2 hours will be needed to further evaluate.    HS Troponin 99th Percentile URL of a Health Population=12 ng/L with a 95% Confidence Interval of 8-18 ng/L.    Second Troponin (time 2 hours)  If calculated delta >= 20 ng/L,  Myocardial injury suggested ; Type of myocardial injury and treatment strategy to be determined.  If 5-49 ng/L and the calculated delta is 5-19 ng/L, consult medical service for evaluation.  Continue evaluation for ischemia on ecg and other possible etiology and repeat hs troponin at 4 hours.  If delta is <5 ng/L at 2 hours, consider discharge based on risk " stratification via the HEART score (if in ED), or VISHAL risk score in IP/Observation.    HS Troponin 99th Percentile URL of a Health Population=12 ng/L with a 95% Confidence Interval of 8-18 ng/L.    Delta 2hr hsTnI 0  <20 ng/L Final   LACTIC ACID, PLASMA (W/REFLEX IF RESULT > 2.0) - Normal    LACTIC ACID 1.1  0.5 - 2.0 mmol/L Final    Narrative:     Result may be elevated if tourniquet was used during collection.   APTT - Normal    PTT 30  23 - 34 seconds Final    Comment: Therapeutic Heparin Range =  60-90 seconds   PROCALCITONIN TEST - Normal    Procalcitonin 0.21  <=0.25 ng/ml Final    Comment: Suspected Lower Respiratory Tract Infection (LRTI):  - LESS than or EQUAL to 0.25 ng/mL:   low likelihood for bacterial LRTI; antibiotics DISCOURAGED.  - GREATER than 0.25 ng/mL:   increased likelihood for bacterial LRTI; antibiotics ENCOURAGED.    Suspected Sepsis:  - Strongly consider initiating antibiotics in ALL UNSTABLE patients.  - LESS than or EQUAL to 0.5 ng/mL:   low likelihood for bacterial sepsis; antibiotics DISCOURAGED.  - GREATER than 0.5 ng/mL:   increased likelihood for bacterial sepsis; antibiotics ENCOURAGED.  - GREATER than 2 ng/mL:   high risk for severe sepsis / septic shock; antibiotics strongly ENCOURAGED.    Decisions on antibiotic use should not be based solely on Procalcitonin (PCT) levels. If PCT is low but uncertainty exists with stopping antibiotics, repeat PCT in 6-24 hours to confirm the low level. If antibiotics are administered (regardless if initial PCT was high or low), repeat PCT every 1-2 days to consider early antibiotic cessation (when GREATER than 80% decrease from the peak OR when PCT drops below designated cutoffs, whichever comes first), so long as the infection is NOT one that typically requires prolonged treatment durations (e.g., bone/joint infections, endocarditis, Staph. aureus bacteremia).    Situations of FALSE-POSITIVE Procalcitonin values:  1) Newborns < 72 hours old  2)  Massive stress from severe trauma / burns, major surgery, acute pancreatitis, cardiogenic / hemorrhagic shock, sickle cell crisis, or other organ perfusion abnormalities  3) Malaria and some Candidal infections  4) Treatment with agents that stimulate cytokines (e.g., OKT3, anti-lymphocyte globulins, alemtuzumab, IL-2, granulocyte transfusion [NOT GCSFs])  5) Chronic renal disease causes elevated baseline levels (consider GREATER than 0.75 ng/mL as an abnormal cut-off); initiating HD/CRRT may cause transient decreases  6) Paraneoplastic syndromes from medullary thyroid or SCLC, some forms of vasculitis, and acute czktj-cc-tllz disease    Situations of FALSE-NEGATIVE Procalcitonin values:  1) Too early in clinical course for PCT to have reached its peak (may repeat in 6-24 hours to confirm low level)  2) Localized infection WITHOUT systemic (SIRS / sepsis) response (e.g., an abscess, osteomyelitis, cystitis)  3) Mycobacteria (e.g., Tuberculosis, MAC)  4) Cystic fibrosis exacerbations     BLOOD CULTURE   BLOOD CULTURE   RBC MORPHOLOGY REFLEX TEST   HS TROPONIN I 4HR   TYPE AND SCREEN    ABO Grouping O   Final    Rh Factor Positive   Final    Antibody Screen Negative   Final    Specimen Expiration Date 20250524   Final         CT lower extremity w contrast left   Final Result   Near-anatomic alignment status post ORIF for left intertrochanteric hip fracture with hyperattenuation noted in the region of the left vastus medialis suspicious for intramuscular hematoma after trauma and recent surgical intervention.         Workstation performed: [id number]         XR chest 1 view portable   ED Interpretation         Right-sided infiltrate.      Final Result      Bilateral patchy airspace disease may be on the basis of pulmonary edema versus infection.            Workstation performed: [id number]                        Procedures            Critical Care Time Statement: Upon my evaluation, this patient had a high probability  of imminent or life-threatening deterioration due to competent CHF requiring BiPAP and nitroglycerin infusion, which required my direct attention, intervention, and personal management.  I spent a total of 60 minutes directly providing critical care services, including complex medical decision making (to support/prevent further life-threatening deterioration). This time is exclusive of procedures, teaching, treating other patients, family meetings, and any prior time recorded by providers other than myself.

## 2025-05-21 NOTE — ASSESSMENT & PLAN NOTE
Wt Readings from Last 3 Encounters:   05/21/25 81.6 kg (179 lb 12.8 oz)   03/17/25 80.4 kg (177 lb 4 oz)   02/20/25 82.3 kg (181 lb 8 oz)   Last echo June 2024 EF 60%, home diuresis torsemide 2.5 daily    Plan:  Follow-up echo  Continue diuresis,

## 2025-05-21 NOTE — ASSESSMENT & PLAN NOTE
History of CLL/SLL previously treated with 4 cycles of BR (June 2012). Outpatient plan is surveillance.

## 2025-05-21 NOTE — Clinical Note
Case was discussed with critical care and the patient's admission status was agreed to be Admission Status: inpatient status to the service of Dr. Mathew

## 2025-05-21 NOTE — ASSESSMENT & PLAN NOTE
Patient presenting acutely hypoxic, not on home O2.  The day history of shortness of breath/apnea.  Iso PNA versus acute HF exacerbation, patient on Eliquis but PE on differential.    Plan:  Continue bipap  Continue diuresis, net goal -1 to 2L  See plans under PNA and HF exacerbation  Follow-up lower extremity Doppler

## 2025-05-21 NOTE — PROGRESS NOTES
Abelino Renee is a 79 y.o. male who is currently ordered Vancomycin IV with management by the Pharmacy Consult service.  Relevant clinical data and objective / subjective history reviewed.  Vancomycin Assessment:  Indication and Goal AUC/Trough: Pneumonia (goal -600, trough >10)  Micro:     Renal Function:  SCr: 1.42 mg/dL  CrCl: 43 mL/min  Renal replacement: Not on dialysis  Days of Therapy: 1  Current Dose: 2 g IV once loading dose  Vancomycin Plan:  New Dosin g IV every every 24 hours  Estimated AUC: 490 mcg*hr/mL  Estimated Trough: 14.3 mcg/mL  Next Level:  at 0600  Renal Function Monitoring: Daily BMP and UOP  Pharmacy will continue to follow closely for s/sx of nephrotoxicity, infusion reactions and appropriateness of therapy.  BMP and CBC will be ordered per protocol. We will continue to follow the patient’s culture results and clinical progress daily.    Josee Martinez, Pharmacist

## 2025-05-21 NOTE — ASSESSMENT & PLAN NOTE
Atrial fibrillation diagnosed in 2024. Patient historically has opted for rate control and anticoagulation.  This is reasonable.  He remains asymptomatic from the atrial fibrillation without any reported episodes of palpitations or high ventricular rates.

## 2025-05-21 NOTE — ASSESSMENT & PLAN NOTE
Recent Labs     05/21/25  1104   HGB 7.9*   S/p type and screen.  Has not been consented    Plan:  Transfuse for hemoglobin less than 7

## 2025-05-21 NOTE — ASSESSMENT & PLAN NOTE
Near-anatomic alignment status post ORIF for left intertrochanteric hip fracture with hyperattenuation noted in the region of the left vastus medialis suspicious for intramuscular hematoma after trauma and recent surgical intervention.     Patient with PMHx CLL, A-fib on Eliquis, HTN, hypothyroid, NIDDM type 2, and CKD who presents after a fall after tripping over floorboards in [facility].  Patient fell on his left side and states he was lying on the ground for about 3 hours unable to weight-bear.  .  CT chest abdomen pelvis showing acute intertrochanteric fracture of left femoral neck.  S/p IM nail for left hip intertrochanteric fracture    Plan:  Monitor CBC  Transfuse for hemoglobin less than 7

## 2025-05-21 NOTE — ASSESSMENT & PLAN NOTE
Lab Results   Component Value Date    EGFR 46 05/21/2025    EGFR 49 04/29/2025    EGFR 30 03/22/2025    CREATININE 1.42 (H) 05/21/2025    CREATININE 1.36 (H) 04/29/2025    CREATININE 2.05 (H) 03/22/2025   Iso DM. Appears stable, at baseline.

## 2025-05-22 ENCOUNTER — APPOINTMENT (INPATIENT)
Dept: VASCULAR ULTRASOUND | Facility: HOSPITAL | Age: 80
DRG: 981 | End: 2025-05-22
Payer: MEDICARE

## 2025-05-22 ENCOUNTER — TELEPHONE (OUTPATIENT)
Age: 80
End: 2025-05-22

## 2025-05-22 PROBLEM — I72.9 PSEUDOANEURYSM (HCC): Status: ACTIVE | Noted: 2025-05-22

## 2025-05-22 LAB
ALBUMIN SERPL BCG-MCNC: 3.4 G/DL (ref 3.5–5)
ALP SERPL-CCNC: 82 U/L (ref 34–104)
ALT SERPL W P-5'-P-CCNC: 11 U/L (ref 7–52)
ANION GAP SERPL CALCULATED.3IONS-SCNC: 7 MMOL/L (ref 4–13)
ANISOCYTOSIS BLD QL SMEAR: PRESENT
AORTIC ROOT: 3.6 CM
ASCENDING AORTA: 3.3 CM
AST SERPL W P-5'-P-CCNC: 14 U/L (ref 13–39)
B PARAP IS1001 DNA NPH QL NAA+NON-PROBE: NOT DETECTED
B PERT.PT PRMT NPH QL NAA+NON-PROBE: NOT DETECTED
BACTERIA UR QL AUTO: NORMAL /HPF
BASOPHILS # BLD MANUAL: 0 THOUSAND/UL (ref 0–0.1)
BASOPHILS NFR MAR MANUAL: 0 % (ref 0–1)
BILIRUB SERPL-MCNC: 0.8 MG/DL (ref 0.2–1)
BILIRUB UR QL STRIP: NEGATIVE
BSA FOR ECHO PROCEDURE: 1.92 M2
BUN SERPL-MCNC: 29 MG/DL (ref 5–25)
C PNEUM DNA NPH QL NAA+NON-PROBE: NOT DETECTED
CALCIUM ALBUM COR SERPL-MCNC: 9.3 MG/DL (ref 8.3–10.1)
CALCIUM SERPL-MCNC: 8.8 MG/DL (ref 8.4–10.2)
CHLORIDE SERPL-SCNC: 102 MMOL/L (ref 96–108)
CLARITY UR: CLEAR
CO2 SERPL-SCNC: 28 MMOL/L (ref 21–32)
COLOR UR: COLORLESS
CREAT SERPL-MCNC: 1.46 MG/DL (ref 0.6–1.3)
EOSINOPHIL # BLD MANUAL: 0.13 THOUSAND/UL (ref 0–0.4)
EOSINOPHIL NFR BLD MANUAL: 1 % (ref 0–6)
ERYTHROCYTE [DISTWIDTH] IN BLOOD BY AUTOMATED COUNT: 16.6 % (ref 11.6–15.1)
FERRITIN SERPL-MCNC: 189 NG/ML (ref 30–336)
FLUAV RNA NPH QL NAA+NON-PROBE: NOT DETECTED
FLUBV RNA NPH QL NAA+NON-PROBE: NOT DETECTED
FRACTIONAL SHORTENING: 24 (ref 28–44)
GFR SERPL CREATININE-BSD FRML MDRD: 45 ML/MIN/1.73SQ M
GLUCOSE SERPL-MCNC: 145 MG/DL (ref 65–140)
GLUCOSE SERPL-MCNC: 172 MG/DL (ref 65–140)
GLUCOSE SERPL-MCNC: 223 MG/DL (ref 65–140)
GLUCOSE SERPL-MCNC: 235 MG/DL (ref 65–140)
GLUCOSE SERPL-MCNC: 273 MG/DL (ref 65–140)
GLUCOSE UR STRIP-MCNC: NEGATIVE MG/DL
HADV DNA NPH QL NAA+NON-PROBE: NOT DETECTED
HCOV 229E RNA NPH QL NAA+NON-PROBE: NOT DETECTED
HCOV HKU1 RNA NPH QL NAA+NON-PROBE: NOT DETECTED
HCOV NL63 RNA NPH QL NAA+NON-PROBE: NOT DETECTED
HCOV OC43 RNA NPH QL NAA+NON-PROBE: NOT DETECTED
HCT VFR BLD AUTO: 22.2 % (ref 36.5–49.3)
HCT VFR BLD AUTO: 23 % (ref 36.5–49.3)
HGB BLD-MCNC: 7.2 G/DL (ref 12–17)
HGB BLD-MCNC: 7.3 G/DL (ref 12–17)
HGB UR QL STRIP.AUTO: NEGATIVE
HMPV RNA NPH QL NAA+NON-PROBE: NOT DETECTED
HPIV1 RNA NPH QL NAA+NON-PROBE: NOT DETECTED
HPIV2 RNA NPH QL NAA+NON-PROBE: NOT DETECTED
HPIV3 RNA NPH QL NAA+NON-PROBE: NOT DETECTED
HPIV4 RNA NPH QL NAA+NON-PROBE: NOT DETECTED
INTERVENTRICULAR SEPTUM IN DIASTOLE (PARASTERNAL SHORT AXIS VIEW): 1.2 CM
INTERVENTRICULAR SEPTUM: 1.2 CM (ref 0.6–1.1)
IRON SATN MFR SERPL: 6 % (ref 15–50)
IRON SERPL-MCNC: 19 UG/DL (ref 50–212)
KETONES UR STRIP-MCNC: NEGATIVE MG/DL
LAAS-AP2: 24.9 CM2
LAAS-AP4: 26.2 CM2
LEFT ATRIUM SIZE: 4.6 CM
LEFT ATRIUM VOLUME (MOD BIPLANE): 80 ML
LEFT INTERNAL DIMENSION IN SYSTOLE: 3.4 CM (ref 2.1–4)
LEFT VENTRICULAR INTERNAL DIMENSION IN DIASTOLE: 4.5 CM (ref 3.5–6)
LEFT VENTRICULAR POSTERIOR WALL IN END DIASTOLE: 1.3 CM
LEFT VENTRICULAR STROKE VOLUME: 46 ML
LEUKOCYTE ESTERASE UR QL STRIP: NEGATIVE
LV EF US.2D.A4C+ESTIMATED: 57 %
LVSV (TEICH): 46 ML
LYMPHOCYTES # BLD AUTO: 54 % (ref 14–44)
LYMPHOCYTES # BLD AUTO: 6.97 THOUSAND/UL (ref 0.6–4.47)
M PNEUMO DNA NPH QL NAA+NON-PROBE: NOT DETECTED
MAGNESIUM SERPL-MCNC: 1.8 MG/DL (ref 1.9–2.7)
MCH RBC QN AUTO: 30.6 PG (ref 26.8–34.3)
MCHC RBC AUTO-ENTMCNC: 32.4 G/DL (ref 31.4–37.4)
MCV RBC AUTO: 95 FL (ref 82–98)
MICROCYTES BLD QL AUTO: PRESENT
MONOCYTES # BLD AUTO: 0.25 THOUSAND/UL (ref 0–1.22)
MONOCYTES NFR BLD: 2 % (ref 4–12)
MRSA NOSE QL CULT: ABNORMAL
MRSA NOSE QL CULT: ABNORMAL
NEUTROPHILS # BLD MANUAL: 5.33 THOUSAND/UL (ref 1.85–7.62)
NEUTS BAND NFR BLD MANUAL: 1 % (ref 0–8)
NEUTS SEG NFR BLD AUTO: 41 % (ref 43–75)
NITRITE UR QL STRIP: NEGATIVE
NON-SQ EPI CELLS URNS QL MICRO: NORMAL /HPF
PH UR STRIP.AUTO: 5 [PH]
PHOSPHATE SERPL-MCNC: 3.2 MG/DL (ref 2.3–4.1)
PLATELET # BLD AUTO: 182 THOUSANDS/UL (ref 149–390)
PLATELET BLD QL SMEAR: ADEQUATE
PMV BLD AUTO: 10.3 FL (ref 8.9–12.7)
POTASSIUM SERPL-SCNC: 3.8 MMOL/L (ref 3.5–5.3)
PROT SERPL-MCNC: 7.2 G/DL (ref 6.4–8.4)
PROT UR STRIP-MCNC: NEGATIVE MG/DL
RA PRESSURE ESTIMATED: 10 MMHG
RBC # BLD AUTO: 2.35 MILLION/UL (ref 3.88–5.62)
RBC #/AREA URNS AUTO: NORMAL /HPF
RBC MORPH BLD: PRESENT
RIGHT ATRIAL 2D VOLUME: 48 ML
RIGHT ATRIUM AREA SYSTOLE A4C: 18.1 CM2
RIGHT VENTRICLE ID DIMENSION: 4 CM
RSV RNA NPH QL NAA+NON-PROBE: NOT DETECTED
RV PSP: 55 MMHG
RV+EV RNA NPH QL NAA+NON-PROBE: NOT DETECTED
SARS-COV-2 RNA NPH QL NAA+NON-PROBE: NOT DETECTED
SL CV LEFT ATRIUM LENGTH A2C: 6.5 CM
SL CV PED ECHO LEFT VENTRICLE DIASTOLIC VOLUME (MOD BIPLANE) 2D: 94 ML
SL CV PED ECHO LEFT VENTRICLE SYSTOLIC VOLUME (MOD BIPLANE) 2D: 48 ML
SODIUM SERPL-SCNC: 137 MMOL/L (ref 135–147)
SP GR UR STRIP.AUTO: 1.01 (ref 1–1.03)
TIBC SERPL-MCNC: 301 UG/DL (ref 250–450)
TR MAX PG: 45 MMHG
TR PEAK VELOCITY: 3.3 M/S
TRANSFERRIN SERPL-MCNC: 215 MG/DL (ref 203–362)
TRICUSPID ANNULAR PLANE SYSTOLIC EXCURSION: 1.6 CM
TRICUSPID VALVE PEAK REGURGITATION VELOCITY: 3.34 M/S
TSH SERPL DL<=0.05 MIU/L-ACNC: 1.85 UIU/ML (ref 0.45–4.5)
UIBC SERPL-MCNC: 282 UG/DL (ref 155–355)
UROBILINOGEN UR STRIP-ACNC: <2 MG/DL
VARIANT LYMPHS # BLD AUTO: 1 %
WBC # BLD AUTO: 12.68 THOUSAND/UL (ref 4.31–10.16)
WBC #/AREA URNS AUTO: NORMAL /HPF

## 2025-05-22 PROCEDURE — 84100 ASSAY OF PHOSPHORUS: CPT

## 2025-05-22 PROCEDURE — 99223 1ST HOSP IP/OBS HIGH 75: CPT | Performed by: SURGERY

## 2025-05-22 PROCEDURE — 99232 SBSQ HOSP IP/OBS MODERATE 35: CPT

## 2025-05-22 PROCEDURE — 85014 HEMATOCRIT: CPT

## 2025-05-22 PROCEDURE — 82948 REAGENT STRIP/BLOOD GLUCOSE: CPT

## 2025-05-22 PROCEDURE — 85018 HEMOGLOBIN: CPT

## 2025-05-22 PROCEDURE — 0202U NFCT DS 22 TRGT SARS-COV-2: CPT | Performed by: STUDENT IN AN ORGANIZED HEALTH CARE EDUCATION/TRAINING PROGRAM

## 2025-05-22 PROCEDURE — 83540 ASSAY OF IRON: CPT

## 2025-05-22 PROCEDURE — NC001 PR NO CHARGE: Performed by: RADIOLOGY

## 2025-05-22 PROCEDURE — 85007 BL SMEAR W/DIFF WBC COUNT: CPT

## 2025-05-22 PROCEDURE — 82728 ASSAY OF FERRITIN: CPT

## 2025-05-22 PROCEDURE — 85027 COMPLETE CBC AUTOMATED: CPT

## 2025-05-22 PROCEDURE — 93005 ELECTROCARDIOGRAM TRACING: CPT

## 2025-05-22 PROCEDURE — 81001 URINALYSIS AUTO W/SCOPE: CPT

## 2025-05-22 PROCEDURE — 83550 IRON BINDING TEST: CPT

## 2025-05-22 PROCEDURE — 80053 COMPREHEN METABOLIC PANEL: CPT

## 2025-05-22 PROCEDURE — 93970 EXTREMITY STUDY: CPT | Performed by: SURGERY

## 2025-05-22 PROCEDURE — 83735 ASSAY OF MAGNESIUM: CPT

## 2025-05-22 PROCEDURE — 93970 EXTREMITY STUDY: CPT

## 2025-05-22 RX ORDER — LOSARTAN POTASSIUM 50 MG/1
100 TABLET ORAL DAILY
Status: DISCONTINUED | OUTPATIENT
Start: 2025-05-22 | End: 2025-05-27

## 2025-05-22 RX ORDER — MAGNESIUM SULFATE HEPTAHYDRATE 40 MG/ML
2 INJECTION, SOLUTION INTRAVENOUS ONCE
Status: COMPLETED | OUTPATIENT
Start: 2025-05-22 | End: 2025-05-22

## 2025-05-22 RX ORDER — POTASSIUM CHLORIDE 1500 MG/1
40 TABLET, EXTENDED RELEASE ORAL ONCE
Status: COMPLETED | OUTPATIENT
Start: 2025-05-22 | End: 2025-05-22

## 2025-05-22 RX ORDER — MAGNESIUM SULFATE HEPTAHYDRATE 40 MG/ML
2 INJECTION, SOLUTION INTRAVENOUS ONCE
Status: DISCONTINUED | OUTPATIENT
Start: 2025-05-22 | End: 2025-05-22

## 2025-05-22 RX ORDER — VANCOMYCIN HYDROCHLORIDE 750 MG/150ML
750 INJECTION, SOLUTION INTRAVENOUS EVERY 24 HOURS
Status: DISCONTINUED | OUTPATIENT
Start: 2025-05-23 | End: 2025-05-25

## 2025-05-22 RX ADMIN — AZITHROMYCIN DIHYDRATE 500 MG: 250 TABLET ORAL at 12:40

## 2025-05-22 RX ADMIN — FUROSEMIDE 40 MG: 10 INJECTION, SOLUTION INTRAMUSCULAR; INTRAVENOUS at 08:07

## 2025-05-22 RX ADMIN — MAGNESIUM SULFATE HEPTAHYDRATE 2 G: 40 INJECTION, SOLUTION INTRAVENOUS at 06:37

## 2025-05-22 RX ADMIN — VANCOMYCIN HYDROCHLORIDE 1000 MG: 1 INJECTION, SOLUTION INTRAVENOUS at 08:08

## 2025-05-22 RX ADMIN — PRAVASTATIN SODIUM 40 MG: 40 TABLET ORAL at 16:09

## 2025-05-22 RX ADMIN — CEFEPIME 2000 MG: 2 INJECTION, POWDER, FOR SOLUTION INTRAVENOUS at 21:32

## 2025-05-22 RX ADMIN — FUROSEMIDE 40 MG: 10 INJECTION, SOLUTION INTRAMUSCULAR; INTRAVENOUS at 15:09

## 2025-05-22 RX ADMIN — CEFEPIME 2000 MG: 2 INJECTION, POWDER, FOR SOLUTION INTRAVENOUS at 10:12

## 2025-05-22 RX ADMIN — LEVOTHYROXINE SODIUM 75 MCG: 75 TABLET ORAL at 05:01

## 2025-05-22 RX ADMIN — APIXABAN 5 MG: 5 TABLET, FILM COATED ORAL at 08:07

## 2025-05-22 RX ADMIN — CHLORHEXIDINE GLUCONATE 15 ML: 1.2 SOLUTION ORAL at 21:31

## 2025-05-22 RX ADMIN — POTASSIUM CHLORIDE 40 MEQ: 1500 TABLET, EXTENDED RELEASE ORAL at 10:12

## 2025-05-22 RX ADMIN — CARVEDILOL 6.25 MG: 6.25 TABLET, FILM COATED ORAL at 08:08

## 2025-05-22 RX ADMIN — CHLORHEXIDINE GLUCONATE 15 ML: 1.2 SOLUTION ORAL at 08:07

## 2025-05-22 RX ADMIN — TIMOLOL MALEATE 1 DROP: 5 SOLUTION OPHTHALMIC at 21:32

## 2025-05-22 RX ADMIN — INSULIN LISPRO 4 UNITS: 100 INJECTION, SOLUTION INTRAVENOUS; SUBCUTANEOUS at 11:17

## 2025-05-22 RX ADMIN — TIMOLOL MALEATE 1 DROP: 5 SOLUTION OPHTHALMIC at 08:09

## 2025-05-22 RX ADMIN — CARVEDILOL 6.25 MG: 6.25 TABLET, FILM COATED ORAL at 16:09

## 2025-05-22 RX ADMIN — INSULIN LISPRO 3 UNITS: 100 INJECTION, SOLUTION INTRAVENOUS; SUBCUTANEOUS at 16:09

## 2025-05-22 NOTE — TELEPHONE ENCOUNTER
Caller: Wife-Nivia    Doctor: Dr. Nevarez    Reason for call: Wife calling to cancel the appt on 5/28/25 due to  being in Hospital with Pneumonia.  Wife wanted Dr. Nevarez to know that they found a hemotoma in the groin area of the left hip.    Call back#: [phone]

## 2025-05-22 NOTE — CASE MANAGEMENT
Case Management Assessment & Discharge Planning Note    Patient name Abelino Renee  Location ICU 10/ICU 10 MRN [id number]  : 1945 Date 2025       Current Admission Date: 2025  Current Admission Diagnosis:Acute hypoxic respiratory failure (HCC)   Patient Active Problem List    Diagnosis Date Noted    Pseudoaneurysm (HCC) 2025    Intramuscular hematoma 2025    Anemia 2025    Hyponatremia 2025    RAINE (acute kidney injury) (HCC) 2025    Type 2 diabetes mellitus with stage 3b chronic kidney disease and hypertension ([facility]) 2025    ABLA (acute blood loss anemia) 2025    Closed fracture of neck of left femur ([facility]) 2025    Persistent atrial fibrillation ([facility]) 2025    Type 2 diabetes mellitus with diabetic chronic kidney disease ([facility]) 2024    Monoclonal gammopathy 2024    Rash 2024    Platelets decreased ([facility]) 2024    Acute hypoxic respiratory failure ([facility]) 2024    Pneumonia 2024    Diabetic nephropathy associated with type 2 diabetes mellitus ([facility]) 2021    Hypothyroidism 01/15/2021    Elevated serum protein level 2021    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    Status post transmetatarsal amputation of left foot ([facility]) 2019    Benign essential HTN 2018    Stage 3b chronic kidney disease ([facility]) 2018    Arthritis 2016    Hx of malignant carcinoid tumor of large intestine 2016    H/O Clostridium difficile infection 2015    Acute exacerbation of CHF (congestive heart failure) ([facility]) 2015    Chronic lymphocytic leukemia ([facility]) 2013    Type 2 diabetes mellitus with diabetic polyneuropathy, without long-term current use of insulin ([facility]) 2012      LOS (days): 1  Geometric Mean LOS (GMLOS) (days): 4.9  Days to GMLOS:3.8     OBJECTIVE:    Risk of Unplanned Readmission Score: 31.29     Current admission status: Inpatient    Preferred Pharmacy:    [facility] - [place], [place] - [address]  [address] [address]  Phone: [phone] Fax: [phone]    [facility] [address] [place], [place] [address] [address] [address]  Phone: [phone] Fax: [phone]    Primary Care Provider: Edel Wright MD    Primary Insurance: MEDICARE  Secondary Insurance: COMMERCIAL MISCELLANEOUS    ASSESSMENT:  Active Health Care Proxies    There are no active Health Care Proxies on file.       Patient Information  Admitted from:: Home  Mental Status: Alert  During Assessment patient was accompanied by: Spouse  Assessment information provided by:: Patient  Primary Caregiver: Self  Support Systems: Self, Spouse/significant other  County of Residence: [place]  What city do you live in?: [place]  Home entry access options. Select all that apply.: Stairs  Number of steps to enter home.: 3  Type of Current Residence: 2 story home  Upon entering residence, is there a bedroom on the main floor (no further steps)?: No  A bedroom is located on the following floor levels of residence (select all that apply):: 2nd Floor  Upon entering residence, is there a bathroom on the main floor (no further steps)?: Yes (1/2 bathroom on first and full on second)  Number of steps to 2nd floor from main floor: One Flight  Living Arrangements: Lives w/ Spouse/significant other    Activities of Daily Living Prior to Admission  Functional Status: Independent  Completes ADLs independently?: Yes  Ambulates independently?: Yes  Does patient use assisted devices?: Yes  Assisted Devices (DME) used: Straight Cane  Does patient currently own DME?: Yes  What DME does the patient currently own?: Walker, Straight Cane  Does patient have a history of Outpatient Therapy (PT/OT)?: No  Does the patient have a history of Short-Term Rehab?: Yes  Does patient have a history of HHC?: No  Does patient currently have HHC?:  No    Patient Information Continued  Income Source: Pension/intermediate  Does patient have prescription coverage?: Yes  Can the patient afford their medications and any related supplies (such as glucometers or test strips)?: Yes  Does patient receive dialysis treatments?: No  Does patient have a history of substance abuse?: No  Does patient have a history of Mental Health Diagnosis?: No    Means of Transportation  Means of Transport to Appts:: Drives Self    DISCHARGE DETAILS:    Discharge planning discussed with:: Pt and his wife, Nivia    Contacts  Patient Contacts: Nivia  Relationship to Patient:: Family  Contact Method: In Person  Reason/Outcome: Emergency Contact    Other Referral/Resources/Interventions Provided:  Interventions: Other (Specify)  Referral Comments: CM met with pt and his wife, Nivia, at bedside. Introduced self/role with dcp. Pt had hip sx earlier this year. Was at rehab and returned home in March. Pt reports he is now using his SPC. Aware CM will f/u with recommendations for dcp.

## 2025-05-22 NOTE — PLAN OF CARE
Problem: Potential for Falls  Goal: Patient will remain free of falls  Description: INTERVENTIONS:  - Educate patient/family on patient safety including physical limitations  - Instruct patient to call for assistance with activity   - Consider consulting OT/PT to assist with strengthening/mobility based on AM PAC & JH-HLM score  - Consult OT/PT to assist with strengthening/mobility   - Keep Call bell within reach  - Keep bed low and locked with side rails adjusted as appropriate  - Keep care items and personal belongings within reach  - Initiate and maintain comfort rounds  - Make Fall Risk Sign visible to staff  - Offer Toileting every 2 Hours, in advance of need  - Initiate/Maintain bed alarm  - Obtain necessary fall risk management equipment  - Apply yellow socks and bracelet for high fall risk patients  - Consider moving patient to room near nurses station  Outcome: Progressing

## 2025-05-22 NOTE — ASSESSMENT & PLAN NOTE
Lab Results   Component Value Date    EGFR 45 05/22/2025    EGFR 46 05/21/2025    EGFR 49 04/29/2025    CREATININE 1.46 (H) 05/22/2025    CREATININE 1.42 (H) 05/21/2025    CREATININE 1.36 (H) 04/29/2025   Iso DM. Appears stable, at baseline.

## 2025-05-22 NOTE — TELEPHONE ENCOUNTER
This does not require a phone call to the patient. He is currently being monitored at the hospital. Made Dr. Nevarez aware. Thank you.

## 2025-05-22 NOTE — H&P
H&P - Critical Care/ICU   Name: Abelino Renee 79 y.o. male I MRN: [id number]  Unit/Bed#: ICU 10 I Date of Admission: 5/21/2025   Date of Service: 5/22/2025 I Hospital Day: 1   { ?Quick Links I Problem List I TYLER I Billing Tip:17023}    Assessment & Plan  Acute hypoxic respiratory failure (HCC)  Patient presenting acutely hypoxic, not on home O2.  The day history of shortness of breath/apnea.  Iso PNA versus acute HF exacerbation, patient on Eliquis but PE on differential.    Plan:  Titrate oxygen as needed  Continue IV Lasix 40mg BID, net goal -1 to 2L  See plans under PNA and HF exacerbation  Follow-up lower extremity Doppler  Pneumonia  CXR: Bilateral patchy airspace disease may be on the basis of pulmonary edema versus infection.  Status post Zosyn, Zithromax, vancomycin.  CT chest: New pulmonary nodules and groundglass opacities suspicious for multifocal pneumonia.  Interstitial edema, bilateral pleural effusions, mediastinal adenopathy.  Strep pneumo/Legionella negative/COVID negative    Plan:  Continue empiric antibiotics  Follow-up blood cultures  Follow-up MRSA  Acute exacerbation of CHF (congestive heart failure) ([facility])  Wt Readings from Last 3 Encounters:   05/22/25 82.5 kg (181 lb 14.1 oz)   05/21/25 81.6 kg (179 lb 12.8 oz)   03/17/25 80.4 kg (177 lb 4 oz)   Last echo June 2024 EF 60%, home diuresis torsemide 2.5 daily    Plan:  Follow-up echo  Continue diuresis  Continue PTA carvedilol  Intramuscular hematoma  Closed fracture of neck of left femur (HCC)  Had a fall mid March.  CT chest abdomen pelvis showing acute intertrochanteric fracture of left femoral neck.  S/p IM nail for left hip intertrochanteric fracture    Due to left hip swelling, CT with contrast was obtained by ED and showing intramuscular hematoma  after trauma and recent surgical intervention.   Appears stable, patient has no pain.    Plan:  Monitor CBC  Transfuse for hemoglobin less than 7  Chronic lymphocytic leukemia (HCC)  History  of CLL/SLL previously treated with 4 cycles of BR (June 2012). Outpatient plan is surveillance.   Anemia  Recent Labs     05/21/25  1104 05/22/25  0456   HGB 7.9* 7.2*   S/p type and screen.  Has been consented.    Plan:  Transfuse for hemoglobin less than 7  Iron panel  Stage 3b chronic kidney disease (HCC)  Lab Results   Component Value Date    EGFR 45 05/22/2025    EGFR 46 05/21/2025    EGFR 49 04/29/2025    CREATININE 1.46 (H) 05/22/2025    CREATININE 1.42 (H) 05/21/2025    CREATININE 1.36 (H) 04/29/2025   Iso DM. Appears stable, at baseline.  Type 2 diabetes mellitus with diabetic polyneuropathy, without long-term current use of insulin (HCC)  Lab Results   Component Value Date    HGBA1C 7.9 (H) 03/18/2025       Recent Labs     05/21/25  1041 05/21/25  1801   POCGLU 318* 276*       Blood Sugar Average: Last 72 hrs:  (P) 297  On metformin 500mg qD  Glipizide 10mg    Plan:  Insulin sliding scale while inpatient  Hypothyroidism   levothyroxine (Synthroid) 75 mcg tablet; Take 1 tablet (75 mcg total) by mouth daily    Plan  Fu TSH  Continue PTA meds  Persistent atrial fibrillation (HCC)  Atrial fibrillation diagnosed in 2024. Patient historically has opted for rate control and anticoagulation.  This is reasonable.  He remains asymptomatic from the atrial fibrillation without any reported episodes of palpitations or high ventricular rates.  Benign essential HTN  PTA regimen: Amlodipine 10 mg, carvedilol 6.25 twice daily, losartan 100 mg daily    Plan:  Continue carvedilol, holding the rest because patient has been normotensive  Disposition: Stepdown Level 1    History of Present Illness   Abelino Renee is a 79 y.o. who presents Patient with PMHx CLL, A-fib on Eliquis, HTN, hypothyroid, NIDDM type 2, and CKD.  . 79y.o M w/ h/o CKD, CLL, DM, Afib (on eliquis), left hip surgery (2 months ago) presenting for SOB. 3d ago developed RAIN with associated cough. Exam shows bilobar rhonchi and diminished air movement. CXR  "shows opacities. Labs notable for WBC 14k, Hgb 7.9, . On arrival was satting 86% so he was placed on 2L NC; no prior h/o O2 use. After CT scan of the hip, which showed hematoma, he went into respiratory distress with HTN. Bipap, SL nitro, and lasix given. Started him on vanc, zosyn, and azithromycin for presumed PNA.     History obtained from the patient.  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[facility])      Comment:  appendectomy-colon resection  No date: Chronic lymphocytic leukemia of B-cell type ([facility])      Comment:  \"remission 4-5yrs\"-chemo  No date: DDD (degenerative disc disease), cervical  No date: Diabetes mellitus ([facility])      Comment:  bs checked by pt at home at 6am =92  No date: Diabetic neuropathy ([facility])  No date: Diabetic retinopathy ([facility])  No date: Elevated WBC count  04/2015: Heart attack ([facility])      Comment:  \"labeled as that and than tested later after appendix                removed and stress test showed negative\"  No date: History of cancer chemotherapy      Comment:  last treatment approx 5yrs ago  No date: Hypertension  No date: Lymphoma ([facility])  04/2015: Myocardial infarction ([facility])  No date: Scalp psoriasis  2/10-15: Shingles  No date: Toe injury      Comment:  left great toe, - 2017-internal braec-to be removed                today 3/30/2018  No date: Wears glasses Past Surgical History:  No date: APPENDECTOMY  3/30/2018: BONE BIOPSY; Left      Comment:  Procedure: GREAT TOE BONE BIOPSY;  Surgeon: Gerber Multani DPM;  Location: [facility];  Service: Podiatry  No date: BOWEL RESECTION  No date: CATARACT EXTRACTION; Bilateral  No date: CHOLECYSTECTOMY  06/2015: COLECTOMY  No date: EYE SURGERY; Right      Comment:  \"retinal peel\"  12/6/2017: FOOT " FUSION; Left      Comment:  Procedure: HALLUX INTERPHALANGEAL JOINT FUSION;                 Surgeon: Gerber Multani DPM;  [facility];                 Service: Podiatry  4/18/2018: HARDWARE REMOVAL; Left      Comment:  Procedure: REMOVAL STAPLES LEFT TOE;  Surgeon: Gerber Multani DPM;  [facility];  Service: Podiatry  12/6/2017: ORIF FOOT FRACTURE; Left      Comment:  Procedure: HALLUX INTERPHALANGEAL JOINT INTERNAL                FIXATION; REPAIR OF ULCERATION WITH EXCISIONS AND                REVISION OF SKIN HALLUX WITH USE OF ALLOGRAFT;  Surgeon:                Gerber Multani DPM;  [facility];  Service:                Podiatry  4/21/2016: VA COLONOSCOPY FLX DX W/COLLJ SPEC WHEN PFRMD; N/A      Comment:  Procedure: COLONOSCOPY;  Surgeon: Minal Ruelas DO;                 Location: [facility];  Service: Gastroenterology  3/18/2025: VA OPTX FEM SHFT FX W/INSJ IMED IMPLT W/WO SCREW; Left      Comment:  Procedure: INSERTION NAIL IM FEMUR ANTEGRADE                (TROCHANTERIC);  Surgeon: Jacqui Nevarez DO;  Location: [facility];  Service: Orthopedics  3/30/2018: VA REMOVAL IMPLANT DEEP; Left      Comment:  Procedure: REMOVAL HARDWARE FOOT;  Surgeon: Gerber Multani DPM;  Location: [facility];  Service: Podiatry  No date: RETINAL DETACHMENT SURGERY  No date: TONSILLECTOMY   Current Outpatient Medications   Medication Instructions    acetaminophen (TYLENOL) 650 mg, Oral, Every 6 hours PRN    amLODIPine (NORVASC) 10 mg, Oral, Daily    brimonidine tartrate 0.2 % ophthalmic solution     carvedilol (COREG) 6.25 mg, Oral, 2 times daily with meals    Diclofenac Sodium (VOLTAREN) 2 g, Daily PRN    Eliquis 5 mg, Oral, 2 times daily    glipiZIDE (GLUCOTROL XL) 10 mg, Oral, 2 times daily    Glucosamine HCl (GLUCOSAMINE PO) 1 tablet, 2 times daily    levothyroxine (SYNTHROID) 75 mcg, Oral, Daily    loperamide (IMODIUM A-D) 2 mg, As needed    losartan  (COZAAR) 100 mg, Oral, Daily    metFORMIN (GLUCOPHAGE-XR) 500 mg, Oral, 2 times daily with meals    metFORMIN (GLUCOPHAGE-XR) 500 mg, 2 times daily with meals    Multiple Vitamin (MULTIVITAMIN) tablet 1 tablet, Daily    Omega-3 Fatty Acids (FISH OIL PO) Oral, Daily    simvastatin (ZOCOR) 20 mg, Oral, Daily at bedtime    timolol (TIMOPTIC) 0.5 % ophthalmic solution 1 drop, 2 times daily    torsemide (DEMADEX) 2.5 mg, Oral, Daily    Allergies[1]   Social History[2] Family History[3]         Objective :                   Vitals I/O      Most Recent Min/Max in 24hrs   Temp 97.9 °F (36.6 °C) Temp  Min: 97 °F (36.1 °C)  Max: 98.1 °F (36.7 °C)   Pulse 90 Pulse  Min: 82  Max: 98   Resp (!) 26 Resp  Min: 18  Max: 32   /63 BP  Min: 104/60  Max: 179/74   O2 Sat 96 % SpO2  Min: 84 %  Max: 100 %      Intake/Output Summary (Last 24 hours) at 2025 0642  Last data filed at 2025 0500  Gross per 24 hour   Intake 1645.75 ml   Output 2550 ml   Net -904.25 ml       Diet Cardiovascular; Cardiac; Fluid Restriction 1500 ML    Invasive Monitoring           Physical Exam   Physical Exam  Skin:     General: Skin is warm.      Coloration: Skin is pale.      Comments: Well-healed surgical scars, no purulence or drainage noted   Cardiovascular:      Rate and Rhythm: Normal rate and regular rhythm.      Heart sounds: Normal heart sounds.   Musculoskeletal:         General: Swelling present.      Right lower leg: Trace Edema present.      Left lower le+ Edema present.   Abdominal: General: There is distension.     Palpations: Abdomen is soft.      Tenderness: There is no abdominal tenderness.   Constitutional:       Appearance: He is well-developed.      Comments: Tolerating BiPAP well   Pulmonary:      Breath sounds: No stridor.   Neurological:      Mental Status: He is alert and oriented to person, place and time. He is calm.          Diagnostic Studies    {IDisappearing Data Review I RadiologyI Cardiology:76529}  {?Quick  Links I Lab Review I Micro Results I Radiology I Cardiology:79173}  Lab Results: I have reviewed the following results:     Medications:  Scheduled PRN   [Held by provider] amLODIPine, 10 mg, Daily  apixaban, 5 mg, BID  azithromycin, 500 mg, Daily  carvedilol, 6.25 mg, BID With Meals  cefepime, 2,000 mg, Q12H  chlorhexidine, 15 mL, Q12H MICHAEL  furosemide, 40 mg, BID (diuretic)  insulin lispro, 1-6 Units, TID AC  levothyroxine, 75 mcg, Early Morning  magnesium sulfate, 2 g, Once  pravastatin, 40 mg, Daily With Dinner  timolol, 1 drop, BID  vancomycin, 1,000 mg, Q24H    Zosyn 4.5, Zithromax 500 mg  S/p Lasix 60 mg  500 cc bolus normal saline       Continuous    nitroGLYcerin, 5-200 mcg/min, Last Rate: Stopped (05/21/25 1544)         Labs: { I Disappearing Data Review I Results Review I Micro :64961}  CBC    Recent Labs     05/21/25  1104 05/22/25  0456   WBC 14.56* 12.68*   HGB 7.9* 7.2*   HCT 24.4* 22.2*    182   BANDSPCT  --  1     BMP    Recent Labs     05/21/25  1104 05/22/25  0536   SODIUM 136 137   K 4.4 3.8    102   CO2 24 28   AGAP 10 7   BUN 34* 29*   CREATININE 1.42* 1.46*   CALCIUM 8.9 8.8       Coags    Recent Labs     05/21/25  1236   INR 1.51*   PTT 30        Additional Electrolytes  Recent Labs     05/22/25  0536   MG 1.8*   PHOS 3.2       Recent Labs     05/21/25  1236   LACTICACID 1.1       Blood Gas    No recent results  No recent results  Lab Results   Component Value Date    HSTNI0 8 05/21/2025    HSTNI2 8 05/21/2025    HSTNID2 0 05/21/2025    HSTNI4 8 05/21/2025    HSTNID4 0 05/21/2025      Lab Results   Component Value Date    SARSCOV2 Negative 06/07/2024    SARSCOVAG Negative 05/21/2025    INFLUA Negative 06/07/2024    RAPFLUA Negative 05/21/2025    INFLUB Negative 06/07/2024    RAPFLUB Negative 05/21/2025    RSV Negative 06/07/2024     LFTs  Recent Labs     05/21/25  1104 05/22/25  0536   ALT 13 11   AST 13 14   ALKPHOS 94 82   ALB 3.7 3.4*   TBILI 0.90 0.80       Infectious  Recent  Labs     25  1104   PROCALCITONI 0.21   Legionella negative, strep pneumo negative, blood culture in progress Glucose  Recent Labs     25  1104 25  0536   GLUC 343* 172*      Acute hypoxic respiratory failure in setting of possible pneumonia versus CHF exacerbation  Neuro: Pain controlled,  CV: Possible volume overload ISO CHF exacerbation, continue carvedilol 6.25 mg twice daily, patient has been normotensive since the nitro drip, continued holding on losartan and amlodipine PTA meds?  Follow-up echo  Resp: Currently on 4 L nasal cannula, continue diuresis 40 mg Lasix twice daily.  On torsemide 2.5+ outpatient  GI: GI ppx, bowel regimen  Renal: No Ochoa, repleted mag  Heme: hemoglobin borderline, intramuscular hematoma, monitor clinically.  Consented patient for blood.  Eliquis 5 mg twice a day  ID: Multifocal pneumonia, DC azithromycin?,  Continue vancomycin and cefepime renally dosed  Endo: glucose elevated, metformin as an outpatient.  Continue levothyroxine.  ISS     MSK: no pressure ulcers         lines:  Dispo:           [1]   Allergies  Allergen Reactions    Oxycodone-Acetaminophen Hallucinations   [2]   Social History  Tobacco Use    Smoking status: Never     Passive exposure: Never    Smokeless tobacco: Never   Vaping Use    Vaping status: Never Used   Substance Use Topics    Alcohol use: No    Drug use: No   [3]   Family History  Problem Relation Name Age of Onset    Alcohol abuse Mother Kymberly             Pancreatic cancer Father          age 79    Hashimoto's thyroiditis Daughter      Rheum arthritis Daughter          Juvenile Rheum Arthitis     No Known Problems Sister           age 78    No Known Problems Brother           86    Kidney failure Brother Melchor         dialysis, 74    Diabetes Brother Melchor

## 2025-05-22 NOTE — ASSESSMENT & PLAN NOTE
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     Plan:  Vascular c/s   Consult to IR, possible embolization  NPO at midnight  Monitor H&H  Hold eliquis  Plan intervention tomorrow  Strict bed rest

## 2025-05-22 NOTE — ASSESSMENT & PLAN NOTE
Atrial fibrillation diagnosed in 2024. Patient historically has opted for rate control and anticoagulation.    He remains asymptomatic from the atrial fibrillation without any reported episodes of palpitations or high ventricular rates. Not in afib here

## 2025-05-22 NOTE — ASSESSMENT & PLAN NOTE
Lab Results   Component Value Date    HGBA1C 7.9 (H) 03/18/2025       Recent Labs     05/21/25  1041 05/21/25  1801   POCGLU 318* 276*       Blood Sugar Average: Last 72 hrs:  (P) 297  On metformin 500mg qD  Glipizide 10mg    Plan:  Insulin sliding scale while inpatient

## 2025-05-22 NOTE — CONSULTS
Consultation - Surgery-General   Name: Abelino Renee 79 y.o. male I MRN: [id number]  Unit/Bed#: ICU 10 I Date of Admission: 5/21/2025   Date of Service: 5/22/2025 I Hospital Day: 1   Inpatient consult to Vascular Surgery  Consult performed by: Concha Maradiaga PA-C  Consult ordered by: Rena Hinton MD        Physician Requesting Evaluation: Socorro Veronica, *   Reason for Evaluation / Principal Problem: c/f LLE pseudoaneurysm     Assessment & Plan  Intramuscular hematoma  S/p ORIF of left hip on 3/18 after a fall  Has been recovering well, discharge from rehab recently and has been ambulating with minimal assistance, uses can for longer distances.  Had some anterior thigh swelling and firmness post-operatively, which he reports has resolved.     CT LLE reviewed by vascular attending  5/22 Venous duplex with evidence of pseudoaneurysm in the proximal thigh measuring approximately 10 cm.     Discussed with attending, recommending IR consult for evaluation and possible embolization.       History of Present Illness   Abelino Renee is a 79 y.o. male with pmhx of afib (On Eliquis), CKD, CHF, CLL, anemia, recent fall with left hip fracture s/p ORIF on 3/18/25 who presents with SOB. Patient reports he has been healing well from his surgery, he was a acute rehab for awhile doing PT and has since returned home where he is going more exercises. He has not yet followed up with his original surgeon due to scheduling issues. He reported some left thigh swelling post-operatively, on the top of his thigh which was very firm. He would massage it and it eventually improved. He has been ambulating with the use of a cane, but recently has only needed it for longer distances. He developed some increased work of breathing and presented to the ED. He reports some left leg weakness post surgery, denies numbness or tingling. He denies any prior vascular surgery interventions or delayed wound healing. He has had  a left foot TMA after a toe infection 10 years ago which healed without issue.     Review of Systems   Constitutional:  Negative for activity change, chills and fever.   Respiratory:  Positive for shortness of breath.    Cardiovascular:  Negative for chest pain.   Skin:  Negative for color change and wound.   Neurological:  Positive for weakness (left leg since surgery but improving). Negative for numbness.     Historical Information   Past Medical History[1]  Past Surgical History[2]  Social History[3]  E-Cigarette/Vaping    E-Cigarette Use Never User      E-Cigarette/Vaping Substances    Nicotine No     THC No     CBD No     Flavoring No     Other No     Unknown No      Family History[4]    Objective :  Temp:  [97 °F (36.1 °C)-99 °F (37.2 °C)] 97.9 °F (36.6 °C)  HR:  [] 79  BP: (114-179)/(60-76) 120/60  Resp:  [18-32] 20  SpO2:  [84 %-100 %] 95 %  O2 Device: Nasal cannula  Nasal Cannula O2 Flow Rate (L/min):  [2 L/min-5 L/min] 4 L/min      Physical Exam  Constitutional:       General: He is not in acute distress.     Appearance: He is normal weight. He is not ill-appearing or toxic-appearing.     Cardiovascular:      Rate and Rhythm: Normal rate.   Pulmonary:      Effort: Pulmonary effort is normal. No respiratory distress.     Musculoskeletal:      Right lower leg: No edema.      Left lower leg: Edema present.   Feet:      Comments: Left foot TMA, well healed. 1+ DP, nonpalpable PT. 2+ pop and 2+ fem on the left. Left thigh without edema, nontender. Lateral thigh incisions well healed.   2+ DP/PT/pop/fem on right.     Skin:     General: Skin is warm and dry.     Neurological:      General: No focal deficit present.      Mental Status: He is alert.     Psychiatric:         Mood and Affect: Mood normal.         Behavior: Behavior normal.         Lab Results: I have reviewed the following results:  Recent Labs     05/21/25  1104 05/21/25  1236 05/22/25  0456 05/22/25  0536   WBC 14.56*  --  12.68*  --    HGB  "7.9*  --  7.2*  --    HCT 24.4*  --  22.2*  --      --  182  --    BANDSPCT  --   --  1  --    SODIUM 136  --   --  137   K 4.4  --   --  3.8     --   --  102   CO2 24  --   --  28   BUN 34*  --   --  29*   CREATININE 1.42*  --   --  1.46*   GLUC 343*  --   --  172*   MG  --   --   --  1.8*   PHOS  --   --   --  3.2   AST 13  --   --  14   ALT 13  --   --  11   ALB 3.7  --   --  3.4*   TBILI 0.90  --   --  0.80   ALKPHOS 94  --   --  82   PTT  --  30  --   --    INR  --  1.51*  --   --    HSTNI0 8  --   --   --    HSTNI2  --  8  --   --    *  --   --   --    LACTICACID  --  1.1  --   --        Imaging Results Review: I reviewed radiology reports from this admission including: Ultrasound(s).  Other Study Results Review: No additional pertinent studies reviewed.    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    Chronic lymphocytic leukemia of B-cell type (HCC)     \"remission 4-5yrs\"-chemo    DDD (degenerative disc disease), cervical     Diabetes mellitus (HCC)     bs checked by pt at home at 6am =92    Diabetic neuropathy (HCC)     Diabetic retinopathy (HCC)     Elevated WBC count     Heart attack (HCC) 04/2015    \"labeled as that and than tested later after appendix removed and stress test showed negative\"    History of cancer chemotherapy     last treatment approx 5yrs ago    Hypertension     Lymphoma (HCC)     Myocardial infarction (HCC) 04/2015    Scalp psoriasis     Shingles 2/10-15    Toe injury     left great toe, - 2017-internal braec-to be removed today 3/30/2018    Wears glasses    [2]   Past Surgical History:  Procedure Laterality Date    " "APPENDECTOMY      BONE BIOPSY Left 3/30/2018    Procedure: GREAT TOE BONE BIOPSY;  Surgeon: Gerber Multani DPM;  Location: [facility];  Service: Podiatry    BOWEL RESECTION      CATARACT EXTRACTION Bilateral     CHOLECYSTECTOMY      COLECTOMY  2015    EYE SURGERY Right     \"retinal peel\"    FOOT FUSION Left 2017    Procedure: HALLUX INTERPHALANGEAL JOINT FUSION;  Surgeon: Gerber Multani DPM;  Location: [facility];  Service: Podiatry    HARDWARE REMOVAL Left 2018    Procedure: REMOVAL STAPLES LEFT TOE;  Surgeon: Gerber Multani DPM;  Location: [facility];  Service: Podiatry    ORIF FOOT FRACTURE Left 2017    Procedure: HALLUX INTERPHALANGEAL JOINT INTERNAL FIXATION; REPAIR OF ULCERATION WITH EXCISIONS AND REVISION OF SKIN HALLUX WITH USE OF ALLOGRAFT;  Surgeon: Gerber Multani DPM;  Location[facility];  Service: Podiatry    VA COLONOSCOPY FLX DX W/COLLJ SPEC WHEN PFRMD N/A 2016    Procedure: COLONOSCOPY;  Surgeon: Minal Ruelas DO;  Location: [facility];  Service: Gastroenterology    VA OPTX FEM SHFT FX W/INSJ IMED IMPLT W/WO SCREW Left 3/18/2025    Procedure: INSERTION NAIL IM FEMUR ANTEGRADE (TROCHANTERIC);  Surgeon: Jacqui Nevarez DO;  Location: [facility];  Service: Orthopedics    VA REMOVAL IMPLANT DEEP Left 3/30/2018    Procedure: REMOVAL HARDWARE FOOT;  Surgeon: Gerber Multani DPM;  Location: [facility];  Service: Podiatry    RETINAL DETACHMENT SURGERY      TONSILLECTOMY     [3]   Social History  Tobacco Use    Smoking status: Never     Passive exposure: Never    Smokeless tobacco: Never   Vaping Use    Vaping status: Never Used   Substance and Sexual Activity    Alcohol use: No    Drug use: No    Sexual activity: Never     Partners: Female     Birth control/protection: None   [4]   Family History  Problem Relation Name Age of Onset    Alcohol abuse Mother Kymberly             Pancreatic cancer Father          age 79    Hashimoto's thyroiditis Daughter      Rheum arthritis " Daughter          Juvenile Rheum Arthitis     No Known Problems Sister           age 78    No Known Problems Brother           86    Kidney failure Brother Melchor         dialysis, 74    Diabetes Brother Melchor

## 2025-05-22 NOTE — ASSESSMENT & PLAN NOTE
Patient presenting acutely hypoxic, not on home O2.  The day history of shortness of breath/apnea.  Iso PNA versus acute HF exacerbation, patient on Eliquis but PE on differential.  Initially requiring BiPAP on admission, currently on nasal cannula    Plan:  Continue supplemental oxygen, wean as tolerated  Continue diuresis, net goal -1 to 2L  See plans under PNA and HF exacerbation  Follow-up lower extremity Doppler

## 2025-05-22 NOTE — ASSESSMENT & PLAN NOTE
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  Off proximal superficial femoral artery    Plan:  Frequent neurovascular checks  Vascular c/s   Consult to IR: Plan to perform angiogram today, Eliquis on hold

## 2025-05-22 NOTE — QUICK NOTE
VASCULAR SURGERY QUICK NOTE    Upon further discussion with IR, will proceed with possible embolization tomorrow 5/23. Patient should be NPOpMN, hold Eliquis and strict bed rest. Frequent neurovascular checks overnight. If there is concern for changes in exam concerning for bleeding can contact on call IR team. This was discussed with the patient's primary team and nurse.     Patient and wife updated at the bedside, all questions answered.     Patient remains neurovascularly intact. Denies any left leg pain or swelling. Left thigh soft, non edematous. Remains motor and sensory intact. LLE is warm. DP 1+ with strong doppler signal, PT signal, 2+ popliteal pulse.     Discussed with attending on call.    Concha Maradiaga  05/22/25

## 2025-05-22 NOTE — ASSESSMENT & PLAN NOTE
Had a fall mid March.  CT chest abdomen pelvis showing acute intertrochanteric fracture of left femoral neck.  S/p IM nail for left hip intertrochanteric fracture     Due to left hip swelling, CT with contrast was obtained by ED and showing intramuscular hematoma  after trauma and recent surgical intervention.   Appears stable, patient has no pain.     Plan:  Monitor CBC  Transfuse for hemoglobin less than 7

## 2025-05-22 NOTE — QUICK NOTE
Need to keep patient for close monitoring for massive pseudoaneurysm    Critical Care Interval Transfer Note:    Brief Hospital Summary: Ramos is a 80 yo M with PMHx of CLL (in surveillance), T2DM, hypothyroidism, CKD, HTN, closed fracture of neck of L femur (3/2025), Afib, CHF who presented 5/21 with acute hypoxic respiratory failure initially requiring continuous bipap.     CT chest showing multifocal PNA, pulmonary nodules that need eventual follow-up, and pleural effusions. He also was found to have a 10cm psuedoaneurysm in a vessel off the proximal SFA on LE duplex for which vascular is consulted. Currently he is on empiric antibiotics, being diuresed as 4L NC, satting well.     Barriers to discharge:   Acute hypoxic respiratory failure ISO pneumonia, pleural effusions. Treatment for multifocal pneumonia   May need Home O2  10cm psuedoaneurysm in a vessel off the proximal SFA on LE duplex for which vascular is consulted.  Vascular c/s  S/p IR LLE angiogram with stent placement         - Keep right leg extended for 2 hours  - Monitor for bleeding/hematoma  - Rest of recommendations as per vascular service  PT/OT     Consults: IP CONSULT TO CASE MANAGEMENT  IP CONSULT TO PHARMACY  IP CONSULT TO VASCULAR SURGERY  INPATIENT CONSULT TO IR    Recommended to review admission imaging for incidental findings and document in discharge navigator: Incidental findings have been documented in discharge navigator. Patient and/or family was informed and they expressed understanding.      Discharge Plan: Anticipate discharge in 48 hrs to discharge location to be determined pending rehab evaluations.       Patient seen and evaluated by Critical Care today and deemed to be appropriate for transfer to Med Surg. Spoke to Dr. Mcgrath from [facility] to accept transfer. Critical care can be contacted via SecureChat with any questions or concerns. Please use the Critical Care AP Role in Secure Chat for any provider inquires until the  patient is transferred out of the ICU or until tomorrow at 0600.

## 2025-05-22 NOTE — ASSESSMENT & PLAN NOTE
Wt Readings from Last 3 Encounters:   05/22/25 82.5 kg (181 lb 14.1 oz)   05/21/25 81.6 kg (179 lb 12.8 oz)   03/17/25 80.4 kg (177 lb 4 oz)   Last echo June 2024 EF 60%, home diuresis torsemide 2.5 daily    Plan:  Follow-up echo  Continue diuresis  Continue PTA carvedilol

## 2025-05-22 NOTE — ASSESSMENT & PLAN NOTE
Lab Results   Component Value Date    EGFR 45 05/22/2025    EGFR 46 05/21/2025    EGFR 49 04/29/2025    CREATININE 1.46 (H) 05/22/2025    CREATININE 1.42 (H) 05/21/2025    CREATININE 1.36 (H) 04/29/2025   Iso DM. Appears stable, at baseline.    Monitor BMP

## 2025-05-22 NOTE — ASSESSMENT & PLAN NOTE
CXR: Bilateral patchy airspace disease may be on the basis of pulmonary edema versus infection.  Status post Zosyn, Zithromax, vancomycin.  CT chest: New pulmonary nodules and groundglass opacities suspicious for multifocal pneumonia.  Interstitial edema, bilateral pleural effusions, mediastinal adenopathy.  Strep pneumo/Legionella negative/COVID negative    Plan:  Continue empiric antibiotics  Follow-up blood cultures  Follow-up MRSA

## 2025-05-22 NOTE — ASSESSMENT & PLAN NOTE
Patient presenting acutely hypoxic, not on home O2.  The day history of shortness of breath/apnea.  Iso PNA versus acute HF exacerbation, patient on Eliquis but PE on differential.    Plan:  Titrate oxygen as needed  Continue IV Lasix 40mg BID, net goal -1 to 2L  See plans under PNA and HF exacerbation  Follow-up lower extremity Doppler

## 2025-05-22 NOTE — ASSESSMENT & PLAN NOTE
PTA regimen: Amlodipine 10 mg, carvedilol 6.25 twice daily, losartan 100 mg daily    Plan:  Continue carvedilol, holding the rest because patient has been normotensive

## 2025-05-22 NOTE — ASSESSMENT & PLAN NOTE
Recent Labs     05/21/25  1104 05/22/25  0456   HGB 7.9* 7.2*   S/p type and screen.  Has been consented.    Plan:  Transfuse for hemoglobin less than 7  Iron panel

## 2025-05-22 NOTE — TELEMEDICINE
e-Consult (IP[facility])  - Interventional Radiology  Abelino Renee 79 y.o. male MRN: [id number]  Unit/Bed#: ICU 10 Encounter: [id number]          Interventional Radiology has been consulted to evaluate Abelino Renee    What procedure(s) would you like IR to perform? Review ct/duplex for possible pseudoaneurysm   Clinical history and reason for the procedure request? S/p L ORIF 2 months ago   If pertinent, please specify laterality Left       Inpatient Consult to IR  Consult performed by: Connie Saeed MD  Consult ordered by: Concha Maradiaga PA-C        05/22/25    Assessment/Recommendation:   Patient presented on 5/22/2025 with shortness of breath.    Left hip ORIF 2 months ago after fall.    History of A-fib on Eliquis, CHF, CKD.    CT left lower extremity on 5/21/2025 shows left vastus medialis hyperdense structure suspicious for intramuscular hematoma.  However, vascular ultrasound performed on 5/22/2025 shows that there is color flow within the structure and it appeared pulsatile, raising suspicion for pseudoaneurysm.  I spoke to vascular tech Mely directly over the phone to confirm the vascular ultrasound findings.  Given this, we recommend angiogram with possible embolization.  Patient was not n.p.o. today.  I have placed the order and plan to perform angiogram tomorrow.  Eliquis on hold.  Please notify IR if there is change in patient's condition.  I advised strict bedrest prior to angiogram.        11-20 minutes, >50% of the total time devoted to medical consultative verbal/EMR discussion between providers. Written report will be generated in the EMR.     Thank you for allowing Interventional Radiology to participate in the care of Abelino Renee. Please don't hesitate to call or TigerText us with any questions.     Connie Saeed MD

## 2025-05-22 NOTE — PROGRESS NOTES
Progress Note - Critical Care/ICU   Name: Abelino Renee 79 y.o. male I MRN: [id number]  Unit/Bed#: ICU 10 I Date of Admission: 5/21/2025   Date of Service: 5/22/2025 I Hospital Day: 1      Assessment & Plan  Acute hypoxic respiratory failure (HCC)  Patient presenting acutely hypoxic, not on home O2.  The day history of shortness of breath/apnea.  Iso PNA versus acute HF exacerbation, patient on Eliquis but PE on differential.  Initially requiring BiPAP on admission, currently on nasal cannula    Plan:  Continue supplemental oxygen, wean as tolerated  Continue diuresis, net goal -1 to 2L  See plans under PNA and HF exacerbation  Follow-up lower extremity Doppler  Pseudoaneurysm (HCC)  5/22 Venous duplex with evidence of pseudoaneurysm in the proximal thigh measuring approximately 10 cm.     Plan:  Vascular c/s   Consult to IR, possible embolization  NPO at midnight  Monitor H&H  Hold eliquis  Plan intervention tomorrow  Strict bed rest  Pneumonia  CXR: Bilateral patchy airspace disease may be on the basis of pulmonary edema versus infection.  Status post Zosyn, Zithromax, vancomycin.  CT chest: New pulmonary nodules and groundglass opacities suspicious for multifocal pneumonia.  Interstitial edema, bilateral pleural effusions, mediastinal adenopathy.  Strep pneumo/Legionella negative/COVID negative     Plan:  Continue empiric antibiotics  Follow-up blood cultures  Follow-up MRSA  Acute exacerbation of CHF (congestive heart failure) ([facility])  Wt Readings from Last 3 Encounters:   05/22/25 82.5 kg (181 lb 14.1 oz)   05/21/25 81.6 kg (179 lb 12.8 oz)   03/17/25 80.4 kg (177 lb 4 oz)   Last echo June 2024 EF 60%, home diuresis torsemide 2.5 daily    Plan:  Follow-up echo  Continue diuresis  Continue PTA carvedilol  Intramuscular hematoma  Closed fracture of neck of left femur (HCC)  Had a fall mid March.  CT chest abdomen pelvis showing acute intertrochanteric fracture of left femoral neck.  S/p IM nail for left hip  intertrochanteric fracture     Due to left hip swelling, CT with contrast was obtained by ED and showing intramuscular hematoma  after trauma and recent surgical intervention.   Appears stable, patient has no pain.     Plan:  Monitor CBC  Transfuse for hemoglobin less than 7  Chronic lymphocytic leukemia (HCC)  History of CLL/SLL previously treated with 4 cycles of BR (June 2012). Outpatient plan is surveillance.   Anemia  Recent Labs     05/21/25  1104 05/22/25  0456   HGB 7.9* 7.2*   S/p type and screen.  Has been consented.     Plan:  Transfuse for hemoglobin less than 7  Iron panel  Stage 3b chronic kidney disease (HCC)  Lab Results   Component Value Date    EGFR 45 05/22/2025    EGFR 46 05/21/2025    EGFR 49 04/29/2025    CREATININE 1.46 (H) 05/22/2025    CREATININE 1.42 (H) 05/21/2025    CREATININE 1.36 (H) 04/29/2025   Iso DM. Appears stable, at baseline.    Monitor BMP  Type 2 diabetes mellitus with diabetic polyneuropathy, without long-term current use of insulin ([facility])  Lab Results   Component Value Date    HGBA1C 7.9 (H) 03/18/2025       Recent Labs     05/21/25  1041 05/21/25  1801   POCGLU 318* 276*       Blood Sugar Average: Last 72 hrs:  (P) 297  On metformin 500mg qD  Glipizide 10mg    Plan:  Insulin sliding scale while inpatient  Hypothyroidism   levothyroxine (Synthroid) 75 mcg tablet; Take 1 tablet (75 mcg total) by mouth daily    Plan  Fu TSH  Continue PTA meds  Persistent atrial fibrillation (HCC)  Atrial fibrillation diagnosed in 2024. Patient historically has opted for rate control and anticoagulation.    He remains asymptomatic from the atrial fibrillation without any reported episodes of palpitations or high ventricular rates. Not in afib here  Benign essential HTN  PTA regimen: Amlodipine 10 mg, carvedilol 6.25 twice daily, losartan 100 mg daily     Plan:  Continue carvedilol, holding the rest because patient has been normotensive  Disposition: Med Surg    ICU Core Measures     A: Assess,  Prevent, and Manage Pain Has pain been assessed? Yes  Need for changes to pain regimen? No   B: Both SAT/SAT  N/A   C: Choice of Sedation RASS Goal: 0 Alert and Calm  Need for changes to sedation or analgesia regimen? No   D: Delirium CAM-ICU: Negative   E: Early Mobility  Plan for early mobility? Yes   F: Family Engagement Plan for family engagement today? Yes       Antibiotic Review: Awaiting culture results.  and Continue broad spectrum secondary to severity of illness.       Prophylaxis:  VTE VTE covered by:  apixaban, Oral, 5 mg at 05/21/25 2142       Stress Ulcer  not ordered       Hospital day 2, ICU day 2  79 y.o. who presents Patient with PMHx CLL, A-fib on Eliquis, HTN, hypothyroid, type 2, and CKD, recent left hip surgery.  Acute hypoxic respiratory failure requiring BiPAP, suspected Monia versus CHF exacerbation  24 Hour Events : patient weaned off of BiPAP to nasal cannula, did well overnight.  No events on telemetry.  Subjective      lines:  Objective :                   Vitals I/O      Most Recent Min/Max in 24hrs   Temp 97.9 °F (36.6 °C) Temp  Min: 97 °F (36.1 °C)  Max: 98.1 °F (36.7 °C)   Pulse 90 Pulse  Min: 82  Max: 98   Resp (!) 26 Resp  Min: 18  Max: 32   /63 BP  Min: 104/60  Max: 179/74   O2 Sat 96 % SpO2  Min: 84 %  Max: 100 %   4 L   Intake/Output Summary (Last 24 hours) at 5/22/2025 0651  Last data filed at 5/22/2025 0500  Gross per 24 hour   Intake 1645.75 ml   Output 2550 ml   Net -904.25 ml       Diet Cardiovascular; Cardiac; Fluid Restriction 1500 ML    Invasive Monitoring           Physical Exam   Physical Exam  Skin:     Coloration: Skin is pale.   HENT:      Nose: No congestion or rhinorrhea.   Cardiovascular:      Rate and Rhythm: Normal rate and regular rhythm.   Musculoskeletal:         General: No tenderness or deformity.      Right lower leg: Trace Edema present.      Left lower leg: Trace Edema present.   Constitutional:       General: He is not in acute distress.      Appearance: He is not ill-appearing.   Pulmonary:      Breath sounds: Wheezing and rhonchi present.   Neurological:      Mental Status: He is alert and oriented to person, place and time.          Diagnostic Studies        Lab Results: I have reviewed the following results:     Medications:  Scheduled PRN   [Held by provider] amLODIPine, 10 mg, Daily  apixaban, 5 mg, BID  azithromycin, 500 mg, Daily  carvedilol, 6.25 mg, BID With Meals  cefepime, 2,000 mg, Q12H  chlorhexidine, 15 mL, Q12H MICHAEL  furosemide, 40 mg, BID (diuretic)  insulin lispro, 1-6 Units, TID AC  levothyroxine, 75 mcg, Early Morning  magnesium sulfate, 2 g, Once  pravastatin, 40 mg, Daily With Dinner  timolol, 1 drop, BID  vancomycin, 1,000 mg, Q24H          Continuous    nitroGLYcerin, 5-200 mcg/min, Last Rate: Stopped (05/21/25 1544)         Labs:   CBC    Recent Labs     05/21/25  1104 05/22/25  0456   WBC 14.56* 12.68*   HGB 7.9* 7.2*   HCT 24.4* 22.2*    182   BANDSPCT  --  1     BMP    Recent Labs     05/21/25  1104 05/22/25  0536   SODIUM 136 137   K 4.4 3.8    102   CO2 24 28   AGAP 10 7   BUN 34* 29*   CREATININE 1.42* 1.46*   CALCIUM 8.9 8.8       Coags    Recent Labs     05/21/25  1236   INR 1.51*   PTT 30        Additional Electrolytes  Recent Labs     05/22/25  0536   MG 1.8*   PHOS 3.2          Blood Gas    No recent results  No recent results LFTs  Recent Labs     05/21/25  1104 05/22/25  0536   ALT 13 11   AST 13 14   ALKPHOS 94 82   ALB 3.7 3.4*   TBILI 0.90 0.80       Infectious  Recent Labs     05/21/25  1104   PROCALCITONI 0.21     Glucose  Recent Labs     05/21/25  1104 05/22/25  0536   GLUC 343* 172*      Acute hypoxic respiratory failure in setting of possible pneumonia versus CHF exacerbation  Neuro: Pain controlled,  CV: Possible volume overload ISO CHF exacerbation, continue carvedilol 6.25 mg twice daily, patient has been normotensive since the nitro drip, continued holding on losartan and amlodipine PTA meds?   Follow-up echo  Resp: Multifocal pneumonia, bilateral pleural effusions, interstitial pneumonia, improving from respiratory standpoint, currently on 4 L nasal cannula, continue diuresis 40 mg Lasix twice daily.  On torsemide 2.5+ outpatient holding.  Treat pneumonia.  GI: No GI ppx, bowel regimen  Renal: No Ochoa, repleted mag  Heme: hemoglobin borderline, intramuscular hematoma, monitor clinically.  Consented patient for blood.  Eliquis 5 mg twice a day.  Follow-up iron panel  ID: Multifocal pneumonia, DC azithromycin?,  Continue vancomycin and cefepime renally dosed, following up MRSA and blood cultures  Endo: glucose elevated, metformin as an outpatient.  Continue levothyroxine.  ISS     MSK: no pressure ulcers, intramuscular hematoma     Dispo: MedSurg

## 2025-05-22 NOTE — PROGRESS NOTES
Abelino Renee is a 79 y.o. male who is currently ordered Vancomycin IV with management by the Pharmacy Consult service.  Relevant clinical data and objective / subjective history reviewed.  Vancomycin Assessment:  Indication and Goal AUC/Trough: Pneumonia (goal -600, trough >10)  Clinical Status: stable  Micro:    Sputum: pending   Strep pneumo urine antigen: negative   Legionella urine antigen: negative   MRSA: pending   Blood: pending    Renal Function:  SCr: 1.46 mg/dL (baseline ~1.4-1.6)  CrCl: 42.1 mL/min  Renal replacement: Not on dialysis  Days of Therapy: 2  Current Dose: 1000 mg IV Q24H  Vancomycin Plan:  New Dosin mg IV Q24H  Estimated AUC: 402 mcg*hr/mL  Estimated Trough: 12 mcg/mL  Next Level:  at 0600  Renal Function Monitoring: Daily BMP and UOP  Pharmacy will continue to follow closely for s/sx of nephrotoxicity, infusion reactions and appropriateness of therapy.  BMP and CBC will be ordered per protocol. We will continue to follow the patient’s culture results and clinical progress daily.    Christy Sterling, Pharmacist

## 2025-05-22 NOTE — ASSESSMENT & PLAN NOTE
Date & Time: 8/27/2018, 11:04 AM  Patient: Demond Butler  Encounter Provider(s): Renetta Abdi MD       To Whom It May Concern:    Letha Ricky was seen and treated in our department on 8/16/2018 and 8/27/2018.     If you have any questions or conc S/p ORIF of left hip on 3/18 after a fall  Has been recovering well, discharge from rehab recently and has been ambulating with minimal assistance, uses can for longer distances.  Had some anterior thigh swelling and firmness post-operatively, which he reports has resolved.     CT LLE reviewed by vascular attending  5/22 Venous duplex with evidence of pseudoaneurysm in the proximal thigh measuring approximately 10 cm.     Discussed with attending, recommending IR consult for evaluation and possible embolization.

## 2025-05-23 ENCOUNTER — APPOINTMENT (INPATIENT)
Dept: RADIOLOGY | Facility: HOSPITAL | Age: 80
DRG: 981 | End: 2025-05-23
Attending: RADIOLOGY
Payer: MEDICARE

## 2025-05-23 ENCOUNTER — HOSPITAL ENCOUNTER (INPATIENT)
Dept: VASCULAR ULTRASOUND | Facility: HOSPITAL | Age: 80
Discharge: HOME | DRG: 981 | End: 2025-05-23
Payer: MEDICARE

## 2025-05-23 LAB
ANION GAP SERPL CALCULATED.3IONS-SCNC: 8 MMOL/L (ref 4–13)
ATRIAL RATE: 95 BPM
BUN SERPL-MCNC: 28 MG/DL (ref 5–25)
CALCIUM SERPL-MCNC: 8.8 MG/DL (ref 8.4–10.2)
CHLORIDE SERPL-SCNC: 100 MMOL/L (ref 96–108)
CO2 SERPL-SCNC: 28 MMOL/L (ref 21–32)
CREAT SERPL-MCNC: 1.67 MG/DL (ref 0.6–1.3)
ERYTHROCYTE [DISTWIDTH] IN BLOOD BY AUTOMATED COUNT: 15.6 % (ref 11.6–15.1)
GFR SERPL CREATININE-BSD FRML MDRD: 38 ML/MIN/1.73SQ M
GLUCOSE SERPL-MCNC: 122 MG/DL (ref 65–140)
GLUCOSE SERPL-MCNC: 150 MG/DL (ref 65–140)
GLUCOSE SERPL-MCNC: 158 MG/DL (ref 65–140)
GLUCOSE SERPL-MCNC: 160 MG/DL (ref 65–140)
GLUCOSE SERPL-MCNC: 248 MG/DL (ref 65–140)
HCT VFR BLD AUTO: 24.9 % (ref 36.5–49.3)
HGB BLD-MCNC: 7.9 G/DL (ref 12–17)
MAGNESIUM SERPL-MCNC: 2 MG/DL (ref 1.9–2.7)
MCH RBC QN AUTO: 30.2 PG (ref 26.8–34.3)
MCHC RBC AUTO-ENTMCNC: 31.7 G/DL (ref 31.4–37.4)
MCV RBC AUTO: 95 FL (ref 82–98)
PLATELET # BLD AUTO: 175 THOUSANDS/UL (ref 149–390)
PMV BLD AUTO: 9 FL (ref 8.9–12.7)
POTASSIUM SERPL-SCNC: 4 MMOL/L (ref 3.5–5.3)
QRS AXIS: 39 DEGREES
QRSD INTERVAL: 78 MS
QT INTERVAL: 352 MS
QTC INTERVAL: 440 MS
RBC # BLD AUTO: 2.62 MILLION/UL (ref 3.88–5.62)
SODIUM SERPL-SCNC: 136 MMOL/L (ref 135–147)
T WAVE AXIS: 59 DEGREES
VANCOMYCIN SERPL-MCNC: 17.7 UG/ML (ref 10–20)
VENTRICULAR RATE: 94 BPM
WBC # BLD AUTO: 14.63 THOUSAND/UL (ref 4.31–10.16)

## 2025-05-23 PROCEDURE — C1894 INTRO/SHEATH, NON-LASER: HCPCS

## 2025-05-23 PROCEDURE — 75736 ARTERY X-RAYS PELVIS: CPT

## 2025-05-23 PROCEDURE — C1769 GUIDE WIRE: HCPCS

## 2025-05-23 PROCEDURE — 80202 ASSAY OF VANCOMYCIN: CPT

## 2025-05-23 PROCEDURE — 99152 MOD SED SAME PHYS/QHP 5/>YRS: CPT

## 2025-05-23 PROCEDURE — 99153 MOD SED SAME PHYS/QHP EA: CPT

## 2025-05-23 PROCEDURE — B41GYZZ FLUOROSCOPY OF LEFT LOWER EXTREMITY ARTERIES USING OTHER CONTRAST: ICD-10-PCS | Performed by: INTERNAL MEDICINE

## 2025-05-23 PROCEDURE — 04HL3DZ INSERTION OF INTRALUMINAL DEVICE INTO LEFT FEMORAL ARTERY, PERCUTANEOUS APPROACH: ICD-10-PCS | Performed by: INTERNAL MEDICINE

## 2025-05-23 PROCEDURE — 93010 ELECTROCARDIOGRAM REPORT: CPT | Performed by: INTERNAL MEDICINE

## 2025-05-23 PROCEDURE — 37226 HB FEM/POPL REVASC W/STENT: CPT

## 2025-05-23 PROCEDURE — 93926 LOWER EXTREMITY STUDY: CPT

## 2025-05-23 PROCEDURE — 75710 ARTERY X-RAYS ARM/LEG: CPT | Performed by: INTERNAL MEDICINE

## 2025-05-23 PROCEDURE — 99152 MOD SED SAME PHYS/QHP 5/>YRS: CPT | Performed by: INTERNAL MEDICINE

## 2025-05-23 PROCEDURE — C1760 CLOSURE DEV, VASC: HCPCS

## 2025-05-23 PROCEDURE — 82948 REAGENT STRIP/BLOOD GLUCOSE: CPT

## 2025-05-23 PROCEDURE — C1874 STENT, COATED/COV W/DEL SYS: HCPCS

## 2025-05-23 PROCEDURE — C1887 CATHETER, GUIDING: HCPCS

## 2025-05-23 PROCEDURE — 83735 ASSAY OF MAGNESIUM: CPT

## 2025-05-23 PROCEDURE — 36247 INS CATH ABD/L-EXT ART 3RD: CPT | Performed by: INTERNAL MEDICINE

## 2025-05-23 PROCEDURE — 99232 SBSQ HOSP IP/OBS MODERATE 35: CPT | Performed by: PHYSICIAN ASSISTANT

## 2025-05-23 PROCEDURE — 76937 US GUIDE VASCULAR ACCESS: CPT | Performed by: INTERNAL MEDICINE

## 2025-05-23 PROCEDURE — 99232 SBSQ HOSP IP/OBS MODERATE 35: CPT

## 2025-05-23 PROCEDURE — 80048 BASIC METABOLIC PNL TOTAL CA: CPT

## 2025-05-23 PROCEDURE — 85027 COMPLETE CBC AUTOMATED: CPT

## 2025-05-23 PROCEDURE — 76937 US GUIDE VASCULAR ACCESS: CPT

## 2025-05-23 PROCEDURE — 37236 OPEN/PERQ PLACE STENT 1ST: CPT | Performed by: INTERNAL MEDICINE

## 2025-05-23 RX ORDER — LIDOCAINE WITH 8.4% SOD BICARB 0.9%(10ML)
SYRINGE (ML) INJECTION AS NEEDED
Status: COMPLETED | OUTPATIENT
Start: 2025-05-23 | End: 2025-05-23

## 2025-05-23 RX ORDER — FENTANYL CITRATE 50 UG/ML
INJECTION, SOLUTION INTRAMUSCULAR; INTRAVENOUS AS NEEDED
Status: COMPLETED | OUTPATIENT
Start: 2025-05-23 | End: 2025-05-23

## 2025-05-23 RX ORDER — IODIXANOL 320 MG/ML
100 INJECTION, SOLUTION INTRAVASCULAR
Status: COMPLETED | OUTPATIENT
Start: 2025-05-23 | End: 2025-05-23

## 2025-05-23 RX ORDER — FUROSEMIDE 10 MG/ML
40 INJECTION INTRAMUSCULAR; INTRAVENOUS DAILY
Status: DISCONTINUED | OUTPATIENT
Start: 2025-05-24 | End: 2025-05-25

## 2025-05-23 RX ORDER — MIDAZOLAM HYDROCHLORIDE 2 MG/2ML
INJECTION, SOLUTION INTRAMUSCULAR; INTRAVENOUS AS NEEDED
Status: COMPLETED | OUTPATIENT
Start: 2025-05-23 | End: 2025-05-23

## 2025-05-23 RX ADMIN — TIMOLOL MALEATE 1 DROP: 5 SOLUTION OPHTHALMIC at 08:11

## 2025-05-23 RX ADMIN — CEFEPIME 2000 MG: 2 INJECTION, POWDER, FOR SOLUTION INTRAVENOUS at 20:52

## 2025-05-23 RX ADMIN — CHLORHEXIDINE GLUCONATE 15 ML: 1.2 SOLUTION ORAL at 08:11

## 2025-05-23 RX ADMIN — AZITHROMYCIN DIHYDRATE 500 MG: 250 TABLET ORAL at 12:29

## 2025-05-23 RX ADMIN — PRAVASTATIN SODIUM 40 MG: 40 TABLET ORAL at 16:41

## 2025-05-23 RX ADMIN — INSULIN LISPRO 1 UNITS: 100 INJECTION, SOLUTION INTRAVENOUS; SUBCUTANEOUS at 08:11

## 2025-05-23 RX ADMIN — FUROSEMIDE 40 MG: 10 INJECTION, SOLUTION INTRAMUSCULAR; INTRAVENOUS at 08:11

## 2025-05-23 RX ADMIN — IODIXANOL 20 ML: 320 INJECTION, SOLUTION INTRAVASCULAR at 14:22

## 2025-05-23 RX ADMIN — Medication 10 ML: at 13:33

## 2025-05-23 RX ADMIN — MIDAZOLAM 0.5 MG: 1 INJECTION INTRAMUSCULAR; INTRAVENOUS at 13:29

## 2025-05-23 RX ADMIN — FENTANYL CITRATE 25 MCG: 50 INJECTION INTRAMUSCULAR; INTRAVENOUS at 13:29

## 2025-05-23 RX ADMIN — CEFEPIME 2000 MG: 2 INJECTION, POWDER, FOR SOLUTION INTRAVENOUS at 08:11

## 2025-05-23 RX ADMIN — TIMOLOL MALEATE 1 DROP: 5 SOLUTION OPHTHALMIC at 20:52

## 2025-05-23 RX ADMIN — INSULIN LISPRO 1 UNITS: 100 INJECTION, SOLUTION INTRAVENOUS; SUBCUTANEOUS at 12:29

## 2025-05-23 RX ADMIN — CARVEDILOL 6.25 MG: 6.25 TABLET, FILM COATED ORAL at 16:41

## 2025-05-23 RX ADMIN — VANCOMYCIN HYDROCHLORIDE 750 MG: 750 INJECTION, SOLUTION INTRAVENOUS at 10:46

## 2025-05-23 RX ADMIN — LEVOTHYROXINE SODIUM 75 MCG: 75 TABLET ORAL at 06:07

## 2025-05-23 NOTE — ASSESSMENT & PLAN NOTE
Wt Readings from Last 3 Encounters:   05/23/25 80.5 kg (177 lb 7.5 oz)   05/21/25 81.6 kg (179 lb 12.8 oz)   03/17/25 80.4 kg (177 lb 4 oz)   Last echo June 2024 EF 60%, home diuresis torsemide 2.5 daily    Plan:  Follow-up echo  Continue diuresis  Continue PTA carvedilol

## 2025-05-23 NOTE — PLAN OF CARE
Problem: Potential for Falls  Goal: Patient will remain free of falls  Description: INTERVENTIONS:  - Educate patient/family on patient safety including physical limitations  - Instruct patient to call for assistance with activity   - Consider consulting OT/PT to assist with strengthening/mobility based on AM PAC & JH-HLM score  - Consult OT/PT to assist with strengthening/mobility   - Keep Call bell within reach  - Keep bed low and locked with side rails adjusted as appropriate  - Keep care items and personal belongings within reach  - Initiate and maintain comfort rounds  - Make Fall Risk Sign visible to staff  - Offer Toileting   - Obtain necessary fall risk management equipment  - Apply yellow socks and bracelet for high fall risk patients  - Consider moving patient to room near nurses station  Outcome: Progressing     Problem: PAIN - ADULT  Goal: Verbalizes/displays adequate comfort level or baseline comfort level  Description: Interventions:  - Encourage patient to monitor pain and request assistance  - Assess pain using appropriate pain scale  - Administer analgesics as ordered based on type and severity of pain and evaluate response  - Implement non-pharmacological measures as appropriate and evaluate response  - Consider cultural and social influences on pain and pain management  - Notify physician/advanced practitioner if interventions unsuccessful or patient reports new pain  - Educate patient/family on pain management process including their role and importance of  reporting pain   - Provide non-pharmacologic/complimentary pain relief interventions  Outcome: Progressing

## 2025-05-23 NOTE — PLAN OF CARE
Problem: Potential for Falls  Goal: Patient will remain free of falls  Description: INTERVENTIONS:  - Educate patient/family on patient safety including physical limitations  - Instruct patient to call for assistance with activity   - Consider consulting OT/PT to assist with strengthening/mobility based on AM PAC & JH-HLM score  - Consult OT/PT to assist with strengthening/mobility   - Keep Call bell within reach  - Keep bed low and locked with side rails adjusted as appropriate  - Keep care items and personal belongings within reach  - Initiate and maintain comfort rounds  - Make Fall Risk Sign visible to staff  - Offer Toileting every 2 Hours, in advance of need  - Initiate/Maintain bed alarm  - Obtain necessary fall risk management equipment:   - Apply yellow socks and bracelet for high fall risk patients  - Consider moving patient to room near nurses station  Outcome: Progressing     Problem: PAIN - ADULT  Goal: Verbalizes/displays adequate comfort level or baseline comfort level  Description: Interventions:  - Encourage patient to monitor pain and request assistance  - Assess pain using appropriate pain scale  - Administer analgesics as ordered based on type and severity of pain and evaluate response  - Implement non-pharmacological measures as appropriate and evaluate response  - Consider cultural and social influences on pain and pain management  - Notify physician/advanced practitioner if interventions unsuccessful or patient reports new pain  - Educate patient/family on pain management process including their role and importance of  reporting pain   - Provide non-pharmacologic/complimentary pain relief interventions  Outcome: Progressing     Problem: INFECTION - ADULT  Goal: Absence or prevention of progression during hospitalization  Description: INTERVENTIONS:  - Assess and monitor for signs and symptoms of infection  - Monitor lab/diagnostic results  - Monitor all insertion sites, i.e. indwelling  lines, tubes, and drains  - Monitor endotracheal if appropriate and nasal secretions for changes in amount and color  - [place] appropriate cooling/warming therapies per order  - Administer medications as ordered  - Instruct and encourage patient and family to use good hand hygiene technique  - Identify and instruct in appropriate isolation precautions for identified infection/condition  Outcome: Progressing  Goal: Absence of fever/infection during neutropenic period  Description: INTERVENTIONS:  - Monitor WBC  - Perform strict hand hygiene  - Limit to healthy visitors only  - No plants, dried, fresh or silk flowers with roy in patient room  Outcome: Progressing     Problem: SAFETY ADULT  Goal: Patient will remain free of falls  Description: INTERVENTIONS:  - Educate patient/family on patient safety including physical limitations  - Instruct patient to call for assistance with activity   - Consider consulting OT/PT to assist with strengthening/mobility based on AM PAC & -HLM score  - Consult OT/PT to assist with strengthening/mobility   - Keep Call bell within reach  - Keep bed low and locked with side rails adjusted as appropriate  - Keep care items and personal belongings within reach  - Initiate and maintain comfort rounds  - Make Fall Risk Sign visible to staff  - Offer Toileting every 2 Hours, in advance of need  - Initiate/Maintain bed alarm  - Obtain necessary fall risk management equipment:   - Apply yellow socks and bracelet for high fall risk patients  - Consider moving patient to room near nurses station  Outcome: Progressing  Goal: Maintain or return to baseline ADL function  Description: INTERVENTIONS:  -  Assess patient's ability to carry out ADLs; assess patient's baseline for ADL function and identify physical deficits which impact ability to perform ADLs (bathing, care of mouth/teeth, toileting, grooming, dressing, etc.)  - Assess/evaluate cause of self-care deficits   - Assess range of  motion  - Assess patient's mobility; develop plan if impaired  - Assess patient's need for assistive devices and provide as appropriate  - Encourage maximum independence but intervene and supervise when necessary  - Involve family in performance of ADLs  - Assess for home care needs following discharge   - Consider OT consult to assist with ADL evaluation and planning for discharge  - Provide patient education as appropriate  - Monitor functional capacity and physical performance, use of AM PAC & JH-HLM   - Monitor gait, balance and fatigue with ambulation    Outcome: Progressing  Goal: Maintains/Returns to pre admission functional level  Description: INTERVENTIONS:  - Perform AM-PAC 6 Click Basic Mobility/ Daily Activity assessment daily.  - Set and communicate daily mobility goal to care team and patient/family/caregiver.   - Collaborate with rehabilitation services on mobility goals if consulted  - Perform Range of Motion 12 times a day.  - Reposition patient every 2 hours.  - Dangle patient 3 times a day  - Stand patient 3 times a day  - Ambulate patient 3 times a day  - Out of bed to chair 3 times a day   - Out of bed for meals 3 times a day  - Out of bed for toileting  - Record patient progress and toleration of activity level   Outcome: Progressing     Problem: DISCHARGE PLANNING  Goal: Discharge to home or other facility with appropriate resources  Description: INTERVENTIONS:  - Identify barriers to discharge w/patient and caregiver  - Arrange for needed discharge resources and transportation as appropriate  - Identify discharge learning needs (meds, wound care, etc.)  - Arrange for interpretive services to assist at discharge as needed  - Refer to Case Management Department for coordinating discharge planning if the patient needs post-hospital services based on physician/advanced practitioner order or complex needs related to functional status, cognitive ability, or social support system  Outcome:  Progressing     Problem: Knowledge Deficit  Goal: Patient/family/caregiver demonstrates understanding of disease process, treatment plan, medications, and discharge instructions  Description: Complete learning assessment and assess knowledge base.  Interventions:  - Provide teaching at level of understanding  - Provide teaching via preferred learning methods  Outcome: Progressing     Problem: Nutrition/Hydration-ADULT  Goal: Nutrient/Hydration intake appropriate for improving, restoring or maintaining nutritional needs  Description: Monitor and assess patient's nutrition/hydration status for malnutrition. Collaborate with interdisciplinary team and initiate plan and interventions as ordered.  Monitor patient's weight and dietary intake as ordered or per policy. Utilize nutrition screening tool and intervene as necessary. Determine patient's food preferences and provide high-protein, high-caloric foods as appropriate.     INTERVENTIONS:  - Monitor oral intake, urinary output, labs, and treatment plans  - Assess nutrition and hydration status and recommend course of action  - Evaluate amount of meals eaten  - Assist patient with eating if necessary   - Allow adequate time for meals  - Recommend/ encourage appropriate diets, oral nutritional supplements, and vitamin/mineral supplements  - Order, calculate, and assess calorie counts as needed  - Recommend, monitor, and adjust tube feedings and TPN/PPN based on assessed needs  - Assess need for intravenous fluids  - Provide specific nutrition/hydration education as appropriate  - Include patient/family/caregiver in decisions related to nutrition  Outcome: Progressing

## 2025-05-23 NOTE — ASSESSMENT & PLAN NOTE
Atrial fibrillation diagnosed in 2024. Patient historically has opted for rate control and anticoagulation.    He remains asymptomatic from the atrial fibrillation without any reported episodes of palpitations or high ventricular rates.    Plan:  Eliquis-on hold  Carvedilol

## 2025-05-23 NOTE — ASSESSMENT & PLAN NOTE
Recent Labs     05/22/25  0456 05/22/25  1515 05/23/25  0423   HGB 7.2* 7.3* 7.9*     Lab Results   Component Value Date    IRON 19 (L) 05/22/2025    FERRITIN 189 05/22/2025    TIBC 301 05/22/2025    CONCFE 6 (L) 05/22/2025    S/p type and screen.  Has been consented.     Plan:  Transfuse for hemoglobin less than 7

## 2025-05-23 NOTE — PROGRESS NOTES
Abelino Renee is a 79 y.o. male who is currently ordered Vancomycin IV with management by the Pharmacy Consult service.  Relevant clinical data and objective / subjective history reviewed.  Vancomycin Assessment:  Indication and Goal AUC/Trough: Pneumonia (goal -600, trough >10)  Clinical Status: critically ill  Micro:   5/22 RP2: negative  5/21 Sputum: pending  5/21 Strep pneumo urine antigen: negative  5/21 Legionella urine antigen: negative  5/21 MRSA: positive (+)  5/21 Blood: no growth at 24 hours   Renal Function:  SCr: 1.67 mg/dL (baseline ~1.4-1.6)   CrCl: 36.4 mL/min  24 hour urine output: 1.4 ml/kg/hr  Renal replacement: Not on dialysis  Days of Therapy: 3  Current Dose: 750 mg IV Q24H  Vancomycin Plan:  New Dosing: continue current dose, monitor Scr and switch to pulse dosing if RAINE evident   Estimated AUC: 493 mcg*hr/mL  Estimated Trough: 15.5 mcg/mL  Next Level: 5/30 at 0600  Renal Function Monitoring: Daily BMP and UOP  Pharmacy will continue to follow closely for s/sx of nephrotoxicity, infusion reactions and appropriateness of therapy.  BMP and CBC will be ordered per protocol. We will continue to follow the patient’s culture results and clinical progress daily.    Christy Sterling, Pharmacist

## 2025-05-23 NOTE — PROGRESS NOTES
Progress Note - Vascular Surgery   Name: Abelino Renee 79 y.o. male I MRN: [id number]  Unit/Bed#: ICU 10 I Date of Admission: 5/21/2025   Date of Service: 5/23/2025 I Hospital Day: 2    Assessment & Plan  Pseudoaneurysm (HCC)  S/p ORIF of left hip on 3/18 after a fall  Has been recovering well, discharge from rehab recently and has been ambulating with minimal assistance, uses can for longer distances.  Had some anterior thigh swelling and firmness post-operatively, which he reports has resolved.      CT LLE Near-anatomic alignment status post ORIF for left intertrochanteric hip fracture with hyperattenuation noted in the region of the left vastus medialis suspicious for intramuscular hematoma after trauma and recent surgical intervention.   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      - f/u IR intervention/angiogram today 5/23  - monitor Hb  - strict bed rest prior to agram  - NPO for intervention  - serial neurovascular checks    Rest of care per ICU    Please contact the SecureChat role for the Vascular Surgery service with any questions/concerns.    Subjective   Doing well, no acute complaints.     Objective :  Temp:  [97.9 °F (36.6 °C)-99 °F (37.2 °C)] 98.1 °F (36.7 °C)  HR:  [76-92] 80  BP: (120-130)/(60-70) 130/62  Resp:  [18-42] 25  SpO2:  [92 %-97 %] 96 %  O2 Device: Nasal cannula  Nasal Cannula O2 Flow Rate (L/min):  [4 L/min] 4 L/min    I/O         05/21 0701  05/22 0700 05/22 0701  05/23 0700 05/23 0701  05/24 0700    P.O. 480 1560     I.V. (mL/kg) 65.8 (0.8) 60 (0.7)     IV Piggyback 1100 350     Total Intake(mL/kg) 1645.8 (19.9) 1970 (24.5)     Urine (mL/kg/hr) 2550 2800 (1.4)     Total Output 2550 2800     Net -904.3 -830                    Physical Exam  Vitals and nursing note reviewed.   Constitutional:       General: He is not in acute distress.     Appearance: Normal appearance. He is normal weight. He is not ill-appearing,  toxic-appearing or diaphoretic.   HENT:      Head: Normocephalic and atraumatic.     Eyes:      Extraocular Movements: Extraocular movements intact.       Cardiovascular:      Rate and Rhythm: Normal rate.   Pulmonary:      Effort: Pulmonary effort is normal. No respiratory distress.     Skin:     General: Skin is warm.      Capillary Refill: Capillary refill takes less than 2 seconds.     Neurological:      General: No focal deficit present.      Mental Status: He is alert and oriented to person, place, and time.     Psychiatric:         Mood and Affect: Mood normal.         Behavior: Behavior normal.     LLE pink and warm. Motor/sensory intact. Leg compartments soft, NT      Lab Results: I have reviewed the following results:  Recent Labs     05/21/25  1104 05/21/25  1104 05/21/25  1236 05/22/25  0456 05/22/25  0536 05/22/25  1515 05/23/25  0423   WBC 14.56*  --   --  12.68*  --   --  14.63*   HGB 7.9*  --   --  7.2*  --    < > 7.9*   HCT 24.4*  --   --  22.2*  --    < > 24.9*     --   --  182  --   --  175   BANDSPCT  --   --   --  1  --   --   --    SODIUM 136  --   --   --  137  --  136   K 4.4  --   --   --  3.8  --  4.0     --   --   --  102  --  100   CO2 24  --   --   --  28  --  28   BUN 34*  --   --   --  29*  --  28*   CREATININE 1.42*  --   --   --  1.46*  --  1.67*   GLUC 343*  --   --   --  172*  --  160*   MG  --    < >  --   --  1.8*  --  2.0   PHOS  --   --   --   --  3.2  --   --    AST 13  --   --   --  14  --   --    ALT 13  --   --   --  11  --   --    ALB 3.7  --   --   --  3.4*  --   --    TBILI 0.90  --   --   --  0.80  --   --    ALKPHOS 94  --   --   --  82  --   --    PTT  --   --  30  --   --   --   --    INR  --   --  1.51*  --   --   --   --    HSTNI0 8  --   --   --   --   --   --    HSTNI2  --   --  8  --   --   --   --    *  --   --   --   --   --   --    LACTICACID  --   --  1.1  --   --   --   --     < > = values in this interval not displayed.       Imaging  Results Review: No pertinent imaging studies reviewed.  Other Study Results Review: No additional pertinent studies reviewed.    VTE Pharmacologic Prophylaxis: VTE covered by:  [Held by provider] apixaban, Oral, 5 mg at 05/22/25 0807     VTE Mechanical Prophylaxis: sequential compression device

## 2025-05-23 NOTE — PROGRESS NOTES
Progress Note - Critical Care/ICU   Name: Abelino Renee 79 y.o. male I MRN: [id number]  Unit/Bed#: ICU 10 I Date of Admission: 5/21/2025   Date of Service: 5/23/2025 I Hospital Day: 2      Assessment & Plan  Acute hypoxic respiratory failure (HCC)  Patient presenting acutely hypoxic, not on home O2.  The day history of shortness of breath/apnea.  Iso PNA versus acute HF exacerbation, patient on Eliquis but PE on differential.  Initially requiring BiPAP on admission, currently on nasal cannula  Lower extremity Doppler negative for DVT, CT plan under pseudoaneurysm    Plan:  Continue supplemental oxygen, wean as tolerated  Continue diuresis, net goal -1 to 2L  See plans under PNA and HF exacerbation  Pseudoaneurysm (HCC)  5/22 Venous duplex with evidence of pseudoaneurysm in the proximal thigh measuring approximately 10 cm.  Off proximal superficial femoral artery    Plan:  Frequent neurovascular checks  Vascular c/s   Consult to IR: Plan to perform angiogram today, Eliquis on hold  Pneumonia  CXR: Bilateral patchy airspace disease may be on the basis of pulmonary edema versus infection.  Status post Zosyn, Zithromax, vancomycin.  CT chest: New pulmonary nodules and groundglass opacities suspicious for multifocal pneumonia.  Interstitial edema, bilateral pleural effusions, mediastinal adenopathy.  Strep pneumo/Legionella negative/COVID negative  MRSA positive     Plan:  Continue empiric antibiotics  Follow-up blood cultures  Acute exacerbation of CHF (congestive heart failure) ([facility])  Wt Readings from Last 3 Encounters:   05/23/25 80.5 kg (177 lb 7.5 oz)   05/21/25 81.6 kg (179 lb 12.8 oz)   03/17/25 80.4 kg (177 lb 4 oz)   Last echo June 2024 EF 60%, home diuresis torsemide 2.5 daily    Plan:  Follow-up echo  Continue diuresis  Continue PTA carvedilol  Closed fracture of neck of left femur (HCC)  Had a fall mid March.  CT chest abdomen pelvis showing acute intertrochanteric fracture of left femoral neck.  S/p IM  nail for left hip intertrochanteric fracture     Due to left hip swelling, CT with contrast was obtained by ED and showing intramuscular hematoma  after trauma and recent surgical intervention.   Appears stable, patient has no pain.     Plan:  Monitor CBC  Transfuse for hemoglobin less than 7  Chronic lymphocytic leukemia (HCC)  History of CLL/SLL previously treated with 4 cycles of BR (June 2012). Outpatient plan is surveillance.   Anemia  Recent Labs     05/22/25  0456 05/22/25  1515 05/23/25  0423   HGB 7.2* 7.3* 7.9*     Lab Results   Component Value Date    IRON 19 (L) 05/22/2025    FERRITIN 189 05/22/2025    TIBC 301 05/22/2025    CONCFE 6 (L) 05/22/2025    S/p type and screen.  Has been consented.     Plan:  Transfuse for hemoglobin less than 7  Stage 3b chronic kidney disease (HCC)  Lab Results   Component Value Date    EGFR 38 05/23/2025    EGFR 45 05/22/2025    EGFR 46 05/21/2025    CREATININE 1.67 (H) 05/23/2025    CREATININE 1.46 (H) 05/22/2025    CREATININE 1.42 (H) 05/21/2025   Iso DM. Appears stable, at baseline.  1.7  baseline as of January    Monitor BMP  Type 2 diabetes mellitus with diabetic polyneuropathy, without long-term current use of insulin ([facility])  Lab Results   Component Value Date    HGBA1C 7.9 (H) 03/18/2025       Recent Labs     05/22/25  1114 05/22/25  1557 05/22/25 2024 05/23/25  0709   POCGLU 273* 235* 223* 150*       Blood Sugar Average: Last 72 hrs:  (P) [id number]  On metformin 500mg qD  Glipizide 10mg    Plan:  Insulin sliding scale while inpatient  Hypothyroidism   levothyroxine (Synthroid) 75 mcg tablet; Take 1 tablet (75 mcg total) by mouth daily    Plan  Fu TSH  Continue PTA meds  Persistent atrial fibrillation (HCC)  Atrial fibrillation diagnosed in 2024. Patient historically has opted for rate control and anticoagulation.    He remains asymptomatic from the atrial fibrillation without any reported episodes of palpitations or high ventricular  rates.    Plan:  Eliquis-on hold  Carvedilol  Benign essential HTN  PTA regimen: Amlodipine 10 mg, carvedilol 6.25 twice daily, losartan 100 mg daily     Plan:  Continue carvedilol, holding the rest because patient has been normotensive  Disposition: Med Surg    ICU Core Measures     A: Assess, Prevent, and Manage Pain Has pain been assessed? Yes  Need for changes to pain regimen? No   B: Both SAT/SAT  N/A   C: Choice of Sedation RASS Goal: 0 Alert and Calm  Need for changes to sedation or analgesia regimen? No   D: Delirium CAM-ICU: Negative   E: Early Mobility  Plan for early mobility? Yes   F: Family Engagement Plan for family engagement today? Yes       Antibiotic Review: Awaiting culture results.  and Continue broad spectrum secondary to severity of illness.       Prophylaxis:  VTE VTE covered by:  [Held by provider] apixaban, Oral, 5 mg at 05/22/25 0807       Stress Ulcer  not ordered       Hospital day 3, ICU day 3  79 y.o. who presents Patient with PMHx CLL, A-fib on Eliquis, HTN, hypothyroid, type 2, and CKD, recent left hip surgery.  Acute hypoxic respiratory failure requiring BiPAP, suspected pneumonia, found to have 10 cm pseudoaneurysm of left superficial femoral artery.  24 Hour Events : Patient on nasal cannula, 4 L.  Interventional radiology plan for embolization of pseudoaneurysm.  N.p.o., Eliquis on hold, strict bedrest.  No events overnight. Patient felt that his breathing was mildly improved from yesterday still no cough or mucus production, he looks forward to his procedure, has been using his incentive spirometer consistently. No pain  Subjective      lines:1 Peripheral IV  Objective :                   Vitals I/O      Most Recent Min/Max in 24hrs   Temp 98.1 °F (36.7 °C) Temp  Min: 97.9 °F (36.6 °C)  Max: 99 °F (37.2 °C)   Pulse 80 Pulse  Min: 76  Max: 104   Resp (!) 25 Resp  Min: 18  Max: 42   /62 BP  Min: 120/60  Max: 130/62   O2 Sat 96 % SpO2  Min: 92 %  Max: 97 %   4 L    Intake/Output Summary (Last 24 hours) at 5/23/2025 0729  Last data filed at 5/23/2025 0601  Gross per 24 hour   Intake 1970 ml   Output 2800 ml   Net -830 ml       Diet NPO; Sips with meds    Invasive Monitoring           Physical Exam   Physical Exam  Skin:     Coloration: Skin is pale.   HENT:      Nose: No congestion or rhinorrhea.   Cardiovascular:      Rate and Rhythm: Normal rate and regular rhythm.   Musculoskeletal:         General: No tenderness or deformity.      Right lower leg: Trace Edema present.      Left lower leg: Trace Edema present.   Constitutional:       General: He is not in acute distress.     Appearance: He is not ill-appearing.   Pulmonary:      Breath sounds: Wheezing and rhonchi present.   Neurological:      Mental Status: He is alert and oriented to person, place and time.          Diagnostic Studies        Lab Results: I have reviewed the following results:     Medications:  Scheduled PRN   [Held by provider] amLODIPine, 10 mg, Daily  [Held by provider] apixaban, 5 mg, BID  azithromycin, 500 mg, Daily  carvedilol, 6.25 mg, BID With Meals  cefepime, 2,000 mg, Q12H  chlorhexidine, 15 mL, Q12H MICHAEL  furosemide, 40 mg, BID (diuretic)  insulin lispro, 1-6 Units, TID AC  levothyroxine, 75 mcg, Early Morning  [Held by provider] losartan, 100 mg, Daily  pravastatin, 40 mg, Daily With Dinner  timolol, 1 drop, BID  vancomycin, 750 mg, Q24H          Continuous            Labs:   CBC    Recent Labs     05/22/25  0456 05/22/25  1515 05/23/25  0423   WBC 12.68*  --  14.63*   HGB 7.2* 7.3* 7.9*   HCT 22.2* 23.0* 24.9*     --  175   BANDSPCT 1  --   --      BMP    Recent Labs     05/22/25  0536 05/23/25  0423   SODIUM 137 136   K 3.8 4.0    100   CO2 28 28   AGAP 7 8   BUN 29* 28*   CREATININE 1.46* 1.67*   CALCIUM 8.8 8.8       Coags    Recent Labs     05/21/25  1236   INR 1.51*   PTT 30        Additional Electrolytes  Recent Labs     05/22/25  0536 05/23/25  0423   MG 1.8* 2.0   PHOS 3.2   --           Blood Gas    LVEF 50 to 55%, systolic function low normal, cannot assess diastolic function due to A-fib LFTs  Recent Labs     05/21/25  1104 05/22/25  0536   ALT 13 11   AST 13 14   ALKPHOS 94 82   ALB 3.7 3.4*   TBILI 0.90 0.80       Infectious  COVID flu RSV negative  Legionella, strep negative MRSA positive  Blood cultures negative at 48  RP 2 panel all negative (parainfluenza, mycoplasma, metapneumo Glucose  Recent Labs     05/21/25  1104 05/22/25  0536 05/23/25  0423   GLUC 343* 172* 160*   220s   Acute hypoxic respiratory failure in setting of possible pneumonia versus CHF exacerbation with incidental finding of SFA pseudoaneurysm  Neuro: Pain controlled,  CV: continue carvedilol 6.25 mg twice daily, patient has been normotensive continued holding on losartan and amlodipine PTA meds. Holding eliquis for endo  Resp: Multifocal pneumonia, bilateral pleural effusions, interstitial pneumonia, currently on 4 L nasal cannula, continue diuresis 40 mg Lasix twice daily, possibly can cut back?  On torsemide 2.5+ outpatient holding.  Treat pneumonia.  GI: No GI ppx, bowel regimen  Renal: No Ochoa, electrolytes WNL  Heme: hemoglobin borderline, pseudoaneurysm monitor clinically.  Consented patient for blood.  Eliquis 5 mg twice a day on hold for procedure.    ID: Multifocal pneumonia MRSA positive, continue azithromycin,  Continue vancomycin and cefepime renally dosed, following up finalized blood cultures  Endo: glucose elevated, metformin as an outpatient.  Continue levothyroxine.  ISS, add Lantus?     MSK: no pressure ulcers, pseudoaneurysm, neurovascular checks     [facility]

## 2025-05-23 NOTE — PHYSICAL THERAPY NOTE
Physical Therapy Cancellation Note         05/23/25 1017   Note Type   Note type Cancelled Session   Cancel Reasons Medical status   Additional Comments PT orders received. Chart reviewed. Per ICU mobility rounds, and primary RN, pt pending IR for Pseudoaneurysm treatment. Will hold PT eval and f/u as appropriate.     Judson Bedolla, PT

## 2025-05-23 NOTE — ASSESSMENT & PLAN NOTE
Patient presenting acutely hypoxic, not on home O2.  The day history of shortness of breath/apnea.  Iso PNA versus acute HF exacerbation, patient on Eliquis but PE on differential.  Initially requiring BiPAP on admission, currently on nasal cannula  Lower extremity Doppler negative for DVT, CT plan under pseudoaneurysm    Plan:  Continue supplemental oxygen, wean as tolerated  Continue diuresis, net goal -1 to 2L  See plans under PNA and HF exacerbation

## 2025-05-23 NOTE — ASSESSMENT & PLAN NOTE
CXR: Bilateral patchy airspace disease may be on the basis of pulmonary edema versus infection.  Status post Zosyn, Zithromax, vancomycin.  CT chest: New pulmonary nodules and groundglass opacities suspicious for multifocal pneumonia.  Interstitial edema, bilateral pleural effusions, mediastinal adenopathy.  Strep pneumo/Legionella negative/COVID negative  MRSA positive     Plan:  Continue empiric antibiotics  Follow-up blood cultures

## 2025-05-23 NOTE — SEDATION DOCUMENTATION
Procedure completed by Dr Ohara. Pt tolerated without issues, VSS. Education provided to pt prior to procedure, questions answered as offered. Perclose to R femoral access site, gauze/tegaderm to site. Manual pressure held after deployment. Transported back to [facility], bedside report given.

## 2025-05-23 NOTE — BRIEF OP NOTE (RAD/CATH)
INTERVENTIONAL RADIOLOGY PROCEDURE NOTE    Date: 5/23/2025    Procedure: Left lower extremity angiogram with stent placement in left profunda femoral artery      Preoperative diagnosis:   1. Acute hypoxic respiratory failure (HCC)    2. Intramuscular hematoma    3. PNA (pneumonia)    4. Sepsis (HCC)    5. Anemia    6. CHF (congestive heart failure) (HCC)    7. Hypoxia    8. Diverticular disease    9. Hematoma of left hip, initial encounter    10. Acute congestive heart failure, unspecified heart failure type (HCC)    11. Stage 3b chronic kidney disease (HCC)    12. Type 2 diabetes mellitus with diabetic polyneuropathy, without long-term current use of insulin (HCC)    13. Symptomatic anemia    14. Pseudoaneurysm ([facility])         Postoperative diagnosis: Same.    Surgeon: Juan M Ohara MD     Assistant: None. No qualified resident was available.    Blood loss: 5 ml    Specimens: None     Findings: Large pseudoaneurysm present arising from focal defect at the left profunda femoral artery. The left profunda femoral artery was stented using a 6 mm x 19 mm [id number] stent to cover the defect. Completion angiogram showed no further pseudoaneurysm present.    The right CFA access site was closed using a perclose device.    Complications: None immediate.    Anesthesia: conscious sedation and local    Recommendations:  - Keep right leg extended for 2 hours  - Monitor for bleeding/hematoma  - Rest of recommendations as per vascular service

## 2025-05-23 NOTE — OCCUPATIONAL THERAPY NOTE
Occupational Therapy Cancellation Note        Patient Name: Abelino Renee  Today's Date: 5/23/2025 05/23/25 1100   Note Type   Note type Cancelled Session   Cancel Reasons Medical status   Additional Comments OT orders received, chart reviewed. Per RN , pt not appropriate for therapy at this time, pending IR for pseudoaneurysm treatment.     Crystal Martinez, OT

## 2025-05-23 NOTE — DISCHARGE INSTRUCTIONS
Embolization   AMBULATORY CARE:   What you need to know about embolization: Embolization is a procedure to create a clot, or block, in a blood vessel. This stops blood from flowing to the area. The procedure may be used to treat many conditions. It can help stop heavy bleeding (hemorrhage), or prevent an aneurysm from rupturing. An abnormal connection between arteries can be removed. Embolization can stop blood flow to a tumor, such as a uterine fibroid or a cancer tumor. Chemotherapy medicine may be given during an embolization to treat a cancer tumor. This is called chemoembolization.  How to prepare for the procedure: Embolization is sometimes done as an emergency procedure. This means you will not have time to prepare. For an embolization that is not an emergency, the following are general guidelines for how to prepare:  Tell your provider about all your allergies. This includes if you have ever had an allergic reaction to contrast liquid, anesthesia, or antibiotics. You may be told not to eat or drink anything after midnight the night before your procedure. Arrange to have someone drive you home. The person should stay with you to help you and watch for problems that may develop.     Give your provider a list of your medicines. Include all medicines and supplements you take. You may need to stop taking blood thinners or aspirin several days before your procedure. This will help decrease your risk for bleeding. Do not stop taking medicines unless your healthcare provider tells you to stop. Your provider will tell you which medicines to take or not take on the day of your procedure.     You may need blood tests to check how well your blood clots and to check your kidney function. Depending on the reason for this procedure, you may an MRI, ultrasound, x-ray, or CT scan. These pictures will help your healthcare provider examine the area to be worked on.     If you are a woman, tell your provider if you know or  think you might be pregnant. You may not be able to have certain tests because they may harm an unborn baby. Your provider may need to take extra precautions for other tests.     What will happen during the procedure:   You may be given general anesthesia to keep you asleep and pain-free. You may instead be given moderate sedation. This means you will be awake during the procedure, but you should not feel any pain. Your provider will put numbing medicine on your skin where the procedure will be done. A small incision will be made over an artery. A catheter (thin tube) will be guided into the artery. Contrast liquid will be used to help your healthcare provider see your arteries more easily.     Your provider will use a type of x-ray that gives a moving picture of the arteries. This will help him or her move the catheter into the right place. The catheter is moved up until it reaches the correct artery. Your provider will put medicine or a material into the artery to slow or stop blood from flowing. This may be a coil, foam, beads, a plug, or liquid. The liquid may also contain material that is larger than blood cells.      Your provider will remove the catheter. Pressure will be used to stop any bleeding that happens. The incision area does not need to be closed with stitches. It will be small and close on its own. It will be covered with a bandage to keep it from becoming infected.     What to expect after the procedure:   You may have pain for a few days. Depending on the reason you had this procedure, you may also have a headache or cramps. You may have pain, bleeding, or bruising where the catheter went into your leg. All of these symptoms are normal and should get better soon. You may be given pain medicine through your IV or a pump. A pump allows you to control when the pain medicine is given.     You should expect to stay in the hospital at least overnight. If you had this procedure to treat heavy bleeding,  it may take 24 hours to know if the bleeding stopped.     Healthcare providers will help you walk around after your procedure. This will help prevent blood clots. Do not get up until healthcare providers say it is okay. They may want you to lie in one position for a certain amount of time. When they say it is okay to walk, they will help you stand and walk safely.     Risks of embolization: You may bleed more than expected or develop an infection. The area being treated may be damaged during the procedure. Your artery may be damaged from the catheter, or you may develop a blood clot. Your kidneys may be damaged from the contrast liquid. The material being put into the artery may go to the wrong place. This can stop blood flow to healthy tissue. The procedure may not work, or it may not relieve your symptoms.  Call your doctor or specialist if:   You have a fever higher than 100.4°F (38°C).     You have a fever, pain, and nausea that last longer than 3 days.     You suddenly have severe abdominal pain.     You cannot urinate, or you urinate very little.     You have signs of an infection at the catheter site, such as red streaks, pain, or swelling.     You have new or worsening pain.     You have questions or concerns about your condition or care.     Medicines: You may need any of the following:  NSAIDs help decrease swelling and pain or fever. This medicine is available with or without a doctor's order. NSAIDs can cause stomach bleeding or kidney problems in certain people. If you take blood thinner medicine, always ask your healthcare provider if NSAIDs are safe for you. Always read the medicine label and follow directions.     Prescription pain medicine may be given. Ask your healthcare provider how to take this medicine safely. Some prescription pain medicines contain acetaminophen. Do not take other medicines that contain acetaminophen without talking to your healthcare provider. Too much acetaminophen may  cause liver damage. Prescription pain medicine may cause constipation. Ask your healthcare provider how to prevent or treat constipation.      Take your medicine as directed. Contact your healthcare provider if you think your medicine is not helping or if you have side effects. Tell him or her if you are allergic to any medicine. Keep a list of the medicines, vitamins, and herbs you take. Include the amounts, and when and why you take them. Bring the list or the pill bottles to follow-up visits. Carry your medicine list with you in case of an emergency.     Self-care:   Rest as needed. Rest and sleep will help your body heal.     Follow your healthcare provider's instructions for activity. He or she will tell you when it is okay to return to your normal activities and to start driving. He or she may want you to wait 1 to 2 weeks to return to work.     Care for the catheter site as directed. It is okay to shower after the procedure. You will only have a small cut in your skin from where the catheter went into your leg. Check the catheter site for signs of infection, including red streaks, pain, and swelling.     Treat symptoms of postembolization syndrome. This syndrome is common after an embolization procedure. It usually starts within 72 hours of the procedure and may last a few days. The main symptoms are fever, pain, and nausea. You will probably be able to manage your symptoms at home. Acetaminophen or an NSAID, such as ibuprofen, can reduce a fever and pain. You may need to eat lightly to manage nausea. Drink more liquids for the first week after the procedure to prevent dehydration.   WOUND CARE     Remove band aid/ dressing tomorrow. You may shower 24 hours after your procedure. Shower and wash groin area or wrist area gently with soap and water: beginning tomorrow. Rinse and pat Dry. Apply new water seal band aid. Repeat this process for 5 days.  If there is any drainage from the puncture site, you should  put on a clean bandage. No Powders, creams, lotions or antibiotic ointments for 5 days.  No tub baths, hot tubs or swimming for 5 days.      Follow up with your doctor or specialist as directed: You may need to have more tests to check if the procedure worked. Write down your questions so you remember to ask them during your visits.  © Copyright [employer] 2020 Information is for End User's use only and may not be sold, redistributed or otherwise used for commercial purposes. All illustrations and images included in CareNotes® are the copyrighted property of [email or web address] or [employer]  The above information is an  only. It is not intended as medical advice for individual conditions or treatments. Talk to your doctor, nurse or pharmacist before following any medical regimen to see if it is safe and effective for you.

## 2025-05-23 NOTE — ASSESSMENT & PLAN NOTE
Lab Results   Component Value Date    HGBA1C 7.9 (H) 03/18/2025       Recent Labs     05/22/25  1114 05/22/25  1557 05/22/25 2024 05/23/25  0709   POCGLU 273* 235* 223* 150*       Blood Sugar Average: Last 72 hrs:  (P) [id number]  On metformin 500mg qD  Glipizide 10mg    Plan:  Insulin sliding scale while inpatient

## 2025-05-23 NOTE — ASSESSMENT & PLAN NOTE
S/p ORIF of left hip on 3/18 after a fall  Has been recovering well, discharge from rehab recently and has been ambulating with minimal assistance, uses can for longer distances.  Had some anterior thigh swelling and firmness post-operatively, which he reports has resolved.      CT LLE Near-anatomic alignment status post ORIF for left intertrochanteric hip fracture with hyperattenuation noted in the region of the left vastus medialis suspicious for intramuscular hematoma after trauma and recent surgical intervention.   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      - f/u IR intervention/angiogram today 5/23  - monitor Hb  - strict bed rest prior to agram  - NPO for intervention  - serial neurovascular checks    Rest of care per ICU

## 2025-05-24 ENCOUNTER — APPOINTMENT (INPATIENT)
Dept: RADIOLOGY | Facility: HOSPITAL | Age: 80
DRG: 981 | End: 2025-05-24
Payer: MEDICARE

## 2025-05-24 LAB
ANION GAP SERPL CALCULATED.3IONS-SCNC: 9 MMOL/L (ref 4–13)
BUN SERPL-MCNC: 30 MG/DL (ref 5–25)
CALCIUM SERPL-MCNC: 8.8 MG/DL (ref 8.4–10.2)
CHLORIDE SERPL-SCNC: 97 MMOL/L (ref 96–108)
CO2 SERPL-SCNC: 28 MMOL/L (ref 21–32)
CREAT SERPL-MCNC: 1.72 MG/DL (ref 0.6–1.3)
ERYTHROCYTE [DISTWIDTH] IN BLOOD BY AUTOMATED COUNT: 15.4 % (ref 11.6–15.1)
GFR SERPL CREATININE-BSD FRML MDRD: 37 ML/MIN/1.73SQ M
GLUCOSE SERPL-MCNC: 189 MG/DL (ref 65–140)
GLUCOSE SERPL-MCNC: 191 MG/DL (ref 65–140)
GLUCOSE SERPL-MCNC: 308 MG/DL (ref 65–140)
GLUCOSE SERPL-MCNC: 309 MG/DL (ref 65–140)
GLUCOSE SERPL-MCNC: 313 MG/DL (ref 65–140)
GLUCOSE SERPL-MCNC: 325 MG/DL (ref 65–140)
HCT VFR BLD AUTO: 25.1 % (ref 36.5–49.3)
HGB BLD-MCNC: 8.1 G/DL (ref 12–17)
MAGNESIUM SERPL-MCNC: 2 MG/DL (ref 1.9–2.7)
MCH RBC QN AUTO: 30.6 PG (ref 26.8–34.3)
MCHC RBC AUTO-ENTMCNC: 32.3 G/DL (ref 31.4–37.4)
MCV RBC AUTO: 95 FL (ref 82–98)
PLATELET # BLD AUTO: 202 THOUSANDS/UL (ref 149–390)
PMV BLD AUTO: 9.6 FL (ref 8.9–12.7)
POTASSIUM SERPL-SCNC: 3.9 MMOL/L (ref 3.5–5.3)
PROCALCITONIN SERPL-MCNC: 0.29 NG/ML
RBC # BLD AUTO: 2.65 MILLION/UL (ref 3.88–5.62)
SODIUM SERPL-SCNC: 134 MMOL/L (ref 135–147)
WBC # BLD AUTO: 17.36 THOUSAND/UL (ref 4.31–10.16)

## 2025-05-24 PROCEDURE — 84145 PROCALCITONIN (PCT): CPT

## 2025-05-24 PROCEDURE — 93926 LOWER EXTREMITY STUDY: CPT | Performed by: SURGERY

## 2025-05-24 PROCEDURE — 71045 X-RAY EXAM CHEST 1 VIEW: CPT

## 2025-05-24 PROCEDURE — 82948 REAGENT STRIP/BLOOD GLUCOSE: CPT

## 2025-05-24 PROCEDURE — 85027 COMPLETE CBC AUTOMATED: CPT

## 2025-05-24 PROCEDURE — 80048 BASIC METABOLIC PNL TOTAL CA: CPT

## 2025-05-24 PROCEDURE — 99232 SBSQ HOSP IP/OBS MODERATE 35: CPT | Performed by: INTERNAL MEDICINE

## 2025-05-24 PROCEDURE — 83735 ASSAY OF MAGNESIUM: CPT

## 2025-05-24 RX ORDER — FERROUS SULFATE 325(65) MG
325 TABLET ORAL EVERY OTHER DAY
Status: DISCONTINUED | OUTPATIENT
Start: 2025-05-24 | End: 2025-05-27 | Stop reason: HOSPADM

## 2025-05-24 RX ADMIN — INSULIN LISPRO 1 UNITS: 100 INJECTION, SOLUTION INTRAVENOUS; SUBCUTANEOUS at 08:30

## 2025-05-24 RX ADMIN — TIMOLOL MALEATE 1 DROP: 5 SOLUTION OPHTHALMIC at 07:54

## 2025-05-24 RX ADMIN — PRAVASTATIN SODIUM 40 MG: 40 TABLET ORAL at 17:21

## 2025-05-24 RX ADMIN — CHLORHEXIDINE GLUCONATE 15 ML: 1.2 SOLUTION ORAL at 21:13

## 2025-05-24 RX ADMIN — INSULIN LISPRO 4 UNITS: 100 INJECTION, SOLUTION INTRAVENOUS; SUBCUTANEOUS at 12:11

## 2025-05-24 RX ADMIN — LEVOTHYROXINE SODIUM 75 MCG: 75 TABLET ORAL at 05:44

## 2025-05-24 RX ADMIN — FERROUS SULFATE TAB 325 MG (65 MG ELEMENTAL FE) 325 MG: 325 (65 FE) TAB at 07:37

## 2025-05-24 RX ADMIN — CARVEDILOL 6.25 MG: 6.25 TABLET, FILM COATED ORAL at 07:36

## 2025-05-24 RX ADMIN — CEFEPIME 2000 MG: 2 INJECTION, POWDER, FOR SOLUTION INTRAVENOUS at 21:13

## 2025-05-24 RX ADMIN — TIMOLOL MALEATE 1 DROP: 5 SOLUTION OPHTHALMIC at 21:13

## 2025-05-24 RX ADMIN — CEFEPIME 2000 MG: 2 INJECTION, POWDER, FOR SOLUTION INTRAVENOUS at 07:35

## 2025-05-24 RX ADMIN — CARVEDILOL 6.25 MG: 6.25 TABLET, FILM COATED ORAL at 17:21

## 2025-05-24 RX ADMIN — INSULIN LISPRO 5 UNITS: 100 INJECTION, SOLUTION INTRAVENOUS; SUBCUTANEOUS at 17:21

## 2025-05-24 RX ADMIN — VANCOMYCIN HYDROCHLORIDE 750 MG: 750 INJECTION, SOLUTION INTRAVENOUS at 11:10

## 2025-05-24 NOTE — PLAN OF CARE
Problem: INFECTION - ADULT  Goal: Absence or prevention of progression during hospitalization  Description: INTERVENTIONS:  - Assess and monitor for signs and symptoms of infection  - Monitor lab/diagnostic results  - Monitor all insertion sites, i.e. indwelling lines, tubes, and drains  - Monitor endotracheal if appropriate and nasal secretions for changes in amount and color  - [employer] appropriate cooling/warming therapies per order  - Administer medications as ordered  - Instruct and encourage patient and family to use good hand hygiene technique  - Identify and instruct in appropriate isolation precautions for identified infection/condition  Outcome: Progressing  Goal: Absence of fever/infection during neutropenic period  Description: INTERVENTIONS:  - Monitor WBC  - Perform strict hand hygiene  - Limit to healthy visitors only  - No plants, dried, fresh or silk flowers with roy in patient room  Outcome: Progressing

## 2025-05-24 NOTE — ASSESSMENT & PLAN NOTE
On Coreg 6.25 mg twice daily and Eliquis 5 mg twice daily  Eliquis held given evidence of left-sided pseudoaneurysm

## 2025-05-24 NOTE — ASSESSMENT & PLAN NOTE
Lab Results   Component Value Date    EGFR 37 05/24/2025    EGFR 38 05/23/2025    EGFR 45 05/22/2025    CREATININE 1.72 (H) 05/24/2025    CREATININE 1.67 (H) 05/23/2025    CREATININE 1.46 (H) 05/22/2025   Stable.  Baseline creatinine 1.7  Monitor BMP

## 2025-05-24 NOTE — PROGRESS NOTES
Abelino Renee is a 79 y.o. male who is currently ordered Vancomycin IV with management by the Pharmacy Consult service.  Relevant clinical data and objective / subjective history reviewed.  Vancomycin Assessment:  Indication and Goal AUC/Trough: Pneumonia (goal -600, trough >10), -600, trough >10  Clinical Status: critically ill  Micro:   5/21: strep pneumoniae- negative        5/21: legionella antigen- negative        5/21: MRSA nares- positive        5/21: blood culture x2- no growth at 48 hours  Renal Function:  SCr: 1.72 mg/dL  CrCl: 35 mL/min  Renal replacement: Not on dialysis  Days of Therapy: 4  Current Dose: 750 mg IV q24h  Vancomycin Plan:  New Dosing: continue current dose  Estimated AUC: 505 mcg*hr/mL  Estimated Trough: 16 mcg/mL  Next Level: 5/30 with AM labs   Renal Function Monitoring: Daily BMP and UOP  Pharmacy will continue to follow closely for s/sx of nephrotoxicity, infusion reactions and appropriateness of therapy.  BMP and CBC will be ordered per protocol. We will continue to follow the patient’s culture results and clinical progress daily.    Willa Acosta, Pharmacist

## 2025-05-24 NOTE — ASSESSMENT & PLAN NOTE
History of CLL/SLL previously treated with 4 cycles of BR in June 2012.  Outpatient surveillance ongoing

## 2025-05-24 NOTE — ASSESSMENT & PLAN NOTE
PTA regimen: Amlodipine 10 mg, carvedilol 6.25 twice daily, losartan 100 mg daily   Blood Pressure: 104/54  Continue carvedilol, will continue to hold the rest of his antihypertensive regimen due to softer blood pressures

## 2025-05-24 NOTE — ASSESSMENT & PLAN NOTE
Lab Results   Component Value Date    HGBA1C 7.9 (H) 03/18/2025       Recent Labs     05/23/25  1142 05/23/25  1602 05/23/25 2120 05/24/25  0735   POCGLU 158* 122 248* 189*       Blood Sugar Average: Last 72 hrs:  (P) [id number]  Sliding-scale insulin ordered  Hypoglycemia protocol

## 2025-05-24 NOTE — ASSESSMENT & PLAN NOTE
Patient presenting acutely hypoxic, not on home O2.  The day history of shortness of breath/apnea.  Iso PNA versus acute HF exacerbation, patient on Eliquis but PE on differential.  Initially requiring BiPAP on admission, currently on nasal cannula  Lower extremity Doppler negative for DVT, CT plan under pseudoaneurysm  Still requires 2 to 3 L nasal cannula oxygen for adequate oxygenation    Plan:  Continue cefepime and vancomycin-day #4  Check procalcitonin  Monitor CBC  Continue oxygen supplementation to maintain O2 sats above 90%  Spirometry ordered

## 2025-05-24 NOTE — ASSESSMENT & PLAN NOTE
CXR: Bilateral patchy airspace disease may be on the basis of pulmonary edema versus infection.  Status post Zosyn, Zithromax, vancomycin.  CT chest: New pulmonary nodules and groundglass opacities suspicious for multifocal pneumonia.  Interstitial edema, bilateral pleural effusions, mediastinal adenopathy.  Strep pneumo/Legionella negative/COVID negative  MRSA positive  RP2 panel negative     Plan:  Patient has already received 4 out of 5 days of broad-spectrum antibiotics vancomycin and cefepime, therefore we we will finish out his course on current antibiotic regimen.  Monitor oxygen requirements  Trend CBC and procalcitonin  Tylenol as needed for fever and mild pain

## 2025-05-24 NOTE — ASSESSMENT & PLAN NOTE
Near-anatomic alignment status post ORIF for left intertrochanteric hip fracture with hyperattenuation noted in the region of the left vastus medialis suspicious for intramuscular hematoma after trauma and recent surgical intervention.   Status post IR embolization  Recent Labs     05/22/25  1515 05/23/25  0423 05/24/25  0550   HGB 7.3* 7.9* 8.1*     Hemoglobin stable this morning  Will continue to monitor CBC and signs and symptoms of expansion

## 2025-05-24 NOTE — PROGRESS NOTES
Progress Note - Hospitalist   Name: Abelino Renee 79 y.o. male I MRN: [id number]  Unit/Bed#: S -01 I Date of Admission: 5/21/2025   Date of Service: 5/24/2025 I Hospital Day: 3    Assessment & Plan  Acute hypoxic respiratory failure (HCC)  Patient presenting acutely hypoxic, not on home O2.  The day history of shortness of breath/apnea.  Iso PNA versus acute HF exacerbation, patient on Eliquis but PE on differential.  Initially requiring BiPAP on admission, currently on nasal cannula  Lower extremity Doppler negative for DVT, CT plan under pseudoaneurysm  Still requires 2 to 3 L nasal cannula oxygen for adequate oxygenation    Plan:  Continue cefepime and vancomycin-day #4  Check procalcitonin  Monitor CBC  Continue oxygen supplementation to maintain O2 sats above 90%  Spirometry ordered  Pneumonia  CXR: Bilateral patchy airspace disease may be on the basis of pulmonary edema versus infection.  Status post Zosyn, Zithromax, vancomycin.  CT chest: New pulmonary nodules and groundglass opacities suspicious for multifocal pneumonia.  Interstitial edema, bilateral pleural effusions, mediastinal adenopathy.  Strep pneumo/Legionella negative/COVID negative  MRSA positive  RP2 panel negative     Plan:  Patient has already received 4 out of 5 days of broad-spectrum antibiotics vancomycin and cefepime, therefore we we will finish out his course on current antibiotic regimen.  Monitor oxygen requirements  Trend CBC and procalcitonin  Tylenol as needed for fever and mild pain  Benign essential HTN  PTA regimen: Amlodipine 10 mg, carvedilol 6.25 twice daily, losartan 100 mg daily   Blood Pressure: 104/54  Continue carvedilol, will continue to hold the rest of his antihypertensive regimen due to softer blood pressures  Chronic lymphocytic leukemia (HCC)  History of CLL/SLL previously treated with 4 cycles of BR in June 2012.  Outpatient surveillance ongoing  Stage 3b chronic kidney disease (HCC)  Lab Results   Component  Value Date    EGFR 37 05/24/2025    EGFR 38 05/23/2025    EGFR 45 05/22/2025    CREATININE 1.72 (H) 05/24/2025    CREATININE 1.67 (H) 05/23/2025    CREATININE 1.46 (H) 05/22/2025   Stable.  Baseline creatinine 1.7  Monitor BMP  Type 2 diabetes mellitus with diabetic polyneuropathy, without long-term current use of insulin (HCC)  Lab Results   Component Value Date    HGBA1C 7.9 (H) 03/18/2025       Recent Labs     05/23/25  1142 05/23/25  1602 05/23/25  2120 05/24/25  0735   POCGLU 158* 122 248* 189*       Blood Sugar Average: Last 72 hrs:  (P) [id number]  Sliding-scale insulin ordered  Hypoglycemia protocol  Hypothyroidism  Continue Synthroid 75 mcg daily  Persistent atrial fibrillation (HCC)  On Coreg 6.25 mg twice daily and Eliquis 5 mg twice daily  Eliquis held given evidence of left-sided pseudoaneurysm  Anemia  Recent Labs     05/22/25  1515 05/23/25  0423 05/24/25  0550   HGB 7.3* 7.9* 8.1*     At approximate baseline  Iron panel suggestive of at least in part some iron deficiency anemia as cause  Will start iron supplementation today   Pseudoaneurysm (HCC)  Near-anatomic alignment status post ORIF for left intertrochanteric hip fracture with hyperattenuation noted in the region of the left vastus medialis suspicious for intramuscular hematoma after trauma and recent surgical intervention.   Status post IR embolization  Recent Labs     05/22/25  1515 05/23/25  0423 05/24/25  0550   HGB 7.3* 7.9* 8.1*     Hemoglobin stable this morning  Will continue to monitor CBC and signs and symptoms of expansion    VTE Pharmacologic Prophylaxis:   High Risk (Score >/= 5) - Pharmacological DVT Prophylaxis Ordered: heparin. Sequential Compression Devices Ordered.    Mobility:   Basic Mobility Inpatient Raw Score: 18  JH-HLM Goal: 6: Walk 10 steps or more  JH-HLM Achieved: 2: Bed activities/Dependent transfer  JH-HLM Goal achieved. Continue to encourage appropriate mobility.    Patient Centered Rounds: I performed  bedside rounds with nursing staff today.   Discussions with Specialists or Other Care Team Provider:     Education and Discussions with Family / Patient: Attempted to update  (wife) via phone. Unable to contact.    Current Length of Stay: 3 day(s)  Current Patient Status: Inpatient   Certification Statement: The patient will continue to require additional inpatient hospital stay due to hypoxic respiratory failure  Discharge Plan: Anticipate discharge in 24-48 hrs to discharge location to be determined pending rehab evaluations.    Code Status: Level 1 - Full Code    Subjective   Patient feeling well this morning.  Still continues to have mild shortness of breath but denies any chest pain.  He is eating and drinking well.  Denies abdominal pain, nausea or vomiting.    Objective :  Temp:  [97.8 °F (36.6 °C)-100.1 °F (37.8 °C)] 98.8 °F (37.1 °C)  HR:  [] 86  BP: (101-149)/(54-78) 104/54  Resp:  [15-24] 18  SpO2:  [90 %-100 %] 90 %  O2 Device: Nasal cannula  Nasal Cannula O2 Flow Rate (L/min):  [2 L/min-3 L/min] 2 L/min    Body mass index is 28.68 kg/m².     Input and Output Summary (last 24 hours):     Intake/Output Summary (Last 24 hours) at 5/24/2025 1127  Last data filed at 5/24/2025 0901  Gross per 24 hour   Intake 720 ml   Output 1750 ml   Net -1030 ml       Physical Exam  Vitals and nursing note reviewed.   Constitutional:       General: He is not in acute distress.     Appearance: He is well-developed.   HENT:      Head: Normocephalic and atraumatic.      Mouth/Throat:      Mouth: Mucous membranes are moist.      Pharynx: Oropharynx is clear.     Eyes:      Extraocular Movements: Extraocular movements intact.      Pupils: Pupils are equal, round, and reactive to light.       Cardiovascular:      Rate and Rhythm: Normal rate. Rhythm irregular.      Pulses: Normal pulses.      Heart sounds: No murmur heard.  Pulmonary:      Effort: Pulmonary effort is normal. No respiratory distress.       Breath sounds: Rales present.      Comments: Good air entry bilaterally but some crackles heard at bilateral lung bases  Abdominal:      Palpations: Abdomen is soft.      Tenderness: There is no abdominal tenderness.     Musculoskeletal:         General: No swelling.      Cervical back: Neck supple.      Right lower leg: No edema.      Left lower leg: No edema.     Skin:     General: Skin is warm and dry.      Capillary Refill: Capillary refill takes less than 2 seconds.     Neurological:      General: No focal deficit present.      Mental Status: He is alert and oriented to person, place, and time.     Psychiatric:         Mood and Affect: Mood normal.           Lines/Drains:              Lab Results: I have reviewed the following results:   Results from last 7 days   Lab Units 05/24/25  0550 05/22/25  1515 05/22/25  0456   WBC Thousand/uL 17.36*   < > 12.68*   HEMOGLOBIN g/dL 8.1*   < > 7.2*   HEMATOCRIT % 25.1*   < > 22.2*   PLATELETS Thousands/uL 202   < > 182   BANDS PCT %  --   --  1   LYMPHO PCT %  --   --  54*   MONO PCT %  --   --  2*   EOS PCT %  --   --  1    < > = values in this interval not displayed.     Results from last 7 days   Lab Units 05/24/25  0550 05/23/25  0423 05/22/25  0536   SODIUM mmol/L 134*   < > 137   POTASSIUM mmol/L 3.9   < > 3.8   CHLORIDE mmol/L 97   < > 102   CO2 mmol/L 28   < > 28   BUN mg/dL 30*   < > 29*   CREATININE mg/dL 1.72*   < > 1.46*   ANION GAP mmol/L 9   < > 7   CALCIUM mg/dL 8.8   < > 8.8   ALBUMIN g/dL  --   --  3.4*   TOTAL BILIRUBIN mg/dL  --   --  0.80   ALK PHOS U/L  --   --  82   ALT U/L  --   --  11   AST U/L  --   --  14   GLUCOSE RANDOM mg/dL 191*   < > 172*    < > = values in this interval not displayed.     Results from last 7 days   Lab Units 05/21/25  1236   INR  1.51*     Results from last 7 days   Lab Units 05/24/25  0735 05/23/25  2120 05/23/25  1602 05/23/25  1142 05/23/25  0709 05/22/25  2024 05/22/25  1557 05/22/25  1114 05/22/25  0717 05/21/25  1801  05/21/25  1041   POC GLUCOSE mg/dl 189* 248* 122 158* 150* 223* 235* 273* 145* 276* 318*         Results from last 7 days   Lab Units 05/21/25  1236 05/21/25  1104   LACTIC ACID mmol/L 1.1  --    PROCALCITONIN ng/ml  --  0.21       Recent Cultures (last 7 days):   Results from last 7 days   Lab Units 05/21/25  1733 05/21/25  1236   BLOOD CULTURE   --  No Growth at 48 hrs.  No Growth at 48 hrs.   LEGIONELLA URINARY ANTIGEN  Negative  --              Last 24 Hours Medication List:     Current Facility-Administered Medications:     [Held by provider] amLODIPine (NORVASC) tablet 10 mg, Daily    [Held by provider] apixaban (ELIQUIS) tablet 5 mg, BID    carvedilol (COREG) tablet 6.25 mg, BID With Meals    ceFEPime (MAXIPIME) 2,000 mg in dextrose 5 % 50 mL IVPB, Q12H, Last Rate: 2,000 mg (05/24/25 0735)    chlorhexidine (PERIDEX) 0.12 % oral rinse 15 mL, Q12H MICHAEL    ferrous sulfate tablet 325 mg, Every Other Day    [Held by provider] furosemide (LASIX) injection 40 mg, Daily    insulin lispro (HumALOG/ADMELOG) 100 units/mL subcutaneous injection 1-6 Units, TID AC **AND** Fingerstick Glucose (POCT), TID AC    levothyroxine tablet 75 mcg, Early Morning    [Held by provider] losartan (COZAAR) tablet 100 mg, Daily    pravastatin (PRAVACHOL) tablet 40 mg, Daily With Dinner    timolol (TIMOPTIC) 0.5 % ophthalmic solution 1 drop, BID    vancomycin (VANCOCIN) IVPB (premix in dextrose) 750 mg 150 mL, Q24H, Last Rate: 750 mg (05/24/25 1110)    Administrative Statements   Today, Patient Was Seen By: Ed Hale MD      **Please Note: This note may have been constructed using a voice recognition system.**

## 2025-05-24 NOTE — PLAN OF CARE
Problem: Potential for Falls  Goal: Patient will remain free of falls  Description: INTERVENTIONS:  - Educate patient/family on patient safety including physical limitations  - Instruct patient to call for assistance with activity   - Consider consulting OT/PT to assist with strengthening/mobility based on AM PAC & JH-HLM score  - Consult OT/PT to assist with strengthening/mobility   - Keep Call bell within reach  - Keep bed low and locked with side rails adjusted as appropriate  - Keep care items and personal belongings within reach  - Initiate and maintain comfort rounds  - Make Fall Risk Sign visible to staff  - Offer Toileting every  Hours, in advance of need  - Initiate/Maintain alarm  - Obtain necessary fall risk management equipment:   - Apply yellow socks and bracelet for high fall risk patients  - Consider moving patient to room near nurses station  Outcome: Progressing     Problem: PAIN - ADULT  Goal: Verbalizes/displays adequate comfort level or baseline comfort level  Description: Interventions:  - Encourage patient to monitor pain and request assistance  - Assess pain using appropriate pain scale  - Administer analgesics as ordered based on type and severity of pain and evaluate response  - Implement non-pharmacological measures as appropriate and evaluate response  - Consider cultural and social influences on pain and pain management  - Notify physician/advanced practitioner if interventions unsuccessful or patient reports new pain  - Educate patient/family on pain management process including their role and importance of  reporting pain   - Provide non-pharmacologic/complimentary pain relief interventions  Outcome: Progressing     Problem: INFECTION - ADULT  Goal: Absence or prevention of progression during hospitalization  Description: INTERVENTIONS:  - Assess and monitor for signs and symptoms of infection  - Monitor lab/diagnostic results  - Monitor all insertion sites, i.e. indwelling lines,  tubes, and drains  - Monitor endotracheal if appropriate and nasal secretions for changes in amount and color  - [place] appropriate cooling/warming therapies per order  - Administer medications as ordered  - Instruct and encourage patient and family to use good hand hygiene technique  - Identify and instruct in appropriate isolation precautions for identified infection/condition  Outcome: Progressing  Goal: Absence of fever/infection during neutropenic period  Description: INTERVENTIONS:  - Monitor WBC  - Perform strict hand hygiene  - Limit to healthy visitors only  - No plants, dried, fresh or silk flowers with roy in patient room  Outcome: Progressing     Problem: SAFETY ADULT  Goal: Patient will remain free of falls  Description: INTERVENTIONS:  - Educate patient/family on patient safety including physical limitations  - Instruct patient to call for assistance with activity   - Consider consulting OT/PT to assist with strengthening/mobility based on AM PAC & -HLM score  - Consult OT/PT to assist with strengthening/mobility   - Keep Call bell within reach  - Keep bed low and locked with side rails adjusted as appropriate  - Keep care items and personal belongings within reach  - Initiate and maintain comfort rounds  - Make Fall Risk Sign visible to staff  - Offer Toileting every  Hours, in advance of need  - Initiate/Maintain alarm  - Obtain necessary fall risk management equipment:   - Apply yellow socks and bracelet for high fall risk patients  - Consider moving patient to room near nurses station  Outcome: Progressing  Goal: Maintain or return to baseline ADL function  Description: INTERVENTIONS:  -  Assess patient's ability to carry out ADLs; assess patient's baseline for ADL function and identify physical deficits which impact ability to perform ADLs (bathing, care of mouth/teeth, toileting, grooming, dressing, etc.)  - Assess/evaluate cause of self-care deficits   - Assess range of motion  - Assess  patient's mobility; develop plan if impaired  - Assess patient's need for assistive devices and provide as appropriate  - Encourage maximum independence but intervene and supervise when necessary  - Involve family in performance of ADLs  - Assess for home care needs following discharge   - Consider OT consult to assist with ADL evaluation and planning for discharge  - Provide patient education as appropriate  - Monitor functional capacity and physical performance, use of AM PAC & JH-HLM   - Monitor gait, balance and fatigue with ambulation    Outcome: Progressing  Goal: Maintains/Returns to pre admission functional level  Description: INTERVENTIONS:  - Perform AM-PAC 6 Click Basic Mobility/ Daily Activity assessment daily.  - Set and communicate daily mobility goal to care team and patient/family/caregiver.   - Collaborate with rehabilitation services on mobility goals if consulted  - Perform Range of Motion  times a day.  - Reposition patient every  hours.  - Dangle patient  times a day  - Stand patient  times a day  - Ambulate patient  times a day  - Out of bed to chair  times a day   - Out of bed for meals  times a day  - Out of bed for toileting  - Record patient progress and toleration of activity level   Outcome: Progressing

## 2025-05-24 NOTE — ASSESSMENT & PLAN NOTE
Recent Labs     05/22/25  1515 05/23/25  0423 05/24/25  0550   HGB 7.3* 7.9* 8.1*     At approximate baseline  Iron panel suggestive of at least in part some iron deficiency anemia as cause  Will start iron supplementation today

## 2025-05-25 LAB
ANION GAP SERPL CALCULATED.3IONS-SCNC: 8 MMOL/L (ref 4–13)
ANISOCYTOSIS BLD QL SMEAR: PRESENT
BASOPHILS # BLD MANUAL: 0 THOUSAND/UL (ref 0–0.1)
BASOPHILS NFR MAR MANUAL: 0 % (ref 0–1)
BUN SERPL-MCNC: 31 MG/DL (ref 5–25)
CALCIUM SERPL-MCNC: 8.6 MG/DL (ref 8.4–10.2)
CHLORIDE SERPL-SCNC: 97 MMOL/L (ref 96–108)
CO2 SERPL-SCNC: 26 MMOL/L (ref 21–32)
CREAT SERPL-MCNC: 1.87 MG/DL (ref 0.6–1.3)
EOSINOPHIL # BLD MANUAL: 0.48 THOUSAND/UL (ref 0–0.4)
EOSINOPHIL NFR BLD MANUAL: 3 % (ref 0–6)
ERYTHROCYTE [DISTWIDTH] IN BLOOD BY AUTOMATED COUNT: 15.3 % (ref 11.6–15.1)
GFR SERPL CREATININE-BSD FRML MDRD: 33 ML/MIN/1.73SQ M
GLUCOSE SERPL-MCNC: 271 MG/DL (ref 65–140)
GLUCOSE SERPL-MCNC: 276 MG/DL (ref 65–140)
GLUCOSE SERPL-MCNC: 282 MG/DL (ref 65–140)
GLUCOSE SERPL-MCNC: 349 MG/DL (ref 65–140)
GLUCOSE SERPL-MCNC: 363 MG/DL (ref 65–140)
HCT VFR BLD AUTO: 24.4 % (ref 36.5–49.3)
HGB BLD-MCNC: 7.8 G/DL (ref 12–17)
LYMPHOCYTES # BLD AUTO: 10.87 THOUSAND/UL (ref 0.6–4.47)
LYMPHOCYTES # BLD AUTO: 68 % (ref 14–44)
MCH RBC QN AUTO: 30 PG (ref 26.8–34.3)
MCHC RBC AUTO-ENTMCNC: 32 G/DL (ref 31.4–37.4)
MCV RBC AUTO: 94 FL (ref 82–98)
MONOCYTES # BLD AUTO: 0.32 THOUSAND/UL (ref 0–1.22)
MONOCYTES NFR BLD: 2 % (ref 4–12)
NEUTROPHILS # BLD MANUAL: 4.31 THOUSAND/UL (ref 1.85–7.62)
NEUTS SEG NFR BLD AUTO: 27 % (ref 43–75)
PLATELET # BLD AUTO: 198 THOUSANDS/UL (ref 149–390)
PLATELET BLD QL SMEAR: ADEQUATE
PMV BLD AUTO: 9.4 FL (ref 8.9–12.7)
POTASSIUM SERPL-SCNC: 4 MMOL/L (ref 3.5–5.3)
PROCALCITONIN SERPL-MCNC: 0.28 NG/ML
RBC # BLD AUTO: 2.6 MILLION/UL (ref 3.88–5.62)
RBC MORPH BLD: PRESENT
SODIUM SERPL-SCNC: 131 MMOL/L (ref 135–147)
WBC # BLD AUTO: 15.98 THOUSAND/UL (ref 4.31–10.16)

## 2025-05-25 PROCEDURE — 85027 COMPLETE CBC AUTOMATED: CPT

## 2025-05-25 PROCEDURE — 82948 REAGENT STRIP/BLOOD GLUCOSE: CPT

## 2025-05-25 PROCEDURE — 84145 PROCALCITONIN (PCT): CPT

## 2025-05-25 PROCEDURE — 80048 BASIC METABOLIC PNL TOTAL CA: CPT

## 2025-05-25 PROCEDURE — 85007 BL SMEAR W/DIFF WBC COUNT: CPT

## 2025-05-25 PROCEDURE — 99232 SBSQ HOSP IP/OBS MODERATE 35: CPT | Performed by: INTERNAL MEDICINE

## 2025-05-25 RX ORDER — INSULIN LISPRO 100 [IU]/ML
3 INJECTION, SOLUTION INTRAVENOUS; SUBCUTANEOUS
Status: DISCONTINUED | OUTPATIENT
Start: 2025-05-25 | End: 2025-05-27 | Stop reason: HOSPADM

## 2025-05-25 RX ORDER — AMOXICILLIN 250 MG
1 CAPSULE ORAL
Status: DISCONTINUED | OUTPATIENT
Start: 2025-05-25 | End: 2025-05-27 | Stop reason: HOSPADM

## 2025-05-25 RX ORDER — FUROSEMIDE 10 MG/ML
40 INJECTION INTRAMUSCULAR; INTRAVENOUS ONCE
Status: COMPLETED | OUTPATIENT
Start: 2025-05-25 | End: 2025-05-25

## 2025-05-25 RX ORDER — INSULIN LISPRO 100 [IU]/ML
3 INJECTION, SOLUTION INTRAVENOUS; SUBCUTANEOUS
Status: DISCONTINUED | OUTPATIENT
Start: 2025-05-26 | End: 2025-05-27 | Stop reason: HOSPADM

## 2025-05-25 RX ADMIN — CEFEPIME 2000 MG: 2 INJECTION, POWDER, FOR SOLUTION INTRAVENOUS at 09:05

## 2025-05-25 RX ADMIN — CARVEDILOL 6.25 MG: 6.25 TABLET, FILM COATED ORAL at 16:45

## 2025-05-25 RX ADMIN — TIMOLOL MALEATE 1 DROP: 5 SOLUTION OPHTHALMIC at 08:09

## 2025-05-25 RX ADMIN — PRAVASTATIN SODIUM 40 MG: 40 TABLET ORAL at 16:45

## 2025-05-25 RX ADMIN — CHLORHEXIDINE GLUCONATE 15 ML: 1.2 SOLUTION ORAL at 08:08

## 2025-05-25 RX ADMIN — INSULIN LISPRO 5 UNITS: 100 INJECTION, SOLUTION INTRAVENOUS; SUBCUTANEOUS at 16:45

## 2025-05-25 RX ADMIN — TIMOLOL MALEATE 1 DROP: 5 SOLUTION OPHTHALMIC at 21:45

## 2025-05-25 RX ADMIN — INSULIN LISPRO 3 UNITS: 100 INJECTION, SOLUTION INTRAVENOUS; SUBCUTANEOUS at 11:58

## 2025-05-25 RX ADMIN — INSULIN LISPRO 3 UNITS: 100 INJECTION, SOLUTION INTRAVENOUS; SUBCUTANEOUS at 16:45

## 2025-05-25 RX ADMIN — INSULIN LISPRO 6 UNITS: 100 INJECTION, SOLUTION INTRAVENOUS; SUBCUTANEOUS at 11:57

## 2025-05-25 RX ADMIN — SENNOSIDES AND DOCUSATE SODIUM 1 TABLET: 50; 8.6 TABLET ORAL at 21:45

## 2025-05-25 RX ADMIN — INSULIN LISPRO 4 UNITS: 100 INJECTION, SOLUTION INTRAVENOUS; SUBCUTANEOUS at 08:09

## 2025-05-25 RX ADMIN — LEVOTHYROXINE SODIUM 75 MCG: 75 TABLET ORAL at 05:53

## 2025-05-25 RX ADMIN — CHLORHEXIDINE GLUCONATE 15 ML: 1.2 SOLUTION ORAL at 21:45

## 2025-05-25 RX ADMIN — CARVEDILOL 6.25 MG: 6.25 TABLET, FILM COATED ORAL at 08:08

## 2025-05-25 RX ADMIN — FUROSEMIDE 40 MG: 10 INJECTION, SOLUTION INTRAVENOUS at 11:57

## 2025-05-25 NOTE — CONSULTS
Vancomycin IV Pharmacy-to-Dose Consultation    Abelino Renee is a 79 y.o. male who was receiving Vancomycin IV with management by the Pharmacy Consult service for treatment of Pneumonia (goal -600, trough >10)  .       The patient’s Vancomycin therapy has been discontinued as therapy is complete after 5 days of treatment. Thank you for allowing us to take part in this patient's care. Pharmacy will sign-off now; please call or re-consult if there are any questions.      Christy Sterling, PharmD  Critical Care & Internal Medicine Clinical Pharmacist   Available via Epic Secure Chat

## 2025-05-25 NOTE — PLAN OF CARE
Problem: Potential for Falls  Goal: Patient will remain free of falls  Description: INTERVENTIONS:  - Educate patient/family on patient safety including physical limitations  - Instruct patient to call for assistance with activity   - Consider consulting OT/PT to assist with strengthening/mobility based on AM PAC & JH-HLM score  - Consult OT/PT to assist with strengthening/mobility   - Keep Call bell within reach  - Keep bed low and locked with side rails adjusted as appropriate  - Keep care items and personal belongings within reach  - Initiate and maintain comfort rounds  - Make Fall Risk Sign visible to staff  - Offer Toileting every  Hours, in advance of need  - Initiate/Maintain alarm  - Obtain necessary fall risk management equipment:   - Apply yellow socks and bracelet for high fall risk patients  - Consider moving patient to room near nurses station  Outcome: Progressing     Problem: PAIN - ADULT  Goal: Verbalizes/displays adequate comfort level or baseline comfort level  Description: Interventions:  - Encourage patient to monitor pain and request assistance  - Assess pain using appropriate pain scale  - Administer analgesics as ordered based on type and severity of pain and evaluate response  - Implement non-pharmacological measures as appropriate and evaluate response  - Consider cultural and social influences on pain and pain management  - Notify physician/advanced practitioner if interventions unsuccessful or patient reports new pain  - Educate patient/family on pain management process including their role and importance of  reporting pain   - Provide non-pharmacologic/complimentary pain relief interventions  Outcome: Progressing     Problem: INFECTION - ADULT  Goal: Absence or prevention of progression during hospitalization  Description: INTERVENTIONS:  - Assess and monitor for signs and symptoms of infection  - Monitor lab/diagnostic results  - Monitor all insertion sites, i.e. indwelling lines,  tubes, and drains  - Monitor endotracheal if appropriate and nasal secretions for changes in amount and color  - [place] appropriate cooling/warming therapies per order  - Administer medications as ordered  - Instruct and encourage patient and family to use good hand hygiene technique  - Identify and instruct in appropriate isolation precautions for identified infection/condition  Outcome: Progressing  Goal: Absence of fever/infection during neutropenic period  Description: INTERVENTIONS:  - Monitor WBC  - Perform strict hand hygiene  - Limit to healthy visitors only  - No plants, dried, fresh or silk flowers with roy in patient room  Outcome: Progressing     Problem: SAFETY ADULT  Goal: Patient will remain free of falls  Description: INTERVENTIONS:  - Educate patient/family on patient safety including physical limitations  - Instruct patient to call for assistance with activity   - Consider consulting OT/PT to assist with strengthening/mobility based on AM PAC & -HLM score  - Consult OT/PT to assist with strengthening/mobility   - Keep Call bell within reach  - Keep bed low and locked with side rails adjusted as appropriate  - Keep care items and personal belongings within reach  - Initiate and maintain comfort rounds  - Make Fall Risk Sign visible to staff  - Offer Toileting every  Hours, in advance of need  - Initiate/Maintain alarm  - Obtain necessary fall risk management equipment:   - Apply yellow socks and bracelet for high fall risk patients  - Consider moving patient to room near nurses station  Outcome: Progressing  Goal: Maintain or return to baseline ADL function  Description: INTERVENTIONS:  -  Assess patient's ability to carry out ADLs; assess patient's baseline for ADL function and identify physical deficits which impact ability to perform ADLs (bathing, care of mouth/teeth, toileting, grooming, dressing, etc.)  - Assess/evaluate cause of self-care deficits   - Assess range of motion  - Assess  patient's mobility; develop plan if impaired  - Assess patient's need for assistive devices and provide as appropriate  - Encourage maximum independence but intervene and supervise when necessary  - Involve family in performance of ADLs  - Assess for home care needs following discharge   - Consider OT consult to assist with ADL evaluation and planning for discharge  - Provide patient education as appropriate  - Monitor functional capacity and physical performance, use of AM PAC & JH-HLM   - Monitor gait, balance and fatigue with ambulation    Outcome: Progressing  Goal: Maintains/Returns to pre admission functional level  Description: INTERVENTIONS:  - Perform AM-PAC 6 Click Basic Mobility/ Daily Activity assessment daily.  - Set and communicate daily mobility goal to care team and patient/family/caregiver.   - Collaborate with rehabilitation services on mobility goals if consulted  - Perform Range of Motion  times a day.  - Reposition patient every  hours.  - Dangle patient  times a day  - Stand patient  times a day  - Ambulate patient  times a day  - Out of bed to chair  times a day   - Out of bed for meals  times a day  - Out of bed for toileting  - Record patient progress and toleration of activity level   Outcome: Progressing     Problem: DISCHARGE PLANNING  Goal: Discharge to home or other facility with appropriate resources  Description: INTERVENTIONS:  - Identify barriers to discharge w/patient and caregiver  - Arrange for needed discharge resources and transportation as appropriate  - Identify discharge learning needs (meds, wound care, etc.)  - Arrange for interpretive services to assist at discharge as needed  - Refer to Case Management Department for coordinating discharge planning if the patient needs post-hospital services based on physician/advanced practitioner order or complex needs related to functional status, cognitive ability, or social support system  Outcome: Progressing     Problem: Knowledge  Deficit  Goal: Patient/family/caregiver demonstrates understanding of disease process, treatment plan, medications, and discharge instructions  Description: Complete learning assessment and assess knowledge base.  Interventions:  - Provide teaching at level of understanding  - Provide teaching via preferred learning methods  Outcome: Progressing     Problem: Nutrition/Hydration-ADULT  Goal: Nutrient/Hydration intake appropriate for improving, restoring or maintaining nutritional needs  Description: Monitor and assess patient's nutrition/hydration status for malnutrition. Collaborate with interdisciplinary team and initiate plan and interventions as ordered.  Monitor patient's weight and dietary intake as ordered or per policy. Utilize nutrition screening tool and intervene as necessary. Determine patient's food preferences and provide high-protein, high-caloric foods as appropriate.     INTERVENTIONS:  - Monitor oral intake, urinary output, labs, and treatment plans  - Assess nutrition and hydration status and recommend course of action  - Evaluate amount of meals eaten  - Assist patient with eating if necessary   - Allow adequate time for meals  - Recommend/ encourage appropriate diets, oral nutritional supplements, and vitamin/mineral supplements  - Order, calculate, and assess calorie counts as needed  - Recommend, monitor, and adjust tube feedings and TPN/PPN based on assessed needs  - Assess need for intravenous fluids  - Provide specific nutrition/hydration education as appropriate  - Include patient/family/caregiver in decisions related to nutrition  Outcome: Progressing     Problem: Prexisting or High Potential for Compromised Skin Integrity  Goal: Skin integrity is maintained or improved  Description: INTERVENTIONS:  - Identify patients at risk for skin breakdown  - Assess and monitor skin integrity including under and around medical devices   - Assess and monitor nutrition and hydration status  - Monitor  labs  - Assess for incontinence   - Turn and reposition patient  - Assist with mobility/ambulation  - Relieve pressure over irina prominences   - Avoid friction and shearing  - Provide appropriate hygiene as needed including keeping skin clean and dry  - Evaluate need for skin moisturizer/barrier cream  - Collaborate with interdisciplinary team  - Patient/family teaching  - Consider wound care consult    Assess:  - Review Arron scale daily  - Clean and moisturize skin every   - Inspect skin when repositioning, toileting, and assisting with ADLS  - Assess under medical devices such as  every   - Assess extremities for adequate circulation and sensation     Bed Management:  - Have minimal linens on bed & keep smooth, unwrinkled  - Change linens as needed when moist or perspiring  - Avoid sitting or lying in one position for more than  hours while in bed?Keep HOB at degrees   - Toileting:  - Offer bedside commode  - Assess for incontinence every   - Use incontinent care products after each incontinent episode such as     Activity:  - Mobilize patient  times a day  - Encourage activity and walks on unit  - Encourage or provide ROM exercises   - Turn and reposition patient every  Hours  - Use appropriate equipment to lift or move patient in bed  - Instruct/ Assist with weight shifting every  when out of bed in chair  - Consider limitation of chair time  hour intervals    Skin Care:  - Avoid use of baby powder, tape, friction and shearing, hot water or constrictive clothing  - Relieve pressure over bony prominences using   - Do not massage red bony areas    Next Steps:  - Teach patient strategies to minimize risks such as   - Consider consults to  interdisciplinary teams such as  Outcome: Progressing

## 2025-05-25 NOTE — PROGRESS NOTES
Progress Note - Hospitalist   Name: Abelino Renee 79 y.o. male I MRN: [id number]  Unit/Bed#: S -01 I Date of Admission: 5/21/2025   Date of Service: 5/25/2025 I Hospital Day: 4    Assessment & Plan  Acute hypoxic respiratory failure (HCC)  Pneumonia  PMH of CLL  Presents with SOB, tachypnea  Labs:   Elevated Pro-Jason, MRSA positive, RP 2 negative, BLD CTX -72 hours.  Elevated leukocyte count in the setting of CLL baseline 15,000's.  Strep pneumo/Legionella negative/COVID negative  Imaging:  CXR: Lateral patchy airspace disease suspect pulmonary edema versus infection.  CT chest: New pulmonary nodules and groundglass opacities suspicious for multifocal pneumonia.  Interstitial edema, bilateral pleural effusions, mediastinal adenopathy.  Repeat CXR 5/23: Mild interstitial edema with trace pleural effusions improved  Initially required continuous BiPAP and diuresis in the CCU.  D5 IV antibiotic.  Patient still desaturating 84 to 85% on RA, chest x-ray shows improvement in interstitial edema but still positive, completed 5 days IV antibiotic treatment.       Plan:  DC IV antibiotic.  Give 1 dose IV lasix 40 mg  Continue oxygen supplementation to maintain O2 sats above 90%  Spirometry.    Pseudoaneurysm (HCC)  Near-anatomic alignment status post ORIF for left intertrochanteric hip fracture with hyperattenuation noted in the region of the left vastus medialis suspicious for intramuscular hematoma after trauma and recent surgical intervention.   Status post IR embolization  Recent Labs     05/23/25  0423 05/24/25  0550 05/25/25  0538   HGB 7.9* 8.1* 7.8*     HB downtrend.  Will continue to monitor CBC and signs and symptoms of expansion  Benign essential HTN  PTA regimen: Amlodipine 10 mg, carvedilol 6.25 twice daily, losartan 100 mg daily   Blood Pressure: 113/67  Continue carvedilol, will continue to hold the rest of his antihypertensive regimen due to softer blood pressures  Chronic lymphocytic leukemia  (HCC)  History of CLL/SLL previously treated with 4 cycles of BR in June 2012.  Outpatient surveillance ongoing  Stage 3b chronic kidney disease (HCC)  Lab Results   Component Value Date    EGFR 33 05/25/2025    EGFR 37 05/24/2025    EGFR 38 05/23/2025    CREATININE 1.87 (H) 05/25/2025    CREATININE 1.72 (H) 05/24/2025    CREATININE 1.67 (H) 05/23/2025   Stable.  Baseline creatinine 1.7  Monitor BMP  Type 2 diabetes mellitus with diabetic polyneuropathy, without long-term current use of insulin (HCC)  Lab Results   Component Value Date    HGBA1C 7.9 (H) 03/18/2025       Recent Labs     05/24/25  1152 05/24/25  1630 05/24/25  2100 05/25/25  0716   POCGLU 308* 325* 309* 271*       Blood Sugar Average: Last 72 hrs:  (P) 233.5  Sliding-scale insulin ordered  Pre-meals 3 units TID  Hypoglycemia protocol  Hypothyroidism  Continue Synthroid 75 mcg daily  Persistent atrial fibrillation (HCC)  On Coreg 6.25 mg twice daily and Eliquis 5 mg twice daily  Eliquis held given evidence of left-sided pseudoaneurysm  Anemia  Recent Labs     05/23/25  0423 05/24/25  0550 05/25/25  0538   HGB 7.9* 8.1* 7.8*     At approximate baseline  Iron panel suggestive of at least in part some iron deficiency anemia as cause  Will start iron supplementation today     VTE Pharmacologic Prophylaxis:   Moderate Risk (Score 3-4) - Pharmacological DVT Prophylaxis Contraindicated. Sequential Compression Devices Ordered.    Mobility:   Basic Mobility Inpatient Raw Score: 13  -HLM Goal: 4: Move to chair/commode  JH-HLM Achieved: 6: Walk 10 steps or more  JH-HLM Goal achieved. Continue to encourage appropriate mobility.    Patient Centered Rounds: I performed bedside rounds with nursing staff today.   Discussions with Specialists or Other Care Team Provider: Attending physician/IR.    Education and Discussions with Family / Patient: Attempted to update  (wife) via phone. Left voicemail.     Current Length of Stay: 4 day(s)  Current Patient  Status: Inpatient   Certification Statement: The patient will continue to require additional inpatient hospital stay due to still hypoxic  Discharge Plan: Anticipate discharge in 24-48 hrs to discharge location to be determined pending rehab evaluations.    Code Status: Level 1 - Full Code    Subjective   Patient seen and examined this morning, no acute events overnight.  He reports improved pain in his thigh, denies cough or shortness of breath or chest pain.  He is eating well however last bowel movement since Tuesday.    Objective :  Temp:  [98.1 °F (36.7 °C)-100.1 °F (37.8 °C)] 98.1 °F (36.7 °C)  HR:  [] 81  BP: (101-124)/(54-67) 113/67  Resp:  [16-18] 16  SpO2:  [90 %-93 %] 93 %  O2 Device: Nasal cannula  Nasal Cannula O2 Flow Rate (L/min):  [1 L/min-2 L/min] 1 L/min    Body mass index is 27.19 kg/m².     Input and Output Summary (last 24 hours):     Intake/Output Summary (Last 24 hours) at 5/25/2025 0843  Last data filed at 5/25/2025 0401  Gross per 24 hour   Intake 380 ml   Output 1475 ml   Net -1095 ml       Physical Exam  Vitals and nursing note reviewed.   Constitutional:       General: He is not in acute distress.     Appearance: He is well-developed.   HENT:      Head: Normocephalic and atraumatic.     Eyes:      Conjunctiva/sclera: Conjunctivae normal.       Cardiovascular:      Rate and Rhythm: Normal rate and regular rhythm.      Heart sounds: No murmur heard.  Pulmonary:      Effort: Pulmonary effort is normal. No respiratory distress.      Breath sounds: Normal breath sounds.      Comments: Decreased breath sounds on lung bases.  Abdominal:      Palpations: Abdomen is soft.      Tenderness: There is no abdominal tenderness.     Musculoskeletal:         General: No swelling.      Cervical back: Neck supple.      Right lower leg: Edema (trace) present.      Left lower leg: Edema (trace) present.     Skin:     General: Skin is warm and dry.      Capillary Refill: Capillary refill takes less than  2 seconds.     Neurological:      Mental Status: He is alert.     Psychiatric:         Mood and Affect: Mood normal.           Lines/Drains:              Lab Results: I have reviewed the following results:   Results from last 7 days   Lab Units 05/25/25  0538 05/22/25  1515 05/22/25  0456   WBC Thousand/uL 15.98*   < > 12.68*   HEMOGLOBIN g/dL 7.8*   < > 7.2*   HEMATOCRIT % 24.4*   < > 22.2*   PLATELETS Thousands/uL 198   < > 182   BANDS PCT %  --   --  1   LYMPHO PCT %  --   --  54*   MONO PCT %  --   --  2*   EOS PCT %  --   --  1    < > = values in this interval not displayed.     Results from last 7 days   Lab Units 05/25/25  0538 05/23/25  0423 05/22/25  0536   SODIUM mmol/L 131*   < > 137   POTASSIUM mmol/L 4.0   < > 3.8   CHLORIDE mmol/L 97   < > 102   CO2 mmol/L 26   < > 28   BUN mg/dL 31*   < > 29*   CREATININE mg/dL 1.87*   < > 1.46*   ANION GAP mmol/L 8   < > 7   CALCIUM mg/dL 8.6   < > 8.8   ALBUMIN g/dL  --   --  3.4*   TOTAL BILIRUBIN mg/dL  --   --  0.80   ALK PHOS U/L  --   --  82   ALT U/L  --   --  11   AST U/L  --   --  14   GLUCOSE RANDOM mg/dL 276*   < > 172*    < > = values in this interval not displayed.     Results from last 7 days   Lab Units 05/21/25  1236   INR  1.51*     Results from last 7 days   Lab Units 05/25/25  0716 05/24/25  2100 05/24/25  1630 05/24/25  1152 05/24/25  1150 05/24/25  0735 05/23/25  2120 05/23/25  1602 05/23/25  1142 05/23/25  0709 05/22/25  2024 05/22/25  1557   POC GLUCOSE mg/dl 271* 309* 325* 308* 313* 189* 248* 122 158* 150* 223* 235*         Results from last 7 days   Lab Units 05/25/25  0538 05/24/25  1255 05/21/25  1236 05/21/25  1104   LACTIC ACID mmol/L  --   --  1.1  --    PROCALCITONIN ng/ml 0.28* 0.29*  --  0.21       Recent Cultures (last 7 days):   Results from last 7 days   Lab Units 05/21/25  1733 05/21/25  1236   BLOOD CULTURE   --  No Growth at 72 hrs.  No Growth at 72 hrs.   LEGIONELLA URINARY ANTIGEN  Negative  --              Last 24 Hours  Medication List:     Current Facility-Administered Medications:     [Held by provider] amLODIPine (NORVASC) tablet 10 mg, Daily    [Held by provider] apixaban (ELIQUIS) tablet 5 mg, BID    carvedilol (COREG) tablet 6.25 mg, BID With Meals    ceFEPime (MAXIPIME) 2,000 mg in dextrose 5 % 50 mL IVPB, Q12H, Last Rate: 2,000 mg (05/24/25 2113)    chlorhexidine (PERIDEX) 0.12 % oral rinse 15 mL, Q12H MICHAEL    ferrous sulfate tablet 325 mg, Every Other Day    [Held by provider] furosemide (LASIX) injection 40 mg, Daily    insulin lispro (HumALOG/ADMELOG) 100 units/mL subcutaneous injection 1-6 Units, TID AC **AND** Fingerstick Glucose (POCT), TID AC    levothyroxine tablet 75 mcg, Early Morning    [Held by provider] losartan (COZAAR) tablet 100 mg, Daily    pravastatin (PRAVACHOL) tablet 40 mg, Daily With Dinner    timolol (TIMOPTIC) 0.5 % ophthalmic solution 1 drop, BID    Administrative Statements   Today, Patient Was Seen By: Elizabeth Montiel MD      **Please Note: This note may have been constructed using a voice recognition system.**

## 2025-05-25 NOTE — ASSESSMENT & PLAN NOTE
Lab Results   Component Value Date    EGFR 33 05/25/2025    EGFR 37 05/24/2025    EGFR 38 05/23/2025    CREATININE 1.87 (H) 05/25/2025    CREATININE 1.72 (H) 05/24/2025    CREATININE 1.67 (H) 05/23/2025   Stable.  Baseline creatinine 1.7  Monitor BMP

## 2025-05-25 NOTE — ASSESSMENT & PLAN NOTE
PMH of CLL  Presents with SOB, tachypnea  Labs:   Elevated Pro-Jason, MRSA positive, RP 2 negative, BLD CTX -72 hours.  Elevated leukocyte count in the setting of CLL baseline 15,000's.  Strep pneumo/Legionella negative/COVID negative  Imaging:  CXR: Lateral patchy airspace disease suspect pulmonary edema versus infection.  CT chest: New pulmonary nodules and groundglass opacities suspicious for multifocal pneumonia.  Interstitial edema, bilateral pleural effusions, mediastinal adenopathy.  Repeat CXR 5/23: Mild interstitial edema with trace pleural effusions improved  Initially required continuous BiPAP and diuresis in the CCU.  D5 IV antibiotic.  Patient still desaturating 84 to 85% on RA, chest x-ray shows improvement in interstitial edema but still positive, completed 5 days IV antibiotic treatment.       Plan:  DC IV antibiotic.  Give 1 dose IV lasix 40 mg  Continue oxygen supplementation to maintain O2 sats above 90%  Spirometry.

## 2025-05-25 NOTE — ASSESSMENT & PLAN NOTE
Near-anatomic alignment status post ORIF for left intertrochanteric hip fracture with hyperattenuation noted in the region of the left vastus medialis suspicious for intramuscular hematoma after trauma and recent surgical intervention.   Status post IR embolization  Recent Labs     05/23/25  0423 05/24/25  0550 05/25/25  0538   HGB 7.9* 8.1* 7.8*     HB downtrend.  Will continue to monitor CBC and signs and symptoms of expansion

## 2025-05-25 NOTE — ASSESSMENT & PLAN NOTE
PTA regimen: Amlodipine 10 mg, carvedilol 6.25 twice daily, losartan 100 mg daily   Blood Pressure: 113/67  Continue carvedilol, will continue to hold the rest of his antihypertensive regimen due to softer blood pressures

## 2025-05-25 NOTE — ASSESSMENT & PLAN NOTE
Lab Results   Component Value Date    HGBA1C 7.9 (H) 03/18/2025       Recent Labs     05/24/25  1152 05/24/25  1630 05/24/25  2100 05/25/25  0716   POCGLU 308* 325* 309* 271*       Blood Sugar Average: Last 72 hrs:  (P) 233.5  Sliding-scale insulin ordered  Pre-meals 3 units TID  Hypoglycemia protocol

## 2025-05-26 PROBLEM — J96.01 ACUTE HYPOXIC RESPIRATORY FAILURE (HCC): Status: RESOLVED | Noted: 2024-06-07 | Resolved: 2025-05-26

## 2025-05-26 PROBLEM — J18.9 PNEUMONIA: Status: RESOLVED | Noted: 2024-06-07 | Resolved: 2025-05-26

## 2025-05-26 LAB
ANION GAP SERPL CALCULATED.3IONS-SCNC: 10 MMOL/L (ref 4–13)
ANISOCYTOSIS BLD QL SMEAR: PRESENT
BACTERIA BLD CULT: NORMAL
BACTERIA BLD CULT: NORMAL
BASOPHILS # BLD MANUAL: 0 THOUSAND/UL (ref 0–0.1)
BASOPHILS NFR MAR MANUAL: 0 % (ref 0–1)
BUN SERPL-MCNC: 28 MG/DL (ref 5–25)
CALCIUM SERPL-MCNC: 8.9 MG/DL (ref 8.4–10.2)
CHLORIDE SERPL-SCNC: 97 MMOL/L (ref 96–108)
CO2 SERPL-SCNC: 24 MMOL/L (ref 21–32)
CREAT SERPL-MCNC: 1.7 MG/DL (ref 0.6–1.3)
EOSINOPHIL # BLD MANUAL: 0.67 THOUSAND/UL (ref 0–0.4)
EOSINOPHIL NFR BLD MANUAL: 4 % (ref 0–6)
ERYTHROCYTE [DISTWIDTH] IN BLOOD BY AUTOMATED COUNT: 15.3 % (ref 11.6–15.1)
GFR SERPL CREATININE-BSD FRML MDRD: 37 ML/MIN/1.73SQ M
GIANT PLATELETS BLD QL SMEAR: PRESENT
GLUCOSE SERPL-MCNC: 213 MG/DL (ref 65–140)
GLUCOSE SERPL-MCNC: 250 MG/DL (ref 65–140)
GLUCOSE SERPL-MCNC: 257 MG/DL (ref 65–140)
GLUCOSE SERPL-MCNC: 316 MG/DL (ref 65–140)
GLUCOSE SERPL-MCNC: 320 MG/DL (ref 65–140)
HCT VFR BLD AUTO: 24.9 % (ref 36.5–49.3)
HGB BLD-MCNC: 8 G/DL (ref 12–17)
LYMPHOCYTES # BLD AUTO: 50 % (ref 14–44)
LYMPHOCYTES # BLD AUTO: 8.44 THOUSAND/UL (ref 0.6–4.47)
MCH RBC QN AUTO: 30 PG (ref 26.8–34.3)
MCHC RBC AUTO-ENTMCNC: 32.1 G/DL (ref 31.4–37.4)
MCV RBC AUTO: 93 FL (ref 82–98)
MONOCYTES # BLD AUTO: 0.34 THOUSAND/UL (ref 0–1.22)
MONOCYTES NFR BLD: 2 % (ref 4–12)
NEUTROPHILS # BLD MANUAL: 7.42 THOUSAND/UL (ref 1.85–7.62)
NEUTS SEG NFR BLD AUTO: 44 % (ref 43–75)
OVALOCYTES BLD QL SMEAR: PRESENT
PLATELET # BLD AUTO: 212 THOUSANDS/UL (ref 149–390)
PLATELET BLD QL SMEAR: ADEQUATE
PMV BLD AUTO: 9.3 FL (ref 8.9–12.7)
POIKILOCYTOSIS BLD QL SMEAR: PRESENT
POLYCHROMASIA BLD QL SMEAR: PRESENT
POTASSIUM SERPL-SCNC: 3.9 MMOL/L (ref 3.5–5.3)
RBC # BLD AUTO: 2.67 MILLION/UL (ref 3.88–5.62)
RBC MORPH BLD: PRESENT
SODIUM SERPL-SCNC: 131 MMOL/L (ref 135–147)
WBC # BLD AUTO: 16.87 THOUSAND/UL (ref 4.31–10.16)

## 2025-05-26 PROCEDURE — 85007 BL SMEAR W/DIFF WBC COUNT: CPT

## 2025-05-26 PROCEDURE — 85027 COMPLETE CBC AUTOMATED: CPT

## 2025-05-26 PROCEDURE — 97167 OT EVAL HIGH COMPLEX 60 MIN: CPT

## 2025-05-26 PROCEDURE — 97163 PT EVAL HIGH COMPLEX 45 MIN: CPT

## 2025-05-26 PROCEDURE — 82948 REAGENT STRIP/BLOOD GLUCOSE: CPT

## 2025-05-26 PROCEDURE — 80048 BASIC METABOLIC PNL TOTAL CA: CPT

## 2025-05-26 PROCEDURE — 97116 GAIT TRAINING THERAPY: CPT

## 2025-05-26 PROCEDURE — 99232 SBSQ HOSP IP/OBS MODERATE 35: CPT | Performed by: INTERNAL MEDICINE

## 2025-05-26 RX ADMIN — TIMOLOL MALEATE 1 DROP: 5 SOLUTION OPHTHALMIC at 21:34

## 2025-05-26 RX ADMIN — FERROUS SULFATE TAB 325 MG (65 MG ELEMENTAL FE) 325 MG: 325 (65 FE) TAB at 08:05

## 2025-05-26 RX ADMIN — TIMOLOL MALEATE 1 DROP: 5 SOLUTION OPHTHALMIC at 08:06

## 2025-05-26 RX ADMIN — APIXABAN 5 MG: 5 TABLET, FILM COATED ORAL at 21:24

## 2025-05-26 RX ADMIN — PRAVASTATIN SODIUM 40 MG: 40 TABLET ORAL at 17:12

## 2025-05-26 RX ADMIN — CARVEDILOL 6.25 MG: 6.25 TABLET, FILM COATED ORAL at 17:12

## 2025-05-26 RX ADMIN — CHLORHEXIDINE GLUCONATE 15 ML: 1.2 SOLUTION ORAL at 21:24

## 2025-05-26 RX ADMIN — INSULIN LISPRO 5 UNITS: 100 INJECTION, SOLUTION INTRAVENOUS; SUBCUTANEOUS at 17:12

## 2025-05-26 RX ADMIN — INSULIN LISPRO 3 UNITS: 100 INJECTION, SOLUTION INTRAVENOUS; SUBCUTANEOUS at 12:31

## 2025-05-26 RX ADMIN — CHLORHEXIDINE GLUCONATE 15 ML: 1.2 SOLUTION ORAL at 08:05

## 2025-05-26 RX ADMIN — INSULIN LISPRO 3 UNITS: 100 INJECTION, SOLUTION INTRAVENOUS; SUBCUTANEOUS at 08:06

## 2025-05-26 RX ADMIN — CARVEDILOL 6.25 MG: 6.25 TABLET, FILM COATED ORAL at 08:05

## 2025-05-26 RX ADMIN — LEVOTHYROXINE SODIUM 75 MCG: 75 TABLET ORAL at 06:00

## 2025-05-26 RX ADMIN — INSULIN LISPRO 5 UNITS: 100 INJECTION, SOLUTION INTRAVENOUS; SUBCUTANEOUS at 12:31

## 2025-05-26 RX ADMIN — INSULIN LISPRO 3 UNITS: 100 INJECTION, SOLUTION INTRAVENOUS; SUBCUTANEOUS at 17:12

## 2025-05-26 NOTE — ASSESSMENT & PLAN NOTE
PTA regimen: Amlodipine 10 mg, carvedilol 6.25 twice daily, losartan 100 mg daily   Blood Pressure: 115/73  Continue carvedilol, will continue to hold the rest of his antihypertensive regimen due to softer blood pressures

## 2025-05-26 NOTE — PLAN OF CARE
Problem: OCCUPATIONAL THERAPY ADULT  Goal: Performs self-care activities at highest level of function for planned discharge setting.  See evaluation for individualized goals.  Description: Treatment Interventions: ADL retraining, Functional transfer training, UE strengthening/ROM, Endurance training, Patient/family training, Equipment evaluation/education, Neuromuscular reeducation, Compensatory technique education, Continued evaluation, Activityengagement (cog assessment)          See flowsheet documentation for full assessment, interventions and recommendations.   5/26/2025 1433 by Crystal Martinez OT  Note: Limitation: Decreased ADL status, Decreased UE strength, Decreased Safe judgement during ADL, Decreased cognition, Decreased endurance, Decreased self-care trans, Decreased high-level ADLs (attention, memory, insight, safety awareness, problem solving, balance)  Prognosis: Good  Assessment: Patient is a 79 y.o. male seen for OT evaluation at [facility] following admission on 5/21/2025  s/p Acute hypoxic respiratory failure (HCC). Please see above for comprehensive list of comorbidities and significant PMHx impacting functional performance.  Upon initial evaluation, pt appears to be performing below baseline functional status.   Occupational performance is affected by the following deficits: endurance , decreased balance , decreased standing tolerance for self care tasks , impaired memory , attention to task, impaired judgement and problem solving , and impaired safety awareness . Personal/Environmental factors impacting D/C include: (+) Hx of falls . Supporting factors include: support system available Patient would benefit from OT services within the acute care setting to maximize level of functional independence in the following areas self-care transfers, functional mobility, formal cognitive evaluation, and ADLs.  From OT standpoint, recommendation at time of D/C would be Level 3: minimum  resource intensity  for OP cog and fitness to drive assessment.  Recommendation: (S) Geriatric Consult (geriatric referral for evaluation/management of cognitive decline.)  Rehab Resource Intensity Level, OT: III (Minimum Resource Intensity) (fitness to drive. Cog therapy)       5/26/2025 1433 by Crystal Martinez OT  Note: Limitation: Decreased ADL status, Decreased UE strength, Decreased Safe judgement during ADL, Decreased cognition, Decreased endurance, Decreased self-care trans, Decreased high-level ADLs (attention, memory, insight, safety awareness, problem solving, balance)  Prognosis: Good  Assessment: Patient is a 79 y.o. male seen for OT evaluation at [facility] following admission on 5/21/2025  s/p Acute hypoxic respiratory failure (HCC). Please see above for comprehensive list of comorbidities and significant PMHx impacting functional performance.  Upon initial evaluation, pt appears to be performing below baseline functional status.   Occupational performance is affected by the following deficits: endurance , decreased balance , decreased standing tolerance for self care tasks , impaired memory , attention to task, impaired judgement and problem solving , and impaired safety awareness . Personal/Environmental factors impacting D/C include: (+) Hx of falls . Supporting factors include: support system available Patient would benefit from OT services within the acute care setting to maximize level of functional independence in the following areas self-care transfers, functional mobility, formal cognitive evaluation, and ADLs.  From OT standpoint, recommendation at time of D/C would be Level 3: minimum resource intensity  for OP cog and fitness to drive assessment.  Recommendation: (S) Geriatric Consult (geriatric referral for evaluation/management of cognitive decline.)  Rehab Resource Intensity Level, OT: III (Minimum Resource Intensity) (fitness to drive. Cog therapy)

## 2025-05-26 NOTE — ASSESSMENT & PLAN NOTE
Lab Results   Component Value Date    EGFR 37 05/26/2025    EGFR 33 05/25/2025    EGFR 37 05/24/2025    CREATININE 1.70 (H) 05/26/2025    CREATININE 1.87 (H) 05/25/2025    CREATININE 1.72 (H) 05/24/2025   Stable.  Baseline creatinine 1.7  Monitor BMP

## 2025-05-26 NOTE — PLAN OF CARE
Problem: INFECTION - ADULT  Goal: Absence or prevention of progression during hospitalization  Description: INTERVENTIONS:  - Assess and monitor for signs and symptoms of infection  - Monitor lab/diagnostic results  - Monitor all insertion sites, i.e. indwelling lines, tubes, and drains  - Monitor endotracheal if appropriate and nasal secretions for changes in amount and color  - [place] appropriate cooling/warming therapies per order  - Administer medications as ordered  - Instruct and encourage patient and family to use good hand hygiene technique  - Identify and instruct in appropriate isolation precautions for identified infection/condition  Outcome: Progressing  Goal: Absence of fever/infection during neutropenic period  Description: INTERVENTIONS:  - Monitor WBC  - Perform strict hand hygiene  - Limit to healthy visitors only  - No plants, dried, fresh or silk flowers with roy in patient room  Outcome: Progressing     Problem: Nutrition/Hydration-ADULT  Goal: Nutrient/Hydration intake appropriate for improving, restoring or maintaining nutritional needs  Description: Monitor and assess patient's nutrition/hydration status for malnutrition. Collaborate with interdisciplinary team and initiate plan and interventions as ordered.  Monitor patient's weight and dietary intake as ordered or per policy. Utilize nutrition screening tool and intervene as necessary. Determine patient's food preferences and provide high-protein, high-caloric foods as appropriate.     INTERVENTIONS:  - Monitor oral intake, urinary output, labs, and treatment plans  - Assess nutrition and hydration status and recommend course of action  - Evaluate amount of meals eaten  - Assist patient with eating if necessary   - Allow adequate time for meals  - Recommend/ encourage appropriate diets, oral nutritional supplements, and vitamin/mineral supplements  - Order, calculate, and assess calorie counts as needed  - Recommend, monitor, and  adjust tube feedings and TPN/PPN based on assessed needs  - Assess need for intravenous fluids  - Provide specific nutrition/hydration education as appropriate  - Include patient/family/caregiver in decisions related to nutrition  Outcome: Progressing

## 2025-05-26 NOTE — HOSPITAL COURSE
"79-year-old male with PMH of CLL, A-fib on Eliquis, HTN, hypothyroidism.  Admitted with shortness of breath, dyspnea on exertion with associated cough.  Exam on admission showed by lobar rhonchi\" air movement bilaterally.  Chest x-ray showed bilateral patchy airspace disease likely pulmonary edema versus infection. labs notable for leukocytosis to 14,000, Hb 7.9, .  He was satting 86% placed on 2L NC.    Of note he recently fell fractured his femoral s/p ORIF but has been on Eliquis, making diagnosis of PE less likely cause of his shortness of breath.  Due to worsening anemia, contrast study of left hip was done to rule out any active site of bleeding, imaging was notable for hematoma.    His Eliquis was put on hold and IR consulted for abnormal left hip imaging, IR, was consulted for left hip hematoma/pseudoaneurysm, he eventually had a left lower extremity angiogram with stent placement and left profundofemoral artery.  He was noted to have a large pseudoaneurysm present arising from focal defects at the left profundofemoral artery.    During his stay in the ICU, patient required continuous BiPAP started on IV Vanco, Zosyn and azithromycin for presumed pneumonia and IV Lasix for pulmonary congestion.      His labs were negative for Legionella, pneumococcal, RP 2, positive for MRSA, blood cultures negative.    Antibiotic was de-escalated to cefepime following release lab results, and se completed a 7 day course.     Iron sats were found to be very low, he received 3 cycles of Venofer.    He continued to be diuresed, with decreasing weight by 21 pounds since admission.    On the floors, patient received 1 dose of IV Lasix with improvement in oxygen saturation, now on room air saturating in the high 90s.  Eliquis resumed on 5/26 to be monitored for 24 hours and stable for discharge if Hb remains stable.    He was assessed by PT and given level III.    Blood pressure medications were put on hold including " amlodipine, losartan due to soft blood pressures will recommend to follow-up with primary care physician prior to commencing them.

## 2025-05-26 NOTE — PROGRESS NOTES
Progress Note - Hospitalist   Name: Abelino Renee 79 y.o. male I MRN: [id number]  Unit/Bed#: S -01 I Date of Admission: 5/21/2025   Date of Service: 5/26/2025 I Hospital Day: 5    Assessment & Plan  Pseudoaneurysm (HCC)  Recent fall with fracture of his femoral s/p ORIF.  Has been on Eliquis.  CT contrast of left hip with no sign of active bleeding but was notable for hematoma.  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  Eliquis has since been on hold since admission.  Hb appears stable.    PLAN  Resume Eliquis today.  Monitor Hb within the next 24 hours.  PT to see today.  Possible discharge tomorrow    Recent Labs     05/24/25  0550 05/25/25  0538 05/26/25  0619   HGB 8.1* 7.8* 8.0*     Benign essential HTN  PTA regimen: Amlodipine 10 mg, carvedilol 6.25 twice daily, losartan 100 mg daily   Blood Pressure: 115/73  Continue carvedilol, will continue to hold the rest of his antihypertensive regimen due to softer blood pressures  Chronic lymphocytic leukemia (HCC)  History of CLL/SLL previously treated with 4 cycles of BR in June 2012.  Outpatient surveillance ongoing  Stage 3b chronic kidney disease (HCC)  Lab Results   Component Value Date    EGFR 37 05/26/2025    EGFR 33 05/25/2025    EGFR 37 05/24/2025    CREATININE 1.70 (H) 05/26/2025    CREATININE 1.87 (H) 05/25/2025    CREATININE 1.72 (H) 05/24/2025   Stable.  Baseline creatinine 1.7  Monitor BMP  Acute hypoxic respiratory failure (HCC) (Resolved: 5/26/2025)  Pneumonia (Resolved: 5/26/2025)  PMH of CLL  Presents with SOB, tachypnea  Labs:   Elevated Pro-Jason, MRSA positive, RP 2 negative, BLD CTX -72 hours.  Elevated leukocyte count in the setting of CLL baseline 15,000's.  Strep pneumo/Legionella negative/COVID negative  Imaging:  CXR: Lateral patchy  airspace disease suspect pulmonary edema versus infection.  CT chest: New pulmonary nodules and groundglass opacities suspicious for multifocal pneumonia.  Interstitial edema, bilateral pleural effusions, mediastinal adenopathy.  Repeat CXR 5/23: Mild interstitial edema with trace pleural effusions improved  Initially required continuous BiPAP and diuresis in the CCU.  D5 IV antibiotic.  Patient still desaturating 84 to 85% on RA, chest x-ray shows improvement in interstitial edema but still positive, completed 5 days IV antibiotic treatment.       Plan:  DC IV antibiotic.  Give 1 dose IV lasix 40 mg  Continue oxygen supplementation to maintain O2 sats above 90%  Spirometry.    Type 2 diabetes mellitus with diabetic polyneuropathy, without long-term current use of insulin (HCC)  Lab Results   Component Value Date    HGBA1C 7.9 (H) 03/18/2025       Recent Labs     05/25/25  1144 05/25/25  1606 05/25/25  2056 05/26/25  0736   POCGLU 363* 349* 282* 250*       Blood Sugar Average: Last 72 hrs:  (P) [id number]  Sliding-scale insulin ordered  Pre-meals 3 units TID  Hypoglycemia protocol  Hypothyroidism  Continue Synthroid 75 mcg daily  Persistent atrial fibrillation (HCC)  On Coreg 6.25 mg twice daily and Eliquis 5 mg twice daily  Eliquis held given evidence of left-sided pseudoaneurysm  Anemia  Recent Labs     05/24/25  0550 05/25/25  0538 05/26/25  0619   HGB 8.1* 7.8* 8.0*     At approximate baseline  Iron panel suggestive of at least in part some iron deficiency anemia as cause  Will start iron supplementation today     VTE Pharmacologic Prophylaxis:   Moderate Risk (Score 3-4) - Pharmacological DVT Prophylaxis Ordered: apixaban (Eliquis).    Mobility:   Basic Mobility Inpatient Raw Score: 13  JH-HLM Goal: 4: Move to chair/commode  JH-HLM Achieved: 4: Move to chair/commode  JH-HLM Goal achieved. Continue to encourage appropriate mobility.    Patient Centered Rounds: I performed bedside rounds with nursing  staff today.   Discussions with Specialists or Other Care Team Provider: Attending physician/IR    Education and Discussions with Family / Patient: Updated  (wife) via phone.    Current Length of Stay: 5 day(s)  Current Patient Status: Inpatient   Certification Statement: The patient will continue to require additional inpatient hospital stay due to to monitor hemodynamics  Discharge Plan: Anticipate discharge tomorrow to discharge location to be determined pending rehab evaluations.    Code Status: Level 1 - Full Code    Subjective   Patient seen and examined this morning, endorses mild cough, denies chest pains or shortness of breath.  Had 2 bowel movements this morning after Senokot was given last night, he had questions about possibility of driving, also states he does not want to go to rehab facility.       Objective :  Temp:  [97.9 °F (36.6 °C)-98.5 °F (36.9 °C)] 97.9 °F (36.6 °C)  HR:  [] 98  BP: (108-116)/(68-74) 115/73  Resp:  [16] 16  SpO2:  [87 %-96 %] 96 %  O2 Device: None (Room air)    Body mass index is 25.99 kg/m².     Input and Output Summary (last 24 hours):     Intake/Output Summary (Last 24 hours) at 5/26/2025 0906  Last data filed at 5/26/2025 0732  Gross per 24 hour   Intake 680 ml   Output 2730 ml   Net -2050 ml       Physical Exam  Vitals and nursing note reviewed.   Constitutional:       General: He is not in acute distress.     Appearance: He is well-developed.   HENT:      Head: Normocephalic and atraumatic.     Eyes:      Conjunctiva/sclera: Conjunctivae normal.       Cardiovascular:      Rate and Rhythm: Normal rate and regular rhythm.      Heart sounds: No murmur heard.  Pulmonary:      Effort: Pulmonary effort is normal. No respiratory distress.      Breath sounds: Normal breath sounds.   Abdominal:      Palpations: Abdomen is soft.      Tenderness: There is no abdominal tenderness.     Musculoskeletal:         General: No swelling.      Cervical back: Neck supple.       Right lower leg: No edema (trace).      Left lower leg: No edema (trace).     Skin:     General: Skin is warm and dry.      Capillary Refill: Capillary refill takes less than 2 seconds.     Neurological:      Mental Status: He is alert.     Psychiatric:         Mood and Affect: Mood normal.         Lines/Drains:              Lab Results: I have reviewed the following results:   Results from last 7 days   Lab Units 05/26/25  0619 05/25/25  0538 05/22/25  1515 05/22/25  0456   WBC Thousand/uL 16.87* 15.98*   < > 12.68*   HEMOGLOBIN g/dL 8.0* 7.8*   < > 7.2*   HEMATOCRIT % 24.9* 24.4*   < > 22.2*   PLATELETS Thousands/uL 212 198   < > 182   BANDS PCT %  --   --   --  1   LYMPHO PCT %  --  68*  --  54*   MONO PCT %  --  2*  --  2*   EOS PCT %  --  3  --  1    < > = values in this interval not displayed.     Results from last 7 days   Lab Units 05/26/25  0619 05/23/25  0423 05/22/25  0536   SODIUM mmol/L 131*   < > 137   POTASSIUM mmol/L 3.9   < > 3.8   CHLORIDE mmol/L 97   < > 102   CO2 mmol/L 24   < > 28   BUN mg/dL 28*   < > 29*   CREATININE mg/dL 1.70*   < > 1.46*   ANION GAP mmol/L 10   < > 7   CALCIUM mg/dL 8.9   < > 8.8   ALBUMIN g/dL  --   --  3.4*   TOTAL BILIRUBIN mg/dL  --   --  0.80   ALK PHOS U/L  --   --  82   ALT U/L  --   --  11   AST U/L  --   --  14   GLUCOSE RANDOM mg/dL 257*   < > 172*    < > = values in this interval not displayed.     Results from last 7 days   Lab Units 05/21/25  1236   INR  1.51*     Results from last 7 days   Lab Units 05/26/25  0736 05/25/25  2056 05/25/25  1606 05/25/25  1144 05/25/25  0716 05/24/25  2100 05/24/25  1630 05/24/25  1152 05/24/25  1150 05/24/25  0735 05/23/25  2120 05/23/25  1602   POC GLUCOSE mg/dl 250* 282* 349* 363* 271* 309* 325* 308* 313* 189* 248* 122         Results from last 7 days   Lab Units 05/25/25  0538 05/24/25  1255 05/21/25  1236 05/21/25  1104   LACTIC ACID mmol/L  --   --  1.1  --    PROCALCITONIN ng/ml 0.28* 0.29*  --  0.21       Recent  Cultures (last 7 days):   Results from last 7 days   Lab Units 05/21/25  1733 05/21/25  1236   BLOOD CULTURE   --  No Growth After 4 Days.  No Growth After 4 Days.   LEGIONELLA URINARY ANTIGEN  Negative  --              Last 24 Hours Medication List:     Current Facility-Administered Medications:     [Held by provider] amLODIPine (NORVASC) tablet 10 mg, Daily    [Held by provider] apixaban (ELIQUIS) tablet 5 mg, BID    carvedilol (COREG) tablet 6.25 mg, BID With Meals    chlorhexidine (PERIDEX) 0.12 % oral rinse 15 mL, Q12H MICHAEL    ferrous sulfate tablet 325 mg, Every Other Day    insulin lispro (HumALOG/ADMELOG) 100 units/mL subcutaneous injection 1-6 Units, TID AC **AND** Fingerstick Glucose (POCT), TID AC    insulin lispro (HumALOG/ADMELOG) 100 units/mL subcutaneous injection 3 Units, Daily With Breakfast    insulin lispro (HumALOG/ADMELOG) 100 units/mL subcutaneous injection 3 Units, Daily With Lunch    insulin lispro (HumALOG/ADMELOG) 100 units/mL subcutaneous injection 3 Units, Daily With Dinner    levothyroxine tablet 75 mcg, Early Morning    [Held by provider] losartan (COZAAR) tablet 100 mg, Daily    pravastatin (PRAVACHOL) tablet 40 mg, Daily With Dinner    senna-docusate sodium (SENOKOT S) 8.6-50 mg per tablet 1 tablet, HS    timolol (TIMOPTIC) 0.5 % ophthalmic solution 1 drop, BID    Administrative Statements   Today, Patient Was Seen By: Elizabeth Montiel MD      **Please Note: This note may have been constructed using a voice recognition system.**

## 2025-05-26 NOTE — ASSESSMENT & PLAN NOTE
Recent Labs     05/24/25  0550 05/25/25  0538 05/26/25  0619   HGB 8.1* 7.8* 8.0*     At approximate baseline  Iron panel suggestive of at least in part some iron deficiency anemia as cause  Will start iron supplementation today

## 2025-05-26 NOTE — PLAN OF CARE
Problem: PHYSICAL THERAPY ADULT  Goal: Performs mobility at highest level of function for planned discharge setting.  See evaluation for individualized goals.  Description: Treatment/Interventions: Functional transfer training, LE strengthening/ROM, Elevations, Therapeutic exercise, Endurance training, Cognitive reorientation, Patient/family training, Equipment eval/education, Bed mobility, Gait training, Spoke to nursing    Equipment Recommended: Walker     See flowsheet documentation for full assessment, interventions and recommendations.  Outcome: Progressing  Note: Prognosis: Good  Problem List: Decreased strength, Decreased range of motion, Decreased endurance, Impaired balance, Decreased mobility, Decreased cognition, Impaired judgement, Decreased safety awareness, Decreased skin integrity, Pain  Assessment: Pt seen for PT evaluation for mobility assessment & discharge needs. Pt admitted 5/21/2025 w/ SOB x3 days, dx Acute hypoxic respiratory failure (HCC). During PT IE, pt completes bed mobility with S in hospital bed, transfers STS with RW and S, and ambulates 112ft with RW and S. During addt'l treatment time, pt requests trial of SPC, as this is what he was able to use at home, however pt with significant instability and multiple LOBs with use of SPC, requiring CHLOÉ. Pt displays above outlined functional impairments & limitations, and presents below his baseline level of functional mobility. The AM-PAC & Barthel Index outcome tools were used to assist in determining pt safety w/ mobility/self care & appropriate d/c recommendations, see above for scores. Pt is at risk of falls d/t multiple comorbidities, impulsivity, h/o falls, impaired balance, impaired cognition, impaired insight/safety awareness, use of ambulatory aid, varying levels of pain, acuity of medical illness, ongoing medical treatment of primary dx, and polypharmacy. Pt's clinical presentation is currently unstable/unpredictable as seen in pt's  presentation of changing level of pain, varying levels of cognitive performance, increased fall risk, new onset of impairment of functional mobility, decreased endurance, and new onset of weakness. Pt will benefit from continued PT services in order to address impairments, decrease risk of falls, maximize independence w/ fnxl mobility, & ensure safety w/ mobility for transition to next level of care. Based on pt presentation & impairments, pt would most appropriately benefit from Level III (minimal PT intensity) resources upon d/c.      Rehab Resource Intensity Level, PT: III (Minimum Resource Intensity)    See flowsheet documentation for full assessment.

## 2025-05-26 NOTE — PLAN OF CARE
Problem: PAIN - ADULT  Goal: Verbalizes/displays adequate comfort level or baseline comfort level  Description: Interventions:  - Encourage patient to monitor pain and request assistance  - Assess pain using appropriate pain scale  - Administer analgesics as ordered based on type and severity of pain and evaluate response  - Implement non-pharmacological measures as appropriate and evaluate response  - Consider cultural and social influences on pain and pain management  - Notify physician/advanced practitioner if interventions unsuccessful or patient reports new pain  - Educate patient/family on pain management process including their role and importance of  reporting pain   - Provide non-pharmacologic/complimentary pain relief interventions  Outcome: Progressing     Problem: INFECTION - ADULT  Goal: Absence or prevention of progression during hospitalization  Description: INTERVENTIONS:  - Assess and monitor for signs and symptoms of infection  - Monitor lab/diagnostic results  - Monitor all insertion sites, i.e. indwelling lines, tubes, and drains  - Monitor endotracheal if appropriate and nasal secretions for changes in amount and color  - [place] appropriate cooling/warming therapies per order  - Administer medications as ordered  - Instruct and encourage patient and family to use good hand hygiene technique  - Identify and instruct in appropriate isolation precautions for identified infection/condition  Outcome: Progressing  Goal: Absence of fever/infection during neutropenic period  Description: INTERVENTIONS:  - Monitor WBC  - Perform strict hand hygiene  - Limit to healthy visitors only  - No plants, dried, fresh or silk flowers with roy in patient room  Outcome: Progressing     Problem: Knowledge Deficit  Goal: Patient/family/caregiver demonstrates understanding of disease process, treatment plan, medications, and discharge instructions  Description: Complete learning assessment and assess knowledge  base.  Interventions:  - Provide teaching at level of understanding  - Provide teaching via preferred learning methods  Outcome: Progressing     Problem: DISCHARGE PLANNING  Goal: Discharge to home or other facility with appropriate resources  Description: INTERVENTIONS:  - Identify barriers to discharge w/patient and caregiver  - Arrange for needed discharge resources and transportation as appropriate  - Identify discharge learning needs (meds, wound care, etc.)  - Arrange for interpretive services to assist at discharge as needed  - Refer to Case Management Department for coordinating discharge planning if the patient needs post-hospital services based on physician/advanced practitioner order or complex needs related to functional status, cognitive ability, or social support system  Outcome: Progressing     Problem: Nutrition/Hydration-ADULT  Goal: Nutrient/Hydration intake appropriate for improving, restoring or maintaining nutritional needs  Description: Monitor and assess patient's nutrition/hydration status for malnutrition. Collaborate with interdisciplinary team and initiate plan and interventions as ordered.  Monitor patient's weight and dietary intake as ordered or per policy. Utilize nutrition screening tool and intervene as necessary. Determine patient's food preferences and provide high-protein, high-caloric foods as appropriate.     INTERVENTIONS:  - Monitor oral intake, urinary output, labs, and treatment plans  - Assess nutrition and hydration status and recommend course of action  - Evaluate amount of meals eaten  - Assist patient with eating if necessary   - Allow adequate time for meals  - Recommend/ encourage appropriate diets, oral nutritional supplements, and vitamin/mineral supplements  - Order, calculate, and assess calorie counts as needed  - Recommend, monitor, and adjust tube feedings and TPN/PPN based on assessed needs  - Assess need for intravenous fluids  - Provide specific  nutrition/hydration education as appropriate  - Include patient/family/caregiver in decisions related to nutrition  Outcome: Progressing

## 2025-05-26 NOTE — ASSESSMENT & PLAN NOTE
Lab Results   Component Value Date    HGBA1C 7.9 (H) 03/18/2025       Recent Labs     05/25/25  1144 05/25/25  1606 05/25/25 2056 05/26/25  0736   POCGLU 363* 349* 282* 250*       Blood Sugar Average: Last 72 hrs:  (P) [id number]  Sliding-scale insulin ordered  Pre-meals 3 units TID  Hypoglycemia protocol

## 2025-05-26 NOTE — ASSESSMENT & PLAN NOTE
Recent fall with fracture of his femoral s/p ORIF.  Has been on Eliquis.  CT contrast of left hip with no sign of active bleeding but was notable for hematoma.  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  Eliquis has since been on hold since admission.  Hb appears stable.    PLAN  Resume Eliquis today.  Monitor Hb within the next 24 hours.  PT to see today.  Possible discharge tomorrow    Recent Labs     05/24/25  0550 05/25/25  0538 05/26/25  0619   HGB 8.1* 7.8* 8.0*

## 2025-05-26 NOTE — OCCUPATIONAL THERAPY NOTE
Occupational Therapy Evaluation     Patient Name: Abelino Renee  Today's Date: 5/26/2025  Problem List  Active Problems:    Benign essential HTN    Chronic lymphocytic leukemia (HCC)    Stage 3b chronic kidney disease (HCC)    Type 2 diabetes mellitus with diabetic polyneuropathy, without long-term current use of insulin (HCC)    Hypothyroidism    Closed fracture of neck of left femur (HCC)    Acute exacerbation of CHF (congestive heart failure) (HCC)    Persistent atrial fibrillation (HCC)    Anemia    Pseudoaneurysm (HCC)    Past Medical History  Past Medical History[1]  Past Surgical History  Past Surgical History[2]        05/26/25 1038   OT Last Visit   OT Visit Date 05/26/25   Note Type   Note type Evaluation   Restrictions/Precautions   Weight Bearing Precautions Per Order Yes   LLE Weight Bearing Per Order WBAT  (s/p IM nail on 3/17 by Dr Nevarez)   Other Precautions Cognitive;Bed Alarm;Chair Alarm;Fall Risk;Pain;WBS   Home Living   Type of Home House   Home Layout Two level;1/2 bath on main level;Bed/bath upstairs;Stairs to enter with rails   Bathroom Shower/Tub Tub/shower unit   Bathroom Toilet Standard  (standard on 1st floor and raised upstairs)   Bathroom Equipment Shower chair  (does not use)   Bathroom Accessibility Accessible  (1/2 on main)   Home Equipment Walker;Cane   Additional Comments DC from rehab in march following IM nail, has since transitioned from RW to SPC.   Prior Function   Level of Hand Independent with ADLs;Independent with functional mobility;Independent with IADLS   Lives With Spouse   Receives Help From Family  (not active with therapy, has been completing his HEP from rehab)   IADLs Independent with driving;Independent with meal prep;Independent with medication management  (Spouse A with laundry)   Falls in the last 6 months 1 to 4  (1; denies new falls since femur fx)   Vocational Retired   Lifestyle   Autonomy PTA pt is (I) c ADLs, IADLs, mod (I) c SPC. Lives c  spouse. + driving   Reciprocal Relationships spouse   Service to Others retired   General   Additional Pertinent History Pt admitted d/t pseudoaneurysm s/p IR embolization. s/p IM nail 3/17 L femur. Hx of Dm2, acute exacerbation of CHF, CKD, CLL, HLD, AFIB,   Family/Caregiver Present No   Subjective   Subjective Pt very tangential , difficulty redirecting during session   ADL   Eating Assistance 6  Modified independent   Grooming Assistance 5  Supervision/Setup   UB Bathing Assistance 5  Supervision/Setup   LB Bathing Assistance 4  Minimal Assistance   UB Dressing Assistance 5  Supervision/Setup   LB Dressing Assistance 4  Minimal Assistance   Toileting Assistance  4  Minimal Assistance   Functional Assistance 5  Supervision/Setup   Additional Comments Limited by dynamic standing tolerance   Bed Mobility   Supine to Sit 5  Supervision   Additional items Assist x 1;Increased time required;HOB elevated;Bedrails;Verbal cues   Sit to Supine   (pt seated OOB in recliner at end of session)   Additional Comments overall fair sitting balance EOB.   Transfers   Sit to Stand 5  Supervision   Additional items Assist x 1;Increased time required;Verbal cues   Stand to Sit 5  Supervision   Additional items Assist x 1;Increased time required;Verbal cues   Stand pivot 5  Supervision   Additional items Assist x 1;Increased time required;Verbal cues   Additional Comments cues for hand placements   Functional Mobility   Functional Mobility 5  Supervision   Additional Comments household distances with RW , trialed SPC with regression to Min A. increased balance deficits when pt attempting to talk and walk at same time. Education for divided attention to balance tasks   Additional items Rolling walker   Balance   Static Sitting Good   Dynamic Sitting Fair   Static Standing Fair  (c RW)   Dynamic Standing Fair  (c RW)   Activity Tolerance   Activity Tolerance Patient tolerated treatment well   Medical Staff Made Aware Care coordination c  PT Velvet   Nurse Made Aware RYANNE STARK Assessment   RUE Assessment WFL  (MMT grossly 4/5 based on functional assessment)   LUE Assessment   LUE Assessment WFL  (MMT grossly 4/5 based on functional assessment)   Hand Function   Gross Motor Coordination Functional   Fine Motor Coordination Functional   Sensation   Light Touch No apparent deficits   Vision-Basic Assessment   Current Vision Wears glasses all the time   Cognition   Overall Cognitive Status Impaired   Arousal/Participation Alert   Attention Difficulty attending to directions  (very poor attention despite persistent redirections. Unable to multitask)   Orientation Level Oriented to place   Memory Decreased recall of precautions;Decreased short term memory   Following Commands Follows one step commands with increased time or repetition   Comments (S)  Very poor sustained attention, difficulty educating patient with poor comprehension demonsrated, poor acceptance overall. highly suggest ACLS and Fitness to drive assessment (pt reports still regularly driving).   Assessment   Limitation Decreased ADL status;Decreased UE strength;Decreased Safe judgement during ADL;Decreased cognition;Decreased endurance;Decreased self-care trans;Decreased high-level ADLs  (attention, memory, insight, safety awareness, problem solving, balance)   Prognosis Good   Assessment Patient is a 79 y.o. male seen for OT evaluation at [facility] following admission on 5/21/2025  s/p Acute hypoxic respiratory failure (HCC). Please see above for comprehensive list of comorbidities and significant PMHx impacting functional performance.  Upon initial evaluation, pt appears to be performing below baseline functional status.   Occupational performance is affected by the following deficits: endurance , decreased balance , decreased standing tolerance for self care tasks , impaired memory , attention to task, impaired judgement and problem solving , and impaired safety  awareness . Personal/Environmental factors impacting D/C include: (+) Hx of falls . Supporting factors include: support system available Patient would benefit from OT services within the acute care setting to maximize level of functional independence in the following areas self-care transfers, functional mobility, formal cognitive evaluation, and ADLs.  From OT standpoint, recommendation at time of D/C would be Level 3: minimum resource intensity  for OP cog and fitness to drive assessment.   Goals   Patient Goals to return home   Plan   Treatment Interventions ADL retraining;Functional transfer training;UE strengthening/ROM;Endurance training;Patient/family training;Equipment evaluation/education;Neuromuscular reeducation;Compensatory technique education;Continued evaluation;Activityengagement  (cog assessment)   Goal Expiration Date 06/05/25   OT Treatment Day 0   OT Frequency 2-3x/wk   Discharge Recommendation   Recommendation (S)  Geriatric Consult  (geriatric referral for evaluation/management of cognitive decline.)   Rehab Resource Intensity Level, OT III (Minimum Resource Intensity)  (fitness to drive. Cog therapy)   Additional Comments  The patient's raw score on the AM-PAC Daily Activity Inpatient Short Form is 19. A raw score of greater than or equal to 19 suggests the patient may benefit from discharge to home. Please refer to the recommendation of the Occupational Therapist for safe discharge planning.   AM-PAC Daily Activity Inpatient   Lower Body Dressing 3   Bathing 3   Toileting 3   Upper Body Dressing 3   Grooming 3   Eating 4   Daily Activity Raw Score 19   Daily Activity Standardized Score (Calc for Raw Score >=11) 40.22   AM-PAC Applied Cognition Inpatient   Following a Speech/Presentation 1   Understanding Ordinary Conversation 2   Taking Medications 2   Remembering Where Things Are Placed or Put Away 2   Remembering List of 4-5 Errands 2   Taking Care of Complicated Tasks 1   Applied Cognition  Raw Score 10   Applied Cognition Standardized Score 24.98   End of Consult   Education Provided Yes   Patient Position at End of Consult Bed/Chair alarm activated;All needs within reach;Bedside chair   Nurse Communication Nurse aware of consult   Goals established on initial evaluation in order to achieve pt's goal of returning home      Pt will complete UB ADLs Mod independent   for increased ADL independence within 10 days.     Pt will complete LB ADLs Mod independent   for increased ADL independence within 10 days.     Pt will complete toileting Mod independent   with use of DME for increased ADL independence within 10 days.     Pt will demonstrate proper body mechanics to complete self-care transfers and functional mobility with Mod independent  and use of LRAD for increased safety and functional independence within 10 days.     Pt will demonstrate proper body mechanics and fall prevention strategies during 100% of tx sessions for increased safety awareness during ADL/IADLs    Pt will attend to treatment task or activity for 4 minutes without need for redirection to improve activity engagement within 10 days.     Pt will participate in ongoing cognitive assessments to assist with safe D/C planning and supervision/assistance recommendations.     Pt will dispense simulated medications into weekly pill box with 100% dosing accuracy and 0 VC's for improved safety and independence during medication management upon D/C       Pt benefited from co-session of skilled OT and PT therapists in order to most appropriately address functional deficits d/t decreased activity tolerance.  OT/PT objectives were addressed separately; please see PT note for specific goal areas targeted.    Crystal Martinez, OT            [1]   Past Medical History:  Diagnosis Date    Acute congestive heart failure, unspecified heart failure type (HCC) 03/07/2024    Arthritis     Spine    Cancer (HCC)     Cancer of appendix (HCC) 2015     "appendectomy-colon resection    Chronic lymphocytic leukemia of B-cell type (HCC)     \"remission 4-5yrs\"-chemo    DDD (degenerative disc disease), cervical     Diabetes mellitus (HCC)     bs checked by pt at home at 6am =92    Diabetic neuropathy (HCC)     Diabetic retinopathy (HCC)     Elevated WBC count     Heart attack (HCC) 04/2015    \"labeled as that and than tested later after appendix removed and stress test showed negative\"    History of cancer chemotherapy     last treatment approx 5yrs ago    Hypertension     Lymphoma (HCC)     Myocardial infarction (HCC) 04/2015    Scalp psoriasis     Shingles 2/10-15    Toe injury     left great toe, - 2017-internal braec-to be removed today 3/30/2018    Wears glasses    [2]   Past Surgical History:  Procedure Laterality Date    APPENDECTOMY      BONE BIOPSY Left 3/30/2018    Procedure: GREAT TOE BONE BIOPSY;  Surgeon: Gerber Multani DPM;  Location: [facility];  Service: Podiatry    BOWEL RESECTION      CATARACT EXTRACTION Bilateral     CHOLECYSTECTOMY      COLECTOMY  06/2015    EYE SURGERY Right     \"retinal peel\"    FOOT FUSION Left 12/6/2017    Procedure: HALLUX INTERPHALANGEAL JOINT FUSION;  Surgeon: Gerber Multani DPM;  [facility];  Service: Podiatry    HARDWARE REMOVAL Left 4/18/2018    Procedure: REMOVAL STAPLES LEFT TOE;  Surgeon: Gerber Multani DPM;  Location: [facility];  Service: Podiatry    IR EMBOLIZATION (SPECIFY VESSEL OR SITE)  5/23/2025    ORIF FOOT FRACTURE Left 12/6/2017    Procedure: HALLUX INTERPHALANGEAL JOINT INTERNAL FIXATION; REPAIR OF ULCERATION WITH EXCISIONS AND REVISION OF SKIN HALLUX WITH USE OF ALLOGRAFT;  Surgeon: Gerber Multani DPM;  [facility];  Service: Podiatry    AZ COLONOSCOPY FLX DX W/COLLJ SPEC WHEN PFRMD N/A 4/21/2016    Procedure: COLONOSCOPY;  Surgeon: Minal Ruelas DO;  Location: [facility];  Service: Gastroenterology    AZ OPTX FEM SHFT FX W/INSJ IMED IMPLT W/WO SCREW Left 3/18/2025    Procedure: " INSERTION NAIL IM FEMUR ANTEGRADE (TROCHANTERIC);  Surgeon: Jacqui Nevarez DO;  Location: [facility];  Service: Orthopedics    IL REMOVAL IMPLANT DEEP Left 3/30/2018    Procedure: REMOVAL HARDWARE FOOT;  Surgeon: Gerber Multani DPM;  [facility];  Service: Podiatry    RETINAL DETACHMENT SURGERY      TONSILLECTOMY

## 2025-05-26 NOTE — PHYSICAL THERAPY NOTE
PHYSICAL THERAPY EVALUATION & TREATMENT  DATE: 05/26/25  TIME: 5706-9528    NAME:  Abelino Renee  AGE:   79 y.o.  Mrn:   [id number]  Length Of Stay: 5    ADMIT DX:  CHF (congestive heart failure) (HCC) [I50.9]  SOB (shortness of breath) [R06.02]  Anemia [D64.9]  Diverticular disease [K57.90]  Hypoxia [R09.02]  PNA (pneumonia) [J18.9]  Hematoma of left hip, initial encounter [S70.02XA]  Sepsis (HCC) [A41.9]  Symptomatic anemia [D64.9]  Type 2 diabetes mellitus with diabetic polyneuropathy, without long-term current use of insulin (HCC) [E11.42]  Stage 3b chronic kidney disease (HCC) [N18.32]  Acute congestive heart failure, unspecified heart failure type (HCC) [I50.9]  Acute hypoxic respiratory failure (HCC) [J96.01]    Past Medical History[1]  Past Surgical History[2]    Performed at least 2 patient identifiers during session: Name, Birthday, ID bracelet, and Epic photo     05/26/25 1102   PT Last Visit   PT Visit Date 05/26/25   Note Type   Note type Evaluation  (& treatment)   Pain Assessment   Pain Assessment Tool Dietz-Baker FACES   Dietz-Baker FACES Pain Rating 4   Pain Location/Orientation Orientation: Left;Location: Knee   Pain Onset/Description Frequency: Intermittent;Descriptor: Sore   Effect of Pain on Daily Activities limits gait tolerance and gait stability   Patient's Stated Pain Goal No pain   Hospital Pain Intervention(s) Medication (See MAR);Repositioned;Ambulation/increased activity;Emotional support   Multiple Pain Sites No   Restrictions/Precautions   Weight Bearing Precautions Per Order Yes   LLE Weight Bearing Per Order WBAT  (s/p L femur IM nail 3/17/25 by Dr. Nevarez)   Other Precautions Cognitive;Chair Alarm;Bed Alarm;WBS;Fall Risk;Pain   Home Living   Type of Home House   Home Layout Two level;Stairs to enter with rails;1/2 bath on main level;Bed/bath upstairs  (2SH with 3STE, 1/2 bathroom first floor, bedroom/full bathroom 2nd floor)   Bathroom Shower/Tub Tub/shower unit   Bathroom Toilet  "Standard  (standard toilet on 1st floor and raised toilet on 2nd floor)   Bathroom Equipment Shower chair  (does not use at baseline)   Bathroom Accessibility Accessible   Home Equipment Walker;Cane   Prior Function   Level of [place] Independent with ADLs;Independent with functional mobility;Independent with IADLS   Lives With Spouse   Receives Help From Family  (denies participating with any PT services currently, reports doing his own HEP)   IADLs Independent with driving;Independent with meal prep;Independent with medication management  (spouse assists with laundry)   Falls in the last 6 months 1 to 4  (pt reports 1 fall; denies any since d/c from SNF [facility])   Vocational Retired   Comments Pt reports that after SNF STR, he was d/c'd home utilizing RW and transitioned self to SPC, then was also able to ambulate short distances with no AD. Pt denies getting any HHPT vs OPPT since d/c from SNF STR.   General   Additional Pertinent History Pt is a 79 yr old male admitted 5/21/25 w/ c/o SOB x3 days; Dx: acute hypoxic respiratory failure. PMH includes: L femur IM nail 3/2025, arthritis, CHF, CLL, DDD, DM, diabetic neuropathy, diabetic retinopathy, MI, HTN, shingles, L foot fusion 2017, Afib.   Family/Caregiver Present No   Cognition   Overall Cognitive Status Impaired   Arousal/Participation Cooperative   Orientation Level Oriented X4   Memory Decreased recall of precautions;Decreased short term memory   Following Commands Follows one step commands with increased time or repetition   Comments Pt with very poor sustained attention to task and conversation at hand; highly tangential and difficult to redirect at times; poor acceptance of therapist education on recommendations and safety.   Subjective   Subjective \"My wife won't be ok with any therapy. She would have to put trash bags on the car seats each time we go out of the house.\"   RUE Assessment   RUE Assessment WFL   LUE Assessment   LUE Assessment WFL   RLE " Assessment   RLE Assessment WFL   LLE Assessment   LLE Assessment X  (recent L femur IM nail 3/2025; grossly 3-/5 to 3+/5 MMT throughout)   Vision-Basic Assessment   Current Vision Wears glasses all the time   Patient Visual Report Other (Comment)  (no acute visual changes)   Coordination   Movements are Fluid and Coordinated 1   Sensation WFL   Light Touch   RLE Light Touch Grossly intact   LLE Light Touch Grossly intact   Proprioception   RLE Proprioception Grossly intact   LLE Proprioception Grossly Intact   Bed Mobility   Supine to Sit 5  Supervision   Additional items Assist x 1;HOB elevated;Bedrails;Increased time required;Verbal cues   Sit to Supine   (NT as pt was left seated OOB in recliner chair with alarm engaged at end of session)   Additional Comments Pt with overall fair sitting balance at EOB. Pt denies lightheadedness/dizziness.   Transfers   Sit to Stand 5  Supervision   Additional items Assist x 1;Increased time required;Verbal cues  (RW)   Stand to Sit 5  Supervision   Additional items Assist x 1;Increased time required;Verbal cues;Armrests  (RW)   Stand pivot 5  Supervision   Additional items Assist x 1;Increased time required;Verbal cues  (RW; cues for surface proximity, body positioning, RW management during approach to target surface)   Ambulation/Elevation   Gait pattern Decreased foot clearance;Short stride;Decreased hip extension;Decreased toe off   Gait Assistance 5  Supervision   Additional items Assist x 1;Verbal cues   Assistive Device Rolling walker   Distance 112ft x1   Stair Management Assistance Not tested  (pt has + NATALIA his home and full flight of steps to access 2nd floor of home)   Ambulation/Elevation Additional Comments Addt'l treatment time completed below, with use of SPC, see below for details.   Balance   Static Sitting Good   Dynamic Sitting Fair   Static Standing Fair  (w/ RW)   Dynamic Standing Fair  (w/ RW)   Ambulatory Fair  (w/ RW)   Endurance Deficit   Endurance  Deficit Yes   Activity Tolerance   Activity Tolerance Patient tolerated treatment well   Medical Staff Made Aware Spoke with OT, RN   Assessment   Prognosis Good   Problem List Decreased strength;Decreased range of motion;Decreased endurance;Impaired balance;Decreased mobility;Decreased cognition;Impaired judgement;Decreased safety awareness;Decreased skin integrity;Pain   Assessment Pt seen for PT evaluation for mobility assessment & discharge needs. Pt admitted 5/21/2025 w/ SOB x3 days, dx Acute hypoxic respiratory failure (HCC). During PT IE, pt completes bed mobility with S in hospital bed, transfers STS with RW and S, and ambulates 112ft with RW and S. During addt'l treatment time, pt requests trial of SPC, as this is what he was able to use at home, however pt with significant instability and multiple LOBs with use of SPC, requiring CHLOÉ. Pt displays above outlined functional impairments & limitations, and presents below his baseline level of functional mobility. The AM-PAC & Barthel Index outcome tools were used to assist in determining pt safety w/ mobility/self care & appropriate d/c recommendations, see above for scores. Pt is at risk of falls d/t multiple comorbidities, impulsivity, h/o falls, impaired balance, impaired cognition, impaired insight/safety awareness, use of ambulatory aid, varying levels of pain, acuity of medical illness, ongoing medical treatment of primary dx, and polypharmacy. Pt's clinical presentation is currently unstable/unpredictable as seen in pt's presentation of changing level of pain, varying levels of cognitive performance, increased fall risk, new onset of impairment of functional mobility, decreased endurance, and new onset of weakness. Pt will benefit from continued PT services in order to address impairments, decrease risk of falls, maximize independence w/ fnxl mobility, & ensure safety w/ mobility for transition to next level of care. Based on pt presentation &  "impairments, pt would most appropriately benefit from Level III (minimal PT intensity) resources upon d/c.   Goals   Patient Goals \"to go home\"   [facility] Expiration Date 06/09/25   Short Term Goal #1 Pt will: complete all bed mobility independently in flat bed in order to promote increased OOB functional mobility and simulate home environment; complete all transfers with LRAD at MIKE level in order to increase safety with functional mobility; ambulate >150ft with LRAD and S in order to increase safety with household and community distance functional mobility; negotiate 12-14 stairs with HR assist + LRAD and S in order to facilitate safe access to all areas of his home; demonstrate understanding and independence with LE strengthening HEP; improve ambulatory balance to >/= good grade with LRAD in order to promote safety and increased independence with mobility; tolerate >3hrs OOB in upright position, in order to improve muscular endurance and respiratory status; improve AM-PAC score to >/= 22/24 in order to increase independence with mobility and decrease burden of care; improve Barthel Index score to >/= 80/100 in order to increase independence and decrease risk of falls.   PT Treatment Day 1   Plan   Treatment/Interventions Functional transfer training;LE strengthening/ROM;Elevations;Therapeutic exercise;Endurance training;Cognitive reorientation;Patient/family training;Equipment eval/education;Bed mobility;Gait training;Spoke to nursing   PT Frequency 2-3x/wk   Discharge Recommendation   Rehab Resource Intensity Level, PT III (Minimum Resource Intensity)   Equipment Recommended Walker   Walker Package Recommended Wheeled walker   Change/add to Walker Package? No   AM-PAC Basic Mobility Inpatient   Turning in Flat Bed Without Bedrails 3   Lying on Back to Sitting on Edge of Flat Bed Without Bedrails 3   Moving Bed to Chair 3   Standing Up From Chair Using Arms 3   Walk in Room 3   Climb 3-5 Stairs With Railing 2   Basic " "Mobility Inpatient Raw Score 17   Basic Mobility Standardized Score 39.67   [facility] Highest Level Of Mobility   -[facility] Goal 5: Stand one or more mins   -[facility] Achieved 7: Walk 25 feet or more   Modified Garfield Scale   Modified Garfield Scale 3   Barthel Index   Feeding 10   Bathing 0   Grooming Score 5   Dressing Score 5   Bladder Score 10   Bowels Score 10   Toilet Use Score 5   Transfers (Bed/Chair) Score 10   Mobility (Level Surface) Score 10   Stairs Score 5   Barthel Index Score 70   Additional Treatment Session   Start Time 1050   End Time 1102   Treatment Assessment Pt is agreeable to participate in additional gait training post IE, requests trial ambulation with SPC as pt reports he was able to \"graduate himself to the cane at home\". Pt requires CHLOÉ for ambulation of addt'l 70ft with SPC, multiple episodes of LOBs laterally, significant instability, inability to multitask or sustain focus on ambulation. Each time pt engaged in conversation, pt with significant instability. Therapist providing extensive education re: sustained attention to task, focus on gait mechanics, and recommendation of continued RW use for all aspects of functional mobility -- however pt with poor acceptance of education. At end of session pt was left seated OOB in recliner chair with alarm engaged and needs in reach. Regarding functional mobility, pt displays functional capacity for return to home with RW use for all mobility, and ongoing PT services. Continue to recommend Level III (minimal PT intensity) resources once medically cleared for d/c from the acute care setting. Will continue skilled PT POC as able and appropriate to address functional impairments and progress towards therapy goals.   Additional Treatment Day 1   End of Consult   Patient Position at End of Consult Bedside chair;Bed/Chair alarm activated;All needs within reach     This session, pt required and most appropriately benefited from partial or full skilled " "PT/OT co-eval due to extensive physical assistance of SKILLED therapists, cognitive-communication impairments, significant regression from baseline level of mobility, decreased activity tolerance, and unpredictable medical and/or functional status. PT and OT goals were addressed separately as seen in documentation.    Based on patient's [facility] Highest Level of Mobility scores today, patient currently has a goal of -[facility] Levels: 7: WALK 25 FEET OR MORE, to be completed with RN staffing each shift, in order to improve overall activity tolerance and mobility, combat hospital related deconditioning, and maximize outcomes for d/c from the acute care setting.     The patient's AM-PAC Basic Mobility Inpatient Short Form Raw Score is 17. A Raw score of greater than 16 suggests the patient may benefit from discharge to home. Please also refer to the recommendation of the Physical Therapist for safe discharge planning.      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    Chronic lymphocytic leukemia of B-cell type (HCC)     \"remission 4-5yrs\"-chemo    DDD (degenerative disc disease), cervical     Diabetes mellitus (HCC)     bs checked by pt at home at 6am =92    Diabetic neuropathy (HCC)     Diabetic retinopathy (HCC)     Elevated WBC count     Heart attack (HCC) 04/2015    \"labeled as that and than tested later after appendix removed and stress test showed negative\"    History of cancer chemotherapy     last treatment approx 5yrs ago    Hypertension     Lymphoma (HCC)     Myocardial infarction (HCC) 04/2015    Scalp psoriasis     Shingles 2/10-15    Toe injury     left great toe, - 2017-internal braec-to be removed today " "3/30/2018    Wears glasses    [2]   Past Surgical History:  Procedure Laterality Date    APPENDECTOMY      BONE BIOPSY Left 3/30/2018    Procedure: GREAT TOE BONE BIOPSY;  Surgeon: Gerber Multani DPM;  Location: [facility];  Service: Podiatry    BOWEL RESECTION      CATARACT EXTRACTION Bilateral     CHOLECYSTECTOMY      COLECTOMY  06/2015    EYE SURGERY Right     \"retinal peel\"    FOOT FUSION Left 12/6/2017    Procedure: HALLUX INTERPHALANGEAL JOINT FUSION;  Surgeon: Gerber Multani DPM;  [facility];  Service: Podiatry    HARDWARE REMOVAL Left 4/18/2018    Procedure: REMOVAL STAPLES LEFT TOE;  Surgeon: Gerber Multani DPM;  [facility];  Service: Podiatry    IR EMBOLIZATION (SPECIFY VESSEL OR SITE)  5/23/2025    ORIF FOOT FRACTURE Left 12/6/2017    Procedure: HALLUX INTERPHALANGEAL JOINT INTERNAL FIXATION; REPAIR OF ULCERATION WITH EXCISIONS AND REVISION OF SKIN HALLUX WITH USE OF ALLOGRAFT;  Surgeon: Gerber Multani DPM;  [facility];  Service: Podiatry    OK COLONOSCOPY FLX DX W/COLLJ SPEC WHEN PFRMD N/A 4/21/2016    Procedure: COLONOSCOPY;  Surgeon: Minal Ruelas DO;  Location: [facility];  Service: Gastroenterology    OK OPTX FEM SHFT FX W/INSJ IMED IMPLT W/WO SCREW Left 3/18/2025    Procedure: INSERTION NAIL IM FEMUR ANTEGRADE (TROCHANTERIC);  Surgeon: Jacqui Nevarez DO;  Location: [facility];  Service: Orthopedics    OK REMOVAL IMPLANT DEEP Left 3/30/2018    Procedure: REMOVAL HARDWARE FOOT;  Surgeon: Gerber Multani DPM;  [facility];  Service: Podiatry    RETINAL DETACHMENT SURGERY      TONSILLECTOMY       "

## 2025-05-27 ENCOUNTER — TELEPHONE (OUTPATIENT)
Age: 80
End: 2025-05-27

## 2025-05-27 VITALS
HEART RATE: 87 BPM | SYSTOLIC BLOOD PRESSURE: 126 MMHG | RESPIRATION RATE: 19 BRPM | HEIGHT: 66 IN | DIASTOLIC BLOOD PRESSURE: 76 MMHG | TEMPERATURE: 98.5 F | WEIGHT: 160 LBS | OXYGEN SATURATION: 97 % | BODY MASS INDEX: 25.71 KG/M2

## 2025-05-27 DIAGNOSIS — S72.002S HIP FRACTURE, LEFT, SEQUELA: Primary | ICD-10-CM

## 2025-05-27 PROBLEM — R93.89 ABNORMAL CT OF THE CHEST: Status: ACTIVE | Noted: 2025-05-27

## 2025-05-27 LAB
ANION GAP SERPL CALCULATED.3IONS-SCNC: 8 MMOL/L (ref 4–13)
ANISOCYTOSIS BLD QL SMEAR: PRESENT
BASOPHILS # BLD MANUAL: 0.17 THOUSAND/UL (ref 0–0.1)
BASOPHILS NFR MAR MANUAL: 1 % (ref 0–1)
BUN SERPL-MCNC: 30 MG/DL (ref 5–25)
CALCIUM SERPL-MCNC: 9.1 MG/DL (ref 8.4–10.2)
CHLORIDE SERPL-SCNC: 99 MMOL/L (ref 96–108)
CO2 SERPL-SCNC: 25 MMOL/L (ref 21–32)
CREAT SERPL-MCNC: 1.63 MG/DL (ref 0.6–1.3)
EOSINOPHIL # BLD MANUAL: 0.68 THOUSAND/UL (ref 0–0.4)
EOSINOPHIL NFR BLD MANUAL: 4 % (ref 0–6)
ERYTHROCYTE [DISTWIDTH] IN BLOOD BY AUTOMATED COUNT: 15 % (ref 11.6–15.1)
GFR SERPL CREATININE-BSD FRML MDRD: 39 ML/MIN/1.73SQ M
GLUCOSE SERPL-MCNC: 222 MG/DL (ref 65–140)
GLUCOSE SERPL-MCNC: 223 MG/DL (ref 65–140)
GLUCOSE SERPL-MCNC: 365 MG/DL (ref 65–140)
GLUCOSE SERPL-MCNC: 386 MG/DL (ref 65–140)
HCT VFR BLD AUTO: 27.6 % (ref 36.5–49.3)
HGB BLD-MCNC: 8.8 G/DL (ref 12–17)
LG PLATELETS BLD QL SMEAR: PRESENT
LYMPHOCYTES # BLD AUTO: 57 % (ref 14–44)
LYMPHOCYTES # BLD AUTO: 9.85 THOUSAND/UL (ref 0.6–4.47)
MCH RBC QN AUTO: 30.1 PG (ref 26.8–34.3)
MCHC RBC AUTO-ENTMCNC: 31.9 G/DL (ref 31.4–37.4)
MCV RBC AUTO: 95 FL (ref 82–98)
MONOCYTES # BLD AUTO: 0.51 THOUSAND/UL (ref 0–1.22)
MONOCYTES NFR BLD: 3 % (ref 4–12)
NEUTROPHILS # BLD MANUAL: 5.78 THOUSAND/UL (ref 1.85–7.62)
NEUTS SEG NFR BLD AUTO: 34 % (ref 43–75)
OVALOCYTES BLD QL SMEAR: PRESENT
PLATELET # BLD AUTO: 216 THOUSANDS/UL (ref 149–390)
PLATELET BLD QL SMEAR: ADEQUATE
PMV BLD AUTO: 9.3 FL (ref 8.9–12.7)
POIKILOCYTOSIS BLD QL SMEAR: PRESENT
POLYCHROMASIA BLD QL SMEAR: PRESENT
POTASSIUM SERPL-SCNC: 4.2 MMOL/L (ref 3.5–5.3)
RBC # BLD AUTO: 2.92 MILLION/UL (ref 3.88–5.62)
RBC MORPH BLD: PRESENT
SODIUM SERPL-SCNC: 132 MMOL/L (ref 135–147)
VARIANT LYMPHS # BLD AUTO: 1 %
WBC # BLD AUTO: 16.99 THOUSAND/UL (ref 4.31–10.16)

## 2025-05-27 PROCEDURE — 80048 BASIC METABOLIC PNL TOTAL CA: CPT

## 2025-05-27 PROCEDURE — 99232 SBSQ HOSP IP/OBS MODERATE 35: CPT | Performed by: INTERNAL MEDICINE

## 2025-05-27 PROCEDURE — 99239 HOSP IP/OBS DSCHRG MGMT >30: CPT | Performed by: INTERNAL MEDICINE

## 2025-05-27 PROCEDURE — 85007 BL SMEAR W/DIFF WBC COUNT: CPT

## 2025-05-27 PROCEDURE — 82948 REAGENT STRIP/BLOOD GLUCOSE: CPT

## 2025-05-27 PROCEDURE — 85027 COMPLETE CBC AUTOMATED: CPT

## 2025-05-27 RX ORDER — TORSEMIDE 5 MG/1
2.5 TABLET ORAL DAILY
Status: DISCONTINUED | OUTPATIENT
Start: 2025-05-28 | End: 2025-05-27 | Stop reason: HOSPADM

## 2025-05-27 RX ORDER — LOSARTAN POTASSIUM 50 MG/1
100 TABLET ORAL DAILY
Status: DISCONTINUED | OUTPATIENT
Start: 2025-05-27 | End: 2025-05-27 | Stop reason: HOSPADM

## 2025-05-27 RX ADMIN — INSULIN LISPRO 3 UNITS: 100 INJECTION, SOLUTION INTRAVENOUS; SUBCUTANEOUS at 08:07

## 2025-05-27 RX ADMIN — LOSARTAN POTASSIUM 100 MG: 50 TABLET, FILM COATED ORAL at 17:15

## 2025-05-27 RX ADMIN — INSULIN LISPRO 6 UNITS: 100 INJECTION, SOLUTION INTRAVENOUS; SUBCUTANEOUS at 17:16

## 2025-05-27 RX ADMIN — INSULIN LISPRO 3 UNITS: 100 INJECTION, SOLUTION INTRAVENOUS; SUBCUTANEOUS at 17:16

## 2025-05-27 RX ADMIN — INSULIN LISPRO 2 UNITS: 100 INJECTION, SOLUTION INTRAVENOUS; SUBCUTANEOUS at 08:06

## 2025-05-27 RX ADMIN — CHLORHEXIDINE GLUCONATE 15 ML: 1.2 SOLUTION ORAL at 08:07

## 2025-05-27 RX ADMIN — PRAVASTATIN SODIUM 40 MG: 40 TABLET ORAL at 17:15

## 2025-05-27 RX ADMIN — INSULIN LISPRO 3 UNITS: 100 INJECTION, SOLUTION INTRAVENOUS; SUBCUTANEOUS at 12:52

## 2025-05-27 RX ADMIN — CARVEDILOL 6.25 MG: 6.25 TABLET, FILM COATED ORAL at 08:06

## 2025-05-27 RX ADMIN — LEVOTHYROXINE SODIUM 75 MCG: 75 TABLET ORAL at 05:21

## 2025-05-27 RX ADMIN — TIMOLOL MALEATE 1 DROP: 5 SOLUTION OPHTHALMIC at 08:07

## 2025-05-27 RX ADMIN — CARVEDILOL 6.25 MG: 6.25 TABLET, FILM COATED ORAL at 17:15

## 2025-05-27 RX ADMIN — APIXABAN 5 MG: 5 TABLET, FILM COATED ORAL at 08:06

## 2025-05-27 RX ADMIN — INSULIN LISPRO 6 UNITS: 100 INJECTION, SOLUTION INTRAVENOUS; SUBCUTANEOUS at 12:51

## 2025-05-27 NOTE — ASSESSMENT & PLAN NOTE
5/21/25 CT chest:  New diffuse irregular shaped pulmonary nodules and groundglass opacities suspicious for multifocal pneumonia. Recommend short interval follow-up in 6 to 8 weeks.  Mild interstitial edema with small bilateral pleural effusions.  Mediastinal lymphadenopathy. Attention on follow-up.  Patient advised to follow-up with pulm in 4 weeks and have a repeat CT chest in 6 weeks.  Pulm referral sent.  However patient states that he sees a pulmonologist at [facility] and wishes to follow-up with them.  Patient is aware that a CT scan has been ordered and states he will undergo repeat imaging as recommended

## 2025-05-27 NOTE — TELEPHONE ENCOUNTER
Reviewed- pt is still currently hospitalized and they already have appts set up with a new PCP at [facility] and new specialists all thorugh [facility]    I'll put in the referral per request of the patient     Ok to cancel his appointment w/ me on 6/24/25 since he has a new patient visit through [facility] soon after that.

## 2025-05-27 NOTE — ASSESSMENT & PLAN NOTE
Lab Results   Component Value Date    EGFR 39 05/27/2025    EGFR 37 05/26/2025    EGFR 33 05/25/2025    CREATININE 1.63 (H) 05/27/2025    CREATININE 1.70 (H) 05/26/2025    CREATININE 1.87 (H) 05/25/2025     Stable.  Baseline creatinine 1.7  Repeat BMP in 1 week

## 2025-05-27 NOTE — ASSESSMENT & PLAN NOTE
Recent Labs     05/25/25  0538 05/26/25  0619 05/27/25  0520   HGB 7.8* 8.0* 8.8*     At approximate baseline  Iron panel suggestive of at least in part some iron deficiency anemia as cause  Recommend iron supplementation every other day.  Patient declined prescription opting to buy it over-the-counter

## 2025-05-27 NOTE — ASSESSMENT & PLAN NOTE
Recent fall with fracture of his femoral s/p ORIF.  Has been on Eliquis.  CT contrast of left hip with no sign of active bleeding but was notable for hematoma.  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  Hb appears stable.    PLAN  Hemoglobin has been stable with resumption of Eliquis.   No sign of active bleeding, or hematoma.  IR access sites well-appearing.    Follow-up with vascular as an outpatient contact information was provided      Recent Labs     05/25/25  0538 05/26/25  0619 05/27/25  0520   HGB 7.8* 8.0* 8.8*

## 2025-05-27 NOTE — DISCHARGE INSTR - AVS FIRST PAGE
Dear Abelino Renee,     It was our pleasure to care for you here at [facility].  It is our hope that we were always able to exceed the expected standards for your care during your stay.  You were hospitalized due to pneumonia and pseudoaneurysm. You were cared for on the fourth floor by Aileen Denson MD under the service of Kranthi Purcell MD with the [facility] who covers for your primary care physician (PCP), Edel Wright MD, while you were hospitalized. If you have any questions or concerns related to this hospitalization, you may contact us at .  For follow up as well as any medication refills, we recommend that you follow up with your primary care physician.  A registered nurse will reach out to you by phone within a few days after your discharge to answer any additional questions that you may have after going home.  However, at this time we provide for you here, the most important instructions / recommendations at discharge:      Notable Medication Adjustments -   Continue your home dose of torsemide and carvedilol.  Please hold your amlodipine until you follow-up with your primary care provider  Please hold your losartan until you follow-up with your primary care provider  Start taking ferrous sulfate 325 mg, 1 tablet, every other day. You can buy these over the counter  Testing Required after Discharge -   Please have a basic metabolic panel repeated within 1 week of discharge.  Your primary care provider can order this.  Please have a CT chest repeated in 6-8 weeks.  This test has been ordered for you.  Important follow up information -   Please follow-up with your primary care provider within 1 week of discharge  Please follow-up with pulmonary as an outpatient in 4 weeks  Please make a follow-up appointment with the vascular center as soon as possible. Their contact information has been provided to you  Other Instructions  -   Please contact your primary care provider or return to the emergency department if you experience new or worsening symptoms including but not limited to shortness of breath, leg pain, fevers, chills.  Please monitor your blood pressure at home prior to taking your blood pressure medications.  Please contact your primary care provider if the top number of your blood pressure is below 110.  Please review this entire after visit summary as additional general instructions including medication list, appointments, activity, diet, any pertinent wound care, and other additional recommendations from your care team that may be provided for you.    Sincerely,     Aileen Denson MD

## 2025-05-27 NOTE — PROGRESS NOTES
Progress Note - Pulmonology   Name: Abelino Renee 79 y.o. male I MRN: [id number]  Unit/Bed#: S -01 I Date of Admission: 5/21/2025   Date of Service: 5/27/2025 I Hospital Day: 6      Assessment/Plan:    Acute hypoxic respiratory failure: Patient presented on May 21 with acute hypoxic respiratory failure, not previously on any home O2.  He was found to be volume overloaded consistent with an acute CHF exacerbation as well as to have a concurrent multifocal consolidative process consistent with pneumonia.  He initially required ICU level of care for increased work of breathing and hypoxia requiring continuous BiPAP.  He received IV diuresis and IV antibiotics, with improvement of his symptoms.  Currently, the patient is on room air, with dramatic improvement of his symptomology.  Continue supportive care, incentive parameter  Maintain saturations above 90%  Multifocal pneumonia: Patient coming in with acute hypoxic respiratory failure, found to have multifocal pneumonia on chest CT. imaging also showing multiple lung nodules as well as small bilateral pleural effusions and mediastinal lymphadenopathy.  Patient was found to be MRSA positive.  He did visit a friend at her nursing home in the past and has had a previous bout of pneumonia last year.  RP 2 negative, cultures negative, strep and Legionella antigens negative.  Procalcitonin was mildly elevated at 0.28, and 0.29.  Patient received only 4 days of IV vancomycin, 5 days of cefepime, 3 days of Zithromax, and 1 dose of Zosyn.  Last dose of vancomycin was 3 days ago.  Recommend repeating CT chest in 6 to 8 weeks  Patient should follow-up with pulmonary office in July after he completes his imaging  Heart failure with preserved ejection fraction: Previous history of stress cardiomyopathy in 2015, with improved ejection fraction.  Follows with Dr. Guzman.  EF 50 to 55%, RVSP 55 mmHg, as well as LA dilation although in the setting of acute volume overload.   Patient has been diuresed appropriately, with significant drop in weight, and improvement in symptomology.  On exam, he is currently euvolemic.  Restart diuretic when medically appropriate, primary team to start torsemide 2.5 mg daily tomorrow  Atrial fibrillation on Eliquis  History of left femur fracture status post ORIF March 2025  History of CLL status post chemo many years ago, currently on surveillance imaging.  He does have a chronic white count elevation, currently at 17, as well as lymphocyte predominance.  Patient follows with oncology at [facility].    24 Hour Events : None  Subjective : Patient denies any complaints.  He states that he feels great, night and day compared to when he come in.    Objective :  Temp:  [97.6 °F (36.4 °C)-98.5 °F (36.9 °C)] 98.5 °F (36.9 °C)  HR:  [83-98] 87  BP: (113-134)/(61-76) 126/76  Resp:  [18-19] 19  SpO2:  [95 %-97 %] 97 %  O2 Device: None (Room air)    Physical Exam  Vitals and nursing note reviewed.   Constitutional:       General: He is not in acute distress.     Appearance: He is well-developed.   HENT:      Head: Normocephalic and atraumatic.     Eyes:      Conjunctiva/sclera: Conjunctivae normal.       Cardiovascular:      Rate and Rhythm: Normal rate and regular rhythm.      Heart sounds: No murmur heard.     No friction rub. No gallop.   Pulmonary:      Effort: Pulmonary effort is normal. No respiratory distress.      Breath sounds: Normal breath sounds. No wheezing or rales.   Abdominal:      General: There is no distension.      Palpations: Abdomen is soft.      Tenderness: There is no abdominal tenderness. There is no guarding.     Musculoskeletal:         General: No swelling.      Cervical back: Neck supple.      Right lower leg: No edema.      Left lower leg: No edema.     Skin:     General: Skin is warm and dry.      Capillary Refill: Capillary refill takes less than 2 seconds.     Neurological:      Mental Status: He is alert.     Psychiatric:          Mood and Affect: Mood normal.           Lab Results: I have reviewed the following results:   .     05/27/25  0520   WBC 16.99*   HGB 8.8*   HCT 27.6*      SODIUM 132*   K 4.2   CL 99   CO2 25   BUN 30*   CREATININE 1.63*   GLUC 222*     ABG: No new results in last 24 hours.    Imaging Results Review: I reviewed radiology reports from this admission including: CT chest.  Other Study Results Review: No additional pertinent studies reviewed.  PFT Results Reviewed:

## 2025-05-27 NOTE — TELEPHONE ENCOUNTER
Hilary Gonzáles notifying of referral. Also notified them they may cancel upcoming appointments with Dr. Wright if they are transferring to [facility] for primary care.

## 2025-05-27 NOTE — ASSESSMENT & PLAN NOTE
PTA regimen: Amlodipine 10 mg, carvedilol 6.25 twice daily, losartan 100 mg daily   Blood Pressure: 126/76    Continue carvedilol and torsemide  Hold amlodipine and losartan until follow-up appointment with PCP within 1 week

## 2025-05-27 NOTE — PLAN OF CARE
Problem: Potential for Falls  Goal: Patient will remain free of falls  Description: INTERVENTIONS:  - Educate patient/family on patient safety including physical limitations  - Instruct patient to call for assistance with activity   - Consider consulting OT/PT to assist with strengthening/mobility based on AM PAC & JH-HLM score  - Consult OT/PT to assist with strengthening/mobility   - Keep Call bell within reach  - Keep bed low and locked with side rails adjusted as appropriate  - Keep care items and personal belongings within reach  - Initiate and maintain comfort rounds  - Make Fall Risk Sign visible to staff  - Offer Toileting every 2 Hours, in advance of need  - Initiate/Maintain alarm  - Obtain necessary fall risk management equipment:   - Apply yellow socks and bracelet for high fall risk patients  - Consider moving patient to room near nurses station  Outcome: Progressing     Problem: PAIN - ADULT  Goal: Verbalizes/displays adequate comfort level or baseline comfort level  Description: Interventions:  - Encourage patient to monitor pain and request assistance  - Assess pain using appropriate pain scale  - Administer analgesics as ordered based on type and severity of pain and evaluate response  - Implement non-pharmacological measures as appropriate and evaluate response  - Consider cultural and social influences on pain and pain management  - Notify physician/advanced practitioner if interventions unsuccessful or patient reports new pain  - Educate patient/family on pain management process including their role and importance of  reporting pain   - Provide non-pharmacologic/complimentary pain relief interventions  Outcome: Progressing     Problem: INFECTION - ADULT  Goal: Absence or prevention of progression during hospitalization  Description: INTERVENTIONS:  - Assess and monitor for signs and symptoms of infection  - Monitor lab/diagnostic results  - Monitor all insertion sites, i.e. indwelling lines,  tubes, and drains  - Monitor endotracheal if appropriate and nasal secretions for changes in amount and color  - [place] appropriate cooling/warming therapies per order  - Administer medications as ordered  - Instruct and encourage patient and family to use good hand hygiene technique  - Identify and instruct in appropriate isolation precautions for identified infection/condition  Outcome: Progressing  Goal: Absence of fever/infection during neutropenic period  Description: INTERVENTIONS:  - Monitor WBC  - Perform strict hand hygiene  - Limit to healthy visitors only  - No plants, dried, fresh or silk flowers with roy in patient room  Outcome: Progressing     Problem: SAFETY ADULT  Goal: Patient will remain free of falls  Description: INTERVENTIONS:  - Educate patient/family on patient safety including physical limitations  - Instruct patient to call for assistance with activity   - Consider consulting OT/PT to assist with strengthening/mobility based on AM PAC & -HLM score  - Consult OT/PT to assist with strengthening/mobility   - Keep Call bell within reach  - Keep bed low and locked with side rails adjusted as appropriate  - Keep care items and personal belongings within reach  - Initiate and maintain comfort rounds  - Make Fall Risk Sign visible to staff  - Offer Toileting every 2 Hours, in advance of need  - Initiate/Maintain alarm  - Obtain necessary fall risk management equipment:   - Apply yellow socks and bracelet for high fall risk patients  - Consider moving patient to room near nurses station  Outcome: Progressing  Goal: Maintain or return to baseline ADL function  Description: INTERVENTIONS:  -  Assess patient's ability to carry out ADLs; assess patient's baseline for ADL function and identify physical deficits which impact ability to perform ADLs (bathing, care of mouth/teeth, toileting, grooming, dressing, etc.)  - Assess/evaluate cause of self-care deficits   - Assess range of motion  - Assess  patient's mobility; develop plan if impaired  - Assess patient's need for assistive devices and provide as appropriate  - Encourage maximum independence but intervene and supervise when necessary  - Involve family in performance of ADLs  - Assess for home care needs following discharge   - Consider OT consult to assist with ADL evaluation and planning for discharge  - Provide patient education as appropriate  - Monitor functional capacity and physical performance, use of AM PAC & JH-HLM   - Monitor gait, balance and fatigue with ambulation    Outcome: Progressing  Goal: Maintains/Returns to pre admission functional level  Description: INTERVENTIONS:  - Perform AM-PAC 6 Click Basic Mobility/ Daily Activity assessment daily.  - Set and communicate daily mobility goal to care team and patient/family/caregiver.   - Collaborate with rehabilitation services on mobility goals if consulted  - Perform Range of Motion 2 times a day.  - Reposition patient every 2 hours.  - Dangle patient 2 times a day  - Stand patient 2 times a day  - Ambulate patient 2 times a day  - Out of bed to chair 2 times a day   - Out of bed for meals 2 times a day  - Out of bed for toileting  - Record patient progress and toleration of activity level   Outcome: Progressing     Problem: DISCHARGE PLANNING  Goal: Discharge to home or other facility with appropriate resources  Description: INTERVENTIONS:  - Identify barriers to discharge w/patient and caregiver  - Arrange for needed discharge resources and transportation as appropriate  - Identify discharge learning needs (meds, wound care, etc.)  - Arrange for interpretive services to assist at discharge as needed  - Refer to Case Management Department for coordinating discharge planning if the patient needs post-hospital services based on physician/advanced practitioner order or complex needs related to functional status, cognitive ability, or social support system  Outcome: Progressing     Problem:  Knowledge Deficit  Goal: Patient/family/caregiver demonstrates understanding of disease process, treatment plan, medications, and discharge instructions  Description: Complete learning assessment and assess knowledge base.  Interventions:  - Provide teaching at level of understanding  - Provide teaching via preferred learning methods  Outcome: Progressing     Problem: Nutrition/Hydration-ADULT  Goal: Nutrient/Hydration intake appropriate for improving, restoring or maintaining nutritional needs  Description: Monitor and assess patient's nutrition/hydration status for malnutrition. Collaborate with interdisciplinary team and initiate plan and interventions as ordered.  Monitor patient's weight and dietary intake as ordered or per policy. Utilize nutrition screening tool and intervene as necessary. Determine patient's food preferences and provide high-protein, high-caloric foods as appropriate.     INTERVENTIONS:  - Monitor oral intake, urinary output, labs, and treatment plans  - Assess nutrition and hydration status and recommend course of action  - Evaluate amount of meals eaten  - Assist patient with eating if necessary   - Allow adequate time for meals  - Recommend/ encourage appropriate diets, oral nutritional supplements, and vitamin/mineral supplements  - Order, calculate, and assess calorie counts as needed  - Recommend, monitor, and adjust tube feedings and TPN/PPN based on assessed needs  - Assess need for intravenous fluids  - Provide specific nutrition/hydration education as appropriate  - Include patient/family/caregiver in decisions related to nutrition  Outcome: Progressing     Problem: Prexisting or High Potential for Compromised Skin Integrity  Goal: Skin integrity is maintained or improved  Description: INTERVENTIONS:  - Identify patients at risk for skin breakdown  - Assess and monitor skin integrity including under and around medical devices   - Assess and monitor nutrition and hydration status  -  Monitor labs  - Assess for incontinence   - Turn and reposition patient  - Assist with mobility/ambulation  - Relieve pressure over irina prominences   - Avoid friction and shearing  - Provide appropriate hygiene as needed including keeping skin clean and dry  - Evaluate need for skin moisturizer/barrier cream  - Collaborate with interdisciplinary team  - Patient/family teaching  - Consider wound care consult    Assess:  - Review Arron scale daily  - Clean and moisturize skin  - Inspect skin when repositioning, toileting, and assisting with ADLS  - Assess under medical devices   - Assess extremities for adequate circulation and sensation     Bed Management:  - Have minimal linens on bed & keep smooth, unwrinkled  - Change linens as needed when moist or perspiring  - Avoid sitting or lying in one position for more than 2 hours while in bed?Keep HOB at 30 degrees   - Toileting:  - Offer bedside commode  - Assess for incontinence   - Use incontinent care products after each incontinent episode     Activity:  - Mobilize patient 2 times a day  - Encourage activity and walks on unit  - Encourage or provide ROM exercises   - Turn and reposition patient every 2 Hours  - Use appropriate equipment to lift or move patient in bed  - Instruct/ Assist with weight shifting when out of bed in chair  - Consider limitation of chair time 2 hour intervals    Skin Care:  - Avoid use of baby powder, tape, friction and shearing, hot water or constrictive clothing  - Relieve pressure over bony prominences  - Do not massage red bony areas    Next Steps:  - Teach patient strategies to minimize risks   - Consider consults to  interdisciplinary teams   Outcome: Progressing

## 2025-05-27 NOTE — CASE MANAGEMENT
Case Management Discharge Planning Note    Patient name Abelino Renee  Location S /S -01 MRN [id number]  : 1945 Date 2025       Current Admission Date: 2025  Current Admission Diagnosis:Chronic lymphocytic leukemia (HCC)   Patient Active Problem List    Diagnosis Date Noted    Pseudoaneurysm (HCC) 2025    Anemia 2025    Hyponatremia 2025    RAINE (acute kidney injury) (HCC) 2025    Type 2 diabetes mellitus with stage 3b chronic kidney disease and hypertension (HCC) 2025    ABLA (acute blood loss anemia) 2025    Closed fracture of neck of left femur (HCC) 2025    Persistent atrial fibrillation (HCC) 2025    Type 2 diabetes mellitus with diabetic chronic kidney disease ([facility]) 2024    Monoclonal gammopathy 2024    Rash 2024    Platelets decreased ([facility]) 2024    Diabetic nephropathy associated with type 2 diabetes mellitus ([facility]) 2021    Hypothyroidism 01/15/2021    Elevated serum protein level 2021    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    Status post transmetatarsal amputation of left foot ([facility]) 2019    Benign essential HTN 2018    Stage 3b chronic kidney disease (HCC) 2018    Arthritis 2016    Hx of malignant carcinoid tumor of large intestine 2016    H/O Clostridium difficile infection 2015    Acute exacerbation of CHF (congestive heart failure) ([facility]) 2015    Chronic lymphocytic leukemia (HCC) 2013    Type 2 diabetes mellitus with diabetic polyneuropathy, without long-term current use of insulin ([facility]) 2012      LOS (days): 6  Geometric Mean LOS (GMLOS) (days): 4.9  Days to GMLOS:-1.1     OBJECTIVE:  Risk of Unplanned Readmission Score: 26.12         Current admission status: Inpatient   Preferred Pharmacy:   [facility] - [place], [place] - [address]  [address] [address]  Phone:  [phone] Fax: [phone]    [place] [address]  Phone: [phone] Fax: [phone]    Primary Care Provider: Edel Wright MD    Primary Insurance: MEDICARE  Secondary Insurance: COMMERCIAL MISCELLANEOUS    DISCHARGE DETAILS:                                                                                        IMM reviewed with patient and caregiver, patient and caregiver agrees with discharge determination.  IMM Given (Date):: 05/27/25  IMM Given to:: Patient  Family notified:: patient and patient's wife Nivia

## 2025-05-27 NOTE — ASSESSMENT & PLAN NOTE
Lab Results   Component Value Date    HGBA1C 7.9 (H) 03/18/2025       Recent Labs     05/26/25 2057 05/27/25  0753 05/27/25  1126 05/27/25  1507   POCGLU 213* 223* 365* 386*       Blood Sugar Average: Last 72 hrs:  (P) 298.875  Resume home medications

## 2025-05-27 NOTE — DISCHARGE SUMMARY
Discharge Summary - Hospitalist   Name: Abelino Renee 79 y.o. male I MRN: [id number]  Unit/Bed#: S -01 I Date of Admission: 5/21/2025   Date of Service: 5/27/2025 I Hospital Day: 6     Assessment & Plan  Pseudoaneurysm (HCC)  Recent fall with fracture of his femoral s/p ORIF.  Has been on Eliquis.  CT contrast of left hip with no sign of active bleeding but was notable for hematoma.  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  Hb appears stable.    PLAN  Hemoglobin has been stable with resumption of Eliquis.   No sign of active bleeding, or hematoma.  IR access sites well-appearing.    Follow-up with vascular as an outpatient contact information was provided      Recent Labs     05/25/25  0538 05/26/25  0619 05/27/25  0520   HGB 7.8* 8.0* 8.8*     Abnormal CT of the chest  5/21/25 CT chest:  New diffuse irregular shaped pulmonary nodules and groundglass opacities suspicious for multifocal pneumonia. Recommend short interval follow-up in 6 to 8 weeks.  Mild interstitial edema with small bilateral pleural effusions.  Mediastinal lymphadenopathy. Attention on follow-up.  Patient advised to follow-up with pulm in 4 weeks and have a repeat CT chest in 6 weeks.  Pulm referral sent.  However patient states that he sees a pulmonologist at [facility] and wishes to follow-up with them.  Patient is aware that a CT scan has been ordered and states he will undergo repeat imaging as recommended  Benign essential HTN  PTA regimen: Amlodipine 10 mg, carvedilol 6.25 twice daily, losartan 100 mg daily   Blood Pressure: 126/76    Continue carvedilol and torsemide  Hold amlodipine and losartan until follow-up appointment with PCP within 1 week  Chronic lymphocytic leukemia (HCC)  History of CLL/SLL previously treated with 4 cycles  of  in June 2012.  Outpatient surveillance ongoing  Stage 3b chronic kidney disease (HCC)  Lab Results   Component Value Date    EGFR 39 05/27/2025    EGFR 37 05/26/2025    EGFR 33 05/25/2025    CREATININE 1.63 (H) 05/27/2025    CREATININE 1.70 (H) 05/26/2025    CREATININE 1.87 (H) 05/25/2025     Stable.  Baseline creatinine 1.7  Repeat BMP in 1 week  Type 2 diabetes mellitus with diabetic polyneuropathy, without long-term current use of insulin (HCC)  Lab Results   Component Value Date    HGBA1C 7.9 (H) 03/18/2025       Recent Labs     05/26/25  2057 05/27/25  0753 05/27/25  1126 05/27/25  1507   POCGLU 213* 223* 365* 386*       Blood Sugar Average: Last 72 hrs:  (P) 298.875  Resume home medications  Hypothyroidism  Continue Synthroid 75 mcg daily  Persistent atrial fibrillation (HCC)  On Coreg 6.25 mg twice daily and Eliquis 5 mg twice daily  Continue home medications  Anemia  Recent Labs     05/25/25  0538 05/26/25  0619 05/27/25  0520   HGB 7.8* 8.0* 8.8*     At approximate baseline  Iron panel suggestive of at least in part some iron deficiency anemia as cause  Recommend iron supplementation every other day.  Patient declined prescription opting to buy it over-the-counter     Medical Problems       Resolved Problems  Date Reviewed: 5/21/2025          Resolved    * (Principal) Acute hypoxic respiratory failure (HCC) 5/26/2025     Resolved by  Elizabeth Montiel MD    Pneumonia 5/26/2025     Resolved by  Elizabeth Montiel MD        Discharging Physician / Practitioner: Aileen Denson MD  PCP: Edel Wright MD  Admission Date:   Admission Orders (From admission, onward)       Ordered        05/21/25 1353  INPATIENT ADMISSION  Once                          Discharge Date: 05/27/25    Next Steps for Physician/AP Assuming Care:  Patient will need outpatient follow-up with pulmonology as well as vascular    Test Results Pending at Discharge (will require follow up):  None    Medication Changes for Discharge & Rationale:    See after visit summary for reconciled discharge medications provided to patient and/or family.     Consultations During Hospital Stay:  Vascular surgery  IR  Pulm    Procedures Performed:   IR intervention for pseudoaneurysm with stent placement    Significant Findings / Test Results:   XR chest portable   Final Result by Pooja El MD (05/24 1544)      Mild interstitial edema with trace pleural effusions.      Persistent patchy bilateral nodularity compared with the chest CT from 5/21/2024, new from 3/17/2024, infectious or inflammatory.            Workstation performed: [id number]         VAS DUPLEX EVALUATION FOR PSEUDOANEURYSM   Final Result by Wale Scruggs MD (05/24 9035)      IR embolization (specify vessel or site)   Final Result by Juan M Ohara MD (05/24 5462)   Impression:   Large pseudoaneurysm present arising from focal defect at the left profunda femoral artery. The left profunda femoral artery was stented using a 6 mm x 19 mm VBX stent to cover the defect. Completion angiogram showed no further pseudoaneurysm present.      Workstation performed: [id number]          VAS VENOUS DUPLEX - LOWER LIMB BILATERAL   Final Result by Wale Scruggs MD (05/22 1320)      CT chest wo contrast   Final Result by Yuan Lemus MD (05/21 1614)      CT lower extremity w contrast left   Final Result by Sang Peñaloza MD (05/21 1302)   Near-anatomic alignment status post ORIF for left intertrochanteric hip fracture with hyperattenuation noted in the region of the left vastus medialis suspicious for intramuscular hematoma after trauma and recent surgical intervention.         Workstation performed: [id number]         XR chest 1 view portable   ED Interpretation by Damon Bowens DO (05/21 1141)         Right-sided infiltrate.      Final Result by Emilio Denis MD (05/21 1215)      Bilateral patchy airspace disease may be on the basis of pulmonary edema versus infection.             Workstation performed: [id number]              Incidental Findings:   CT Chest: New diffuse irregular shaped pulmonary nodules and groundglass opacities suspicious for multifocal pneumonia. Recommend short interval follow-up in 6 to 8 weeks.   I reviewed the above mentioned incidental findings with the patient and/or family and they expressed understanding.    Hospital Course:   Abelino Renee is a 79 y.o. male w/ PMH of CLL, A-fib on Eliquis, HTN, hypothyroidism who originally presented to the hospital on 5/21/2025 due to shortness of breath, dyspnea on exertion with associated cough.    Chest x-ray showed bilateral patchy airspace disease likely pulmonary edema versus infection. labs notable for leukocytosis to 14,000, Hb 7.9, .  He was satting 86% placed on 2L NC.    Of note he recently fell fractured his femoral s/p ORIF but has been on Eliquis, making diagnosis of PE less likely cause of his shortness of breath.  Due to worsening anemia, contrast study of left hip was done to rule out any active site of bleeding, imaging was notable for hematoma.    His Eliquis was put on hold and IR consulted for abnormal left hip imaging, IR, was consulted for left hip hematoma/pseudoaneurysm, he eventually had a left lower extremity angiogram with stent placement in left profunda femoral artery.  He was noted to have a large pseudoaneurysm present arising from focal defects at the left profundofemoral artery.    During his stay in the ICU, patient required continuous BiPAP started on IV Vanco, Zosyn and azithromycin for presumed pneumonia and IV Lasix for pulmonary congestion.      His labs were negative for Legionella, pneumococcal, RP2, positive for MRSA, blood cultures negative.    Antibiotic was de-escalated to cefepime following release lab results, and he completed a 7 day course.     Iron sats were found to be very low, he received 3 doses of Venofer.    He continued to be diuresed, with decreasing weight  "by ~20 pounds since admission.    On the floors, patient received 1 dose of IV Lasix with improvement in oxygen saturation, now on room air saturating in the high 90s.  Eliquis resumed on 5/26 to be monitored for 24 hours with a stable hemoglobin.    He was assessed by PT and given level III.    Due to hypotension patient's antihypertensives were held.  He was resumed on carvedilol and torsemide.  Amlodipine and losartan have still been held and he is to follow-up with his primary care provider within 1 week to resume those.  Patient has been instructed to monitor his blood pressure at home in the interim and contact his PCP for any consistently aberrant values.    Please see above list of diagnoses and related plan for additional information.     Discharge Day Visit / Exam:   Subjective: Patient seen and examined at bedside.  No overnight events.  Patient denies any pain.  He reports having a normal bowel movement this morning.  Patient tolerating p.o. without any difficulty. Patient eager to be discharged    Vitals: Blood Pressure: 126/76 (05/27/25 1500)  Pulse: 87 (05/27/25 1500)  Temperature: 98.5 °F (36.9 °C) (05/27/25 1500)  Temp Source: Oral (05/27/25 1500)  Respirations: 19 (05/27/25 1500)  Height: 5' 6\" (167.6 cm) (05/21/25 1645)  Weight - Scale: 72.6 kg (160 lb) (05/27/25 0500)  SpO2: 97 % (05/27/25 1500)    Physical Exam  Vitals and nursing note reviewed.   Constitutional:       Appearance: Normal appearance.   HENT:      Head: Normocephalic and atraumatic.      Mouth/Throat:      Mouth: Mucous membranes are moist.     Eyes:      Extraocular Movements: Extraocular movements intact.      Conjunctiva/sclera: Conjunctivae normal.      Pupils: Pupils are equal, round, and reactive to light.       Cardiovascular:      Rate and Rhythm: Normal rate. Rhythm irregular.   Pulmonary:      Effort: Pulmonary effort is normal. No respiratory distress.      Breath sounds: Normal breath sounds.   Abdominal:      General: " Bowel sounds are normal. There is no distension.      Palpations: Abdomen is soft.      Tenderness: There is no abdominal tenderness. There is no guarding.   Genitourinary:     Comments: Left scrotal and left thigh ecchymosis  Right groin access site well-appearing with mild irritation underneath adhesive.  Bandage remove no signs of infection    Musculoskeletal:      Right lower leg: Edema present.      Left lower leg: Edema present.      Comments: Trace edema noted to bilateral lower extremities L>R     Skin:     General: Skin is warm and dry.     Neurological:      General: No focal deficit present.      Mental Status: He is alert. Mental status is at baseline.     Psychiatric:         Mood and Affect: Mood normal.          Discussion with Family: Updated  (wife) via phone.    Discharge instructions/Information to patient and family:   See after visit summary for information provided to patient and family.      Provisions for Follow-Up Care:  See after visit summary for information related to follow-up care and any pertinent home health orders.      Mobility at time of Discharge:   Basic Mobility Inpatient Raw Score: 17  JH-HLM Goal: 5: Stand one or more mins  JH-HLM Achieved: 6: Walk 10 steps or more  HLM Goal achieved. Continue to encourage appropriate mobility.     Disposition:   Home    Planned Readmission: No    Administrative Statements   Discharge Statement:  I have spent a total time of 35 minutes in caring for this patient on the day of the visit/encounter. >30 minutes of time was spent on: Instructions for management, Patient and family education, Importance of tx compliance, Impressions, Counseling / Coordination of care, Documenting in the medical record, and Reviewing / ordering tests, medicine, procedures  .    **Please Note: This note may have been constructed using a voice recognition system**

## 2025-05-28 ENCOUNTER — PATIENT MESSAGE (OUTPATIENT)
Dept: FAMILY MEDICINE CLINIC | Facility: CLINIC | Age: 80
End: 2025-05-28

## 2025-05-28 ENCOUNTER — TELEPHONE (OUTPATIENT)
Age: 80
End: 2025-05-28

## 2025-05-28 DIAGNOSIS — N18.32 STAGE 3B CHRONIC KIDNEY DISEASE (HCC): Primary | ICD-10-CM

## 2025-05-28 DIAGNOSIS — N17.9 AKI (ACUTE KIDNEY INJURY) (HCC): ICD-10-CM

## 2025-05-28 NOTE — TELEPHONE ENCOUNTER
Patients wife Nivia called back.    They do not have any plans to change Primary Care Provider's at this time.  They would like to keep the follow up appointments with Dr. Wright.      They are also requesting that the referral for IR be put in through [facility].      Please call Nivia back with updates.

## 2025-05-28 NOTE — TELEPHONE ENCOUNTER
Patients spouse called in wanting to know if vascular could provide the patient a referral to IR , patient is not currently a patient , advised them to call the primary care provider for the referral and to call us back if they need an appt with vascular. Patient understood.

## 2025-05-28 NOTE — TELEPHONE ENCOUNTER
Please have provider place order for BMP as the patient needs this prior to his TCM on 6/3/25.  Let wife know when order has been placed.  Thank you.

## 2025-05-29 ENCOUNTER — TRANSITIONAL CARE MANAGEMENT (OUTPATIENT)
Dept: FAMILY MEDICINE CLINIC | Facility: CLINIC | Age: 80
End: 2025-05-29

## 2025-05-29 NOTE — TELEPHONE ENCOUNTER
I sent a message via [employer] with referral information.  Does not need an IR referral.  He was seen by IR in the hospital for the stent procedure, but his discharge summary notes that he needs f/u with vascular surgery.      Has TCM scheduled.    Though pt's wife notes that they don't plan to change PCPs, the appts are in the chart for a nurse intake call on 7/1/25 followed by an appointment to establish care with Dr Aguilera on 7/16/25 at the [facility].    It's fine that he is transferring, but he doesn't need to keep his appts w/ me after his TCM since he will be establishing with the new PCP at that point.

## 2025-05-30 ENCOUNTER — RESULTS FOLLOW-UP (OUTPATIENT)
Dept: FAMILY MEDICINE CLINIC | Facility: CLINIC | Age: 80
End: 2025-05-30

## 2025-05-30 ENCOUNTER — APPOINTMENT (OUTPATIENT)
Dept: LAB | Facility: CLINIC | Age: 80
End: 2025-05-30
Attending: FAMILY MEDICINE
Payer: MEDICARE

## 2025-05-30 DIAGNOSIS — N17.9 AKI (ACUTE KIDNEY INJURY) (HCC): ICD-10-CM

## 2025-05-30 DIAGNOSIS — N18.32 STAGE 3B CHRONIC KIDNEY DISEASE (HCC): Primary | ICD-10-CM

## 2025-05-30 DIAGNOSIS — N18.32 STAGE 3B CHRONIC KIDNEY DISEASE (HCC): ICD-10-CM

## 2025-05-30 LAB
ANION GAP SERPL CALCULATED.3IONS-SCNC: 9 MMOL/L (ref 4–13)
BUN SERPL-MCNC: 49 MG/DL (ref 5–25)
CALCIUM SERPL-MCNC: 9.6 MG/DL (ref 8.4–10.2)
CHLORIDE SERPL-SCNC: 103 MMOL/L (ref 96–108)
CO2 SERPL-SCNC: 23 MMOL/L (ref 21–32)
CREAT SERPL-MCNC: 2.08 MG/DL (ref 0.6–1.3)
GFR SERPL CREATININE-BSD FRML MDRD: 29 ML/MIN/1.73SQ M
GLUCOSE P FAST SERPL-MCNC: 141 MG/DL (ref 65–99)
POTASSIUM SERPL-SCNC: 4.7 MMOL/L (ref 3.5–5.3)
SODIUM SERPL-SCNC: 135 MMOL/L (ref 135–147)

## 2025-05-30 PROCEDURE — 36415 COLL VENOUS BLD VENIPUNCTURE: CPT

## 2025-05-30 PROCEDURE — 80048 BASIC METABOLIC PNL TOTAL CA: CPT

## 2025-06-03 ENCOUNTER — APPOINTMENT (OUTPATIENT)
Dept: LAB | Facility: CLINIC | Age: 80
End: 2025-06-03
Payer: MEDICARE

## 2025-06-03 ENCOUNTER — OFFICE VISIT (OUTPATIENT)
Dept: FAMILY MEDICINE CLINIC | Facility: CLINIC | Age: 80
End: 2025-06-03
Payer: MEDICARE

## 2025-06-03 ENCOUNTER — RESULTS FOLLOW-UP (OUTPATIENT)
Dept: FAMILY MEDICINE CLINIC | Facility: CLINIC | Age: 80
End: 2025-06-03

## 2025-06-03 VITALS
OXYGEN SATURATION: 98 % | HEART RATE: 76 BPM | RESPIRATION RATE: 16 BRPM | BODY MASS INDEX: 26.65 KG/M2 | HEIGHT: 66 IN | DIASTOLIC BLOOD PRESSURE: 58 MMHG | SYSTOLIC BLOOD PRESSURE: 98 MMHG | TEMPERATURE: 98.8 F | WEIGHT: 165.8 LBS

## 2025-06-03 DIAGNOSIS — I72.9 PSEUDOANEURYSM (HCC): ICD-10-CM

## 2025-06-03 DIAGNOSIS — N18.32 STAGE 3B CHRONIC KIDNEY DISEASE (HCC): ICD-10-CM

## 2025-06-03 DIAGNOSIS — D69.6 PLATELETS DECREASED (HCC): ICD-10-CM

## 2025-06-03 DIAGNOSIS — I48.19 PERSISTENT ATRIAL FIBRILLATION (HCC): ICD-10-CM

## 2025-06-03 DIAGNOSIS — R93.89 ABNORMAL CT OF THE CHEST: ICD-10-CM

## 2025-06-03 DIAGNOSIS — N17.9 AKI (ACUTE KIDNEY INJURY) (HCC): Primary | ICD-10-CM

## 2025-06-03 DIAGNOSIS — S72.002S CLOSED FRACTURE OF NECK OF LEFT FEMUR, SEQUELA: ICD-10-CM

## 2025-06-03 LAB
ANION GAP SERPL CALCULATED.3IONS-SCNC: 7 MMOL/L (ref 4–13)
BUN SERPL-MCNC: 29 MG/DL (ref 5–25)
CALCIUM SERPL-MCNC: 8.8 MG/DL (ref 8.4–10.2)
CHLORIDE SERPL-SCNC: 104 MMOL/L (ref 96–108)
CO2 SERPL-SCNC: 25 MMOL/L (ref 21–32)
CREAT SERPL-MCNC: 1.48 MG/DL (ref 0.6–1.3)
GFR SERPL CREATININE-BSD FRML MDRD: 44 ML/MIN/1.73SQ M
GLUCOSE P FAST SERPL-MCNC: 130 MG/DL (ref 65–99)
POTASSIUM SERPL-SCNC: 4.7 MMOL/L (ref 3.5–5.3)
SODIUM SERPL-SCNC: 136 MMOL/L (ref 135–147)

## 2025-06-03 PROCEDURE — 80048 BASIC METABOLIC PNL TOTAL CA: CPT

## 2025-06-03 PROCEDURE — 36415 COLL VENOUS BLD VENIPUNCTURE: CPT

## 2025-06-03 PROCEDURE — 99495 TRANSJ CARE MGMT MOD F2F 14D: CPT | Performed by: FAMILY MEDICINE

## 2025-06-03 NOTE — ASSESSMENT & PLAN NOTE
Noted on recent hospitalization.    Required stenting of the L profundofemoral artery with completion angiogram showing no further pseudoaneurysm present.    Discharge notes advise to follow up with vascular surgery as outpatient but pt notes he called and was told he didn't need f/u with vascular.

## 2025-06-03 NOTE — PATIENT INSTRUCTIONS
Continue following up with orthopedics as planned.    Stay off the losartan and amlodipine for now since your BP is low.  Check BP daily and send a log to your nephrologist once a week.

## 2025-06-03 NOTE — ASSESSMENT & PLAN NOTE
Reviewed hospital follow up notes.    5/21/25 CT chest shows new diffuse irregularly shaped pulm nodules and groundglass opacities suspicious for multifocal penumonia- needs f/u CT 6 weeks from previous study and a f/u visit with pulmonary which he has scheduled with [facility] for 7/3/25.

## 2025-06-03 NOTE — ASSESSMENT & PLAN NOTE
Had repeat BMP today prior to OV which demonstrated creatinine back near baseline.  Follows w nephrology- next appointment in one month.    Orders:  •  Comprehensive metabolic panel; Future

## 2025-06-03 NOTE — PROGRESS NOTES
Transition of Care Visit  Name: Abelino Renee      : 1945      MRN: [id number]  Encounter Provider: Edel Wright MD  Encounter Date: 6/3/2025   Encounter department: [facility]    :  Assessment & Plan  RAINE (acute kidney injury) (HCC)  Had repeat BMP today prior to OV which demonstrated creatinine back near baseline.  Follows w nephrology- next appointment in one month.    Orders:  •  Comprehensive metabolic panel; Future    Pseudoaneurysm (HCC)  Noted on recent hospitalization.    Required stenting of the L profundofemoral artery with completion angiogram showing no further pseudoaneurysm present.    Discharge notes advise to follow up with vascular surgery as outpatient but pt notes he called and was told he didn't need f/u with vascular.       Abnormal CT of the chest  Reviewed hospital follow up notes.    25 CT chest shows new diffuse irregularly shaped pulm nodules and groundglass opacities suspicious for multifocal penumonia- needs f/u CT 6 weeks from previous study and a f/u visit with pulmonary which he has scheduled with [facility] for 7/3/25.       Persistent atrial fibrillation (HCC)  Remains on coreg for rate control and eliquis for anticoagulation       Closed fracture of neck of left femur, sequela  Walking with cane when out of the house.         Platelets decreased (HCC)  Lab work ordered  Orders:  •  CBC and differential; Future             History of Present Illness     Transitional Care Management Review:   Abelino Renee is a 79 y.o. male here for TCM follow up.     Pt notes that he is feeling much better than when I saw him last time and sent him to the hospital  Pain much improved since stenting  Walking more easily without getting as SOB  Using cane, feels comfortable with this    Had stent placed for pseudoaneurysm    He notes he was told he doesn't need vascular f/u    Has pulm f/u scheduled    Home BP is stable with losartan and amlodipine  "on hold.  Taking coreg  No palpitations  No CP or SOB    BS ranging     Has f/u appts scheduled w specialists  During the TCM phone call patient stated:  TCM Call (since 5/22/2025)     Date and time call was made  5/29/2025  5:09 PM    Hospital care reviewed  Records reviewed    Patient was hospitialized at  [facility]    Date of Admission  05/21/25    Date of discharge  05/27/25    Diagnosis  respiratory failure    Disposition  Home    Were the patients medications reviewed and updated  Yes    Current Symptoms  None      TCM Call (since 5/22/2025)     Post hospital issues  None    Scheduled for follow up?  Yes    Did you obtain your prescribed medications  Yes    Do you need help managing your prescriptions or medications  No    Is transportation to your appointment needed  No    I have advised the patient to call PCP with any new or worsening symptoms  April YORK LPN    Living Arrangements  Spouse or Significiant other    Support System  Family; Spouse    The type of support provided  Emotional; Physical    Do you have social support  Yes, as much as I need    Are you recieving home care services  No        Review of Systems  Objective   BP 98/58   Pulse 76   Temp 98.8 °F (37.1 °C) (Temporal)   Resp 16   Ht 5' 6\" (1.676 m)   Wt 75.2 kg (165 lb 12.8 oz)   SpO2 98%   BMI 26.76 kg/m²     Physical Exam  Vitals and nursing note reviewed.   Constitutional:       Appearance: Normal appearance. He is not ill-appearing.     Cardiovascular:      Rate and Rhythm: Rhythm irregular.   Pulmonary:      Effort: Pulmonary effort is normal. No respiratory distress.      Breath sounds: Normal breath sounds. No wheezing, rhonchi or rales.     Musculoskeletal:      Right lower leg: No edema.      Left lower leg: Edema (1+, without erythema or warmth) present.     Neurological:      Mental Status: He is alert and oriented to person, place, and time.      Comments: Ambulating more comfortably than previous. Using " single prong cane   Psychiatric:         Mood and Affect: Mood normal.       Medications have been reviewed by provider in current encounter

## 2025-06-26 ENCOUNTER — APPOINTMENT (OUTPATIENT)
Dept: LAB | Facility: HOSPITAL | Age: 80
End: 2025-06-26
Attending: FAMILY MEDICINE
Payer: MEDICARE

## 2025-06-26 DIAGNOSIS — N17.9 AKI (ACUTE KIDNEY INJURY) (HCC): ICD-10-CM

## 2025-06-26 DIAGNOSIS — E87.1 HYPOSMOLALITY SYNDROME: ICD-10-CM

## 2025-06-26 DIAGNOSIS — N18.30 TYPE 2 DIABETES MELLITUS WITH STAGE 3 CHRONIC KIDNEY DISEASE, UNSPECIFIED WHETHER LONG TERM INSULIN USE, UNSPECIFIED WHETHER STAGE 3A OR 3B CKD (HCC): ICD-10-CM

## 2025-06-26 DIAGNOSIS — E11.22 TYPE 2 DIABETES MELLITUS WITH STAGE 3 CHRONIC KIDNEY DISEASE, UNSPECIFIED WHETHER LONG TERM INSULIN USE, UNSPECIFIED WHETHER STAGE 3A OR 3B CKD (HCC): ICD-10-CM

## 2025-06-26 DIAGNOSIS — I10 ESSENTIAL HYPERTENSION, MALIGNANT: ICD-10-CM

## 2025-06-26 DIAGNOSIS — D69.6 PLATELETS DECREASED (HCC): ICD-10-CM

## 2025-06-26 DIAGNOSIS — N18.32 CHRONIC KIDNEY DISEASE (CKD) STAGE G3B/A1, MODERATELY DECREASED GLOMERULAR FILTRATION RATE (GFR) BETWEEN 30-44 ML/MIN/1.73 SQUARE METER AND ALBUMINURIA CREATININE RATIO LESS THAN 30 MG/G (HCC): ICD-10-CM

## 2025-06-26 LAB
ALBUMIN SERPL BCG-MCNC: 3.9 G/DL (ref 3.5–5)
ALP SERPL-CCNC: 79 U/L (ref 34–104)
ALT SERPL W P-5'-P-CCNC: 10 U/L (ref 7–52)
ANION GAP SERPL CALCULATED.3IONS-SCNC: 7 MMOL/L (ref 4–13)
ANISOCYTOSIS BLD QL SMEAR: PRESENT
AST SERPL W P-5'-P-CCNC: 17 U/L (ref 13–39)
BASOPHILS # BLD MANUAL: 0.16 THOUSAND/UL (ref 0–0.1)
BASOPHILS NFR MAR MANUAL: 1 % (ref 0–1)
BILIRUB SERPL-MCNC: 0.69 MG/DL (ref 0.2–1)
BUN SERPL-MCNC: 31 MG/DL (ref 5–25)
CALCIUM SERPL-MCNC: 10.4 MG/DL (ref 8.4–10.2)
CHLORIDE SERPL-SCNC: 102 MMOL/L (ref 96–108)
CO2 SERPL-SCNC: 24 MMOL/L (ref 21–32)
CREAT SERPL-MCNC: 1.5 MG/DL (ref 0.6–1.3)
CREAT UR-MCNC: 55.4 MG/DL
EOSINOPHIL # BLD MANUAL: 0.32 THOUSAND/UL (ref 0–0.4)
EOSINOPHIL NFR BLD MANUAL: 2 % (ref 0–6)
ERYTHROCYTE [DISTWIDTH] IN BLOOD BY AUTOMATED COUNT: 16.8 % (ref 11.6–15.1)
GFR SERPL CREATININE-BSD FRML MDRD: 43 ML/MIN/1.73SQ M
GLUCOSE P FAST SERPL-MCNC: 151 MG/DL (ref 65–99)
HCT VFR BLD AUTO: 26.9 % (ref 36.5–49.3)
HGB BLD-MCNC: 8.3 G/DL (ref 12–17)
LYMPHOCYTES # BLD AUTO: 58 % (ref 14–44)
LYMPHOCYTES # BLD AUTO: 9.2 THOUSAND/UL (ref 0.6–4.47)
MAGNESIUM SERPL-MCNC: 1.9 MG/DL (ref 1.9–2.7)
MCH RBC QN AUTO: 30.2 PG (ref 26.8–34.3)
MCHC RBC AUTO-ENTMCNC: 30.9 G/DL (ref 31.4–37.4)
MCV RBC AUTO: 98 FL (ref 82–98)
MICROALBUMIN UR-MCNC: 35 MG/L
MICROALBUMIN/CREAT 24H UR: 63 MG/G CREATININE (ref 0–30)
MONOCYTES # BLD AUTO: 0.32 THOUSAND/UL (ref 0–1.22)
MONOCYTES NFR BLD: 2 % (ref 4–12)
NEUTROPHILS # BLD MANUAL: 5.87 THOUSAND/UL (ref 1.85–7.62)
NEUTS BAND NFR BLD MANUAL: 1 % (ref 0–8)
NEUTS SEG NFR BLD AUTO: 36 % (ref 43–75)
PHOSPHATE SERPL-MCNC: 4 MG/DL (ref 2.3–4.1)
PLATELET # BLD AUTO: 183 THOUSANDS/UL (ref 149–390)
PLATELET BLD QL SMEAR: ADEQUATE
PMV BLD AUTO: 9.7 FL (ref 8.9–12.7)
POIKILOCYTOSIS BLD QL SMEAR: PRESENT
POTASSIUM SERPL-SCNC: 4.5 MMOL/L (ref 3.5–5.3)
PROT SERPL-MCNC: 8 G/DL (ref 6.4–8.4)
RBC # BLD AUTO: 2.75 MILLION/UL (ref 3.88–5.62)
RBC MORPH BLD: PRESENT
SODIUM SERPL-SCNC: 133 MMOL/L (ref 135–147)
WBC # BLD AUTO: 15.87 THOUSAND/UL (ref 4.31–10.16)

## 2025-06-26 PROCEDURE — 80053 COMPREHEN METABOLIC PANEL: CPT

## 2025-06-26 PROCEDURE — 85027 COMPLETE CBC AUTOMATED: CPT

## 2025-06-26 PROCEDURE — 84100 ASSAY OF PHOSPHORUS: CPT

## 2025-06-26 PROCEDURE — 36415 COLL VENOUS BLD VENIPUNCTURE: CPT

## 2025-06-26 PROCEDURE — 85007 BL SMEAR W/DIFF WBC COUNT: CPT

## 2025-06-26 PROCEDURE — 83735 ASSAY OF MAGNESIUM: CPT

## 2025-06-26 PROCEDURE — 82043 UR ALBUMIN QUANTITATIVE: CPT

## 2025-06-26 PROCEDURE — 82570 ASSAY OF URINE CREATININE: CPT

## 2025-07-10 ENCOUNTER — HOSPITAL ENCOUNTER (OUTPATIENT)
Dept: CT IMAGING | Facility: HOSPITAL | Age: 80
Discharge: HOME | End: 2025-07-10
Attending: INTERNAL MEDICINE
Payer: MEDICARE

## 2025-07-10 DIAGNOSIS — R93.89 ABNORMAL CT OF THE CHEST: ICD-10-CM

## 2025-07-10 PROCEDURE — 71250 CT THORAX DX C-: CPT

## 2025-08-13 ENCOUNTER — APPOINTMENT (OUTPATIENT)
Dept: LAB | Facility: HOSPITAL | Age: 80
End: 2025-08-13
Attending: INTERNAL MEDICINE
Payer: MEDICARE

## 2025-08-13 DIAGNOSIS — C91.10 CLL (CHRONIC LYMPHOCYTIC LEUKEMIA) (HCC): ICD-10-CM

## 2025-08-13 DIAGNOSIS — N18.32 CHRONIC KIDNEY DISEASE (CKD) STAGE G3B/A1, MODERATELY DECREASED GLOMERULAR FILTRATION RATE (GFR) BETWEEN 30-44 ML/MIN/1.73 SQUARE METER AND ALBUMINURIA CREATININE RATIO LESS THAN 30 MG/G (HCC): ICD-10-CM

## 2025-08-13 DIAGNOSIS — E87.1 HYPOSMOLALITY SYNDROME: ICD-10-CM

## 2025-08-13 DIAGNOSIS — D50.9 IRON DEFICIENCY ANEMIA, UNSPECIFIED IRON DEFICIENCY ANEMIA TYPE: ICD-10-CM

## 2025-08-13 LAB
ALBUMIN SERPL BCG-MCNC: 4.3 G/DL (ref 3.5–5)
ALP SERPL-CCNC: 86 U/L (ref 34–104)
ALT SERPL W P-5'-P-CCNC: 13 U/L (ref 7–52)
ANION GAP SERPL CALCULATED.3IONS-SCNC: 8 MMOL/L (ref 4–13)
ANISOCYTOSIS BLD QL SMEAR: PRESENT
AST SERPL W P-5'-P-CCNC: 19 U/L (ref 13–39)
BASOPHILS # BLD MANUAL: 0 THOUSAND/UL (ref 0–0.1)
BASOPHILS NFR MAR MANUAL: 0 % (ref 0–1)
BILIRUB SERPL-MCNC: 0.33 MG/DL (ref 0.2–1)
BUN SERPL-MCNC: 40 MG/DL (ref 5–25)
CALCIUM SERPL-MCNC: 9.4 MG/DL (ref 8.4–10.2)
CHLORIDE SERPL-SCNC: 103 MMOL/L (ref 96–108)
CO2 SERPL-SCNC: 25 MMOL/L (ref 21–32)
CREAT SERPL-MCNC: 1.58 MG/DL (ref 0.6–1.3)
DACRYOCYTES BLD QL SMEAR: PRESENT
EOSINOPHIL # BLD MANUAL: 0.65 THOUSAND/UL (ref 0–0.4)
EOSINOPHIL NFR BLD MANUAL: 3 % (ref 0–6)
ERYTHROCYTE [DISTWIDTH] IN BLOOD BY AUTOMATED COUNT: 15 % (ref 11.6–15.1)
FERRITIN SERPL-MCNC: 24 NG/ML (ref 30–336)
GFR SERPL CREATININE-BSD FRML MDRD: 40 ML/MIN/1.73SQ M
GLUCOSE P FAST SERPL-MCNC: 101 MG/DL (ref 65–99)
HCT VFR BLD AUTO: 33.1 % (ref 36.5–49.3)
HGB BLD-MCNC: 10.4 G/DL (ref 12–17)
IGA SERPL-MCNC: 67 MG/DL (ref 66–433)
IGG SERPL-MCNC: 2184 MG/DL (ref 635–1741)
IGM SERPL-MCNC: 23 MG/DL (ref 45–281)
IRON SATN MFR SERPL: 15 % (ref 15–50)
IRON SERPL-MCNC: 66 UG/DL (ref 50–212)
LDH SERPL-CCNC: 126 U/L (ref 140–271)
LG PLATELETS BLD QL SMEAR: PRESENT
LYMPHOCYTES # BLD AUTO: 15.23 THOUSAND/UL (ref 0.6–4.47)
LYMPHOCYTES # BLD AUTO: 69 % (ref 14–44)
MCH RBC QN AUTO: 29.6 PG (ref 26.8–34.3)
MCHC RBC AUTO-ENTMCNC: 31.4 G/DL (ref 31.4–37.4)
MCV RBC AUTO: 94 FL (ref 82–98)
MONOCYTES # BLD AUTO: 0.44 THOUSAND/UL (ref 0–1.22)
MONOCYTES NFR BLD: 2 % (ref 4–12)
NEUTROPHILS # BLD MANUAL: 5.44 THOUSAND/UL (ref 1.85–7.62)
NEUTS SEG NFR BLD AUTO: 25 % (ref 43–75)
OVALOCYTES BLD QL SMEAR: PRESENT
PLATELET # BLD AUTO: 156 THOUSANDS/UL (ref 149–390)
PLATELET BLD QL SMEAR: ADEQUATE
PMV BLD AUTO: 10.1 FL (ref 8.9–12.7)
POIKILOCYTOSIS BLD QL SMEAR: PRESENT
POTASSIUM SERPL-SCNC: 4.5 MMOL/L (ref 3.5–5.3)
PROT SERPL-MCNC: 8.2 G/DL (ref 6.4–8.4)
RBC # BLD AUTO: 3.51 MILLION/UL (ref 3.88–5.62)
RBC MORPH BLD: PRESENT
SODIUM SERPL-SCNC: 136 MMOL/L (ref 135–147)
TIBC SERPL-MCNC: 438.2 UG/DL (ref 250–450)
TRANSFERRIN SERPL-MCNC: 313 MG/DL (ref 203–362)
UIBC SERPL-MCNC: 372 UG/DL (ref 155–355)
VARIANT LYMPHS # BLD AUTO: 1 %
WBC # BLD AUTO: 21.75 THOUSAND/UL (ref 4.31–10.16)

## 2025-08-13 PROCEDURE — 84165 PROTEIN E-PHORESIS SERUM: CPT

## 2025-08-13 PROCEDURE — 80053 COMPREHEN METABOLIC PANEL: CPT

## 2025-08-13 PROCEDURE — 83550 IRON BINDING TEST: CPT

## 2025-08-13 PROCEDURE — 86334 IMMUNOFIX E-PHORESIS SERUM: CPT

## 2025-08-13 PROCEDURE — 86335 IMMUNFIX E-PHORSIS/URINE/CSF: CPT

## 2025-08-13 PROCEDURE — 82784 ASSAY IGA/IGD/IGG/IGM EACH: CPT

## 2025-08-13 PROCEDURE — 85027 COMPLETE CBC AUTOMATED: CPT

## 2025-08-13 PROCEDURE — 82232 ASSAY OF BETA-2 PROTEIN: CPT

## 2025-08-13 PROCEDURE — 83521 IG LIGHT CHAINS FREE EACH: CPT

## 2025-08-13 PROCEDURE — 84166 PROTEIN E-PHORESIS/URINE/CSF: CPT

## 2025-08-13 PROCEDURE — 85007 BL SMEAR W/DIFF WBC COUNT: CPT

## 2025-08-13 PROCEDURE — 82728 ASSAY OF FERRITIN: CPT

## 2025-08-13 PROCEDURE — 36415 COLL VENOUS BLD VENIPUNCTURE: CPT

## 2025-08-13 PROCEDURE — 83615 LACTATE (LD) (LDH) ENZYME: CPT

## 2025-08-13 PROCEDURE — 83540 ASSAY OF IRON: CPT

## 2025-08-14 LAB
ALBUMIN SERPL ELPH-MCNC: 4.11 G/DL (ref 3.2–5.1)
ALBUMIN SERPL ELPH-MCNC: 52.7 % (ref 48–70)
ALPHA1 GLOB SERPL ELPH-MCNC: 0.18 G/DL (ref 0.15–0.47)
ALPHA1 GLOB SERPL ELPH-MCNC: 2.3 % (ref 1.8–7)
ALPHA2 GLOB SERPL ELPH-MCNC: 0.87 G/DL (ref 0.42–1.04)
ALPHA2 GLOB SERPL ELPH-MCNC: 11.1 % (ref 5.9–14.9)
BETA GLOB ABNORMAL SERPL ELPH-MCNC: 0.48 G/DL (ref 0.31–0.57)
BETA1 GLOB SERPL ELPH-MCNC: 6.2 % (ref 4.7–7.7)
BETA2 GLOB SERPL ELPH-MCNC: 2.9 % (ref 3.1–7.9)
BETA2+GAMMA GLOB SERPL ELPH-MCNC: 0.23 G/DL (ref 0.2–0.58)
GAMMA GLOB ABNORMAL SERPL ELPH-MCNC: 1.93 G/DL (ref 0.4–1.66)
GAMMA GLOB SERPL ELPH-MCNC: 24.8 % (ref 6.9–22.3)
IGG/ALB SER: 1.11 {RATIO} (ref 1.1–1.8)
M PROTEIN 1 SERPL ELPH-MCNC: 1.32 G/DL
PROT SERPL-MCNC: 7.8 G/DL (ref 6.4–8.4)

## 2025-08-14 PROCEDURE — 86334 IMMUNOFIX E-PHORESIS SERUM: CPT | Performed by: PATHOLOGY

## 2025-08-14 PROCEDURE — 84165 PROTEIN E-PHORESIS SERUM: CPT | Performed by: PATHOLOGY

## 2025-08-15 LAB
ALBUMIN UR ELPH-MCNC: 100 %
ALPHA1 GLOB MFR UR ELPH: 0 %
ALPHA2 GLOB MFR UR ELPH: 0 %
B-GLOBULIN MFR UR ELPH: 0 %
GAMMA GLOB MFR UR ELPH: 0 %
PROT UR-MCNC: 14.7 MG/DL

## 2025-08-15 PROCEDURE — 86335 IMMUNFIX E-PHORSIS/URINE/CSF: CPT | Performed by: PATHOLOGY

## 2025-08-15 PROCEDURE — 84166 PROTEIN E-PHORESIS/URINE/CSF: CPT | Performed by: PATHOLOGY

## 2025-08-16 LAB
KAPPA LC FREE SER-MCNC: 61.9 MG/L (ref 3.3–19.4)
KAPPA LC FREE/LAMBDA FREE SER: 3.16 {RATIO} (ref 0.26–1.65)
LAMBDA LC FREE SERPL-MCNC: 19.6 MG/L (ref 5.7–26.3)

## 2025-08-17 LAB — B2 MICROGLOB SERPL-MCNC: 5 MG/L (ref 0.6–2.4)

## 2025-08-21 DIAGNOSIS — I48.0 PAROXYSMAL ATRIAL FIBRILLATION (HCC): ICD-10-CM

## 2025-08-21 DIAGNOSIS — I10 ESSENTIAL HYPERTENSION: ICD-10-CM

## 2025-08-22 ENCOUNTER — OFFICE VISIT (OUTPATIENT)
Dept: HEMATOLOGY ONCOLOGY | Facility: CLINIC | Age: 80
End: 2025-08-22
Payer: MEDICARE

## 2025-08-22 VITALS
WEIGHT: 164 LBS | SYSTOLIC BLOOD PRESSURE: 146 MMHG | OXYGEN SATURATION: 95 % | RESPIRATION RATE: 18 BRPM | DIASTOLIC BLOOD PRESSURE: 64 MMHG | TEMPERATURE: 97.7 F | HEART RATE: 83 BPM | HEIGHT: 66 IN | BODY MASS INDEX: 26.36 KG/M2

## 2025-08-22 DIAGNOSIS — C91.10 CHRONIC LYMPHOCYTIC LEUKEMIA (HCC): Primary | ICD-10-CM

## 2025-08-22 PROCEDURE — 99214 OFFICE O/P EST MOD 30 MIN: CPT | Performed by: INTERNAL MEDICINE

## 2025-08-22 RX ORDER — APIXABAN 5 MG/1
5 TABLET, FILM COATED ORAL 2 TIMES DAILY
Qty: 180 TABLET | Refills: 1 | Status: SHIPPED | OUTPATIENT
Start: 2025-08-22

## (undated) DEVICE — 10FR FRAZIER SUCTION HANDLE: Brand: CARDINAL HEALTH

## (undated) DEVICE — COBAN 4 IN STERILE

## (undated) DEVICE — GAUZE SPONGES,16 PLY: Brand: CURITY

## (undated) DEVICE — CAST PADDING 4 IN SYNTHETIC NON-STRL

## (undated) DEVICE — ACE WRAP 2 IN STERILE

## (undated) DEVICE — GLOVE SRG BIOGEL 8

## (undated) DEVICE — KERLIX BANDAGE ROLL: Brand: KERLIX

## (undated) DEVICE — CURITY NON-ADHERENT STRIPS: Brand: CURITY

## (undated) DEVICE — TUBING SUCTION 5MM X 12 FT

## (undated) DEVICE — PACK MAJOR ORTHO W/SPLITS PBDS

## (undated) DEVICE — INTENDED FOR TISSUE SEPARATION, AND OTHER PROCEDURES THAT REQUIRE A SHARP SURGICAL BLADE TO PUNCTURE OR CUT.: Brand: BARD-PARKER ® CARBON RIB-BACK BLADES

## (undated) DEVICE — NEEDLE 18 G X 1 1/2

## (undated) DEVICE — 3M™ STERI-STRIP™ REINFORCED ADHESIVE SKIN CLOSURES, R1541, 1/4 IN X 3 IN (6 MM X 75 MM), 3 STRIPS/ENVELOPE: Brand: 3M™ STERI-STRIP™

## (undated) DEVICE — SYRINGE 10ML LL

## (undated) DEVICE — PAD CAST 4 IN COTTON NON STERILE

## (undated) DEVICE — GLOVE INDICATOR PI UNDERGLOVE SZ 7 BLUE

## (undated) DEVICE — CUFF TOURNIQUET 18 X 4 IN QUICK CONNECT DISP 1 BLADDER

## (undated) DEVICE — INTENDED FOR TISSUE SEPARATION, AND OTHER PROCEDURES THAT REQUIRE A SHARP SURGICAL BLADE TO PUNCTURE OR CUT.: Brand: BARD-PARKER SAFETY BLADES SIZE 15, STERILE

## (undated) DEVICE — WET SKIN PREP TRAY: Brand: MEDLINE INDUSTRIES, INC.

## (undated) DEVICE — NEEDLE 25G X 1 1/2

## (undated) DEVICE — SCD SEQUENTIAL COMPRESSION COMFORT SLEEVE MEDIUM KNEE LENGTH: Brand: KENDALL SCD

## (undated) DEVICE — GLOVE SRG BIOGEL 7.5

## (undated) DEVICE — K-WIRE
Type: IMPLANTABLE DEVICE | Site: HIP | Status: NON-FUNCTIONAL
Brand: GAMMA
Removed: 2025-03-18

## (undated) DEVICE — REM POLYHESIVE ADULT PATIENT RETURN ELECTRODE: Brand: VALLEYLAB

## (undated) DEVICE — JAMSHIDI BONE MARROW BIOPSY/ASPIRATION NEEDLE, WITH LUER-LOCK ADAPTER 13GX3.5 ASP: Brand: JAMSHIDI

## (undated) DEVICE — NEEDLE 18 G X 1 1/2 SAFETY

## (undated) DEVICE — INTENDED FOR TISSUE SEPARATION, AND OTHER PROCEDURES THAT REQUIRE A SHARP SURGICAL BLADE TO PUNCTURE OR CUT.: Brand: BARD-PARKER SAFETY BLADES SIZE 10, STERILE

## (undated) DEVICE — CALIBRATED DRILL BIT

## (undated) DEVICE — LIGHT HANDLE COVER SLEEVE DISP BLUE STELLAR

## (undated) DEVICE — UNIVERSAL  MINOR EXTREMITY PK: Brand: CARDINAL HEALTH

## (undated) DEVICE — BLADE SAGITTAL 25.6 X 9.5MM

## (undated) DEVICE — GLOVE SRG BIOGEL 7

## (undated) DEVICE — DRAPE C-ARMOUR

## (undated) DEVICE — STERILE ALLENTOWN HAND PACK: Brand: CARDINAL HEALTH

## (undated) DEVICE — 3M™ STERI-STRIP™ REINFORCED ADHESIVE SKIN CLOSURES, R1546, 1/4 IN X 4 IN (6 MM X 100 MM), 10 STRIPS/ENVELOPE: Brand: 3M™ STERI-STRIP™

## (undated) DEVICE — STOCKINETTE REGULAR

## (undated) DEVICE — ACE WRAP 4 IN UNSTERILE

## (undated) DEVICE — 6617 IOBAN II PATIENT ISOLATION DRAPE 5/BX,4BX/CS: Brand: STERI-DRAPE™ IOBAN™ 2

## (undated) DEVICE — 2000CC GUARDIAN II: Brand: GUARDIAN

## (undated) DEVICE — PROXIMATE SKIN STAPLERS (35 WIDE) CONTAINS 35 STAINLESS STEEL STAPLES (FIXED HEAD): Brand: PROXIMATE

## (undated) DEVICE — SAW BLADE MICRO SAGGITAL 4.5MM X 25.5 X .024

## (undated) DEVICE — OCCLUSIVE GAUZE STRIP,3% BISMUTH TRIBROMOPHENATE IN PETROLATUM BLEND: Brand: XEROFORM

## (undated) DEVICE — MEDI-VAC YANK SUCT HNDL W/TPRD BULBOUS TIP: Brand: CARDINAL HEALTH

## (undated) DEVICE — DRILL, AO SMALL: Brand: GAMMA

## (undated) DEVICE — C-ARM: Brand: UNBRANDED

## (undated) DEVICE — CURITY STRETCH BANDAGE: Brand: CURITY

## (undated) DEVICE — DRAPE C-ARM X-RAY

## (undated) DEVICE — GLOVE INDICATOR PI UNDERGLOVE SZ 8 BLUE

## (undated) DEVICE — GLOVE INDICATOR PI UNDERGLOVE SZ 6.5 BLUE

## (undated) DEVICE — CHLORAPREP HI-LITE 26ML ORANGE

## (undated) DEVICE — PADDING CAST 4 IN  COTTON STRL

## (undated) DEVICE — MASTISOL LIQ ADHESIVE 2/3ML

## (undated) DEVICE — STRETCH BANDAGE: Brand: CURITY

## (undated) DEVICE — MAT ABSORBANT ARTHROSCOPY FLOOR 46 X 40 IN

## (undated) DEVICE — VIAL DECANTER

## (undated) DEVICE — SUT VICRYL PLUS 2-0 CTB-1 27 IN VCPB259H

## (undated) DEVICE — SUT VICRYL 0 CT-1 27 IN J340H

## (undated) DEVICE — DISPOSABLE EQUIPMENT COVER: Brand: SMALL TOWEL DRAPE

## (undated) DEVICE — PROXIMAL RASP: Brand: X-FUSE

## (undated) DEVICE — WEBRIL 6 IN UNSTERILE